# Patient Record
Sex: MALE | Race: WHITE | NOT HISPANIC OR LATINO | Employment: OTHER | URBAN - METROPOLITAN AREA
[De-identification: names, ages, dates, MRNs, and addresses within clinical notes are randomized per-mention and may not be internally consistent; named-entity substitution may affect disease eponyms.]

---

## 2017-02-21 ENCOUNTER — HOSPITAL ENCOUNTER (OUTPATIENT)
Dept: RADIOLOGY | Facility: MEDICAL CENTER | Age: 77
Discharge: HOME | End: 2017-02-21
Payer: MEDICARE

## 2017-02-21 ENCOUNTER — TRANSCRIBE ORDERS (OUTPATIENT)
Dept: ADMINISTRATIVE | Facility: HOSPITAL | Age: 77
End: 2017-02-21

## 2017-02-21 DIAGNOSIS — J45.20 INTRINSIC ASTHMA WITHOUT STATUS ASTHMATICUS, MILD INTERMITTENT, UNCOMPLICATED: Primary | ICD-10-CM

## 2017-02-21 DIAGNOSIS — J45.20 INTRINSIC ASTHMA WITHOUT STATUS ASTHMATICUS, MILD INTERMITTENT, UNCOMPLICATED: ICD-10-CM

## 2017-02-21 PROCEDURE — 71020 HB CHEST X-RAY 2VW FRONTAL&LATL: CPT

## 2017-03-02 ENCOUNTER — ALLSCRIPTS OFFICE VISIT (OUTPATIENT)
Dept: OTHER | Facility: OTHER | Age: 77
End: 2017-03-02

## 2017-03-02 ENCOUNTER — TRANSCRIBE ORDERS (OUTPATIENT)
Dept: ADMINISTRATIVE | Facility: HOSPITAL | Age: 77
End: 2017-03-02

## 2017-03-02 DIAGNOSIS — T57.8X1A TOXIC EFFECT OF ASBESTOS: ICD-10-CM

## 2017-03-02 DIAGNOSIS — R05.9 COUGH: Primary | ICD-10-CM

## 2017-03-29 ENCOUNTER — HOSPITAL ENCOUNTER (OUTPATIENT)
Dept: PULMONOLOGY | Facility: HOSPITAL | Age: 77
Discharge: HOME | End: 2017-03-29
Attending: INTERNAL MEDICINE
Payer: MEDICARE

## 2017-03-29 ENCOUNTER — GENERIC CONVERSION - ENCOUNTER (OUTPATIENT)
Dept: OTHER | Facility: OTHER | Age: 77
End: 2017-03-29

## 2017-03-29 DIAGNOSIS — R05.9 COUGH: ICD-10-CM

## 2017-03-29 DIAGNOSIS — T57.8X1A TOXIC EFFECT OF ASBESTOS: ICD-10-CM

## 2017-03-29 PROCEDURE — 94726 PLETHYSMOGRAPHY LUNG VOLUMES: CPT

## 2017-03-29 PROCEDURE — 94760 N-INVAS EAR/PLS OXIMETRY 1: CPT

## 2017-03-29 PROCEDURE — 94729 DIFFUSING CAPACITY: CPT

## 2017-03-29 PROCEDURE — 94060 EVALUATION OF WHEEZING: CPT

## 2017-03-29 RX ORDER — ALBUTEROL SULFATE 2.5 MG/3ML
2.5 SOLUTION RESPIRATORY (INHALATION) ONCE
Status: COMPLETED | OUTPATIENT
Start: 2017-03-29 | End: 2017-03-29

## 2017-03-29 RX ADMIN — ALBUTEROL SULFATE 2.5 MG: 2.5 SOLUTION RESPIRATORY (INHALATION) at 10:32

## 2017-04-14 ENCOUNTER — APPOINTMENT (OUTPATIENT)
Dept: LAB | Facility: MEDICAL CENTER | Age: 77
End: 2017-04-14
Payer: MEDICARE

## 2017-04-14 ENCOUNTER — TRANSCRIBE ORDERS (OUTPATIENT)
Dept: ADMINISTRATIVE | Facility: HOSPITAL | Age: 77
End: 2017-04-14

## 2017-04-14 DIAGNOSIS — I10 ESSENTIAL HYPERTENSION, BENIGN: Primary | ICD-10-CM

## 2017-04-14 DIAGNOSIS — Z87.09 HISTORY OF ASTHMA: ICD-10-CM

## 2017-04-14 DIAGNOSIS — J45.909 INTRINSIC ASTHMA WITHOUT STATUS ASTHMATICUS, UNSPECIFIED ASTHMA SEVERITY, UNCOMPLICATED: ICD-10-CM

## 2017-04-14 DIAGNOSIS — Z00.00 ROUTINE GENERAL MEDICAL EXAMINATION AT A HEALTH CARE FACILITY: ICD-10-CM

## 2017-04-14 LAB
ALBUMIN SERPL BCP-MCNC: 3.6 G/DL (ref 3.5–5)
ALP SERPL-CCNC: 84 U/L (ref 46–116)
ALT SERPL W P-5'-P-CCNC: 18 U/L (ref 12–78)
ANION GAP SERPL CALCULATED.3IONS-SCNC: 5 MMOL/L (ref 4–13)
AST SERPL W P-5'-P-CCNC: 12 U/L (ref 5–45)
BACTERIA UR QL AUTO: NORMAL /HPF
BASOPHILS # BLD AUTO: 0.02 THOUSANDS/ΜL (ref 0–0.1)
BASOPHILS NFR BLD AUTO: 0 % (ref 0–1)
BILIRUB SERPL-MCNC: 1.15 MG/DL (ref 0.2–1)
BILIRUB UR QL STRIP: NEGATIVE
BUN SERPL-MCNC: 21 MG/DL (ref 5–25)
CALCIUM SERPL-MCNC: 8.8 MG/DL (ref 8.3–10.1)
CHLORIDE SERPL-SCNC: 107 MMOL/L (ref 100–108)
CHOLEST SERPL-MCNC: 164 MG/DL (ref 50–200)
CLARITY UR: CLEAR
CO2 SERPL-SCNC: 30 MMOL/L (ref 21–32)
COLOR UR: YELLOW
CREAT SERPL-MCNC: 1.06 MG/DL (ref 0.6–1.3)
EOSINOPHIL # BLD AUTO: 0.19 THOUSAND/ΜL (ref 0–0.61)
EOSINOPHIL NFR BLD AUTO: 3 % (ref 0–6)
ERYTHROCYTE [DISTWIDTH] IN BLOOD BY AUTOMATED COUNT: 13.3 % (ref 11.6–15.1)
GFR SERPL CREATININE-BSD FRML MDRD: >60 ML/MIN/1.73SQ M
GLUCOSE P FAST SERPL-MCNC: 89 MG/DL (ref 65–99)
GLUCOSE UR STRIP-MCNC: NEGATIVE MG/DL
HCT VFR BLD AUTO: 40.2 % (ref 36.5–49.3)
HDLC SERPL-MCNC: 38 MG/DL (ref 40–60)
HGB BLD-MCNC: 13.7 G/DL (ref 12–17)
HGB UR QL STRIP.AUTO: NEGATIVE
HYALINE CASTS #/AREA URNS LPF: NORMAL /LPF
KETONES UR STRIP-MCNC: NEGATIVE MG/DL
LDLC SERPL CALC-MCNC: 105 MG/DL (ref 0–100)
LEUKOCYTE ESTERASE UR QL STRIP: NEGATIVE
LYMPHOCYTES # BLD AUTO: 1.62 THOUSANDS/ΜL (ref 0.6–4.47)
LYMPHOCYTES NFR BLD AUTO: 25 % (ref 14–44)
MCH RBC QN AUTO: 31.9 PG (ref 26.8–34.3)
MCHC RBC AUTO-ENTMCNC: 34.1 G/DL (ref 31.4–37.4)
MCV RBC AUTO: 94 FL (ref 82–98)
MONOCYTES # BLD AUTO: 0.57 THOUSAND/ΜL (ref 0.17–1.22)
MONOCYTES NFR BLD AUTO: 9 % (ref 4–12)
NEUTROPHILS # BLD AUTO: 4.1 THOUSANDS/ΜL (ref 1.85–7.62)
NEUTS SEG NFR BLD AUTO: 63 % (ref 43–75)
NITRITE UR QL STRIP: NEGATIVE
NON-SQ EPI CELLS URNS QL MICRO: NORMAL /HPF
NRBC BLD AUTO-RTO: 0 /100 WBCS
PH UR STRIP.AUTO: 6 [PH] (ref 4.5–8)
PLATELET # BLD AUTO: 166 THOUSANDS/UL (ref 149–390)
PMV BLD AUTO: 10.7 FL (ref 8.9–12.7)
POTASSIUM SERPL-SCNC: 4.2 MMOL/L (ref 3.5–5.3)
PROT SERPL-MCNC: 6.5 G/DL (ref 6.4–8.2)
PROT UR STRIP-MCNC: NEGATIVE MG/DL
RBC # BLD AUTO: 4.3 MILLION/UL (ref 3.88–5.62)
RBC #/AREA URNS AUTO: NORMAL /HPF
SODIUM SERPL-SCNC: 142 MMOL/L (ref 136–145)
SP GR UR STRIP.AUTO: 1.02 (ref 1–1.03)
TRIGL SERPL-MCNC: 106 MG/DL
TSH SERPL DL<=0.05 MIU/L-ACNC: 1.68 UIU/ML (ref 0.36–3.74)
UROBILINOGEN UR QL STRIP.AUTO: 0.2 E.U./DL
WBC # BLD AUTO: 6.52 THOUSAND/UL (ref 4.31–10.16)
WBC #/AREA URNS AUTO: NORMAL /HPF

## 2017-04-14 PROCEDURE — 84443 ASSAY THYROID STIM HORMONE: CPT | Performed by: FAMILY MEDICINE

## 2017-04-14 PROCEDURE — 85025 COMPLETE CBC W/AUTO DIFF WBC: CPT | Performed by: FAMILY MEDICINE

## 2017-04-14 PROCEDURE — 80053 COMPREHEN METABOLIC PANEL: CPT | Performed by: FAMILY MEDICINE

## 2017-04-14 PROCEDURE — 36415 COLL VENOUS BLD VENIPUNCTURE: CPT | Performed by: FAMILY MEDICINE

## 2017-04-14 PROCEDURE — 80061 LIPID PANEL: CPT | Performed by: FAMILY MEDICINE

## 2017-04-14 PROCEDURE — 81001 URINALYSIS AUTO W/SCOPE: CPT | Performed by: FAMILY MEDICINE

## 2018-01-14 VITALS
HEART RATE: 76 BPM | WEIGHT: 221 LBS | BODY MASS INDEX: 30.94 KG/M2 | RESPIRATION RATE: 18 BRPM | DIASTOLIC BLOOD PRESSURE: 64 MMHG | TEMPERATURE: 98.4 F | HEIGHT: 71 IN | OXYGEN SATURATION: 96 % | SYSTOLIC BLOOD PRESSURE: 132 MMHG

## 2018-04-12 ENCOUNTER — TRANSCRIBE ORDERS (OUTPATIENT)
Dept: ADMINISTRATIVE | Facility: HOSPITAL | Age: 78
End: 2018-04-12

## 2018-04-12 ENCOUNTER — LAB (OUTPATIENT)
Dept: LAB | Facility: MEDICAL CENTER | Age: 78
End: 2018-04-12
Payer: MEDICARE

## 2018-04-12 DIAGNOSIS — R53.83 FATIGUE, UNSPECIFIED TYPE: ICD-10-CM

## 2018-04-12 DIAGNOSIS — Z12.5 SPECIAL SCREENING FOR MALIGNANT NEOPLASM OF PROSTATE: ICD-10-CM

## 2018-04-12 DIAGNOSIS — E78.2 MIXED HYPERLIPIDEMIA: ICD-10-CM

## 2018-04-12 DIAGNOSIS — I10 ESSENTIAL HYPERTENSION, BENIGN: ICD-10-CM

## 2018-04-12 DIAGNOSIS — I10 ESSENTIAL HYPERTENSION, BENIGN: Primary | ICD-10-CM

## 2018-04-12 LAB
ALBUMIN SERPL BCP-MCNC: 3.7 G/DL (ref 3.5–5)
ALP SERPL-CCNC: 97 U/L (ref 46–116)
ALT SERPL W P-5'-P-CCNC: 18 U/L (ref 12–78)
ANION GAP SERPL CALCULATED.3IONS-SCNC: 6 MMOL/L (ref 4–13)
AST SERPL W P-5'-P-CCNC: 13 U/L (ref 5–45)
BASOPHILS # BLD AUTO: 0.02 THOUSANDS/ΜL (ref 0–0.1)
BASOPHILS NFR BLD AUTO: 0 % (ref 0–1)
BILIRUB SERPL-MCNC: 0.92 MG/DL (ref 0.2–1)
BILIRUB UR QL STRIP: NEGATIVE
BUN SERPL-MCNC: 29 MG/DL (ref 5–25)
CALCIUM SERPL-MCNC: 8.7 MG/DL
CHLORIDE SERPL-SCNC: 110 MMOL/L (ref 100–108)
CHOLEST SERPL-MCNC: 160 MG/DL (ref 50–200)
CLARITY UR: CLEAR
CO2 SERPL-SCNC: 28 MMOL/L (ref 21–32)
COLOR UR: YELLOW
CREAT SERPL-MCNC: 1.05 MG/DL (ref 0.6–1.3)
EOSINOPHIL # BLD AUTO: 0.11 THOUSAND/ΜL (ref 0–0.61)
EOSINOPHIL NFR BLD AUTO: 2 % (ref 0–6)
ERYTHROCYTE [DISTWIDTH] IN BLOOD BY AUTOMATED COUNT: 12.8 % (ref 11.6–15.1)
GFR SERPL CREATININE-BSD FRML MDRD: 69 ML/MIN/1.73SQ M
GLUCOSE P FAST SERPL-MCNC: 96 MG/DL (ref 65–99)
GLUCOSE UR STRIP-MCNC: NEGATIVE MG/DL
HCT VFR BLD AUTO: 42.4 % (ref 36.5–49.3)
HDLC SERPL-MCNC: 44 MG/DL (ref 40–60)
HGB BLD-MCNC: 13.9 G/DL (ref 12–17)
HGB UR QL STRIP.AUTO: NEGATIVE
KETONES UR STRIP-MCNC: NEGATIVE MG/DL
LDLC SERPL CALC-MCNC: 97 MG/DL (ref 0–100)
LEUKOCYTE ESTERASE UR QL STRIP: NEGATIVE
LYMPHOCYTES # BLD AUTO: 1.66 THOUSANDS/ΜL (ref 0.6–4.47)
LYMPHOCYTES NFR BLD AUTO: 25 % (ref 14–44)
MCH RBC QN AUTO: 31.4 PG (ref 26.8–34.3)
MCHC RBC AUTO-ENTMCNC: 32.8 G/DL (ref 31.4–37.4)
MCV RBC AUTO: 96 FL (ref 82–98)
MONOCYTES # BLD AUTO: 0.52 THOUSAND/ΜL (ref 0.17–1.22)
MONOCYTES NFR BLD AUTO: 8 % (ref 4–12)
NEUTROPHILS # BLD AUTO: 4.22 THOUSANDS/ΜL (ref 1.85–7.62)
NEUTS SEG NFR BLD AUTO: 65 % (ref 43–75)
NITRITE UR QL STRIP: NEGATIVE
NONHDLC SERPL-MCNC: 116 MG/DL
NRBC BLD AUTO-RTO: 0 /100 WBCS
PH UR STRIP.AUTO: 6 [PH] (ref 4.5–8)
PLATELET # BLD AUTO: 173 THOUSANDS/UL (ref 149–390)
PMV BLD AUTO: 10.4 FL (ref 8.9–12.7)
POTASSIUM SERPL-SCNC: 4.7 MMOL/L (ref 3.5–5.3)
PROT SERPL-MCNC: 6.9 G/DL (ref 6.4–8.2)
PROT UR STRIP-MCNC: NEGATIVE MG/DL
PSA SERPL-MCNC: 1.3 NG/ML (ref 0–4)
RBC # BLD AUTO: 4.42 MILLION/UL (ref 3.88–5.62)
SODIUM SERPL-SCNC: 144 MMOL/L (ref 136–145)
SP GR UR STRIP.AUTO: 1.02 (ref 1–1.03)
TRIGL SERPL-MCNC: 95 MG/DL
TSH SERPL DL<=0.05 MIU/L-ACNC: 2.23 UIU/ML (ref 0.36–3.74)
UROBILINOGEN UR QL STRIP.AUTO: 0.2 E.U./DL
WBC # BLD AUTO: 6.54 THOUSAND/UL (ref 4.31–10.16)

## 2018-04-12 PROCEDURE — 81003 URINALYSIS AUTO W/O SCOPE: CPT | Performed by: FAMILY MEDICINE

## 2018-04-12 PROCEDURE — 36415 COLL VENOUS BLD VENIPUNCTURE: CPT

## 2018-04-12 PROCEDURE — 85025 COMPLETE CBC W/AUTO DIFF WBC: CPT

## 2018-04-12 PROCEDURE — G0103 PSA SCREENING: HCPCS

## 2018-04-12 PROCEDURE — 80053 COMPREHEN METABOLIC PANEL: CPT

## 2018-04-12 PROCEDURE — 84443 ASSAY THYROID STIM HORMONE: CPT

## 2018-04-12 PROCEDURE — 80061 LIPID PANEL: CPT

## 2018-05-02 ENCOUNTER — TELEPHONE (OUTPATIENT)
Dept: NEUROLOGY | Facility: CLINIC | Age: 78
End: 2018-05-02

## 2018-06-13 ENCOUNTER — TRANSCRIBE ORDERS (OUTPATIENT)
Dept: ADMINISTRATIVE | Facility: HOSPITAL | Age: 78
End: 2018-06-13

## 2018-06-13 ENCOUNTER — APPOINTMENT (OUTPATIENT)
Dept: LAB | Facility: MEDICAL CENTER | Age: 78
End: 2018-06-13
Payer: MEDICARE

## 2018-06-13 DIAGNOSIS — Z11.9 SCREENING EXAMINATION FOR INFECTIOUS DISEASE: ICD-10-CM

## 2018-06-13 DIAGNOSIS — Z11.9 SCREENING EXAMINATION FOR INFECTIOUS DISEASE: Primary | ICD-10-CM

## 2018-06-13 PROCEDURE — 36415 COLL VENOUS BLD VENIPUNCTURE: CPT

## 2018-06-13 PROCEDURE — 86618 LYME DISEASE ANTIBODY: CPT

## 2018-06-14 LAB
B BURGDOR IGG SER IA-ACNC: 0.07
B BURGDOR IGM SER IA-ACNC: 0.34

## 2018-07-12 ENCOUNTER — OFFICE VISIT (OUTPATIENT)
Dept: NEUROLOGY | Facility: CLINIC | Age: 78
End: 2018-07-12
Payer: MEDICARE

## 2018-07-12 VITALS
DIASTOLIC BLOOD PRESSURE: 70 MMHG | WEIGHT: 209 LBS | SYSTOLIC BLOOD PRESSURE: 118 MMHG | HEIGHT: 71 IN | RESPIRATION RATE: 14 BRPM | HEART RATE: 81 BPM | BODY MASS INDEX: 29.26 KG/M2

## 2018-07-12 DIAGNOSIS — G20 PARKINSON'S DISEASE (HCC): Primary | ICD-10-CM

## 2018-07-12 DIAGNOSIS — R26.89 IMBALANCE: ICD-10-CM

## 2018-07-12 DIAGNOSIS — G31.84 MILD COGNITIVE IMPAIRMENT: ICD-10-CM

## 2018-07-12 PROCEDURE — 99204 OFFICE O/P NEW MOD 45 MIN: CPT | Performed by: PSYCHIATRY & NEUROLOGY

## 2018-07-12 RX ORDER — LOSARTAN POTASSIUM 100 MG/1
100 TABLET ORAL ONCE
COMMUNITY
Start: 2018-06-04 | End: 2019-03-12

## 2018-07-12 NOTE — PROGRESS NOTES
Patient ID: Marlene Fermin  is a 69 y o  male  Assessment/Plan:    No problem-specific Assessment & Plan notes found for this encounter  Diagnoses and all orders for this visit:    Parkinson's disease ([address] )- he has action tremor but otherwise with significant bradykinesia, decremental response and increased tone on the right concerning for PD  Sinemet trial, discussed potential med a/e  -     Ambulatory referral to Physical Therapy; Future- BIG and LOUD therapy  -     MRI brain without contrast; Future  -     carbidopa-levodopa (SINEMET)  mg per tablet; Take 1 tablet by mouth 3 (three) times a day  - Discussed staying hydrated to help with postural light headedness and constipation and daily exercise- stationary bike, aqua therapy at least 30 minutes a day  Mild cognitive impairment  -     Vitamin B12; Future  -     Folate; Future  -     Vitamin D 25 hydroxy; Future    Imbalance  -     Vitamin B12; Future  -     Folate; Future  -     Vitamin D 25 hydroxy; Future    Other orders  -     losartan (COZAAR) 100 MG tablet; Take 100 tablets by mouth once  Follow up in three months time         Subjective:    HPI     Mr Noris Cisneros is a 68 yo left handed male, hx b/l shoulder and knee surgery, hx asbestosis (was a ), seen in consultation for tremors  He tells me he was having therapy for shoulder and PT noted less dexterity in his right and left hands thus here  His wife tells me over time he has gotten weaker- worse for the last 2-3 years with no inciting etiology  States he is more stiff and slower to do things  He states he has had worsening tremors more over the last year; milder prior  Notes it while using a screw  or buttoning shirts but not noted at rest  Wife States speech a little more softer over time- he used to scream all the time on his cell per him but no longer    He denies one sided weakness   States chronic neck pain  Hx skull fracture and concussion s/p fall with ice skating decades ago but no residual symptoms per him  Hx reading issues since childhood, but wife states worse short term memory over the last few years but does ADLs work around the house with no issues per him and her other than physical trouble with tremor  States no trouble with driving, states no getting lost going in the familiar route or issues with reaction time per wife and patient  States no family history of tremors or PD  States no hx CAD, DM, irregular heart beat stroke or seizure  No tobacco use or significant alcohol use  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  States any type of pain medication- he hallucinates  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 thus prefers open MRI  Denies dysphagia, hallucinosis, sleeping trouble or depression or anxiety otherwise  Does report light headedness with standing and constipation- states drinks may 1-2 cups water daily  Denies syncope    The following portions of the patient's history were reviewed and updated as appropriate: allergies, current medications, past family history, past medical history, past social history, past surgical history and problem list          Objective:    Blood pressure 118/70, pulse 81, resp  rate 14, height 5' 11" (1 803 m), weight 94 8 kg (209 lb)  Physical Exam   Constitutional: He appears well-developed and well-nourished  HENT:   Head: Normocephalic and atraumatic  Eyes: Conjunctivae and EOM are normal  Pupils are equal, round, and reactive to light  Neck: Normal range of motion  Neck supple  Cardiovascular: Normal rate and regular rhythm  Pulmonary/Chest: Effort normal    Musculoskeletal: Normal range of motion  Neurological: He has normal strength and normal reflexes  Coordination normal    Nursing note and vitals reviewed        Neurological Exam    Mental Status  The patient is alert and oriented to person, place, time, and situation  His recent and remote memory are normal  He has dysarthria of mild severity  He is able to name object, read and repeat  He has normal attention span and concentration  He follows multi-step commands  He has a normal fund of knowledge  Bradykinetic worse with right  Hypomimia, hypophonia       Cranial Nerves    CN II: The patient's visual acuity and visual fields are normal   CN III, IV, VI: The patient's pupils are equally round and reactive to light and ocular movements are normal   CN V: The patient has normal facial sensation  CN VII:  The patient has symmetric facial movement  CN VIII:  The patient's hearing is normal   CN IX, X: The patient has symmetric palate movement and normal gag reflex  CN XI: The patient's shoulder shrug strength is normal   CN XII: The patient's tongue is midline without atrophy or fasciculations  Motor  The patient has normal muscle bulk throughout  His overall muscle tone is increased throughout  Specifically, the tone is increased in the right arm, increased in the right leg,  He has abnormal movement with bradykinesia  He has right-sided tremor and left-sided tremor  There is postural and intention tremor of the right arm;  There is postural and intention tremor of the left arm;  His strength is 5/5 throughout all four extremities  More bradykinetic on the right with more decremental response with HAM right UE and LE     Sensory  The patient's sensation is normal in all four extremities to light touch, temperature and vibration  He has no right-sided and no left-sided hemispatial neglect  Reflexes  Deep tendon reflexes are 2+ and symmetric in all four extremities with downgoing toes bilaterally  Gait and Coordination   He has a narrow stance  He has a shortened stride  Romberg's sign is negative  He has normal coordination bilaterally  Mildly stooped posture and reduced arm swing b/l          ROS:    Review of Systems   Constitutional: Negative      HENT: Positive for hearing loss and sinus pressure  Eyes: Negative  Respiratory: Positive for shortness of breath  Cardiovascular: Negative  Gastrointestinal: Negative  Endocrine: Negative  Genitourinary: Negative  Musculoskeletal: Positive for back pain and neck pain  Joint pain     Skin: Negative  Allergic/Immunologic: Negative  Neurological: Positive for tremors  Taste or smell changes   Hematological: Negative  Psychiatric/Behavioral: Negative

## 2018-07-12 NOTE — LETTER
July 12, 2018     Miky Robert, [address] [place] [address]  [facility] [address] [place] [address] Road [address]    Patient: Anya Kc  YOB: 1940   Date of Visit: 7/12/2018       Dear Dr Jerry Carrel:    Thank you for referring Susanne Silva to me for evaluation  Below are my notes for this consultation  If you have questions, please do not hesitate to call me  I look forward to following your patient along with you  Sincerely,        Beatrice Vieyra DO        CC: No Recipients  Beatrice Vieyra DO  7/12/2018  6:01 PM  Sign at close encounter  Patient ID: Anya Kc  is a 69 y o  male  Assessment/Plan:    No problem-specific Assessment & Plan notes found for this encounter  Diagnoses and all orders for this visit:    Parkinson's disease ([address] )- he has action tremor but otherwise with significant bradykinesia, decremental response and increased tone on the right concerning for PD  Sinemet trial, discussed potential med a/e  -     Ambulatory referral to Physical Therapy; Future- BIG and LOUD therapy  -     MRI brain without contrast; Future  -     carbidopa-levodopa (SINEMET)  mg per tablet; Take 1 tablet by mouth 3 (three) times a day  - Discussed staying hydrated to help with postural light headedness and constipation and daily exercise- stationary bike, aqua therapy at least 30 minutes a day  Mild cognitive impairment  -     Vitamin B12; Future  -     Folate; Future  -     Vitamin D 25 hydroxy; Future    Imbalance  -     Vitamin B12; Future  -     Folate; Future  -     Vitamin D 25 hydroxy; Future    Other orders  -     losartan (COZAAR) 100 MG tablet; Take 100 tablets by mouth once  Follow up in three months time         Subjective:    HPI     Mr Susanne Silva is a 68 yo left handed male, hx b/l shoulder and knee surgery, hx asbestosis (was a ), seen in consultation for tremors    He tells me he was having therapy for shoulder and PT noted less dexterity in his right and left hands thus here  His wife tells me over time he has gotten weaker- worse for the last 2-3 years with no inciting etiology  States he is more stiff and slower to do things  He states he has had worsening tremors more over the last year; milder prior  Notes it while using a screw  or buttoning shirts but not noted at rest  Wife States speech a little more softer over time- he used to scream all the time on his cell per him but no longer  He denies one sided weakness   States chronic neck pain  Hx skull fracture and concussion s/p fall with ice skating decades ago but no residual symptoms per him  Hx reading issues since childhood, but wife states worse short term memory over the last few years but does ADLs work around the house with no issues per him and her other than physical trouble with tremor  States no trouble with driving, states no getting lost going in the familiar route or issues with reaction time per wife and patient  States no family history of tremors or PD  States no hx CAD, DM, irregular heart beat stroke or seizure  No tobacco use or significant alcohol use  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  States any type of pain medication- he hallucinates  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 thus prefers open MRI  Denies dysphagia, hallucinosis, sleeping trouble or depression or anxiety otherwise  Does report light headedness with standing and constipation- states drinks may 1-2 cups water daily  Denies syncope    The following portions of the patient's history were reviewed and updated as appropriate: allergies, current medications, past family history, past medical history, past social history, past surgical history and problem list          Objective:    Blood pressure 118/70, pulse 81, resp  rate 14, height 5' 11" (1 803 m), weight 94 8 kg (209 lb)      Physical Exam   Constitutional: He appears well-developed and well-nourished  HENT:   Head: Normocephalic and atraumatic  Eyes: Conjunctivae and EOM are normal  Pupils are equal, round, and reactive to light  Neck: Normal range of motion  Neck supple  Cardiovascular: Normal rate and regular rhythm  Pulmonary/Chest: Effort normal    Musculoskeletal: Normal range of motion  Neurological: He has normal strength and normal reflexes  Coordination normal    Nursing note and vitals reviewed  Neurological Exam    Mental Status  The patient is alert and oriented to person, place, time, and situation  His recent and remote memory are normal  He has dysarthria of mild severity  He is able to name object, read and repeat  He has normal attention span and concentration  He follows multi-step commands  He has a normal fund of knowledge  Bradykinetic worse with right  Hypomimia, hypophonia       Cranial Nerves    CN II: The patient's visual acuity and visual fields are normal   CN III, IV, VI: The patient's pupils are equally round and reactive to light and ocular movements are normal   CN V: The patient has normal facial sensation  CN VII:  The patient has symmetric facial movement  CN VIII:  The patient's hearing is normal   CN IX, X: The patient has symmetric palate movement and normal gag reflex  CN XI: The patient's shoulder shrug strength is normal   CN XII: The patient's tongue is midline without atrophy or fasciculations  Motor  The patient has normal muscle bulk throughout  His overall muscle tone is increased throughout  Specifically, the tone is increased in the right arm, increased in the right leg,  He has abnormal movement with bradykinesia  He has right-sided tremor and left-sided tremor  There is postural and intention tremor of the right arm;  There is postural and intention tremor of the left arm;  His strength is 5/5 throughout all four extremities    More bradykinetic on the right with more decremental response with HAM right UE and LE     Sensory  The patient's sensation is normal in all four extremities to light touch, temperature and vibration  He has no right-sided and no left-sided hemispatial neglect  Reflexes  Deep tendon reflexes are 2+ and symmetric in all four extremities with downgoing toes bilaterally  Gait and Coordination   He has a narrow stance  He has a shortened stride  Romberg's sign is negative  He has normal coordination bilaterally  Mildly stooped posture and reduced arm swing b/l          ROS:    Review of Systems   Constitutional: Negative  HENT: Positive for hearing loss and sinus pressure  Eyes: Negative  Respiratory: Positive for shortness of breath  Cardiovascular: Negative  Gastrointestinal: Negative  Endocrine: Negative  Genitourinary: Negative  Musculoskeletal: Positive for back pain and neck pain  Joint pain     Skin: Negative  Allergic/Immunologic: Negative  Neurological: Positive for tremors  Taste or smell changes   Hematological: Negative  Psychiatric/Behavioral: Negative

## 2018-07-16 ENCOUNTER — APPOINTMENT (OUTPATIENT)
Dept: LAB | Facility: MEDICAL CENTER | Age: 78
End: 2018-07-16
Payer: MEDICARE

## 2018-07-16 DIAGNOSIS — G31.84 MILD COGNITIVE IMPAIRMENT: ICD-10-CM

## 2018-07-16 DIAGNOSIS — R26.89 IMBALANCE: ICD-10-CM

## 2018-07-16 LAB
25(OH)D3 SERPL-MCNC: 29.1 NG/ML (ref 30–100)
FOLATE SERPL-MCNC: >20 NG/ML (ref 3.1–17.5)
VIT B12 SERPL-MCNC: 435 PG/ML (ref 100–900)

## 2018-07-16 PROCEDURE — 82746 ASSAY OF FOLIC ACID SERUM: CPT

## 2018-07-16 PROCEDURE — 82607 VITAMIN B-12: CPT

## 2018-07-16 PROCEDURE — 82306 VITAMIN D 25 HYDROXY: CPT

## 2018-07-16 PROCEDURE — 36415 COLL VENOUS BLD VENIPUNCTURE: CPT

## 2018-07-24 NOTE — PROGRESS NOTES
Speech-Language Pathology Initial Evaluation    Today's date: 2018   Patients name: Gerardo Good  : 1940  MRN: [id number]  Safety measures: Parkinson's disease, fall risk   Referring provider: Rena Cintron DO    Medical history significant for:   Past Medical History:   Diagnosis Date    Arthritis     Asbestos exposure     [address] ([facility] [address]     GERD (gastroesophageal reflux disease)     Hypertension     Lung disease     Parkinson's disease ([facility] [address] )        Medication list:   Current Outpatient Prescriptions   Medication Sig Dispense Refill    carbidopa-levodopa (SINEMET)  mg per tablet Take 1 tablet by mouth 3 (three) times a day 90 tablet 3    losartan (COZAAR) 100 MG tablet Take 100 tablets by mouth once       No current facility-administered medications for this visit  Allergies: Allergies   Allergen Reactions    Morphine And Related Other (See Comments)     Patient gets delirious       Subjective comments: "I'm fine "    Patient/family's goal(s): "to talk louder"    Reason for referral: Change in vocal quality  Prior functional status: Communication effective and appropriate in all situations  Clinically complex situations: Mental/behavioral diagnosis affecting rate of recovery    History: Patient is a 69 y o  male who was referred to outpatient skilled Speech Therapy services for an LSVT LOUD evaluation  Per review of record, patient has an action tremor, but otherwise, without significant bradykinesia  Patient was recently dx with Parkinson's disease  Other significant PMH includes MCI, imbalance, concussion s/p fall while ice skating (decades ago), h/o asbestosis (was a )  Patient with a significant PSH of b/l shoulder and knee surgery  Patient's wife indicated that over the past 2-3 years without any inciting etiology, patient had become weaker ("more stiff   slower to do things")  Patient reported that tremors have worsened over the past year  Patient's wife also noted that patient's speech has become "a little more softer over time" over the past 6 months--per review of record, she reported that he used to "scream" while using his cell phone, but no longer does  Patient reported that he is 75% intelligible to both familiar and unfamiliar listeners  Patient demonstrates decreased awareness of his reduced vocal loudness  Hearing: Decreased hearing (Per patient's wife, was evaluated by audiologist; however, did not f/u )  Vision: Reading glasses (h/o Lasik surgery)    Home environment/lifestyle: Lives with wife  Highest level of education: High school  Vocational status:  (retired)    Mental status: Alert; forgetful  Behavior status: Cooperative  Communication modalities: Verbal  Rehabilitation prognosis: Fair rehab potential to reach the established goals    Assessments    [facility] (LSVT) LOUD assessment:    Sound Level Meter-to-Mouth Distance: 50 cm throughout testing    Maximum Duration of Sustained Vowel Phonation (/ah/):   Average MDP 11 9 sec (range 11 25-13 0 sec)   Average Intensity 68 4 dB SPL       Maximum Fundamental Frequency Range:   Highest Pitch 170 3 Hz   Lowest Pitch 138 0 Hz   Average Intensity 78 7 dB SPL     Reading of Words:   Average Intensity  67 2 dB SPL     Reading of Phrases:   Average Intensity  66 7 dB SPL     Conversational Monologue:   Average Intensity  67 2 dB SPL     *Data gathered from reading and conversation tasks revealed vocal SPL levels that may significantly reduce patients speech intelligibility and communicative effectiveness in his functional living environment  **STIMULABILITY TESTING TO [address] LOUD! **    Maximum Duration of Sustained Vowel Phonation (/ah/):   Average MDP 13 5 sec   Average Intensity 87 5 dB SPL       Maximum Fundamental Frequency Range:   Highest Pitch 233 0 Hz   Lowest Pitch 169 3 Hz   Average Intensity 84 5 dB SPL     Reading of Phrases:   Average Intensity  76 0 dB SPL     *Patient demonstrated vocal improvement with loudness and quality in all tasks when stimulated  Voice Handicap Index (VHI): The VHI is a list of 30 statements that many people have used to describe their voices and the effects of their voices on their lives  Patient indicated how frequently he has the same experience using a rating point scale (never = 0, almost never = 1, sometimes = 2, almost always = 3, and always = 4)  Results were as follows:    Subscale: Score: Self-Perceived Impairment Level:   Physical 10/40 Mild   Emotional 5/40 Mild   Functional 9/40 Mild        TOTAL 24/120 Mild       Goals  Short-term goals:  1  Patient and caregiver will be educated on vocal hygiene and demonstrate understanding of recommendations to facilitate improved vocal quality (to be achieved in 1-2 weeks)       2  Patient will increase his vocal loudness from 50% to 75% of the time in order to convey information over the telephone with min verbal cues (to be achieved in 4 weeks)       3  Patient will demonstrate understanding that his louder voice is WNL and will become comfortable increasing his phonatory effort (to be achieved in 4 weeks)      4  Patient will develop the ability to monitor adequate loudness levels in conversation to facilitate increased communication success with min verbal cues (to be achieved in 4 weeks)       Long-term goals:  1    Patient will increase his vocal loudness to an appropriate level to be understood by others with min verbal cues (to be achieved by discharge)       2  Patient will increase self-intelligibility rating by 25% (to be achieved by discharge)      Functional Limitations Reporting (G-codes):    Flowsheet Rows      Most Recent Value   SLP G-Codes   FOTO information reviewed  N/A   Assessment Type  Evaluation   Functional Limitations  Voice   Voice Current Status ()  CL   Voice Goal Status ()  CJ Impressions/Recommendations    Impressions: Patient presents with moderate-severe voice disorder c/b reduced vocal loudness and a breathy vocal quality, which contributes to an overall decrease in speech intelligibility  During conversation, speech intelligibility is reduced 60% of the time as judged by this clinician  Based on stimulability testing, patient is an good candidate for the LSVT LOUD program; however, due to suspected cognitive decline as evidenced through informal observation during testing, patient's rehabilitation prognosis decreases  Rehabilitation prognosis is dependant upon the involment of patient's caregiver (wife)  P[place]et would benefit from skilled speech/voice therapy (LSVT LOUD program) in the outpatient setting 4x/week for 4 weeks (consisting of 16 sessions)  Recommendations:  -Patient would benefit from outpatient skilled Speech Therapy services : LSVT LOUD program    -Frequency: 4x weekly  -Duration: 4 weeks    -Intervention certification from: 2/66/9944  -Intervention certification to: 97/73/7529    Visit Tracking:  -Referring provider: Epic  -Billing guidelines: CMS  -Visit #1/10   -Medicare  [employer]  -RE due 08/25/2018

## 2018-07-25 ENCOUNTER — EVALUATION (OUTPATIENT)
Dept: SPEECH THERAPY | Facility: CLINIC | Age: 78
End: 2018-07-25
Payer: MEDICARE

## 2018-07-25 ENCOUNTER — EVALUATION (OUTPATIENT)
Dept: PHYSICAL THERAPY | Facility: CLINIC | Age: 78
End: 2018-07-25
Payer: MEDICARE

## 2018-07-25 DIAGNOSIS — G20 PARKINSON'S DISEASE (HCC): ICD-10-CM

## 2018-07-25 DIAGNOSIS — R49.9 UNSPECIFIED VOICE AND RESONANCE DISORDER: Primary | ICD-10-CM

## 2018-07-25 PROCEDURE — 97162 PT EVAL MOD COMPLEX 30 MIN: CPT | Performed by: PHYSICAL THERAPIST

## 2018-07-25 PROCEDURE — G9171 VOICE CURRENT STATUS: HCPCS | Performed by: SPEECH-LANGUAGE PATHOLOGIST

## 2018-07-25 PROCEDURE — G8978 MOBILITY CURRENT STATUS: HCPCS | Performed by: PHYSICAL THERAPIST

## 2018-07-25 PROCEDURE — 92524 BEHAVRAL QUALIT ANALYS VOICE: CPT | Performed by: SPEECH-LANGUAGE PATHOLOGIST

## 2018-07-25 PROCEDURE — G9172 VOICE GOAL STATUS: HCPCS | Performed by: SPEECH-LANGUAGE PATHOLOGIST

## 2018-07-25 PROCEDURE — G8979 MOBILITY GOAL STATUS: HCPCS | Performed by: PHYSICAL THERAPIST

## 2018-07-25 NOTE — PROGRESS NOTES
PT Evaluation     Today's date: 2018  Patient name: Reji Slater  : 1940  MRN: [id number]  Referring provider: Carrie Galvez DO  Dx:   Encounter Diagnosis     ICD-10-CM    1  Parkinson's disease ([facility] [address] Ambulatory referral to Physical Therapy       Start Time: 1400  Stop Time: 1500  Total time in clinic (min): 60 minutes    Assessment  Impairments: abnormal gait, impaired balance and safety issue    Assessment details: Pt is a 69year old male referred with Parkinson's  Presented today with impairments in decreased standing balance, (+) fall risk per FGA and TUG scores, occasional bradykinesia all which limit him functionally with gait, elevations, ADLs, and yardwork  Pt will benefit from [facility] BIG program to address these impairments with possible transfer to OT services for this program   Understanding of Dx/Px/POC: good   Prognosis: good    Goals  ST  Pt will demonstrate independence with HEP within 2 weeks  2  Pt will improve TUG cog score by 3 seconds reducing fall risk within 2 weeks  LT  Pt will improve gait speed to at least 1 22 m/s with least restrictive device needed for safe community mobility within 4 weeks  2  Pt will improve FGA score >25/30 needed to reduce fall risk within 4 weeks  3  Pt will improve ABC score to at least 90% within 4 weeks  4  Pt will report improved ability to climb in and out of his mower to mow the lawn within 4 weeks         Plan  Patient would benefit from: skilled physical therapy  Planned therapy interventions: balance, neuromuscular re-education, patient education, strengthening, therapeutic exercise, therapeutic activities, gait training and home exercise program  Frequency: 2x week  Duration in weeks: 4  Treatment plan discussed with: family and patient  Plan details: Certification period from today 18 through 18        Subjective Evaluation    History of Present Illness  Mechanism of injury: Noticed during PT this past winter when having therapy for his shoulder that his hand wasn't moving right  Per wife she was noticing his walking getting slower  Diagnosis with Parkinson's, started meds  Since been on meds has improved  Having problems with buttoning shirt, walking, writing, stumbles on stairs, tremor when action of left hand  Denies falls, but has had near falls  Does not need assistive device  Goals: wants to get hands moving better, walking better, buttoning shirts, slow progression of disease without getting worse, wants to increase speed/time with normal daily things  Pain  No pain reported    Social Support  Steps to enter house: yes  Stairs in house: yes (tobasement)   Lives in: [facility]  Lives with: spouse    Hand dominance: left          Objective    Flowsheet Rows      Most Recent Value   PT/OT G-Codes   Current Score  97   Projected Score  92   FOTO information reviewed  Yes   Assessment Type  Evaluation   G code set  Mobility: Walking & Moving Around   Mobility: Walking and Moving Around Current Status ()  CI   Mobility: Walking and Moving Around Goal Status ()  CI       Patient Reported Functional Limitations: see subjective    Patient Goals: see subjective    MMT: 5/5 throughout BLE except hip ext 3+/5 B, and hip abd 4/5 B   5/5 throughout BUE   Grib L: 80#; R: 73#    AROM: BLE and BUE WFL    Posture: forward head, slightly rounded B shoulders    Coordination  Finger to Nose: normal right and left  Rapid forearm supination and pronation: normal left, slowed on right  Heel on Shin: normal right and left    Sit to Stand Assessment  5xSTS score (time): 9 5 seconds    Gait Assessments  1  Timed Up and Go (TUG)   TUG Score: 11 sec   TUG Manual Score: 12 5 sec   TUG Cognitive Score: 16 3 sec  2  6 minute walk test score: NT  3   Gait speed: 94 m/s without AD   Deviations: decreased heel strike B, decreased speed,   4: Functional Gait Assessment (FGA) <22/30 indicates increased risk for falls: 20/30    Hand Function Assessment  9 Hole Peg Test Score: left: 46 sec; right: 34 sec    Other  1  Modified Clinical Test of Sensory Interaction on Balance  eyes open firm surface: 30s  eyes closed firm surface: 30s  eyes open foam surface: 30s  eyes closed foam surface: 30s, increased sway  2  Activities Specific Balance Confidence Scale (ABC) score: 79%    Identified Functional Component Movements (5)  1  Sit to Stand  2  Buttons on shirt  3  Slouching with posture  4  Putting shoes on  5  Writing    Identified Hierarchies (1-3)  1  Using  (stepping on, steering, pushing pedal)  2  Using power tools  3  Precautions: Parkinson's, HTN  [facility] BIG Daily Treatment Diary      Carryover Assignment                                                                                      Max Daily Exercises             Floor to Ceiling             Side to Side             Forward Step and Reach             Sideways Step and Reach             Backwards Step and Reach             Forwards Rock and Reach             Sideways Rock and Reach             Functional Components             1  STS             2  Buttons on shirt             3  posture             4  Writing             5  Putting shirt on             Hierarchies             1   Getting on              2              BIG walking

## 2018-07-30 NOTE — PROGRESS NOTES
Daily Speech Treatment Note    Today's date: 2018  Patients name: Safia Chaney  : 1940  MRN: [id number]  Safety measures: Parkinson's disease, fall risk  Referring provider: Luis Alas DO    Primary Diagnosis/Billing code: R49 9  Secondary Diagnosis/ Billing code: [address]    Visit Tracking:  -Referring provider: Epic  -Billing guidelines: CMS  -Visit #2/10   -Medicare  [employer]  -RE due 2018  Subjective/Behavioral:  -"I'm okay  My reading and writing are not good " Patient noted to uncontrollably laugh frequently during initial tasks, which appeared to distract patient from task  Patient also noted to have difficulty understanding instruction for tasks during session and required multiple reminders of what to do  Objective/Assessment:    Short-term goals:  1  Patient and caregiver will be educated on vocal hygiene and demonstrate understanding of recommendations to facilitate improved vocal quality (to be achieved in 1-2 weeks)  2  Patient will increase his vocal loudness from 50% to 75% of the time in order to convey information over the telephone with min verbal cues (to be achieved in 4 weeks)  3  Patient will demonstrate understanding that his louder voice is WNL and will become comfortable increasing his phonatory effort (to be achieved in 4 weeks)  4  Patient will develop the ability to monitor adequate loudness levels in conversation to facilitate increased communication success with min verbal cues (to be achieved in 4 weeks)  The following data was collected using [address] [place] [address]  Sound level meter-to-mouth distance: 50 cm      Daily Task #1  Maximum Duration & Intensity of Sustained LOUD /ah/ Phonation:  Average duration: 13 7 sec  Average intensity: 86 7 dB SPL  Perceived level of effort: 10/10  Cues for loudness: Minimal-moderate    Daily Task #2  Maximum Fundamental Frequency Range:  Average Pitch (high): 236 5 Hz  Average Pitch (low): 157 5 Hz  Perceived level of effort: 10/10  Cues for loudness: Minimal  Cues for pitch: Minimal-moderate    Daily Task #3  Maximum Speech Loudness Drill of Functional Phrases:  Average intensity: 72 4 dB SPL  Perceived level of effort: 10/10  Cues for loudness: Minimal-moderate    Average intensity during spontaneous "off the cuff" questions: 65 5 dB SPL    *Hierarchal Speech Loudness Drill (Word Level) not completed due to patient refusal     Plan:  -Patient was provided with home exercises/activities to target goals in plan of care at the end of today's session   -Continue with current plan of care  Patient was treated by Nichol Arndt, graduate SLP student, and was under the direct supervision of AME Steiner , CCC-SLP

## 2018-07-31 ENCOUNTER — OFFICE VISIT (OUTPATIENT)
Dept: SPEECH THERAPY | Facility: CLINIC | Age: 78
End: 2018-07-31
Payer: MEDICARE

## 2018-07-31 ENCOUNTER — OFFICE VISIT (OUTPATIENT)
Dept: PHYSICAL THERAPY | Facility: CLINIC | Age: 78
End: 2018-07-31
Payer: MEDICARE

## 2018-07-31 DIAGNOSIS — R49.9 UNSPECIFIED VOICE AND RESONANCE DISORDER: Primary | ICD-10-CM

## 2018-07-31 DIAGNOSIS — G20 PARKINSON'S DISEASE (HCC): ICD-10-CM

## 2018-07-31 DIAGNOSIS — G20 PARKINSON'S DISEASE (HCC): Primary | ICD-10-CM

## 2018-07-31 PROCEDURE — 92507 TX SP LANG VOICE COMM INDIV: CPT

## 2018-07-31 PROCEDURE — 97112 NEUROMUSCULAR REEDUCATION: CPT

## 2018-07-31 NOTE — PROGRESS NOTES
Daily Note     Today's date: 2018  Patient name: Boom Garcia  : 1940  MRN: [id number]  Referring provider: Luiz Wilkins DO  Dx:   Encounter Diagnosis     ICD-10-CM    1  Parkinson's disease ([facility] [address] ) G20          Subjective: Reports that he had MRI of his head  Objective: See treatment diary below    Precautions: Parkinson's, HTN  [facility] BIG Daily Treatment Diary      Carryover Assignment                                                                                         Max Daily Exercises             Floor to Ceiling  10x           Side to Side  10x           Forward Step and Reach  10x           Sideways Step and Reach  10x           Backwards Step and Reach  10x           Forwards Rock and Reach  10x           Sideways Rock and Reach  10x           Functional Components             1  STS  10x           2  Buttons on shirt  2x           3  posture             4  Picking up coins 1x           5  Putting shirt on             Hierarchies             1  Getting on              2              BIG walking                                                             Assessment: Initiated treatment per POC  Patient requires cueing and shaping for bigger movements  Mild instability noted with standing maximal daily exercises  Educated in postural awareness and correction  Very challenged with coordination with UE/LE with rocking  Patient demonstrates difficulty with fine motor skills, but demonstrates improvement with cueing  Plan: Progress treament per protocol

## 2018-08-01 ENCOUNTER — OFFICE VISIT (OUTPATIENT)
Dept: SPEECH THERAPY | Facility: CLINIC | Age: 78
End: 2018-08-01
Payer: MEDICARE

## 2018-08-01 ENCOUNTER — OFFICE VISIT (OUTPATIENT)
Dept: PHYSICAL THERAPY | Facility: CLINIC | Age: 78
End: 2018-08-01
Payer: MEDICARE

## 2018-08-01 DIAGNOSIS — G20 PARKINSON'S DISEASE (HCC): ICD-10-CM

## 2018-08-01 DIAGNOSIS — G20 PARKINSON'S DISEASE (HCC): Primary | ICD-10-CM

## 2018-08-01 DIAGNOSIS — R49.9 UNSPECIFIED VOICE AND RESONANCE DISORDER: Primary | ICD-10-CM

## 2018-08-01 PROCEDURE — 97116 GAIT TRAINING THERAPY: CPT

## 2018-08-01 PROCEDURE — 92507 TX SP LANG VOICE COMM INDIV: CPT

## 2018-08-01 NOTE — PROGRESS NOTES
Daily Speech Treatment Note    Today's date: 2018  Patients name: Sri Durham  : 1940  MRN: [id number]  Safety measures: Parkinson's disease, fall risk  Referring provider: Roya Bray DO    Primary Diagnosis/Billing code: R49 9  Secondary Diagnosis/ Billing code: [address]    Visit Tracking:  -Referring provider: Epic  -Billing guidelines: CMS  -Visit #3/10   -Medicare  [employer]  -RE due 2018  Subjective/Behavioral:  -"I'm okay  It's rough to be here; I don't want to be " Patient reported he "howled a few times last night" to be loud  He also reported his wife tells him he's "talking awfully soft "    Objective/Assessment:  -Patient did not complete HEP  Patient reported he was busy last night  Short-term goals:  1  Patient and caregiver will be educated on vocal hygiene and demonstrate understanding of recommendations to facilitate improved vocal quality (to be achieved in 1-2 weeks)  2  Patient will increase his vocal loudness from 50% to 75% of the time in order to convey information over the telephone with min verbal cues (to be achieved in 4 weeks)  3  Patient will demonstrate understanding that his louder voice is WNL and will become comfortable increasing his phonatory effort (to be achieved in 4 weeks)  4  Patient will develop the ability to monitor adequate loudness levels in conversation to facilitate increased communication success with min verbal cues (to be achieved in 4 weeks)  The following data was collected using [address] [place] [address]  Sound level meter-to-mouth distance: 50 cm      Daily Task #1  Maximum Duration & Intensity of Sustained LOUD /ah/ Phonation:  Average duration: 12 5 sec  Average intensity: 85 0 dB SPL  Perceived level of effort: 10/10  Cues for loudness: None    Daily Task #2  Maximum Fundamental Frequency Range:  Average Pitch (high): 253 5 Hz  Average Pitch (low): 130 1 Hz  Perceived level of effort: 10/10  Cues for loudness: Minimal  Cues for pitch: Minimal    Daily Task #3  Maximum Speech Loudness Drill of Functional Phrases:  Average intensity: 74 3 dB SPL  Perceived level of effort: 10/10  Cues for loudness: Minimal    Daily Task #4  Hierarchal Speech Loudness Drill (Word Level): Average intensity: 73 7 dB SPL  Perceived level of effort: 10/10  Cues for loudness: Minimal-moderate    *Hierarchal Speech Loudness Drill (Word Level) completed with rote recitation tasks (i e , days of the week, months of the year, alphabet, counting to 10)  Average intensity during spontaneous "off the cuff" questions: 70 8 dB SPL    Plan:  -Patient was provided with home exercises/activities to target goals in plan of care at the end of today's session   -Continue with current plan of care  Patient was treated by Ivana Dhaliwal, graduate SLP student, and was under the direct supervision of AME Paredes , CCC-SLP

## 2018-08-01 NOTE — PROGRESS NOTES
Daily Note     Today's date: 2018  Patient name: Diego Rivera  : 1940  MRN: [id number]  Referring provider: Will Lawrence DO  Dx:   Encounter Diagnosis     ICD-10-CM    1  Parkinson's disease ([facility] [address] ) G20          Subjective: Reports that he had MRI of his head  Objective: See treatment diary below    Precautions: Parkinson's, HTN  [facility] BIG Daily Treatment Diary      Carryover Assignment                                                                                         Max Daily Exercises           Floor to Ceiling  10x  10x         Side to Side  10x  10x         Forward Step and Reach  10x  10x         Sideways Step and Reach  10x  10x         Backwards Step and Reach  10x  10x         Forwards Rock and Reach  10x  10x         Sideways Rock and Reach  10x  10x         Functional Components             1  STS  10x  10x         2  Buttons on shirt  2x  2x         3  posture    during functional components         4  Picking up coins 1x  1x         5  Putting shirt on    2x         Hierarchies             1  Getting on     lifting legs-5x         2 hurdles             BIG walking    10 min, outside-sidewalk, sidewalk hill                                                         Assessment:  Patient requires cueing and shaping for bigger movements  More difficulty and scuffing noted with stepping exercises on right  Very challenged with coordination with UE/LE with rocking  Demonstrates improvement with buttoning with cueing  Cueing during ambulation to  feet and to increased arm swing  More difficulty maintaining arm swing and open hand on left side  Plan: Progress treament per protocol

## 2018-08-02 ENCOUNTER — OFFICE VISIT (OUTPATIENT)
Dept: PHYSICAL THERAPY | Facility: CLINIC | Age: 78
End: 2018-08-02
Payer: MEDICARE

## 2018-08-02 ENCOUNTER — OFFICE VISIT (OUTPATIENT)
Dept: SPEECH THERAPY | Facility: CLINIC | Age: 78
End: 2018-08-02
Payer: MEDICARE

## 2018-08-02 DIAGNOSIS — G20 PARKINSON'S DISEASE (HCC): ICD-10-CM

## 2018-08-02 DIAGNOSIS — G20 PARKINSON'S DISEASE (HCC): Primary | ICD-10-CM

## 2018-08-02 DIAGNOSIS — R49.9 UNSPECIFIED VOICE AND RESONANCE DISORDER: Primary | ICD-10-CM

## 2018-08-02 PROCEDURE — 92507 TX SP LANG VOICE COMM INDIV: CPT

## 2018-08-02 PROCEDURE — 97116 GAIT TRAINING THERAPY: CPT

## 2018-08-02 PROCEDURE — 97112 NEUROMUSCULAR REEDUCATION: CPT

## 2018-08-02 NOTE — PROGRESS NOTES
Daily Speech Treatment Note    Today's date: 2018  Patients name: Joann Hoff  : 1940  MRN: [id number]  Safety measures: Parkinson's disease, fall risk  Referring provider: Dennie Dung, DO    Primary Diagnosis/Billing code: R49 9  Secondary Diagnosis/ Billing code: [address]    Visit Tracking:  -Referring provider: Epic  -Billing guidelines: CMS  -Visit #4/10   -Medicare  [employer]  -RE due 2018  Subjective/Behavioral:  -"I'm okay  I'm being loud at home "    Objective/Assessment:  -Reviewed patient's completed HEP  Short-term goals:  1  Patient and caregiver will be educated on vocal hygiene and demonstrate understanding of recommendations to facilitate improved vocal quality (to be achieved in 1-2 weeks)  2  Patient will increase his vocal loudness from 50% to 75% of the time in order to convey information over the telephone with min verbal cues (to be achieved in 4 weeks)  3  Patient will demonstrate understanding that his louder voice is WNL and will become comfortable increasing his phonatory effort (to be achieved in 4 weeks)  4  Patient will develop the ability to monitor adequate loudness levels in conversation to facilitate increased communication success with min verbal cues (to be achieved in 4 weeks)  The following data was collected using [address] [place] [address]  Sound level meter-to-mouth distance: 50 cm      Daily Task #1  Maximum Duration & Intensity of Sustained LOUD /ah/ Phonation:  Average duration: 9 1 sec  Average intensity: 86 9 dB SPL  Perceived level of effort: 10/10  Cues for loudness: None    Daily Task #2  Maximum Fundamental Frequency Range:  Average Pitch (high): 261 6 Hz  Average Pitch (low): 149 8 Hz  Perceived level of effort: 10/10  Cues for loudness: Minimal  Cues for pitch: Minimal-moderate    Daily Task #3  Maximum Speech Loudness Drill of Functional Phrases:  Average intensity: 78 5 dB SPL  Perceived level of effort: 10/10  Cues for loudness: Minimal-none    Daily Task #4  Hierarchal Speech Loudness Drill (Word Level): Average intensity: 76 5 dB SPL  Perceived level of effort: 10/10  Cues for loudness: Minimal    *Hierarchal Speech Loudness Drill (Word Level) completed with rote recitation tasks (i e , days of the week, months of the year, counting to 20)  Average intensity during spontaneous "off the cuff" questions: 71 2 dB SPL    Plan:  -Patient was provided with home exercises/activities to target goals in plan of care at the end of today's session   -Continue with current plan of care  Patient was treated by Michelle Caldwell, graduate SLP student, and was under the direct supervision of AME Shen , CCC-SLP

## 2018-08-03 ENCOUNTER — OFFICE VISIT (OUTPATIENT)
Dept: PHYSICAL THERAPY | Facility: CLINIC | Age: 78
End: 2018-08-03
Payer: MEDICARE

## 2018-08-03 ENCOUNTER — OFFICE VISIT (OUTPATIENT)
Dept: SPEECH THERAPY | Facility: CLINIC | Age: 78
End: 2018-08-03
Payer: MEDICARE

## 2018-08-03 DIAGNOSIS — G20 PARKINSON'S DISEASE (HCC): Primary | ICD-10-CM

## 2018-08-03 DIAGNOSIS — G20 PARKINSON'S DISEASE (HCC): ICD-10-CM

## 2018-08-03 DIAGNOSIS — R49.9 UNSPECIFIED VOICE AND RESONANCE DISORDER: Primary | ICD-10-CM

## 2018-08-03 PROCEDURE — 97116 GAIT TRAINING THERAPY: CPT

## 2018-08-03 PROCEDURE — 97112 NEUROMUSCULAR REEDUCATION: CPT

## 2018-08-03 PROCEDURE — 92507 TX SP LANG VOICE COMM INDIV: CPT

## 2018-08-03 NOTE — PROGRESS NOTES
Daily Note     Today's date: 8/3/2018  Patient name: Gerardo Good  : 1940  MRN: [id number]  Referring provider: Rena Cintron DO  Dx:   Encounter Diagnosis     ICD-10-CM    1  Parkinson's disease ([facility] [address] ) G20          Subjective: Reports some soreness in right shoulder  Objective: See treatment diary below    Precautions: Parkinson's, HTN  [facility] BIG Daily Treatment Diary      Carryover Assignment                                                                                         Max Daily Exercises  7/31 8/1  8/2  8/3     Floor to Ceiling  10x  10x  22N  w/ finger flicks-10x     Side to Side  10x  10x  74[address]  w/ finger flicks-10x      Forward Step and Reach  10x  10x  10x  10x     Sideways Step and Reach  10x  10x  10x  10x     Backwards Step and Reach  10x  10x  10x  10x    Forwards Rock and Reach  10x  10x  10x  10x     Sideways Rock and Reach  10x  10x  10x  10x     Functional Components             1  STS  10x  10x  10x  10x     2  Buttons on shirt  2x  2x  2x  2x     3  posture    during functional components  during functional components  during functional components     4  Picking up coins 1x  1x  1x  1x     5  Putting shirt on    2x  1x       Hierarchies             1  Getting on     lifting legs-5x  ifting legs-5x       2 hurdles             BIG walking    10 min, outside-sidewalk, sidewalk hill  10 min, outside-sidewalk, sidewalk hill  10 min, outside-sidewalk, sidewalk hill, sticks around clinic                                                   Assessment:  Patient requires cueing and shaping for bigger movements  Has some instability at times with stepping exercises  Very challenged with coordination with UE/LE with rocking  Impression that patients gets confused at times, requires cueing and re-direction  Demonstrated improvements with buttoning today  Still requires cueing to maintain arm swing during ambulation  Plan: Progress treament per protocol

## 2018-08-03 NOTE — PROGRESS NOTES
Daily Speech Treatment Note    Today's date: 8/3/2018  Patients name: Gabe Toscano  : 1940  MRN: [id number]  Safety measures: Parkinson's disease, fall risk  Referring provider: Gary Almanza DO    Primary Diagnosis/Billing code: R49 9  Secondary Diagnosis/ Billing code: Nell Carmona    Visit Tracking:  -Referring provider: Epic  -Billing guidelines: CMS  -Visit #5/10   -Medicare  [employer]  -RE due 2018  Subjective/Behavioral:  n/a    Objective/Assessment:  Patient reports he doesn't always do HEP- during review of HEP it was noted he was not completing it accurately  Education was provided on appropriate complete of HEP  Reciprocal comprehension was verbally expressed  Short-term goals:  1  Patient and caregiver will be educated on vocal hygiene and demonstrate understanding of recommendations to facilitate improved vocal quality (to be achieved in 1-2 weeks)  2  Patient will increase his vocal loudness from 50% to 75% of the time in order to convey information over the telephone with min verbal cues (to be achieved in 4 weeks)  3  Patient will demonstrate understanding that his louder voice is WNL and will become comfortable increasing his phonatory effort (to be achieved in 4 weeks)  4  Patient will develop the ability to monitor adequate loudness levels in conversation to facilitate increased communication success with min verbal cues (to be achieved in 4 weeks)  The following data was collected using [address] [place] [address]  Sound level meter-to-mouth distance: 50 cm      Daily Task #1  Maximum Duration & Intensity of Sustained LOUD /ah/ Phonation:  Average duration: 14 sec  Average intensity: 89 4 dB SPL  Perceived level of effort: 10/10  Cues for loudness: None    Daily Task #2  Maximum Fundamental Frequency Range:  Average Pitch (high): 298 Hz  Average Pitch (low): 165 5 Hz  Perceived level of effort: 10/10  Cues for loudness: Minimal  Cues for pitch: Moderate-maximum    Daily Task #3  Maximum Speech Loudness Drill of Functional Phrases:  Average intensity: 77 2 dB SPL  Perceived level of effort: 10/10  Cues for loudness: None    Daily Task #4  Hierarchal Speech Loudness Drill (Word Level): Average intensity: 63 6 dB SPL  Perceived level of effort: 10/10  Cues for loudness: Minimal-moderate    *Hierarchal Speech Loudness Drill (Word Level) completed with rote recitation tasks (i e , days of the week, months of the year, counting to 20)  Average intensity during spontaneous "off the cuff" questions: 71 4 dB SPL    Plan:  -Patient was provided with home exercises/activities to target goals in plan of care at the end of today's session   -Continue with current plan of care       AME Coreas , [address] [place] [address] Language Pathologist   [id number]

## 2018-08-06 ENCOUNTER — OFFICE VISIT (OUTPATIENT)
Dept: SPEECH THERAPY | Facility: CLINIC | Age: 78
End: 2018-08-06
Payer: MEDICARE

## 2018-08-06 ENCOUNTER — OFFICE VISIT (OUTPATIENT)
Dept: PHYSICAL THERAPY | Facility: CLINIC | Age: 78
End: 2018-08-06
Payer: MEDICARE

## 2018-08-06 DIAGNOSIS — G20 PARKINSON'S DISEASE (HCC): Primary | ICD-10-CM

## 2018-08-06 DIAGNOSIS — G20 PARKINSON'S DISEASE (HCC): ICD-10-CM

## 2018-08-06 DIAGNOSIS — R49.9 UNSPECIFIED VOICE AND RESONANCE DISORDER: Primary | ICD-10-CM

## 2018-08-06 PROCEDURE — 97112 NEUROMUSCULAR REEDUCATION: CPT

## 2018-08-06 PROCEDURE — 92507 TX SP LANG VOICE COMM INDIV: CPT

## 2018-08-06 PROCEDURE — 97116 GAIT TRAINING THERAPY: CPT

## 2018-08-06 NOTE — PROGRESS NOTES
Daily Speech Treatment Note    Today's date: 2018  Patients name: Joann Hoff  : 1940  MRN: [id number]  Safety measures: Parkinson's disease, fall risk  Referring provider: Dennie Dung, DO    Primary Diagnosis/Billing code: R49 9  Secondary Diagnosis/ Billing code: Larisa Erick    Visit Tracking:  -Referring provider: Epic  -Billing guidelines: CMS  -Visit #6/10   -Medicare  [employer]  -RE due 2018  Subjective/Behavioral:  "I'm good " Patient reported sore throat upon arrival to session  Patient reports sore throat began Saturday  Patient was instructed to drink water as needed throughout session  Objective/Assessment:  Patient reported he did not complete HEP over the weekend  Patient reports he "was doing yard work all weekend "    Short-term goals:  1  Patient and caregiver will be educated on vocal hygiene and demonstrate understanding of recommendations to facilitate improved vocal quality (to be achieved in 1-2 weeks)  2  Patient will increase his vocal loudness from 50% to 75% of the time in order to convey information over the telephone with min verbal cues (to be achieved in 4 weeks)  3  Patient will demonstrate understanding that his louder voice is WNL and will become comfortable increasing his phonatory effort (to be achieved in 4 weeks)  4  Patient will develop the ability to monitor adequate loudness levels in conversation to facilitate increased communication success with min verbal cues (to be achieved in 4 weeks)  The following data was collected using [address] [place] [address]  Sound level meter-to-mouth distance: 50 cm      Daily Task #1  Maximum Duration & Intensity of Sustained LOUD /ah/ Phonation:  Average duration: 11 2 sec  Average intensity: 85 5 dB SPL  Perceived level of effort: 10/10  Cues for loudness: None    Daily Task #2  Maximum Fundamental Frequency Range:  Average Pitch (high): 248 9 Hz  Average Pitch (low): 150 3 Hz  Perceived level of effort: 10/10  Cues for loudness: None  Cues for pitch: Minimal    Daily Task #3  Maximum Speech Loudness Drill of Functional Phrases:  Average intensity: 77 8 dB SPL  Perceived level of effort: 10/10  Cues for loudness: Minimal    Daily Task #4  Hierarchal Speech Loudness Drill (Phrase Level): Average intensity: 73 9 dB SPL  Perceived level of effort: 10/10  Cues for loudness: None    *Hierarchal Speech Loudness Drill (Word Level) completed with patient repetition of phrases read aloud by clinician  Average intensity during spontaneous "off the cuff" questions: 70 9 dB SPL    Plan:  -Patient was provided with home exercises/activities to target goals in plan of care at the end of today's session   -Continue with current plan of care  Patient was treated by Aury Wellington, graduate SLP student, and was under the direct supervision of AME Nair , CCC-SLP

## 2018-08-06 NOTE — PROGRESS NOTES
Daily Note     Today's date: 2018  Patient name: Wei Jones  : 1940  MRN: [id number]  Referring provider: Andrew Abbott DO  Dx:   Encounter Diagnosis     ICD-10-CM    1  Parkinson's disease ([facility] [address] ) G20          Subjective: Reports some soreness in right shoulder  States that he was too busy with yard work over the weekend, didn't have time for HEP  Objective: See treatment diary below    Precautions: Parkinson's, HTN  LSVT BIG Daily Treatment Diary      Carryover Assignment                                                                                         Max Daily Exercises  7/31 8/1  8/2  8/3  8/6   Floor to Ceiling  10x  10x  58G  w/ finger [id number]  w/ finger flicks-10x   Side to Side  10x  10x  46Y  w/ finger [id number]   w/ finger flicks-10x   Forward Step and Reach  10x  10x  10x  10x  10x   Sideways Step and Reach  10x  10x  10x  10x  10x   Backwards Step and Reach  10x  10x  10x  10x 10x   Forwards Rock and Reach  10x  10x  10x  10x  10x   Sideways Rock and Reach  10x  10x  10x  10x  10x   Functional Components             1  STS  10x  10x  10x  10x  10x   2  Buttons on shirt  2x  2x  2x  2x  2x   3  posture    during functional components  during functional components  during functional components  during functional components   4  Picking up coins 1x  1x  1x  1x  1x   5  Putting shirt on    2x  1x       Hierarchies             1  Getting on     lifting legs-5x  ifting legs-5x       2 hurdles          fwd/lateral-5 laps   BIG walking    10 min, outside-sidewalk, sidewalk hill  10 min, outside-sidewalk, sidewalk hill  10 min, outside-sidewalk, sidewalk hill, sticks around clinic  10 min, outside-sidewalk, sidewalk hill,    alt step taps w/ opposite arm reach         10x                                   Assessment:  Patient requires cueing and shaping for bigger movements  Very challenged with coordination with UE/LE with rocking   Impression that patients gets confused at times, requires cueing and re-direction  Patient has difficulty with coordiantion of opposite UE/LE with stepping over hurdles, but able to maintain with ambulation  Plan: Progress treament per protocol

## 2018-08-07 ENCOUNTER — OFFICE VISIT (OUTPATIENT)
Dept: SPEECH THERAPY | Facility: CLINIC | Age: 78
End: 2018-08-07
Payer: MEDICARE

## 2018-08-07 ENCOUNTER — APPOINTMENT (OUTPATIENT)
Dept: PHYSICAL THERAPY | Facility: CLINIC | Age: 78
End: 2018-08-07
Payer: MEDICARE

## 2018-08-07 ENCOUNTER — APPOINTMENT (OUTPATIENT)
Dept: OCCUPATIONAL THERAPY | Facility: CLINIC | Age: 78
End: 2018-08-07
Payer: MEDICARE

## 2018-08-07 DIAGNOSIS — R49.9 UNSPECIFIED VOICE AND RESONANCE DISORDER: Primary | ICD-10-CM

## 2018-08-07 DIAGNOSIS — G20 PARKINSON'S DISEASE (HCC): ICD-10-CM

## 2018-08-07 PROCEDURE — 92507 TX SP LANG VOICE COMM INDIV: CPT

## 2018-08-07 NOTE — PROGRESS NOTES
Daily Speech Treatment Note    Today's date: 2018  Patients name: Wei Jones  : 1940  MRN: [id number]  Safety measures: Parkinson's disease, fall risk  Referring provider: Andrew Abbott DO    Primary Diagnosis/Billing code: R49 9  Secondary Diagnosis/ Billing code: [address]    Visit Tracking:  -Referring provider: Epic  -Billing guidelines: CMS  -Visit #7/10   -Medicare  [employer]  -RE due 2018  Subjective/Behavioral:  Patient reports he has been somewhat noncompliant with ST and PT HEP  Discussed importance of carryover of exercises as well as LSVT program and how it works  He reports he often times forgets to do it or gets busy with yardwork  Discussed scheduling a time to get the exercises done so he does not forgot  Reciprocal comprehension was verbally expressed  Objective/Assessment:  Patient reported he did not complete HEP over the weekend  Patient reports he "was doing yard work all weekend "    Short-term goals:  1  Patient and caregiver will be educated on vocal hygiene and demonstrate understanding of recommendations to facilitate improved vocal quality (to be achieved in 1-2 weeks)  2  Patient will increase his vocal loudness from 50% to 75% of the time in order to convey information over the telephone with min verbal cues (to be achieved in 4 weeks)  3  Patient will demonstrate understanding that his louder voice is WNL and will become comfortable increasing his phonatory effort (to be achieved in 4 weeks)  4  Patient will develop the ability to monitor adequate loudness levels in conversation to facilitate increased communication success with min verbal cues (to be achieved in 4 weeks)  The following data was collected using [address] [place] [address]  Sound level meter-to-mouth distance: 50 cm      Daily Task #1  Maximum Duration & Intensity of Sustained LOUD /ah/ Phonation:  Average duration: 10 sec  Average intensity: 90 1 dB SPL  Perceived level of effort: 10/10  Cues for loudness: Minimal    Daily Task #2  Maximum Fundamental Frequency Range:  Average Pitch (high): 251 1 Hz  Average Pitch (low): 157 2 Hz  Perceived level of effort: 10/10  Cues for loudness: None  Cues for pitch: Moderate-maximum    Daily Task #3  Maximum Speech Loudness Drill of Functional Phrases:  Average intensity: 78  5dB SPL  Perceived level of effort: 10/10  Cues for loudness: Minimal-none    Daily Task #4  Hierarchal Speech Loudness Drill (Phrase Level): Average intensity: 77 3 dB SPL  Perceived level of effort: 10/10  Cues for loudness: Minimal-none  Patient was given a list of automatic speech tasks to complete in place of reading  This included the alphabet, counting, JENNIFER, SOPHIE, and reciting the itsy bitsy spider ( as patient reports he says this with his great granddaughter)  Average intensity during spontaneous "off the cuff" questions: 72 6 dB SPL    Spoke with patients wife at the end of today's session to reiterate importance of carryover of HEP and review the HEP worksheet previously provided- answered all related questions as well as reviewed POC  Reciprocal comprehension was verbally expressed  Plan:  -Patient was provided with home exercises/activities to target goals in plan of care at the end of today's session   -Continue with current plan of care       AME Prasad , [address] [place] [address] Language Pathologist   [id number]

## 2018-08-08 ENCOUNTER — OFFICE VISIT (OUTPATIENT)
Dept: SPEECH THERAPY | Facility: CLINIC | Age: 78
End: 2018-08-08
Payer: MEDICARE

## 2018-08-08 ENCOUNTER — OFFICE VISIT (OUTPATIENT)
Dept: PHYSICAL THERAPY | Facility: CLINIC | Age: 78
End: 2018-08-08
Payer: MEDICARE

## 2018-08-08 DIAGNOSIS — G20 PARKINSON'S DISEASE (HCC): ICD-10-CM

## 2018-08-08 DIAGNOSIS — R49.9 UNSPECIFIED VOICE AND RESONANCE DISORDER: Primary | ICD-10-CM

## 2018-08-08 DIAGNOSIS — G20 PARKINSON'S DISEASE (HCC): Primary | ICD-10-CM

## 2018-08-08 PROCEDURE — 97116 GAIT TRAINING THERAPY: CPT

## 2018-08-08 PROCEDURE — 92507 TX SP LANG VOICE COMM INDIV: CPT

## 2018-08-08 PROCEDURE — 97112 NEUROMUSCULAR REEDUCATION: CPT

## 2018-08-08 NOTE — PROGRESS NOTES
Daily Note     Today's date: 2018  Patient name: Boom Garcia  : 1940  MRN: [id number]  Referring provider: Luiz Wilkins DO  Dx:   Encounter Diagnosis     ICD-10-CM    1  Parkinson's disease ([facility] [address] ) G20          Subjective: Reports some soreness in right shoulder  Notes compliance with HEP  States he feels he is improving since starting  Objective: See treatment diary below    Precautions: Parkinson's, HTN  LSVT BIG Daily Treatment Diary      Carryover Assignment                                                                                         Max Daily Exercises         Floor to Ceiling  w/ finger flicks, 34O       Side to Side  w/ finger flicks, 38F       Forward Step and Reach  stepping to blue foam, 10x       Sideways Step and Reach  stepping to blue foam, 10x       Backwards Step and Reach  10x       Forwards Rock and Reach  10x       [employer] and Reach  10x       Functional Components         1  STS  10x       2  Buttons on shirt  2x       3  posture  during functional components       4  Picking up coins/threading beads Standing on foam-1x       5  Putting shirt on         Hierarchies         1  Getting on          2 hurdles  fwd/lateral- 5 laps       BIG walking  10 min-outside,sidewalk and sidewalk hill        alt step taps w/ opposite arm reach                                     Assessment:  Patient demonstrating improved big movements with less cueing required  Added stepping to blue foam for forward and sideways  Continues to be challenged with coordinating of forward rock and reach, but improving  Demonstrates some instability, stepping strategy utilized to correct self  Still requires cueing for arm swing during ambulation  Plan: Progress treament per protocol

## 2018-08-08 NOTE — PROGRESS NOTES
Daily Speech Treatment Note    Today's date: 2018  Patients name: Madina Maria  : 1940  MRN: [id number]  Safety measures: Parkinson's disease, fall risk  Referring provider: Indio Billings DO    Primary Diagnosis/Billing code: R49 9  Secondary Diagnosis/ Billing code: [address]    Visit Tracking:  -Referring provider: Epic  -Billing guidelines: CMS  -Visit #8/10   -Medicare  [employer]  -RE due 2018  Subjective/Behavioral:  Patient reports some improvement with carryover of HEP  Objective/Assessment:    Short-term goals:  1  Patient and caregiver will be educated on vocal hygiene and demonstrate understanding of recommendations to facilitate improved vocal quality (to be achieved in 1-2 weeks)  2  Patient will increase his vocal loudness from 50% to 75% of the time in order to convey information over the telephone with min verbal cues (to be achieved in 4 weeks)  3  Patient will demonstrate understanding that his louder voice is WNL and will become comfortable increasing his phonatory effort (to be achieved in 4 weeks)  4  Patient will develop the ability to monitor adequate loudness levels in conversation to facilitate increased communication success with min verbal cues (to be achieved in 4 weeks)  The following data was collected using [address] [place] [address]  Sound level meter-to-mouth distance: 50 cm  Daily Task #1  Maximum Duration & Intensity of Sustained LOUD /ah/ Phonation:  Average duration: 12 sec  Average intensity: 90 7 dB SPL  Perceived level of effort: 10/10  Cues for loudness: Minimal-moderate    Daily Task #2  Maximum Fundamental Frequency Range:  Average Pitch (high): 260 7 Hz  Average Pitch (low): 162 7 Hz  Perceived level of effort: 10/10  Cues for loudness: None  Cues for pitch:  Moderate-maximum    Daily Task #3  Maximum Speech Loudness Drill of Functional Phrases:  Average intensity: 80dB SPL  Perceived level of effort: 10/10  Cues for loudness: Minimal-none    Daily Task #4  Hierarchal Speech Loudness Drill (Phrase Level): Average intensity: 79 2 dB SPL  Perceived level of effort: 10/10  Cues for loudness: Minimal-none  Patient was given a list of automatic speech tasks to complete in place of reading  This included the alphabet, counting, JENNIFER, SOPHIE, and reciting the itsy bitsy spider ( as patient reports he says this with his great granddaughter)  Average intensity during spontaneous "off the cuff" questions: 71 3 dB SPL    Plan:  -Patient was provided with home exercises/activities to target goals in plan of care at the end of today's session   -Continue with current plan of care       AME Ramirez , [address] [place] [address] Language Pathologist   [id number]

## 2018-08-09 ENCOUNTER — OFFICE VISIT (OUTPATIENT)
Dept: PHYSICAL THERAPY | Facility: CLINIC | Age: 78
End: 2018-08-09
Payer: MEDICARE

## 2018-08-09 ENCOUNTER — OFFICE VISIT (OUTPATIENT)
Dept: SPEECH THERAPY | Facility: CLINIC | Age: 78
End: 2018-08-09
Payer: MEDICARE

## 2018-08-09 DIAGNOSIS — G20 PARKINSON'S DISEASE (HCC): Primary | ICD-10-CM

## 2018-08-09 DIAGNOSIS — R49.9 UNSPECIFIED VOICE AND RESONANCE DISORDER: Primary | ICD-10-CM

## 2018-08-09 DIAGNOSIS — G20 PARKINSON'S DISEASE (HCC): ICD-10-CM

## 2018-08-09 PROCEDURE — 92507 TX SP LANG VOICE COMM INDIV: CPT

## 2018-08-09 PROCEDURE — 97112 NEUROMUSCULAR REEDUCATION: CPT

## 2018-08-09 PROCEDURE — 97116 GAIT TRAINING THERAPY: CPT

## 2018-08-09 NOTE — PROGRESS NOTES
Daily Speech Treatment Note    Today's date: 2018  Patients name: Boom Garcia  : 1940  MRN: [id number]  Safety measures: Parkinson's disease, fall risk  Referring provider: Luiz Wilkins DO    Primary Diagnosis/Billing code: R49 9  Secondary Diagnosis/ Billing code: [address]    Visit Tracking:  -Referring provider: Epic  -Billing guidelines: CMS  -Visit #9/10   -Medicare  [employer]  -RE due 2018  Subjective/Behavioral:  Patient reports he has been doing well with carryover of HEP  Objective/Assessment:    Short-term goals:  1  Patient and caregiver will be educated on vocal hygiene and demonstrate understanding of recommendations to facilitate improved vocal quality (to be achieved in 1-2 weeks)  2  Patient will increase his vocal loudness from 50% to 75% of the time in order to convey information over the telephone with min verbal cues (to be achieved in 4 weeks)  3  Patient will demonstrate understanding that his louder voice is WNL and will become comfortable increasing his phonatory effort (to be achieved in 4 weeks)  4  Patient will develop the ability to monitor adequate loudness levels in conversation to facilitate increased communication success with min verbal cues (to be achieved in 4 weeks)  The following data was collected using [address] [place] [address]  Sound level meter-to-mouth distance: 50 cm  Daily Task #1  Maximum Duration & Intensity of Sustained LOUD /ah/ Phonation:  Average duration: 13 sec  Average intensity: 87 9 dB SPL  Perceived level of effort: 10/10  Cues for loudness: Minimal    Daily Task #2  Maximum Fundamental Frequency Range:  Average Pitch (high): 280 9 Hz  Average Pitch (low): 125 7 Hz  Perceived level of effort: 10/10  Cues for loudness: None  Cues for pitch: Moderate-maximum    Daily Task #3  Maximum Speech Loudness Drill of Functional Phrases:  Average intensity: 77  2dB SPL  Perceived level of effort: 10/10  Cues for loudness: Minimal-none    Daily Task #4  Hierarchal Speech Loudness Drill (Phrase Level): Average intensity: 74 1 dB SPL  Perceived level of effort: 10/10  Cues for loudness: Minimal-none    Patient was given a list of automatic speech tasks to complete in place of reading  This included the alphabet, counting, JENNIFER, SOPHIE, and reciting the itsy bitsy spider ( as patient reports he says this with his great granddaughter)  Average intensity during spontaneous "off the cuff" questions: 71 1 dB SPL    Plan:  -Patient was provided with home exercises/activities to target goals in plan of care at the end of today's session   -Continue with current plan of care

## 2018-08-09 NOTE — PROGRESS NOTES
Daily Note     Today's date: 2018  Patient name: Leonel Bustamante  : 1940  MRN: [id number]  Referring provider: Justin Wolfe DO  Dx:   Encounter Diagnosis     ICD-10-CM    1  Parkinson's disease ([facility] [address] ) G20          Subjective: Reports minimal soreness upon arrival      Objective: See treatment diary below    Precautions: Parkinson's, HTN  LSVT BIG Daily Treatment Diary      Carryover Assignment                                                                                         Max Daily Exercises        Floor to Ceiling  w/ finger flicks, 01W w/ finger flicks, 08T      Side to Side  w/ finger flicks, 20F w/ finger flicks, 80Z      Forward Step and Reach  stepping to blue foam, 10x From blue disks to blue foam, 10x      Sideways Step and Reach  stepping to blue foam, 10x From blue disks to blue foam, 10x      Backwards Step and Reach  10x 10x      Forwards Rock and Reach  10x 10x      Sideways Rock and Reach  10x From blue disks      Functional Components         1  STS  10x Blue foam, 10x      2  Buttons on shirt  2x       3  posture  during functional components  during functional components      4  Picking up coins/threading beads Standing on foam-1x Standing on foam, coins & clips      5  Putting shirt on         Hierarchies         1  Getting on          2 hurdles  fwd/lateral- 5 laps  fwd/lateral- 5 laps      BIG walking  10 min-outside,sidewalk and sidewalk hill 10 min-outside,sidewalk and sidewalk hill       alt step taps w/ opposite arm reach                                   **1# wrist weights and 1 5# ankle weights utilized entire session**    Assessment:  Added wrist and ankle weights today for additional challenge  Patient demonstrating improved big movements with less cueing required  Added standing on blue disks and stepping to blue foam for forward and sideways  Mild instability demonstrated at times   Continues to be challenged with coordinating of forward rock and reach, but improving  Challenged with coordination of opposite arm/leg with stepping over hurdles  Plan: Progress treament per protocol

## 2018-08-13 ENCOUNTER — OFFICE VISIT (OUTPATIENT)
Dept: PHYSICAL THERAPY | Facility: CLINIC | Age: 78
End: 2018-08-13
Payer: MEDICARE

## 2018-08-13 ENCOUNTER — EVALUATION (OUTPATIENT)
Dept: SPEECH THERAPY | Facility: CLINIC | Age: 78
End: 2018-08-13
Payer: MEDICARE

## 2018-08-13 DIAGNOSIS — G20 PARKINSON'S DISEASE (HCC): Primary | ICD-10-CM

## 2018-08-13 DIAGNOSIS — G20 PARKINSON'S DISEASE (HCC): ICD-10-CM

## 2018-08-13 DIAGNOSIS — R49.9 UNSPECIFIED VOICE AND RESONANCE DISORDER: Primary | ICD-10-CM

## 2018-08-13 PROCEDURE — G8979 MOBILITY GOAL STATUS: HCPCS | Performed by: PHYSICAL THERAPIST

## 2018-08-13 PROCEDURE — G9171 VOICE CURRENT STATUS: HCPCS

## 2018-08-13 PROCEDURE — 97110 THERAPEUTIC EXERCISES: CPT | Performed by: PHYSICAL THERAPIST

## 2018-08-13 PROCEDURE — 92507 TX SP LANG VOICE COMM INDIV: CPT

## 2018-08-13 PROCEDURE — G8980 MOBILITY D/C STATUS: HCPCS | Performed by: PHYSICAL THERAPIST

## 2018-08-13 PROCEDURE — 97530 THERAPEUTIC ACTIVITIES: CPT | Performed by: PHYSICAL THERAPIST

## 2018-08-13 PROCEDURE — G9172 VOICE GOAL STATUS: HCPCS

## 2018-08-13 PROCEDURE — 97116 GAIT TRAINING THERAPY: CPT | Performed by: PHYSICAL THERAPIST

## 2018-08-13 NOTE — PROGRESS NOTES
Daily Note     Today's date: 2018  Patient name: Laura Walters  : 1940  MRN: [id number]  Referring provider: José Miguel Peña DO  Dx:   Encounter Diagnosis     ICD-10-CM    1  Parkinson's disease ([facility] [address] ) G20          Subjective: No complaints  Reports compliance with HEP however is not consistent  Discussed transfer to OT BIG program [place]  Objective: See treatment diary below    Precautions: Parkinson's, HTN  LSVT BIG Daily Treatment Diary      Carryover Assignment                                                                                         Max Daily Exercises       Floor to Ceiling  w/ finger flicks, 81T w/ finger flicks, 66E w/ finger flicks, 56R     Side to Side  w/ finger flicks, 76W w/ finger flicks, 21C w/ finger flicks, 58W     Forward Step and Reach  stepping to blue foam, 10x From blue disks to blue foam, 10x From blue disks to blue foam, 10x     Sideways Step and Reach  stepping to blue foam, 10x From blue disks to blue foam, 10x From blue disks to blue foam, 10x     Backwards Step and Reach  10x 10x x10     Forwards Rock and Reach  10x 10x x10     Sideways Rock and Reach  10x From blue disks x10     Functional Components         1  STS  10x Blue foam, 10x Blue foam, 10x     2  Buttons on shirt  2x  1 round each with flicks first with each button     3  posture  during functional components  during functional components While on TM     4  Picking up coins/threading beads Standing on foam-1x Standing on foam, coins & clips Standing on foam, coins  With flicks  1 min ea     5  Putting shirt on    x5 with cues for Bign[place]     Hierarchies         1   Getting on          2 hurdles  fwd/lateral- 5 laps  fwd/lateral- 5 laps fwd/lateral- 3 laps ea     BIG walking  10 min-outside,sidewalk and sidewalk hill 10 min-outside,sidewalk and sidewalk hill x5 min on TM 1 5 mph      alt step taps w/ opposite arm reach                                   **1# wrist weights and 1 5# ankle weights utilized during MDEs and STS**    Assessment:  Added flicks with buttoning and coins which assisted in pt's fine motor and speed of hand movements  Initiated TM to address gait speed and posture during gait, pt required frequent cues to increase heel strike and correct BIG posture however able to complete  Plan: Will have one more PT session, transferring for OT BIG

## 2018-08-13 NOTE — PROGRESS NOTES
Speech Re-Evaluation/Progress Update  Today's date: 2018  Patients name: Aba Sotelo  : 1940  MRN: [id number]  Safety measures: Parkinson's disease, fall risk  Referring provider: Amol Jarrell DO    Primary Diagnosis/Billing code: R49 9  Secondary Diagnosis/ Billing code: [address]    Visit Tracking:  -Referring provider: Epic  -Billing guidelines: CMS  -Visit #1/10  10 total  -Medicare  [employer]  -RE due 2018    Subjective/Behavioral:  Patient reports he has been doing well with carryover of HEP  Patient/family's goal(s): "to talk louder"    Objective/Assessment:    [facility] Voice Treatment (LSVT) LOUD re-assessment:    Sound Level Meter-to-Mouth Distance: 50 cm throughout testing    Maximum Duration of Sustained Vowel Phonation (/ah/): IE: Status:   Average MDP 12 8 sec  11 9 sec IMPROVEMENT   Average Intensity 87 4 dB SPL 68 4 dB IMPROVEMENT         Maximum Fundamental Frequency Range: IE: Status:   Highest Pitch 253 2 Hz 170 3 Hz IMPROVEMENT   Lowest Pitch 130 Hz 138 Hz IMPROVEMENT   Average Intensity 84 dB SPL 78 7 dB IMPROVEMENT         Reading of Words: IE: Status:   Average Intensity  78 8 dB SPL 67 2 dB IMPROVEMENT         Reading of Phrases: IE: Status:   Average Intensity  76 6 dB SPL 66 7 dB IMPROVEMENT     Conversational Monologue: IE: Status:   Average Intensity  70 1 dB SPL 67 2 dB IMPROVEMENT     IE indicates the scores from the initial evaluation (18)  *Data gathered from reading and conversation tasks revealed vocal SPL levels that may significantly reduce patients speech intelligibility and communicative effectiveness in his functional living environment  Short-term goals:  1  Patient and caregiver will be educated on vocal hygiene and demonstrate understanding of recommendations to facilitate improved vocal quality (to be achieved in 1-2 weeks)       2  Patient will increase his vocal loudness from 50% to 75% of the time in order to convey information over the telephone with min verbal cues (to be achieved in 4 weeks)  3  Patient will demonstrate understanding that his louder voice is WNL and will become comfortable increasing his phonatory effort (to be achieved in 4 weeks)  4  Patient will develop the ability to monitor adequate loudness levels in conversation to facilitate increased communication success with min verbal cues (to be achieved in 4 weeks)  Long-term goals:  1   Patient will increase his vocal loudness to an appropriate level to be understood by others with min verbal cues (to be achieved by discharge)       2  Patient will increase self-intelligibility rating by 25% (to be achieved by discharge)  Functional Limitations Reporting (G-codes):    Flowsheet Rows      Most Recent Value   SLP G-Codes   FOTO information reviewed  N/A   Assessment Type  Re-evaluation   Functional Limitations  Voice   Voice Current Status ()  CK   Voice Goal Status ()  CJ              Impressions/Recommendations     Impressions: 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 c/b reduced vocal loudness and a breathy vocal quality, which contributes to an overall decrease in speech intelligibility  During conversation, speech intelligibility is reduced 40% of the time as judged by this clinician  Based on stimulability testing, patient is an good candidate for the LSVT LOUD program; however, due to suspected cognitive decline as evidenced through informal observation during testing, patient's rehabilitation prognosis decreases  Rehabilitation prognosis is dependant upon the involment of patient's caregiver (wife)   Patinet would benefit from skilled speech/voice therapy (LSVT LOUD program) in the outpatient setting 4x/week for 4 weeks (consisting of 16 sessions)       Recommendations:  -Patient would benefit from outpatient skilled Speech Therapy services : LSVT LOUD program     -Frequency: 4x weekly  -Duration: 2-3 weeks     -Intervention certification from: 9/65/97  -Intervention certification to: 8/02/63     Visit Tracking:  -Referring provider: Epic  -Billing guidelines: CMS  -Visit #1/10  (10 total)  -Medicare  [employer]  -RE due 9/13/18    Plan:  -Patient was provided with home exercises/activities to target goals in plan of care at the end of today's session   -Continue with current plan of care

## 2018-08-14 ENCOUNTER — EVALUATION (OUTPATIENT)
Dept: OCCUPATIONAL THERAPY | Facility: CLINIC | Age: 78
End: 2018-08-14
Payer: MEDICARE

## 2018-08-14 ENCOUNTER — OFFICE VISIT (OUTPATIENT)
Dept: SPEECH THERAPY | Facility: CLINIC | Age: 78
End: 2018-08-14
Payer: MEDICARE

## 2018-08-14 DIAGNOSIS — R49.9 UNSPECIFIED VOICE AND RESONANCE DISORDER: Primary | ICD-10-CM

## 2018-08-14 DIAGNOSIS — G20 PARKINSON'S DISEASE (HCC): Primary | ICD-10-CM

## 2018-08-14 DIAGNOSIS — G20 PARKINSON'S DISEASE (HCC): ICD-10-CM

## 2018-08-14 PROCEDURE — 92507 TX SP LANG VOICE COMM INDIV: CPT | Performed by: SPEECH-LANGUAGE PATHOLOGIST

## 2018-08-14 PROCEDURE — G8990 OTHER PT/OT CURRENT STATUS: HCPCS | Performed by: OCCUPATIONAL THERAPIST

## 2018-08-14 PROCEDURE — 97165 OT EVAL LOW COMPLEX 30 MIN: CPT | Performed by: OCCUPATIONAL THERAPIST

## 2018-08-14 PROCEDURE — G8991 OTHER PT/OT GOAL STATUS: HCPCS | Performed by: OCCUPATIONAL THERAPIST

## 2018-08-14 NOTE — PROGRESS NOTES
OCCUPATIONAL THERAPY INITIAL EVALUATION    2018  Zora Sanz    1940  [id number]  Beatrice Vieyra, DO  No diagnosis found  Subjective Evaluation    Quality of life: good      "It's hard for me to tell what my limitations are"  PATIENT GOAL: "I want to increase my speed"  HISTORY OF PRESENT ILLNESS:     Pt is a 69 y o  male who was referred to Occupational Therapy s/p PD diagnosed 2018  Pt with initial symptoms of isolating digits, decreased reciprocal arm swings, and dragging feet with functional ambulation  Pt attended PCP 2* suspicions who then referred Pt to neurologist who recommended Pt for LSVT BIG tx  Pt currently taking Sinemet 3x/day (morning, mid-afternoon, and evening hours)  Pt reports benefits noted since beginning medication  Pt reports difficulties with donning shirt occasionally, decreased reciprocal arm swings, decreased abilities buttoning, difficulties donning suspenders, tremors in B/L hands, and increased time required to complete functional tasks  Pt lives in a [address] with wife  Pt mod I with all ADLs at this time with occasional difficulties buttoning, donning suspenders, and donning some shirts  Pt mod I with increased time required for all IADLs  Pt worked as a ; Pt retired in   Pt continues the role of driving with no difficulties reported  PMH:   Past Medical History:   Diagnosis Date    Arthritis     Asbestos exposure     Asbestosis ([facility] )     GERD (gastroesophageal reflux disease)     Hypertension     Lung disease     Parkinson's disease ([facility] [address] )          Pain Levels:  Restin    With Activity:  6    Pain in R shoulder     Objective     Functional Assessment     Comments  See impairment section for further details  IMPAIRMENT SECTION:  1  9-hole peg test: R 35 58 seconds, L 33 67 seconds (Pt is L hand dominant)  2    TUG 10 38 secs, TUG carry 11 69 secs, TUG cog 23 25 secs   3  5 sit to stands: 11 17 seconds  4  Physical Performance Test 17/28     5  functional task recording form for 10 salient ADL tasks:    -buttoning, donning coat/jacket, improved reciprocal arm swings with functional ambulation, donning suspenders  6  Medication taken 3x/day with meals  Assessment    Assessment details: See skilled analysis for further details  SKILLED ANALYSIS:  Pt is a 69 y o  Male referred to Occupational Therapy s/p new diagnosis of PD  Pt presents with B/L tremors, marked decreased B/L arm swings with functional ambulation, increased time required for task completion of all functional tasks, decreased engagement in activities of daily living as Pt has difficulties in the following: dinning shirts/coats/jackets, donning suspenders, buttoning  Pt will benefit from skilled Occupational Therapy services for LSVT BIG 4x/week for 2-4 weeks (split between PT/OT services) focusing on neuro re-education, ADL training, and self-care management for improved rate of performance, larger amplitude of movement, and good calibration for improved functional performance in salient tasks,  QOL, and community involvement             GOALS:    Short Term Goals:    - Pt will increase BUE reciprocal arm swings to WNL for improved rate of community mobility, improved balance with item transport throughout home and community envt 2-4 weeks    -Pt will demo good carryover of larger amplitude movements with salient  Tasks and ADL engagement with increased pace/speed x 75% 2-4 weeks    -Pt will demo good carryover of LSVT HEP 2 x daily x 4 weeks- 2-4 weeks      -Pt will increase confidence in all rating scales >95% for improved  functional mobility with ADL performance- 2-4 weeks    -Pt will increase rate of performance of Physical Performance Test for  improved QOL with ADLS- 2-4 weeks    -Pt will demo good carryover of larger amplitude movements with salient ADL task  >75% of time for increased participation in meaningful occupations 2-4 weeks    -Pt will report increased rate of activities of daily living and instrumental activities of daily living for improved salient task performance 4 weeks        Long Term Goals:    STG = LTG      PLANNED THERAPY INTERVENTIONS:  Maximal daily exercises abiding by LSVT BIG protocol    INTERVENTION COMMENTS:  Diagnosis: PD  Precautions: Asbestosis, lung disease, HTN  FOTO: will perform next session  Insurance: Payor: [address] [place] [address] / Plan: MEDICARE A AND B / Product Type: Medicare A & B Fee for Service /   1 of 10 visits, PN due 09/14/2018

## 2018-08-14 NOTE — PROGRESS NOTES
Daily Speech Treatment Note    Today's date: 2018  Patients name: Audelia Cortes  : 1940  MRN: [id number]  Safety measures: Parkinson's disease, fall risk  Referring provider: Radha Ordoñez DO    Primary Diagnosis/Billing code: R49 9  Secondary Diagnosis/ Billing code: Enrrique Hewitt    Visit Tracking:  -Referring provider: Epic  -Billing guidelines: CMS  -Visit #2/10  (11 total visits)  -Medicare  [employer]  -RE due 2018    Subjective/Behavioral:  -"I practiced my homework with [place] "    Objective/Assessment:    Short-term goals:  1  Patient and caregiver will be educated on vocal hygiene and demonstrate understanding of recommendations to facilitate improved vocal quality (to be achieved in 1-2 weeks)  2  Patient will increase his vocal loudness from 50% to 75% of the time in order to convey information over the telephone with min verbal cues (to be achieved in 4 weeks)  3  Patient will demonstrate understanding that his louder voice is WNL and will become comfortable increasing his phonatory effort (to be achieved in 4 weeks)  4  Patient will develop the ability to monitor adequate loudness levels in conversation to facilitate increased communication success with min verbal cues (to be achieved in 4 weeks)  The following data was collected using [address] [place] [address]  Sound level meter-to-mouth distance: 50 cm  Daily Task #1  Maximum Duration & Intensity of Sustained LOUD /ah/ Phonation:  Average duration: 12 0 sec  Average intensity: 90 5 dB SPL  Perceived level of effort: 10/10  Cues for loudness: Minimal    Daily Task #2  Maximum Fundamental Frequency Range:  Average Pitch (high): 256 9 Hz  Average Pitch (low): 135 3 Hz  Perceived level of effort: 10/10  Cues for loudness: None  Cues for pitch:  Moderate-maximum    Daily Task #3  Maximum Speech Loudness Drill of Functional Phrases:  Average intensity: 72 1 dB SPL  Perceived level of effort: 9/10  Cues for loudness: Minimal-none    Daily Task #4  Hierarchal Speech Loudness Drill (Phrase Level): Average intensity: 67 7 dB SPL  Perceived level of effort: 10/10  Cues for loudness: Minimal-none    Patient was given a list of automatic speech tasks to complete in place of reading  This included the alphabet, counting, JENNIFER, SOPHIE, and reciting the itsy bitsy spider ( as patient reports he says this with his great granddaughter)  Average intensity during spontaneous "off the cuff" questions: 66 4 dB SPL    Plan:  -Patient was provided with home exercises/activities to target goals in plan of care at the end of today's session   -Continue with current plan of care

## 2018-08-14 NOTE — PROGRESS NOTES
Daily Speech Treatment Note    Today's date: 8/15/2018  Patients name: Gifty Bennett  : 1940  MRN: [id number]  Safety measures: Parkinson's disease, fall risk  Referring provider: Jose De Jesus Campuzano DO    Primary Diagnosis/Billing code: R49 9  Secondary Diagnosis/ Billing code: [address]    Visit Tracking:  -Referring provider: Epic  -Billing guidelines: CMS  -Visit #3/10  (12 total visits)  -Medicare  [employer]  -RE due 2018    Subjective/Behavioral:  -"I'm good "    Objective/Assessment:    Short-term goals:  1  Patient and caregiver will be educated on vocal hygiene and demonstrate understanding of recommendations to facilitate improved vocal quality (to be achieved in 1-2 weeks)  2  Patient will increase his vocal loudness from 50% to 75% of the time in order to convey information over the telephone with min verbal cues (to be achieved in 4 weeks)  3  Patient will demonstrate understanding that his louder voice is WNL and will become comfortable increasing his phonatory effort (to be achieved in 4 weeks)  4  Patient will develop the ability to monitor adequate loudness levels in conversation to facilitate increased communication success with min verbal cues (to be achieved in 4 weeks)  The following data was collected using [address] [place] [address]  Sound level meter-to-mouth distance: 50 cm  Daily Task #1  Maximum Duration & Intensity of Sustained LOUD /ah/ Phonation:  Average duration: 11 1 sec  Average intensity: 94 7 dB SPL  Perceived level of effort: 10/10  Cues for loudness: Minimal    Daily Task #2  Maximum Fundamental Frequency Range:  Average Pitch (high): 234 5 Hz  Average Pitch (low): 152 5 Hz  Perceived level of effort: 10/10  Cues for loudness: None  Cues for pitch:  Moderate    Daily Task #3  Maximum Speech Loudness Drill of Functional Phrases:  Average intensity: 79 2 dB SPL  Perceived level of effort: 9/10  Cues for loudness: Minimal    Daily Task #4  Hierarchal Speech Loudness Drill (Phrase Level): Average intensity: 75 7 dB SPL  Perceived level of effort: 10/10  Cues for loudness: Minimal-none    Patient was given a list of automatic speech tasks to complete in place of reading  This included the alphabet, counting, JENNIFER, SOPHIE, and reciting the itsy bitsy spider ( as patient reports he says this with his great granddaughter)  Average intensity during spontaneous "off the cuff" questions: 71 3 dB SPL    Plan:  -Patient was provided with home exercises/activities to target goals in plan of care at the end of today's session   -Continue with current plan of care

## 2018-08-15 ENCOUNTER — OFFICE VISIT (OUTPATIENT)
Dept: OCCUPATIONAL THERAPY | Facility: CLINIC | Age: 78
End: 2018-08-15
Payer: MEDICARE

## 2018-08-15 ENCOUNTER — OFFICE VISIT (OUTPATIENT)
Dept: SPEECH THERAPY | Facility: CLINIC | Age: 78
End: 2018-08-15
Payer: MEDICARE

## 2018-08-15 DIAGNOSIS — G20 PARKINSON'S DISEASE (HCC): ICD-10-CM

## 2018-08-15 DIAGNOSIS — R49.9 UNSPECIFIED VOICE AND RESONANCE DISORDER: Primary | ICD-10-CM

## 2018-08-15 DIAGNOSIS — G20 PARKINSON'S DISEASE (HCC): Primary | ICD-10-CM

## 2018-08-15 PROCEDURE — 97112 NEUROMUSCULAR REEDUCATION: CPT

## 2018-08-15 PROCEDURE — 92507 TX SP LANG VOICE COMM INDIV: CPT | Performed by: SPEECH-LANGUAGE PATHOLOGIST

## 2018-08-15 PROCEDURE — 97110 THERAPEUTIC EXERCISES: CPT

## 2018-08-15 NOTE — PROGRESS NOTES
Daily Note     Today's date: 8/15/2018  Patient name: Anya Kc  : 1940  MRN: [id number]  Referring provider: Mellisa Brito DO  Dx:   Encounter Diagnosis   Name Primary?  Parkinson's disease ([place] [address] ) Yes                  Subjective: "I keep mixing up which arm I'm supposed to swing "      Objective: See treatment diary below      Assessment: Tolerated treatment well  Pt completed 1x10 reps each side of seated exercises: floor to ceiling reach and side-to-side reach, and in stance: forward steps, sideways steps, backward steps, forward rock and reach, sideways rock and reach, and sit to stand, incorporating finger flicks B29 during each rep of exercises  Focus was on performing large amplitude movements, noted with poor carryover of technique and requiring constant therapist demonstration and frequent verbal and physical cues for positioning  Completed exercises with slow pace, noted with BUE tremoring during reach and occasional LOB with pt able to self-correct  BUE bean bag toss in quadruped on mat table requiring crossing midline and large amplitude movements to retrieve bean bag and toss towards power web targets on floor in front of pt  Focus was on improving BUE reciprocal arm swings for improved balance, noted with continued BUE tremoring and fair carryover of technique requiring frequent cues for sequencing and finger flicks  Plan: Continue skilled OT per POC with focus on carryover of large amplitude movements, BUE reciprocal arm swings, and pacing      INTERVENTION COMMENTS:  Diagnosis: Parkinson's disease ([facility] ) [G20]  Precautions: Asbestosis, lung disease, HTN  FOTO: will perform next session  Insurance: Payor: MEDICARE / Plan: MEDICARE A AND B / Product Type: Medicare A & B Fee for Service /   2 of 10 visits, PN due 2018

## 2018-08-16 ENCOUNTER — OFFICE VISIT (OUTPATIENT)
Dept: OCCUPATIONAL THERAPY | Facility: CLINIC | Age: 78
End: 2018-08-16
Payer: MEDICARE

## 2018-08-16 ENCOUNTER — OFFICE VISIT (OUTPATIENT)
Dept: SPEECH THERAPY | Facility: CLINIC | Age: 78
End: 2018-08-16
Payer: MEDICARE

## 2018-08-16 DIAGNOSIS — G20 PARKINSON'S DISEASE (HCC): Primary | ICD-10-CM

## 2018-08-16 DIAGNOSIS — R49.9 UNSPECIFIED VOICE AND RESONANCE DISORDER: Primary | ICD-10-CM

## 2018-08-16 DIAGNOSIS — G20 PARKINSON'S DISEASE (HCC): ICD-10-CM

## 2018-08-16 PROCEDURE — 92507 TX SP LANG VOICE COMM INDIV: CPT | Performed by: SPEECH-LANGUAGE PATHOLOGIST

## 2018-08-16 PROCEDURE — 97535 SELF CARE MNGMENT TRAINING: CPT

## 2018-08-16 PROCEDURE — 97112 NEUROMUSCULAR REEDUCATION: CPT

## 2018-08-16 NOTE — PROGRESS NOTES
Daily Speech Treatment Note    Today's date: 2018   Patients name: Anya Kc  : 1940  MRN: [id number]  Safety measures: Parkinson's disease, fall risk  Referring provider: Mellisa Brito DO    Primary Diagnosis/Billing code: R49 9  Secondary Diagnosis/ Billing code: [address]    Visit Tracking:  -Referring provider: Epic  -Billing guidelines: CMS  -Visit #4/10  (13 total visits)  -Medicare  [employer]  -RE due 2018    Subjective/Behavioral:  -"My phone ran out of battery '    Objective/Assessment:    Short-term goals:  1  Patient and caregiver will be educated on vocal hygiene and demonstrate understanding of recommendations to facilitate improved vocal quality (to be achieved in 1-2 weeks)  2  Patient will increase his vocal loudness from 50% to 75% of the time in order to convey information over the telephone with min verbal cues (to be achieved in 4 weeks)  3  Patient will demonstrate understanding that his louder voice is WNL and will become comfortable increasing his phonatory effort (to be achieved in 4 weeks)  4  Patient will develop the ability to monitor adequate loudness levels in conversation to facilitate increased communication success with min verbal cues (to be achieved in 4 weeks)  The following data was collected using [address] [place] [address]  Sound level meter-to-mouth distance: 50 cm      Daily Task #1  Maximum Duration & Intensity of Sustained LOUD /ah/ Phonation:  Average duration: 12 4 sec  Average intensity: 90 0 dB SPL  Perceived level of effort: 10/10  Cues for loudness: Minimal    Daily Task #2  Maximum Fundamental Frequency Range:  Average Pitch (high): 239 7 Hz  Average Pitch (low): 148 7 Hz  Perceived level of effort: 10/10  Cues for loudness: None  Cues for pitch: Minimal    Daily Task #3  Maximum Speech Loudness Drill of Functional Phrases:  Average intensity: 77 7 dB SPL  Perceived level of effort: 9/10  Cues for loudness: Minimal    Daily Task #4  Hierarchal Speech Loudness Drill (Phrase Level): Average intensity: 74 0 dB SPL  Perceived level of effort: 10/10  Cues for loudness: Minimal-none    Patient was given a list of automatic speech tasks to complete in place of reading  This included the alphabet, counting, JENNIFER, SOPHIE, and reciting the itsy bitsy spider ( as patient reports he says this with his great granddaughter)  Average intensity during spontaneous "off the cuff" questions: 71 4 dB SPL    Plan:  -Patient was provided with home exercises/activities to target goals in plan of care at the end of today's session   -Continue with current plan of care

## 2018-08-16 NOTE — PROGRESS NOTES
Today's date: 2018  Patient name: Audelia Cortes  : 1940  MRN: [id number]  Referring provider: Radha Ordoñez DO  Dx:   Encounter Diagnosis   Name Primary?  Parkinson's disease ([place] [address] ) Yes                  Subjective: "I have trouble cutting my toenails"  Objective: See treatment diary below  Assessment: Tolerated treatment well  Patient would benefit from continued OT    2 of 16  LSVT sessions performing at 5/10 exertion level  Pts first LSVT visit  Pt performed all exercises on foam with exception of backwards step, twist and reach w/o LOB noted  Improvement noted   Functional comp task:  Big reach to low surface to engage in ADL tasks ie   cutting toenails       BIG walking:        Hierarchy Task:     Calibration: Frequent cues required for posture during task           Meds:     Carryover Assign:     PLAN:    INTERVENTION COMMENTS:  Diagnosis: Parkinson's disease ([place] [address] ) [G20]  Precautions: Asbestosis, lung disease, HTN  FOTO: will perform next session  Insurance: Payor: [address] [place] [address],[address] / Plan: MEDICARE A AND B / Product Type: Medicare A & B Fee for Service /   3 of 10 visits, PN due 2018

## 2018-08-18 NOTE — PROGRESS NOTES
Daily Speech Treatment Note    Today's date: 2018  Patients name: Gabe Toscano  : 1940  MRN: [id number]  Safety measures: Parkinson's disease, fall risk  Referring provider: Gary Almanza DO    Primary Diagnosis/Billing code: R49 9  Secondary Diagnosis/ Billing code: [address]    Visit Tracking:  -Referring provider: Epic  -Billing guidelines: CMS  -Visit #5/10  (14 total visits)   -Medicare  [employer]  -RE due 2018    Subjective/Behavioral:  -"I tweaked my knee"    Objective/Assessment:    Short-term goals:  1  Patient and caregiver will be educated on vocal hygiene and demonstrate understanding of recommendations to facilitate improved vocal quality (to be achieved in 1-2 weeks)  2  Patient will increase his vocal loudness from 50% to 75% of the time in order to convey information over the telephone with min verbal cues (to be achieved in 4 weeks)  3  Patient will demonstrate understanding that his louder voice is WNL and will become comfortable increasing his phonatory effort (to be achieved in 4 weeks)  4  Patient will develop the ability to monitor adequate loudness levels in conversation to facilitate increased communication success with min verbal cues (to be achieved in 4 weeks)  The following data was collected using [address] [place] [address]  Sound level meter-to-mouth distance: 50 cm      Daily Task #1  Maximum Duration & Intensity of Sustained LOUD /ah/ Phonation:  Average duration: 13 8 sec  Average intensity: 92 7 dB SPL  Perceived level of effort: 10/10  Cues for loudness: Minimal    Daily Task #2  Maximum Fundamental Frequency Range:  Average Pitch (high): 253 5 Hz  Average Pitch (low): 129 4 Hz  Perceived level of effort: 10/10  Cues for loudness: None  Cues for pitch: Minimal    Daily Task #3  Maximum Speech Loudness Drill of Functional Phrases:  Average intensity: 76 1 dB SPL  Perceived level of effort: 9/10  Cues for loudness: Minimal    Average intensity during spontaneous "off the cuff" questions: 71 6 dB SPL    Plan:  -Patient was provided with home exercises/activities to target goals in plan of care at the end of today's session   -Continue with current plan of care

## 2018-08-20 ENCOUNTER — OFFICE VISIT (OUTPATIENT)
Dept: SPEECH THERAPY | Facility: CLINIC | Age: 78
End: 2018-08-20
Payer: MEDICARE

## 2018-08-20 ENCOUNTER — OFFICE VISIT (OUTPATIENT)
Dept: OCCUPATIONAL THERAPY | Facility: CLINIC | Age: 78
End: 2018-08-20
Payer: MEDICARE

## 2018-08-20 DIAGNOSIS — G20 PARKINSON'S DISEASE (HCC): Primary | ICD-10-CM

## 2018-08-20 DIAGNOSIS — R49.9 UNSPECIFIED VOICE AND RESONANCE DISORDER: Primary | ICD-10-CM

## 2018-08-20 DIAGNOSIS — G20 PARKINSON'S DISEASE (HCC): ICD-10-CM

## 2018-08-20 PROCEDURE — 97535 SELF CARE MNGMENT TRAINING: CPT

## 2018-08-20 PROCEDURE — 97112 NEUROMUSCULAR REEDUCATION: CPT

## 2018-08-20 PROCEDURE — 92507 TX SP LANG VOICE COMM INDIV: CPT

## 2018-08-20 NOTE — PROGRESS NOTES
Daily Note     Today's date: 2018  Patient name: Joann Hoff  : 1940  MRN: [id number]  Referring provider: Dennie Dung, DO  Dx:   Encounter Diagnosis   Name Primary?  Parkinson's disease ([place] [address] ) Yes                  Subjective: "I'm not good with coordination  Objective: See treatment diary below      Assessment: Tolerated treatment well  Patient would benefit from continued OT    Session 11 of 16  Medication:   Maximum Daily Exercises: utilized blue foam for all exercises except backward step, rock and reach, and twist  Required frequent cuing to recall exercises and complete with good technique  5 Component Tasks: BIG Sit-to-Stand (unable to complete due to time constraints)  Hierarchy Task: unable to complete due to time  BIG Walking: forward and backward in christiansen with verbal cuing for BIG arm swings  Calibration: poor/fair  Carryover Assignment:arm swings at home        Plan: Continue per plan of care       INTERVENTION COMMENTS:  Diagnosis: Parkinson's disease ([place] [address] ) [G20]  Precautions: Asbestosis, lung disease, HTN  FOTO:  Insurance: Payor: MEDICARE / Plan: MEDICARE A AND B / Product Type: Medicare A & B Fee for Service /   4 of 10 visits, PN due

## 2018-08-21 ENCOUNTER — OFFICE VISIT (OUTPATIENT)
Dept: OCCUPATIONAL THERAPY | Facility: CLINIC | Age: 78
End: 2018-08-21
Payer: MEDICARE

## 2018-08-21 ENCOUNTER — OFFICE VISIT (OUTPATIENT)
Dept: SPEECH THERAPY | Facility: CLINIC | Age: 78
End: 2018-08-21
Payer: MEDICARE

## 2018-08-21 DIAGNOSIS — G20 PARKINSON'S DISEASE (HCC): ICD-10-CM

## 2018-08-21 DIAGNOSIS — R49.9 UNSPECIFIED VOICE AND RESONANCE DISORDER: Primary | ICD-10-CM

## 2018-08-21 DIAGNOSIS — G20 PARKINSON'S DISEASE (HCC): Primary | ICD-10-CM

## 2018-08-21 PROCEDURE — 92507 TX SP LANG VOICE COMM INDIV: CPT

## 2018-08-21 PROCEDURE — 97112 NEUROMUSCULAR REEDUCATION: CPT

## 2018-08-21 PROCEDURE — 97535 SELF CARE MNGMENT TRAINING: CPT

## 2018-08-21 NOTE — PROGRESS NOTES
Daily Speech Treatment Note    Today's date: 2018  Patients name: Sharmin Smith  : 1940  MRN: [id number]  Safety measures: Parkinson's disease, fall risk  Referring provider: Tiburcio Lance DO    Primary Diagnosis/Billing code: R49 9  Secondary Diagnosis/ Billing code: [place]    Visit Tracking:  -Referring provider: Epic  -Billing guidelines: CMS  -Visit #6/10  (14 total visits)   -Medicare  [employer]  -RE due 2018    Subjective/Behavioral:  -"I have a lot of work to do when I get home "    Objective/Assessment:    Short-term goals:  1  Patient and caregiver will be educated on vocal hygiene and demonstrate understanding of recommendations to facilitate improved vocal quality (to be achieved in 1-2 weeks)  2  Patient will increase his vocal loudness from 50% to 75% of the time in order to convey information over the telephone with min verbal cues (to be achieved in 4 weeks)  3  Patient will demonstrate understanding that his louder voice is WNL and will become comfortable increasing his phonatory effort (to be achieved in 4 weeks)  4  Patient will develop the ability to monitor adequate loudness levels in conversation to facilitate increased communication success with min verbal cues (to be achieved in 4 weeks)  The following data was collected using [address] [place] [address]  Sound level meter-to-mouth distance: 50 cm       Daily Task #1  Maximum Duration & Intensity of Sustained LOUD /ah/ Phonation:  Average duration: 9 8 sec  Average intensity: 90 3 dB SPL  Perceived level of effort: 10/10  Cues for loudness: Minimal     Daily Task #2  Maximum Fundamental Frequency Range:  Average Pitch (high): 254 3 Hz  Average Pitch (low): 138 7 Hz  Perceived level of effort: 10/10  Cues for loudness: None  Cues for pitch: Minimal     Daily Task #3  Maximum Speech Loudness Drill of Functional Phrases:  Average intensity: 79 9 dB SPL  Perceived level of effort: 9/10  Cues for loudness: None Average intensity during spontaneous "off the cuff" questions: 71 3 dB SPL  Plan:  -Patient was provided with home exercises/activities to target goals in plan of care at the end of today's session   -Continue with current plan of care

## 2018-08-22 ENCOUNTER — OFFICE VISIT (OUTPATIENT)
Dept: SPEECH THERAPY | Facility: CLINIC | Age: 78
End: 2018-08-22
Payer: MEDICARE

## 2018-08-22 ENCOUNTER — OFFICE VISIT (OUTPATIENT)
Dept: OCCUPATIONAL THERAPY | Facility: CLINIC | Age: 78
End: 2018-08-22
Payer: MEDICARE

## 2018-08-22 DIAGNOSIS — G20 PARKINSON'S DISEASE (HCC): Primary | ICD-10-CM

## 2018-08-22 DIAGNOSIS — R49.9 UNSPECIFIED VOICE AND RESONANCE DISORDER: Primary | ICD-10-CM

## 2018-08-22 DIAGNOSIS — G20 PARKINSON'S DISEASE (HCC): ICD-10-CM

## 2018-08-22 PROCEDURE — 92507 TX SP LANG VOICE COMM INDIV: CPT

## 2018-08-22 PROCEDURE — 97112 NEUROMUSCULAR REEDUCATION: CPT

## 2018-08-22 PROCEDURE — 97535 SELF CARE MNGMENT TRAINING: CPT

## 2018-08-22 NOTE — PROGRESS NOTES
Daily Note     Today's date: 2018  Patient name: Boom Garcia  : 1940  MRN: [id number]  Referring provider: Luiz Wilkins DO  Dx:   Encounter Diagnosis   Name Primary?  Parkinson's disease ([place] [address] ) Yes                  Subjective: "I did a few exercises yesterday, but not all of them "      Objective: See treatment diary below      Assessment: Tolerated treatment well  Patient would benefit from continued OT    Session 14 of 16  Medication:   Maximum Daily Exercises: Utilized blue foam and sylvain for increasing balance challenge  5 Component Tasks: BIG Sit-to-Stand, BB toss in stance for reciprocal arm movements tossing over standing mirror, BIG catch of BB while standing on foam  Hierarchy Task: agility ladder for coordination forward and backward stepping  BIG Walking: completed 3 laps in hallways with BIG arm swings-decreased VC this session  Calibration: fair  Carryover Assignment:complete full set of LSVT exercises tonight        Plan: Progress treament per protocol       INTERVENTION COMMENTS:  Diagnosis: Parkinson's disease ([place] [address] ) [G20]  Precautions: Asbestosis, lung disease, HTN  FOTO:  Insurance: Payor: MEDICARE / Plan: MEDICARE A AND B / Product Type: Medicare A & B Fee for Service /   6 of 10 visits, PN due

## 2018-08-22 NOTE — PROGRESS NOTES
Daily Speech Treatment Note    Today's date: 2018  Patients name: Audelia Cortes  : 1940  MRN: [id number]  Safety measures: Parkinson's disease, fall risk  Referring provider: Radha Ordoñez DO    Primary Diagnosis/Billing code: R49 9  Secondary Diagnosis/ Billing code: [address]    Visit Tracking:  -Referring provider: Epic  -Billing guidelines: CMS  -Visit #7/10  (15 total visits)   -Medicare  [employer]  -RE due 2018    Subjective/Behavioral:  "My wife says I sound better"    Objective/Assessment:    Short-term goals:  1  Patient and caregiver will be educated on vocal hygiene and demonstrate understanding of recommendations to facilitate improved vocal quality (to be achieved in 1-2 weeks)  2  Patient will increase his vocal loudness from 50% to 75% of the time in order to convey information over the telephone with min verbal cues (to be achieved in 4 weeks)  3  Patient will demonstrate understanding that his louder voice is WNL and will become comfortable increasing his phonatory effort (to be achieved in 4 weeks)  4  Patient will develop the ability to monitor adequate loudness levels in conversation to facilitate increased communication success with min verbal cues (to be achieved in 4 weeks)  The following data was collected using [address] [place] [address]  Sound level meter-to-mouth distance: 50 cm       Daily Task #1  Maximum Duration & Intensity of Sustained LOUD /ah/ Phonation:  Average duration: 12 5 sec  Average intensity: 92 0 dB SPL  Perceived level of effort: 10/10  Cues for loudness: Minimal     Daily Task #2  Maximum Fundamental Frequency Range:  Average Pitch (high): 230 1 Hz  Average Pitch (low): 121,8 Hz  Perceived level of effort: 10/10  Cues for loudness: None  Cues for pitch: Minimal     Daily Task #3  Maximum Speech Loudness Drill of Functional Phrases:  Average intensity: 78 0 dB SPL  Perceived level of effort: 9/10  Cues for loudness: None     Average intensity during spontaneous "off the cuff" questions: 72 1 dB SPL  Plan:  -Patient was provided with home exercises/activities to target goals in plan of care at the end of today's session   -Continue with current plan of care

## 2018-08-23 ENCOUNTER — OFFICE VISIT (OUTPATIENT)
Dept: SPEECH THERAPY | Facility: CLINIC | Age: 78
End: 2018-08-23
Payer: MEDICARE

## 2018-08-23 ENCOUNTER — OFFICE VISIT (OUTPATIENT)
Dept: OCCUPATIONAL THERAPY | Facility: CLINIC | Age: 78
End: 2018-08-23
Payer: MEDICARE

## 2018-08-23 DIAGNOSIS — G20 PARKINSON'S DISEASE (HCC): Primary | ICD-10-CM

## 2018-08-23 DIAGNOSIS — R49.9 UNSPECIFIED VOICE AND RESONANCE DISORDER: Primary | ICD-10-CM

## 2018-08-23 DIAGNOSIS — G20 PARKINSON'S DISEASE (HCC): ICD-10-CM

## 2018-08-23 PROCEDURE — 97112 NEUROMUSCULAR REEDUCATION: CPT | Performed by: OCCUPATIONAL THERAPIST

## 2018-08-23 PROCEDURE — G8991 OTHER PT/OT GOAL STATUS: HCPCS | Performed by: OCCUPATIONAL THERAPIST

## 2018-08-23 PROCEDURE — G9172 VOICE GOAL STATUS: HCPCS

## 2018-08-23 PROCEDURE — G8992 OTHER PT/OT  D/C STATUS: HCPCS | Performed by: OCCUPATIONAL THERAPIST

## 2018-08-23 PROCEDURE — G9173 VOICE D/C STATUS: HCPCS

## 2018-08-23 PROCEDURE — 92507 TX SP LANG VOICE COMM INDIV: CPT

## 2018-08-23 NOTE — PROGRESS NOTES
Pt did not have one more PT treatment as planned prior to transferring to OT to finish the BIG program  This visit was pt's PT d/c from BIG  Pt finished BIG program with OT

## 2018-08-23 NOTE — PROGRESS NOTES
Speech-Language Pathology Discharge    Today's date: 2018  Patients name: Juan Andrade  : 1940  MRN: [id number]  Safety measures: Parkinson's, fall ris  Referring provider: John Goncalves DO    Visit Tracking:   -Referring provider: Epic  -Billing guidelines: CMS  -Visit #8/10  (16 total visits)   -Medicare  [employer]  -RE due 2018    Subjective comments: "I brought in candy for you guys"    Patient/family's goal(s): "to talk louder"    Assessments    [facility] (LSVT) LOUD re-assessment:     Sound Level Meter-to-Mouth Distance: 50 cm throughout testing           Maximum Duration of Sustained Vowel Phonation (/ah/): IE: Status:   Average MDP 12 6 sec 11 9 sec IMPROVEMENT   Average Intensity 95 2 dB 68 4 dB IMPROVEMENT             Maximum Fundamental Frequency Range: IE: Status:   Highest Pitch 240 1 Hz 170 3 Hz IMPROVEMENT   Lowest Pitch 138 Hz 138 Hz NO CHANGE   Average Intensity 85 dB 78 7 dB IMPROVEMENT             Reading of Phrases: IE: Status:   Average Intensity  79 0 dB 66 7 dB IMPROVEMENT            Conversational Monologue: IE: Status:   Average Intensity  71 4 67 2 dB IMPROVEMENT      IE indicates the scores from the initial evaluation (18)     Voice Handicap Index (VHI): The VHI is a list of 30 statements that many people have used to describe their voices and the effects of their voices on their lives  Patient indicated how frequently he has the same experience using a rating point scale (never = 0, almost never = 1, sometimes = 2, almost always = 3, and always = 4)  Results were as follows:    Subscale: Score: Self-Perceived Impairment Level:   Physical 6/40 Mild (improved from 10)   Emotional 4/40 Mild (improved from 5)   Functional 8/40 Mild (improved from 9)        TOTAL 18/120 Mild (improved from 24)       Goals  Short-term goals:  1   Patient and caregiver will be educated on vocal hygiene and demonstrate understanding of recommendations to facilitate improved vocal quality (to be achieved in 1-2 weeks)  MET     2  Patient will increase his vocal loudness from 50% to 75% of the time in order to convey information over the telephone with min verbal cues (to be achieved in 4 weeks)  MET     3  Patient will demonstrate understanding that his louder voice is WNL and will become comfortable increasing his phonatory effort (to be achieved in 4 weeks)  MET     4  Patient will develop the ability to monitor adequate loudness levels in conversation to facilitate increased communication success with min verbal cues (to be achieved in 4 weeks)  MET     Long-term goals:  1   Patient will increase his vocal loudness to an appropriate level to be understood by others with min verbal cues (to be achieved by discharge)  MET     2  Patient will increase self-intelligibility rating by 25% (to be achieved by discharge)  MET    Functional Limitations Reporting (G-codes):   Flowsheet Rows      Most Recent Value   SLP G-Codes   FOTO information reviewed  N/A   Assessment Type  Discharge   Functional Limitations  Voice   Voice Goal Status ()  [address] [employer]   Voice Discharge Status ()  CJ          Impressions/Recommendations    Impressions:Patient presents with an improved overall vocal loudness  During conversation, speech intelligibility is reduced 20% of the time as judged by this clinician (improved from 60% in initial evaluation)       Recommendations:  -Patient to be discharged from outpatient skilled Speech Therapy services: Patient to be discharged to an independent Home Exercise Program

## 2018-08-23 NOTE — PROGRESS NOTES
OCCUPATIONAL THERAPY DISCHARGE SUMMARY:    2018  [address]   1940  [id number]  Beatrice Vieyra, DO  No diagnosis found  Subjective Evaluation    Quality of life: good      "I feel pretty good"  PATIENT GOAL: "I want to increase my speed"  HISTORY OF PRESENT ILLNESS:     Pt is a 69 y o  male who was referred to Occupational Therapy s/p PD diagnosed 2018  Pt with initial symptoms of isolating digits, decreased reciprocal arm swings, and dragging feet with functional ambulation  Pt attended PCP 2* suspicions who then referred Pt to neurologist who recommended Pt for LSVT BIG tx  Pt currently taking Sinemet 3x/day (morning, mid-afternoon, and evening hours)  Pt reports benefits noted since beginning medication  Pt reports difficulties with donning shirt occasionally, decreased reciprocal arm swings, decreased abilities buttoning, difficulties donning suspenders, tremors in B/L hands, and increased time required to complete functional tasks  Pt lives in a [address] with wife  Pt mod I with all ADLs at this time with occasional difficulties buttoning, donning suspenders, and donning some shirts  Pt mod I with increased time required for all IADLs  Pt worked as a ; Pt retired in   Pt continues the role of driving with no difficulties reported  PMH:   Past Medical History:   Diagnosis Date    Arthritis     Asbestos exposure     Asbestosis ([facility] [address] )     GERD (gastroesophageal reflux disease)     Hypertension     Lung disease     Parkinson's disease ([facility] [address] )          Pain Levels:  Restin    With Activity:  0    Pain in R shoulder     Objective     Functional Assessment     Comments  See impairment section for further details  IMPAIRMENT SECTION:  1  9-hole peg test: R 35 58 seconds, L 33 67 seconds (Pt is L hand dominant)    R 28 65 seconds, L 31 78 seconds  2  TUG 10 38 secs, TUG carry 11 69 secs, TUG cog 23 25 secs     TUG 9 31 secs, TUG carry 11 44 secs, TUG cog 12 75 secs  3  5 sit to stands: 11 17 seconds    11 09 seconds  4  Physical Performance Test 17/28 18/28    5  functional task recording form for 10 salient ADL tasks:    -buttoning, donning coat/jacket, improved reciprocal arm swings with functional ambulation, donning suspenders    Pt reports significant improvement with buttoning independently with increased pace and accuracy, Pt continues to present with min difficulties with donning coat/jackets, slight increased reciprocal arm swings evident, Pt able to maria luisa suspenders independently with little difficulties reported  6  Medication taken 3x/day with meals  7  97% confidence of ABC survey      Pt demonstrates with good functional progression towards all goals in POC  Pt beginning to caliberate and recognize the need for increased "Bigness"-- improved ability to learn new internal cues for WNL movement effort and bigness  Pt reports noticing functional impact outside of therapy session when in home and community environments  OTR will be D/Cing Pt at this time with completion of LSVT BIG program 4x/4 weeks  OTR recommending Pt to continue LSVT BIG exercises in home environment 2x/day to continue to progress/maintain large amplitude movements "automatically" in everyday living  Assessment    Assessment details: See skilled analysis for further details  SKILLED ANALYSIS:  Pt is a 69 y o  Male referred to Occupational Therapy s/p new diagnosis of PD  Pt presents with B/L tremors, marked decreased B/L arm swings with functional ambulation, increased time required for task completion of all functional tasks, decreased engagement in activities of daily living as Pt has difficulties in the following: dinning shirts/coats/jackets, donning suspenders, buttoning    Pt will benefit from skilled Occupational Therapy services for LSVT BIG 4x/week for 2-4 weeks (split between PT/OT services) focusing on neuro re-education, ADL training, and self-care management for improved rate of performance, larger amplitude of movement, and good calibration for improved functional performance in salient tasks,  QOL, and community involvement             GOALS:    Short Term Goals:    - Pt will increase BUE reciprocal arm swings to WNL for improved rate of community mobility, improved balance with item transport throughout home and community envt 2-4 weeks-- PARTIALLY MET    -Pt will demo good carryover of larger amplitude movements with salient  Tasks and ADL engagement with increased pace/speed x 75% 2-4 weeks-- PARTIALLY MET    -Pt will demo good carryover of LSVT HEP 2 x daily x 4 weeks- 2-4 weeks-- PARTIALLY MET      -Pt will increase confidence in all rating scales >95% for improved  functional mobility with ADL performance- 2-4 weeks-- MET    -Pt will increase rate of performance of Physical Performance Test for  improved QOL with ADLS- 2-4 weeks-- MET     -Pt will demo good carryover of larger amplitude movements with salient ADL task  >75% of time for increased participation in meaningful occupations 2-4 weeks-- PARTIALLY MET    -Pt will report increased rate of activities of daily living and instrumental activities of daily living for improved salient task performance 4 weeks-- MET        Long Term Goals:    STG = LTG      PLANNED THERAPY INTERVENTIONS:  Maximal daily exercises abiding by LSVT BIG protocol    INTERVENTION COMMENTS:  Diagnosis: PD  Precautions: Asbestosis, lung disease, HTN  FOTO: 82, with 3% limitations   Insurance: Payor: Jose Nair / Plan: MEDICARE A AND B / Product Type: Medicare A & B Fee for Service /   7 of 10 visits

## 2018-10-25 ENCOUNTER — OFFICE VISIT (OUTPATIENT)
Dept: NEUROLOGY | Facility: CLINIC | Age: 78
End: 2018-10-25
Payer: MEDICARE

## 2018-10-25 VITALS
HEIGHT: 71 IN | WEIGHT: 209 LBS | RESPIRATION RATE: 14 BRPM | DIASTOLIC BLOOD PRESSURE: 72 MMHG | SYSTOLIC BLOOD PRESSURE: 110 MMHG | HEART RATE: 78 BPM | BODY MASS INDEX: 29.26 KG/M2

## 2018-10-25 DIAGNOSIS — G20 PARKINSON'S DISEASE (HCC): ICD-10-CM

## 2018-10-25 PROCEDURE — 99214 OFFICE O/P EST MOD 30 MIN: CPT | Performed by: PSYCHIATRY & NEUROLOGY

## 2018-10-25 NOTE — PROGRESS NOTES
Patient ID: Tameka Paniagua  is a 69 y o  male  Assessment/Plan:    No problem-specific Assessment & Plan notes found for this encounter  Diagnoses and all orders for this visit:    Parkinson's disease ([address] )  -     carbidopa-levodopa (SINEMET)  mg per tablet; Take 1 5 tab AM, 1 5 tab afternoon, 1 tab nightly- increased dose to help with his bradykinesia noted on exam today and hopefully helps with his tremor also  Discussed staying hydrated for constipation  Commended him on doing daily exercises as taught in BIG and LOUD therapy- they will call next year if he wishes to repeat this carter in winter time  Discussed importance of fall prevention    Follow up in three months time           Subjective:    HPI     Mr  Cierra Abreu is a 70 yo left handed male, hx b/l shoulder and knee surgery, hx asbestosis (was a ), seen in consultation for tremors  He tells me he was having therapy for shoulder and PT noted less dexterity in his right and left hands thus here  His wife tells me over time he has gotten weaker- worse for the last 2-3 years with no inciting etiology  States he is more stiff and slower to do things  He states he has had worsening tremors more over the last year; milder prior  Notes it while using a screw  or buttoning shirts but not noted at rest  Wife States speech a little more softer over time- he used to scream all the time on his cell per him but no longer  He denies one sided weakness   States chronic neck pain  Hx skull fracture and concussion s/p fall with ice skating decades ago but no residual symptoms per him  Hx reading issues since childhood, but wife states worse short term memory over the last few years but does ADLs work around the house with no issues per him and her other than physical trouble with tremor  States no trouble with driving, states no getting lost going in the familiar route or issues with reaction time per wife and patient    States no family history of tremors or PD  States no hx CAD, DM, irregular heart beat stroke or seizure  No tobacco use or significant alcohol use  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  States any type of pain medication- he hallucinates  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 thus prefers open MRI  Denies dysphagia, hallucinosis, sleeping trouble or depression or anxiety otherwise  Does report light headedness with standing and constipation- states drinks may 1-2 cups water daily  Denies syncope    Since last seen:  Per wife he is more "limber" since starting Sinemet  He states tremor still the same- however his tremor is with action  He reports lesser bradykinesia, wife states she didn't realize how "slow he is" until he started the medication and she noted the difference  States voice louder   He has done BIG and LOUD therapy and notes a benefit from this  States he has increased phlegm but also has asbestosis and has seen pulmonology in the past, doing well now  States occasional minimal drooling  No significant bladder control issues  States no depression or anxiety  Delayed recall: 1/3  Stays active  No falls  States no significant cognitive issues  Wife state occasional shouting, "twitching" in sleep improved with Sinemet also    The following portions of the patient's history were reviewed and updated as appropriate: allergies, current medications, past family history, past medical history, past social history, past surgical history and problem list          Objective:    Blood pressure 110/72, pulse 78, resp  rate 14, height 5' 11" (1 803 m), weight 94 8 kg (209 lb)  Physical Exam   Constitutional: He appears well-developed and well-nourished  HENT:   Head: Normocephalic and atraumatic  Eyes: Pupils are equal, round, and reactive to light  Conjunctivae are normal    Neck: Normal range of motion  Neck supple  Cardiovascular: Normal rate and regular rhythm  Pulmonary/Chest: Effort normal    Musculoskeletal: Normal range of motion  Neurological: He is alert  He has normal strength and normal reflexes  Nursing note and vitals reviewed  Neurological Exam  Mental Status  Awake and alert  Oriented to person, place, time and situation  Recalls 1 of 3 objects immediately  At 10 minutes no dysarthria present  Language is fluent with no aphasia  Attention and concentration are normal   Hypophonia- improved  Cranial Nerves  CN II: Visual fields full to confrontation  CN III, IV, VI: Extraocular movements intact bilaterally  Pupils equal round and reactive to light bilaterally  CN V: Facial sensation is normal   CN VII: Full and symmetric facial movement  CN VIII: Hearing is normal   CN IX, X: Palate elevates symmetrically  Normal gag reflex  CN XI: Shoulder shrug strength is normal   CN XII: Tongue midline without atrophy or fasciculations  Masked facies  Motor  Normal muscle bulk throughout  No fasciculations present  Increased muscle tone  The following abnormal movements were seen: Strength is 5/5 throughout all four extremities  Increased tone RUE >> LUE- improved mildly from last time  Bradykinetic with all action  Decremental response noted with HAM     Sensory  Sensation is intact to light touch, pinprick, vibration and proprioception in all four extremities  Light touch is normal in upper and lower extremities  Temperature is normal in upper and lower extremities  Vibration is normal in upper and lower extremities  No right-sided hemispatial neglect  No left-sided hemispatial neglect  Reflexes  Deep tendon reflexes are 2+ and symmetric in all four extremities with downgoing toes bilaterally      Coordination  Right: Finger-to-nose normal  Rapid alternating movement abnormality:  Left: Finger-to-nose normal  Rapid alternating movement abnormality:  Decremental response with HAM     Gait Romberg is absent  Shuffling gait improved from last time, minimal stooped posture, reduced swing b/l  ROS:    Review of Systems   Constitutional: Negative  Negative for appetite change and fever  HENT: Negative  Negative for hearing loss, tinnitus, trouble swallowing and voice change  Eyes: Negative  Negative for photophobia and pain  Respiratory: Negative  Negative for shortness of breath  Cardiovascular: Negative  Negative for palpitations  Gastrointestinal: Negative  Negative for nausea and vomiting  Endocrine: Negative  Negative for cold intolerance and heat intolerance  Genitourinary: Negative  Negative for dysuria, frequency and urgency  Musculoskeletal: Negative  Negative for myalgias and neck pain  Skin: Negative  Negative for rash  Neurological: Positive for tremors  Negative for dizziness, seizures, syncope, facial asymmetry, speech difficulty, weakness, light-headedness, numbness and headaches  Hematological: Negative  Does not bruise/bleed easily  Psychiatric/Behavioral: Negative  Negative for confusion, hallucinations and sleep disturbance

## 2018-10-25 NOTE — LETTER
October 25, 2018     Marisol Ring, [address] [place] [address] [place] [address] Road [address]    Patient: Brinda Beauchamp  YOB: 1940   Date of Visit: 10/25/2018       Dear Dr Marisa Omalley:    Thank you for referring Bar Trevino to me for evaluation  Below are my notes for this consultation  If you have questions, please do not hesitate to call me  I look forward to following your patient along with you  Sincerely,        Beatrice Vieyra,         CC: No Recipients  [address] [facility], DO  10/25/2018  4:27 PM  Sign at close encounter  Patient ID: Brinda Beauchamp  is a 69 y o  male  Assessment/Plan:    No problem-specific Assessment & Plan notes found for this encounter  Diagnoses and all orders for this visit:    Parkinson's disease ([address] )  -     carbidopa-levodopa (SINEMET)  mg per tablet; Take 1 5 tab AM, 1 5 tab afternoon, 1 tab nightly- increased dose to help with his bradykinesia noted on exam today and hopefully helps with his tremor also  Discussed staying hydrated for constipation  Commended him on doing daily exercises as taught in [facility] therapy- they will call next year if he wishes to repeat this carter in winter time  Discussed importance of fall prevention    Follow up in three months time           Subjective:    HPI     Mr  Bar Trevino is a 70 yo left handed male, hx b/l shoulder and knee surgery, hx asbestosis (was a ), seen in consultation for tremors  He tells me he was having therapy for shoulder and PT noted less dexterity in his right and left hands thus here  His wife tells me over time he has gotten weaker- worse for the last 2-3 years with no inciting etiology  States he is more stiff and slower to do things  He states he has had worsening tremors more over the last year; milder prior   Notes it while using a screw  or buttoning shirts but not noted at rest  Wife States speech a little more softer over time- he used to scream all the time on his cell per him but no longer  He denies one sided weakness   States chronic neck pain  Hx skull fracture and concussion s/p fall with ice skating decades ago but no residual symptoms per him  Hx reading issues since childhood, but wife states worse short term memory over the last few years but does ADLs work around the house with no issues per him and her other than physical trouble with tremor  States no trouble with driving, states no getting lost going in the familiar route or issues with reaction time per wife and patient  States no family history of tremors or PD  States no hx CAD, DM, irregular heart beat stroke or seizure  No tobacco use or significant alcohol use  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  States any type of pain medication- he hallucinates  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 thus prefers open MRI  Denies dysphagia, hallucinosis, sleeping trouble or depression or anxiety otherwise  Does report light headedness with standing and constipation- states drinks may 1-2 cups water daily  Denies syncope    Since last seen:  Per wife he is more "limber" since starting Sinemet    He states tremor still the same- however his tremor is with action  He reports lesser bradykinesia, wife states she didn't realize how "slow he is" until he started the medication and she noted the difference  States voice louder   He has done [facility] therapy and notes a benefit from this  States he has increased phlegm but also has asbestosis and has seen pulmonology in the past, doing well now  States occasional minimal drooling  No significant bladder control issues  States no depression or anxiety  Delayed recall: 1/3  Stays active  No falls  States no significant cognitive issues  Wife state occasional shouting, "twitching" in sleep improved with Sinemet also    The following portions of the patient's history were reviewed and updated as appropriate: allergies, current medications, past family history, past medical history, past social history, past surgical history and problem list          Objective:    Blood pressure 110/72, pulse 78, resp  rate 14, height 5' 11" (1 803 m), weight 94 8 kg (209 lb)  Physical Exam   Constitutional: He appears well-developed and well-nourished  HENT:   Head: Normocephalic and atraumatic  Eyes: Pupils are equal, round, and reactive to light  Conjunctivae are normal    Neck: Normal range of motion  Neck supple  Cardiovascular: Normal rate and regular rhythm  Pulmonary/Chest: Effort normal    Musculoskeletal: Normal range of motion  Neurological: He is alert  He has normal strength and normal reflexes  Nursing note and vitals reviewed  Neurological Exam  Mental Status  Awake and alert  Oriented to person, place, time and situation  Recalls 1 of 3 objects immediately  At 10 minutes no dysarthria present  Language is fluent with no aphasia  Attention and concentration are normal   Hypophonia- improved  Cranial Nerves  CN II: Visual fields full to confrontation  CN III, IV, VI: Extraocular movements intact bilaterally  Pupils equal round and reactive to light bilaterally  CN V: Facial sensation is normal   CN VII: Full and symmetric facial movement  CN VIII: Hearing is normal   CN IX, X: Palate elevates symmetrically  Normal gag reflex  CN XI: Shoulder shrug strength is normal   CN XII: Tongue midline without atrophy or fasciculations  Masked facies  Motor  Normal muscle bulk throughout  No fasciculations present  Increased muscle tone  The following abnormal movements were seen: Strength is 5/5 throughout all four extremities  Increased tone RUE >> LUE- improved mildly from last time  Bradykinetic with all action  Decremental response noted with HAM     Sensory  Sensation is intact to light touch, pinprick, vibration and proprioception in all four extremities  Light touch is normal in upper and lower extremities  Temperature is normal in upper and lower extremities  Vibration is normal in upper and lower extremities  No right-sided hemispatial neglect  No left-sided hemispatial neglect  Reflexes  Deep tendon reflexes are 2+ and symmetric in all four extremities with downgoing toes bilaterally  Coordination  Right: Finger-to-nose normal  Rapid alternating movement abnormality:  Left: Finger-to-nose normal  Rapid alternating movement abnormality:  Decremental response with HAM     Gait Romberg is absent  Shuffling gait improved from last time, minimal stooped posture, reduced swing b/l  ROS:    Review of Systems   Constitutional: Negative  Negative for appetite change and fever  HENT: Negative  Negative for hearing loss, tinnitus, trouble swallowing and voice change  Eyes: Negative  Negative for photophobia and pain  Respiratory: Negative  Negative for shortness of breath  Cardiovascular: Negative  Negative for palpitations  Gastrointestinal: Negative  Negative for nausea and vomiting  Endocrine: Negative  Negative for cold intolerance and heat intolerance  Genitourinary: Negative  Negative for dysuria, frequency and urgency  Musculoskeletal: Negative  Negative for myalgias and neck pain  Skin: Negative  Negative for rash  Neurological: Positive for tremors  Negative for dizziness, seizures, syncope, facial asymmetry, speech difficulty, weakness, light-headedness, numbness and headaches  Hematological: Negative  Does not bruise/bleed easily  Psychiatric/Behavioral: Negative  Negative for confusion, hallucinations and sleep disturbance

## 2018-11-03 DIAGNOSIS — G20 PARKINSON'S DISEASE (HCC): ICD-10-CM

## 2019-01-08 ENCOUNTER — TELEPHONE (OUTPATIENT)
Dept: NEUROLOGY | Facility: CLINIC | Age: 79
End: 2019-01-08

## 2019-01-08 NOTE — TELEPHONE ENCOUNTER
Wife calls re ongoing constipation  Asking for recommendation on what pt can take so that he doesn't have to strain, should he use miralax?    Pt has not tried anything previously    Please advise    Current meds:  Sinemet 25/100mg 1 5tab, 1 5, 1tab

## 2019-01-08 NOTE — TELEPHONE ENCOUNTER
Miralax is okay to use- can take 1-3 days to take effect    Also can try magnesium oxide 500-2000 mg available over the counter daily

## 2019-01-15 NOTE — TELEPHONE ENCOUNTER
Patient's wife made aware that he can take either Miralax or mag oxide OTC for his constipation  She verbalized understanding

## 2019-02-28 ENCOUNTER — OFFICE VISIT (OUTPATIENT)
Dept: NEUROLOGY | Facility: CLINIC | Age: 79
End: 2019-02-28
Payer: MEDICARE

## 2019-02-28 ENCOUNTER — APPOINTMENT (OUTPATIENT)
Dept: LAB | Facility: CLINIC | Age: 79
End: 2019-02-28
Payer: MEDICARE

## 2019-02-28 ENCOUNTER — TRANSCRIBE ORDERS (OUTPATIENT)
Dept: LAB | Facility: CLINIC | Age: 79
End: 2019-02-28

## 2019-02-28 VITALS
BODY MASS INDEX: 29.4 KG/M2 | HEART RATE: 79 BPM | DIASTOLIC BLOOD PRESSURE: 60 MMHG | WEIGHT: 210 LBS | SYSTOLIC BLOOD PRESSURE: 112 MMHG | HEIGHT: 71 IN

## 2019-02-28 DIAGNOSIS — I67.9 CEREBROVASCULAR DISEASE: ICD-10-CM

## 2019-02-28 DIAGNOSIS — G20 PARKINSON DISEASE (HCC): Primary | ICD-10-CM

## 2019-02-28 DIAGNOSIS — H57.9 EYE EXAM ABNORMAL: ICD-10-CM

## 2019-02-28 DIAGNOSIS — G20 PARKINSON'S DISEASE (HCC): ICD-10-CM

## 2019-02-28 LAB
BUN SERPL-MCNC: 28 MG/DL (ref 5–25)
CREAT SERPL-MCNC: 1.04 MG/DL (ref 0.6–1.3)
GFR SERPL CREATININE-BSD FRML MDRD: 69 ML/MIN/1.73SQ M

## 2019-02-28 PROCEDURE — 82565 ASSAY OF CREATININE: CPT

## 2019-02-28 PROCEDURE — 99215 OFFICE O/P EST HI 40 MIN: CPT | Performed by: PSYCHIATRY & NEUROLOGY

## 2019-02-28 PROCEDURE — 84520 ASSAY OF UREA NITROGEN: CPT

## 2019-02-28 PROCEDURE — 36415 COLL VENOUS BLD VENIPUNCTURE: CPT

## 2019-02-28 NOTE — PATIENT INSTRUCTIONS
Constipation    Try prune juice daily  Miralax daily  Stay hydrated  Take Magnesium oxide 500-1000 mg daily
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
## 2019-03-05 ENCOUNTER — HOSPITAL ENCOUNTER (OUTPATIENT)
Dept: RADIOLOGY | Facility: MEDICAL CENTER | Age: 79
Discharge: HOME | End: 2019-03-05
Payer: MEDICARE

## 2019-03-05 DIAGNOSIS — I67.9 CEREBROVASCULAR DISEASE: ICD-10-CM

## 2019-03-05 PROCEDURE — 70498 CT ANGIOGRAPHY NECK: CPT

## 2019-03-05 PROCEDURE — 70496 CT ANGIOGRAPHY HEAD: CPT

## 2019-03-05 RX ADMIN — IOHEXOL 85 ML: 350 INJECTION, SOLUTION INTRAVENOUS at 09:53

## 2019-03-12 ENCOUNTER — OFFICE VISIT (OUTPATIENT)
Dept: OBGYN CLINIC | Facility: OTHER | Age: 79
End: 2019-03-12
Payer: MEDICARE

## 2019-03-12 ENCOUNTER — HOSPITAL ENCOUNTER (OUTPATIENT)
Dept: RADIOLOGY | Facility: HOSPITAL | Age: 79
Discharge: HOME | End: 2019-03-12
Attending: ORTHOPAEDIC SURGERY
Payer: MEDICARE

## 2019-03-12 ENCOUNTER — APPOINTMENT (OUTPATIENT)
Dept: RADIOLOGY | Facility: OTHER | Age: 79
End: 2019-03-12
Payer: MEDICARE

## 2019-03-12 VITALS
DIASTOLIC BLOOD PRESSURE: 76 MMHG | HEIGHT: 71 IN | BODY MASS INDEX: 29.4 KG/M2 | HEART RATE: 78 BPM | SYSTOLIC BLOOD PRESSURE: 165 MMHG | WEIGHT: 210 LBS

## 2019-03-12 DIAGNOSIS — M25.511 RIGHT SHOULDER PAIN, UNSPECIFIED CHRONICITY: ICD-10-CM

## 2019-03-12 DIAGNOSIS — M25.511 CHRONIC RIGHT SHOULDER PAIN: ICD-10-CM

## 2019-03-12 DIAGNOSIS — G89.29 CHRONIC RIGHT SHOULDER PAIN: ICD-10-CM

## 2019-03-12 DIAGNOSIS — M24.811 INTERNAL DERANGEMENT OF RIGHT SHOULDER: Primary | ICD-10-CM

## 2019-03-12 PROCEDURE — 73030 X-RAY EXAM OF SHOULDER: CPT

## 2019-03-12 PROCEDURE — 99203 OFFICE O/P NEW LOW 30 MIN: CPT | Performed by: ORTHOPAEDIC SURGERY

## 2019-03-12 RX ORDER — LORAZEPAM 1 MG/1
1 TABLET ORAL
Qty: 1 TABLET | Refills: 0 | Status: SHIPPED | OUTPATIENT
Start: 2019-03-12 | End: 2019-07-08 | Stop reason: ALTCHOICE

## 2019-03-12 RX ORDER — LORAZEPAM 1 MG/1
1 TABLET ORAL
Qty: 90 EACH | Refills: 0 | Status: SHIPPED | OUTPATIENT
Start: 2019-03-12 | End: 2019-03-12 | Stop reason: SDUPTHER

## 2019-03-12 NOTE — PROGRESS NOTES
Moira Álvarez MD personally examined the patient and reviewed the history provided  I agree with the note and the assessment and plan by Milad Carl PA-C  Assessment  Diagnoses and all orders for this visit:    Internal derangement of right shoulder  -     MRI shoulder right wo contrast; Future  -     Discontinue: LORazepam (ATIVAN) 1 mg tablet; Take 1 tablet (1 mg total) by mouth once in imaging for anxiety for up to 1 dose  -     LORazepam (ATIVAN) 1 mg tablet; Take 1 tablet (1 mg total) by mouth once in imaging for anxiety for up to 1 dose    Chronic right shoulder pain  -     XR shoulder 2+ vw right; Future  -     MRI shoulder right wo contrast; Future        Discussion and Plan:    1  MRI right shoulder to evaluate rotator cuff  Had prior rotator cuff repair about 20 years ago and with weakness on exam we are concerned for another rotator cuff tear  He has not improved with over 4 months of conservative treatment, therapy and steroid injection  2  Ativan provided for MRI  3  If he cannot tolerate MRI, CT arthrogram would be needed NOT an open MRI  4  Follow up after study    Subjective:   Patient ID: Jeffery Marie  is a 69 y o  male      Rakel Worthy presents in consultation from his PCP Dr Fatimah Soria for right shoulder pain  Pain started last year after a fall into wall at home  He tried therapy but was having difficulty completing tasks and was later found to have Parkinson's  He is doing better with that medication, but right shoulder pain remains  PCP injected the right shoulder which helped for a short period of time  Therapy started making his right forearm hurt and his shoulder pain worse  He admits to a prior rotator cuff repair about 20 years ago  He has had pain with the shoulder on and off over the last several years  He has pain at night        Vitals:    03/12/19 1302   BP: 165/76   Pulse: 78       The following portions of the patient's history were reviewed and updated as appropriate: allergies, current medications, past family history, past medical history, past social history, past surgical history and problem list     Review of Systems   Constitutional: Negative for chills and fever  HENT: Negative for hearing loss and trouble swallowing  Eyes: Negative for visual disturbance  Respiratory: Negative for shortness of breath  Cardiovascular: Negative for chest pain  Gastrointestinal: Negative for abdominal pain, constipation and diarrhea  Musculoskeletal:        See HPI    Skin: Negative for rash and wound  Neurological: Negative for weakness, numbness and headaches  As reviewed in the HPI   Psychiatric/Behavioral: Negative for agitation and sleep disturbance  The patient is not nervous/anxious  Objective:  Right Shoulder Exam     Tenderness   The patient is experiencing tenderness in the acromioclavicular joint  Range of Motion   Passive abduction: 80   External rotation: 40   Forward flexion: 150     Muscle Strength   External rotation: 3/5   Supraspinatus: 3/5     Tests   Nicolas test: positive  Cross arm: negative  Impingement: positive  Drop arm: positive    Other   Erythema: absent  Scars: present (healed anterior-superior scar)  Sensation: normal  Pulse: present    Comments:  Neg speeds  Positive empty can            Physical Exam   Constitutional: He is oriented to person, place, and time  He appears well-developed and well-nourished  HENT:   Head: Normocephalic  Eyes: EOM are normal    Neck: Normal range of motion  Pulmonary/Chest: No respiratory distress  He has no wheezes  Neurological: He is alert and oriented to person, place, and time  Skin: Skin is warm and dry  Psychiatric: He has a normal mood and affect  His behavior is normal  Judgment and thought content normal          I have personally reviewed pertinent films in PACS and my interpretation is as follows      3 views right shoulder: no significant GH arthritis, no fx or dislocation

## 2019-03-19 ENCOUNTER — TELEPHONE (OUTPATIENT)
Dept: NEUROLOGY | Facility: CLINIC | Age: 79
End: 2019-03-19

## 2019-03-19 NOTE — TELEPHONE ENCOUNTER
Patient's secondary insurance has been finally denied and I have made patient's wife aware of it  She would also like to know the results of patient's CTA scan  I advised that I will have the clinical team call them with the results because I cannot give them any of that information  She would like a call as soon as possible  Thank you!

## 2019-03-31 ENCOUNTER — HOSPITAL ENCOUNTER (OUTPATIENT)
Dept: MRI IMAGING | Facility: HOSPITAL | Age: 79
Discharge: HOME | End: 2019-03-31
Payer: MEDICARE

## 2019-03-31 DIAGNOSIS — M25.511 CHRONIC RIGHT SHOULDER PAIN: ICD-10-CM

## 2019-03-31 DIAGNOSIS — G89.29 CHRONIC RIGHT SHOULDER PAIN: ICD-10-CM

## 2019-03-31 DIAGNOSIS — M24.811 INTERNAL DERANGEMENT OF RIGHT SHOULDER: ICD-10-CM

## 2019-03-31 PROCEDURE — 73221 MRI JOINT UPR EXTREM W/O DYE: CPT

## 2019-04-09 ENCOUNTER — OFFICE VISIT (OUTPATIENT)
Dept: OBGYN CLINIC | Facility: OTHER | Age: 79
End: 2019-04-09
Payer: COMMERCIAL

## 2019-04-09 VITALS
SYSTOLIC BLOOD PRESSURE: 120 MMHG | WEIGHT: 212 LBS | HEART RATE: 84 BPM | HEIGHT: 71 IN | BODY MASS INDEX: 29.68 KG/M2 | DIASTOLIC BLOOD PRESSURE: 64 MMHG

## 2019-04-09 DIAGNOSIS — M75.101 TEAR OF RIGHT SUPRASPINATUS TENDON: Primary | ICD-10-CM

## 2019-04-09 DIAGNOSIS — S46.101D INJURY OF TENDON OF LONG HEAD OF RIGHT BICEPS, SUBSEQUENT ENCOUNTER: ICD-10-CM

## 2019-04-09 PROCEDURE — 99213 OFFICE O/P EST LOW 20 MIN: CPT | Performed by: ORTHOPAEDIC SURGERY

## 2019-05-03 ENCOUNTER — TELEPHONE (OUTPATIENT)
Dept: NEUROLOGY | Facility: CLINIC | Age: 79
End: 2019-05-03

## 2019-05-16 ENCOUNTER — TRANSCRIBE ORDERS (OUTPATIENT)
Dept: ADMINISTRATIVE | Facility: HOSPITAL | Age: 79
End: 2019-05-16

## 2019-05-16 ENCOUNTER — APPOINTMENT (OUTPATIENT)
Dept: LAB | Facility: MEDICAL CENTER | Age: 79
End: 2019-05-16
Payer: COMMERCIAL

## 2019-05-16 DIAGNOSIS — I10 ESSENTIAL HYPERTENSION, BENIGN: Primary | ICD-10-CM

## 2019-05-16 DIAGNOSIS — R53.81 MALAISE: ICD-10-CM

## 2019-05-16 DIAGNOSIS — R53.83 OTHER FATIGUE: ICD-10-CM

## 2019-05-16 LAB
ALBUMIN SERPL BCP-MCNC: 3.8 G/DL (ref 3.5–5)
ALP SERPL-CCNC: 89 U/L (ref 46–116)
ALT SERPL W P-5'-P-CCNC: 9 U/L (ref 12–78)
ANION GAP SERPL CALCULATED.3IONS-SCNC: 3 MMOL/L (ref 4–13)
AST SERPL W P-5'-P-CCNC: 11 U/L (ref 5–45)
BACTERIA UR QL AUTO: NORMAL /HPF
BASOPHILS # BLD AUTO: 0.03 THOUSANDS/ΜL (ref 0–0.1)
BASOPHILS NFR BLD AUTO: 1 % (ref 0–1)
BILIRUB SERPL-MCNC: 1.09 MG/DL (ref 0.2–1)
BILIRUB UR QL STRIP: NEGATIVE
BUN SERPL-MCNC: 25 MG/DL (ref 5–25)
CALCIUM SERPL-MCNC: 8.6 MG/DL (ref 8.3–10.1)
CHLORIDE SERPL-SCNC: 110 MMOL/L (ref 100–108)
CHOLEST SERPL-MCNC: 165 MG/DL (ref 50–200)
CLARITY UR: CLEAR
CO2 SERPL-SCNC: 29 MMOL/L (ref 21–32)
COLOR UR: YELLOW
CREAT SERPL-MCNC: 1.06 MG/DL (ref 0.6–1.3)
EOSINOPHIL # BLD AUTO: 0.14 THOUSAND/ΜL (ref 0–0.61)
EOSINOPHIL NFR BLD AUTO: 3 % (ref 0–6)
ERYTHROCYTE [DISTWIDTH] IN BLOOD BY AUTOMATED COUNT: 13 % (ref 11.6–15.1)
GFR SERPL CREATININE-BSD FRML MDRD: 67 ML/MIN/1.73SQ M
GLUCOSE P FAST SERPL-MCNC: 87 MG/DL (ref 65–99)
GLUCOSE UR STRIP-MCNC: NEGATIVE MG/DL
HCT VFR BLD AUTO: 41.1 % (ref 36.5–49.3)
HDLC SERPL-MCNC: 42 MG/DL (ref 40–60)
HGB BLD-MCNC: 13.4 G/DL (ref 12–17)
HGB UR QL STRIP.AUTO: NEGATIVE
HYALINE CASTS #/AREA URNS LPF: NORMAL /LPF
IMM GRANULOCYTES # BLD AUTO: 0.01 THOUSAND/UL (ref 0–0.2)
IMM GRANULOCYTES NFR BLD AUTO: 0 % (ref 0–2)
KETONES UR STRIP-MCNC: NEGATIVE MG/DL
LDLC SERPL CALC-MCNC: 107 MG/DL (ref 0–100)
LEUKOCYTE ESTERASE UR QL STRIP: NEGATIVE
LYMPHOCYTES # BLD AUTO: 1.31 THOUSANDS/ΜL (ref 0.6–4.47)
LYMPHOCYTES NFR BLD AUTO: 25 % (ref 14–44)
MCH RBC QN AUTO: 31.8 PG (ref 26.8–34.3)
MCHC RBC AUTO-ENTMCNC: 32.6 G/DL (ref 31.4–37.4)
MCV RBC AUTO: 98 FL (ref 82–98)
MONOCYTES # BLD AUTO: 0.5 THOUSAND/ΜL (ref 0.17–1.22)
MONOCYTES NFR BLD AUTO: 10 % (ref 4–12)
NEUTROPHILS # BLD AUTO: 3.29 THOUSANDS/ΜL (ref 1.85–7.62)
NEUTS SEG NFR BLD AUTO: 61 % (ref 43–75)
NITRITE UR QL STRIP: NEGATIVE
NON-SQ EPI CELLS URNS QL MICRO: NORMAL /HPF
NONHDLC SERPL-MCNC: 123 MG/DL
NRBC BLD AUTO-RTO: 0 /100 WBCS
PH UR STRIP.AUTO: 6.5 [PH]
PLATELET # BLD AUTO: 173 THOUSANDS/UL (ref 149–390)
PMV BLD AUTO: 10.5 FL (ref 8.9–12.7)
POTASSIUM SERPL-SCNC: 4.5 MMOL/L (ref 3.5–5.3)
PROT SERPL-MCNC: 6.6 G/DL (ref 6.4–8.2)
PROT UR STRIP-MCNC: NEGATIVE MG/DL
RBC # BLD AUTO: 4.21 MILLION/UL (ref 3.88–5.62)
RBC #/AREA URNS AUTO: NORMAL /HPF
SODIUM SERPL-SCNC: 142 MMOL/L (ref 136–145)
SP GR UR STRIP.AUTO: 1.02 (ref 1–1.03)
TRIGL SERPL-MCNC: 82 MG/DL
TSH SERPL DL<=0.05 MIU/L-ACNC: 1.48 UIU/ML (ref 0.36–3.74)
UROBILINOGEN UR QL STRIP.AUTO: 0.2 E.U./DL
WBC # BLD AUTO: 5.28 THOUSAND/UL (ref 4.31–10.16)
WBC #/AREA URNS AUTO: NORMAL /HPF

## 2019-05-16 PROCEDURE — 80053 COMPREHEN METABOLIC PANEL: CPT | Performed by: FAMILY MEDICINE

## 2019-05-16 PROCEDURE — 81001 URINALYSIS AUTO W/SCOPE: CPT | Performed by: FAMILY MEDICINE

## 2019-05-16 PROCEDURE — 36415 COLL VENOUS BLD VENIPUNCTURE: CPT | Performed by: FAMILY MEDICINE

## 2019-05-16 PROCEDURE — 84443 ASSAY THYROID STIM HORMONE: CPT | Performed by: FAMILY MEDICINE

## 2019-05-16 PROCEDURE — 85025 COMPLETE CBC W/AUTO DIFF WBC: CPT | Performed by: FAMILY MEDICINE

## 2019-05-16 PROCEDURE — 80061 LIPID PANEL: CPT | Performed by: FAMILY MEDICINE

## 2019-06-11 ENCOUNTER — OFFICE VISIT (OUTPATIENT)
Dept: OBGYN CLINIC | Facility: OTHER | Age: 79
End: 2019-06-11
Payer: COMMERCIAL

## 2019-06-11 VITALS
HEIGHT: 68 IN | SYSTOLIC BLOOD PRESSURE: 132 MMHG | WEIGHT: 211 LBS | HEART RATE: 77 BPM | DIASTOLIC BLOOD PRESSURE: 64 MMHG | BODY MASS INDEX: 31.98 KG/M2

## 2019-06-11 DIAGNOSIS — M75.101 TEAR OF RIGHT SUPRASPINATUS TENDON: Primary | ICD-10-CM

## 2019-06-11 PROCEDURE — 99213 OFFICE O/P EST LOW 20 MIN: CPT | Performed by: ORTHOPAEDIC SURGERY

## 2019-06-11 PROCEDURE — 20610 DRAIN/INJ JOINT/BURSA W/O US: CPT | Performed by: ORTHOPAEDIC SURGERY

## 2019-06-11 RX ORDER — BUPIVACAINE HYDROCHLORIDE 2.5 MG/ML
2 INJECTION, SOLUTION INFILTRATION; PERINEURAL
Status: COMPLETED | OUTPATIENT
Start: 2019-06-11 | End: 2019-06-11

## 2019-06-11 RX ORDER — BETAMETHASONE SODIUM PHOSPHATE AND BETAMETHASONE ACETATE 3; 3 MG/ML; MG/ML
6 INJECTION, SUSPENSION INTRA-ARTICULAR; INTRALESIONAL; INTRAMUSCULAR; SOFT TISSUE
Status: COMPLETED | OUTPATIENT
Start: 2019-06-11 | End: 2019-06-11

## 2019-06-11 RX ADMIN — BUPIVACAINE HYDROCHLORIDE 2 ML: 2.5 INJECTION, SOLUTION INFILTRATION; PERINEURAL at 13:33

## 2019-06-11 RX ADMIN — BETAMETHASONE SODIUM PHOSPHATE AND BETAMETHASONE ACETATE 6 MG: 3; 3 INJECTION, SUSPENSION INTRA-ARTICULAR; INTRALESIONAL; INTRAMUSCULAR; SOFT TISSUE at 13:33

## 2019-07-08 ENCOUNTER — TELEPHONE (OUTPATIENT)
Dept: NEUROLOGY | Facility: CLINIC | Age: 79
End: 2019-07-08

## 2019-07-08 ENCOUNTER — OFFICE VISIT (OUTPATIENT)
Dept: NEUROLOGY | Facility: CLINIC | Age: 79
End: 2019-07-08
Payer: COMMERCIAL

## 2019-07-08 VITALS
BODY MASS INDEX: 31.52 KG/M2 | DIASTOLIC BLOOD PRESSURE: 60 MMHG | HEART RATE: 70 BPM | SYSTOLIC BLOOD PRESSURE: 110 MMHG | RESPIRATION RATE: 16 BRPM | WEIGHT: 208 LBS | HEIGHT: 68 IN

## 2019-07-08 DIAGNOSIS — G25.81 RLS (RESTLESS LEGS SYNDROME): ICD-10-CM

## 2019-07-08 DIAGNOSIS — G20 PARKINSON'S DISEASE (HCC): Primary | ICD-10-CM

## 2019-07-08 PROCEDURE — 99214 OFFICE O/P EST MOD 30 MIN: CPT | Performed by: PSYCHIATRY & NEUROLOGY

## 2019-07-08 NOTE — PROGRESS NOTES
Patient ID: Diego Rivera  is a 69 y o  male  Assessment/Plan:    No problem-specific Assessment & Plan notes found for this encounter  Diagnoses and all orders for this visit:    Parkinson's disease ([address] )  - C/w Sinemet  : 1 and half tabs morning, afternoon and 1 tab nightly  - Boxing for PD recommended- reached out to our  regarding location recommendations  - discussed importance of regular exercise, staying hydrated  RLS  - encouraged nightly magnesium  Overall doing well can follow up with me in 4-6 months time  Subjective:    HPI    Mr Anjelica Albarran is a pleasant 70 yo male seen in follow-up for Parkinson's disease  He continues to be on Sinemet 1 and half tabs morning, afternoon, 1 tab nightly  No significant adverse effects with this  He states he is overall doing well other than significant restless leg symptoms at nighttime  Denies any freezing, falls, significant sleep disturbance, hallucinosis, depression, cognitive changes, wearing off with medication  He has no trouble with ADLs  Denies falls or significant imbalance  States he is happy with how he is doing  When Last seen, he had noted me that the eye doctor told him there was a likely a blood clot in his thigh  For this reason I started him on aspirin daily and obtained a CTA of the head and neck which showed no significant occlusion or stenosis or other vascular anomaly  The following portions of the patient's history were reviewed and updated as appropriate: allergies, current medications, past family history, past medical history, past social history, past surgical history and problem list and ROS  Objective:    Blood pressure 110/60, pulse 70, resp  rate 16, height 5' 8" (1 727 m), weight 94 3 kg (208 lb)  Physical Exam   Constitutional: He appears well-developed and well-nourished  HENT:   Head: Normocephalic and atraumatic     Eyes: Pupils are equal, round, and reactive to light  Conjunctivae are normal    Neck: Normal range of motion  Neck supple  Cardiovascular: Normal rate and regular rhythm  Pulmonary/Chest: Effort normal    Musculoskeletal: Normal range of motion  Neurological: He is alert  He has normal strength and normal reflexes  Gait normal    Nursing note and vitals reviewed  Neurological Exam  Mental Status  Alert  Oriented to person, place, time and situation  Recent and remote memory are intact  no dysarthria present  Language is fluent with no aphasia  Attention and concentration are normal  Fund of knowledge is appropriate for level of education  Cranial Nerves  CN II: Visual fields full to confrontation  CN III, IV, VI: Extraocular movements intact bilaterally  Pupils equal round and reactive to light bilaterally  CN V: Facial sensation is normal   CN VII: Full and symmetric facial movement  CN VIII: Hearing is normal   CN IX, X: Palate elevates symmetrically  Normal gag reflex  CN XI: Shoulder shrug strength is normal   CN XII: Tongue midline without atrophy or fasciculations  Motor   Normal muscle tone  The following abnormal movements were seen: Strength is 5/5 throughout all four extremities  Bl tremor minimal resting  No significant cogwheeling noted  Mild bradykinesia with HAM     Sensory  Sensation is intact to light touch, pinprick, vibration and proprioception in all four extremities  Light touch is normal in upper and lower extremities  Temperature is normal in upper and lower extremities  Vibration is normal in upper and lower extremities  No right-sided hemispatial neglect  No left-sided hemispatial neglect  Reflexes  Deep tendon reflexes are 2+ and symmetric in all four extremities with downgoing toes bilaterally  Coordination  Right: Finger-to-nose normal  Rapid alternating movement abnormality:  Left: Finger-to-nose normal  Rapid alternating movement abnormality:  Minimal decremental response      Gait  Casual gait: Narrow stance  Reduced stride length  Normal gait  Normal right arm swing  Normal left arm swing  Tandem gait abnormality:        ROS:    Review of Systems   Constitutional: Negative  Negative for appetite change and fever  HENT: Negative  Negative for hearing loss, tinnitus, trouble swallowing and voice change  Eyes: Negative  Negative for photophobia and pain  Respiratory: Negative  Negative for shortness of breath  Cardiovascular: Negative  Negative for palpitations  Gastrointestinal: Negative  Negative for nausea and vomiting  Endocrine: Negative  Negative for cold intolerance and heat intolerance  Genitourinary: Negative  Negative for dysuria, frequency and urgency  Musculoskeletal: Negative  Negative for myalgias and neck pain  Skin: Negative  Negative for rash  Neurological: Positive for tremors (R/L)  Negative for dizziness, seizures, syncope, facial asymmetry, speech difficulty, weakness, light-headedness, numbness and headaches  Patient states that he has bilateral tremors in hand  Patient also stated that he gets a hit in his arms at times when he sleeps  Hematological: Negative  Does not bruise/bleed easily  Psychiatric/Behavioral: Negative  Negative for confusion, hallucinations and sleep disturbance

## 2019-07-08 NOTE — TELEPHONE ENCOUNTER
----- Message from [address], DO sent at 7/8/2019 11:50 AM EDT -----  Regarding: PD-boxing therapy  Good afternoon! Mr  Laura Fitzgerald is interested in boxing therapy for PD and he will certainly benefit from this  He lives between our [place] and [facility] offices  Please let him/wife and me know what location would work and if I need to place a referral    Thank you!   SD

## 2019-07-09 NOTE — TELEPHONE ENCOUNTER
MSW researched [employer] Classes that would be local for patient with the following results:    [place]:  [address]  Email: [email or web address]  Phone: [phone]  Class schedule:  *All levels will be combined but may be split at a later date based on enrollments  *An initial assessment is required  Email for an appointment  Mondays 230-330PM   Tuesdays 5-6PM   Wednesdays 12-1PM   Thursdays 5-6PM   Saturdays [address] [place] [address] Avenue:  [address] [place] [address]  Email: [email or web address]  Phone: [phone]  Class schedule:  Tuesdays & Thursdays 330-5PM    MSW also located that Manuel Powell has a "boxing for PD program" which is currently going through legal to be classified as a "[employer]" program  This is held at [employer]  Phone number is [phone]  MSW attempted to reach patient to review above options, but there was no answer at [phone]  MSW left message requesting callback  Awaiting same  MSW will be available to patient as needed

## 2019-07-10 NOTE — TELEPHONE ENCOUNTER
MSW attempted to reach patient at [phone] this date  No answer at either number  MSW left messages requesting callback  Awaiting same

## 2019-07-11 NOTE — TELEPHONE ENCOUNTER
MSW spoke with Clari Abreu at [address] [place] [address] who stated that they are able to bill patient's insurance for the first round of Boxing for PD, and then if patient is interested in continuing that they would need to join the "[employer] gym ($25 for first month, then $45 thereafter, and this class would be included with his membership  MSW received a callback from patient's wife who stated that they would prefer the [employer] location  MSW did advise that the patient would need to pay for classes there, as it is not billable to patient's insurance  Patient's wife stated that they have have a friend who has PD in [place], [place] who goes to [employer] at [employer] there and that it is a part of his membership  Patient's wife stated that patient has [employer] and wonders if any of the local [facility] would offer the [employer] class  MSW advised that this writer would need to look into same and let her know outcome  MSW did advise patient's wife of upcoming program at   [address], but patient's wife stated that [place] is not a desirable location for patient to get to

## 2019-07-12 NOTE — TELEPHONE ENCOUNTER
MSW received a call back from the person in charge of [employer] at the [place], [place]  She advised that is it unique that the [place] has [employer], as she is not aware of any other [facility] that would have this service as it is a separate [facility]  MSW was informed that they charge $10 per class and that it is not part of the [employer] program      MSW also phoned the [facility] with the following the results:  [place] - Nothing specific for PD and Boxing  Closest they have is "[employer]" (kickboxing) on Thursdays [phone]  [place] - No boxing for PD  [place] - No boxing for PD  [place] - only an education center, no group exercise classes  [place] - No boxing for PD  [address] - No boxing for PD  [place] - No boxing for PD    MSW will get pricing information for [employer] and will mail information to patient

## 2019-07-15 NOTE — TELEPHONE ENCOUNTER
MSW attempted to reach [facility] with [address] to get pricing information  No answer [phone]  MSW left message requesting callback  Awaiting same  MSW updated patient's wife regarding the program not being available at local [facility] and that this writer will place information in the mail to patient for [address] once pricing information obtained  Patient's wife in agreement with same

## 2019-07-16 NOTE — TELEPHONE ENCOUNTER
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  Garry Harrison started this group to have access to the sport she loved (formerly did kickboxing)  Because she shares the same diagnosis, she can relate more to group participants and the outcome is a supportive group who will also hold discussions about PD/its challenges before or after class   - Classes are held:   Mondays and Wednesdays from 2-3PM   Tuesdays and Thursdays from 5-6PM   Saturdays from 11AM-12PM  - Classes are held at [address] [place] [address] (at the [employer])  - Pricing is as follows: Two classes per week - $60 per month   Unlimited classes per week - $80 per month  - All [employer] participants are together in the class, but there are volunteers who help patients with different ability levels  - There is no charge for the assessment  Garry Harrison has patients come a few minutes before a class to review the program, then patients are welcome to observe/participate in that class before committing to a monthly membership  To schedule an assessment, please call [phone] or email [email or web address]   - Wear comfortable clothes, no equipment needed besides gloves  Patients can choose to purchase gloves on their own (~$30-40) or patients can choose to purchase gloves from Garry Harrison at [facility] for $15-17  - There are about 3535 [facility] participants in total  Average class size is 6-8 people, but the largest class can have 10-11 people  - A physician certification form is needed to provide clearance to participate in the class  MSW emailed Garry Harrison to request a blank Physician Release form  Once received, MSW will forward form to Dr Gloria Corcoran for signature and will then mail information along with form to patient's home address

## 2019-07-18 NOTE — TELEPHONE ENCOUNTER
MSW received the Physician Medical Release form and forwarded same to Dr Franca Fried via her MA for completion

## 2019-07-19 NOTE — TELEPHONE ENCOUNTER
Dr Rich Roldan completed the Physician Medical Release form  Release form and information about [address] placed in the mail to patient this date, along with this writer's business card  MSW will be available to patient as needed

## 2019-09-19 DIAGNOSIS — G20 PARKINSON'S DISEASE (HCC): ICD-10-CM

## 2019-10-10 ENCOUNTER — OFFICE VISIT (OUTPATIENT)
Dept: OBGYN CLINIC | Facility: OTHER | Age: 79
End: 2019-10-10
Payer: COMMERCIAL

## 2019-10-10 VITALS
DIASTOLIC BLOOD PRESSURE: 60 MMHG | WEIGHT: 211 LBS | HEIGHT: 68 IN | BODY MASS INDEX: 31.98 KG/M2 | SYSTOLIC BLOOD PRESSURE: 116 MMHG | HEART RATE: 77 BPM

## 2019-10-10 DIAGNOSIS — G89.29 CHRONIC RIGHT SHOULDER PAIN: ICD-10-CM

## 2019-10-10 DIAGNOSIS — M25.511 CHRONIC RIGHT SHOULDER PAIN: ICD-10-CM

## 2019-10-10 DIAGNOSIS — M75.101 TEAR OF RIGHT SUPRASPINATUS TENDON: Primary | ICD-10-CM

## 2019-10-10 PROCEDURE — 99213 OFFICE O/P EST LOW 20 MIN: CPT | Performed by: ORTHOPAEDIC SURGERY

## 2020-02-11 ENCOUNTER — APPOINTMENT (OUTPATIENT)
Dept: LAB | Facility: MEDICAL CENTER | Age: 80
End: 2020-02-11
Payer: COMMERCIAL

## 2020-02-11 ENCOUNTER — TRANSCRIBE ORDERS (OUTPATIENT)
Dept: ADMINISTRATIVE | Facility: HOSPITAL | Age: 80
End: 2020-02-11

## 2020-02-11 DIAGNOSIS — Z13.6 SCREENING FOR CARDIOVASCULAR CONDITION: ICD-10-CM

## 2020-02-11 DIAGNOSIS — R10.30 ABDOMINAL PAIN, LOWER: Primary | ICD-10-CM

## 2020-02-11 DIAGNOSIS — R53.83 OTHER FATIGUE: ICD-10-CM

## 2020-02-11 DIAGNOSIS — R60.0 BILATERAL EDEMA OF LOWER EXTREMITY: ICD-10-CM

## 2020-02-11 DIAGNOSIS — R10.30 ABDOMINAL PAIN, LOWER: ICD-10-CM

## 2020-02-11 LAB
ALBUMIN SERPL BCP-MCNC: 3.9 G/DL (ref 3.5–5)
ALP SERPL-CCNC: 87 U/L (ref 46–116)
ALT SERPL W P-5'-P-CCNC: 17 U/L (ref 12–78)
ANION GAP SERPL CALCULATED.3IONS-SCNC: 3 MMOL/L (ref 4–13)
AST SERPL W P-5'-P-CCNC: 14 U/L (ref 5–45)
BACTERIA UR QL AUTO: NORMAL /HPF
BASOPHILS # BLD AUTO: 0.03 THOUSANDS/ΜL (ref 0–0.1)
BASOPHILS NFR BLD AUTO: 1 % (ref 0–1)
BILIRUB SERPL-MCNC: 1.1 MG/DL (ref 0.2–1)
BILIRUB UR QL STRIP: NEGATIVE
BUN SERPL-MCNC: 28 MG/DL (ref 5–25)
CALCIUM SERPL-MCNC: 9.2 MG/DL (ref 8.3–10.1)
CHLORIDE SERPL-SCNC: 109 MMOL/L (ref 100–108)
CHOLEST SERPL-MCNC: 193 MG/DL (ref 50–200)
CLARITY UR: CLEAR
CO2 SERPL-SCNC: 29 MMOL/L (ref 21–32)
COLOR UR: YELLOW
CREAT SERPL-MCNC: 1.1 MG/DL (ref 0.6–1.3)
EOSINOPHIL # BLD AUTO: 0.16 THOUSAND/ΜL (ref 0–0.61)
EOSINOPHIL NFR BLD AUTO: 3 % (ref 0–6)
ERYTHROCYTE [DISTWIDTH] IN BLOOD BY AUTOMATED COUNT: 12.3 % (ref 11.6–15.1)
GFR SERPL CREATININE-BSD FRML MDRD: 64 ML/MIN/1.73SQ M
GLUCOSE P FAST SERPL-MCNC: 87 MG/DL (ref 65–99)
GLUCOSE UR STRIP-MCNC: NEGATIVE MG/DL
HCT VFR BLD AUTO: 43.6 % (ref 36.5–49.3)
HDLC SERPL-MCNC: 47 MG/DL
HGB BLD-MCNC: 14.4 G/DL (ref 12–17)
HGB UR QL STRIP.AUTO: NEGATIVE
HYALINE CASTS #/AREA URNS LPF: NORMAL /LPF
IMM GRANULOCYTES # BLD AUTO: 0.01 THOUSAND/UL (ref 0–0.2)
IMM GRANULOCYTES NFR BLD AUTO: 0 % (ref 0–2)
KETONES UR STRIP-MCNC: NEGATIVE MG/DL
LDLC SERPL CALC-MCNC: 122 MG/DL (ref 0–100)
LEUKOCYTE ESTERASE UR QL STRIP: NEGATIVE
LYMPHOCYTES # BLD AUTO: 1.54 THOUSANDS/ΜL (ref 0.6–4.47)
LYMPHOCYTES NFR BLD AUTO: 24 % (ref 14–44)
MCH RBC QN AUTO: 32.3 PG (ref 26.8–34.3)
MCHC RBC AUTO-ENTMCNC: 33 G/DL (ref 31.4–37.4)
MCV RBC AUTO: 98 FL (ref 82–98)
MONOCYTES # BLD AUTO: 0.54 THOUSAND/ΜL (ref 0.17–1.22)
MONOCYTES NFR BLD AUTO: 9 % (ref 4–12)
NEUTROPHILS # BLD AUTO: 4.07 THOUSANDS/ΜL (ref 1.85–7.62)
NEUTS SEG NFR BLD AUTO: 63 % (ref 43–75)
NITRITE UR QL STRIP: NEGATIVE
NON-SQ EPI CELLS URNS QL MICRO: NORMAL /HPF
NONHDLC SERPL-MCNC: 146 MG/DL
NRBC BLD AUTO-RTO: 0 /100 WBCS
NT-PROBNP SERPL-MCNC: 124 PG/ML
PH UR STRIP.AUTO: 7 [PH]
PLATELET # BLD AUTO: 190 THOUSANDS/UL (ref 149–390)
PMV BLD AUTO: 10.6 FL (ref 8.9–12.7)
POTASSIUM SERPL-SCNC: 4.4 MMOL/L (ref 3.5–5.3)
PROT SERPL-MCNC: 7.3 G/DL (ref 6.4–8.2)
PROT UR STRIP-MCNC: NEGATIVE MG/DL
RBC # BLD AUTO: 4.46 MILLION/UL (ref 3.88–5.62)
RBC #/AREA URNS AUTO: NORMAL /HPF
SODIUM SERPL-SCNC: 141 MMOL/L (ref 136–145)
SP GR UR STRIP.AUTO: 1.02 (ref 1–1.03)
TRIGL SERPL-MCNC: 122 MG/DL
TSH SERPL DL<=0.05 MIU/L-ACNC: 2.44 UIU/ML (ref 0.36–3.74)
UROBILINOGEN UR QL STRIP.AUTO: 0.2 E.U./DL
WBC # BLD AUTO: 6.35 THOUSAND/UL (ref 4.31–10.16)
WBC #/AREA URNS AUTO: NORMAL /HPF

## 2020-02-11 PROCEDURE — 80053 COMPREHEN METABOLIC PANEL: CPT | Performed by: FAMILY MEDICINE

## 2020-02-11 PROCEDURE — 84443 ASSAY THYROID STIM HORMONE: CPT | Performed by: FAMILY MEDICINE

## 2020-02-11 PROCEDURE — 83880 ASSAY OF NATRIURETIC PEPTIDE: CPT

## 2020-02-11 PROCEDURE — 80061 LIPID PANEL: CPT | Performed by: FAMILY MEDICINE

## 2020-02-11 PROCEDURE — 85025 COMPLETE CBC W/AUTO DIFF WBC: CPT | Performed by: FAMILY MEDICINE

## 2020-02-11 PROCEDURE — 81001 URINALYSIS AUTO W/SCOPE: CPT | Performed by: FAMILY MEDICINE

## 2020-02-11 PROCEDURE — 36415 COLL VENOUS BLD VENIPUNCTURE: CPT | Performed by: FAMILY MEDICINE

## 2020-02-17 ENCOUNTER — TELEPHONE (OUTPATIENT)
Dept: NEUROLOGY | Facility: CLINIC | Age: 80
End: 2020-02-17

## 2020-02-18 ENCOUNTER — OFFICE VISIT (OUTPATIENT)
Dept: NEUROLOGY | Facility: CLINIC | Age: 80
End: 2020-02-18
Payer: COMMERCIAL

## 2020-02-18 VITALS
DIASTOLIC BLOOD PRESSURE: 72 MMHG | HEART RATE: 76 BPM | BODY MASS INDEX: 33.36 KG/M2 | SYSTOLIC BLOOD PRESSURE: 148 MMHG | HEIGHT: 68 IN | RESPIRATION RATE: 12 BRPM | WEIGHT: 220.1 LBS

## 2020-02-18 DIAGNOSIS — I10 HYPERTENSION, UNSPECIFIED TYPE: ICD-10-CM

## 2020-02-18 DIAGNOSIS — G20 PARKINSON'S DISEASE (HCC): Primary | ICD-10-CM

## 2020-02-18 PROBLEM — G20.A1 PARKINSON'S DISEASE: Status: ACTIVE | Noted: 2020-02-18

## 2020-02-18 PROCEDURE — 99214 OFFICE O/P EST MOD 30 MIN: CPT | Performed by: PSYCHIATRY & NEUROLOGY

## 2020-02-18 NOTE — PATIENT INSTRUCTIONS
Take Sinemet 1 5 tabs at 8-9 AM, 2-3 PM, 9-10 PM  Exercise daily 20-30 minutes a day five days a week mild to moderate in severity  Reduce salty food/junk food  Please talk to your family doctor about high blood pressure it was 178 SBP today    If sudden onset of one sided weakness facial droop speech changes dizziness vision loss please call 911/go to the nearest ER as these can be signs of acute stroke

## 2020-02-18 NOTE — PROGRESS NOTES
Patient ID: Frankey Moos  is a 67 y o  male  Assessment/Plan:    No problem-specific Assessment & Plan notes found for this encounter  Diagnoses and all orders for this visit:    Parkinson's disease ([address] )- stable  - Continue with Sinemet 1 and half tabs morning, afternoon, 1 tab nightly  - Discussed importance of staying active hydrated      Hypertension, unspecified type  - follow up with PCP tomorrow  - Repeat BP check improved  - We discussed reducing salt intake, dietary modification        If sudden onset of one sided weakness facial droop speech changes dizziness vision loss please call 911/go to the nearest ER as these can be signs of acute stroke    Follow up in 4-5 months time    Subjective:    HPI    Mr Miguel Walters is a pleasant 68 yo male seen in follow-up for Parkinson's disease  He continues to be on Sinemet 1 and half tabs morning, afternoon, 1 tab nightly  States this dose works well for him  Denies wearing off  Patient's wife states that he continues to be much better with walking and movement overall on this dose  They are happy with the current dosing  No s/e  States he continues to do well from Parkinson's with minimal tremors, reduced dexterity noted with eating spaghetti, buttoning shirt however no significant trouble reported  Pt states he is being more active exercising with a pedal bike doing sitting standing chair exercises daily  Denies significant imbalance falls  States uses cane as needed for long walks  Denies bowel bladder control issues  Denies sleep difficulty, RBD,  Acting out dreams, visual hallucinosis  RLS reported last visit improved with Mag nightly  States staying hydrated  No positional lightheadedness reported  Denies memory issues  States reads the paper daily, goes hunting at times, socializes with family and grandchildren and no issues with this  He is hypertensive today, this was not noted in prior visits per him and on chart review by me   Per wife and patient he has always been one to indulge in potato chips  He also had LE edema and was on lasix recently per PCP and already told to reduce salt intake  We discussed this again  He is due to follow up with PCP tomorrow and discussed notifying him re: HTN  He is asymptomatic from BP standpoint, no headaches, vision changes confusion or other neurologic deficits reported    Repeat BP improved to 140s SBP toward the end of the visit  Patient's wife states her sister has had bladder cancer and between visiting her they have all been more stressed  No memory issues per him  No trouble with driving  The following portions of the patient's history were reviewed and updated as appropriate: allergies, current medications, past family history, past medical history, past social history, past surgical history and problem list and ROS         Objective:    Blood pressure 146/86, pulse 81, resp  rate 12, height 5' 8" (1 727 m), weight 99 8 kg (220 lb 1 6 oz)  Physical Exam   Constitutional: He appears well-developed and well-nourished  HENT:   Head: Normocephalic and atraumatic  Right Ear: No hearing normal    Left Ear: No hearing normal    Eyes: Pupils are equal, round, and reactive to light  Conjunctivae are normal    Neck: Normal range of motion  Neck supple  Cardiovascular: Normal rate and regular rhythm  Pulmonary/Chest: Effort normal    Musculoskeletal: Normal range of motion  He exhibits edema (bl feet mild)  Neurological: He is alert  He has normal strength  Reflex Scores:       Patellar reflexes are Tr on the right side and Tr on the left side  Achilles reflexes are Tr on the right side and Tr on the left side  Nursing note and vitals reviewed  Neurological Exam  Mental Status  Alert  Oriented only to person, place and situation  no dysarthria present  Language is fluent with no aphasia  Able to perform serial calculations    Knows month but not year- states "25, then 20"  States February but states then third month  Knows season president country location  Cranial Nerves  CN II: Visual fields full to confrontation  CN III, IV, VI: Extraocular movements intact bilaterally  Pupils equal round and reactive to light bilaterally  CN V: Facial sensation is normal   CN VII: Full and symmetric facial movement  CN VIII:  Right: Hearing is decreased  Left: Hearing is decreased  CN IX, X: Palate elevates symmetrically  Normal gag reflex  CN XI: Shoulder shrug strength is normal   CN XII: Tongue midline without atrophy or fasciculations  Hypomimia, reduced blink rate  Motor   Increased muscle tone  The following abnormal movements were seen: Strength is 5/5 throughout all four extremities  Resting tremor mild bl with mildly increased tone bl UE  + bradykinesia and decremental response with HAM     Sensory  Light touch is normal in upper and lower extremities  Temperature is normal in upper and lower extremities  Vibration abnormality:  No right-sided hemispatial neglect  No left-sided hemispatial neglect  Reduced vibration bl feet-- does have edema  Reflexes  Deep tendon reflexes are 2+ and symmetric except as noted  Right                      Left  Patellar                                Tr                         Tr  Achilles                                Tr                         Tr    Coordination  Right: Finger-to-nose normal  Rapid alternating movement abnormality:  Left: Finger-to-nose normal  Rapid alternating movement abnormality:  Decremental response with HAM bl  Gait  Casual gait: Wide stance  Reduced stride length  Reduced right arm swing  Reduced left arm swing  Romberg is absent  No ataxia noted  ROS:    Review of Systems   Constitutional: Negative  Negative for appetite change and fever  HENT: Negative  Negative for hearing loss, tinnitus, trouble swallowing and voice change  Eyes: Negative    Negative for photophobia and pain  Respiratory: Negative  Negative for shortness of breath  Cardiovascular: Negative  Negative for palpitations  Gastrointestinal: Negative  Negative for nausea and vomiting  Endocrine: Negative  Negative for cold intolerance and heat intolerance  Genitourinary: Negative  Negative for dysuria, frequency and urgency  Musculoskeletal: Negative for myalgias and neck pain  Feet and hands swollen    Skin: Negative  Negative for rash  Allergic/Immunologic: Negative  Neurological: Negative  Negative for dizziness, tremors, seizures, syncope, facial asymmetry, speech difficulty, weakness, light-headedness, numbness and headaches  Hematological: Negative  Does not bruise/bleed easily  Psychiatric/Behavioral: Negative  Negative for confusion, hallucinations and sleep disturbance  All other systems reviewed and are negative

## 2020-09-14 ENCOUNTER — OFFICE VISIT (OUTPATIENT)
Dept: NEUROLOGY | Facility: CLINIC | Age: 80
End: 2020-09-14
Payer: COMMERCIAL

## 2020-09-14 VITALS
TEMPERATURE: 98 F | HEART RATE: 81 BPM | HEIGHT: 68 IN | BODY MASS INDEX: 33.34 KG/M2 | SYSTOLIC BLOOD PRESSURE: 146 MMHG | DIASTOLIC BLOOD PRESSURE: 73 MMHG | WEIGHT: 220 LBS

## 2020-09-14 DIAGNOSIS — G20 PARKINSON'S DISEASE (HCC): ICD-10-CM

## 2020-09-14 PROCEDURE — 99214 OFFICE O/P EST MOD 30 MIN: CPT | Performed by: PSYCHIATRY & NEUROLOGY

## 2020-09-14 NOTE — PATIENT INSTRUCTIONS
1  Continue Simenet- TAKE ONE AND ONE-HALF TABLETS IN THE MORNING, ONE AND ONE-HALF TABLETS IN THE AFTERNOON AND 1 TABLET NIGHTLY   2  Call the office with any worsening of symptoms  3   Follow up with Dr Catherine Gomez in 6 months or sooner if needed

## 2020-09-14 NOTE — PROGRESS NOTES
Patient ID: Reza Mckeon  is a 67 y o  male with Parkinson's disease who is returning to neurology office for follow-up  Assessment/Plan:    Parkinson's disease [facility])  Patient has been doing well since prior visit  Denies any side effects or concerns with medication  Denies wearing off  No worsening of walking or movement  Notes noticeable tremor or one hand or the other, does not usually notice both hands at the same time  Has trouble with writing or fine movements such as buttoning his shirt but not difficulty with eating  Has stayed active with walking outside  No trouble swallowing or issues with incontinence  May have occasional constipation and urinary urgency  Denies positional lightheadedness or sleeping difficulties  Exam reveals bradykinesia, reduced stride length and arm swing, difficulty with rising from seated position, resting tremor, difficulty with HAM and increased tone of upper extremities  Sensation and reflexes intact  A&O x 3  recalls 2/3 objects  Naming intact  Offered to increase dose of sinemet as patient does seem to have worsening of symptoms throughout the day  Patient and wife have refused at this time as they note he is doing well  Instructed to call office with any worsening of symptoms  Follow up with Dr Deandre Root in 6 months or sooner if needed  Diagnoses and all orders for this visit:    Parkinson's disease ([address] )  -     carbidopa-levodopa (SINEMET)  mg per tablet; TAKE ONE AND ONE-HALF TABLETS IN THE MORNING, ONE AND ONE-HALF TABLETS IN THE AFTERNOON AND 1 TABLET NIGHTLY           Subjective: Uriel Kincaid is a 67year old male who is being seen today for Parkinson's follow up  He was last seen by Dr Deandre Root on 02/18/2020  At that time he was doing well on current dose of sinemet without any side effects  Tremors were minimal, dexterity was reduced but did not have any significant issues  Since his last visit, he had some issues with back pain    Had antiinflammatory and steroid shot  This has resolved  Following with podiatry for foot issues  He is doing well and denies any issues from sinemet  Currently feeling well  Wearing off - denies  Walking and movement - no changes  Tremor - may be one hand or the other but usually not both hands at the same time  Dexterity/fine movements - difficulty with writing and with fine motor such as buttoning his shirt  Denies difficulty with eating but does eat slowly  Activity - has been walking recently  Summer was too hot to walk  No trouble swallowing  Bowel/bladder issues - no incontinence but occasional constipation and urinary urgency  Positional light headedness - no  Sleep difficulties - denies  No memory issues   Occasionally wife notes he does not listen to what she is saying but he notes that it is because he is not paying attention to her  No headaches or other neurological deficits          The following portions of the patient's history were reviewed and updated as appropriate: allergies, current medications, past family history, past medical history, past social history, past surgical history and problem list          Objective:    Blood pressure 146/73, pulse 81, temperature 98 °F (36 7 °C), temperature source Temporal, height 5' 8" (1 727 m), weight 99 8 kg (220 lb)  Physical Exam   No apparent distress  Appears well nourished  Trace LE edema  Hard of hearing, one ear not worse than other, intact bilaterally to finger rubs     Neurologic Exam  Mental status- alert and oriented to person, place, and time  Speech appropriate for conversation  Recalls 2/3 words after 5 minutes  Unable to spell world (3rd grade education)  Good attention and knowledge  Object naming intact  Cranial Nerves- VFFTC, EOMS normal, facial sensation and muscles symmetric, hearing intact bilaterally to finger rubs, tongue midline, palate rise symmetrical, shoulder shrug symmetrical     Motor- No pronator drift  5/5 strength all extremities  Resting tremor noted of bilateral upper extremities  + bradykinesia  Increased tone of bilateral upper extremities  Sensory-  Intact distally in all extremities to light touch and vibration  DTRs- 2+ and symmetrical in all extremities  Gait- difficulty rising from chair  wide based casual gait  Reduced stride length and arm swing bilaterally  Difficulty with tandem gait  Negative rhomberg  Coordination- FNF slow but intact  Difficulty with HAM        ROS:    Review of Systems   Constitutional: Negative  Negative for appetite change and fever  HENT: Negative  Negative for hearing loss, tinnitus, trouble swallowing and voice change  Eyes: Negative  Negative for photophobia and pain  Respiratory: Negative  Negative for shortness of breath  Cardiovascular: Negative  Negative for palpitations  Gastrointestinal: Negative  Negative for nausea and vomiting  Endocrine: Negative  Negative for cold intolerance  Genitourinary: Negative  Negative for dysuria, frequency and urgency  Musculoskeletal: Negative  Negative for myalgias and neck pain  Skin: Negative  Negative for rash  Allergic/Immunologic: Negative  Neurological: Negative  Negative for dizziness, tremors, seizures, syncope, facial asymmetry, speech difficulty, weakness, light-headedness, numbness and headaches  Hematological: Negative  Does not bruise/bleed easily  Psychiatric/Behavioral: Negative  Negative for confusion, hallucinations and sleep disturbance  ROS obtained by MA and reviewed by myself

## 2020-09-14 NOTE — ASSESSMENT & PLAN NOTE
Patient has been doing well since prior visit  Denies any side effects or concerns with medication  Denies wearing off  No worsening of walking or movement  Notes noticeable tremor or one hand or the other, does not usually notice both hands at the same time  Has trouble with writing or fine movements such as buttoning his shirt but not difficulty with eating  Has stayed active with walking outside  No trouble swallowing or issues with incontinence  May have occasional constipation and urinary urgency  Denies positional lightheadedness or sleeping difficulties  Exam reveals bradykinesia, reduced stride length and arm swing, difficulty with rising from seated position, resting tremor, difficulty with HAM and increased tone of upper extremities  Sensation and reflexes intact  A&O x 3  recalls 2/3 objects  Naming intact  Offered to increase dose of sinemet as patient does seem to have worsening of symptoms throughout the day  Patient and wife have refused at this time as they note he is doing well  Instructed to call office with any worsening of symptoms  Follow up with Dr Surya Mcmahon in 6 months or sooner if needed

## 2021-02-14 ENCOUNTER — IMMUNIZATIONS (OUTPATIENT)
Dept: FAMILY MEDICINE CLINIC | Facility: HOSPITAL | Age: 81
End: 2021-02-14

## 2021-02-14 DIAGNOSIS — Z23 ENCOUNTER FOR IMMUNIZATION: Primary | ICD-10-CM

## 2021-02-14 PROCEDURE — 91300 SARS-COV-2 / COVID-19 MRNA VACCINE (PFIZER-BIONTECH) 30 MCG: CPT

## 2021-02-14 PROCEDURE — 0001A SARS-COV-2 / COVID-19 MRNA VACCINE (PFIZER-BIONTECH) 30 MCG: CPT

## 2021-02-26 ENCOUNTER — LAB (OUTPATIENT)
Dept: LAB | Facility: MEDICAL CENTER | Age: 81
End: 2021-02-26
Payer: COMMERCIAL

## 2021-02-26 ENCOUNTER — TRANSCRIBE ORDERS (OUTPATIENT)
Dept: ADMINISTRATIVE | Facility: HOSPITAL | Age: 81
End: 2021-02-26

## 2021-02-26 DIAGNOSIS — I10 ESSENTIAL HYPERTENSION, BENIGN: ICD-10-CM

## 2021-02-26 DIAGNOSIS — Z13.6 SCREENING FOR CARDIOVASCULAR CONDITION: ICD-10-CM

## 2021-02-26 DIAGNOSIS — Z13.6 SCREENING FOR CARDIOVASCULAR CONDITION: Primary | ICD-10-CM

## 2021-02-26 DIAGNOSIS — R53.83 FATIGUE, UNSPECIFIED TYPE: ICD-10-CM

## 2021-02-26 LAB
ALBUMIN SERPL BCP-MCNC: 4.3 G/DL (ref 3.5–5)
ALP SERPL-CCNC: 97 U/L (ref 46–116)
ALT SERPL W P-5'-P-CCNC: 11 U/L (ref 12–78)
ANION GAP SERPL CALCULATED.3IONS-SCNC: 3 MMOL/L (ref 4–13)
AST SERPL W P-5'-P-CCNC: 17 U/L (ref 5–45)
BASOPHILS # BLD AUTO: 0.03 THOUSANDS/ΜL (ref 0–0.1)
BASOPHILS NFR BLD AUTO: 0 % (ref 0–1)
BILIRUB SERPL-MCNC: 1.07 MG/DL (ref 0.2–1)
BILIRUB UR QL STRIP: NEGATIVE
BUN SERPL-MCNC: 31 MG/DL (ref 5–25)
CALCIUM SERPL-MCNC: 9.3 MG/DL (ref 8.3–10.1)
CHLORIDE SERPL-SCNC: 112 MMOL/L (ref 100–108)
CHOLEST SERPL-MCNC: 175 MG/DL (ref 50–200)
CLARITY UR: CLEAR
CO2 SERPL-SCNC: 30 MMOL/L (ref 21–32)
COLOR UR: YELLOW
CREAT SERPL-MCNC: 1.18 MG/DL (ref 0.6–1.3)
EOSINOPHIL # BLD AUTO: 0.11 THOUSAND/ΜL (ref 0–0.61)
EOSINOPHIL NFR BLD AUTO: 2 % (ref 0–6)
ERYTHROCYTE [DISTWIDTH] IN BLOOD BY AUTOMATED COUNT: 12.5 % (ref 11.6–15.1)
GFR SERPL CREATININE-BSD FRML MDRD: 58 ML/MIN/1.73SQ M
GLUCOSE P FAST SERPL-MCNC: 89 MG/DL (ref 65–99)
GLUCOSE UR STRIP-MCNC: NEGATIVE MG/DL
HCT VFR BLD AUTO: 42.8 % (ref 36.5–49.3)
HDLC SERPL-MCNC: 48 MG/DL
HGB BLD-MCNC: 14.1 G/DL (ref 12–17)
HGB UR QL STRIP.AUTO: NEGATIVE
IMM GRANULOCYTES # BLD AUTO: 0.02 THOUSAND/UL (ref 0–0.2)
IMM GRANULOCYTES NFR BLD AUTO: 0 % (ref 0–2)
KETONES UR STRIP-MCNC: NEGATIVE MG/DL
LDLC SERPL CALC-MCNC: 108 MG/DL (ref 0–100)
LEUKOCYTE ESTERASE UR QL STRIP: NEGATIVE
LYMPHOCYTES # BLD AUTO: 1.46 THOUSANDS/ΜL (ref 0.6–4.47)
LYMPHOCYTES NFR BLD AUTO: 21 % (ref 14–44)
MCH RBC QN AUTO: 32 PG (ref 26.8–34.3)
MCHC RBC AUTO-ENTMCNC: 32.9 G/DL (ref 31.4–37.4)
MCV RBC AUTO: 97 FL (ref 82–98)
MONOCYTES # BLD AUTO: 0.5 THOUSAND/ΜL (ref 0.17–1.22)
MONOCYTES NFR BLD AUTO: 7 % (ref 4–12)
NEUTROPHILS # BLD AUTO: 4.75 THOUSANDS/ΜL (ref 1.85–7.62)
NEUTS SEG NFR BLD AUTO: 70 % (ref 43–75)
NITRITE UR QL STRIP: NEGATIVE
NONHDLC SERPL-MCNC: 127 MG/DL
NRBC BLD AUTO-RTO: 0 /100 WBCS
PH UR STRIP.AUTO: 6.5 [PH]
PLATELET # BLD AUTO: 180 THOUSANDS/UL (ref 149–390)
PMV BLD AUTO: 10.6 FL (ref 8.9–12.7)
POTASSIUM SERPL-SCNC: 4.6 MMOL/L (ref 3.5–5.3)
PROT SERPL-MCNC: 7.4 G/DL (ref 6.4–8.2)
PROT UR STRIP-MCNC: NEGATIVE MG/DL
RBC # BLD AUTO: 4.41 MILLION/UL (ref 3.88–5.62)
SODIUM SERPL-SCNC: 145 MMOL/L (ref 136–145)
SP GR UR STRIP.AUTO: 1.02 (ref 1–1.03)
TRIGL SERPL-MCNC: 93 MG/DL
TSH SERPL DL<=0.05 MIU/L-ACNC: 1.75 UIU/ML (ref 0.36–3.74)
UROBILINOGEN UR QL STRIP.AUTO: 0.2 E.U./DL
WBC # BLD AUTO: 6.87 THOUSAND/UL (ref 4.31–10.16)

## 2021-02-26 PROCEDURE — 85025 COMPLETE CBC W/AUTO DIFF WBC: CPT

## 2021-02-26 PROCEDURE — 80061 LIPID PANEL: CPT

## 2021-02-26 PROCEDURE — 36415 COLL VENOUS BLD VENIPUNCTURE: CPT

## 2021-02-26 PROCEDURE — 84443 ASSAY THYROID STIM HORMONE: CPT

## 2021-02-26 PROCEDURE — 80053 COMPREHEN METABOLIC PANEL: CPT

## 2021-02-26 PROCEDURE — 81003 URINALYSIS AUTO W/O SCOPE: CPT | Performed by: FAMILY MEDICINE

## 2021-03-07 ENCOUNTER — IMMUNIZATIONS (OUTPATIENT)
Dept: FAMILY MEDICINE CLINIC | Facility: HOSPITAL | Age: 81
End: 2021-03-07

## 2021-03-07 DIAGNOSIS — Z23 ENCOUNTER FOR IMMUNIZATION: Primary | ICD-10-CM

## 2021-03-07 PROCEDURE — 0002A SARS-COV-2 / COVID-19 MRNA VACCINE (PFIZER-BIONTECH) 30 MCG: CPT

## 2021-03-07 PROCEDURE — 91300 SARS-COV-2 / COVID-19 MRNA VACCINE (PFIZER-BIONTECH) 30 MCG: CPT

## 2021-03-15 ENCOUNTER — OFFICE VISIT (OUTPATIENT)
Dept: NEUROLOGY | Facility: CLINIC | Age: 81
End: 2021-03-15
Payer: COMMERCIAL

## 2021-03-15 VITALS
DIASTOLIC BLOOD PRESSURE: 70 MMHG | HEIGHT: 68 IN | BODY MASS INDEX: 32.05 KG/M2 | WEIGHT: 211.5 LBS | SYSTOLIC BLOOD PRESSURE: 130 MMHG

## 2021-03-15 DIAGNOSIS — R60.0 LEG EDEMA: ICD-10-CM

## 2021-03-15 DIAGNOSIS — G20 PARKINSON'S DISEASE (HCC): Primary | ICD-10-CM

## 2021-03-15 PROCEDURE — 1036F TOBACCO NON-USER: CPT | Performed by: PSYCHIATRY & NEUROLOGY

## 2021-03-15 PROCEDURE — 1160F RVW MEDS BY RX/DR IN RCRD: CPT | Performed by: PSYCHIATRY & NEUROLOGY

## 2021-03-15 PROCEDURE — 99214 OFFICE O/P EST MOD 30 MIN: CPT | Performed by: PSYCHIATRY & NEUROLOGY

## 2021-03-15 NOTE — PROGRESS NOTES
Patient ID: Frankey Moos  is a 79 y o  male  Assessment/Plan:    No problem-specific Assessment & Plan notes found for this encounter  Diagnoses and all orders for this visit:    Parkinson's disease ([address] )  - C/w Sinemet : wife would like patient to reduce to 1 tab TID to see if LE edema swelling better  I d/w patient and wife if his tremors bradykinesia or balance are much worse to increase dose again   They will let me know  -     Ambulatory referral to Physical Therapy; Future  Pt does have mild cogwheeling and clear  Bradykinesia on exam today, referral to BIG therapy for benefit with this  No falls thankfully  Leg edema  bl pitting mild to moderate  Hx asbestosis they will speak to pulmonology    Follow up with me in four months time or sooner if clinically warranted         Subjective:    HPI    Mr Miguel Walters is a pleasant 80 yo male seen in follow up for Parkinson's disease, states doing okay other than intermittent tremors  He is unsure if before next dose of med  Per wife, he has trouble with buttoning shirt at times but only with certain kinds  States balance is okay  No falls  States the sinemet makes him a little fatigued  Patients' wife is concerned the sinemet is making him have LE edema  We discussed unlikely but they can reduce dose and see if this is better  Denies dysphagia    He has hx of asbestos exposure and has seen pulmonology before  He is noting more cough, phlegm and LE swelling and wife concerned it may be due to lung issues   I discussed notifying their pulmonologist  Pt denies chest pain or cardiac awareness    Parkinson's therapy was helpful in the past       The following portions of the patient's history were reviewed and updated as appropriate: allergies, current medications, past family history, past medical history, past social history, past surgical history and problem list and ROS         Objective:    Blood pressure 130/70, height 5' 8" (1 727 m), weight 95 9 kg (211 lb 8 oz)  Physical Exam    Gen: NAD  HEENT: NCAT, EOMI  Resp: Symmetric chest rise bl  CVS: RRR  Ext: bl LE edema, pitting    Neurological Exam  AO X 4, immediate recall 3/3, delayed recall 2/3 no significant hypophonia  CN 2-12 grossly intact other than hard of hearing  Motor: 5/5 ext x 4 with reduced ROM over shoulder bl, hx rotator cuff injury bl  + resting tremor and cogwheeling rigidity bl UE mild  Abnormal HAM- + bradykinesia with bl UE >>> LE  Sensation: Intact to LT x 4  Cerebellar: No dysmetria b/l  Gait: narrow based bl reduced but walks independently with no shuffling and no ataxia      ROS:    Review of Systems   Constitutional: Negative  Negative for appetite change and fever  HENT: Negative  Negative for hearing loss, tinnitus, trouble swallowing and voice change  Eyes: Negative  Negative for photophobia and pain  Respiratory: Negative  Negative for shortness of breath  Cardiovascular: Negative  Negative for palpitations  Gastrointestinal: Negative  Negative for nausea and vomiting  Endocrine: Negative  Negative for cold intolerance  Genitourinary: Negative  Negative for dysuria, frequency and urgency  Musculoskeletal: Negative  Negative for myalgias and neck pain  Swelling in the feet   Skin: Negative  Negative for rash  Neurological: Positive for tremors  Negative for dizziness, seizures, syncope, facial asymmetry, speech difficulty, weakness, light-headedness, numbness and headaches  Hematological: Negative  Does not bruise/bleed easily  Psychiatric/Behavioral: Negative  Negative for confusion, hallucinations and sleep disturbance  All other systems reviewed and are negative

## 2021-03-25 ENCOUNTER — EVALUATION (OUTPATIENT)
Dept: PHYSICAL THERAPY | Facility: MEDICAL CENTER | Age: 81
End: 2021-03-25
Payer: COMMERCIAL

## 2021-03-25 VITALS — SYSTOLIC BLOOD PRESSURE: 164 MMHG | DIASTOLIC BLOOD PRESSURE: 83 MMHG | HEART RATE: 75 BPM

## 2021-03-25 DIAGNOSIS — G20 PARKINSON'S DISEASE (HCC): Primary | ICD-10-CM

## 2021-03-25 DIAGNOSIS — R68.89 HYPOKINESIA: ICD-10-CM

## 2021-03-25 DIAGNOSIS — R26.9 ABNORMALITY OF GAIT AND MOBILITY: ICD-10-CM

## 2021-03-25 PROCEDURE — 97163 PT EVAL HIGH COMPLEX 45 MIN: CPT

## 2021-03-25 NOTE — PROGRESS NOTES
PT Evaluation     Today's date: 3/25/2021  Patient name: Elizabeth Joiner  : 1940  MRN: [id number]  Referring provider: Yolanda Dave DO  Dx:   Encounter Diagnosis     ICD-10-CM    1  Parkinson's disease ([address] )  [address] Ambulatory referral to Physical Therapy                  Assessment  Assessment details: 79year old male with PD reports difficulty with UE activity, due to slow UE movement, UE tremoring  Pt displays reduced shoulder ROM  Pt presents also with reduced LE strength, increased risk for falls per Lopez score  Further objective measures next visit  All Functional Task Recording provided by patient involves UE and dressing/eating  Pt observed to demonstrate small steps with full turns  Pt would benefit from PT for improvement of his UE AROM, calibration of his UE movment,   LSVT tune up, review of his LSVT BIG home  program, with focus on functional aciivities as listed  Pt and spouse are in agreement  Impairments: abnormal coordination, abnormal muscle tone, abnormal or restricted ROM, impaired balance, impaired physical strength, lacks appropriate home exercise program and pain with function  Other impairment: bradykinesia, hypokinesia  Understanding of Dx/Px/POC: excellentPrognosis details: STG 2-4 weeks  1  Pt will display independence with gait on level surfaces including 360 degree turns with lowest number of steps needed  2   Pt will be independent with early HEP/LSVT HEP  3  Pt will need cueing jennifer 50% of the time for high-march technique, or swaying technique to override poor motion initiation, and to improve safety with turns in place  4  Pt will need cueing only 50% of the time for large amplitude movement for donning shirt sleeve  LTG 4-12 weeks  1  Pt will display independent, or safe with close supervision, ambulation on unlevel surfaces, stairs, curbs, perturb surfaces with AD as needed  2  Pt will be independent with final HEP     3  Improve 5XSTS score to approach 10 sec age norm  4  Improved Lopez score to 48 or higher, to indicate mild impairments  5  Pt will independently use appropriate size movement for UE functional activities: buttoning, donning shirt, eating  Plan  Planned therapy interventions: manual therapy, neuromuscular re-education, patient education, postural training, therapeutic exercise, home exercise program, therapeutic activities, balance, fine motor coordination training and graded exercise  Frequency: 2x week  Duration in weeks: 12  Plan of Care beginning date: 3/25/2021  Plan of Care expiration date: 2021  Treatment plan discussed with: family and patient        Subjective Evaluation    History of Present Illness  Mechanism of injury: 79year old male accompanied by spouse Prosper Rhoades, states he was dxd with PD 3 years ago  LSVT was completed at [address] [place] [address] SLPT early in PD  Pt here for LSVT tune up  Primary complaint difficulty buttoning shirts  While  getting a shirt on,  he gets twisted  Shoulder pain R, with history of multiple surgeries, was told by Orthopedic MD that some of the pain may be due to PD  He needs assistance to get screwdriver in the slot due to UE tremors  Balance:  No real issues  Getting up from a low couch is difficult  Pt states he can get up from kneeling with something to lean on  But from floor to quadruped is challenging         Carbiodopa/levidopa: 1 tablet meds 9 am, 3 pm 6 pm       Pt lives in [address])  Pain  Current pain ratin  At best pain ratin  At worst pain rating: 10  Location: both shoulders  Quality: dull ache  Relieving factors: rest    Social Support  Steps to enter house: yes (railings present)  Stairs in house: yes (railing present basement stairs)   Lives in: multiple-level home  Lives with: spouse    Hand dominance: left    Treatments  Previous treatment: physical therapy  Patient Goals  Patient goals for therapy: independence with ADLs/IADLs  Patient goal: Needs help with donning shirt/buttons  goal to be independent    Wants to be able to use screwdriver  Wants to be able to finish a meal quicker  Objective     Functional Assessment        Comments    INITIAL EVAL:   Activity level:  "In winter he sits a lot "  "In warmer weather, he cuts the grass (riding mower,) rakes " They will be getting a zero turn b/c pushing the gas bothered his back  He will be starting to walk up the steep hill as weather warms  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  FUNCTIONAL TASK RECORDING FORM:  1  Button shirt 5 very difficult  2  Putting shirt and coat on 4, moderately difficult  3  Screw  4, moderately difficult  4  Eating 3 somewhat difficult    Coordination:   Finger to nose: mild tremor L, very slow, on target  Heel to shin:  Very low range of motion, leaning back in the chair     HAM:  UE:slow, incomplete range      LE:slow, off rhythm    UE ROM:  Flexion to 130 degrees bilat  ABD: 110 degrees bilat  ER 80 dgerees L,  Full R     UE strength: flex and ABD 5/5 with slight increase in ache  Gait: almost no trunk rotation or arm swing bilaterally  Feet clear the floor, gait symmetrical       LE ROM:   Does not achieve full knee ext by 10-15 degrees while seated, R   Lack of last 5-10 degrees knee ext while standing  Strength:  Hip flexion:  R=3+/5 L=3+/5  Quads:  R = 5 /5  L = 5/5  Ankles: R=5/5 DF and PF,  IV = 4+/5  L=5/5    Oculomotor:  SP:slow   Saccades: slow    Static Balance:  FA EO: 60 sec   FA EC: 30 sec   REO:30 sec   REC: NT  SREO:NT   SREC:NT   SLS: 2 sec     Dynamic Balance and Transfers:   Gait speed:11 37 sec  sec over 10M =0 87 m/sec or  2 88ft/sec no AD    PROCTOR:47/56, steps get smaller, increased number of steps during 360 degree turns  TUG:NT   5XSTS:NT         Positionals: denies dizziness             Flowsheet Rows      Most Recent Value   PT/OT G-Codes   Current Score  73   Projected Score  71 Precautions:   Past Medical History:   Diagnosis Date    Arthritis     Asbestos exposure     [facility]     GERD (gastroesophageal reflux disease)     Hypertension     Lung disease     [facility]'s [facility] )          Manuals                                                                 Neuro Re-Ed                                                                                                        Ther Ex                                                                                                                     Ther Activity                                       Gait Training                                       Modalities

## 2021-03-30 ENCOUNTER — OFFICE VISIT (OUTPATIENT)
Dept: PHYSICAL THERAPY | Facility: MEDICAL CENTER | Age: 81
End: 2021-03-30
Payer: COMMERCIAL

## 2021-03-30 DIAGNOSIS — R26.9 ABNORMALITY OF GAIT AND MOBILITY: ICD-10-CM

## 2021-03-30 DIAGNOSIS — R68.89 HYPOKINESIA: ICD-10-CM

## 2021-03-30 DIAGNOSIS — G20 PARKINSON'S DISEASE (HCC): Primary | ICD-10-CM

## 2021-03-30 PROCEDURE — 97110 THERAPEUTIC EXERCISES: CPT

## 2021-03-30 PROCEDURE — 97530 THERAPEUTIC ACTIVITIES: CPT

## 2021-03-30 PROCEDURE — 97112 NEUROMUSCULAR REEDUCATION: CPT

## 2021-03-30 NOTE — PROGRESS NOTES
Daily Note     Today's date: 3/30/2021  Patient name: Brigitte Velásquez  : 1940  MRN: [id number]  Referring provider: Priscila Webber,   Dx:   Encounter Diagnosis     ICD-10-CM    1  Parkinson's disease ([facility] )  G20    2  Hypokinesia  R68 89    3  Abnormality of gait and mobility  R26 9                   Subjective: No new complaints  No falls  Objective: See treatment diary below  LSVT tune-up with focus on UE movement/strength    Warm up:  TE:   NV: UBE   Seated shoulder AAROM/wand flexion 20 reps  Standing shoulder ABD/wand  AAROM 20 reps  Lumbar flexion stretch seated 20 sec x 3, 6" wooden box  NV add LTR    NMR:  Max Daily exercises:  Exercises 1-7: 4 reps each, gait belt utilized, step length occasionally decreased due to LOB  Big walkin sec -VC's no LOB  TA:   Functional- 5 reps each  1  STS no UE   2  UE 1# lift to mouth-bilat  3  Button a button NV  4  Flip coat over shoulders and Punch R UE into R sleeve  5  Screw : 5 turns (turn reflex hammer in door stop opening)         Hierarchal tasks:  1  Put on coat- 3 steps 5 reps block practice   2  Get food on fork to mouth NV   3  Screw in screws while standing; NV           HEP: 3/30:  Lumbar trunk flexion stretch seated, shoulder AAROM flexion and ABD  NV add LTR    Assessment: Tolerated treatment well  Patient would benefit from continued PT  Donning coat improved with several reps  Pt to bring in or wear shirt with buttons NV  Pt to bring in old BIG homework for PT to see  Plan: Continue per plan of care        Precautions:   Past Medical History:   Diagnosis Date    Arthritis     Asbestos exposure     [facility]bestosis ([facility] )     GERD (gastroesophageal reflux disease)     Hypertension     Lung disease     Parkinson's disease ([facility] )          Manuals                                                                 Neuro Re-Ed Ther Ex                                                                                                                     Ther Activity                                       Gait Training                                       Modalities

## 2021-04-02 ENCOUNTER — OFFICE VISIT (OUTPATIENT)
Dept: PHYSICAL THERAPY | Facility: MEDICAL CENTER | Age: 81
End: 2021-04-02
Payer: COMMERCIAL

## 2021-04-02 DIAGNOSIS — R26.9 ABNORMALITY OF GAIT AND MOBILITY: ICD-10-CM

## 2021-04-02 DIAGNOSIS — R68.89 HYPOKINESIA: ICD-10-CM

## 2021-04-02 DIAGNOSIS — G20 PARKINSON'S DISEASE (HCC): Primary | ICD-10-CM

## 2021-04-02 PROCEDURE — 97112 NEUROMUSCULAR REEDUCATION: CPT

## 2021-04-02 PROCEDURE — 97530 THERAPEUTIC ACTIVITIES: CPT

## 2021-04-02 NOTE — PROGRESS NOTES
Daily Note     Today's date: 2021  Patient name: Domingo Pimentel  : 1940  MRN: [id number]  Referring provider: Vicki Leslie DO  Dx:   Encounter Diagnosis     ICD-10-CM    1  Parkinson's disease ([facility] [address] )  G20    2  Hypokinesia  R68 89    3  Abnormality of gait and mobility  R26 9        Start Time: 1016  Stop Time: 1104  Total time in clinic (min): 48 minutes    Subjective: No new complaints  No falls  Objective: See treatment diary below  LSVT tune-up with focus on UE movement/strength    Warm up:  TE:   NV: UBE   Seated shoulder AAROM/wand flexion 20 reps  NP Standing shoulder ABD/wand  AAROM 20 reps  NP Lumbar flexion stretch seated 20 sec x 3, 6" wooden box  NV add LTR    NMR:  Max Daily exercises:  exercises 1-7: 4 reps each, gait belt utilized, step length occasionally decreased due to LOB  Big walking:  NP 90 sec -VC's no LOB  TA:   Functional- 5 reps each  1  STS no UE   2  Fork to mouth  3  Button a button and unbutton:  5 reps each  4  Flip coat over shoulders and Punch R UE into R sleeve  5  Screw : 5 turns       Hierarchal tasks:  1  Get food on fork to mouth NV   2  Screw in screws while standing; NV           HEP: 3/30:  Lumbar trunk flexion stretch seated, shoulder AAROM flexion and ABD  NV add LTR    Assessment: Tolerated treatment well  Patient would benefit from continued PT  Donning coat improved with several reps  Shows fatigue buttoning/unbuttongin 5 buttons  Pt to brought  old BIG homework for PT to see- no evidence of pt performing HEP from previous LSVT BIG  Plan: Continue per plan of care  Continue to progress to full BIG program and encourage HEP adherence        Precautions:   Past Medical History:   Diagnosis Date    Arthritis     Asbestos exposure     Asbestosis ([facility] [address] )     GERD (gastroesophageal reflux disease)     Hypertension     Lung disease     Parkinson's disease ([facility] [address] [facility] )          Manuals Neuro Re-Ed                                                                                                        Ther Ex                                                                                                                     Ther Activity                                       Gait Training                                       Modalities

## 2021-04-06 ENCOUNTER — OFFICE VISIT (OUTPATIENT)
Dept: PHYSICAL THERAPY | Facility: MEDICAL CENTER | Age: 81
End: 2021-04-06
Payer: COMMERCIAL

## 2021-04-06 DIAGNOSIS — G20 PARKINSON'S DISEASE (HCC): Primary | ICD-10-CM

## 2021-04-06 DIAGNOSIS — R68.89 HYPOKINESIA: ICD-10-CM

## 2021-04-06 DIAGNOSIS — R26.9 ABNORMALITY OF GAIT AND MOBILITY: ICD-10-CM

## 2021-04-06 PROCEDURE — 97530 THERAPEUTIC ACTIVITIES: CPT

## 2021-04-06 PROCEDURE — 97112 NEUROMUSCULAR REEDUCATION: CPT

## 2021-04-06 PROCEDURE — 97110 THERAPEUTIC EXERCISES: CPT

## 2021-04-06 NOTE — PROGRESS NOTES
Daily Note     Today's date: 2021  Patient name: Lyndsey Query  : 1940  MRN: [id number]  Referring provider: Catalina Frost DO  Dx:   Encounter Diagnosis     ICD-10-CM    1  Parkinson's disease ([facility] [address] )  G20    2  Hypokinesia  R68 89    3  Abnormality of gait and mobility  R26 9                   Subjective:  My back hurts in the morning  No falls  Objective: See treatment diary below  LSVT tune-up with focus on UE movement/strength    Warm up:  TE:   LTR rocking 2 min   LTR 20 sec x 3 bilat   supine shoulder AAROM wand/flexion 20 sec  x5   Lumbar flexion stretch seated 20 sec x 3, 6" wooden box  NP Standing shoulder ABD/wand  AAROM 20 reps  NV add /consider UBE    NMR:  Max Daily exercises:  exercises 1-5: 4 reps each, rock/reaches 10 reps, gait belt utilized, step length occasionally decreased due to LOB  Big walking:  NP 90 sec -VC's no LOB  TA:   Functional- 5 reps each  1  STS no UE   2  NP Fork to mouth  3  NPButton a button and unbutton:  5 reps each  4  Flip coat over shoulders and Punch R UE into R sleeve  5  NP Screw : 5 turns       Hierarchal tasks:  1  NP Get food on fork to mouth NV   2  NP Screw in screws while standing; NV           HEP: 3/30:  Lumbar trunk flexion stretch seated, shoulder AAROM flexion and ABD  21 added  LTR    Assessment: Tolerated treatment well  Patient would benefit from continued PT Pt needs moderate warm up activities to loosen shoulders, trunk  Hierarchal tasks not completed, partial functional completed (pt is here for LSVT tune-up ) Time spent educating pt that we will focus on building his HEP so he can continue that everyday in future  Pt encouraged to go online and look at  homework helper DVD and discuss with family (Pt may need adapted Max Daily exercise version of DVD which is included in volume I )    Plan: Continue per plan of care  Continue to progress to full BIG program and encourage HEP adherence   Plan to continue to educate pt about LSVT Homework helper video           Precautions:   Past Medical History:   Diagnosis Date    Arthritis     Asbestos exposure     [facility]     GERD (gastroesophageal reflux disease)     Hypertension     Lung disease     Parkinson's disease ([facility] )          Manuals                                                                 Neuro Re-Ed                                                                                                        Ther Ex                                                                                                                     Ther Activity                                       Gait Training                                       Modalities

## 2021-04-09 ENCOUNTER — APPOINTMENT (OUTPATIENT)
Dept: PHYSICAL THERAPY | Facility: MEDICAL CENTER | Age: 81
End: 2021-04-09
Payer: COMMERCIAL

## 2021-04-13 ENCOUNTER — OFFICE VISIT (OUTPATIENT)
Dept: PHYSICAL THERAPY | Facility: MEDICAL CENTER | Age: 81
End: 2021-04-13
Payer: COMMERCIAL

## 2021-04-13 DIAGNOSIS — R26.9 ABNORMALITY OF GAIT AND MOBILITY: ICD-10-CM

## 2021-04-13 DIAGNOSIS — G20 PARKINSON'S DISEASE (HCC): Primary | ICD-10-CM

## 2021-04-13 DIAGNOSIS — R68.89 HYPOKINESIA: ICD-10-CM

## 2021-04-13 PROCEDURE — 97112 NEUROMUSCULAR REEDUCATION: CPT

## 2021-04-13 PROCEDURE — 97110 THERAPEUTIC EXERCISES: CPT

## 2021-04-13 PROCEDURE — 97116 GAIT TRAINING THERAPY: CPT

## 2021-04-13 NOTE — PROGRESS NOTES
Daily Note     Today's date: 2021  Patient name: Diomedes Perez  : 1940  MRN: [id number]  Referring provider: Glory Munoz DO  Dx:   Encounter Diagnosis     ICD-10-CM    1  Parkinson's disease ([facility] [address] )  G20    2  Hypokinesia  R68 89    3  Abnormality of gait and mobility  R26 9        Start Time: 1147  Stop Time: 1242  Total time in clinic (min): 55 minutes    Subjective:  My back hurts in the morning  No falls  Objective: See treatment diary below  LSVT tune-up with focus on UE movement/strength    Warm up:  TE: none today   LTR rocking 2 min   LTR 20 sec x 3 bilat   supine shoulder AAROM wand/flexion 20 sec  x5   Lumbar flexion stretch seated 20 sec x 3, 6" wooden box  NP Standing shoulder ABD/wand  AAROM 20 reps  NV add /consider UBE    Spouse was in waiting room and was invited in for education regarding the BIG program and to learn supervision tips for home  Spouse observed the session closely, asked questions, gave verbal understanding  Homework sheet was reviewed with patient/spouse  NMR:  Max Daily exercises:  exercises 1-5: 6-8 reps each, rock/reaches 10 reps, gait belt utilized, step length occasionally decreased due to LOB  Big walking:  NP 90 sec -VC's no LOB  TA:   Functional- 5 reps each  1  STS no UE   2  Fork to mouth  3  Button a button and unbutton:  5 reps each  4  Flip coat over shoulders and Punch R UE into R sleeve  5  Screw : 5 turns       Hierarchal tasks:  1  NP Get food on fork to mouth NV   2  NP Screw in screws while standing; NV     Carryover:  tues-use screwdriver BIG  Wed-open a dooor for someone BIG  thurs- walk to you car BIG      HEP: 3/30:  Lumbar trunk flexion stretch seated, shoulder AAROM flexion and ABD  21 added  LTR  NV add wall climbing with towel    Assessment: Tolerated treatment well  Patient would benefit from continued PT Pt needs moderate warm up activities to loosen shoulders, trunk     Significant time spent this session making sure spouse understands how to position pt with wall/corner nearby at home for safety/imbalance, and HEP info:  how many reps of Max Daily pt is to do, 5 Functional tasks, Big Walking, and daily carryover activity  Plan: Continue per plan of care  Continue to progress to full BIG program and encourage HEP adherence  Plan to continue to educate pt about LSVT Homework helper video           Precautions:   Past Medical History:   Diagnosis Date    Arthritis     Asbestos exposure     [facility]bestosis ([facility] [address] )     GERD (gastroesophageal reflux disease)     Hypertension     Lung disease     [facility]          Manuals                                                                 Neuro Re-Ed                                                                                                        Ther Ex                                                                                                                     Ther Activity                                       Gait Training                                       Modalities

## 2021-04-16 ENCOUNTER — OFFICE VISIT (OUTPATIENT)
Dept: PHYSICAL THERAPY | Facility: MEDICAL CENTER | Age: 81
End: 2021-04-16
Payer: COMMERCIAL

## 2021-04-16 DIAGNOSIS — R68.89 HYPOKINESIA: ICD-10-CM

## 2021-04-16 DIAGNOSIS — R26.9 ABNORMALITY OF GAIT AND MOBILITY: ICD-10-CM

## 2021-04-16 DIAGNOSIS — G20 PARKINSON'S DISEASE (HCC): Primary | ICD-10-CM

## 2021-04-16 PROCEDURE — 97110 THERAPEUTIC EXERCISES: CPT

## 2021-04-16 PROCEDURE — 97112 NEUROMUSCULAR REEDUCATION: CPT

## 2021-04-16 NOTE — PROGRESS NOTES
Daily Note     Today's date: 2021  Patient name: Boo Cueva  : 1940  MRN: [id number]  Referring provider: Odette Crisostomo DO  Dx:   Encounter Diagnosis     ICD-10-CM    1  Parkinson's disease ([facility] [address] )  G20    2  Hypokinesia  R68 89    3  Abnormality of gait and mobility  R26 9        Start Time: 1105  Stop Time: 1145  Total time in clinic (min): 40 minutes    Subjective:  My back hurts in the morning  No falls  Objective: See treatment diary below  LSVT tune-up with focus on UE movement/strength    Warm up:  TE:   LTR rocking 2 min   LTR 20 sec x 3 bilat   HS stretch by PT   supine shoulder AAROM wand/flexion 20 sec  x5   NV Lumbar flexion stretch seated 20 sec x 3, 6" wooden box  NP Standing shoulder ABD/wand  AAROM 20 reps  NV add /consider UBE      NMR:  Max Daily exercises:  exercises 1-5: 6-8 reps each,   rock/reaches 10 reps, gait belt utilized, step length occasionally decreased due to LOB  Big walking: not today  NP 90 sec -VC's no LOB  TA:  5x5 not done today  Functional- 5 reps each  1  STS no UE   2  Fork to mouth  3  Button a button and unbutton:  5 reps each  4  Flip coat over shoulders and Punch R UE into R sleeve  5  Screw : 5 turns       Hierarchal tasks:  1  NP Get food on fork to mouth NV   2  Screw in screws while standing;  place screw at tip of screwdriver, place screw in hole, turn screw x 5    Carryover:  Not assigned      HEP: 3/30:  Lumbar trunk flexion stretch seated, shoulder AAROM flexion and ABD  21 added  LTR  NV add wall climbing with towel    Assessment: Tolerated treatment well  Patient would benefit from continued PT Pt needs a considerate amount of warm-up/stretching prior to LSVT (see TE above )  Pt has significantly tight HS  Pt reported HEP adherence  Plan: Continue per plan of care  Continue to progress to full BIG program and encourage HEP adherence  Pt borrowed SVT Homework helper video for the weekend  Precautions:   Past Medical History:   Diagnosis Date    Arthritis     Asbestos exposure     [facility]bestosis ([facility] [address] )     GERD (gastroesophageal reflux disease)     Hypertension     Lung disease     [facility]          Manuals                                                                 Neuro Re-Ed                                                                                                        Ther Ex                                                                                                                     Ther Activity                                       Gait Training                                       Modalities

## 2021-04-20 ENCOUNTER — OFFICE VISIT (OUTPATIENT)
Dept: PHYSICAL THERAPY | Facility: MEDICAL CENTER | Age: 81
End: 2021-04-20
Payer: COMMERCIAL

## 2021-04-20 DIAGNOSIS — R68.89 HYPOKINESIA: ICD-10-CM

## 2021-04-20 DIAGNOSIS — R26.9 ABNORMALITY OF GAIT AND MOBILITY: ICD-10-CM

## 2021-04-20 DIAGNOSIS — G20 PARKINSON'S DISEASE (HCC): Primary | ICD-10-CM

## 2021-04-20 PROCEDURE — 97112 NEUROMUSCULAR REEDUCATION: CPT

## 2021-04-20 PROCEDURE — 97530 THERAPEUTIC ACTIVITIES: CPT

## 2021-04-20 PROCEDURE — 97110 THERAPEUTIC EXERCISES: CPT

## 2021-04-20 NOTE — PROGRESS NOTES
Daily Note     Today's date: 2021  Patient name: Lefty Cristobal  : 1940  MRN: [id number]  Referring provider: Ant Cedillo DO  Dx:   Encounter Diagnosis     ICD-10-CM    1  Parkinson's disease ([facility] [address] )  G20    2  Hypokinesia  R68 89    3  Abnormality of gait and mobility  R26 9                   Subjective:  My back hurts in the morning  Pt has not been remembering to perform LTR in bed prior to getting up  No falls  Objective: See treatment diary below  LSVT tune-up with focus on UE movement/strength    Warm up:  TE: 25'  LTR rocking 2 min   LTR 20 sec x 3 bilat   HS stretch by PT   supine shoulder AAROM wand/flexion 20 sec  x5   Lumbar flexion stretch seated 20 sec x 3, 6" wooden box  NP Standing shoulder ABD/wand  AAROM 20 reps        NMR:  Max Daily exercises:  exercises 1-5: 8 reps each,   rock/reaches 10 reps, gait belt utilized, step length occasionally decreased due to LOB  COLOR DOTS USED       Big walkin' x 5 Big walking, to board with holes, apply screw to screwdriver, screw in hole x 5 BIG turns  TA:  5x5 not done today  Functional- 5 reps each  1  STS no UE   2   np Fork to mouth  3  Button a button and unbutton:  5 reps each  4  NP Flip coat over shoulders and Punch R UE into R sleeve  5  Screw : 5 turns -see BIG walking  Hierarchal tasks:  1  NP Get food on fork to mouth NV   2  Screw in screws while standing;  place screw at tip of screwdriver, place screw in hole, turn screw x 5- done during BIG walking     Carryover:  Not assigned      HEP: 3/30:  Lumbar trunk flexion stretch seated, shoulder AAROM flexion and ABD  21 added  LTR  NV add wall climbing with towel    Assessment: Tolerated treatment well  Patient would benefit from continued PT Pt needs a considerate amount of warm-up/stretching prior to LSVT (see TE above )  Pt has significantly tight HS  Pt reported HEP adherence    Pt purchased the Homework [place] DVD, should get it in a few days         Plan: Continue per plan of care  Continue to progress to full BIG program and encourage HEP adherence             Precautions:   Past Medical History:   Diagnosis Date    Arthritis     Asbestos exposure     [facility] [address] )     GERD (gastroesophageal reflux disease)     Hypertension     Lung disease     Parkinson's disease ([facility] )          Manuals                                                                 Neuro Re-Ed                                                                                                        Ther Ex                                                                                                                     Ther Activity                                       Gait Training                                       Modalities

## 2021-04-23 ENCOUNTER — OFFICE VISIT (OUTPATIENT)
Dept: PHYSICAL THERAPY | Facility: MEDICAL CENTER | Age: 81
End: 2021-04-23
Payer: COMMERCIAL

## 2021-04-23 DIAGNOSIS — R26.9 ABNORMALITY OF GAIT AND MOBILITY: ICD-10-CM

## 2021-04-23 DIAGNOSIS — R68.89 HYPOKINESIA: ICD-10-CM

## 2021-04-23 DIAGNOSIS — G20 PARKINSON'S DISEASE (HCC): Primary | ICD-10-CM

## 2021-04-23 PROCEDURE — 97530 THERAPEUTIC ACTIVITIES: CPT

## 2021-04-23 PROCEDURE — 97112 NEUROMUSCULAR REEDUCATION: CPT

## 2021-04-23 NOTE — PROGRESS NOTES
Daily Note     Today's date: 2021  Patient name: Sebastián Wilson  : 1940  MRN: [id number]  Referring provider: Aruna Collazo DO  Dx:   Encounter Diagnosis     ICD-10-CM    1  Parkinson's disease ([facility] [address] )  G20    2  Hypokinesia  R68 89    3  Abnormality of gait and mobility  R26 9        Start Time: 1105  Stop Time: 1155  Total time in clinic (min): 50 minutes    Subjective:  No falls  At the end of the session spouse stated in waiting room that she would like the next session to be the last          Objective: See treatment diary below  LSVT tune-up with focus on UE movement/strength  Pt/spouse were provided education regarding RSB in [place]  They were advised that trials of neuro-boxing could begin next week (to determine if pt is appropriate candidate,) so pt may need to complete 2 visits next week  Warm up:  TE: none this visit  LTR rocking 2 min   LTR 20 sec x 3 bilat   HS stretch by PT   supine shoulder AAROM wand/flexion 20 sec  x5   Lumbar flexion stretch seated 20 sec x 3, 6" wooden box  NP Standing shoulder ABD/wand  AAROM 20 reps        NMR:  Max Daily exercises:  exercises 1-5: 8 reps each,   rock/reaches 10 reps, gait belt utilized, step length occasionally decreased due to LOB  COLOR DOTS USED       Big walking:   Fwd x 140'  Backwards x 100'  Sidestepping x 100'      TA:   Functional- 5 reps each  1  STS foam on seat, under feet  2   np Fork to mouth  3  NP Button a button and unbutton:  5 reps each  4  NP Flip coat over shoulders and Punch R UE into R sleeve  5  Screw : 5 turns  For each of 5 screws       Hierarchal tasks:  1  NP Get food on fork to mouth NV   2  Hold 7# at mouth (dumbell, L UE) x 3 min    Carryover:  Not assigned      HEP: 3/30:  Lumbar trunk flexion stretch seated, shoulder AAROM flexion and ABD  21 added  LTR      21 finger ext with rubber bands  NV add wall climbing with towel  Assessment: Tolerated treatment well   Patient would benefit from continued PT Pt needs a considerate amount of warm-up/stretching prior to LSVT (see TE above )  Pt has significantly tight HS  Pt reported HEP adherence  Pt purchased the Homework [place] DVD, should get it in a few days  Spouse feels he is doing well enough to stop PT, but she is willing for trial of neuroboxing next week, for consideration of RSB in [place] vs maintenance in [place]  Plan: Continue per plan of care  Continue to progress to full BIG program and encourage HEP adherence  Trial neuroboxing             Precautions:   Past Medical History:   Diagnosis Date    Arthritis     Asbestos exposure     [facility]bestosis [facility] [address] )     GERD (gastroesophageal reflux disease)     Hypertension     Lung disease     Parkinson's disease ([facility] [address] )          Manuals                                                                 Neuro Re-Ed                                                                                                        Ther Ex                                                                                                                     Ther Activity                                       Gait Training                                       Modalities

## 2021-04-27 ENCOUNTER — OFFICE VISIT (OUTPATIENT)
Dept: PHYSICAL THERAPY | Facility: MEDICAL CENTER | Age: 81
End: 2021-04-27
Payer: COMMERCIAL

## 2021-04-27 DIAGNOSIS — R68.89 HYPOKINESIA: ICD-10-CM

## 2021-04-27 DIAGNOSIS — G20 PARKINSON'S DISEASE (HCC): Primary | ICD-10-CM

## 2021-04-27 DIAGNOSIS — R26.9 ABNORMALITY OF GAIT AND MOBILITY: ICD-10-CM

## 2021-04-27 PROCEDURE — 97530 THERAPEUTIC ACTIVITIES: CPT

## 2021-04-27 PROCEDURE — 97112 NEUROMUSCULAR REEDUCATION: CPT

## 2021-04-27 NOTE — PROGRESS NOTES
Progress Note/Daily Note     Today's date: 2021  Patient name: Prashant Painter  : 1940  MRN: [id number]  Referring provider: Gil West DO  Dx:   Encounter Diagnosis     ICD-10-CM    1  Parkinson's disease ([facility] [address] )  G20    2  Hypokinesia  R68 89    3  Abnormality of gait and mobility  R26 9        Start Time: 1100  Stop Time: 1200  Total time in clinic (min): 60 minutes    Subjective:  Patient reports he is doing well  Is interested in the boxing classes offered at [place]  Objective: See treatment diary below  LSVT tune-up with focus on UE     Gait: almost no trunk rotation or arm swing bilaterally  Feet clear the floor, gait symmetrical       LE ROM:   Does not achieve full knee ext by 10-15 degrees while seated, R   Lack of last 5-10 degrees knee ext while standing  Strength:  Hip flexion:  R=4/5 L=4/5  Quads:  R = 5 /5  L = 5/5  Ankles: R=5/5 DF and PF,  IV = 4+/5  L=5/5    Static Balance:  FA EO: 60 sec   FA EC: 60 sec   REO:30 sec   REC: NT  SREO:NT   SREC:NT   SLS:  sec     Dynamic Balance and Transfers:   Gait speed:8 5 sec over 10M =1 17m/sec   6 min WT 1350ft  PROCTOR:49 /56    TUG:   TUG 12 15 sec   TUG Cog 20 19 sec   TUG manual 14 22 sec  5XSTS: 12 5 sec  30 sec STS: 11x        [employer]  - Patient educated on rock steady boxing techniques, form, and concepts prior to start 10'  -Shadow boxing 2x10 ea  1  2  3  4  5  6  -Punches mits with therapist calling a number 2x10  1  2  3  4  5  6    STG 2-4 weeks- MET all Goals  1  Pt will display independence with gait on level surfaces including 360 degree turns with lowest number of steps needed  2   Pt will be independent with early HEP/LSVT HEP  3  Pt will need cueing jennifer 50% of the time for high-march technique, or swaying technique to override poor motion initiation, and to improve safety with turns in place      4  Pt will need cueing only 50% of the time for large amplitude movement for donning shirt sleeve  LTG 4-12 weeks- Progressing toward all goals  1  Pt will display independent, or safe with close supervision, ambulation on unlevel surfaces, stairs, curbs, perturb surfaces with AD as needed  2  Pt will be independent with final HEP  3  Improve 5XSTS score to approach 10 sec age norm  4  Improved Lopez score to 48 or higher, to indicate mild impairments  5  Pt will independently use appropriate size movement for UE functional activities: buttoning, donning shirt, eating  Assessment: Tolerated treatment well with a reexamination being performed in today's session  Patient Improved in his gait speed (1 17 m/sec) which suggests patient can tolerate community ambulation  Lopez scores improved slightly (49/56) but the patient still struggled with SLS and reaching activities  A TUG and 5xSTS was performed which showed the patient is not a fall risk and has good strength/power in his legs  However with a follow up TUG C and TUG M patient had increased times especially with the cognitive component  Endurance also appears to be WNL as the patient walked 1350ft in 6 minutes without an AD  Rock steady boxing was trialed today with a learning curve in the techniques however the patient caught on after a few sets  He is considering a Rock steady class in follow up therapy  He will continue to benefit from skiled Pt to improve balance, strength, endurance, and gait  Patient left in the waiting room following the session as he waited for his wife to arrive  Patient will call back to schedule more if interested, concerned that he has too much to do at home  Plan: Continue per plan of care  Continue to progress to full BIG program and encourage HEP adherence  Trial neuroboxing             Precautions:   Past Medical History:   Diagnosis Date    Arthritis     Asbestos exposure     [facility]osis ([facility] [address] )     GERD (gastroesophageal reflux disease)     Hypertension     Lung disease     [facility]          Manuals                                                                 Neuro Re-Ed                                                                                                        Ther Ex                                                                                                                     Ther Activity                                       Gait Training                                       Modalities

## 2021-05-11 ENCOUNTER — OFFICE VISIT (OUTPATIENT)
Dept: PHYSICAL THERAPY | Facility: MEDICAL CENTER | Age: 81
End: 2021-05-11
Payer: COMMERCIAL

## 2021-05-11 DIAGNOSIS — R68.89 HYPOKINESIA: ICD-10-CM

## 2021-05-11 DIAGNOSIS — R26.9 ABNORMALITY OF GAIT AND MOBILITY: ICD-10-CM

## 2021-05-11 DIAGNOSIS — G20 PARKINSON'S DISEASE (HCC): Primary | ICD-10-CM

## 2021-05-11 PROCEDURE — 97112 NEUROMUSCULAR REEDUCATION: CPT

## 2021-05-11 NOTE — PROGRESS NOTES
Daily Note     Today's date: 2021  Patient name: Onelia Najera  : 1940  MRN: [id number]  Referring provider: Jerardo Li DO  Dx:   Encounter Diagnosis     ICD-10-CM    1  Parkinson's disease ([facility] )  G20    2  Hypokinesia  R68 89    3  Abnormality of gait and mobility  R26 9        Start Time: 1400  Stop Time: 1445  Total time in clinic (min): 45 minutes    Subjective:  Patient reports he has been " having some trouble with his Parkinson's disease"  He notes being achy lately but has not had any problems with his falls or balance  Objective: See treatment diary below  LSVT tune-up with focus on [facility]  - Patient educated on rock steady boxing techniques, form, and concepts prior to start 10'  -Shadow boxing 2x10 ea  1  2  3  4  5  6  -Punches mits with therapist calling a number 2x10  1  2  3  4  5  6  1,2  3,4  5,6    THEREX  Sit to stand 5x  --> combo punches 10x  Step over karen LAT--> combos 10x  Sled push no additional weight 25 ft x4       Assessment: Tolerated treatment fairly  Frequent VCs needed for which punch is correlated to which number  Patient showed good strength and power with punches but form and technique need to be continued to be practiced in following sessions  Patient is still interested in Vignesh Mejia  He will continue to benefit from skilled PT in order to improve strength and endurance  Plan: Continue per plan of care           Precautions:   Past Medical History:   Diagnosis Date    Arthritis     Asbestos exposure     Asbestosis ([facility] )     GERD (gastroesophageal reflux disease)     Hypertension     Lung disease     Parkinson's disease ([facility] )          Manuals                                                                 Neuro Re-Ed                                                                                                        Ther Ex Ther Activity                                       Gait Training                                       Modalities

## 2021-05-13 ENCOUNTER — OFFICE VISIT (OUTPATIENT)
Dept: PHYSICAL THERAPY | Facility: MEDICAL CENTER | Age: 81
End: 2021-05-13
Payer: COMMERCIAL

## 2021-05-13 DIAGNOSIS — R26.9 ABNORMALITY OF GAIT AND MOBILITY: ICD-10-CM

## 2021-05-13 DIAGNOSIS — R68.89 HYPOKINESIA: ICD-10-CM

## 2021-05-13 DIAGNOSIS — G20 PARKINSON'S DISEASE (HCC): Primary | ICD-10-CM

## 2021-05-13 PROCEDURE — 97112 NEUROMUSCULAR REEDUCATION: CPT

## 2021-05-13 NOTE — PROGRESS NOTES
Daily Note     Today's date: 2021  Patient name: Lefty Cavanaugh  : 1940  MRN: [id number]  Referring provider: Vadim Solorio DO  Dx:   Encounter Diagnosis     ICD-10-CM    1  Parkinson's disease ([facility] [address] )  G20    2  Hypokinesia  R68 89    3  Abnormality of gait and mobility  R26 9        Start Time: 1615  Stop Time: 1700  Total time in clinic (min): 45 minutes    Subjective:  Patient reports he has been having some back pain that is especially worst in th morning  Patient reports it doesn't seem to get better with exercise  Objective: See treatment diary below  LSVT tune-up with focus on [facility]  - Patient educated on rock steady boxing techniques, form, and concepts prior to start 5'  -Punches mits with therapist calling a number 2x10  1  2  3  4  5  6  1,2  3,4  5,6    THEREX  Standing Marching  20x--> combo punches 10x repeated 3 cycles  Sit to stands 5x-->10 punches repeated 3 cycles   Sled push no additional weight 25 ft x3 cycles       Assessment: Tolerated treatment fairly  Continued use of VCs  And tactile cues for which punch is correlated to which number  Patient showed good strength and power with punches but form and technique need to be continued to be practiced in following sessions  Increased processing time needed after directions for exercises  He will continue to benefit from skilled PT in order to improve strength and endurance  Plan: Continue per plan of care           Precautions:   Past Medical History:   Diagnosis Date    Arthritis     Asbestos exposure     [facility]osis [facility] )     GERD (gastroesophageal reflux disease)     Hypertension     Lung disease     [facility]          Manuals                                                                 Neuro Re-Ed                                                                                                        Ther Ex Ther Activity                                       Gait Training                                       Modalities

## 2021-05-18 ENCOUNTER — OFFICE VISIT (OUTPATIENT)
Dept: PHYSICAL THERAPY | Facility: MEDICAL CENTER | Age: 81
End: 2021-05-18
Payer: COMMERCIAL

## 2021-05-18 DIAGNOSIS — G20 PARKINSON'S DISEASE (HCC): Primary | ICD-10-CM

## 2021-05-18 DIAGNOSIS — R26.9 ABNORMALITY OF GAIT AND MOBILITY: ICD-10-CM

## 2021-05-18 DIAGNOSIS — R68.89 HYPOKINESIA: ICD-10-CM

## 2021-05-18 PROCEDURE — 97112 NEUROMUSCULAR REEDUCATION: CPT

## 2021-05-18 PROCEDURE — 97530 THERAPEUTIC ACTIVITIES: CPT

## 2021-05-18 NOTE — PROGRESS NOTES
Daily Note     Today's date: 2021  Patient name: Luther Pritchard  : 1940  MRN: [id number]  Referring provider: Sunita Perez DO  Dx:   Encounter Diagnosis     ICD-10-CM    1  Parkinson's disease ([facility] )  G20    2  Hypokinesia  R68 89    3  Abnormality of gait and mobility  R26 9        Start Time: 1230  Stop Time: 1315  Total time in clinic (min): 45 minutes    Subjective:  Patient reports he has been okay recently, no changes  Objective: See treatment diary below  LSVT tune-up with focus on [facility]  - Patient educated on rock steady boxing techniques, form, and concepts prior to start 5'  -Punches mits with therapist calling a number 2x10  1  2  3  4  5  6  1,2  3,4  5,6  3,4,5,6  1,2,3,4  1,2,3,4,5,6    Standing Marching  20x--> combo punches 10x repeated 3 cycles  Sit to stands 5x-->10 punches repeated 3 cycles   Sled push no additional weight 25 ft x3 cycles       Assessment: Tolerated treatment well, patient continues to require mod to max assistance for memorization of the combos for the punching, patient continues to demonstrate decreased amplitude with his movements today  Increased processing time needed after directions for exercises  He will continue to benefit from skilled PT in order to improve strength and endurance  Plan: Continue per plan of care           Precautions:   Past Medical History:   Diagnosis Date    Arthritis     Asbestos exposure     [facility]     GERD (gastroesophageal reflux disease)     Hypertension     Lung disease     Parkinson's disease ([facility] )          Manuals                                                                 Neuro Re-Ed                                                                                                        Ther Ex                                                                                                                     Ther Activity                                       Gait Training Modalities

## 2021-05-20 ENCOUNTER — TELEPHONE (OUTPATIENT)
Dept: NEUROLOGY | Facility: CLINIC | Age: 81
End: 2021-05-20

## 2021-05-20 ENCOUNTER — OFFICE VISIT (OUTPATIENT)
Dept: PHYSICAL THERAPY | Facility: MEDICAL CENTER | Age: 81
End: 2021-05-20
Payer: COMMERCIAL

## 2021-05-20 DIAGNOSIS — G20 PARKINSON'S DISEASE (HCC): Primary | ICD-10-CM

## 2021-05-20 DIAGNOSIS — R68.89 HYPOKINESIA: ICD-10-CM

## 2021-05-20 DIAGNOSIS — R26.9 ABNORMALITY OF GAIT AND MOBILITY: ICD-10-CM

## 2021-05-20 PROCEDURE — 97112 NEUROMUSCULAR REEDUCATION: CPT

## 2021-05-20 NOTE — PROGRESS NOTES
Daily Note     Today's date: 2021  Patient name: Kimberley Drake  : 1940  MRN: [id number]  Referring provider: Belen Martins DO  Dx:   Encounter Diagnosis     ICD-10-CM    1  [facility] ([facility] )  G20    2  Hypokinesia  R68 89    3  Abnormality of gait and mobility  R26 9        Start Time: 1215  Stop Time: 1300  Total time in clinic (min): 45 minutes    Subjective:  Patient reports he has been okay recently, no changes  Patient wants to do maintenance program        Objective: See treatment diary below  [address] [place] [address]  - Patient educated on rock steady boxing techniques, form, and concepts prior to start 5'  -Punches mits with therapist calling a number 2x10  1  2  3  4  5  6  1,2  3,4  5,6  3,4,5,6  1,2,3,4  1,2,3,4,5,6    Standing Marching  20x--> combo punches 10x repeated 3 cycles  Sit to stands 5x-->10 punches repeated 3 cycles   Sled push no additional weight 25 ft x3 cycles       Ambulation with combos- 5'    Assessment: Tolerated treatment well, patient continues to require mod to max assistance for memorization of the combos for the punching, patient requires consistent cuing to improve his function today, patient challenged with his dual tasking today, patient challenged with his trunk rotation  He will continue to benefit from skilled PT in order to improve strength and endurance      Plan: Discharge, Begin maintenance PT next scheduled session        Precautions:   Past Medical History:   Diagnosis Date    Arthritis     Asbestos exposure     [facility]     GERD (gastroesophageal reflux disease)     Hypertension     Lung disease     [facility]'s [facility] )          Manuals                                                                 Neuro Re-Ed                                                                                                        Ther Ex Ther Activity                                       Gait Training                                       Modalities

## 2021-05-20 NOTE — TELEPHONE ENCOUNTER
Patient's wife calling regarding patient's medication  Patient was not able to tolerate the sinemet  decrease of 1 tab TID  Patient returned after 2 weeks to his previous dose of 1 5 tabs/1 5tabs/1tab daily    Natalie Resendiz states that despite increasing back to his previous dose, he currently has a worsening left hand tremor and some balance issues  Patient is currently doing BIG therapy  Patient was to f/u with you in 4 months, but d/t lack of availability was scheduled into November  Patient was provided with a  Early September appointment in [facility] to at least be seen somewhat sooner  Dr Sen Lay, they are looking for some recommendations and what can be done in the meantime  Please advise  There are no appointments available with Pasha Raphael, I thought maybe getting him into an AP in the meantime, but everyone is really booked up      # [phone]

## 2021-05-20 NOTE — TELEPHONE ENCOUNTER
If he just returned to his prior higher dose it might take at least a week to notice significant benefit   If not better within a week please let me know thanks

## 2021-05-26 NOTE — TELEPHONE ENCOUNTER
Called patient's wife for update on how patient is doing on the increased dose   She states patient has been doing well in his increased dose and would like to remain on that dose if that is okay with provider

## 2021-06-03 ENCOUNTER — EVALUATION (OUTPATIENT)
Dept: PHYSICAL THERAPY | Facility: MEDICAL CENTER | Age: 81
End: 2021-06-03
Payer: COMMERCIAL

## 2021-06-03 DIAGNOSIS — G20 PARKINSON'S DISEASE (HCC): Primary | ICD-10-CM

## 2021-06-03 DIAGNOSIS — R68.89 HYPOKINESIA: ICD-10-CM

## 2021-06-03 DIAGNOSIS — R26.9 ABNORMALITY OF GAIT AND MOBILITY: ICD-10-CM

## 2021-06-03 PROCEDURE — 97163 PT EVAL HIGH COMPLEX 45 MIN: CPT

## 2021-06-03 NOTE — PROGRESS NOTES
PT Evaluation /PT Maintenance EVAL                                                   POC-6/3 to 9/3                                                              1x/week  Today's date: 6/3/2021  Patient name: Holland Armstrong  : 1940  MRN: [id number]  Referring provider: Ivone Escobar DO  Dx:   Encounter Diagnosis     ICD-10-CM    1  Parkinson's disease ([facility] )  G20 PT plan of care cert/re-cert   2  Hypokinesia  R68 89 PT plan of care cert/re-cert   3  Abnormality of gait and mobility  R26 9 PT plan of care cert/re-cert         Assessment  Assessment details: Patient is a 79 y o  Male who has been making steady gains in skilled outpatient PT since end of May 2021 with reported 0 falls  However, high risk for falls remains as he has demonstrated results below age matched normative values for the following outcome measures: FGA, Gait speed, TUG, 5x sit to stand, and PROCTOR  At this time the patient is appropriate to transition to maintenance skilled PT to maintain current gains and prevent regression  Regression is likely, due to history or regression noted, sedentary lifestyle, presence of neurodegenerative/neurocognitive disorder and limited ability for family member/caregiver to assist with HEP  Both the patient and caregiver would be at risk for increased injury if patient had LOB or fall due to inadequate safety equipment, which could lead to hospitalization and increased healthcare costs  New goals have been created below to reflect new focus of maintenance therapy  Pt and wife in agreement with POC  Maria Eugenia Lo (2013): a [facility] decision that sought to clarify that Medicare will in fact cover skilled therapy services to maintain a patients current condition or prevent/slow further deterioration  Patient verbalized understanding of POC  Please contact me if you have any questions or recommendations   Thank you for the referral and the opportunity to share [facility]      Cut off score   All date taken from APTA Neuro Section or Rehab Measures    Proctor/64  MDC: 6 pts  Age Norms:  61-76: M - 54   F - 55  70-79: M - 47   F - 53  80-89: M - 48   F - 50 5xSTS: Valery et al 2010  MDC: 2 3 sec  Age Norms:  60-69: 11 1 sec  70-79: 12 6 sec  80-89: 14 8 sec   TUG  MDC: 4 14 sec  Cut off score:  >13 5 sec community dwelling adults  >32 2 frail elderly  <20 I for basic transfers  >30 dependent on transfers 10 Meter Walk Test: Nita Joseis al 2011  20-29: M - 1 35 m   F - 1 34 m  30-39: M - 1 43 m   F - 1 34 m  40-49: M - 1 43 m   F - 1 39 m  50-59: M - 1 43 m   F - 1 31 m  60-69: M - 1 34 m   F - 1 24 m  70-79: M - 1 26 m   F - 1 13 m  80-89: M - 0 97 m   F - 0 94 m   FGA  MCID: 4 pts  Geriatrics/community < 22/30 fall risk  Geriatrics/community < 20/30 unexplained falls DGI  MDC: vestibular - 4 pts  MDC: geriatric/community - 3 pts  Falls risk <19/24   6 Minute Walk Test  Age Norms  61-76: M - 1876 ft   F - 1765 ft  70-79: M - 1729 ft   F - 1545 ft  80-89: M - 1368 ft   F - 1286 ft mCTSIB  Norm: 20-60 yrs  Eyes open firm: norm sway 0 21-0 48  Eyes closed firm: norm sway 0 48-0 99  Eyes open foam: norm sway 0 38-0 71  Eyes closed foam: norm sway 0 70-2 22             Impairments: abnormal coordination, abnormal gait, abnormal muscle tone, abnormal or restricted ROM, activity intolerance, impaired balance, impaired physical strength, lacks appropriate HEP, poor posture, poor body mechanics and pain with function  Understanding of Dx/Px/POC: Excellent  Prognosis: Good      Goals    - Patient will ambulate outdoors on uneven surfaces with limited assistance to facilitate safe community ambulation  - Patient will maintain gait speed per 10 meter walk test at  92 m/s to facilitate continued safety with community ambulation  - Patient will continue to score at least 36/56 on PROCTOR to maintain fall risk status to reduce risk of injury  - Patient will be able to ambulate at least 1050 ft during 6 Minute Walk Test to maintain current cardiovascular capacity  - Patient will attend PT 1x/week with focus on balance and gait training to maintain functional capacity  - Patient will remain at 10 5 seconds on TUG and TUG variations without AD to maintain fall risk status  - Patient will maintain LE strength at current level of 4/5 strength to maintain level of independence with transfers        Plan  Plan details: Maintenance program  Patient would benefit from: PT Eval and Skilled PT  Planned modality interventions: Biofeedback, Cryotherapy, TENS, Thermotherapy: Hydrocollator Packs and Traction  Planned therapy interventions: abdominal trunk stabilization and breathing training  Frequency: 1x/wk  Duration in weeks: 16  Plan of Care beginning date: 6/3/2021  Plan of Care expiration date: 3 months - 9/3/2021  Treatment plan discussed with: Patient and Family        Subjective Evaluation    History of Present Illness  Mechanism of injury: Patient is a 79year old male reporting to skilled PT today for a maintenance evaluation today, patient previously completed LSVT BIG program for PD management, patient interested in neuro boxing to help maintain his gains       Pain  Current pain ratin/10  At best pain ratin/10  At worst pain ratin/10  Location: Low back  Aggravating factors: Bending and twisting    Social Support  Steps to enter house: 2  Stairs in house: 12  Lives in: [place]  Lives with: wife    Employment status: retired  Hand dominance: L    Treatments  Previous treatment: Physical Therapy  Current treatment:         Objective     Orientation  - A&O x 3,       Coordination  UE:  - Finger to nose: Normal  - Alternating supination/pronation: Abnormal    LE:  - Heel to shin: Abnormal, Bradykinetic  - Alternating toe taps: Abnormal,Bradykinetic      LE Strength (MMT)  - L hip flexion: 4/5  R hip flexion: 4/5  - L hip extension: 4/5  R hip extension: 4/5  - L hip abduction: 4/5  R hip abduction: 4/5  - L hip adduction: 4/5  R hip adduction: 4/5  - L knee extension: 4/5 R knee extension:4/5  - L knee flexion: 4/5  R knee flexion: 4/5  - L ankle DF: 4/5  R ankle DF: 4/5  - L ankle PF: 4/5  R ankle PF: 4/5    Anticipatory Reactions  - Anterior Balance Reaction: Would fall without PT assistance,  - Lateral Balance Reaction: Would fall without PT assistance,    - Posterior Balance Reaction: Would fall without PT assistance,       Transfers and Mobility  Assistance Level with Ambulation: Independent  - Primary AD: None    Assistance Level with Transfers:  - STS: independent  - Return to sit: independent  - Floor to stand: contact guard      Gait  - Observed gait abnormalities: Festinating gait pattern   - Gait speed: 10/10 92=  92 m/s  - Difficulty with environmental obstacles such as: Stairs, hurdles, uneven surfaces    Outcome Measures IE  6/3/2021     5x STS 11 76 sec     TUG  TUG manual  TUG cog 12 69 sec  13 93 sec  24 83 sec  100 by 2's     10 Meter Walk Test  92 m/s     6 Minute Walk Test 1050 ft     FGA 17/30     PROCTOR 36/56     30 second sit to stand  11 reps          Precautions:   Past Medical History:   Diagnosis Date    Arthritis     Asbestos exposure     [facility]     GERD (gastroesophageal reflux disease)     Hypertension     Lung disease     Parkinson's disease ([facility] )

## 2021-06-10 ENCOUNTER — OFFICE VISIT (OUTPATIENT)
Dept: PHYSICAL THERAPY | Facility: MEDICAL CENTER | Age: 81
End: 2021-06-10
Payer: COMMERCIAL

## 2021-06-10 DIAGNOSIS — R68.89 HYPOKINESIA: ICD-10-CM

## 2021-06-10 DIAGNOSIS — G20 PARKINSON'S DISEASE (HCC): Primary | ICD-10-CM

## 2021-06-10 DIAGNOSIS — R26.9 ABNORMALITY OF GAIT AND MOBILITY: ICD-10-CM

## 2021-06-10 PROCEDURE — 97112 NEUROMUSCULAR REEDUCATION: CPT

## 2021-06-10 NOTE — PROGRESS NOTES
Daily Note/Maintainance       Today's date: 6/10/2021  Patient name: Sebastián Wilson  : 1940  MRN: [id number]  Referring provider: Aruna Collazo DO  Dx:   Encounter Diagnosis     ICD-10-CM    1  Parkinson's disease ([facility] )  G20    2  Hypokinesia  R68 89    3  Abnormality of gait and mobility  R26 9      Start Time: 1445  Stop Time: 1530  Total time in clinic (min): 45 minutes    Subjective: Patient reports no updates upon arrival, states she has been practicing her combos          Objective: See treatment diary below     Neuro Boxing instruction  Shadow boxing  1 through 6     Hitting mitts  1 through 6  1-2  3-4  5-6     1-2-3-4  3-4-5-6     Rapid Fire  1, 2, 3, 4, 5, 6  1-2-3-4     Sit to stand- 15 reps, 1-2 combos        Assessment: Patient challenged with maintaining amplitude, improved carryover of boxing combinations, challenged with amplitude with changing combinations  Over 1000 punches thrown today   Patient requires skilled PT in order to maximize function to improve her endurance and maximize function with PD        Plan: Continue per plan of care   Precautions:   Past Medical History:   Diagnosis Date    Arthritis     Asbestos exposure     [facility]     GERD (gastroesophageal reflux disease)     Hypertension     Lung disease     [facility]          Manuals                                                                 Neuro Re-Ed                                                                                                        Ther Ex                                                                                                                     Ther Activity                                       Gait Training                                       Modalities

## 2021-06-17 ENCOUNTER — OFFICE VISIT (OUTPATIENT)
Dept: PHYSICAL THERAPY | Facility: MEDICAL CENTER | Age: 81
End: 2021-06-17
Payer: COMMERCIAL

## 2021-06-17 DIAGNOSIS — R68.89 HYPOKINESIA: ICD-10-CM

## 2021-06-17 DIAGNOSIS — R26.9 ABNORMALITY OF GAIT AND MOBILITY: ICD-10-CM

## 2021-06-17 DIAGNOSIS — G20 PARKINSON'S DISEASE (HCC): Primary | ICD-10-CM

## 2021-06-17 PROCEDURE — 97112 NEUROMUSCULAR REEDUCATION: CPT

## 2021-06-17 NOTE — PROGRESS NOTES
Daily Note/Maintainance       Today's date: 2021  Patient name: Domingo Piemntel  : 1940  MRN: [id number]  Referring provider: Vicki Leslie DO  Dx:   Encounter Diagnosis     ICD-10-CM    1  Parkinson's disease ([facility] )  G20    2  Hypokinesia  R68 89    3  Abnormality of gait and mobility  R26 9      Start Time: 1400  Stop Time: 1445  Total time in clinic (min): 45 minutes    Subjective: Patient reports no updates upon arrival, states he has been practicing his combos at home in mirror          Objective: See treatment diary below     Neuro Boxing instruction 25x each  Shadow boxing  1 through 6     Hitting mitts 25x each  1 through 6  1-2  3-4  5-6     1-2-3-4  3-4-5-6     Rapid Fire combo call out- 25x each  1, 2, 3, 4, 5, 6  1-2-3-4     Sit to stand with combo call out- 5 cycles  -6" karen  -6" step up        Assessment: Patient challenged with maintaining amplitude, improved carryover of boxing combinations, challenged with amplitude with changing combinations, specifically with trunk rotation, patient challenged with "on the spot" memory combinations   Patient requires skilled PT in order to maximize function to improve her endurance and maximize function with PD        Plan: Continue per plan of care   Precautions:   Past Medical History:   Diagnosis Date    Arthritis     Asbestos exposure     [facility]bestosis [facility]     GERD (gastroesophageal reflux disease)     Hypertension     Lung disease     Parkinson's disease ([facility] )          Manuals                                                                 Neuro Re-Ed                                                                                                        Ther Ex                                                                                                                     Ther Activity                                       Gait Training                                       Modalities

## 2021-06-24 ENCOUNTER — OFFICE VISIT (OUTPATIENT)
Dept: PHYSICAL THERAPY | Facility: MEDICAL CENTER | Age: 81
End: 2021-06-24
Payer: COMMERCIAL

## 2021-06-24 DIAGNOSIS — R68.89 HYPOKINESIA: ICD-10-CM

## 2021-06-24 DIAGNOSIS — G20 PARKINSON'S DISEASE (HCC): Primary | ICD-10-CM

## 2021-06-24 DIAGNOSIS — R26.9 ABNORMALITY OF GAIT AND MOBILITY: ICD-10-CM

## 2021-06-24 PROCEDURE — 97112 NEUROMUSCULAR REEDUCATION: CPT

## 2021-06-24 NOTE — PROGRESS NOTES
Daily Note/Maintainance       Today's date: 2021  Patient name: Boo Cueva  : 1940  MRN: [id number]  Referring provider: Odette Crisostomo DO  Dx:   Encounter Diagnosis     ICD-10-CM    1  [facility]  G20    2  Hypokinesia  R68 89    3  Abnormality of gait and mobility  R26 9      Start Time: 1230  Stop Time: 1315  Total time in clinic (min): 45 minutes    Subjective: Patient reports no updates upon arrival, states he is getting hammer toe surgery tomorrow         Objective: See treatment diary below     Neuro Boxing instruction 25x each  Shadow boxing  1 through 6     Hitting mitts 25x each  1 through 6  1-2  3-4  5-6     1-2-3-4  3-4-5-6     Rapid Fire combo call out- 25x each  1, 2, 3, 4, 5, 6  1-2-3-4     Sit to stand with combo call out- 5 cycles  -6" karen  -6" step up    Sit to stand with combo call out- random combo 5'        Assessment: Patient challenged with maintaining amplitude, continued to be challenged with combo recognition  Patient challenged with overall activities today, advised to follow up with treating therapist with his status after his hammer toe surgery  Patient verbalized understanding   Patient requires skilled PT in order to maximize function to improve her endurance and maximize function with PD        Plan: Continue per plan of care   Precautions:   Past Medical History:   Diagnosis Date    Arthritis     Asbestos exposure     [facility]     GERD (gastroesophageal reflux disease)     Hypertension     Lung disease     Parkinson's disease ([facility] )          Manuals                                                                 Neuro Re-Ed                                                                                                        Ther Ex                                                                                                                     Ther Activity                                       Gait Training 58 yo female h/o DM on oral hypoglycemics, kidney stones c/o cough ( worse at night) for a week associated with nasal congestion.  In addition, she reports decreased appetite ( cannot smell food) and nausea with NBNB emesis .  No documented fever, chills, sore throat, headache, neck pain, difficulties breathing or swallowing, no CP, SOB,  new abdominal pain or flank pain, no change in urination.  No recent travel or immobilization.  Will check labs, CXR, give IVF and reassess. Modalities

## 2021-07-01 ENCOUNTER — OFFICE VISIT (OUTPATIENT)
Dept: PHYSICAL THERAPY | Facility: MEDICAL CENTER | Age: 81
End: 2021-07-01
Payer: COMMERCIAL

## 2021-07-01 DIAGNOSIS — R26.9 ABNORMALITY OF GAIT AND MOBILITY: ICD-10-CM

## 2021-07-01 DIAGNOSIS — R68.89 HYPOKINESIA: ICD-10-CM

## 2021-07-01 DIAGNOSIS — G20 PARKINSON'S DISEASE (HCC): Primary | ICD-10-CM

## 2021-07-01 PROCEDURE — 97112 NEUROMUSCULAR REEDUCATION: CPT

## 2021-07-01 NOTE — PROGRESS NOTES
Daily Note/Maintainance       Today's date: 2021  Patient name: Surya Rivera  : 1940  MRN: [id number]  Referring provider: Sadie Collier DO  Dx:   Encounter Diagnosis     ICD-10-CM    1  Parkinson's disease ([facility] )  G20    2  Hypokinesia  R68 89    3  Abnormality of gait and mobility  R26 9      Start Time: 1230  Stop Time: 1315  Total time in clinic (min): 45 minutes    Subjective: Patient reports no updates upon arrival, states he got his surgery and was cleared to return          Objective: See treatment diary below     Neuro Boxing instruction 25x each  Shadow boxing  1 through 6     Hitting mitts 25x each  1 through 6  1-2  3-4  5-6     1-2-3-4  3-4-5-6     Rapid Fire combo call out- 25x each  1, 2, 3, 4, 5, 6  1-2-3-4     Sit to stand with combo call out- 5 cycles  -6" karen  -6" step up    Sit to stand with combo call out- random combo 5'    Speed ladder- forward and laterally, no combos- 10 cycles of 10 feet         Assessment: Patient challenged with maintaining amplitude, continued to be challenged with combo recognition  Patient challenged with overall recognition, challenged with overall internal cuing, patient responds to cuing well with external cuing   Patient requires skilled PT in order to maximize function to improve her endurance and maximize function with PD        Plan: Continue per plan of care   Precautions:   Past Medical History:   Diagnosis Date    Arthritis     Asbestos exposure     Asbestosis ([facility] )     GERD (gastroesophageal reflux disease)     Hypertension     Lung disease     Parkinson's disease ([facility] )          Manuals                                                                 Neuro Re-Ed                                                                                                        Ther Ex                                                                                                                     Ther Activity Gait Training                                       Modalities

## 2021-07-08 ENCOUNTER — OFFICE VISIT (OUTPATIENT)
Dept: PHYSICAL THERAPY | Facility: MEDICAL CENTER | Age: 81
End: 2021-07-08
Payer: COMMERCIAL

## 2021-07-08 DIAGNOSIS — G20 PARKINSON'S DISEASE (HCC): Primary | ICD-10-CM

## 2021-07-08 DIAGNOSIS — R26.9 ABNORMALITY OF GAIT AND MOBILITY: ICD-10-CM

## 2021-07-08 DIAGNOSIS — R68.89 HYPOKINESIA: ICD-10-CM

## 2021-07-08 PROCEDURE — 97530 THERAPEUTIC ACTIVITIES: CPT

## 2021-07-08 NOTE — PROGRESS NOTES
PT Progress Note/Daily Note                                                   POC-6/3 to 9/3                                                              1x/week  Today's date: 2021  Patient name: Gabe Toscano  : 1940  MRN: [id number]  Referring provider: Gary Almanza DO  Dx:   Encounter Diagnosis     ICD-10-CM    1  Parkinson's disease ([facility] [address] )  G20    2  Hypokinesia  R68 89    3  Abnormality of gait and mobility  R26 9          Assessment  Assessment details: Patient is a 79 y o  Male who has been making steady gains in skilled outpatient PT since end of May 2021 with reported 0 falls  However, high risk for falls remains as he has demonstrated results below age matched normative values for the following outcome measures: FGA, Gait speed, TUG, 5x sit to stand, and PROCTOR  At this time the patient is appropriate to remain on maintenance skilled PT to maintain current gains and prevent regression  Regression is likely, due to history or regression noted, sedentary lifestyle, presence of neurodegenerative/neurocognitive disorder and limited ability for family member/caregiver to assist with HEP  Both the patient and caregiver would be at risk for increased injury if patient had LOB or fall due to inadequate safety equipment, which could lead to hospitalization and increased healthcare costs  New goals have been created below to reflect new focus of maintenance therapy  Pt and wife in agreement with POC  Maria Eugenia Lo (2013): a [facility] decision that sought to clarify that Medicare will in fact cover skilled therapy services to maintain a patients current condition or prevent/slow further deterioration  Patient verbalized understanding of POC  Please contact me if you have any questions or recommendations  Thank you for the referral and the opportunity to share in [facility]  's care      Cut off score   All date taken from [facility] Neuro Section or Rehab Measures    Proctor/64  MDC: 6 pts  Age Norms:  61-76: M - 54   F - 55  70-79: M - 47   F - 53  80-89: M - 48   F - 50 5xSTS: Valery et al 2010  Mick [address]: 2 3 sec  Age Norms:  60-69: 11 1 sec  70-79: 12 6 sec  80-89: 14 8 sec   TUG  MDC: 4 14 sec  Cut off score:  >13 5 sec community dwelling adults  >32 2 frail elderly  <20 I for basic transfers  >30 dependent on transfers 10 Meter Walk Test: Susan Reyes and Ej wilcox   20-29: M - 1 35 m   F - 1 34 m  30-39: M - 1 43 m   F - 1 34 m  40-49: M - 1 43 m   F - 1 39 m  50-59: M - 1 43 m   F - 1 31 m  60-69: M - 1 34 m   F - 1 24 m  70-79: M - 1 26 m   F - 1 13 m  80-89: M - 0 97 m   F - 0 94 m   FGA  MCID: 4 pts  Geriatrics/community < 22/30 fall risk  Geriatrics/community < 20/30 unexplained falls DGI  MDC: vestibular - 4 pts  MDC: geriatric/community - 3 pts  Falls risk <19/24   6 Minute Walk Test  Age Norms  61-76: M - 1876 ft   F - 1765 ft  70-79: M - 1729 ft   F - 1545 ft  80-89: M - 1368 ft   F - 1286 ft mCTSIB  Norm: 20-60 yrs  Eyes open firm: norm sway 0 21-0 48  Eyes closed firm: norm sway 0 48-0 99  Eyes open foam: norm sway 0 38-0 71  Eyes closed foam: norm sway 0 70-2 22             Impairments: abnormal coordination, abnormal gait, abnormal muscle tone, abnormal or restricted ROM, activity intolerance, impaired balance, impaired physical strength, lacks appropriate HEP, poor posture, poor body mechanics and pain with function  Understanding of Dx/Px/POC: Excellent  Prognosis: Good      Goals    - Patient will ambulate outdoors on uneven surfaces with limited assistance to facilitate safe community ambulation- MET  - Patient will maintain gait speed per 10 meter walk test at  92 m/s to facilitate continued safety with community ambulation- MET  - Patient will continue to score at least 36/56 on PROCTOR to maintain fall risk status to reduce risk of injury- MET  - Patient will be able to ambulate at least 1050 ft during 6 Minute Walk Test to maintain current cardiovascular capacity- MET  - Patient will attend PT 1x/week with focus on balance and gait training to maintain functional capacity- MET  - Patient will remain at 10 5 seconds on TUG and TUG variations without AD to maintain fall risk status- NOT MET  - Patient will maintain LE strength at current level of 4/5 strength to maintain level of independence with transfers- MET        Plan  Plan details: Maintenance program  Patient would benefit from: PT Eval and Skilled PT  Planned modality interventions: Biofeedback, Cryotherapy, TENS, Thermotherapy: Hydrocollator Packs and Traction  Planned therapy interventions: abdominal trunk stabilization and breathing training  Frequency: 1x/wk  Duration in weeks: 12  Plan of Care beginning date: 6/3/2021  Plan of Care expiration date: 3 months - 9/3/2021  Treatment plan discussed with: Patient and Family        Subjective Evaluation    History of Present Illness  Mechanism of injury: Patient is a 79year old male reporting to skilled PT today for a maintenance evaluation today, patient previously completed LSV[employer] BIG program for PD management, patient interested in neuro boxing to help maintain his gains  Patient has been gradually improving with his overall function per subjective reports       Pain  Current pain ratin/10  At best pain ratin/10  At worst pain ratin/10  Location: Low back  Aggravating factors: Bending and twisting    Social Support  Steps to enter house: 2  Stairs in house: 12  Lives in: [place]  Lives with: wife    Employment status: retired  Hand dominance: L    Treatments  Previous treatment: Physical Therapy  Current treatment: PT Neuro boxing        Objective     Orientation  - A&O x 3,       Coordination  UE:  - Finger to nose: Normal  - Alternating supination/pronation: Abnormal    LE:  - Heel to shin: Abnormal, Bradykinetic  - Alternating toe taps: Abnormal,Bradykinetic      LE Strength (MMT)  - L hip flexion: 4/5  R hip flexion: 4/5  - L hip extension: 4/5  R hip extension: 4/5  - L hip abduction: 4/5  R hip abduction: 4/5  - L hip adduction: 4/5  R hip adduction: 4/5  - L knee extension: 4/5 R knee extension:4/5  - L knee flexion: 4/5  R knee flexion: 4/5  - L ankle DF: 4/5  R ankle DF: 4/5  - L ankle PF: 4/5  R ankle PF: 4/5    Anticipatory Reactions  - Anterior Balance Reaction: Would fall without PT assistance,  - Lateral Balance Reaction: Would fall without PT assistance,    - Posterior Balance Reaction: Would fall without PT assistance,       Transfers and Mobility  Assistance Level with Ambulation: Independent  - Primary AD: None    Assistance Level with Transfers:  - STS: independent  - Return to sit: independent  - Floor to stand: contact guard      Gait  - Observed gait abnormalities: Festinating gait pattern   - Gait speed: 10/8 63= 1 16 m/s  - Difficulty with environmental obstacles such as: Stairs, hurdles, uneven surfaces    Outcome Measures IE  6/9/2021 NC  7/8/2021    5x STS 11 76 sec 13 seconds    TUG  TUG manual  TUG cog 12 69 sec  13 93 sec  24 83 sec  100 by 2's 13 90 sec  15 90 sec  24 31 sec  100's by 2's    10 Meter Walk Test  92 m/s 1 16 m/s    6 Minute Walk Test 1050 ft 1275 feet    FGA 17/30 17/30    PROCTOR 36/56 41/56    30 second sit to stand  11 reps  10 reps        Precautions:   Past Medical History:   Diagnosis Date    Arthritis     Asbestos exposure     Asbestosis ([facility] [address] )     GERD (gastroesophageal reflux disease)     Hypertension     Lung disease     [facility]

## 2021-07-22 ENCOUNTER — OFFICE VISIT (OUTPATIENT)
Dept: PHYSICAL THERAPY | Facility: MEDICAL CENTER | Age: 81
End: 2021-07-22
Payer: COMMERCIAL

## 2021-07-22 DIAGNOSIS — R68.89 HYPOKINESIA: ICD-10-CM

## 2021-07-22 DIAGNOSIS — R26.9 ABNORMALITY OF GAIT AND MOBILITY: ICD-10-CM

## 2021-07-22 DIAGNOSIS — G20 PARKINSON'S DISEASE (HCC): Primary | ICD-10-CM

## 2021-07-22 PROCEDURE — 97112 NEUROMUSCULAR REEDUCATION: CPT | Performed by: PHYSICAL THERAPIST

## 2021-07-22 NOTE — PROGRESS NOTES
Daily Note     Today's date: 2021  Patient name: Leonel Bustamante  : 1940  MRN: [id number]  Referring provider: Justin Wolfe DO  Dx:   Encounter Diagnosis     ICD-10-CM    1  Parkinson's disease ([facility] [address] )  G20    2  Hypokinesia  R68 89    3  Abnormality of gait and mobility  R26 9        Start Time: 1230  Stop Time: 1312  Total time in clinic (min): 42 minutes    Subjective: pt denies new complaints upon arrival        Objective: See treatment diary below    Neuro Boxing instruction 25x each  Shadow boxing  1 through 6     Hitting mitts 25x each  1 through 6  1-2  3-4  5-6     1-2-3-4  3-4-5-6  5-6-1-2     Rapid Fire combo call out- 25x each  1, 2, 3, 4, 5, 6  1-2-3-4      Speed ladder- forward and laterally, no combos- 2 cycles of 10 feet       Assessment: Tolerated treatment well  Pt required increased cueing for large amplitude movements and trunk rotation  Rest breaks were utilized to improve performance of exercise  Pt had mild loss of balance during agility ladder exercise that the patient was able to independently recover  Patient would benefit from continued PT      Plan: Continue per plan of care        Precautions:   Past Medical History:   Diagnosis Date    Arthritis     Asbestos exposure     Asbestosis ([facility] [address] )     GERD (gastroesophageal reflux disease)     Hypertension     Lung disease     Parkinson's disease ([facility] [address] )          Manuals                                                                 Neuro Re-Ed                                                                                                        Ther Ex                                                                                                                     Ther Activity                                       Gait Training                                       Modalities

## 2021-07-29 ENCOUNTER — OFFICE VISIT (OUTPATIENT)
Dept: PHYSICAL THERAPY | Facility: MEDICAL CENTER | Age: 81
End: 2021-07-29
Payer: COMMERCIAL

## 2021-07-29 DIAGNOSIS — R26.9 ABNORMALITY OF GAIT AND MOBILITY: ICD-10-CM

## 2021-07-29 DIAGNOSIS — G20 PARKINSON'S DISEASE (HCC): Primary | ICD-10-CM

## 2021-07-29 DIAGNOSIS — R68.89 HYPOKINESIA: ICD-10-CM

## 2021-07-29 PROCEDURE — 97112 NEUROMUSCULAR REEDUCATION: CPT

## 2021-07-29 NOTE — PROGRESS NOTES
Daily Note     Today's date: 2021  Patient name: Joann Hoff  : 1940  MRN: [id number]  Referring provider: Dennie Dung, DO  Dx:   Encounter Diagnosis     ICD-10-CM    1  Parkinson's disease ([facility] [address] )  G20    2  Hypokinesia  R68 89    3  Abnormality of gait and mobility  R26 9        Start Time: 1230  Stop Time: 1315  Total time in clinic (min): 45 minutes    Subjective: Patient challenged with overall endurance with his activities inside and outside the clinic with endurance and amplitude  Objective: See treatment diary below    Neuro Boxing instruction 25x each  Shadow boxing  1 through 6     Hitting mitts 25x each  1 through 6  1-2  3-4  5-6     1-2-3-4  3-4-5-6  5-6-1-2     Rapid Fire combo call out- 25x each  1, 2, 3, 4, 5, 6  1-2-3-4      Speed ladder- forward and laterally, no combos- 2 cycles of 10 feet     Hurdles with BIG cue-2'        Assessment: Tolerated treatment well  Pt required increased cueing for large amplitude movements and trunk rotation  Patient continually needed extensive verbal and tactile cuing to improve his memorization with punches and motion  Patient would benefit from continued PT to maximize his function with Neurodegenerative dx of PD  Plan: Continue per plan of care        Precautions:   Past Medical History:   Diagnosis Date    Arthritis     Asbestos exposure     Asbestosis ([facility] [address] )     GERD (gastroesophageal reflux disease)     Hypertension     Lung disease     Parkinson's disease ([facility] [address] )          Manuals                                                                 Neuro Re-Ed                                                                                                        Ther Ex                                                                                                                     Ther Activity                                       Gait Training                                       Modalities

## 2021-08-04 ENCOUNTER — TELEPHONE (OUTPATIENT)
Dept: NEUROLOGY | Facility: CLINIC | Age: 81
End: 2021-08-04

## 2021-08-04 ENCOUNTER — OFFICE VISIT (OUTPATIENT)
Dept: NEUROLOGY | Facility: CLINIC | Age: 81
End: 2021-08-04
Payer: COMMERCIAL

## 2021-08-04 VITALS
WEIGHT: 205 LBS | HEART RATE: 76 BPM | HEIGHT: 68 IN | DIASTOLIC BLOOD PRESSURE: 63 MMHG | SYSTOLIC BLOOD PRESSURE: 135 MMHG | BODY MASS INDEX: 31.07 KG/M2

## 2021-08-04 DIAGNOSIS — G89.29 CHRONIC MIDLINE LOW BACK PAIN WITH RIGHT-SIDED SCIATICA: ICD-10-CM

## 2021-08-04 DIAGNOSIS — R20.0 NUMBNESS AND TINGLING: ICD-10-CM

## 2021-08-04 DIAGNOSIS — M54.41 CHRONIC MIDLINE LOW BACK PAIN WITH RIGHT-SIDED SCIATICA: ICD-10-CM

## 2021-08-04 DIAGNOSIS — R29.898 HAND WEAKNESS: ICD-10-CM

## 2021-08-04 DIAGNOSIS — R20.2 NUMBNESS AND TINGLING: ICD-10-CM

## 2021-08-04 DIAGNOSIS — G20 PARKINSON'S DISEASE (HCC): Primary | ICD-10-CM

## 2021-08-04 PROCEDURE — 1160F RVW MEDS BY RX/DR IN RCRD: CPT | Performed by: PSYCHIATRY & NEUROLOGY

## 2021-08-04 PROCEDURE — 1036F TOBACCO NON-USER: CPT | Performed by: PSYCHIATRY & NEUROLOGY

## 2021-08-04 PROCEDURE — 99215 OFFICE O/P EST HI 40 MIN: CPT | Performed by: PSYCHIATRY & NEUROLOGY

## 2021-08-04 RX ORDER — HYDROCORTISONE 25 MG/ML
LOTION TOPICAL AS NEEDED
COMMUNITY
Start: 2021-05-10 | End: 2022-05-10 | Stop reason: ALTCHOICE

## 2021-08-04 RX ORDER — AMANTADINE HYDROCHLORIDE 100 MG/1
CAPSULE, GELATIN COATED ORAL
Qty: 30 CAPSULE | Refills: 1 | Status: SHIPPED | OUTPATIENT
Start: 2021-08-04 | End: 2021-09-15

## 2021-08-04 NOTE — PATIENT INSTRUCTIONS
Take Sinemet 1 5 tabs at 8 AM, 1 5 tabs at noon/1 PM, 1 tab at 7/730 PM  I will send a message to PT about addressing your low back pain

## 2021-08-04 NOTE — PROGRESS NOTES
Patient ID: Reji Slater  is a 79 y o  male  Assessment/Plan:    No problem-specific Assessment & Plan notes found for this encounter  Diagnoses and all orders for this visit:    Parkinson's disease ([address] )  -   Continue with carbidopa levodopa, discuss timings taking it at 8:00 a m , noon, 7 7:30 p m  to see if this would further help  I am not increasing dosing at this time as he has lower extremity edema as adverse  -     amantadine (SYMMETREL) 100 mg capsule; Take one tab daily at 5 pm- to help with gait especially in evening time    Numbness and tingling-   Positive Tinel sign in bl hands  -     EMG 2 Limb Upper Extremity; Future  -     HEMOGLOBIN A1C W/ EAG ESTIMATION; Future  -     Elastic Bandages & Supports (Wrist Brace Deluxe/Left L-XL) MISC; Use nightly for carpal tunnel    Hand weakness-- mild hand  weakness worse right  -     EMG 2 Limb Upper Extremity; Future  -     HEMOGLOBIN A1C W/ EAG ESTIMATION; Future  -     Elastic Bandages & Supports (Wrist Brace Deluxe/Left L-XL) MISC; Use nightly for carpal tunnel       review blood work including thyroid function studies, CBC, CMP   If no significant abnormalities  Lipid panel with mildly elevated LDL but this is improved from prior  We discussed healthy diet  We discussed the importance of exercises and patient is currently on his physical therapy regimen  Will follow up in 3 months time with me or Pilar VILLEDA in [facility] and six months with me in [facility] per their preference     total time spent with direct patient care, chart review, imaging review to formulate treatment plan today at least 45 minutes  Subjective:    HPI    Mr  Ej Brown Is a pleasant 71-year-old male with lower extremity edema last visit attribute it to Parkinson's disease medications seen in follow-up    We had discussed last visit to reduce the dosing to 1 tablet t i d  to see if his edema with improved as long as tremors, bradykinesia, balance did not worsen significantly  Did benefit with Parkinson's disease big and loud therapy in the past       There history of asbestos exposure in the remote past and recent increased coughing which we had discussed notifying his pulmonologist     Follow up today:  Worse tremor at 12/1 pm  States stress can worsen it  He does feel fatigued  Around this time  He does nap around 2-3:00 p m  which is helpful  Patient is continuing boxing for PD with significant benefit  He states at the end of the day- his balance and getting out of the chair is harder  He states less dexterity with hands including using his tools for wood working  Denies hand pain  He does report chronic back pain and stiffness with radiation to posterior RLE  No new weakness    No falls    Takes B complex daily          The following portions of the patient's history were reviewed and updated as appropriate: allergies, current medications, past family history, past medical history, past social history, past surgical history and problem list and ROS         Objective:    Blood pressure 135/63, pulse 76, height 5' 8" (1 727 m), weight 93 kg (205 lb)  Physical Exam    Gen: NAD  HEENT: NCAT, EOMI  Resp: Symmetric chest rise bl  CVS: RRR  Ext: bl LE edema below mid calf- improved compared to prior    Neurological Exam    AO X person place and month,   No aphasia or dysarthria and mild hypophonia noted  CN 2-12 grossly intact   However he is hard of hearing  Motor: 5/5 ext x 4,   Resting tremor noted bilateral worse left, decremental response with rapid alternating movements worsened left lower extremity and noted in bilateral upper extremity  His cogwheeling rigidity mild bilateral upper extremity  Reduced hand  strength bl  Sensation: Intact to   Light touch and vibration proximal and distal at anklex 4  extremities  Cerebellar: No dysmetria b/l  Gait:  Narrow small steps, reduced arm swing bilateral mildly stooped posture      ROS:    Review of Systems   Constitutional: Negative  Negative for appetite change and fever  HENT: Negative  Negative for hearing loss, tinnitus, trouble swallowing and voice change  Eyes: Negative  Negative for photophobia and pain  Respiratory: Negative  Negative for shortness of breath  Cardiovascular: Negative  Negative for palpitations  Gastrointestinal: Negative  Negative for nausea and vomiting  Endocrine: Negative  Negative for cold intolerance  Genitourinary: Negative  Negative for dysuria, frequency and urgency  Musculoskeletal: Negative  Negative for myalgias and neck pain  Skin: Negative  Negative for rash  Neurological: Positive for tremors (left hand)  Negative for dizziness, seizures, syncope, facial asymmetry, speech difficulty, weakness, light-headedness, numbness and headaches  Hematological: Negative  Does not bruise/bleed easily  Psychiatric/Behavioral: Negative  Negative for confusion, hallucinations and sleep disturbance

## 2021-08-04 NOTE — TELEPHONE ENCOUNTER
Wife Ervin Menchaca called in stating pharmacy would not fill amantadine  She asked if [facility] and if not able to fill there, can send to [facility] in [address] [place] [address]       Best call back [phone], okay with detailed messages

## 2021-08-05 ENCOUNTER — OFFICE VISIT (OUTPATIENT)
Dept: PHYSICAL THERAPY | Facility: MEDICAL CENTER | Age: 81
End: 2021-08-05
Payer: COMMERCIAL

## 2021-08-05 DIAGNOSIS — R26.9 ABNORMALITY OF GAIT AND MOBILITY: ICD-10-CM

## 2021-08-05 DIAGNOSIS — G20 PARKINSON'S DISEASE (HCC): Primary | ICD-10-CM

## 2021-08-05 DIAGNOSIS — R68.89 HYPOKINESIA: ICD-10-CM

## 2021-08-05 PROCEDURE — 97530 THERAPEUTIC ACTIVITIES: CPT

## 2021-08-05 PROCEDURE — 97112 NEUROMUSCULAR REEDUCATION: CPT

## 2021-08-05 NOTE — PROGRESS NOTES
PT Progress Note/Daily Note                                                  POC-6/3 to 9/3                                                              1x/week  Today's date: 2021  Patient name: Gifty Bennett  : 1940  MRN: [id number]  Referring provider: Jose De Jesus Campuzano DO  Dx:   Encounter Diagnosis     ICD-10-CM    1  Parkinson's disease ([facility] [address] )  G20    2  Hypokinesia  R68 89    3  Abnormality of gait and mobility  R26 9          Assessment  Assessment details: Patient is a 79 y o  Male who has been making steady gains in skilled outpatient PT since end of May 2021 with reported 0 falls  However, high risk for falls remains as he has demonstrated results below age matched normative values for the following outcome measures: FGA, Gait speed, TUG, 5x sit to stand, and PROCTOR  At this time the patient is appropriate to remain on maintenance skilled PT to maintain current gains and prevent regression  Regression is likely, due to history or regression noted, sedentary lifestyle, presence of neurodegenerative/neurocognitive disorder and limited ability for family member/caregiver to assist with HEP  Both the patient and caregiver would be at risk for increased injury if patient had LOB or fall due to inadequate safety equipment, which could lead to hospitalization and increased healthcare costs  Patient recently had hammer toe surgery, patient notes improvements with overall activities and with their balance and pain  New goals have been created below to reflect new focus of maintenance therapy  Pt and wife in agreement with POC  Maria Eugenia Lo (2013): a [facility] decision that sought to clarify that Medicare will in fact cover skilled therapy services to maintain a patients current condition or prevent/slow further deterioration  Patient verbalized understanding of POC      Please contact me if you have any questions or recommendations  Thank you for the referral and the opportunity to share in [place] [address] 's care      Cut off score   All date taken from APTA Neuro Section or Rehab Measures    Lopez/64  MDC: 6 pts  Age Norms:  61-76: M - 54   F - 55  70-79: M - 47   F - 53  80-89: M - 48   F - 50 5xSTS: Valery et al 2010  MDC: 2 3 sec  Age Norms:  60-69: 11 1 sec  70-79: 12 6 sec  80-89: 14 8 sec   TUG  MDC: 4 14 sec  Cut off score:  >13 5 sec community dwelling adults  >32 2 frail elderly  <20 I for basic transfers  >30 dependent on transfers 10 Meter Walk Test: Melanie Dodd and Vale wilcox   20-29: M - 1 35 m   F - 1 34 m  30-39: M - 1 43 m   F - 1 34 m  40-49: M - 1 43 m   F - 1 39 m  50-59: M - 1 43 m   F - 1 31 m  60-69: M - 1 34 m   F - 1 24 m  70-79: M - 1 26 m   F - 1 13 m  80-89: M - 0 97 m   F - 0 94 m   FGA  MCID: 4 pts  Geriatrics/community < 22/30 fall risk  Geriatrics/community < 20/30 unexplained falls DGI  MDC: vestibular - 4 pts  MDC: geriatric/community - 3 pts  Falls risk <19/24   6 Minute Walk Test  Age Norms  61-76: M - 1876 ft   F - 1765 ft  70-79: M - 1729 ft   F - 1545 ft  80-89: M - 1368 ft   F - 1286 ft mCTSIB  Norm: 20-60 yrs  Eyes open firm: norm sway 0 21-0 48  Eyes closed firm: norm sway 0 48-0 99  Eyes open foam: norm sway 0 38-0 71  Eyes closed foam: norm sway 0 70-2 22         Impairments: abnormal coordination, abnormal gait, abnormal muscle tone, abnormal or restricted ROM, activity intolerance, impaired balance, impaired physical strength, lacks appropriate HEP, poor posture, poor body mechanics and pain with function  Understanding of Dx/Px/POC: Excellent  Prognosis: Good      Goals    - Patient will ambulate outdoors on uneven surfaces with limited assistance to facilitate safe community ambulation- MET  - Patient will maintain gait speed per 10 meter walk test at  92 m/s to facilitate continued safety with community ambulation- MET  - Patient will continue to score at least 36/56 on PROCTOR to maintain fall risk status to reduce risk of injury- MET  - Patient will be able to ambulate at least 1050 ft during 6 Minute Walk Test to maintain current cardiovascular capacity- MET  - Patient will attend PT 1x/week with focus on balance and gait training to maintain functional capacity- MET  - Patient will remain at 10 5 seconds on TUG and TUG variations without AD to maintain fall risk status- NOT MET  - Patient will maintain LE strength at current level of 4/5 strength to maintain level of independence with transfers- MET        Plan  Plan details: Maintenance program  Patient would benefit from: PT Eval and Skilled PT  Planned modality interventions: Biofeedback, Cryotherapy, TENS, Thermotherapy: Hydrocollator Packs and Traction  Planned therapy interventions: abdominal trunk stabilization and breathing training  Frequency: 1x/wk  Duration in weeks: 12  Plan of Care beginning date: 6/3/2021  Plan of Care expiration date: 3 months - 9/3/2021  Treatment plan discussed with: Patient and Family        Subjective Evaluation    History of Present Illness  Mechanism of injury: Patient is a 79year old male reporting to skilled PT today for a maintenance evaluation today, patient previously completed [employer] program for PD management, patient interested in neuro boxing to help maintain his gains  Patient has been gradually improving with his overall function per subjective reports  Patient is concerned about his hands and his tremoring with his overall activity tolerance      Pain  Current pain ratin/10  At best pain ratin/10  At worst pain ratin/10  Location: Low back  Aggravating factors: Bending and twisting    Social Support  Steps to enter house: 2  Stairs in house: 12  Lives in: [place]  Lives with: wife    Employment status: retired  Hand dominance: L    Treatments  Previous treatment: Physical Therapy  Current treatment: PT Neuro boxing        Objective     Orientation  - A&O x 3, Coordination  UE:  - Finger to nose: Normal  - Alternating supination/pronation: Abnormal    LE:  - Heel to shin: Abnormal, Bradykinetic  - Alternating toe taps: Abnormal,Bradykinetic      LE Strength (MMT)  - L hip flexion: 4/5  R hip flexion: 4/5  - L hip extension: 4/5  R hip extension: 4/5  - L hip abduction: 4/5  R hip abduction: 4/5  - L hip adduction: 4/5  R hip adduction: 4/5  - L knee extension: 4/5 R knee extension:4/5  - L knee flexion: 4/5  R knee flexion: 4/5  - L ankle DF: 4/5  R ankle DF: 4/5  - L ankle PF: 4/5  R ankle PF: 4/5    Anticipatory Reactions  - Anterior Balance Reaction: Would fall without PT assistance,  - Lateral Balance Reaction: Would fall without PT assistance,    - Posterior Balance Reaction: Would fall without PT assistance,     Neuro Boxing instruction 25x each  Shadow boxing  1 through 6     Hitting mitts 25x each  1 through 6  1-2  3-4  5-6     1-2-3-4  3-4-5-6     Rapid Fire combo call out- 25x each  1, 2, 3, 4, 5, 6  1-2-3-4      Transfers and Mobility  Assistance Level with Ambulation: Independent  - Primary AD: None    Assistance Level with Transfers:  - STS: independent  - Return to sit: independent  - Floor to stand: contact guard      Gait  - Observed gait abnormalities: Festinating gait pattern   - Gait speed: 10/9 81= 1 11 m/s  - Difficulty with environmental obstacles such as: Stairs, hurdles, uneven surfaces    Outcome Measures IE  6/9/2021 CA  7/8/2021 CA  8/5/2021   5x STS 11 76 sec 13 seconds 12 01 seconds   TUG  TUG manual  TUG cog 12 69 sec  13 93 sec  24 83 sec  100 by 2's 13 90 sec  15 90 sec  24 31 sec  100's by 2's 11 23 sec  13 78 sec  25 31 sec  100's by 2's   10 Meter Walk Test  92 m/s 1 16 m/s 1 11 m/s   6 Minute Walk Test 1050 ft 1275 feet 1250 feet   FGA 17/30 17/30 17/30   PROCTOR 36/56 41/56 42/56   30 second sit to stand  11 reps  10 reps 11 reps        Precautions:   Past Medical History:   Diagnosis Date    Arthritis     Asbestos exposure     Asbestosis ([facility] )     GERD (gastroesophageal reflux disease)     Hypertension     Lung disease     Parkinson's disease ([facility] )

## 2021-08-12 ENCOUNTER — OFFICE VISIT (OUTPATIENT)
Dept: PHYSICAL THERAPY | Facility: MEDICAL CENTER | Age: 81
End: 2021-08-12
Payer: COMMERCIAL

## 2021-08-12 DIAGNOSIS — R68.89 HYPOKINESIA: ICD-10-CM

## 2021-08-12 DIAGNOSIS — G20 PARKINSON'S DISEASE (HCC): Primary | ICD-10-CM

## 2021-08-12 DIAGNOSIS — R26.9 ABNORMALITY OF GAIT AND MOBILITY: ICD-10-CM

## 2021-08-12 PROCEDURE — 97112 NEUROMUSCULAR REEDUCATION: CPT

## 2021-08-12 NOTE — PROGRESS NOTES
Daily Note     Today's date: 2021  Patient name: Surya Rivera  : 1940  MRN: [id number]  Referring provider: Sadie Collier DO  Dx:   Encounter Diagnosis     ICD-10-CM    1  [facility] ([facility] )  G20    2  Hypokinesia  R68 89    3  Abnormality of gait and mobility  R26 9        Start Time: 1145  Stop Time: 1230  Total time in clinic (min): 45 minutes    Subjective: Patient reports that he saw his neurologist recently, patient notes that he has some low back pain  Objective: See treatment diary below    Neuro Boxing instruction 25x each  Shadow boxing  1 through 6     Hitting mitts 25x each  1 through 6  1-2  3-4  5-6     1-2-3-4  3-4-5-6  5-6-1-2     Rapid Fire combo call out- 25x each  1, 2, 3, 4, 5, 6  1-2-3-4    Hurdles of 6 in and 9 in with BIG cue - 5 laps    BIG review- Forward reach, lateral reach bilaterally- 10 reps x 10 seconds      Assessment: Tolerated treatment well  Pt showed slight improvements with memory of punching combos  Patient demonstrated good balance with hurdles, however requires CG due to loss of balance a few times  Next session STS exercise will be practiced to work on minimizing UE use when standing up from a chair and to maximize overall QOL as well as lumbar stabilization for low back pain  Patient would benefit from continued PT to improve his function with Neurodegenerative dx of PD  Plan: Continue per plan of care        Precautions:   Past Medical History:   Diagnosis Date    Arthritis     Asbestos exposure     [facility]     GERD (gastroesophageal reflux disease)     Hypertension     Lung disease     Parkinson's disease ([facility] )          Manuals                                                                 Neuro Re-Ed                                                                                                        Ther Ex Ther Activity                                       Gait Training                                       Modalities

## 2021-08-19 ENCOUNTER — OFFICE VISIT (OUTPATIENT)
Dept: PHYSICAL THERAPY | Facility: MEDICAL CENTER | Age: 81
End: 2021-08-19
Payer: COMMERCIAL

## 2021-08-19 DIAGNOSIS — R68.89 HYPOKINESIA: ICD-10-CM

## 2021-08-19 DIAGNOSIS — G20 PARKINSON'S DISEASE (HCC): Primary | ICD-10-CM

## 2021-08-19 DIAGNOSIS — R26.9 ABNORMALITY OF GAIT AND MOBILITY: ICD-10-CM

## 2021-08-19 PROCEDURE — 97112 NEUROMUSCULAR REEDUCATION: CPT

## 2021-08-19 NOTE — PROGRESS NOTES
Daily Note     Today's date: 2021  Patient name: Sri Durham  : 1940  MRN: [id number]  Referring provider: Roya Bray DO  Dx:   Encounter Diagnosis     ICD-10-CM    1  Parkinson's disease ([facility] [address] )  G20    2  Hypokinesia  R68 89    3  Abnormality of gait and mobility  R26 9        Start Time: 1145  Stop Time: 1230  Total time in clinic (min): 45 minutes    Subjective: Patient reported he is feeling okay today and that he has no new updates  Objective: See treatment diary below    Neuro Boxing instruction 25x each  Shadow boxing  1 through 6     Hitting mitts 25x each  1 through 6  1-2  3-4  5-6     1-2-3-4  3-4-5-6  5-6-1-2     Rapid Fire combo call out- 25x each  1, 2, 3, 4, 5, 6  1-2-3-4    Hurdles of 6 in and 9 in with BIG cue - 5 laps    BIG STS on low chair without foam - 20x      Assessment: Tolerated treatment well  Pt demonstrated good stamina with punching combos, however requires a break after each round  Patient was educated on BIG STS to improve function with lower chairs at home, which he completed with good form, utilizing increased amplitude to assist with his motion and large amplitude  Patient would benefit from continued PT to improve his function with Neurodegenerative dx of PD  Plan: Continue per plan of care        Precautions:   Past Medical History:   Diagnosis Date    Arthritis     Asbestos exposure     Asbestosis ([facility] )     GERD (gastroesophageal reflux disease)     Hypertension     Lung disease     [facility]          Manuals                                                                 Neuro Re-Ed                                                                                                        Ther Ex                                                                                                                     Ther Activity                                       Gait Training                                       Modalities

## 2021-08-26 ENCOUNTER — OFFICE VISIT (OUTPATIENT)
Dept: PHYSICAL THERAPY | Facility: MEDICAL CENTER | Age: 81
End: 2021-08-26
Payer: COMMERCIAL

## 2021-08-26 DIAGNOSIS — R26.9 ABNORMALITY OF GAIT AND MOBILITY: ICD-10-CM

## 2021-08-26 DIAGNOSIS — R68.89 HYPOKINESIA: ICD-10-CM

## 2021-08-26 DIAGNOSIS — G20 PARKINSON'S DISEASE (HCC): Primary | ICD-10-CM

## 2021-08-26 PROCEDURE — 97112 NEUROMUSCULAR REEDUCATION: CPT

## 2021-08-26 NOTE — PROGRESS NOTES
Daily Note/Discharge    Today's date: 2021  Patient name: Diego Rivera  : 1940  MRN: [id number]  Referring provider: Will Lawrence DO  Dx:   Encounter Diagnosis     ICD-10-CM    1  Parkinson's disease ([facility] )  G20    2  Hypokinesia  R68 89    3  Abnormality of gait and mobility  R26 9        Start Time: 1230  Stop Time: 1315  Total time in clinic (min): 45 minutes    Subjective: Patient reported he is feeling okay today and that he has no new updates  Patient ready for D/C today  Objective: See treatment diary below    Neuro Boxing instruction 25x each  Shadow boxing  1 through 6     Hitting mitts 25x each  1 through 6  1-2  3-4  5-6     1-2-3-4  3-4-5-6  5-6-1-2     Rapid Fire combo call out- 25x each  1, 2, 3, 4, 5, 6  1-2-3-4    Walking- 20 feet, 6 cycles each direction  1-2  3-4  5-6      Assessment: Tolerated treatment well  Pt demonstrated good tolerance, patient advised to contact PT with any concerns after neurologist appointment in December if any regression is noted  Patient to be discharged due to reaching maximal level of function  Plan: Continue per plan of care        Precautions:   Past Medical History:   Diagnosis Date    Arthritis     Asbestos exposure     Asbestosis ([facility] )     GERD (gastroesophageal reflux disease)     Hypertension     Lung disease     Parkinson's disease ([place] [address] )          Manuals                                                                 Neuro Re-Ed                                                                                                        Ther Ex                                                                                                                     Ther Activity                                       Gait Training                                       Modalities

## 2021-09-07 ENCOUNTER — APPOINTMENT (OUTPATIENT)
Dept: LAB | Facility: MEDICAL CENTER | Age: 81
End: 2021-09-07
Payer: COMMERCIAL

## 2021-09-07 DIAGNOSIS — R20.2 NUMBNESS AND TINGLING: ICD-10-CM

## 2021-09-07 DIAGNOSIS — R29.898 HAND WEAKNESS: ICD-10-CM

## 2021-09-07 DIAGNOSIS — R20.0 NUMBNESS AND TINGLING: ICD-10-CM

## 2021-09-07 LAB
EST. AVERAGE GLUCOSE BLD GHB EST-MCNC: 103 MG/DL
HBA1C MFR BLD: 5.2 %

## 2021-09-07 PROCEDURE — 83036 HEMOGLOBIN GLYCOSYLATED A1C: CPT

## 2021-09-07 PROCEDURE — 36415 COLL VENOUS BLD VENIPUNCTURE: CPT

## 2021-09-09 ENCOUNTER — PROCEDURE VISIT (OUTPATIENT)
Dept: NEUROLOGY | Facility: CLINIC | Age: 81
End: 2021-09-09
Payer: COMMERCIAL

## 2021-09-09 DIAGNOSIS — R20.0 NUMBNESS AND TINGLING: ICD-10-CM

## 2021-09-09 DIAGNOSIS — R20.2 NUMBNESS AND TINGLING: ICD-10-CM

## 2021-09-09 DIAGNOSIS — R29.898 HAND WEAKNESS: ICD-10-CM

## 2021-09-09 PROCEDURE — 95886 MUSC TEST DONE W/N TEST COMP: CPT | Performed by: PSYCHIATRY & NEUROLOGY

## 2021-09-09 PROCEDURE — 95910 NRV CNDJ TEST 7-8 STUDIES: CPT | Performed by: PSYCHIATRY & NEUROLOGY

## 2021-09-09 NOTE — PROGRESS NOTES
Procedures    EMG BILATERAL UPPER EXTREMITIES    Motor and sensory conduction studies were performed on the median and ulnar nerves and radial sensory nerves bilaterally  The left median distal motor latency is prolonged at 5 milliseconds while the other motor latencies are normal  distal motor latencies were normal  The motor action potential amplitudes were normal  Motor conduction velocities   Of the median nerves were prolonged at 43 m/sec on the right and 48 m/sec on the left  Conduction of the ulnar nerves below the elbow were prolonged at 44 m/sec bilaterally with normal conduction across the elbow  The  median and ulnar F waves were  Prolonged bilaterally  Median distal sensory latencies were prolonged at 4 3 milliseconds on the right and 3 9 milliseconds on the left with a delay and palmar stimulation at 1 9 milliseconds on the right and 2 2 milliseconds on the left with normal sensory action potential amplitudes  The radial distal sensory latencies were normal with normal action potential amplitudes  The ulnar distal sensory latencies were mildly delayed at 2 9 milliseconds on the right and 3 milliseconds on the left  Concentric needle EMG was performed in various distal and proximal muscles of the upper extremities bilaterally including APB, FDI, EDC, brachioradialis, biceps, triceps in the low cervical paraspinal region  There was no evidence of spontaneous activity seen  The compound motor unit potentials were of normal configuration and interference patterns were full or full for effort    IMPRESSION: This is an abnormal EMG of the bilateral upper extremities due to changes consistent with a localized neuropathic process involving the median nerve at the wrist bilaterally consistent with moderate carpal tunnel syndrome with demyelinative change    In addition 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

## 2021-09-14 DIAGNOSIS — G20 PARKINSON'S DISEASE (HCC): ICD-10-CM

## 2021-09-15 RX ORDER — AMANTADINE HYDROCHLORIDE 100 MG/1
CAPSULE, GELATIN COATED ORAL
Qty: 30 CAPSULE | Refills: 11 | Status: SHIPPED | OUTPATIENT
Start: 2021-09-15 | End: 2021-11-05 | Stop reason: SDUPTHER

## 2021-09-29 DIAGNOSIS — G20 PARKINSON'S DISEASE (HCC): Primary | ICD-10-CM

## 2021-10-28 ENCOUNTER — IMMUNIZATIONS (OUTPATIENT)
Dept: FAMILY MEDICINE CLINIC | Facility: HOSPITAL | Age: 81
End: 2021-10-28

## 2021-10-28 DIAGNOSIS — Z23 ENCOUNTER FOR IMMUNIZATION: Primary | ICD-10-CM

## 2021-10-28 PROCEDURE — 91300 COVID-19 PFIZER VACC 0.3 ML: CPT

## 2021-10-28 PROCEDURE — 0001A COVID-19 PFIZER VACC 0.3 ML: CPT

## 2021-11-05 DIAGNOSIS — G20 PARKINSON'S DISEASE (HCC): ICD-10-CM

## 2021-11-05 RX ORDER — AMANTADINE HYDROCHLORIDE 100 MG/1
CAPSULE, GELATIN COATED ORAL
Qty: 90 CAPSULE | Refills: 3 | Status: SHIPPED | OUTPATIENT
Start: 2021-11-05 | End: 2022-06-27 | Stop reason: SDUPTHER

## 2021-12-13 DIAGNOSIS — G20 PARKINSON'S DISEASE (HCC): ICD-10-CM

## 2021-12-27 ENCOUNTER — TELEPHONE (OUTPATIENT)
Dept: NEUROLOGY | Facility: CLINIC | Age: 81
End: 2021-12-27

## 2022-03-14 ENCOUNTER — OFFICE VISIT (OUTPATIENT)
Dept: NEUROLOGY | Facility: CLINIC | Age: 82
End: 2022-03-14
Payer: COMMERCIAL

## 2022-03-14 VITALS
HEART RATE: 75 BPM | WEIGHT: 206.4 LBS | SYSTOLIC BLOOD PRESSURE: 147 MMHG | HEIGHT: 68 IN | BODY MASS INDEX: 31.28 KG/M2 | TEMPERATURE: 98.1 F | DIASTOLIC BLOOD PRESSURE: 66 MMHG

## 2022-03-14 DIAGNOSIS — G20 PARKINSON'S DISEASE (HCC): Primary | ICD-10-CM

## 2022-03-14 DIAGNOSIS — G62.9 NEUROPATHY: ICD-10-CM

## 2022-03-14 DIAGNOSIS — G56.00 CARPAL TUNNEL SYNDROME: ICD-10-CM

## 2022-03-14 PROCEDURE — 99215 OFFICE O/P EST HI 40 MIN: CPT | Performed by: PSYCHIATRY & NEUROLOGY

## 2022-03-14 RX ORDER — PREDNISONE 10 MG/1
10 TABLET ORAL 2 TIMES DAILY
COMMUNITY
Start: 2021-12-09 | End: 2022-05-10 | Stop reason: ALTCHOICE

## 2022-03-14 NOTE — PROGRESS NOTES
Patient ID: Kenisha Pham  is a [de-identified] y o  male  Assessment/Plan:    No problem-specific Assessment & Plan notes found for this encounter  Diagnoses and all orders for this visit:    Parkinson's disease ([address] )  - Doing well per report he is bradykinetic on exam however did not take his afternoon dose yet  Pt and wife report improvement with addition of amantadine thus will continue this daily at 5 PM  Also continue carbidopa levodopa see dosing in HP below  Higher dosing caused significant LE edema thus on lower dosing  -     TSH, 3rd generation with Free T4 reflex; Future    Carpal tunnel syndrome- emg reviewed with moderate axonal and demyelinating CTS bl  Pt with no pain, unclear duration of symptoms defer to hold off surgery at this time after further discussion  Will try OTC wrist splints  -     TSH, 3rd generation with Free T4 reflex; Future  -     HEMOGLOBIN A1C W/ EAG ESTIMATION; Future  -     Vitamin B12; Future  -     Folate; Future  -     Heavy metals screen; Future  -     Protein electrophoresis, serum; Future    Neuropathy- hgbA1C wnl last year will recheck  -     TSH, 3rd generation with Free T4 reflex; Future  -     HEMOGLOBIN A1C W/ EAG ESTIMATION; Future  -     Vitamin B12; Future  -     Folate; Future  -     Heavy metals screen; Future  -     Protein electrophoresis, serum; Future  Message sent to social work regarding boxing with PD information to be sent to patient     Prefers to be seen in [facility], if possible going forward  Follow up in three to four months time with Niharika VILLEDA going forward then general neurology in [facility] if openings available afterward    Total time spent with direct patient care chart review imaging review to formulate treatment plan discuss with patient and wife today at least 45 minutes    Subjective:    HPI      Mr Rimma Bennett is a pleasant [de-identified] yo male seen in follow up for PD    Patient reports he is doing better since last visit since adding amantadine at 5 PM with significant improvement in walking  He continues to take 1 5 tabs 8 AM, 12-1 PM and 7 PM  No side effects, LE edema stable with this dose  No falls  Lumbar radicular symptoms improved with prednisone short taper by PCP- Dec 2020  Denies any significant issues with memory or driving  We discussed EMG results including bl carpal tunnel cannot exclude neuropathy  Pt reports that he drives locally when good weather and not in dark  Wife corroborates this  Patients wife reports possible exposure to pesticides other farmland chemicals around them that is being looked into in case I am contacted given the potential link to these chemicals and PD  The following portions of the patient's history were reviewed and updated as appropriate: allergies, current medications, past family history, past medical history, past social history, past surgical history and problem list and ROS  Objective:    Blood pressure 147/66, pulse 75, temperature 98 1 °F (36 7 °C), height 5' 8" (1 727 m), weight 93 6 kg (206 lb 6 4 oz)  Physical Exam    Gen: NAD  HEENT: NCAT, EOMI  Resp: Symmetric chest rise bl  CVS: RRR  Ext:+ mild LE edema    Neurological Exam    AO X  Person place and month but not year, but picks correct year with choices, states inaccurate season as spring however this is only a few days away, president picks the correct president when given choices-- otherwise unable to name, slowed cognitive processing noted  Does have insight into situation for the most part  CN 2-12 grossly intact other than hypomimia and hard of hearing  Motor: 5/5 ext x 4 with resting tremor bl + bradykinesia no significant cogwheeling , HAM abnormal with decremental response  3-/5 hand  strength  Sensation: Intact to LT PP vibration above ankle, diminished below   Reflexes: hyporeflexic  Cerebellar: No dysmetria b/l  Gait: cautious bradykinetic    ROS:    Review of Systems   Constitutional: Negative    Negative for appetite change and fever  HENT: Negative  Negative for hearing loss, tinnitus, trouble swallowing and voice change  Eyes: Negative  Negative for photophobia and pain  Respiratory: Negative  Negative for shortness of breath  Cardiovascular: Negative  Negative for palpitations  Gastrointestinal: Negative  Negative for nausea and vomiting  Endocrine: Negative  Negative for cold intolerance  Genitourinary: Negative  Negative for dysuria, frequency and urgency  Musculoskeletal: Negative  Negative for myalgias and neck pain  Skin: Negative  Negative for rash  Neurological: Positive for tremors, weakness (Hands) and numbness (Hands)  Negative for dizziness, seizures, syncope, facial asymmetry, speech difficulty, light-headedness and headaches  Hematological: Bruises/bleeds easily  Psychiatric/Behavioral: Negative  Negative for confusion, hallucinations and sleep disturbance

## 2022-03-14 NOTE — PATIENT INSTRUCTIONS
Continue to stay active   30 minutes a day five days a week  Recommend carpal tunnel wrist splint over the counter (ask your local pharmacist) wear it nightly for a month to see if this helps your hand strength, alternate hands

## 2022-03-15 ENCOUNTER — TELEPHONE (OUTPATIENT)
Dept: NEUROLOGY | Facility: CLINIC | Age: 82
End: 2022-03-15

## 2022-03-15 NOTE — TELEPHONE ENCOUNTER
[facility], DO  P Neurology   Hello this patient is interested in starting community program for boxing with Parkinsons disease   He did this in past and was helpful   Please assist them   Thank you

## 2022-03-16 NOTE — TELEPHONE ENCOUNTER
MSW phoned [facility] at  [phone], and spoke with Sheng Cuba who informed that [employer] is running  She transferred [facility] requesting to learn days and times of the week that program is offered   MSW is awaiting for call

## 2022-03-17 NOTE — TELEPHONE ENCOUNTER
MSW phoned [facility] at  [phone], and spoke with    Jimena Kumari who informed that he will be emailing this writer with more info about the program and what is needed in detail  Patient will need pt and st eval just to be able to add patient to the right level  M and W- class high level  T-Th- lower level (pt have dme)  $10 per session  Will need gloves and can buy at [facility]  Class is from 4 to 530pm     MSW will await for email from Jimena Kumari

## 2022-03-18 NOTE — TELEPHONE ENCOUNTER
MSW awaiting on a response from Anne De La Paz from [address] [place] [address] to learn if they have any program in the [place] area

## 2022-03-18 NOTE — TELEPHONE ENCOUNTER
MSW phoned home number at [phone] and requested to speak with patient  Juana Castnao asked to take the message  MSW informed that [employer] is available at [place] location  Juana Pea reported that they will not travel to [facility] and is interested to know if there is any other Boxing program for parkinson patients in [place]  MSW informed Juana Omaira that she is not aware of any boxing program in that area but will do a search for her and will let her know if anything is available

## 2022-03-21 NOTE — TELEPHONE ENCOUNTER
[facility] in [facility] at [phone] and lvm to Oneil Piper to learn if their boxing program is still active and if dates and pricing are still the same as 2019  Awaiting on a call back

## 2022-03-23 ENCOUNTER — APPOINTMENT (OUTPATIENT)
Dept: LAB | Facility: MEDICAL CENTER | Age: 82
End: 2022-03-23
Payer: COMMERCIAL

## 2022-03-23 DIAGNOSIS — G56.00 CARPAL TUNNEL SYNDROME: ICD-10-CM

## 2022-03-23 DIAGNOSIS — G62.9 NEUROPATHY: ICD-10-CM

## 2022-03-23 DIAGNOSIS — G20 PARKINSON'S DISEASE (HCC): ICD-10-CM

## 2022-03-23 LAB
EST. AVERAGE GLUCOSE BLD GHB EST-MCNC: 100 MG/DL
FOLATE SERPL-MCNC: 11.7 NG/ML (ref 3.1–17.5)
HBA1C MFR BLD: 5.1 %
TSH SERPL DL<=0.05 MIU/L-ACNC: 2.45 UIU/ML (ref 0.36–3.74)
VIT B12 SERPL-MCNC: 370 PG/ML (ref 100–900)

## 2022-03-23 PROCEDURE — 82746 ASSAY OF FOLIC ACID SERUM: CPT

## 2022-03-23 PROCEDURE — 82607 VITAMIN B-12: CPT

## 2022-03-23 PROCEDURE — 84165 PROTEIN E-PHORESIS SERUM: CPT | Performed by: SPECIALIST

## 2022-03-23 PROCEDURE — 84165 PROTEIN E-PHORESIS SERUM: CPT

## 2022-03-23 PROCEDURE — 36415 COLL VENOUS BLD VENIPUNCTURE: CPT

## 2022-03-23 PROCEDURE — 83036 HEMOGLOBIN GLYCOSYLATED A1C: CPT

## 2022-03-23 PROCEDURE — 84443 ASSAY THYROID STIM HORMONE: CPT

## 2022-03-23 PROCEDURE — 86334 IMMUNOFIX E-PHORESIS SERUM: CPT

## 2022-03-23 PROCEDURE — 86334 IMMUNOFIX E-PHORESIS SERUM: CPT | Performed by: SPECIALIST

## 2022-03-23 NOTE — TELEPHONE ENCOUNTER
MSW phoned [facility] program in [facility] and spoke with Emelina Espana at [phone]     [address] [place] [address] - fitness center   3 classes a week  3pm -4pm  T,W, Th  $80 a month unlimited classes and use of fitness center  $60- class 2x a week   $10 per class must buy punch card   1st class is free  Must do assessment first before class  Physician release form can be completed by PCP or Neurologist      MSW phoned Genetta Later and informed that there is a boxing program available  She will evaluate the information and will contact rosana if interested  MSW remains available for any questions or future social needs

## 2022-03-24 LAB
ALBUMIN SERPL ELPH-MCNC: 4.22 G/DL (ref 3.5–5)
ALBUMIN SERPL ELPH-MCNC: 62 % (ref 52–65)
ALPHA1 GLOB SERPL ELPH-MCNC: 0.3 G/DL (ref 0.1–0.4)
ALPHA1 GLOB SERPL ELPH-MCNC: 4.4 % (ref 2.5–5)
ALPHA2 GLOB SERPL ELPH-MCNC: 0.83 G/DL (ref 0.4–1.2)
ALPHA2 GLOB SERPL ELPH-MCNC: 12.2 % (ref 7–13)
BETA GLOB ABNORMAL SERPL ELPH-MCNC: 0.36 G/DL (ref 0.4–0.8)
BETA1 GLOB SERPL ELPH-MCNC: 5.3 % (ref 5–13)
BETA2 GLOB SERPL ELPH-MCNC: 4.7 % (ref 2–8)
BETA2+GAMMA GLOB SERPL ELPH-MCNC: 0.32 G/DL (ref 0.2–0.5)
GAMMA GLOB ABNORMAL SERPL ELPH-MCNC: 0.78 G/DL (ref 0.5–1.6)
GAMMA GLOB SERPL ELPH-MCNC: 11.4 % (ref 12–22)
IGG/ALB SER: 1.63 {RATIO} (ref 1.1–1.8)
INTERPRETATION UR IFE-IMP: NORMAL
M PROTEIN 1 MFR SERPL ELPH: 4.2 %
M PROTEIN 1 SERPL ELPH-MCNC: 0.29 G/DL
PROT PATTERN SERPL ELPH-IMP: ABNORMAL
PROT SERPL-MCNC: 6.8 G/DL (ref 6.4–8.2)

## 2022-03-26 ENCOUNTER — APPOINTMENT (OUTPATIENT)
Dept: LAB | Facility: MEDICAL CENTER | Age: 82
End: 2022-03-26
Payer: COMMERCIAL

## 2022-03-26 DIAGNOSIS — G56.00 CARPAL TUNNEL SYNDROME, UNSPECIFIED LATERALITY: ICD-10-CM

## 2022-03-26 PROCEDURE — 36415 COLL VENOUS BLD VENIPUNCTURE: CPT

## 2022-04-01 ENCOUNTER — LAB (OUTPATIENT)
Dept: LAB | Facility: CLINIC | Age: 82
End: 2022-04-01
Payer: COMMERCIAL

## 2022-04-01 DIAGNOSIS — G56.00 CARPAL TUNNEL SYNDROME, UNSPECIFIED LATERALITY: ICD-10-CM

## 2022-04-01 PROCEDURE — 82300 ASSAY OF CADMIUM: CPT

## 2022-04-01 PROCEDURE — 36415 COLL VENOUS BLD VENIPUNCTURE: CPT

## 2022-04-01 PROCEDURE — 83825 ASSAY OF MERCURY: CPT

## 2022-04-01 PROCEDURE — 82175 ASSAY OF ARSENIC: CPT

## 2022-04-01 PROCEDURE — 83655 ASSAY OF LEAD: CPT

## 2022-04-06 LAB
ARSENIC BLD-MCNC: 15 UG/L (ref 0–9)
CADMIUM BLD-MCNC: <0.5 UG/L (ref 0–1.2)
LEAD BLD-MCNC: 1 UG/DL (ref 0–4)
MERCURY BLD-MCNC: 1.2 UG/L (ref 0–14.9)

## 2022-04-11 ENCOUNTER — TELEPHONE (OUTPATIENT)
Dept: NEUROLOGY | Facility: CLINIC | Age: 82
End: 2022-04-11

## 2022-04-11 DIAGNOSIS — G62.9 NEUROPATHY: ICD-10-CM

## 2022-04-11 DIAGNOSIS — R77.8 ABNORMAL SPEP: Primary | ICD-10-CM

## 2022-04-11 NOTE — RESULT ENCOUNTER NOTE
Please let patient,wife know that his arsenic level is mildly elevated 15, this is potentially from environmental exposure recommend reducing intake of seafood  We can recheck this again his next office visit  Also he has a mildly abnormal blood protein igG kappa elevated levels- likely benign however I will refer him to hematology to make sure no other work up needed      thanks

## 2022-04-11 NOTE — TELEPHONE ENCOUNTER
----- Message from Juana Bonner DO sent at 4/11/2022  3:49 PM EDT -----  Please let patient,wife know that his arsenic level is mildly elevated 15, this is potentially from environmental exposure recommend reducing intake of seafood  We can recheck this again his next office visit  Also he has a mildly abnormal blood protein igG kappa elevated levels- likely benign however I will refer him to hematology to make sure no other work up needed      thanks

## 2022-04-14 ENCOUNTER — TELEPHONE (OUTPATIENT)
Dept: HEMATOLOGY ONCOLOGY | Facility: CLINIC | Age: 82
End: 2022-04-14

## 2022-04-14 NOTE — TELEPHONE ENCOUNTER
Made attempt to schedule new pt appt, left message with wife , who stated that they would like to speak with referring doctor before scheduling an appointment  Provided hopeline number to callback to schedule an appointment

## 2022-04-25 ENCOUNTER — TELEPHONE (OUTPATIENT)
Dept: HEMATOLOGY ONCOLOGY | Facility: CLINIC | Age: 82
End: 2022-04-25

## 2022-04-25 NOTE — TELEPHONE ENCOUNTER
New Patient Intake Form   Patient Details: Julian Yarbrough   1940    Appointment Information   Who is calling to schedule? Spouse   If not self, what is the caller's name? Please put name of [facility] nurse as well  DID YOU CONFIRM INSURANCE WITH PATIENT? Yes    Referring provider Bishnu Paige, DO   What is the diagnosis? Abnormal SPEP     Is there a confirmed tissue diagnosis? No   Is there a biopsy ordered or pending? Please specify dates   n/a     Is patient aware of diagnosis? Yes   Have you had any imaging or labs done? If yes, where? (If imaging done outside of [facility], please remind patient to bring a disk ) Yes     03/23     If imaging done at outside facility, did you instruct patient to obtain discs and bring to visit? n/a   Have you been seen by another Oncologist/Hematologist?  If so, who and where? no   Are the records in [facility] or Care Everywhere? yes   Does the patient have records at another facility/hospital? no   If yes, Name of facility, city and state where facility is located  Did you instruct patient to have records faxed to [facility] and provide rightfax number? n/a   Preferred [facility]   Is the patient willing to be seen by another provider? (This is for breast patients only) no     Did you send new patient paperwork?   Email or mail? no   Miscellaneous Information: appt made for 04/29

## 2022-04-28 ENCOUNTER — TELEPHONE (OUTPATIENT)
Dept: HEMATOLOGY ONCOLOGY | Facility: CLINIC | Age: 82
End: 2022-04-28

## 2022-04-28 NOTE — TELEPHONE ENCOUNTER
Left message for patient to confirm consult with Dr Lashell Escobar on 4/29/22 and to go over department policies listed below  "We ask that you come at least 15 minutes early for your appointment to complete all paperwork  However, If you are up to 20 minutes late for your appointment, we may need to reschedule you  Any lateness to an appointment may result in an shorted visit, for our providers to offer you the most out of your consult, please arrive early  We require at least 24-hour notice for cancelations and if you miss your appointment 3 times, we may unfortunately not be able to reschedule any future visits  We ask that you please call our office in the event you are feeling ill as we may need to reschedule your appointment  You are allowed to bring only one visitor with you to your appointment, if your visitor is not feeling well we ask that you don't bring them   Our current masking policy is: if you are vaccinated masking is optional, if you are unvaccinated masking is required "

## 2022-04-29 ENCOUNTER — CONSULT (OUTPATIENT)
Dept: HEMATOLOGY ONCOLOGY | Facility: CLINIC | Age: 82
End: 2022-04-29
Payer: COMMERCIAL

## 2022-04-29 VITALS
TEMPERATURE: 97.8 F | BODY MASS INDEX: 31.07 KG/M2 | RESPIRATION RATE: 17 BRPM | HEIGHT: 68 IN | HEART RATE: 79 BPM | SYSTOLIC BLOOD PRESSURE: 140 MMHG | DIASTOLIC BLOOD PRESSURE: 80 MMHG | WEIGHT: 205 LBS | OXYGEN SATURATION: 99 %

## 2022-04-29 DIAGNOSIS — R77.8 ABNORMAL SPEP: ICD-10-CM

## 2022-04-29 DIAGNOSIS — D47.2 MGUS (MONOCLONAL GAMMOPATHY OF UNKNOWN SIGNIFICANCE): Primary | ICD-10-CM

## 2022-04-29 DIAGNOSIS — G62.9 NEUROPATHY: ICD-10-CM

## 2022-04-29 DIAGNOSIS — G20 PARKINSON'S DISEASE (HCC): ICD-10-CM

## 2022-04-29 PROCEDURE — 99204 OFFICE O/P NEW MOD 45 MIN: CPT | Performed by: INTERNAL MEDICINE

## 2022-04-29 NOTE — PROGRESS NOTES
Consultation - Medical Oncology   Marifer Ricketts  [de-identified] y o  male MRN: [id number]  Unit/Bed#:  Encounter: [id number]    Date of consultation:  04/29/2022  Referring physician:  Dr Conchis Lindsay  Reason for Consult:  Abnormal SPEP  HPI: Marifer Ricketts  is a [de-identified]y o  year old male   Patient is here with his wife  He has symptoms of Parkinson's disease and is on medication and he states that is helping   He does not have tremors  He has rigidity  History of arthritis  History of carpal tunnel  Hesitancy and nocturia  Occasionally constipation  Occasionally tingling in his hands and feet and they get cold  History of exposure to asbestos and he gets checks x-rays for that  He follows with his primary physician and neurologist on regular basis  Occasionally tiredness   He walks slow    ROS:  04/29/22 Reviewed 12 systems: See symptoms in HPI  Presently no other neurological, cardiac, pulmonary, GI and  symptoms other than listed in HPI  Other symptoms are in HPI  No  fever, chills, bleeding, bone pains, skin rash, weight loss, night sweats,  weakness,  Claudication  No frequent infections  Not unusually sensitive to heat or cold  No swelling of the ankles  No swollen glands  Patient is anxious         Historical Information   Past Medical History:   Diagnosis Date    Arthritis     Asbestos exposure     [facility]     GERD (gastroesophageal reflux disease)     Hypertension     Lung disease     Parkinson's disease [facility] )      Past Surgical History:   Procedure Laterality Date    HERNIA REPAIR      KNEE SURGERY      many years ago    SHOULDER SURGERY       Social History   Social History     Substance and Sexual Activity   Alcohol Use No     Social History     Substance and Sexual Activity   Drug Use No     Social History     Tobacco Use   Smoking Status Never Smoker   Smokeless Tobacco Never Used     Family History:   Family History   Problem Relation Age of Onset    Stroke Mother    Ishmael Lima Colon cancer Mother     Depression Sister     Hypertension Sister          Current Outpatient Medications:     amantadine (SYMMETREL) 100 mg capsule, TAKE 1 CAPSULE DAILY AT 5 P M , Disp: 90 capsule, Rfl: 3    carbidopa-levodopa (SINEMET)  mg per tablet, TAKE ONE AND ONE-HALF TABLETS IN THE MORNING, ONE AND ONE-HALF TABLETS IN THE AFTERNOON AND 1 TABLET NIGHTLY, Disp: 360 tablet, Rfl: 3    Elastic Bandages & Supports (Wrist Brace Deluxe/Left L-XL) MISC, Use nightly for carpal tunnel (Patient not taking: Reported on 3/14/2022 ), Disp: 1 each, Rfl: 0    hydrocortisone 2 5 % lotion, as needed (Patient not taking: Reported on 3/14/2022 ), Disp: , Rfl:     predniSONE 10 mg tablet, Take 10 mg by mouth 2 (two) times a day (Patient not taking: Reported on 3/14/2022 ), Disp: , Rfl:     Allergies   Allergen Reactions    Morphine And Related Other (See Comments)     Patient gets delirious     @ ROS@  Physical Exam:  Vitals:    04/29/22 0935   BP: 140/80   BP Location: Left arm   Patient Position: Sitting   Cuff Size: Adult   Pulse: 79   Resp: 17   Temp: 97 8 °F (36 6 °C)   SpO2: 99%   Weight: 93 kg (205 lb)   Height: 5' 8" (1 727 m)     Alert, oriented, not in distress, no icterus, no oral thrush, no palpable neck mass, clear lung fields, regular heart rate, systolic murmur, abdomen  soft and non tender, no palpable abdominal mass, no ascites, no edema of ankles, no calf tenderness, no focal neurological deficit, has some rigidity, he ambulates slowly, no skin rash, no palpable lymphadenopathy in the neck and axillary areas,  no clubbing  Patient is anxious  Performance status 2  Lab Results: I have reviewed all pertinent labs  LABS:  Results for orders placed or performed in visit on 04/01/22   Heavy metals screen   Result Value Ref Range    Arsenic 15 (H) 0 - 9 ug/L    Cadmium <0 5 0 0 - 1 2 ug/L    Mercury 1 2 0 0 - 14 9 ug/L    Lead Blood 1 0 - 4 ug/dL     Hemoglobin 14 1  WBC 6870   Platelets [id number]  Normal differential count  Near normal CMP  Creatinine 1  18  Calcium 9 3  Normal LFTs  SPEP showed small IgG kappa monoclonal spike of 290 mg  Folic acid 06 6  B12 370  Imaging Studies: I have personally reviewed pertinent reports  IMPRESSION:   1  No lung consolidation or a pleural effusion  Workstation:[id number]    Narrative    Clinical indication-cough  Procedure-chest, 2 view:   Comparison study is dated February 2020  Findings-   No lung consolidation, lung mass, pleural effusion, hilar enlargement or   cardiomegaly  There are mild degenerative change of dorsal spine    Other Result Text    Santosh Arias MD - 09/29/2021   Formatting of this note might be different from the original    Clinical indication-cough  Procedure-chest, 2 view:   Comparison study is dated February 2020  Findings-   No lung consolidation, lung mass, pleural effusion, hilar enlargement or   cardiomegaly  There are mild degenerative change of dorsal spine     IMPRESSION:   IMPRESSION:   1  No lung consolidation or a pleural effusion  Pathology, and Other Studies: I have personally reviewed pertinent reports  Reviewed above test results    Assessment and Plan:  See diagnoses, orders instructions below      Patient is here with his wife  He has symptoms of Parkinson's disease and is on medication and he states that is helping   He does not have tremors  He has rigidity  History of arthritis  History of carpal tunnel  Hesitancy and nocturia  Occasionally constipation  Occasionally tingling in his hands and feet and they get cold  History of exposure to asbestos and he gets checks x-rays for that  He follows with his primary physician and neurologist on regular basis  Occasionally tiredness   He walks slow    Physical examination and test results are as recorded and discussed  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 at his age and will be monitored    I explained MGUS to patient and his wife and risk of evolution into multiple myeloma is 1% per year  I do not thing peripheral neuropathy is related to the MGUS in his case  He is being treated for Parkinson's disease  For urological symptoms I suggested seeing a urologist and they will discuss this with patient's primary physician  Wife asked about the arsenic and level is higher than normal and I told her that I am not the doctor for that  She will discuss this with her primary physician  Diet and activities per primary physician and neurologist     Patient needs assistance in his care  Plan for MGUS is surveillance  All discussed in detail  Questions answered  Discussed precautions against COVID virus  1  MGUS (monoclonal gammopathy of unknown significance)    - Beta 2 microglobulin, serum; Future  - CBC and differential; Future  - Comprehensive metabolic panel; Future  - IgG, IgA, IgM; Future  - Immunoglobulin free LT chains blood; Future  - LD,Blood; Future  - Protein electrophoresis, serum; Future  - Protein electrophoresis, urine; Future    2  Parkinson's disease ([facility] )      3  Abnormal SPEP    - Ambulatory Referral to Hematology / Oncology    4  Neuropathy    - Ambulatory Referral to Hematology / Oncology        Blood and urine test prior to next visit in 3 months     Patient will continue to follow with her primary physician and other consultants  Patient and wife voiced understanding and agrees     This note has been generated by voice recognition softener  Therefore there maybe spelling, grammar, and/or syntax errors  Please contact if questions arise  Counseling / Coordination of Care    Dany Valladares   Provided counseling and support

## 2022-05-10 ENCOUNTER — HOSPITAL ENCOUNTER (EMERGENCY)
Facility: HOSPITAL | Age: 82
Discharge: HOME | DRG: 683 | End: 2022-05-10
Attending: EMERGENCY MEDICINE
Payer: COMMERCIAL

## 2022-05-10 ENCOUNTER — APPOINTMENT (EMERGENCY)
Dept: RADIOLOGY | Facility: HOSPITAL | Age: 82
DRG: 683 | End: 2022-05-10
Payer: COMMERCIAL

## 2022-05-10 VITALS
OXYGEN SATURATION: 98 % | DIASTOLIC BLOOD PRESSURE: 78 MMHG | SYSTOLIC BLOOD PRESSURE: 184 MMHG | RESPIRATION RATE: 18 BRPM | HEART RATE: 75 BPM | TEMPERATURE: 97.7 F

## 2022-05-10 DIAGNOSIS — R04.2 HEMOPTYSIS: Primary | ICD-10-CM

## 2022-05-10 LAB
ALBUMIN SERPL BCP-MCNC: 3.8 G/DL (ref 3.5–5)
ALP SERPL-CCNC: 130 U/L (ref 46–116)
ALT SERPL W P-5'-P-CCNC: 7 U/L (ref 12–78)
ANION GAP SERPL CALCULATED.3IONS-SCNC: 9 MMOL/L (ref 4–13)
APTT PPP: 34 SECONDS (ref 23–37)
AST SERPL W P-5'-P-CCNC: 14 U/L (ref 5–45)
ATRIAL RATE: 77 BPM
BASOPHILS # BLD AUTO: 0.03 THOUSANDS/ΜL (ref 0–0.1)
BASOPHILS NFR BLD AUTO: 1 % (ref 0–1)
BILIRUB DIRECT SERPL-MCNC: 0.18 MG/DL (ref 0–0.2)
BILIRUB SERPL-MCNC: 0.92 MG/DL (ref 0.2–1)
BUN SERPL-MCNC: 26 MG/DL (ref 5–25)
CALCIUM SERPL-MCNC: 8.8 MG/DL (ref 8.3–10.1)
CARDIAC TROPONIN I PNL SERPL HS: 3 NG/L
CHLORIDE SERPL-SCNC: 105 MMOL/L (ref 100–108)
CO2 SERPL-SCNC: 28 MMOL/L (ref 21–32)
CREAT SERPL-MCNC: 1.25 MG/DL (ref 0.6–1.3)
EOSINOPHIL # BLD AUTO: 0.18 THOUSAND/ΜL (ref 0–0.61)
EOSINOPHIL NFR BLD AUTO: 4 % (ref 0–6)
ERYTHROCYTE [DISTWIDTH] IN BLOOD BY AUTOMATED COUNT: 12.6 % (ref 11.6–15.1)
FLUAV RNA RESP QL NAA+PROBE: NEGATIVE
FLUBV RNA RESP QL NAA+PROBE: NEGATIVE
GFR SERPL CREATININE-BSD FRML MDRD: 54 ML/MIN/1.73SQ M
GLUCOSE SERPL-MCNC: 88 MG/DL (ref 65–140)
HCT VFR BLD AUTO: 39.8 % (ref 36.5–49.3)
HGB BLD-MCNC: 13.3 G/DL (ref 12–17)
IMM GRANULOCYTES # BLD AUTO: 0.01 THOUSAND/UL (ref 0–0.2)
IMM GRANULOCYTES NFR BLD AUTO: 0 % (ref 0–2)
INR PPP: 1.11 (ref 0.84–1.19)
LACTATE SERPL-SCNC: 0.8 MMOL/L (ref 0.5–2)
LYMPHOCYTES # BLD AUTO: 1 THOUSANDS/ΜL (ref 0.6–4.47)
LYMPHOCYTES NFR BLD AUTO: 22 % (ref 14–44)
MCH RBC QN AUTO: 32.3 PG (ref 26.8–34.3)
MCHC RBC AUTO-ENTMCNC: 33.4 G/DL (ref 31.4–37.4)
MCV RBC AUTO: 97 FL (ref 82–98)
MONOCYTES # BLD AUTO: 0.84 THOUSAND/ΜL (ref 0.17–1.22)
MONOCYTES NFR BLD AUTO: 19 % (ref 4–12)
NEUTROPHILS # BLD AUTO: 2.46 THOUSANDS/ΜL (ref 1.85–7.62)
NEUTS SEG NFR BLD AUTO: 54 % (ref 43–75)
NRBC BLD AUTO-RTO: 0 /100 WBCS
NT-PROBNP SERPL-MCNC: 348 PG/ML
P AXIS: 60 DEGREES
PLATELET # BLD AUTO: 165 THOUSANDS/UL (ref 149–390)
PMV BLD AUTO: 9.7 FL (ref 8.9–12.7)
POTASSIUM SERPL-SCNC: 4.9 MMOL/L (ref 3.5–5.3)
PR INTERVAL: 222 MS
PROT SERPL-MCNC: 6.9 G/DL (ref 6.4–8.2)
PROTHROMBIN TIME: 13.9 SECONDS (ref 11.6–14.5)
QRS AXIS: 33 DEGREES
QRSD INTERVAL: 94 MS
QT INTERVAL: 384 MS
QTC INTERVAL: 434 MS
RBC # BLD AUTO: 4.12 MILLION/UL (ref 3.88–5.62)
RSV RNA RESP QL NAA+PROBE: NEGATIVE
SARS-COV-2 RNA RESP QL NAA+PROBE: NEGATIVE
SODIUM SERPL-SCNC: 142 MMOL/L (ref 136–145)
T WAVE AXIS: 14 DEGREES
VENTRICULAR RATE: 77 BPM
WBC # BLD AUTO: 4.52 THOUSAND/UL (ref 4.31–10.16)

## 2022-05-10 PROCEDURE — 0241U HB NFCT DS VIR RESP RNA 4 TRGT: CPT | Performed by: EMERGENCY MEDICINE

## 2022-05-10 PROCEDURE — 99284 EMERGENCY DEPT VISIT MOD MDM: CPT | Performed by: EMERGENCY MEDICINE

## 2022-05-10 PROCEDURE — 80048 BASIC METABOLIC PNL TOTAL CA: CPT | Performed by: EMERGENCY MEDICINE

## 2022-05-10 PROCEDURE — 93005 ELECTROCARDIOGRAM TRACING: CPT

## 2022-05-10 PROCEDURE — 93010 ELECTROCARDIOGRAM REPORT: CPT | Performed by: INTERNAL MEDICINE

## 2022-05-10 PROCEDURE — 99284 EMERGENCY DEPT VISIT MOD MDM: CPT

## 2022-05-10 PROCEDURE — 83605 ASSAY OF LACTIC ACID: CPT | Performed by: EMERGENCY MEDICINE

## 2022-05-10 PROCEDURE — 87154 CUL TYP ID BLD PTHGN 6+ TRGT: CPT | Performed by: EMERGENCY MEDICINE

## 2022-05-10 PROCEDURE — 87186 SC STD MICRODIL/AGAR DIL: CPT | Performed by: EMERGENCY MEDICINE

## 2022-05-10 PROCEDURE — 83880 ASSAY OF NATRIURETIC PEPTIDE: CPT | Performed by: EMERGENCY MEDICINE

## 2022-05-10 PROCEDURE — 71046 X-RAY EXAM CHEST 2 VIEWS: CPT

## 2022-05-10 PROCEDURE — 85025 COMPLETE CBC W/AUTO DIFF WBC: CPT | Performed by: EMERGENCY MEDICINE

## 2022-05-10 PROCEDURE — 85730 THROMBOPLASTIN TIME PARTIAL: CPT | Performed by: EMERGENCY MEDICINE

## 2022-05-10 PROCEDURE — 80076 HEPATIC FUNCTION PANEL: CPT | Performed by: EMERGENCY MEDICINE

## 2022-05-10 PROCEDURE — 84484 ASSAY OF TROPONIN QUANT: CPT | Performed by: EMERGENCY MEDICINE

## 2022-05-10 PROCEDURE — 87040 BLOOD CULTURE FOR BACTERIA: CPT | Performed by: EMERGENCY MEDICINE

## 2022-05-10 PROCEDURE — 85610 PROTHROMBIN TIME: CPT | Performed by: EMERGENCY MEDICINE

## 2022-05-10 PROCEDURE — 36415 COLL VENOUS BLD VENIPUNCTURE: CPT | Performed by: EMERGENCY MEDICINE

## 2022-05-10 NOTE — DISCHARGE INSTRUCTIONS
As we discussed, call your lung doctor or family doctor today to schedule a follow-up appointment for the coughing up blood  Come back to the ER if your symptoms worsen or if you have any other concerns

## 2022-05-10 NOTE — ED PROVIDER NOTES
History  Chief Complaint   Patient presents with    Cough     Pt and wife report spitting up blood last few days, hx asbestosis and frequent coughing  Denies difficulty breathing or chest pain  [address] yo male with h/o possible asbestosis as well as parkinson's who presents to ED c/o 2-3 days of scant hemoptysis  Pt is a very poor historian on account of dementia and therefore a complete history cannot be obtained  Wife states duration 2-3 days, associated mild dyspnea and subjective fever  Negative covid test yesterday  Prior to Admission Medications   Prescriptions Last Dose Informant Patient Reported? Taking? Elastic Bandages & Supports (Wrist Brace Deluxe/Left L-XL) MISC   No No   Sig: Use nightly for carpal tunnel   Patient not taking: Reported on 3/14/2022    amantadine (SYMMETREL) 100 mg capsule   No No   Sig: TAKE 1 CAPSULE DAILY AT 5 P M    carbidopa-levodopa (SINEMET)  mg per tablet   No No   Sig: TAKE ONE AND ONE-HALF TABLETS IN THE MORNING, ONE AND ONE-HALF TABLETS IN THE AFTERNOON AND 1 TABLET NIGHTLY      Facility-Administered Medications: None       Past Medical History:   Diagnosis Date    Arthritis     Asbestos exposure     [facility]osis [facility]     GERD (gastroesophageal reflux disease)     Hypertension     Lung disease     Parkinson's disease ([facility] )        Past Surgical History:   Procedure Laterality Date    HERNIA REPAIR      KNEE SURGERY      many years ago    SHOULDER SURGERY         Family History   Problem Relation Age of Onset   Chepe Loya Stroke Mother     Colon cancer Mother     Depression Sister     Hypertension Sister      I have reviewed and agree with the history as documented      E-Cigarette/Vaping    E-Cigarette Use Never User      E-Cigarette/Vaping Substances    Nicotine No     THC No     CBD No     Flavoring No     Other No     Unknown No      Social History     Tobacco Use    Smoking status: Never Smoker    Smokeless tobacco: Never Used   Vaping Use  Vaping Use: Never used   Substance Use Topics    Alcohol use: No    Drug use: No       Review of Systems   Respiratory: Positive for cough  All other systems reviewed and are negative  Physical Exam  Physical Exam  Vitals and nursing note reviewed  Constitutional:       General: He is not in acute distress  Appearance: Normal appearance  He is well-developed  He is not ill-appearing, toxic-appearing or diaphoretic  HENT:      Head: Normocephalic and atraumatic  Eyes:      Conjunctiva/sclera: Conjunctivae normal       Pupils: Pupils are equal, round, and reactive to light  Neck:      Vascular: No JVD  Cardiovascular:      Rate and Rhythm: Normal rate and regular rhythm  Pulses: Normal pulses  Heart sounds: Normal heart sounds  No murmur heard  No friction rub  No gallop  Pulmonary:      Effort: Pulmonary effort is normal  No respiratory distress  Breath sounds: Normal breath sounds  No stridor  No wheezing or rales  Abdominal:      General: There is no distension  Palpations: Abdomen is soft  Tenderness: There is no abdominal tenderness  Musculoskeletal:         General: No tenderness or deformity  Normal range of motion  Cervical back: Normal range of motion and neck supple  Right lower leg: Edema present  Left lower leg: Edema present  Skin:     General: Skin is warm and dry  Capillary Refill: Capillary refill takes less than 2 seconds  Neurological:      Mental Status: He is alert  Cranial Nerves: No cranial nerve deficit  Sensory: No sensory deficit  Motor: No abnormal muscle tone        Coordination: Coordination normal          Vital Signs  ED Triage Vitals [05/10/22 1240]   Temperature Pulse Respirations Blood Pressure SpO2   97 7 °F (36 5 °C) 77 15 139/61 99 %      Temp Source Heart Rate Source Patient Position - Orthostatic VS BP Location FiO2 (%)   Oral Monitor Sitting Left arm --      Pain Score       No Pain Vitals:    05/10/22 1240 05/10/22 1315   BP: 139/61 (!) 184/78   Pulse: 77 75   Patient Position - Orthostatic VS: Sitting          Visual Acuity      ED Medications  Medications - No data to display    Diagnostic Studies  Results Reviewed     Procedure Component Value Units Date/Time    COVID/FLU/RSV - 2 hour TAT [[id number]]  (Normal) Collected: 05/10/22 1334    Lab Status: Final result Specimen: Nares from Nose Updated: 05/10/22 1423     SARS-CoV-2 Negative     INFLUENZA A PCR Negative     INFLUENZA B PCR Negative     RSV PCR Negative    Narrative:      FOR PEDIATRIC PATIENTS - copy/paste COVID Guidelines URL to browser: [email or web address]  [employer]    SARS-CoV-2 assay is a Nucleic Acid Amplification assay intended for the  qualitative detection of nucleic acid from SARS-CoV-2 in nasopharyngeal  swabs  Results are for the presumptive identification of SARS-CoV-2 RNA  Positive results are indicative of infection with SARS-CoV-2, the virus  causing COVID-19, but do not rule out bacterial infection or co-infection  with other viruses  Laboratories within the [place] and its  territories are required to report all positive results to the appropriate  public health authorities  Negative results do not preclude SARS-CoV-2  infection and should not be used as the sole basis for treatment or other  patient management decisions  Negative results must be combined with  clinical observations, patient history, and epidemiological information  This test has not been FDA cleared or approved  This test has been authorized by FDA under an Emergency Use Authorization  (EUA)   This test is only authorized for the duration of time the  declaration that circumstances exist justifying the authorization of the  emergency use of an in vitro diagnostic tests for detection of SARS-CoV-2  virus and/or diagnosis of COVID-19 infection under section 564(b)(1) of  the Act, [place]  360bbb-3(b)(1), unless the authorization is terminated  or revoked sooner  The test has been validated but independent review by FDA  and CLIA is pending  Test performed using [employer]: This RT-PCR assay targets N2,  a region unique to SARS-CoV-2  A conserved region in the E-gene was chosen  for pan-Sarbecovirus detection which includes SARS-CoV-2  Hepatic function panel [[id number]]  (Abnormal) Collected: 05/10/22 1334    Lab Status: Final result Specimen: Blood from Arm, Left Updated: 05/10/22 1412     Total Bilirubin 0 92 mg/dL      Bilirubin, Direct 0 18 mg/dL      Alkaline Phosphatase 130 U/L      AST 14 U/L      ALT 7 U/L      Total Protein 6 9 g/dL      Albumin 3 8 g/dL     NT-BNP PRO [[id number]]  (Normal) Collected: 05/10/22 1334    Lab Status: Final result Specimen: Blood from Arm, Left Updated: 05/10/22 1412     NT-proBNP 348 pg/mL     HS Troponin I 2hr [[id number]]     Lab Status: No result Specimen: Blood     HS Troponin 0hr (reflex protocol) [[id number]]  (Normal) Collected: 05/10/22 1334    Lab Status: Final result Specimen: Blood from Arm, Left Updated: 05/10/22 1411     hs TnI 0hr 3 ng/L     Lactic acid [[id number]]  (Normal) Collected: 05/10/22 1334    Lab Status: Final result Specimen: Blood from Arm, Left Updated: 05/10/22 1407     LACTIC ACID 0 8 mmol/L     Narrative:      Result may be elevated if tourniquet was used during collection      Basic metabolic panel [[id number]]  (Abnormal) Collected: 05/10/22 1334    Lab Status: Final result Specimen: Blood from Arm, Left Updated: 05/10/22 1402     Sodium 142 mmol/L      Potassium 4 9 mmol/L      Chloride 105 mmol/L      CO2 28 mmol/L      ANION GAP 9 mmol/L      BUN 26 mg/dL      Creatinine 1 25 mg/dL      Glucose 88 mg/dL      Calcium 8 8 mg/dL      eGFR 54 ml/min/1 73sq m     Narrative:      [place] guidelines for Chronic Kidney Disease (CKD):     Stage 1 with normal or high GFR (GFR > 90 mL/min/1 73 square meters)    Stage 2 Mild CKD (GFR = 60-89 mL/min/1 73 square meters)    Stage 3A Moderate CKD (GFR = 45-59 mL/min/1 73 square meters)    Stage 3B Moderate CKD (GFR = 30-44 mL/min/1 73 square meters)    Stage 4 Severe CKD (GFR = 15-29 mL/min/1 73 square meters)    Stage 5 End Stage CKD (GFR <15 mL/min/1 73 square meters)  Note: GFR calculation is accurate only with a steady state creatinine    Protime-INR [[id number]]  (Normal) Collected: 05/10/22 1334    Lab Status: Final result Specimen: Blood from Arm, Left Updated: 05/10/22 1358     Protime 13 9 seconds      INR 1 11    APTT [[id number]]  (Normal) Collected: 05/10/22 1334    Lab Status: Final result Specimen: Blood from Arm, Left Updated: 05/10/22 1358     PTT 34 seconds     CBC and differential [[id number]]  (Abnormal) Collected: 05/10/22 1334    Lab Status: Final result Specimen: Blood from Arm, Left Updated: 05/10/22 1342     WBC 4 52 Thousand/uL      RBC 4 12 Million/uL      Hemoglobin 13 3 g/dL      Hematocrit 39 8 %      MCV 97 fL      MCH 32 3 pg      MCHC 33 4 g/dL      RDW 12 6 %      MPV 9 7 fL      Platelets 602 Thousands/uL      nRBC 0 /100 WBCs      Neutrophils Relative 54 %      Immat GRANS % 0 %      Lymphocytes Relative 22 %      Monocytes Relative 19 %      Eosinophils Relative 4 %      Basophils Relative 1 %      Neutrophils Absolute 2 46 Thousands/µL      Immature Grans Absolute 0 01 Thousand/uL      Lymphocytes Absolute 1 00 Thousands/µL      Monocytes Absolute 0 84 Thousand/µL      Eosinophils Absolute 0 18 Thousand/µL      Basophils Absolute 0 03 Thousands/µL     Blood culture #2 [[id number]] Collected: 05/10/22 1334    Lab Status: In process Specimen: Blood from Arm, Left Updated: 05/10/22 1338    Blood culture #1 [[id number]] Collected: 05/10/22 1334    Lab Status:  In process Specimen: Blood from Arm, Left Updated: 05/10/22 1338                 XR chest 2 views    (Results Pending)              Procedures  Procedures         ED Course               Identification of Seniors at Risk      Most Recent Value   (ISAR) Identification of Seniors at Risk    Before the illness or injury that brought you to the Emergency, did you need someone to help you on a regular basis? 1 Filed at: 05/10/2022 1241   In the last 24 hours, have you needed more help than usual? 0 Filed at: 05/10/2022 1241   Have you been hospitalized for one or more nights during the past 6 months? 0 Filed at: 05/10/2022 1241   In general, do you see well? 0 Filed at: 05/10/2022 1241   In general, do you have serious problems with your memory? 1 Filed at: 05/10/2022 1241   Do you take more than three different medications every day? 1 Filed at: 05/10/2022 1241   ISAR Score 3 Filed at: 05/10/2022 1241                      SBIRT 22yo+      Most Recent Value   SBIRT (23 yo +)    In order to provide better care to our patients, we are screening all of our patients for alcohol and drug use  Would it be okay to ask you these screening questions? No Filed at: 05/10/2022 1311   WANDA: How many times in the past year have you    Used an illegal drug or used a prescription medication for non-medical reasons? Never Filed at: 05/10/2022 1311                    MDM    Disposition  Final diagnoses:   Hemoptysis     Time reflects when diagnosis was documented in both MDM as applicable and the Disposition within this note     Time User Action Codes Description Comment    5/10/2022  2:50 PM Clearance Redfield Add [R04 2] Hemoptysis       ED Disposition     ED Disposition Condition Date/Time Comment    Discharge Stable Tue May 10, 2022  2:50 PM Jenaro Martinez  discharge to home/self care  Follow-up Information    None         Patient's Medications   Discharge Prescriptions    No medications on file       No discharge procedures on file      PDMP Review     None          ED Provider  Electronically Signed by           Carmen Weiss MD  05/10/22 5645

## 2022-05-11 ENCOUNTER — HOSPITAL ENCOUNTER (INPATIENT)
Facility: HOSPITAL | Age: 82
LOS: 2 days | Discharge: HOME | DRG: 683 | End: 2022-05-13
Attending: INTERNAL MEDICINE | Admitting: HOSPITALIST
Payer: COMMERCIAL

## 2022-05-11 ENCOUNTER — APPOINTMENT (INPATIENT)
Dept: CT IMAGING | Facility: HOSPITAL | Age: 82
DRG: 683 | End: 2022-05-11
Payer: COMMERCIAL

## 2022-05-11 DIAGNOSIS — R04.2 HEMOPTYSIS: ICD-10-CM

## 2022-05-11 DIAGNOSIS — G20 PARKINSON'S DISEASE (HCC): ICD-10-CM

## 2022-05-11 DIAGNOSIS — N17.9 AKI (ACUTE KIDNEY INJURY) (HCC): ICD-10-CM

## 2022-05-11 DIAGNOSIS — R78.81 BACTEREMIA: Primary | ICD-10-CM

## 2022-05-11 LAB
ALBUMIN SERPL BCP-MCNC: 3.6 G/DL (ref 3.5–5)
ALP SERPL-CCNC: 117 U/L (ref 46–116)
ALT SERPL W P-5'-P-CCNC: <6 U/L (ref 12–78)
ANION GAP SERPL CALCULATED.3IONS-SCNC: 7 MMOL/L (ref 4–13)
AST SERPL W P-5'-P-CCNC: 13 U/L (ref 5–45)
BASOPHILS # BLD AUTO: 0.02 THOUSANDS/ΜL (ref 0–0.1)
BASOPHILS NFR BLD AUTO: 0 % (ref 0–1)
BILIRUB SERPL-MCNC: 0.64 MG/DL (ref 0.2–1)
BUN SERPL-MCNC: 32 MG/DL (ref 5–25)
CALCIUM SERPL-MCNC: 9 MG/DL (ref 8.3–10.1)
CHLORIDE SERPL-SCNC: 107 MMOL/L (ref 100–108)
CO2 SERPL-SCNC: 27 MMOL/L (ref 21–32)
CREAT SERPL-MCNC: 1.38 MG/DL (ref 0.6–1.3)
EOSINOPHIL # BLD AUTO: 0.17 THOUSAND/ΜL (ref 0–0.61)
EOSINOPHIL NFR BLD AUTO: 4 % (ref 0–6)
ERYTHROCYTE [DISTWIDTH] IN BLOOD BY AUTOMATED COUNT: 12.6 % (ref 11.6–15.1)
GFR SERPL CREATININE-BSD FRML MDRD: 47 ML/MIN/1.73SQ M
GLUCOSE SERPL-MCNC: 104 MG/DL (ref 65–140)
HCT VFR BLD AUTO: 38 % (ref 36.5–49.3)
HGB BLD-MCNC: 12.4 G/DL (ref 12–17)
IMM GRANULOCYTES # BLD AUTO: 0.01 THOUSAND/UL (ref 0–0.2)
IMM GRANULOCYTES NFR BLD AUTO: 0 % (ref 0–2)
LYMPHOCYTES # BLD AUTO: 1.31 THOUSANDS/ΜL (ref 0.6–4.47)
LYMPHOCYTES NFR BLD AUTO: 27 % (ref 14–44)
MCH RBC QN AUTO: 32.2 PG (ref 26.8–34.3)
MCHC RBC AUTO-ENTMCNC: 32.6 G/DL (ref 31.4–37.4)
MCV RBC AUTO: 99 FL (ref 82–98)
MONOCYTES # BLD AUTO: 0.67 THOUSAND/ΜL (ref 0.17–1.22)
MONOCYTES NFR BLD AUTO: 14 % (ref 4–12)
NEUTROPHILS # BLD AUTO: 2.61 THOUSANDS/ΜL (ref 1.85–7.62)
NEUTS SEG NFR BLD AUTO: 55 % (ref 43–75)
NRBC BLD AUTO-RTO: 0 /100 WBCS
PLATELET # BLD AUTO: 163 THOUSANDS/UL (ref 149–390)
PLATELET # BLD AUTO: 167 THOUSANDS/UL (ref 149–390)
PMV BLD AUTO: 9.9 FL (ref 8.9–12.7)
PMV BLD AUTO: 9.9 FL (ref 8.9–12.7)
POTASSIUM SERPL-SCNC: 4.1 MMOL/L (ref 3.5–5.3)
PROCALCITONIN SERPL-MCNC: 0.06 NG/ML
PROT SERPL-MCNC: 6.6 G/DL (ref 6.4–8.2)
RBC # BLD AUTO: 3.85 MILLION/UL (ref 3.88–5.62)
SODIUM SERPL-SCNC: 141 MMOL/L (ref 136–145)
WBC # BLD AUTO: 4.79 THOUSAND/UL (ref 4.31–10.16)

## 2022-05-11 PROCEDURE — 96374 THER/PROPH/DIAG INJ IV PUSH: CPT

## 2022-05-11 PROCEDURE — 99284 EMERGENCY DEPT VISIT MOD MDM: CPT

## 2022-05-11 PROCEDURE — 71250 CT THORAX DX C-: CPT

## 2022-05-11 PROCEDURE — 80053 COMPREHEN METABOLIC PANEL: CPT | Performed by: PHYSICIAN ASSISTANT

## 2022-05-11 PROCEDURE — 85049 AUTOMATED PLATELET COUNT: CPT | Performed by: HOSPITALIST

## 2022-05-11 PROCEDURE — 99285 EMERGENCY DEPT VISIT HI MDM: CPT | Performed by: PHYSICIAN ASSISTANT

## 2022-05-11 PROCEDURE — 84145 PROCALCITONIN (PCT): CPT | Performed by: HOSPITALIST

## 2022-05-11 PROCEDURE — 99220 PR INITIAL OBSERVATION CARE/DAY 70 MINUTES: CPT | Performed by: HOSPITALIST

## 2022-05-11 PROCEDURE — 85025 COMPLETE CBC W/AUTO DIFF WBC: CPT | Performed by: PHYSICIAN ASSISTANT

## 2022-05-11 PROCEDURE — G1004 CDSM NDSC: HCPCS

## 2022-05-11 PROCEDURE — 87040 BLOOD CULTURE FOR BACTERIA: CPT | Performed by: PHYSICIAN ASSISTANT

## 2022-05-11 PROCEDURE — 36415 COLL VENOUS BLD VENIPUNCTURE: CPT | Performed by: PHYSICIAN ASSISTANT

## 2022-05-11 RX ORDER — SODIUM CHLORIDE 9 MG/ML
50 INJECTION, SOLUTION INTRAVENOUS CONTINUOUS
Status: DISCONTINUED | OUTPATIENT
Start: 2022-05-11 | End: 2022-05-12

## 2022-05-11 RX ORDER — AMANTADINE HYDROCHLORIDE 100 MG/1
100 CAPSULE, GELATIN COATED ORAL DAILY
Status: DISCONTINUED | OUTPATIENT
Start: 2022-05-12 | End: 2022-05-12

## 2022-05-11 RX ORDER — AMANTADINE HYDROCHLORIDE 100 MG/1
100 CAPSULE, GELATIN COATED ORAL DAILY
Status: DISCONTINUED | OUTPATIENT
Start: 2022-05-12 | End: 2022-05-13 | Stop reason: HOSPADM

## 2022-05-11 RX ORDER — AMANTADINE HYDROCHLORIDE 100 MG/1
100 CAPSULE, GELATIN COATED ORAL ONCE
Status: COMPLETED | OUTPATIENT
Start: 2022-05-11 | End: 2022-05-11

## 2022-05-11 RX ORDER — HEPARIN SODIUM 5000 [USP'U]/ML
5000 INJECTION, SOLUTION INTRAVENOUS; SUBCUTANEOUS EVERY 12 HOURS SCHEDULED
Status: DISCONTINUED | OUTPATIENT
Start: 2022-05-11 | End: 2022-05-13 | Stop reason: HOSPADM

## 2022-05-11 RX ADMIN — CARBIDOPA AND LEVODOPA 1 TABLET: 25; 100 TABLET ORAL at 18:57

## 2022-05-11 RX ADMIN — SODIUM CHLORIDE 50 ML/HR: 0.9 INJECTION, SOLUTION INTRAVENOUS at 18:57

## 2022-05-11 RX ADMIN — CEFTRIAXONE SODIUM 1000 MG: 10 INJECTION, POWDER, FOR SOLUTION INTRAVENOUS at 21:09

## 2022-05-11 RX ADMIN — HEPARIN SODIUM 5000 UNITS: 5000 INJECTION INTRAVENOUS; SUBCUTANEOUS at 21:09

## 2022-05-11 RX ADMIN — VANCOMYCIN HYDROCHLORIDE 1250 MG: 5 INJECTION, POWDER, LYOPHILIZED, FOR SOLUTION INTRAVENOUS at 17:21

## 2022-05-11 RX ADMIN — AMANTADINE HYDROCHLORIDE 100 MG: 100 CAPSULE, LIQUID FILLED ORAL at 20:21

## 2022-05-11 NOTE — ASSESSMENT & PLAN NOTE
Creatinine of 1 38 with a baseline of 10 4 to 1 25  Will gently hydrate with a total of 1 LNS in consideration of his age  AM BMP ordered  Avoid all possible nephrotoxins

## 2022-05-11 NOTE — H&P
[address] [facility]  H/P- Keira Yu  1940, [de-identified] y o  male MRN: [id number]  Unit/Bed#: -[facility] Encounter: [id number]  Primary Care Provider: Kevin Fogn,    Date and time admitted to hospital: 5/11/2022  4:48 PM    * Bacteremia  Assessment & Plan  · Blood culture positive with staph epidermidis yesterday in the ED after initial visit for wich he was called back today  · Repeat blood cx are pending as drawn today  · S/P a dose of vanco and Rocephin in the ED, will continue both while awaiting final blood culture result for abx tailoring  · No known source appreciated  · May consider ICD consult      Hemoptysis  Assessment & Plan  CXR with no focal consolidation , pneumothorax or any acute cardiopulmonary process  Viral panel is negative for FLU and covid 19  Saturating well >95% on room air  Will obtain CT chest without iv contrast since his GFR is 47  AM labs ordered  Will consider Pulm consult for possible bronchoscopy if CT chest is negative    [facility]) ([facility]  Assessment & Plan  Creatinine of 1 38 with a baseline of 10 4 to 1 25  Will gently hydrate with a total of 1 LNS in consideration of his age  AM BMP ordered  Avoid all possible nephrotoxins    Parkinson's disease ([facility] )  Assessment & Plan  · Resume home dose of Amantadine and Sinemet  · Exercise fall precaution       VTE Pharmacologic Prophylaxis:   Moderate Risk (Score 3-4) - Pharmacological DVT Prophylaxis Ordered: heparin  Code Status: Level 1 - Full Code yes  Discussion with family: Updated  (wife) at bedside  Anticipated Length of Stay: > 2 days secondary to positive blood culture results    Total Time for Visit, including Counseling / Coordination of Care: 45 minutes Greater than 50% of this total time spent on direct patient counseling and coordination of care      Chief Complaint: Hemoptysis    History of Present Illness:    Patient is an [de-identified] yo male with a hx of  Parkinson's disease, possible asbestosis, right shoulder pain chronically seen with complaint of 1 week of cough which improved mildly but then he developed streaks of blood about 3 days ago for which he was seen in the ED yesterday for further evaluation  ED evaluation involved blood work including cbc, bmp, blood cultures  He was called back to the hospital for admission 2/2 a positive blood culture results with G+cocci in clusters (staph epidermidis)  Evaluation today shows normal vitals with saturation of 98^ on RA  Labs with normal cbc and bmp except foe creatinine of 1 38 and GFR of 47  He was given a dose each of Rocephin and vancomycin  Hospitalist team was called to admit and to manage further      Review of Systems:  Review of Systems   Constitutional: Negative  Respiratory: Positive for cough  Negative for wheezing and stridor  Hemoptysis   All other systems reviewed and are negative  Past Medical and Surgical History:   Past Medical History:   Diagnosis Date    Arthritis     Asbestos exposure     Asbestosis ([facility] )     GERD (gastroesophageal reflux disease)     Hypertension     Lung disease     Parkinson's disease ([facility] )        Past Surgical History:   Procedure Laterality Date    HERNIA REPAIR      KNEE SURGERY      many years ago    SHOULDER SURGERY         Meds/Allergies:  Prior to Admission medications    Medication Sig Start Date End Date Taking? Authorizing Provider   amantadine (SYMMETREL) 100 mg capsule TAKE 1 CAPSULE DAILY AT 5 P M  11/5/21   Beatrice Vieyra DO   carbidopa-levodopa (SINEMET)  mg per tablet TAKE ONE AND ONE-HALF TABLETS IN THE MORNING, ONE AND ONE-HALF TABLETS IN THE AFTERNOON AND 1 TABLET NIGHTLY 12/16/21   Beatrice Vieyra DO   Elastic Bandages & Supports (Wrist Brace Deluxe/Left L-XL) MISC Use nightly for carpal tunnel  Patient not taking: Reported on 3/14/2022  8/4/21   Beatrice Vieyra DO     I have reviewed home medications with patient personally      Allergies: Allergies   Allergen Reactions    Morphine And Related Other (See Comments)     Patient gets delirious       Social History:  Marital Status: /Civil Union   Occupation: Retired   Patient Pre-hospital Living Situation: With spouse  Patient Pre-hospital Level of Mobility: walks  Patient Pre-hospital Diet Restrictions: None  Substance Use History:   Social History     Substance and Sexual Activity   Alcohol Use No     Social History     Tobacco Use   Smoking Status Never Smoker   Smokeless Tobacco Never Used     Social History     Substance and Sexual Activity   Drug Use No       Family History:  Family History   Problem Relation Age of Onset    Stroke Mother     Colon cancer Mother     Depression Sister     Hypertension Sister        Physical Exam:     Vitals:   Blood Pressure: (!) 180/89 (05/11/22 1837)  Pulse: 86 (05/11/22 1837)  Temperature: 97 9 °F (36 6 °C) (05/11/22 1837)  Temp Source: Oral (05/11/22 1837)  Respirations: 20 (05/11/22 1646)  SpO2: 98 % (05/11/22 1837)    Physical Exam  Vitals and nursing note reviewed  HENT:      Head: Normocephalic and atraumatic  Eyes:      Conjunctiva/sclera: Conjunctivae normal       Pupils: Pupils are equal, round, and reactive to light  Cardiovascular:      Rate and Rhythm: Normal rate and regular rhythm  Pulses: Normal pulses  Heart sounds: Normal heart sounds  Pulmonary:      Effort: Pulmonary effort is normal       Breath sounds: Normal breath sounds  Abdominal:      General: Bowel sounds are normal       Palpations: Abdomen is soft  Musculoskeletal:         General: Normal range of motion  Comments: Left arm with resting tremor   Skin:     General: Skin is warm and dry  Capillary Refill: Capillary refill takes less than 2 seconds  Neurological:      General: No focal deficit present  Mental Status: He is alert and oriented to person, place, and time     Psychiatric:         Mood and Affect: Mood normal  Additional Data:     Lab Results:  Results from last 7 days   Lab Units 05/11/22  1856 05/11/22  1715   WBC Thousand/uL  --  4 79   HEMOGLOBIN g/dL  --  12 4   HEMATOCRIT %  --  38 0   PLATELETS Thousands/uL 163 167   NEUTROS PCT %  --  55   LYMPHS PCT %  --  27   MONOS PCT %  --  14*   EOS PCT %  --  4     Results from last 7 days   Lab Units 05/11/22  1715   SODIUM mmol/L 141   POTASSIUM mmol/L 4 1   CHLORIDE mmol/L 107   CO2 mmol/L 27   BUN mg/dL 32*   CREATININE mg/dL 1 38*   ANION GAP mmol/L 7   CALCIUM mg/dL 9 0   ALBUMIN g/dL 3 6   TOTAL BILIRUBIN mg/dL 0 64   ALK PHOS U/L 117*   ALT U/L <6*   AST U/L 13   GLUCOSE RANDOM mg/dL 104     Results from last 7 days   Lab Units 05/10/22  1334   INR  1 11             Results from last 7 days   Lab Units 05/10/22  1334   LACTIC ACID mmol/L 0 8       Imaging: Reviewed radiology reports from this admission including: chest xray  CT chest w contrast    (Results Pending)       EKG and Other Studies Reviewed on Admission:   · EKG: NSR  HR 77 bpm and Ist degree AVB  ** Please Note: This note has been constructed using a voice recognition system   **

## 2022-05-11 NOTE — ASSESSMENT & PLAN NOTE
· Blood culture positive with staph epidermidis yesterday in the ED after initial visit for wich he was called back today  · Repeat blood cx are pending as drawn today  · S/P a dose of vanco and Rocephin in the ED, will continue both while awaiting final blood culture result for abx tailoring  · No known source appreciated  · May consider ICD consult

## 2022-05-11 NOTE — ED PROVIDER NOTES
History  Chief Complaint   Patient presents with    Abnormal Lab     Positive blood cultures from yesterdays visit     Siena Mccoy  Is an 81-year-old male with past medical history including Parkinson's disease, possible asbestosis, returning to the emergency department for evaluation following abnormal test results from recent hospital visit  The patient was evaluated in this emergency department yesterday with chief complaint of hemoptysis x 2-3 days  The patient had blood cultures drawn as part of his workup, with results of 2 sets of positive blood cultures for Staph epidermidis  The patient denies any skin lesions or open wounds  On my assessment, the patient denies any fevers, chills, sweats, rigors  He denies any pain  Patient and spouse offer no acute concerns at this time  Prior to Admission Medications   Prescriptions Last Dose Informant Patient Reported? Taking? Elastic Bandages & Supports (Wrist Brace Deluxe/Left L-XL) MISC   No No   Sig: Use nightly for carpal tunnel   Patient not taking: Reported on 3/14/2022    amantadine (SYMMETREL) 100 mg capsule   No No   Sig: TAKE 1 CAPSULE DAILY AT 5 P M    carbidopa-levodopa (SINEMET)  mg per tablet   No No   Sig: TAKE ONE AND ONE-HALF TABLETS IN THE MORNING, ONE AND ONE-HALF TABLETS IN THE AFTERNOON AND 1 TABLET NIGHTLY      Facility-Administered Medications: None       Past Medical History:   Diagnosis Date    Arthritis     Asbestos exposure     Asbestosis ([address]     GERD (gastroesophageal reflux disease)     Hypertension     Lung disease     Parkinson's disease ([facility] )        Past Surgical History:   Procedure Laterality Date    HERNIA REPAIR      KNEE SURGERY      many years ago    SHOULDER SURGERY         Family History   Problem Relation Age of Onset   [facility] Stroke Mother     Colon cancer Mother     Depression Sister     Hypertension Sister      I have reviewed and agree with the history as documented      E-Cigarette/Vaping  E-Cigarette Use Never User      E-Cigarette/Vaping Substances    Nicotine No     THC No     CBD No     Flavoring No     Other No     Unknown No      Social History     Tobacco Use    Smoking status: Never Smoker    Smokeless tobacco: Never Used   Vaping Use    Vaping Use: Never used   Substance Use Topics    Alcohol use: No    Drug use: No       Review of Systems   Constitutional: Negative for chills, diaphoresis, fatigue and fever  Respiratory: Positive for cough (Hemoptysis)  Cardiovascular: Negative for chest pain and leg swelling  Gastrointestinal: Negative for nausea and vomiting  Neurological: Negative for dizziness, weakness, numbness and headaches  All other systems reviewed and are negative  Physical Exam  Physical Exam  Vitals and nursing note reviewed  Constitutional:       General: He is not in acute distress  Appearance: Normal appearance  He is well-developed  He is not ill-appearing, toxic-appearing or diaphoretic  HENT:      Head: Normocephalic and atraumatic  Right Ear: External ear normal       Left Ear: External ear normal    Eyes:      Conjunctiva/sclera: Conjunctivae normal    Cardiovascular:      Rate and Rhythm: Normal rate and regular rhythm  Pulses: Normal pulses  Pulmonary:      Effort: Pulmonary effort is normal  No respiratory distress  Breath sounds: Normal breath sounds  No wheezing or rhonchi  Chest:      Chest wall: No tenderness  Abdominal:      General: There is no distension  Palpations: Abdomen is soft  Tenderness: There is no abdominal tenderness  There is no guarding or rebound  Musculoskeletal:         General: Normal range of motion  Cervical back: Normal range of motion and neck supple  Skin:     General: Skin is warm and dry  Capillary Refill: Capillary refill takes less than 2 seconds  Neurological:      Mental Status: He is alert  Mental status is at baseline        Motor: Motor function is intact           Vital Signs  ED Triage Vitals   Temperature Pulse Respirations Blood Pressure SpO2   05/11/22 1646 05/11/22 1646 05/11/22 1646 05/11/22 1646 05/11/22 1648   97 7 °F (36 5 °C) 89 20 129/58 98 %      Temp Source Heart Rate Source Patient Position - Orthostatic VS BP Location FiO2 (%)   05/11/22 1646 05/11/22 1646 05/11/22 1646 05/11/22 1646 --   Oral Monitor Sitting Left arm       Pain Score       --                  Vitals:    05/11/22 1646   BP: 129/58   Pulse: 89   Patient Position - Orthostatic VS: Sitting         Visual Acuity      ED Medications  Medications   vancomycin (VANCOCIN) 1,250 mg in sodium chloride 0 9 % 250 mL IVPB (1,250 mg Intravenous New Bag 5/11/22 1721)   amantadine (SYMMETREL) capsule 100 mg (has no administration in time range)   ceftriaxone (ROCEPHIN) 1 g/50 mL in dextrose IVPB (has no administration in time range)   carbidopa-levodopa (SINEMET)  mg per tablet 1 5 tablet (has no administration in time range)   carbidopa-levodopa (SINEMET)  mg per tablet 1 5 tablet (has no administration in time range)   carbidopa-levodopa (SINEMET)  mg per tablet 1 tablet (has no administration in time range)   amantadine (SYMMETREL) capsule 100 mg (has no administration in time range)   sodium chloride 0 9 % infusion (has no administration in time range)   heparin (porcine) subcutaneous injection 5,000 Units (has no administration in time range)   ceftriaxone (ROCEPHIN) 1 g/50 mL in dextrose IVPB (has no administration in time range)       Diagnostic Studies  Results Reviewed     Procedure Component Value Units Date/Time    CBC and differential [[id number]]  (Abnormal) Collected: 05/11/22 1715    Lab Status: Final result Specimen: Blood from Arm, Left Updated: 05/11/22 1722     WBC 4 79 Thousand/uL      RBC 3 85 Million/uL      Hemoglobin 12 4 g/dL      Hematocrit 38 0 %      MCV 99 fL      MCH 32 2 pg      MCHC 32 6 g/dL      RDW 12 6 %      MPV 9 9 fL      Platelets 615 Thousands/uL      nRBC 0 /100 WBCs      Neutrophils Relative 55 %      Immat GRANS % 0 %      Lymphocytes Relative 27 %      Monocytes Relative 14 %      Eosinophils Relative 4 %      Basophils Relative 0 %      Neutrophils Absolute 2 61 Thousands/µL      Immature Grans Absolute 0 01 Thousand/uL      Lymphocytes Absolute 1 31 Thousands/µL      Monocytes Absolute 0 67 Thousand/µL      Eosinophils Absolute 0 17 Thousand/µL      Basophils Absolute 0 02 Thousands/µL     Comprehensive metabolic panel [[id number]] Collected: 05/11/22 1715    Lab Status: In process Specimen: Blood from Arm, Left Updated: 05/11/22 1720    Blood culture #2 [[id number]] Collected: 05/11/22 1715    Lab Status: In process Specimen: Blood from Arm, Right Updated: 05/11/22 1720    Blood culture #1 [[id number]] Collected: 05/11/22 1715    Lab Status: In process Specimen: Blood from Arm, Left Updated: 05/11/22 1720                 No orders to display              Procedures  Procedures         ED Course                                             MDM  Number of Diagnoses or Management Options  Bacteremia: new and requires workup  Diagnosis management comments: This is an 25-year-old male returning to the emergency department for further evaluation and management of abnormal lab results  The patient was previously evaluated yesterday in this emergency department with complaint of hemoptysis x 2-3 days  Blood cultures were drawn with two positive sets for Staph epidermidis  The patient denies any systemic complaints denying fevers, chills, sweats, rigors, fatigue, weakness, chest pain  He denies any pain, n/v/d, or recent illness      Differential diagnosis includes but is not limited to: bacteremia, source unclear at this time; consider pneumonia, endocarditis, cellulitis, uti    Initial ED plan: repeat lab work, initiate broad spectrum antibiotics, admit    Final ED Assessment: Vital signs on ED presentation demonstrate an elevated blood pressure reading  Patient afebrile and non-toxic in appearance  Examination as above which is without acute findings  Labs reviewed, no leukocytosis  Serum creatinine is mildly elevated from the patient's baseline  Repeat blood cultures obtained and are in process  Patient's spouse made aware of plan for hospital admission to continue IV antibiotics with further management per SLIM  Patient started on broad-spectrum IV antibiotics in the emergency department  I had discussed the case with Dr Win Hope, who accepts patient for hospital admission  Bridging orders placed  Amount and/or Complexity of Data Reviewed  Clinical lab tests: ordered and reviewed  Review and summarize past medical records: yes  Independent visualization of images, tracings, or specimens: yes    Risk of Complications, Morbidity, and/or Mortality  Presenting problems: high  Diagnostic procedures: high  Management options: high    Patient Progress  Patient progress: stable      Disposition  Final diagnoses:   Bacteremia     Time reflects when diagnosis was documented in both MDM as applicable and the Disposition within this note     Time User Action Codes Description Comment    5/11/2022  5:28 PM Jelani Stauffer Add [V79 [id number]] Bacteremia       ED Disposition     ED Disposition   Admit    Condition   Stable    Date/Time   Wed May 11, 2022  5:27 PM    Comment   Case was discussed with Dr Priscila Montes and the patient's admission status was agreed to be Admission Status: observation status to the service of Dr Norbert Dodd   Follow-up Information    None         Patient's Medications   Discharge Prescriptions    No medications on file       No discharge procedures on file      PDMP Review     None          ED Provider  Electronically Signed by           Beth Arenas PA-C  05/11/22 9329

## 2022-05-11 NOTE — ASSESSMENT & PLAN NOTE
CXR with no focal consolidation , pneumothorax or any acute cardiopulmonary process  Viral panel is negative for FLU and covid 19  Saturating well >95% on room air  Will obtain CT chest without iv contrast since his GFR is 47  AM labs ordered  Will consider Pulm consult for possible bronchoscopy if CT chest is negative

## 2022-05-11 NOTE — RESULT ENCOUNTER NOTE
5/11/22 1455 -  1st attempt - attempted to contact patient via phone - multiple rings - no answer, no voicemail  1 blood culture positive for staph epidermidis  2nd culture not performed  Needs f/u call to evaluate for symptoms or second blood culture set  May be contaminant unless symptomatic

## 2022-05-11 NOTE — RESULT ENCOUNTER NOTE
5/11/22  able to contact patient  - spoke with wife - 2/2 blood cultures positive - wife bringing him back to ED

## 2022-05-12 LAB
ANION GAP SERPL CALCULATED.3IONS-SCNC: 10 MMOL/L (ref 4–13)
BUN SERPL-MCNC: 27 MG/DL (ref 5–25)
CALCIUM SERPL-MCNC: 8.7 MG/DL (ref 8.3–10.1)
CHLORIDE SERPL-SCNC: 105 MMOL/L (ref 100–108)
CO2 SERPL-SCNC: 26 MMOL/L (ref 21–32)
CREAT SERPL-MCNC: 1.12 MG/DL (ref 0.6–1.3)
ERYTHROCYTE [DISTWIDTH] IN BLOOD BY AUTOMATED COUNT: 12.4 % (ref 11.6–15.1)
GFR SERPL CREATININE-BSD FRML MDRD: 61 ML/MIN/1.73SQ M
GLUCOSE SERPL-MCNC: 93 MG/DL (ref 65–140)
HCT VFR BLD AUTO: 37.9 % (ref 36.5–49.3)
HGB BLD-MCNC: 12.9 G/DL (ref 12–17)
MCH RBC QN AUTO: 32.4 PG (ref 26.8–34.3)
MCHC RBC AUTO-ENTMCNC: 34 G/DL (ref 31.4–37.4)
MCV RBC AUTO: 95 FL (ref 82–98)
PLATELET # BLD AUTO: 180 THOUSANDS/UL (ref 149–390)
PMV BLD AUTO: 9.8 FL (ref 8.9–12.7)
POTASSIUM SERPL-SCNC: 4.6 MMOL/L (ref 3.5–5.3)
RBC # BLD AUTO: 3.98 MILLION/UL (ref 3.88–5.62)
SODIUM SERPL-SCNC: 141 MMOL/L (ref 136–145)
WBC # BLD AUTO: 6.17 THOUSAND/UL (ref 4.31–10.16)

## 2022-05-12 PROCEDURE — 85027 COMPLETE CBC AUTOMATED: CPT | Performed by: NURSE PRACTITIONER

## 2022-05-12 PROCEDURE — 99232 SBSQ HOSP IP/OBS MODERATE 35: CPT | Performed by: NURSE PRACTITIONER

## 2022-05-12 PROCEDURE — 80048 BASIC METABOLIC PNL TOTAL CA: CPT | Performed by: NURSE PRACTITIONER

## 2022-05-12 RX ORDER — SODIUM CHLORIDE 9 MG/ML
50 INJECTION, SOLUTION INTRAVENOUS ONCE
Status: DISCONTINUED | OUTPATIENT
Start: 2022-05-12 | End: 2022-05-13 | Stop reason: HOSPADM

## 2022-05-12 RX ADMIN — AMANTADINE HYDROCHLORIDE 100 MG: 100 CAPSULE, LIQUID FILLED ORAL at 09:13

## 2022-05-12 RX ADMIN — HEPARIN SODIUM 5000 UNITS: 5000 INJECTION INTRAVENOUS; SUBCUTANEOUS at 09:10

## 2022-05-12 RX ADMIN — HEPARIN SODIUM 5000 UNITS: 5000 INJECTION INTRAVENOUS; SUBCUTANEOUS at 21:27

## 2022-05-12 RX ADMIN — VANCOMYCIN HYDROCHLORIDE 750 MG: 1 INJECTION, POWDER, LYOPHILIZED, FOR SOLUTION INTRAVENOUS at 04:57

## 2022-05-12 RX ADMIN — CARBIDOPA AND LEVODOPA 1 TABLET: 25; 100 TABLET ORAL at 17:56

## 2022-05-12 RX ADMIN — CEFTRIAXONE SODIUM 1000 MG: 10 INJECTION, POWDER, FOR SOLUTION INTRAVENOUS at 19:57

## 2022-05-12 RX ADMIN — VANCOMYCIN HYDROCHLORIDE 750 MG: 1 INJECTION, POWDER, LYOPHILIZED, FOR SOLUTION INTRAVENOUS at 16:30

## 2022-05-12 RX ADMIN — CARBIDOPA AND LEVODOPA 1.5 TABLET: 25; 100 TABLET ORAL at 12:51

## 2022-05-12 RX ADMIN — CARBIDOPA AND LEVODOPA 1.5 TABLET: 25; 100 TABLET ORAL at 09:12

## 2022-05-12 NOTE — ASSESSMENT & PLAN NOTE
· Blood culture positive with staph epidermidis yesterday in the ED after initial visit for wich he was called back today  · Repeat blood cx are pending as drawn today  · S/P a dose of vanco and Rocephin in the ED, will continue both while awaiting final blood culture result for abx tailoring  · No known source appreciated

## 2022-05-12 NOTE — UTILIZATION REVIEW
Initial Clinical Review    Admission: Date/Time/Statement:   Admission Orders (From admission, onward)     Ordered        05/11/22 1911  Inpatient Admission  Once                       Inpatient Admission     Standing Status:   Standing     Number of Occurrences:   1     Order Specific Question:   Level of Care     Answer:   Med Surg [16]     Order Specific Question:   Estimated length of stay     Answer:   More than 2 Midnights     Order Specific Question:   Certification     Answer:   I certify that inpatient services are medically necessary for this patient for a duration of greater than two midnights  See H&P and MD Progress Notes for additional information about the patient's course of treatment  ED Arrival Information     Expected   -    Arrival   5/11/2022 16:42    Acuity   Emergent            Means of arrival   Walk-In    Escorted by   Family Member    Service   Hospitalist    Admission type   Emergency            Arrival complaint   Abnormal Labs            Chief Complaint   Patient presents with    Abnormal Lab     Positive blood cultures from yesterdays visit       Initial Presentation: [de-identified] y o  male to ED from home w/ 1 week of cough improved mildly but then developed streaks of bld 3 days ago   Seen in ED yest for labs   Called back for admission sec to + BC gran + cocci in clusters   In ED O2 sat wnl , CR 1 38  given rocephin and vanco   Admitted IP status for bacteremia , no known source appreciated , consider ID consult , repeat BC pending   Hemoptysis obtain CT chest , consider pulm consult for possible bronchoscopy if CT is neg   BONIFACIO gentle IVF and recheck   Hx parkinsons , resume home meds  Date: 5/12   Day 2: cont stay for BONIFACIO , bld cultures   CR improved w/ IVF   Repeat BC drawn today    Cont IV ceftriaxone and IV vanco        ED Triage Vitals   Temperature Pulse Respirations Blood Pressure SpO2   05/11/22 1646 05/11/22 1646 05/11/22 1646 05/11/22 1646 05/11/22 1648   97 7 °F (36 5 °C) 89 20 129/58 98 %      Temp Source Heart Rate Source Patient Position - Orthostatic VS BP Location FiO2 (%)   05/11/22 1646 05/11/22 1646 05/11/22 1646 05/11/22 1646 --   Oral Monitor Sitting Left arm       Pain Score       05/11/22 1920       No Pain          Wt Readings from Last 1 Encounters:   05/11/22 93 kg (205 lb 0 4 oz)     Additional Vital Signs:   05/12/22 09:02:12 97 6 °F (36 4 °C) 79 -- -- -- 94 % -- --   05/12/22 07:22:26 -- 73 -- 174/86 Abnormal  115 96 % -- --   05/12/22 0356 -- 79 -- -- -- -- -- --   05/11/22 23:15:53 97 7 °F (36 5 °C) 79 18 173/87 Abnormal  116 97 % -- --   05/11/22 1920 -- -- -- -- -- -- None (Room air) --   05/11/22 18:37:20 97 9 °F (36 6 °C) 86 -- 180/89 Abnormal  119 98 % None (Room air) --   05/11/22 1648 -- -- -- -- -- 98 % --        Pertinent Labs/Diagnostic Test Results:   5/11 EKG Sinus rhythm with 1st degree A-V block  Otherwise normal ECG  CT chest high resolution   Final Result by Froy Mcmahan MD (05/12 0946)         1  No CT evidence of interstitial lung disease  No active pulmonary disease  2   Stable bilateral pleural plaques, in keeping with asbestosis related pleural disease  No evidence of parenchymal asbestosis                    Workstation performed: [id number]           Results from last 7 days   Lab Units 05/10/22  1334   SARS-COV-2  Negative     Results from last 7 days   Lab Units 05/12/22  1234 05/11/22  1856 05/11/22  1715 05/10/22  1334   WBC Thousand/uL 6 17  --  4 79 4 52   HEMOGLOBIN g/dL 12 9  --  12 4 13 3   HEMATOCRIT % 37 9  --  38 0 39 8   PLATELETS Thousands/uL 180 163 167 165   NEUTROS ABS Thousands/µL  --   --  2 61 2 46     Results from last 7 days   Lab Units 05/12/22  1234 05/11/22  1715 05/10/22  1334   SODIUM mmol/L 141 141 142   POTASSIUM mmol/L 4 6 4 1 4 9   CHLORIDE mmol/L 105 107 105   CO2 mmol/L 26 27 28   ANION GAP mmol/L 10 7 9   BUN mg/dL 27* 32* 26*   CREATININE mg/dL 1 12 1 38* 1 25   EGFR ml/min/1 73sq m 61 47 54 CALCIUM mg/dL 8 7 9 0 8 8     Results from last 7 days   Lab Units 05/11/22  1715 05/10/22  1334   AST U/L 13 14   ALT U/L <6* 7*   ALK PHOS U/L 117* 130*   TOTAL PROTEIN g/dL 6 6 6 9   ALBUMIN g/dL 3 6 3 8   TOTAL BILIRUBIN mg/dL 0 64 0 92   BILIRUBIN DIRECT mg/dL  --  0 18     Results from last 7 days   Lab Units 05/12/22  1234 05/11/22  1715 05/10/22  1334   GLUCOSE RANDOM mg/dL 93 104 88     Results from last 7 days   Lab Units 05/10/22  1334   HS TNI 0HR ng/L 3     Results from last 7 days   Lab Units 05/10/22  1334   PROTIME seconds 13 9   INR  1 11   PTT seconds 34     Results from last 7 days   Lab Units 05/11/22  1856   PROCALCITONIN ng/ml 0 06     Results from last 7 days   Lab Units 05/10/22  1334   LACTIC ACID mmol/L 0 8       Results from last 7 days   Lab Units 05/10/22  1334   NT-PRO BNP pg/mL 348       Results from last 7 days   Lab Units 05/10/22  1334   INFLUENZA A PCR  Negative   INFLUENZA B PCR  Negative   RSV PCR  Negative       Results from last 7 days   Lab Units 05/11/22  1715 05/10/22  1334   BLOOD CULTURE  Received in Microbiology Lab  Culture in Progress  Received in Microbiology Lab  Culture in Progress   Staphylococcus coagulase negative*  Staphylococcus epidermidis*   GRAM STAIN RESULT   --  Gram positive cocci in clusters*  Gram positive cocci in clusters*     ED Treatment:   Medication Administration from 05/11/2022 1642 to 05/11/2022 1819       Date/Time Order Dose Route Action Action by Comments     05/11/2022 1721 vancomycin (VANCOCIN) 1,250 mg in sodium chloride 0 9 % 250 mL IVPB 1,250 mg Intravenous New Bag Ehsan Luo RN         Past Medical History:   Diagnosis Date    Arthritis     Asbestos exposure     [facility]bestosis ([facility] )     GERD (gastroesophageal reflux disease)     Hypertension     Lung disease     Parkinson's disease ([facility] )      Present on Admission:   Bacteremia   Hemoptysis   BONIFACIO (acute kidney injury) ([facility] )   Parkinson's disease ([facility] )      Admitting Diagnosis: Bacteremia [R78 81]  Parkinson's disease ([facility] ) [G20]  Abnormal laboratory test [R89 9]  [facility]ONIFACIO (acute kidney injury) ([facility] ) [N17 9]  Hemoptysis [R04 2]  Age/Sex: [de-identified] y o  male  Admission Orders:  Scheduled Medications:  amantadine, 100 mg, Oral, Daily  amantadine, 100 mg, Oral, Daily  carbidopa-levodopa, 1 tablet, Oral, TID  carbidopa-levodopa, 1 tablet, Oral, QPM  carbidopa-levodopa, 1 5 tablet, Oral, Daily With Lunch  carbidopa-levodopa, 1 5 tablet, Oral, QAM  cefTRIAXone, 1,000 mg, Intravenous, Once  cefTRIAXone, 1,000 mg, Intravenous, Q24H  heparin (porcine), 5,000 Units, Subcutaneous, Q12H [address]  vancomycin, 10 mg/kg (Adjusted), Intravenous, Q12H      Continuous IV Infusions:  sodium chloride, 50 mL/hr, Intravenous, Continuous      PRN Meds:     amb pt   Reg diet     IP CONSULT TO PHARMACY    Network Utilization Review Department  ATTENTION: Please call with any questions or concerns to [phone] and carefully listen to the prompts so that you are directed to the right person  All voicemails are confidential   Malu Abbott all requests for admission clinical reviews, approved or denied determinations and any other requests to dedicated fax number below belonging to the campus where the patient is receiving treatment   List of dedicated fax numbers for the Facilities:  [address] DENIALS (Administrative/Medical Necessity) [phone]   [address] (Maternity/NICU/Pediatrics) [phone]   [address] [place] [address]  [address] [facility] [place] [address] [address] [place] [address] [phone]   Nir Cisneros [address] [place] [address] [address] [place] [address] [phone]

## 2022-05-12 NOTE — CASE MANAGEMENT
Case Management Assessment & Discharge Planning Note    Patient name Abhinav Mary Beth  Location /-01 MRN [id number]  : 1940 Date 2022       Current Admission Date: 2022  Current Admission Diagnosis:Bacteremia   Patient Active Problem List    Diagnosis Date Noted    Bacteremia 2022    Hemoptysis 2022    BONIFACIO (acute kidney injury) ([facility] ) 2022    MGUS (monoclonal gammopathy of unknown significance) 2022    Neuropathy 2022    [facility]'s disease ([facility] ) 2020    Tear of right supraspinatus tendon 2019    Chronic right shoulder pain 2019    Internal derangement of right shoulder 2019      LOS (days): 1  Geometric Mean LOS (GMLOS) (days): 3 50  Days to GMLOS:2 7     OBJECTIVE:    Risk of Unplanned Readmission Score: 10 08         Current admission status: Inpatient       Preferred Pharmacy:   [address] [place] [address] [address] [facility]  Phone: [phone] Fax: [phone]    [address] [place] [address]  Phone: [phone] Fax: [phone]    Primary Care Provider: Re Walters DO    Primary Insurance: [facility]  Secondary Insurance:     ASSESSMENT:  Analia Mcdaniel Proxies     [facility] Representative - Spouse   Primary Phone: [phone] (Home)               Advance Directives  Does patient have a [address] [facility] [address]?: No  Was patient offered paperwork?: No (patient has dementia; not appropriate   Wife identifies herself as HCR )  Does patient currently have a Health Care decision maker?: Yes, please see Health Care Proxy section  Does patient have Advance Directives?: Yes  Advance Directives: Living will  Primary Contact: spouse Ju Arroyo         Readmission Root Cause  30 Day Readmission: No    Patient Information  Admitted from[de-identified] Home  Mental Status: Alert  During Assessment patient was accompanied by: Spouse  Assessment information provided by[de-identified] Patient, Spouse  Primary Caregiver: Self  Support Systems: Spouse/significant other  [place] of Residence: [place] [address] do you live in?: [address] entry access options   Select all that apply : Stairs  Number of steps to enter home : 1  Type of Current Residence: [place]  In the last 12 months, was there a time when you were not able to pay the mortgage or rent on time?: No  In the last 12 months, how many places have you lived?: 1  In the last 12 months, was there a time when you did not have a steady place to sleep or slept in a shelter (including now)?: No  Living Arrangements: Lives w/ Spouse/significant other    Activities of Daily Living Prior to Admission  Functional Status: Assistance  Completes ADLs independently?: No  Level of ADL dependence: Assistance (assist from spouse with certain movements while getting dressed)  Ambulates independently?: Yes  Does patient use assisted devices?: No  Does patient currently own DME?: Yes  What DME does the patient currently own?: Other (Comment) (raised toilet, electric recliner chair)  Does patient have a history of Outpatient Therapy (PT/OT)?: No  Does the patient have a history of Short-Term Rehab?: No  Does patient have a history of HHC?: No         Patient Information Continued  Does patient have prescription coverage?: Yes  Within the past 12 months, you worried that your food would run out before you got the money to buy more : Never true  Within the past 12 months, the food you bought just didn't last and you didn't have money to get more : Never true  Does patient receive dialysis treatments?: No  Does patient have a history of substance abuse?: No    PHQ 2/9 Screening   Reviewed PHQ 2/9 Depression Screening Score?: No    Means of Transportation  Means of Transport to Appts[de-identified] Drives Self  In the past 12 months, has lack of transportation kept you from medical appointments or from getting medications?: No  In the past 12 months, has lack of transportation kept you from meetings, work, or from getting things needed for daily living?: No        DISCHARGE DETAILS:    Discharge planning discussed with[de-identified] patient and spouse at bedside  Freedom of Choice: Yes  Comments - Freedom of Choice: CM met with patient and spouse at bedside to introduce self/role  Patient is alert and seated in recliner; he did participate in assessment but spouse provided most information  Patient requires some assistance from his spouse when getting dressed, like when buttoning his shirt or putting on his suspenders  Otherwise, he still drives and does not use any DME to ambulate  Patient is hoping to dc home later today vs tomorrow once his sensitivities come back  No CM needs anticipated at this time though CM will continue to follow and will remain available through discharge  CM contacted family/caregiver?: Yes  Were Treatment Team discharge recommendations reviewed with patient/caregiver?: Yes  Did patient/caregiver verbalize understanding of patient care needs?: Yes  Were patient/caregiver advised of the risks associated with not following Treatment Team discharge recommendations?: Yes    Contacts  Patient Contacts: spouse Rc Lassiter  Relationship to Patient[de-identified] Family  Contact Method:  In Person  Reason/Outcome: Continuity of Care, Discharge [address]         Is the patient interested in [facility] at discharge?: No    DME Referral Provided  Referral made for DME?: No        Treatment Team Recommendation: Home  Discharge Destination Plan[de-identified] Home  Transport at Discharge : Family

## 2022-05-12 NOTE — ASSESSMENT & PLAN NOTE
Patient was discharged from the ED on 05/10 for hemoptysis; has a history of asbestos  CXR negative  Viral panel is negative for FLU and covid 19  CT chest  No CT evidence of interstitial lung disease   No active pulmonary disease   Stable bilateral pleural plaques, in keeping with asbestosis related pleural disease   No evidence of parenchymal asbestosis  Recommend outpatient pulmonary follow-up  Hemoglobin stable

## 2022-05-12 NOTE — PROGRESS NOTES
[address] [facility]  Progress Note - Elizabeth Joiner  1940, [de-identified] y o  male MRN: [id number]  Unit/Bed#: MS Corazon Encounter: [id number]  Primary Care Provider: Niranjan Oliveira DO   Date and time admitted to hospital: 5/11/2022  4:48 PM    * Bacteremia  Assessment & Plan  · Blood culture positive with staph epidermidis yesterday in the ED after initial visit for wich he was called back today  · Repeat blood cx are pending as drawn today  · S/P a dose of vanco and Rocephin in the ED, will continue both while awaiting final blood culture result for abx tailoring  · No known source appreciated    BONIFACIO (acute kidney injury) ([facility] )  Assessment & Plan  Creatinine of 1 38 with a baseline of 1 04 to 1 25  Will gently hydrate with a total of 1 LNS in consideration of his age  Resolved with IV fluids  1 12    Hemoptysis  Assessment & Plan  Patient was discharged from the ED on 05/10 for hemoptysis; has a history of asbestos  CXR negative  Viral panel is negative for FLU and covid 19  CT chest  No CT evidence of interstitial lung disease   No active pulmonary disease  Stable bilateral pleural plaques, in keeping with asbestosis related pleural disease   No evidence of parenchymal asbestosis  Recommend outpatient pulmonary follow-up  Hemoglobin stable    Parkinson's disease ([facility] )  Assessment & Plan  · Resume home dose of Amantadine and Sinemet  · Exercise fall precaution     VTE Pharmacologic Prophylaxis: VTE Score: 4 Moderate Risk (Score 3-4) - Pharmacological DVT Prophylaxis Ordered: heparin  Patient Centered Rounds: I performed bedside rounds with nursing staff today  Discussions with Specialists or Other Care Team Provider:  Reviewed notes    Education and Discussions with Family / Patient:  Discussed with patient and family    Time Spent for Care: 20 minutes  More than 50% of total time spent on counseling and coordination of care as described above      Current Length of Stay: 1 day(s)  Current Patient Status: Inpatient   Certification Statement: The patient will continue to require additional inpatient hospital stay due to BONIFACIO, blood cultures  Discharge Plan: Anticipate discharge tomorrow to home  Code Status: Level 1 - Full Code    Subjective:   Resting in chair wife at bedside offers no complaints or concerns at this time    Objective:     Vitals:   Temp (24hrs), Av 7 °F (36 5 °C), Min:97 6 °F (36 4 °C), Max:97 9 °F (36 6 °C)    Temp:  [97 6 °F (36 4 °C)-97 9 °F (36 6 °C)] 97 6 °F (36 4 °C)  HR:  [73-89] 79  Resp:  [18-20] 18  BP: (129-180)/(58-89) 174/86  SpO2:  [94 %-98 %] 94 %  Body mass index is 31 17 kg/m²  Input and Output Summary (last 24 hours): Intake/Output Summary (Last 24 hours) at 2022 1434  Last data filed at 2022 1301  Gross per 24 hour   Intake 2113 33 ml   Output 1525 ml   Net 588 33 ml       Physical Exam:   Physical Exam  Constitutional:       Appearance: He is ill-appearing  HENT:      Head: Normocephalic  Cardiovascular:      Rate and Rhythm: Normal rate  Pulmonary:      Breath sounds: Normal breath sounds  Abdominal:      Palpations: Abdomen is soft  Skin:     General: Skin is warm  Neurological:      Mental Status: He is alert  Mental status is at baseline  Psychiatric:         Mood and Affect: Mood normal          Additional Data:     Labs:  Results from last 7 days   Lab Units 22  1234 22  1856 22  1715   WBC Thousand/uL 6 17  --  4 79   HEMOGLOBIN g/dL 12 9  --  12 4   HEMATOCRIT % 37 9  --  38 0   PLATELETS Thousands/uL 180   < > 167   NEUTROS PCT %  --   --  55   LYMPHS PCT %  --   --  27   MONOS PCT %  --   --  14*   EOS PCT %  --   --  4    < > = values in this interval not displayed       Results from last 7 days   Lab Units 22  1234 22  1715   SODIUM mmol/L 141 141   POTASSIUM mmol/L 4 6 4 1   CHLORIDE mmol/L 105 107   CO2 mmol/L 26 27   BUN mg/dL 27* 32*   CREATININE mg/dL 1 12 1 38*   ANION GAP mmol/L 10 7   CALCIUM mg/dL 8 7 9 0   ALBUMIN g/dL  --  3 6   TOTAL BILIRUBIN mg/dL  --  0 64   ALK PHOS U/L  --  117*   ALT U/L  --  <6*   AST U/L  --  13   GLUCOSE RANDOM mg/dL 93 104     Results from last 7 days   Lab Units 05/10/22  1334   INR  1 11             Results from last 7 days   Lab Units 05/11/22  1856 05/10/22  1334   LACTIC ACID mmol/L  --  0 8   PROCALCITONIN ng/ml 0 06  --        Lines/Drains:  Invasive Devices  Report    Peripheral Intravenous Line  Duration           Peripheral IV 05/11/22 Left Antecubital <1 day                      Recent Cultures (last 7 days):   Results from last 7 days   Lab Units 05/11/22  1715 05/10/22  1334   BLOOD CULTURE  Received in Microbiology Lab  Culture in Progress  Received in Microbiology Lab  Culture in Progress  Staphylococcus epidermidis*  Staphylococcus coagulase negative*   GRAM STAIN RESULT   --  Gram positive cocci in clusters*  Gram positive cocci in clusters*       Last 24 Hours Medication List:   Current Facility-Administered Medications   Medication Dose Route Frequency Provider Last Rate    amantadine  100 mg Oral Daily Jenna Graff MD      carbidopa-levodopa  1 tablet Oral QPM Jenna Graff MD      carbidopa-levodopa  1 5 tablet Oral Daily With Lunch Jenna Graff MD      carbidopa-levodopa  1 5 tablet Oral QAM Jenna Graff MD      cefTRIAXone  1,000 mg Intravenous Q24H Jenna Graff MD 1,000 mg (05/11/22 2109)    heparin (porcine)  5,000 Units Subcutaneous Q12H [facility] Jenna Graff MD      sodium chloride  50 mL/hr Intravenous Once MITESH Leon 50 mL/hr (05/12/22 1116)    vancomycin  10 mg/kg (Adjusted) Intravenous Q12H Terry Anthony MD          Today, Patient Was Seen By: MITESH Leon    **Please Note: This note may have been constructed using a voice recognition system  **

## 2022-05-12 NOTE — PROGRESS NOTES
Vancomycin Assessment    Frankey Moos  is a [de-identified] y o  male who is currently receiving vancomycin 125omf IV q12h for bacteremia     Relevant clinical data and objective history reviewed:  Creatinine   Date Value Ref Range Status   05/11/2022 1 38 (H) 0 60 - 1 30 mg/dL Final     Comment:     Standardized to IDMS reference method   05/10/2022 1 25 0 60 - 1 30 mg/dL Final     Comment:     Standardized to IDMS reference method   02/26/2021 1 18 0 60 - 1 30 mg/dL Final     Comment:     Standardized to IDMS reference method   04/29/2015 0 82 0 60 - 1 30 mg/dL Final     Comment:     Standardized to IDMS reference method   06/04/2014 0 79 0 60 - 1 30 mg/dL Final     Comment:     Standardized to IDMS reference method     BP (!) 173/87   Pulse 79   Temp 97 7 °F (36 5 °C)   Resp 18   SpO2 97%   I/O last 3 completed shifts: In: 730 [P O :730]  Out: 300 [Urine:300]  Lab Results   Component Value Date/Time    BUN 32 (H) 05/11/2022 05:15 PM    BUN 22 04/29/2015 09:10 AM    WBC 4 79 05/11/2022 05:15 PM    WBC 7 23 04/29/2015 09:10 AM    HGB 12 4 05/11/2022 05:15 PM    HGB 14 6 04/29/2015 09:10 AM    HCT 38 0 05/11/2022 05:15 PM    HCT 42 3 04/29/2015 09:10 AM    MCV 99 (H) 05/11/2022 05:15 PM    MCV 93 04/29/2015 09:10 AM     05/11/2022 06:56 PM     04/29/2015 09:10 AM     Temp Readings from Last 3 Encounters:   05/11/22 97 7 °F (36 5 °C)   05/10/22 97 7 °F (36 5 °C) (Oral)   04/29/22 97 8 °F (36 6 °C)     Vancomycin Days of Therapy: 2    Assessment/Plan  The patient is currently on vancomycin utilizing scheduled dosing based on adjusted body weight (due to obesity)  Baseline risks associated with therapy include: pre-existing renal impairment, concomitant nephrotoxic medications, advanced age, and dehydration  The patient is currently receiving 125omf IV q12h and after clinical evaluation will be changed to 750mg IV q12h    Pharmacy will also follow closely for s/sx of nephrotoxicity, infusion reactions, and appropriateness of therapy  BMP and CBC will be ordered per protocol  Plan for trough as patient approaches steady state, prior to the 5th  dose at approximately 1630 on 5/13/22  Due to infection severity, will target a trough of 15-20 (appropriate for most indications)   Pharmacy will continue to follow the patients culture results and clinical progress daily      Riddhi Westfall

## 2022-05-12 NOTE — PLAN OF CARE
Problem: MOBILITY - ADULT  Goal: Maintain or return to baseline ADL function  Description: INTERVENTIONS:  -  Assess patient's ability to carry out ADLs; assess patient's baseline for ADL function and identify physical deficits which impact ability to perform ADLs (bathing, care of mouth/teeth, toileting, grooming, dressing, etc )  - Assess/evaluate cause of self-care deficits   - Assess range of motion  - Assess patient's mobility; develop plan if impaired  - Assess patient's need for assistive devices and provide as appropriate  - Encourage maximum independence but intervene and supervise when necessary  - Involve family in performance of ADLs  - Assess for home care needs following discharge   - Consider OT consult to assist with ADL evaluation and planning for discharge  - Provide patient education as appropriate  5/12/2022 0353 by Lori Granados RN  Outcome: Progressing  5/12/2022 0352 by Lori Granados RN  Outcome: Progressing     Problem: PAIN - ADULT  Goal: Verbalizes/displays adequate comfort level or baseline comfort level  Description: Interventions:  - Encourage patient to monitor pain and request assistance  - Assess pain using appropriate pain scale  - Administer analgesics based on type and severity of pain and evaluate response  - Implement non-pharmacological measures as appropriate and evaluate response  - Consider cultural and social influences on pain and pain management  - Notify physician/advanced practitioner if interventions unsuccessful or patient reports new pain  Outcome: Progressing     Problem: DISCHARGE PLANNING  Goal: Discharge to home or other facility with appropriate resources  Description: INTERVENTIONS:  - Identify barriers to discharge w/patient and caregiver  - Arrange for needed discharge resources and transportation as appropriate  - Identify discharge learning needs (meds, wound care, etc )  - Arrange for interpretive services to assist at discharge as needed  - Refer to Case Management Department for coordinating discharge planning if the patient needs post-hospital services based on physician/advanced practitioner order or complex needs related to functional status, cognitive ability, or social support system  Outcome: Progressing     Problem: Knowledge Deficit  Goal: Patient/family/caregiver demonstrates understanding of disease process, treatment plan, medications, and discharge instructions  Description: Complete learning assessment and assess knowledge base    Interventions:  - Provide teaching at level of understanding  - Provide teaching via preferred learning methods  Outcome: Progressing

## 2022-05-12 NOTE — ASSESSMENT & PLAN NOTE
Creatinine of 1 38 with a baseline of 1 04 to 1 25  Will gently hydrate with a total of 1 LNS in consideration of his age  Resolved with IV fluids  1 12

## 2022-05-13 VITALS
SYSTOLIC BLOOD PRESSURE: 158 MMHG | DIASTOLIC BLOOD PRESSURE: 89 MMHG | TEMPERATURE: 96.6 F | HEIGHT: 68 IN | BODY MASS INDEX: 31.07 KG/M2 | WEIGHT: 205.03 LBS | RESPIRATION RATE: 20 BRPM | OXYGEN SATURATION: 95 % | HEART RATE: 85 BPM

## 2022-05-13 LAB
ANION GAP SERPL CALCULATED.3IONS-SCNC: 9 MMOL/L (ref 4–13)
BACTERIA BLD CULT: ABNORMAL
BUN SERPL-MCNC: 24 MG/DL (ref 5–25)
CALCIUM SERPL-MCNC: 8.5 MG/DL (ref 8.3–10.1)
CHLORIDE SERPL-SCNC: 107 MMOL/L (ref 100–108)
CO2 SERPL-SCNC: 27 MMOL/L (ref 21–32)
CREAT SERPL-MCNC: 0.95 MG/DL (ref 0.6–1.3)
ERYTHROCYTE [DISTWIDTH] IN BLOOD BY AUTOMATED COUNT: 12.5 % (ref 11.6–15.1)
GFR SERPL CREATININE-BSD FRML MDRD: 75 ML/MIN/1.73SQ M
GLUCOSE SERPL-MCNC: 95 MG/DL (ref 65–140)
GRAM STN SPEC: ABNORMAL
HCT VFR BLD AUTO: 37.9 % (ref 36.5–49.3)
HGB BLD-MCNC: 13 G/DL (ref 12–17)
MCH RBC QN AUTO: 32.7 PG (ref 26.8–34.3)
MCHC RBC AUTO-ENTMCNC: 34.3 G/DL (ref 31.4–37.4)
MCV RBC AUTO: 95 FL (ref 82–98)
PLATELET # BLD AUTO: 162 THOUSANDS/UL (ref 149–390)
PMV BLD AUTO: 9.9 FL (ref 8.9–12.7)
POTASSIUM SERPL-SCNC: 4.3 MMOL/L (ref 3.5–5.3)
RBC # BLD AUTO: 3.98 MILLION/UL (ref 3.88–5.62)
S EPIDERMIDIS DNA BLD POS QL NAA+NON-PRB: DETECTED
SODIUM SERPL-SCNC: 143 MMOL/L (ref 136–145)
WBC # BLD AUTO: 7 THOUSAND/UL (ref 4.31–10.16)

## 2022-05-13 PROCEDURE — 80048 BASIC METABOLIC PNL TOTAL CA: CPT | Performed by: NURSE PRACTITIONER

## 2022-05-13 PROCEDURE — 85027 COMPLETE CBC AUTOMATED: CPT | Performed by: NURSE PRACTITIONER

## 2022-05-13 PROCEDURE — 99239 HOSP IP/OBS DSCHRG MGMT >30: CPT | Performed by: NURSE PRACTITIONER

## 2022-05-13 RX ADMIN — CARBIDOPA AND LEVODOPA 1.5 TABLET: 25; 100 TABLET ORAL at 10:00

## 2022-05-13 RX ADMIN — VANCOMYCIN HYDROCHLORIDE 750 MG: 1 INJECTION, POWDER, LYOPHILIZED, FOR SOLUTION INTRAVENOUS at 05:21

## 2022-05-13 RX ADMIN — AMANTADINE HYDROCHLORIDE 100 MG: 100 CAPSULE, LIQUID FILLED ORAL at 10:00

## 2022-05-13 RX ADMIN — HEPARIN SODIUM 5000 UNITS: 5000 INJECTION INTRAVENOUS; SUBCUTANEOUS at 10:00

## 2022-05-13 NOTE — ASSESSMENT & PLAN NOTE
· Blood culture positive with staph epidermidis yesterday in the ED after initial visit for wich he was called back   · Repeat blood cx negative for 24 hours  · S/P a dose of vanco and Rocephin in the ED, will continue both while awaiting final blood culture result for abx tailoring  · Bacteremia was likely a false positive in the setting of contamination of the initial blood culture set

## 2022-05-13 NOTE — UTILIZATION REVIEW
Notification of Discharge   This is a Notification of Discharge from our facility [address]  Please be advised that this patient has been discharge from our facility  Below you will find the admission and discharge date and time including the patients disposition  UTILIZATION REVIEW CONTACT:  Majo Giordano  Utilization   Network Utilization Review Department  Phone: [phone] x carefully listen to the prompts  All voicemails are confidential   Email: [email or web address]     PHYSICIAN ADVISORY SERVICES:  FOR EOUB-LD-AGJO REVIEW - MEDICAL NECESSITY DENIAL  Phone: [phone]  Fax: [phone]  Email: [email or web address]     PRESENTATION DATE: 5/11/2022  4:48 PM  OBERVATION ADMISSION DATE: 05/11/22  INPATIENT ADMISSION DATE: 5/11/22  7:11 PM   DISCHARGE DATE: 5/13/2022 11:56 AM  DISPOSITION: Home/Self Care Home/Self Care      IMPORTANT INFORMATION:  Send all requests for admission clinical reviews, approved or denied determinations and any other requests to dedicated fax number below belonging to the campus where the patient is receiving treatment   List of dedicated fax numbers:  [address] [place] [address] DENIALS (Administrative/Medical Necessity) [phone]   [address] [place] [address] (Maternity/NICU/Pediatrics) [phone]   [facility] [phone]   [address] [facility] [phone]   [facility] [place]  [phone]   [address] [place] [address] [address] [place] [address],[address] [phone]   [facility]  [phone]   [address] [facility]  [address] [facility] [address] [facility] [phone]

## 2022-05-13 NOTE — DISCHARGE SUMMARY
[facility]  Discharge- Katie Lamas  1940, [de-identified] y o  male MRN: [id number]  Unit/Bed#: [facility] Encounter: [id number]  Primary Care Provider: Olya Quinn DO   Date and time admitted to hospital: 5/11/2022  4:48 PM    * Bacteremia  Assessment & Plan  · Blood culture positive with staph epidermidis yesterday in the ED after initial visit for wich he was called back   · Repeat blood cx negative for 24 hours  · S/P a dose of vanco and Rocephin in the ED, will continue both while awaiting final blood culture result for abx tailoring  · Bacteremia was likely a false positive in the setting of contamination of the initial blood culture set    BONIFACIO (acute kidney injury) ([facility] [address] )  Assessment & Plan  Creatinine of 1 38 with a baseline of 1 04 to 1 25  Will gently hydrate with a total of 1 LNS in consideration of his age  Resolved with IV fluids      Hemoptysis  Assessment & Plan  Patient was discharged from the ED on 05/10 for hemoptysis; has a history of asbestos  CXR negative  Viral panel is negative for FLU and covid 19  CT chest  No CT evidence of interstitial lung disease   No active pulmonary disease   Stable bilateral pleural plaques, in keeping with asbestosis related pleural disease   No evidence of parenchymal asbestosis  Has an outpatient pulmonary visit on Tuesday  Hemoglobin stable      Parkinson's disease ([facility] [address] )  Assessment & Plan  · Resume home dose of Amantadine and Sinemet  · Exercise fall precaution          Discharging Physician / Practitioner: MITESH Guevara  PCP: Olya Quinn DO  Admission Date:   Admission Orders (From admission, onward)     Ordered        05/11/22 1911  Inpatient Admission  Once            05/11/22 1728  Place in Observation  Once                      Discharge Date: 05/13/22    Medical Problems             Resolved Problems  Date Reviewed: 5/11/2022   None                 Consultations During Hospital Stay:  · IP CONSULT TO PHARMACY    Procedures Performed:   XR chest 2 views    Result Date: 5/10/2022  Narrative: CHEST INDICATION:   hemoptysis  COMPARISON:  Chest radiograph February 21, 2017 EXAM PERFORMED/VIEWS:  XR CHEST PA & LATERAL FINDINGS: Cardiomediastinal silhouette appears unremarkable  Bibasilar linear atelectasis  Francella Crooked No pneumothorax or pleural effusion  Osseous structures appear within normal limits for patient age  Impression: Bibasilar linear atelectasis  No focal consolidation, pleural effusion or pneumothorax  Workstation performed: [id number]     CT chest high resolution    Result Date: 5/12/2022  Narrative: CT CHEST INCLUDING HIGH RESOLUTION SLICES - WITHOUT CONTRAST INDICATION:   Hemoptysis Hemoptysis and a hx of asbestosis  COMPARISON:  X-ray chest dated 5/10/2022, CT chest dated April 3, 2013  TECHNIQUE: CT examination of the chest was performed without intravenous contrast   Contiguous 1 25 mm transverse images were obtained along with 1 x 10 mm transverse images with the patient prone  Additional thin section images were performed at 10 mm intervals in supine and prone positioning in inspiration as well as supine in expiration  Reformatted images were created in sagittal, and coronal planes  MIP reconstructions were created in the transverse and coronal planes along with minIP reconstructions in the coronal plane  Sagittal reformatted images were also created  Radiation dose length product (DLP) for this visit:  366 mGy-cm   This examination, like all CT scans performed in the [facility], was performed utilizing techniques to minimize radiation dose exposure, including the use of iterative reconstruction and automated exposure control  FINDINGS: LUNGS:  -Patent central airways  Linear atelectasis in the left lower lobe, lingula and right middle lobe    No focal consolidation  -No septal lobular thickening, bronchial wall thickening, bronchiectasis, diffuse nodularity, ground glass infiltrates or honeycombing to suggest interstitial lung disease  -Stable 3 mm right upper lobe nodule (3/111), stable 4 mm right lower lobe nodule (3/152), and stable bilateral perifissural nodules, largest measuring 6 mm along right major fissure (3/under 30)  No suspicious pulmonary nodules  PLEURA:  Bilateral pleural plaques, some of which are calcified, similar to prior CT  Findings are in keeping with asbestosis related pleural disease  HEART/GREAT VESSELS: Mild atherosclerotic calcifications  Otherwise, unremarkable  MEDIASTINUM AND MELODY:  No mediastinal lymphadenopathy  Small hiatal hernia  CHEST WALL AND LOWER NECK:  Unremarkable  VISUALIZED STRUCTURES IN THE UPPER ABDOMEN:  Unremarkable  OSSEOUS STRUCTURES:  No acute fracture or destructive osseous lesion  Degenerative changes of thoracic spine  Impression: 1  No CT evidence of interstitial lung disease  No active pulmonary disease  2   Stable bilateral pleural plaques, in keeping with asbestosis related pleural disease  No evidence of parenchymal asbestosis  Workstation performed: [id number]   ·   ·     Significant Findings / Test Results:   · none    Incidental Findings:   · none     Test Results Pending at Discharge (will require follow up):   · none     Outpatient Tests Requested:  · none    Complications:  none    Past Medical History:   Diagnosis Date    Arthritis     Asbestos exposure     Asbestosis ([facility] [address] [facility]     GERD (gastroesophageal reflux disease)     Hypertension     Lung disease     Parkinson's disease ([facility] [address] )        Reason for Admission: Abnormal Lab (Positive blood cultures from yesterdays visit)       Hospital Course: Jenaro Martinez  is a [de-identified] y o  male patient with past medical history of see above of who originally presented to the hospital on 5/11/2022 due to Abnormal Lab (Positive blood cultures from yesterdays visit) contaminated blood cultures were likely the culprit    Patient has no signs or symptoms of an infection repeat blood cultures are negative patient had a very mild BONIFACIO I which resolved with IV fluids he has an outpatient pulmonary visit on Tuesday    Please see above list of diagnoses and related plan for additional information  Condition at Discharge: stable     Discharge Day Visit / Exam:     Subjective:  Patient is dressed sitting in the chair offers no complaints at this time house eager for discharge  Vitals: Blood Pressure: 158/89 (05/13/22 0744)  Pulse: 85 (05/13/22 0744)  Temperature: (!) 96 6 °F (35 9 °C) (05/13/22 0744)  Temp Source: Oral (05/12/22 1538)  Respirations: 20 (05/12/22 2342)  Height: 5' 8" (172 7 cm) (05/11/22 2315)  Weight - Scale: 93 kg (205 lb 0 4 oz) (05/11/22 2315)  SpO2: 95 % (05/13/22 0744)  Exam:   Physical Exam  Vitals and nursing note reviewed  Cardiovascular:      Rate and Rhythm: Normal rate  Pulmonary:      Breath sounds: Normal breath sounds  Abdominal:      Palpations: Abdomen is soft  Skin:     General: Skin is warm  Neurological:      Mental Status: He is alert  Mental status is at baseline  Psychiatric:         Mood and Affect: Mood normal      Discussion with Family:  Wife    Discharge instructions/Information to patient and family:   See after visit summary for information provided to patient and family  Provisions for Follow-Up Care:  See after visit summary for information related to follow-up care and any pertinent home health orders  Disposition:     Home  ·     Planned Readmission: no     Discharge Statement:  I spent 40 minutes discharging the patient  This time was spent on the day of discharge  I had direct contact with the patient on the day of discharge  Greater than 50% of the total time was spent examining patient, answering all patient questions, arranging and discussing plan of care with patient as well as directly providing post-discharge instructions  Additional time then spent on discharge activities      Discharge Medications:  See after visit summary for reconciled discharge medications provided to patient and family        ** Please Note: This note has been constructed using a voice recognition system **

## 2022-05-13 NOTE — ASSESSMENT & PLAN NOTE
Creatinine of 1 38 with a baseline of 1 04 to 1 25  Will gently hydrate with a total of 1 LNS in consideration of his age  Resolved with IV fluids

## 2022-05-13 NOTE — ASSESSMENT & PLAN NOTE
Patient was discharged from the ED on 05/10 for hemoptysis; has a history of asbestos  CXR negative  Viral panel is negative for FLU and covid 19  CT chest  No CT evidence of interstitial lung disease   No active pulmonary disease   Stable bilateral pleural plaques, in keeping with asbestosis related pleural disease   No evidence of parenchymal asbestosis  Has an outpatient pulmonary visit on Tuesday  Hemoglobin stable

## 2022-05-14 ENCOUNTER — APPOINTMENT (EMERGENCY)
Dept: VASCULAR ULTRASOUND | Facility: HOSPITAL | Age: 82
End: 2022-05-14
Payer: COMMERCIAL

## 2022-05-14 ENCOUNTER — HOSPITAL ENCOUNTER (EMERGENCY)
Facility: HOSPITAL | Age: 82
Discharge: HOME | End: 2022-05-14
Attending: EMERGENCY MEDICINE
Payer: COMMERCIAL

## 2022-05-14 VITALS
TEMPERATURE: 98.1 F | HEART RATE: 90 BPM | SYSTOLIC BLOOD PRESSURE: 148 MMHG | OXYGEN SATURATION: 98 % | RESPIRATION RATE: 18 BRPM | DIASTOLIC BLOOD PRESSURE: 70 MMHG

## 2022-05-14 DIAGNOSIS — M71.22 SYNOVIAL CYST OF LEFT POPLITEAL SPACE: ICD-10-CM

## 2022-05-14 DIAGNOSIS — M79.605 LEFT LEG PAIN: Primary | ICD-10-CM

## 2022-05-14 PROCEDURE — 99284 EMERGENCY DEPT VISIT MOD MDM: CPT | Performed by: EMERGENCY MEDICINE

## 2022-05-14 PROCEDURE — 99283 EMERGENCY DEPT VISIT LOW MDM: CPT

## 2022-05-14 PROCEDURE — 93971 EXTREMITY STUDY: CPT

## 2022-05-14 RX ORDER — ACETAMINOPHEN 325 MG/1
650 TABLET ORAL ONCE
Status: COMPLETED | OUTPATIENT
Start: 2022-05-14 | End: 2022-05-14

## 2022-05-14 RX ADMIN — ACETAMINOPHEN 650 MG: 325 TABLET, FILM COATED ORAL at 15:30

## 2022-05-14 NOTE — ED PROVIDER NOTES
History  Chief Complaint   Patient presents with    Leg Pain     Left calf pain and swelling for a few days, recently admitted for BONIFACIO  Hx of blood clot years ago  Was walking with cane with EMS     [de-identified] yo male with h/o dementia and distant h/o DVT who presents to ED for evaluation of L leg pain x 1 day  Worse in calf  Worsened w ambulation  No associated swelling  Occurs in context of admission last week for + blood cultures (staph epidermidis)  Complete history limited due to dementia  additional history from transporting EMS and wife at bedside  Prior to Admission Medications   Prescriptions Last Dose Informant Patient Reported? Taking? Elastic Bandages & Supports (Wrist Brace Deluxe/Left L-XL) MISC   No No   Sig: Use nightly for carpal tunnel   Patient not taking: No sig reported   amantadine (SYMMETREL) 100 mg capsule   No No   Sig: TAKE 1 CAPSULE DAILY AT 5 P M    carbidopa-levodopa (SINEMET)  mg per tablet   No No   Sig: TAKE ONE AND ONE-HALF TABLETS IN THE MORNING, ONE AND ONE-HALF TABLETS IN THE AFTERNOON AND 1 TABLET NIGHTLY      Facility-Administered Medications: None       Past Medical History:   Diagnosis Date    Arthritis     Asbestos exposure     [facility]bestosis ([facility] [address]     GERD (gastroesophageal reflux disease)     Hypertension     Lung disease     Parkinson's disease ([facility] [address] )        Past Surgical History:   Procedure Laterality Date    HERNIA REPAIR      KNEE SURGERY      many years ago    SHOULDER SURGERY         Family History   Problem Relation Age of Onset    Stroke Mother     Colon cancer Mother     Depression Sister     Hypertension Sister      I have reviewed and agree with the history as documented      E-Cigarette/Vaping    E-Cigarette Use Never User      E-Cigarette/Vaping Substances    Nicotine No     THC No     CBD No     Flavoring No     Other No     Unknown No      Social History     Tobacco Use    Smoking status: Never Smoker    Smokeless tobacco: Never Used   Vaping Use    Vaping Use: Never used   Substance Use Topics    Alcohol use: Not Currently    Drug use: No       Review of Systems   Unable to perform ROS: Dementia       Physical Exam  Physical Exam  Vitals and nursing note reviewed  Constitutional:       General: He is not in acute distress  Appearance: He is well-developed  He is not diaphoretic  HENT:      Head: Normocephalic and atraumatic  Eyes:      General:         Right eye: No discharge  Left eye: No discharge  Conjunctiva/sclera: Conjunctivae normal       Pupils: Pupils are equal, round, and reactive to light  Neck:      Vascular: No JVD  Pulmonary:      Breath sounds: No stridor  Musculoskeletal:         General: Tenderness present  No deformity  Normal range of motion  Cervical back: Normal range of motion and neck supple  Right lower leg: No edema  Left lower leg: No edema  Comments: L calf mildly ttp  Normal perfusion  Calf soft  No pain with passive stretch  Skin:     General: Skin is warm and dry  Capillary Refill: Capillary refill takes less than 2 seconds  Coloration: Skin is not jaundiced or pale  Findings: No bruising, erythema, lesion or rash  Neurological:      General: No focal deficit present  Mental Status: He is alert  He is disoriented  Cranial Nerves: No cranial nerve deficit  Sensory: No sensory deficit  Motor: No abnormal muscle tone        Coordination: Coordination normal          Vital Signs  ED Triage Vitals [05/14/22 1416]   Temperature Pulse Respirations Blood Pressure SpO2   98 1 °F (36 7 °C) 90 18 148/70 98 %      Temp Source Heart Rate Source Patient Position - Orthostatic VS BP Location FiO2 (%)   Oral Monitor -- Right arm --      Pain Score       5           Vitals:    05/14/22 1416   BP: 148/70   Pulse: 90         Visual Acuity      ED Medications  Medications - No data to display    Diagnostic Studies  Results Reviewed     None VAS lower limb venous duplex study, unilateral/limited    (Results Pending)       Prelim result from u/s tech; negative for dvt  + baker's cyst         Procedures  Procedures         ED Course                                             MDM    Disposition  Final diagnoses:   Left leg pain   Synovial cyst of left popliteal space     Time reflects when diagnosis was documented in both MDM as applicable and the Disposition within this note     Time User Action Codes Description Comment    5/14/2022  2:53 PM Will Nevarez [M79 605] Left leg pain     5/14/2022  2:53 PM Will Nevarez [M71 22] Synovial cyst of left popliteal space       ED Disposition     ED Disposition   Discharge    Condition   Stable    Date/Time   Sat May 14, 2022  2:53 PM    [address]  discharge to home/self care  Follow-up Information     Follow up With Specialties Details Why [address], MD Orthopedic Surgery In 1 week  [address] [address]  [phone]            Patient's Medications   Discharge Prescriptions    No medications on file       No discharge procedures on file      PDMP Review     None          ED Provider  Electronically Signed by           Ricardo Vasquez MD  05/14/22 5239

## 2022-05-15 LAB
BACTERIA BLD CULT: ABNORMAL
GRAM STN SPEC: ABNORMAL

## 2022-05-15 PROCEDURE — 93971 EXTREMITY STUDY: CPT | Performed by: SURGERY

## 2022-05-17 ENCOUNTER — CONSULT (OUTPATIENT)
Dept: PULMONOLOGY | Facility: CLINIC | Age: 82
End: 2022-05-17
Payer: COMMERCIAL

## 2022-05-17 VITALS
HEIGHT: 70 IN | TEMPERATURE: 98.8 F | WEIGHT: 207 LBS | DIASTOLIC BLOOD PRESSURE: 64 MMHG | BODY MASS INDEX: 29.63 KG/M2 | HEART RATE: 94 BPM | SYSTOLIC BLOOD PRESSURE: 146 MMHG | OXYGEN SATURATION: 98 %

## 2022-05-17 DIAGNOSIS — J92.0 CALCIFIED PLEURAL PLAQUE DUE TO ASBESTOS EXPOSURE: ICD-10-CM

## 2022-05-17 DIAGNOSIS — R05.8 POST-VIRAL COUGH SYNDROME: Primary | ICD-10-CM

## 2022-05-17 DIAGNOSIS — R06.2 WHEEZING: ICD-10-CM

## 2022-05-17 LAB
BACTERIA BLD CULT: NORMAL
BACTERIA BLD CULT: NORMAL

## 2022-05-17 PROCEDURE — 99204 OFFICE O/P NEW MOD 45 MIN: CPT | Performed by: INTERNAL MEDICINE

## 2022-05-17 RX ORDER — ALBUTEROL SULFATE 90 UG/1
2 AEROSOL, METERED RESPIRATORY (INHALATION) EVERY 6 HOURS PRN
Qty: 6.7 G | Refills: 1 | Status: SHIPPED | OUTPATIENT
Start: 2022-05-17 | End: 2022-08-09 | Stop reason: ALTCHOICE

## 2022-05-17 RX ORDER — IPRATROPIUM BROMIDE AND ALBUTEROL SULFATE 2.5; .5 MG/3ML; MG/3ML
3 SOLUTION RESPIRATORY (INHALATION) 4 TIMES DAILY
Qty: 360 ML | Refills: 1 | Status: SHIPPED | OUTPATIENT
Start: 2022-05-17 | End: 2022-05-17 | Stop reason: ALTCHOICE

## 2022-05-17 RX ORDER — PREDNISONE 10 MG/1
TABLET ORAL
Qty: 18 TABLET | Refills: 0 | Status: SHIPPED | OUTPATIENT
Start: 2022-05-17 | End: 2022-08-09 | Stop reason: ALTCHOICE

## 2022-05-17 RX ORDER — GUAIFENESIN/DEXTROMETHORPHAN 100-10MG/5
5 SYRUP ORAL 3 TIMES DAILY PRN
Qty: 237 ML | Refills: 0 | Status: SHIPPED | OUTPATIENT
Start: 2022-05-17 | End: 2022-08-09

## 2022-05-17 NOTE — PROGRESS NOTES
Assessment/Plan:     Diagnoses and all orders for this visit:    Post-viral cough syndrome  -     dextromethorphan-guaiFENesin (ROBITUSSIN DM)  mg/5 mL syrup; Take 5 mL by mouth as needed in the morning and 5 mL as needed at noon and 5 mL as needed in the evening for cough  Wheezing  -     Discontinue: ipratropium-albuterol (DUO-NEB) 0 5-2 5 mg/3 mL nebulizer solution; Take 3 mL by nebulization in the morning and 3 mL at noon and 3 mL in the evening and 3 mL before bedtime  -     albuterol (Ventolin HFA) 90 mcg/act inhaler; Inhale 2 puffs every 6 (six) hours as needed for wheezing  -     predniSONE 10 mg tablet; Use 3 tab for 3 days and 2 tab for 3 days and one tab for 3 days    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          Plan for follow up:  Post viral cough syndrome with significant cough and wheezing currently the hemoptysis has subsided   Reviewed CT of the chest report and images with the patient with no evidence of any pneumonia bilateral calcified pleural plaques with prior history of asbestos exposure   Will send in the cough syrup  Albuterol MDI 2 puffs 4 times daily as needed also spacer given to the patient and use of the MDI with a spacer was shown to the patient  Prednisone tapering down dose   Call if any worsening symptoms or go into the ER otherwise will see him back in 6 weeks or p r n  earlier as needed  Return in about 6 weeks (around 6/28/2022)  All questions are answered to the patient's satisfaction and understanding  He verbalizes understanding  He is encouraged to call with any further questions or concerns  Portions of the record may have been created with voice recognition software  Occasional wrong word or "sound a like" substitutions may have occurred due to the inherent limitations of voice recognition software  Read the chart carefully and recognize, using context, where substitutions have occurred  a    Electronically Signed by Patsy Granados MD    ______________________________________________________________________    Chief Complaint: No chief complaint on file  Patient ID: Ruiz Cristobal is a [de-identified] y o  y o  male has a past medical history of Arthritis, Asbestos exposure, Asbestosis ([facility] [address] ), GERD (gastroesophageal reflux disease), Hypertension, Lung disease, and Parkinson's disease ([facility] [address] )  5/17/2022  Patient presents today for initial visit  Patient is a very pleasant 81-year-old gentleman, who has never smoked, who used to work as a  exposed to a lot of asbestos he states, has history of repeated episodes of bronchitis in the past also was given an inhaler in the past for wheezing several years ago after an acute URI cough and wheezing last it for several weeks he states  Recently he had an episode of head cold about 3 weeks  ago, no history of any fevers, but after which he started to have significant cough, and had an episode of hemoptysis for which he had to go into the ER CT chest was done no evidence of any pneumonia and the patient was discharged from the ER after 2 2 days had to go back again because of swelling in the leg was found to be bakers  cyst , patient wife States  She also states he her the cough has slightly decreased, he has not having any hemoptysis currently but he has wheezing especially at nighttime           Occupational/Exposure history:  Used to work as pipe  in the past   Travel history:  None  Review of Systems   Constitutional: Positive for fatigue  HENT: Negative  Eyes: Negative  Respiratory: Positive for cough and wheezing  Cardiovascular: Negative  Gastrointestinal: Negative  Endocrine: Negative  Genitourinary: Negative  Musculoskeletal: Negative  Allergic/Immunologic: Negative  Neurological: Negative  Hematological: Negative  Psychiatric/Behavioral: Negative  Social history: He reports that he has never smoked   He has never used smokeless tobacco  He reports previous alcohol use  He reports that he does not use drugs  Past surgical history:   Past Surgical History:   Procedure Laterality Date    HERNIA REPAIR      KNEE SURGERY      many years ago    SHOULDER SURGERY       Family history:   Family History   Problem Relation Age of Onset    Stroke Mother     Colon cancer Mother     Depression Sister     Hypertension Sister        Immunization History   Administered Date(s) Administered    COVID-19 PFIZER VACCINE 0 3 ML IM 02/14/2021, 03/07/2021, 10/28/2021    Influenza Split High Dose Preservative Free IM 10/02/2016     Current Outpatient Medications   Medication Sig Dispense Refill    albuterol (Ventolin HFA) 90 mcg/act inhaler Inhale 2 puffs every 6 (six) hours as needed for wheezing 6 7 g 1    amantadine (SYMMETREL) 100 mg capsule TAKE 1 CAPSULE DAILY AT 5 P M  90 capsule 3    carbidopa-levodopa (SINEMET)  mg per tablet TAKE ONE AND ONE-HALF TABLETS IN THE MORNING, ONE AND ONE-HALF TABLETS IN THE AFTERNOON AND 1 TABLET NIGHTLY 360 tablet 3    dextromethorphan-guaiFENesin (ROBITUSSIN DM)  mg/5 mL syrup Take 5 mL by mouth as needed in the morning and 5 mL as needed at noon and 5 mL as needed in the evening for cough  237 mL 0    predniSONE 10 mg tablet Use 3 tab for 3 days and 2 tab for 3 days and one tab for 3 days 18 tablet 0    Elastic Bandages & Supports (Wrist Brace Deluxe/Left L-XL) MISC Use nightly for carpal tunnel (Patient not taking: No sig reported) 1 each 0     No current facility-administered medications for this visit  Allergies: Morphine and related    Objective:  Vitals:    05/17/22 1408 05/17/22 1411   BP: 146/64    Pulse: 94    Temp: 98 8 °F (37 1 °C)    SpO2:  98%   Weight: 93 9 kg (207 lb)    Height: 5' 10" (1 778 m)    Oxygen Therapy  SpO2: 98 %  Oxygen Therapy: None (Room air)      Wt Readings from Last 3 Encounters:   05/17/22 93 9 kg (207 lb)   05/11/22 93 kg (205 lb 0 4 oz)   04/29/22 93 kg (205 lb)     Body mass index is 29 7 kg/m²  Physical Exam  Vitals and nursing note reviewed  Constitutional:       Appearance: He is well-developed  HENT:      Head: Normocephalic and atraumatic  Eyes:      Conjunctiva/sclera: Conjunctivae normal       Pupils: Pupils are equal, round, and reactive to light  Neck:      Thyroid: No thyromegaly  Vascular: No JVD  Cardiovascular:      Rate and Rhythm: Normal rate and regular rhythm  Heart sounds: Normal heart sounds  No murmur heard  No friction rub  No gallop  Pulmonary:      Effort: Pulmonary effort is normal  No respiratory distress  Breath sounds: Wheezing (occasional expiratory wheeze) present  No rales  Chest:      Chest wall: No tenderness  Musculoskeletal:         General: No tenderness or deformity  Normal range of motion  Cervical back: Normal range of motion and neck supple  Lymphadenopathy:      Cervical: No cervical adenopathy  Skin:     General: Skin is warm and dry  Neurological:      Mental Status: He is alert and oriented to person, place, and time  Diagnostics:  I have personally reviewed pertinent films in PACS  CXR chest 2 views    Result Date: 5/10/2022  Narrative: CHEST INDICATION:   hemoptysis  COMPARISON:  Chest radiograph February 21, 2017 EXAM PERFORMED/VIEWS:  XR CHEST PA & LATERAL FINDINGS: Cardiomediastinal silhouette appears unremarkable  Bibasilar linear atelectasis  Carlyon Burows No pneumothorax or pleural effusion  Osseous structures appear within normal limits for patient age  Impression: Bibasilar linear atelectasis  No focal consolidation, pleural effusion or pneumothorax  Workstation performed: [id number]     CT chest high resolution    Result Date: 5/12/2022  Narrative: CT CHEST INCLUDING HIGH RESOLUTION SLICES - WITHOUT CONTRAST INDICATION:   Hemoptysis Hemoptysis and a hx of asbestosis  COMPARISON:  X-ray chest dated 5/10/2022, CT chest dated April 3, 2013   TECHNIQUE: CT examination of the chest was performed without intravenous contrast   Contiguous 1 25 mm transverse images were obtained along with 1 x 10 mm transverse images with the patient prone  Additional thin section images were performed at 10 mm intervals in supine and prone positioning in inspiration as well as supine in expiration  Reformatted images were created in sagittal, and coronal planes  MIP reconstructions were created in the transverse and coronal planes along with minIP reconstructions in the coronal plane  Sagittal reformatted images were also created  Radiation dose length product (DLP) for this visit:  366 mGy-cm   This examination, like all CT scans performed in the [facility], was performed utilizing techniques to minimize radiation dose exposure, including the use of iterative reconstruction and automated exposure control  FINDINGS: LUNGS:  -Patent central airways  Linear atelectasis in the left lower lobe, lingula and right middle lobe  No focal consolidation  -No septal lobular thickening, bronchial wall thickening, bronchiectasis, diffuse nodularity, ground glass infiltrates or honeycombing to suggest interstitial lung disease  -Stable 3 mm right upper lobe nodule (3/111), stable 4 mm right lower lobe nodule (3/152), and stable bilateral perifissural nodules, largest measuring 6 mm along right major fissure (3/under 30)  No suspicious pulmonary nodules  PLEURA:  Bilateral pleural plaques, some of which are calcified, similar to prior CT  Findings are in keeping with asbestosis related pleural disease  HEART/GREAT VESSELS: Mild atherosclerotic calcifications  Otherwise, unremarkable  MEDIASTINUM AND MELODY:  No mediastinal lymphadenopathy  Small hiatal hernia  CHEST WALL AND LOWER NECK:  Unremarkable  VISUALIZED STRUCTURES IN THE UPPER ABDOMEN:  Unremarkable  OSSEOUS STRUCTURES:  No acute fracture or destructive osseous lesion  Degenerative changes of thoracic spine  Impression: 1  No CT evidence of interstitial lung disease  No active pulmonary disease  2   Stable bilateral pleural plaques, in keeping with asbestosis related pleural disease  No evidence of parenchymal asbestosis  Workstation performed: [id number]     VAS lower limb venous duplex study, unilateral/limited    Result Date: 5/15/2022  Narrative:  [facility] REPORT CLINICAL: Indications: Patient complains of pain behind his knee x 3 days  Doctor wants to rule out deep and superficial venous thrombosis  Operative History: No cardiovascular surgeries Risk Factors The patient has history of HTN and CKD  FINDINGS:  Left     Valve   Reflux Time  FV Dist  Reflux         1 18     CONCLUSION:  Impression: RIGHT LOWER LIMB LIMITED: Evaluation shows no evidence of thrombus in the common femoral vein  Doppler evaluation shows a normal response to augmentation maneuvers  LEFT LOWER LIMB: No evidence of acute or chronic deep vein thrombosis No evidence of superficial thrombophlebitis noted  Deep venous incompetence is noted  Popliteal, posterior tibial and anterior tibial arterial Doppler waveforms are biphasic  There is a well defined hypoechoic non-vascularized cystic-type structure in the popliteal fossa  Technical findings were given to Dr Noam Cohen    SIGNATURE: Electronically Signed by: Michaela Duverney on 8900-08-26 48:71:70 AM

## 2022-05-17 NOTE — PATIENT INSTRUCTIONS
USE THE COUGH SYRUP 3-4 TIMES A DAY   USE THE VENTOLIN INHALER 2 PUFFS OFUR TIMES DAILY AS NEEDED, USE AT NIGHT WHEN U HAVE THE WHEEZE, USE THE INHALER WITH THE SPACER GIVEN AND SHOWN HOW TO USE  IF THERE IS ANY BRIGHT RED BLOOD COUGHING UP PLEASE GO BACK TO THE ER

## 2022-06-16 ENCOUNTER — NEW PATIENT (OUTPATIENT)
Dept: URBAN - METROPOLITAN AREA CLINIC 6 | Facility: CLINIC | Age: 82
End: 2022-06-16

## 2022-06-16 DIAGNOSIS — H04.123: ICD-10-CM

## 2022-06-16 DIAGNOSIS — H25.813: ICD-10-CM

## 2022-06-16 PROCEDURE — 92004 COMPRE OPH EXAM NEW PT 1/>: CPT

## 2022-06-16 ASSESSMENT — VISUAL ACUITY
OD_GLARE: 20/100
OS_GLARE: 20/100
OS_CC: 20/50+1
OD_CC: 20/50
OD_PH: 20/40-2

## 2022-06-16 ASSESSMENT — TONOMETRY
OS_IOP_MMHG: 18
OD_IOP_MMHG: 17

## 2022-06-27 ENCOUNTER — OFFICE VISIT (OUTPATIENT)
Dept: NEUROLOGY | Facility: CLINIC | Age: 82
End: 2022-06-27
Payer: COMMERCIAL

## 2022-06-27 VITALS
WEIGHT: 198.2 LBS | TEMPERATURE: 97.2 F | DIASTOLIC BLOOD PRESSURE: 57 MMHG | BODY MASS INDEX: 28.37 KG/M2 | HEART RATE: 87 BPM | SYSTOLIC BLOOD PRESSURE: 113 MMHG | HEIGHT: 70 IN

## 2022-06-27 DIAGNOSIS — G20 PARKINSON'S DISEASE (HCC): ICD-10-CM

## 2022-06-27 DIAGNOSIS — D47.2 MGUS (MONOCLONAL GAMMOPATHY OF UNKNOWN SIGNIFICANCE): ICD-10-CM

## 2022-06-27 DIAGNOSIS — G62.9 NEUROPATHY: Primary | ICD-10-CM

## 2022-06-27 PROCEDURE — 99214 OFFICE O/P EST MOD 30 MIN: CPT | Performed by: NURSE PRACTITIONER

## 2022-06-27 RX ORDER — AMANTADINE HYDROCHLORIDE 100 MG/1
CAPSULE, GELATIN COATED ORAL
Qty: 90 CAPSULE | Refills: 3 | Status: SHIPPED | OUTPATIENT
Start: 2022-06-27

## 2022-06-27 RX ORDER — LIFITEGRAST 50 MG/ML
1 SOLUTION/ DROPS OPHTHALMIC 2 TIMES DAILY
COMMUNITY

## 2022-06-27 NOTE — PROGRESS NOTES
Patient ID: Gavin Mejia  is a [de-identified] y o  male  Assessment/Plan:    Neuropathy  Noted on EMG, patient without any neuropathic pain  He does have diminished sensation to pinprick, temperature and vibration distal  No muscle weakness,  strength is improved form previous Work up thus far with abnormal spep-following with hematology for MGUS  Will order additional labs to rule out other causes of neuropathy  Unclear duration of symptoms as patient is minimally symptomatic  He is not interested for surgery for his carpal tunnel at this time  Would like to work with OT to work on hand strength and dexterity  MGUS (monoclonal gammopathy of unknown significance)  Currently following with hematology who is monitoring labs  Parkinson's disease [facility])  Patient fairly stable since last seen  He continues with mild tremor as well as bradykinesia  We did discuss increasing sinemet or amantadine slightly, however he felt he is currently functioning well and did not wish to make any adjustments  Could consider this in the future if he feels worsening of symptoms-would watch for any worsening leg swelling with increase in sinemet as he did have this issue prior, however per the patient and wife has resolved  He was interested in PT referral as well as OT to work on hand dexterity  Plan for follow up in 4-6 months, he wishes to see general attending at [facility] due to proximity to his home in which I will try to arrange  To contact the office sooner with any concerns or worsening symptoms  Diagnoses and all orders for this visit:    Neuropathy  -     Ambulatory Referral to Physical Therapy; Future  -     Sedimentation rate, automated; Future  -     Vitamin B6; Future  -     Sjogren's Antibodies; Future    Parkinson's disease [facility])  -     Ambulatory Referral to Occupational Therapy; Future  -     Ambulatory Referral to Physical Therapy;  Future  -     amantadine (SYMMETREL) 100 mg capsule; TAKE 1 CAPSULE DAILY AT 5 P M     MGUS (monoclonal gammopathy of unknown significance)    Other orders  -     lifitegrast (Xiidra) 5 % op solution; Administer 1 drop to both eyes 2 (two) times a day           Subjective:    HPI    Mr Noreen Worthy is a pleasant [de-identified] yo male seen in follow up for PD  Per chart review, first seen for consultation in 2018 in which he had noted 2-3 year history of weakness, stiffness, and moving slower  Noted worsening tremors over this period of time  He was started on sinemet with improvement of symptoms  Amantadine later added  He did have issues with leg swelling with higher doses of sinemet  Last office visit 3/2022 in which PD was stable  Labs for neuropathy were ordered given EMG findings  he was referred to hematology for abnormal spep  Current Medications:  sinemet 25/100 mg 1 5 tabs  8 am, 1 pm and 1 tab 8 PM  amantadine 100 mg QD     Interval History:   He feels his walking and balance are good, he tripped off his  but no other falls  He will need a chair with arms to get up  Feels tremor is about the same, benefit from amantadine  No trouble swallowing  He sleeps well, He has occasional  talking in his sleep, he used to have more acting out of his dreams but has improved recently  No hallucinations  No dizziness  He has some occasional drooling  His wife can sometimes have a hard time hearing him sometimes  No memory concerns  He denies any numbness or tingling in his feet, he has occasional numbness/tingling in his hands b/l entire hand, has known carpal tunnel  He is looking into stay strong program at [facility]  Performs ADls independent, needs help putting on pants  Was seen by hematology for MGUS-monitoring labs  He was admitted to [facility] for abnormal blood cultures, however repeat studies were normal, likely contamination  He did have BONIFACIO that was treated with fluids   He did have f/u with pulmonary due to hemoptysis that has since resolved  HX of asbestos exposure  prior work up:  labs 3/2022: tsh 2 45, a1c 5 1, b12 370, folate 11 7, spep monoclonal peak in gamma region, immunofixation-monoclonal gammopathy identified as IgG kappa, heavy metals screen normal except arsenic elevated at 15     EMG 9/2021 b/l UE: This is an abnormal EMG of the bilateral upper extremities due to changes consistent with a localized neuropathic process involving the median nerve at the wrist bilaterally consistent with moderate carpal tunnel syndrome with demyelinative change   In addition 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 with predominantly demyelinative change         The following portions of the patient's history were reviewed and updated as appropriate: allergies, current medications, past family history, past medical history, past social history, past surgical history and problem list        Objective:    Blood pressure 113/57, pulse 87, temperature (!) 97 2 °F (36 2 °C), temperature source Temporal, height 5' 10" (1 778 m), weight 89 9 kg (198 lb 3 2 oz)  Physical Exam  Constitutional:       General: He is awake  HENT:      Right Ear: Hearing normal       Left Ear: Hearing normal    Eyes:      General: Lids are normal       Pupils: Pupils are equal, round, and reactive to light  Neurological:      Mental Status: He is alert  Deep Tendon Reflexes: Strength normal       Reflex Scores:       Bicep reflexes are 1+ on the right side and 1+ on the left side  Brachioradialis reflexes are 1+ on the right side and 1+ on the left side  Patellar reflexes are 1+ on the right side and 1+ on the left side  Achilles reflexes are 0 on the right side and 0 on the left side  Neurological Exam  Mental Status  Awake and alert  Speech: hypophonia  Language is fluent with no aphasia  Cranial Nerves  CN III, IV, VI: Normal lids and orbits bilaterally   Pupils equal round and reactive to light bilaterally  Slow saccades, dysconjugate gaze  CN V:  Right: Facial sensation is normal   Left: Facial sensation is normal on the left  CN VII:  Right: There is no facial weakness  Left: There is no facial weakness  CN VIII:  Right: Hearing is normal   Left: Hearing is normal   CN IX, X: Palate elevates symmetrically  CN XI:  Right: Trapezius strength is normal   Left: Trapezius strength is normal   CN XII: Tongue midline without atrophy or fasciculations  Motor   Strength is 5/5 throughout all four extremities  Normal  strength b/l  Sensory  Light touch is normal in upper and lower extremities  Pinprick abnormality: Diminished to the forearm in b/l UE, diminished to the ankle in b/l LE    Temperature abnormality: Diminished to the forearm in b/l UE, diminished to the knee in b/l LE    Vibration abnormality: Decreased to 7 secs at the ankles  Reflexes                                            Right                      Left  Brachioradialis                    1+                         1+  Biceps                                 1+                         1+  Patellar                                1+                         1+  Achilles                                0                         0    Coordination  Right: Finger-to-nose abnormality: Rapid alternating movement abnormality:Left: Finger-to-nose normal  Rapid alternating movement abnormality:  Intermittently resting tremor R>L, no postural tremor, mild  intention tremor noted with FTN testing on the right  Mild to moderate bradykinesia R>L       Gait  Casual gait: Unable to rise from chair without using arms  Decreased stride R>L, decreased arm swing  I have personally reviewed the ROS performed by the MA     ROS:    Review of Systems   Constitutional: Negative  Negative for appetite change and fever  HENT: Negative  Negative for hearing loss, tinnitus, trouble swallowing and voice change  Eyes: Negative    Negative for photophobia and pain  Respiratory: Negative  Negative for shortness of breath  Cardiovascular: Negative  Negative for palpitations  Gastrointestinal: Negative  Negative for nausea and vomiting  Endocrine: Negative  Negative for cold intolerance  Genitourinary: Negative  Negative for dysuria, frequency and urgency  Musculoskeletal: Negative  Negative for myalgias and neck pain  Skin: Negative  Negative for rash  Neurological: Positive for tremors  Negative for dizziness, seizures, syncope, facial asymmetry, speech difficulty, weakness, light-headedness, numbness and headaches  Hematological: Negative  Does not bruise/bleed easily  Psychiatric/Behavioral: Negative  Negative for confusion, hallucinations and sleep disturbance  All other systems reviewed and are negative

## 2022-06-27 NOTE — PATIENT INSTRUCTIONS
Obtain labs for neuropathy    PT and OT for balance and to work on hand dexterity    Continue current dose of sinemet and amantadine-call if you would like to increase either prior to next visit

## 2022-06-27 NOTE — ASSESSMENT & PLAN NOTE
Noted on EMG, patient without any neuropathic pain  He does have diminished sensation to pinprick, temperature and vibration distal  No muscle weakness,  strength is improved form previous Work up thus far with abnormal spep-following with hematology for MGUS  Will order additional labs to rule out other causes of neuropathy  Unclear duration of symptoms as patient is minimally symptomatic  He is not interested for surgery for his carpal tunnel at this time  Would like to work with OT to work on hand strength and dexterity

## 2022-06-28 NOTE — ASSESSMENT & PLAN NOTE
Patient fairly stable since last seen  He continues with mild tremor as well as bradykinesia  We did discuss increasing sinemet or amantadine slightly, however he felt he is currently functioning well and did not wish to make any adjustments  Could consider this in the future if he feels worsening of symptoms-would watch for any worsening leg swelling with increase in sinemet as he did have this issue prior, however per the patient and wife has resolved  He was interested in PT referral as well as OT to work on hand dexterity  Plan for follow up in 4-6 months, he wishes to see general attending at [facility] [place] due to proximity to his home in which I will try to arrange  To contact the office sooner with any concerns or worsening symptoms

## 2022-07-25 ENCOUNTER — FOLLOW UP (OUTPATIENT)
Dept: URBAN - METROPOLITAN AREA CLINIC 6 | Facility: CLINIC | Age: 82
End: 2022-07-25

## 2022-07-25 DIAGNOSIS — H25.813: ICD-10-CM

## 2022-07-25 DIAGNOSIS — H04.123: ICD-10-CM

## 2022-07-25 PROCEDURE — 92012 INTRM OPH EXAM EST PATIENT: CPT

## 2022-07-25 ASSESSMENT — VISUAL ACUITY
OU_CC: 20/40
OD_CC: 20/40-2
OS_CC: 20/40

## 2022-07-25 ASSESSMENT — TONOMETRY
OD_IOP_MMHG: 15
OS_IOP_MMHG: 16

## 2022-07-28 ENCOUNTER — APPOINTMENT (OUTPATIENT)
Dept: LAB | Facility: MEDICAL CENTER | Age: 82
End: 2022-07-28
Payer: COMMERCIAL

## 2022-07-28 DIAGNOSIS — E55.9 VITAMIN D DEFICIENCY: ICD-10-CM

## 2022-07-28 DIAGNOSIS — G62.9 NEUROPATHY: ICD-10-CM

## 2022-07-28 DIAGNOSIS — D47.2 MGUS (MONOCLONAL GAMMOPATHY OF UNKNOWN SIGNIFICANCE): ICD-10-CM

## 2022-07-28 LAB
25(OH)D3 SERPL-MCNC: 36.6 NG/ML (ref 30–100)
ALBUMIN SERPL BCP-MCNC: 3.8 G/DL (ref 3.5–5)
ALP SERPL-CCNC: 108 U/L (ref 46–116)
ALT SERPL W P-5'-P-CCNC: 16 U/L (ref 12–78)
ANION GAP SERPL CALCULATED.3IONS-SCNC: 5 MMOL/L (ref 4–13)
AST SERPL W P-5'-P-CCNC: 14 U/L (ref 5–45)
BASOPHILS # BLD AUTO: 0.03 THOUSANDS/ΜL (ref 0–0.1)
BASOPHILS NFR BLD AUTO: 1 % (ref 0–1)
BILIRUB SERPL-MCNC: 1.31 MG/DL (ref 0.2–1)
BUN SERPL-MCNC: 32 MG/DL (ref 5–25)
CALCIUM SERPL-MCNC: 9.1 MG/DL (ref 8.3–10.1)
CHLORIDE SERPL-SCNC: 108 MMOL/L (ref 96–108)
CO2 SERPL-SCNC: 26 MMOL/L (ref 21–32)
CREAT SERPL-MCNC: 1.41 MG/DL (ref 0.6–1.3)
EOSINOPHIL # BLD AUTO: 0.13 THOUSAND/ΜL (ref 0–0.61)
EOSINOPHIL NFR BLD AUTO: 2 % (ref 0–6)
ERYTHROCYTE [DISTWIDTH] IN BLOOD BY AUTOMATED COUNT: 12.6 % (ref 11.6–15.1)
ERYTHROCYTE [SEDIMENTATION RATE] IN BLOOD: 16 MM/HOUR (ref 0–19)
GFR SERPL CREATININE-BSD FRML MDRD: 46 ML/MIN/1.73SQ M
GLUCOSE P FAST SERPL-MCNC: 84 MG/DL (ref 65–99)
HCT VFR BLD AUTO: 39.6 % (ref 36.5–49.3)
HGB BLD-MCNC: 13 G/DL (ref 12–17)
IGA SERPL-MCNC: 165 MG/DL (ref 70–400)
IGG SERPL-MCNC: 773 MG/DL (ref 700–1600)
IGM SERPL-MCNC: 107 MG/DL (ref 40–230)
IMM GRANULOCYTES # BLD AUTO: 0.02 THOUSAND/UL (ref 0–0.2)
IMM GRANULOCYTES NFR BLD AUTO: 0 % (ref 0–2)
LDH SERPL-CCNC: 236 U/L (ref 81–234)
LYMPHOCYTES # BLD AUTO: 1.41 THOUSANDS/ΜL (ref 0.6–4.47)
LYMPHOCYTES NFR BLD AUTO: 22 % (ref 14–44)
MCH RBC QN AUTO: 32.1 PG (ref 26.8–34.3)
MCHC RBC AUTO-ENTMCNC: 32.8 G/DL (ref 31.4–37.4)
MCV RBC AUTO: 98 FL (ref 82–98)
MONOCYTES # BLD AUTO: 0.53 THOUSAND/ΜL (ref 0.17–1.22)
MONOCYTES NFR BLD AUTO: 8 % (ref 4–12)
NEUTROPHILS # BLD AUTO: 4.39 THOUSANDS/ΜL (ref 1.85–7.62)
NEUTS SEG NFR BLD AUTO: 67 % (ref 43–75)
NRBC BLD AUTO-RTO: 0 /100 WBCS
PLATELET # BLD AUTO: 179 THOUSANDS/UL (ref 149–390)
PMV BLD AUTO: 10.6 FL (ref 8.9–12.7)
POTASSIUM SERPL-SCNC: 4.6 MMOL/L (ref 3.5–5.3)
PROT SERPL-MCNC: 7 G/DL (ref 6.4–8.4)
RBC # BLD AUTO: 4.05 MILLION/UL (ref 3.88–5.62)
SODIUM SERPL-SCNC: 139 MMOL/L (ref 135–147)
WBC # BLD AUTO: 6.51 THOUSAND/UL (ref 4.31–10.16)

## 2022-07-28 PROCEDURE — 80053 COMPREHEN METABOLIC PANEL: CPT

## 2022-07-28 PROCEDURE — 86235 NUCLEAR ANTIGEN ANTIBODY: CPT

## 2022-07-28 PROCEDURE — 83615 LACTATE (LD) (LDH) ENZYME: CPT

## 2022-07-28 PROCEDURE — 84207 ASSAY OF VITAMIN B-6: CPT

## 2022-07-28 PROCEDURE — 36415 COLL VENOUS BLD VENIPUNCTURE: CPT

## 2022-07-28 PROCEDURE — 83521 IG LIGHT CHAINS FREE EACH: CPT

## 2022-07-28 PROCEDURE — 84166 PROTEIN E-PHORESIS/URINE/CSF: CPT | Performed by: PATHOLOGY

## 2022-07-28 PROCEDURE — 85025 COMPLETE CBC W/AUTO DIFF WBC: CPT

## 2022-07-28 PROCEDURE — 84165 PROTEIN E-PHORESIS SERUM: CPT

## 2022-07-28 PROCEDURE — 84165 PROTEIN E-PHORESIS SERUM: CPT | Performed by: PATHOLOGY

## 2022-07-28 PROCEDURE — 82232 ASSAY OF BETA-2 PROTEIN: CPT

## 2022-07-28 PROCEDURE — 82784 ASSAY IGA/IGD/IGG/IGM EACH: CPT

## 2022-07-28 PROCEDURE — 82306 VITAMIN D 25 HYDROXY: CPT

## 2022-07-28 PROCEDURE — 85652 RBC SED RATE AUTOMATED: CPT

## 2022-07-28 PROCEDURE — 84166 PROTEIN E-PHORESIS/URINE/CSF: CPT

## 2022-07-29 LAB
ALBUMIN SERPL ELPH-MCNC: 4.47 G/DL (ref 3.5–5)
ALBUMIN SERPL ELPH-MCNC: 65.7 % (ref 52–65)
ALBUMIN UR ELPH-MCNC: 17.4 %
ALPHA1 GLOB MFR UR ELPH: 9.2 %
ALPHA1 GLOB SERPL ELPH-MCNC: 0.29 G/DL (ref 0.1–0.4)
ALPHA1 GLOB SERPL ELPH-MCNC: 4.2 % (ref 2.5–5)
ALPHA2 GLOB MFR UR ELPH: 47.1 %
ALPHA2 GLOB SERPL ELPH-MCNC: 0.75 G/DL (ref 0.4–1.2)
ALPHA2 GLOB SERPL ELPH-MCNC: 11.1 % (ref 7–13)
B-GLOBULIN MFR UR ELPH: 6.8 %
BETA GLOB ABNORMAL SERPL ELPH-MCNC: 0.31 G/DL (ref 0.4–0.8)
BETA1 GLOB SERPL ELPH-MCNC: 4.5 % (ref 5–13)
BETA2 GLOB SERPL ELPH-MCNC: 4.1 % (ref 2–8)
BETA2+GAMMA GLOB SERPL ELPH-MCNC: 0.28 G/DL (ref 0.2–0.5)
ENA SS-A AB SER-ACNC: <0.2 AI (ref 0–0.9)
ENA SS-B AB SER-ACNC: <0.2 AI (ref 0–0.9)
GAMMA GLOB ABNORMAL SERPL ELPH-MCNC: 0.71 G/DL (ref 0.5–1.6)
GAMMA GLOB MFR UR ELPH: 19.5 %
GAMMA GLOB SERPL ELPH-MCNC: 10.4 % (ref 12–22)
IGG/ALB SER: 1.92 {RATIO} (ref 1.1–1.8)
KAPPA LC FREE SER-MCNC: 206 MG/L (ref 3.3–19.4)
KAPPA LC FREE/LAMBDA FREE SER: 12.19 {RATIO} (ref 0.26–1.65)
LAMBDA LC FREE SERPL-MCNC: 16.9 MG/L (ref 5.7–26.3)
M PROTEIN 1 MFR SERPL ELPH: 2.7 %
M PROTEIN 1 SERPL ELPH-MCNC: 0.18 G/DL
PROT PATTERN SERPL ELPH-IMP: ABNORMAL
PROT PATTERN UR ELPH-IMP: ABNORMAL
PROT SERPL-MCNC: 6.8 G/DL (ref 6.4–8.2)
PROT UR-MCNC: 26 MG/DL

## 2022-07-30 LAB — B2 MICROGLOB SERPL-MCNC: 3.1 MG/L (ref 0.6–2.4)

## 2022-08-01 LAB — VIT B6 SERPL-MCNC: 15.7 UG/L (ref 3.4–65.2)

## 2022-08-05 ENCOUNTER — OFFICE VISIT (OUTPATIENT)
Dept: HEMATOLOGY ONCOLOGY | Facility: CLINIC | Age: 82
End: 2022-08-05
Payer: COMMERCIAL

## 2022-08-05 VITALS
WEIGHT: 199.5 LBS | DIASTOLIC BLOOD PRESSURE: 70 MMHG | TEMPERATURE: 98 F | SYSTOLIC BLOOD PRESSURE: 120 MMHG | HEART RATE: 81 BPM | OXYGEN SATURATION: 97 % | HEIGHT: 70 IN | RESPIRATION RATE: 16 BRPM | BODY MASS INDEX: 28.56 KG/M2

## 2022-08-05 DIAGNOSIS — J61 PULMONARY ASBESTOSIS (HCC): ICD-10-CM

## 2022-08-05 DIAGNOSIS — G20 PARKINSON'S DISEASE (HCC): ICD-10-CM

## 2022-08-05 DIAGNOSIS — D47.2 MGUS (MONOCLONAL GAMMOPATHY OF UNKNOWN SIGNIFICANCE): Primary | ICD-10-CM

## 2022-08-05 PROCEDURE — 99214 OFFICE O/P EST MOD 30 MIN: CPT | Performed by: INTERNAL MEDICINE

## 2022-08-05 NOTE — PROGRESS NOTES
HPI:   Patient is here with his wife  Follow-up visit for MGUS and patient's hematological status is being monitored  Patient was in the hospital in May 2022 because of staph epidermidis bacteremia that was thought to be contaminant and during that admission in he also had cough and hemoptysis and was seen by Pulmonary service and was thought to have viral infection but not COVID  Patient also has asbestosis and he follows with Pulmonary service for that  Patient was a     He has Parkinson's disease with stiffness but no tremors  He walks slowly    There was some increase in creatinine during the admission in May 2022 but that improved with intravenous fluids  He was thought to have dehydration  He has minimal residual cough  No hemoptysis  Exertional dyspnea  Has generalized weakness and tiredness and stiffness  History of arthritis  History of carpal tunnel  Occasionally tingling in his hands and feet and they get cold  He follows with his primary physician and other consultants including neurologist on regular basis  Has nocturia  ROS:  08/05/22 Reviewed 12 systems: See symptoms in HPI  Presently no other neurological, cardiac, pulmonary, GI and  symptoms other than listed in HPI  Other symptoms are in HPI  No  fever, chills, bleeding, bone pains, skin rash, weight loss, night sweats,   Claudication  No swelling of the ankles  No swollen glands  Patient is anxious         Historical Information   Past Medical History:   Diagnosis Date    Arthritis     Asbestos exposure     Asbestosis ([address] )     GERD (gastroesophageal reflux disease)     Hypertension     Lung disease     Parkinson's disease ([facility] )      Past Surgical History:   Procedure Laterality Date    HERNIA REPAIR      KNEE SURGERY      many years ago    SHOULDER SURGERY       Social History   Social History     Substance and Sexual Activity   Alcohol Use Not Currently     Social History     Substance and Sexual Activity   Drug Use No     Social History     Tobacco Use   Smoking Status Never Smoker   Smokeless Tobacco Never Used     Family History:   Family History   Problem Relation Age of Onset    Stroke Mother     Colon cancer Mother     Depression Sister     Hypertension Sister          Current Outpatient Medications:     amantadine (SYMMETREL) 100 mg capsule, TAKE 1 CAPSULE DAILY AT 5 P M , Disp: 90 capsule, Rfl: 3    carbidopa-levodopa (SINEMET)  mg per tablet, TAKE ONE AND ONE-HALF TABLETS IN THE MORNING, ONE AND ONE-HALF TABLETS IN THE AFTERNOON AND 1 TABLET NIGHTLY, Disp: 360 tablet, Rfl: 3    lifitegrast (Xiidra) 5 % op solution, Administer 1 drop to both eyes 2 (two) times a day, Disp: , Rfl:     albuterol (Ventolin HFA) 90 mcg/act inhaler, Inhale 2 puffs every 6 (six) hours as needed for wheezing (Patient not taking: No sig reported), Disp: 6 7 g, Rfl: 1    dextromethorphan-guaiFENesin (ROBITUSSIN DM)  mg/5 mL syrup, Take 5 mL by mouth as needed in the morning and 5 mL as needed at noon and 5 mL as needed in the evening for cough  (Patient not taking: No sig reported), Disp: 237 mL, Rfl: 0    Elastic Bandages & Supports (Wrist Brace Deluxe/Left L-XL) MISC, Use nightly for carpal tunnel (Patient not taking: No sig reported), Disp: 1 each, Rfl: 0    predniSONE 10 mg tablet, Use 3 tab for 3 days and 2 tab for 3 days and one tab for 3 days (Patient not taking: No sig reported), Disp: 18 tablet, Rfl: 0    Allergies   Allergen Reactions    Morphine And Related Other (See Comments)     Patient gets delirious     @ ROS@  Physical Exam:  Vitals:    08/05/22 1102   BP: 120/70   BP Location: Left arm   Patient Position: Sitting   Cuff Size: Adult   Pulse: 81   Resp: 16   Temp: 98 °F (36 7 °C)   SpO2: 97%   Weight: 90 5 kg (199 lb 8 oz)   Height: 5' 10" (1 778 m)       Patient is alert and oriented  Patient is not in distress  Vital signs are above    There is no icterus, no oral thrush , no palpable neck mass,  lung fields clear to percussion and auscultation, regular heart rate, systolic murmur,  soft and non tender abdomen, no palpable abdominal mass, no ascites, no edema of ankles, no calf tenderness,  has some rigidity, he ambulates slowly, no skin rash, no palpable lymphadenopathy in the neck and axillary areas,  no clubbing  Patient is anxious  Performance status 2  Lab Results: I have reviewed all pertinent labs    LABS:    Results for orders placed or performed in visit on 07/28/22   Beta 2 microglobulin, serum   Result Value Ref Range    Beta-2 Microglobulin 3 1 (H) 0 6 - 2 4 mg/L   CBC and differential   Result Value Ref Range    WBC 6 51 4 31 - 10 16 Thousand/uL    RBC 4 05 3 88 - 5 62 Million/uL    Hemoglobin 13 0 12 0 - 17 0 g/dL    Hematocrit 39 6 36 5 - 49 3 %    MCV 98 82 - 98 fL    MCH 32 1 26 8 - 34 3 pg    MCHC 32 8 31 4 - 37 4 g/dL    RDW 12 6 11 6 - 15 1 %    MPV 10 6 8 9 - 12 7 fL    Platelets 384 262 - 005 Thousands/uL    nRBC 0 /100 WBCs    Neutrophils Relative 67 43 - 75 %    Immat GRANS % 0 0 - 2 %    Lymphocytes Relative 22 14 - 44 %    Monocytes Relative 8 4 - 12 %    Eosinophils Relative 2 0 - 6 %    Basophils Relative 1 0 - 1 %    Neutrophils Absolute 4 39 1 85 - 7 62 Thousands/µL    Immature Grans Absolute 0 02 0 00 - 0 20 Thousand/uL    Lymphocytes Absolute 1 41 0 60 - 4 47 Thousands/µL    Monocytes Absolute 0 53 0 17 - 1 22 Thousand/µL    Eosinophils Absolute 0 13 0 00 - 0 61 Thousand/µL    Basophils Absolute 0 03 0 00 - 0 10 Thousands/µL   Comprehensive metabolic panel   Result Value Ref Range    Sodium 139 135 - 147 mmol/L    Potassium 4 6 3 5 - 5 3 mmol/L    Chloride 108 96 - 108 mmol/L    CO2 26 21 - 32 mmol/L    ANION GAP 5 4 - 13 mmol/L    BUN 32 (H) 5 - 25 mg/dL    Creatinine 1 41 (H) 0 60 - 1 30 mg/dL    Glucose, Fasting 84 65 - 99 mg/dL    Calcium 9 1 8 3 - 10 1 mg/dL    AST 14 5 - 45 U/L    ALT 16 12 - 78 U/L    Alkaline Phosphatase 108 46 - 116 U/L Total Protein 7 0 6 4 - 8 4 g/dL    Albumin 3 8 3 5 - 5 0 g/dL    Total Bilirubin 1 31 (H) 0 20 - 1 00 mg/dL    eGFR 46 ml/min/1 73sq m   IgG, IgA, IgM   Result Value Ref Range     0 70 0 - 400 0 mg/dL     0 700 0 - 1,600 0 mg/dL     0 40 0 - 230 0 mg/dL   Immunoglobulin free LT chains blood   Result Value Ref Range    Ig [place] Free Light Chain 206 0 (H) 3 3 - 19 4 mg/L    Ig Lambda Free Light Chain 16 9 5 7 - 26 3 mg/L    Kappa/Lambda FluidC Ratio 12 19 (H) 0 26 - 1 65   LD,Blood   Result Value Ref Range     (H) 81 - 234 U/L   Protein electrophoresis, serum   Result Value Ref Range    A/G Ratio 1 92 (H) 1 10 - 1 80    Albumin Electrophoresis 65 7 (H) 52 0 - 65 0 %    Albumin CONC 4 47 3 50 - 5 00 g/dl    Alpha 1 4 2 2 5 - 5 0 %    ALPHA 1 CONC 0 29 0 10 - 0 40 g/dL    Alpha 2 11 1 7 0 - 13 0 %    ALPHA 2 CONC 0 75 0 40 - 1 20 g/dL    Beta-1 4 5 (L) 5 0 - 13 0 %    BETA 1 CONC 0 31 (L) 0 40 - 0 80 g/dL    Beta-2 4 1 2 0 - 8 0 %    BETA 2 CONC 0 28 0 20 - 0 50 g/dL    Gamma Globulin 10 4 (L) 12 0 - 22 0 %    GAMMA CONC 0 71 0 50 - 1 60 g/dL    M Peak ID 1 2 70 %    M PEAK 1 CONC 0 18 g/dL    Total Protein 6 8 6 4 - 8 2 g/dL    SPEP Interpretation See Comment    Protein electrophoresis, urine   Result Value Ref Range    Urine Protein 26 0 (H) 2 0 - 17 5 mg/dL    Albumin ELP, Urine 17 4 %    Alpha 1, Urine 9 2 %    Alpha 2, Urine 47 1 %    Beta, Urine 6 8 %    Gamma Globulin, Urine 19 5 %    UPEP Interp See Comment    Sedimentation rate, automated   Result Value Ref Range    Sed Rate 16 0 - 19 mm/hour   Vitamin B6   Result Value Ref Range    Vitamin B6 15 7 3 4 - 65 2 ug/L   Sjogren's Antibodies   Result Value Ref Range    SS-A (RO) Ab <0 2 0 0 - 0 9 AI    SS-B (LA) Ab <0 2 0 0 - 0 9 AI   Vitamin D 25 hydroxy   Result Value Ref Range    Vit D, 25-Hydroxy 36 6 30 0 - 100 0 ng/mL         Imaging Studies: I have personally reviewed pertinent reports        OSSEOUS STRUCTURES:  No acute fracture or destructive osseous lesion  Degenerative changes of thoracic spine      IMPRESSION:        1  No CT evidence of interstitial lung disease  No active pulmonary disease      2   Stable bilateral pleural plaques, in keeping with asbestosis related pleural disease  No evidence of parenchymal asbestosis                 Workstation performed: [id number]          Imaging    CT chest high resolution (Order: [id number]) - 5/11/2022                Pathology, and Other Studies: I have personally reviewed pertinent reports  Reviewed above test results pertaining to hematological condition and discussed with patient and his wife    Assessment and Plan:  See diagnoses, orders instructions below  Patient is here with his wife  Follow-up visit for MGUS and patient's hematological status is being monitored  Patient was in the hospital in May 2022 because of staph epidermidis bacteremia that was thought to be contaminant and during that admission in he also had cough and hemoptysis and was seen by Pulmonary service and was thought to have viral infection but not COVID  Patient also has asbestosis and he follows with Pulmonary service for that  Patient was a     He has Parkinson's disease with stiffness but no tremors  He walks slowly    There was some increase in creatinine during the admission in May 2022 but that improved with intravenous fluids  He was thought to have dehydration  He has minimal residual cough  No hemoptysis  Exertional dyspnea  Has generalized weakness and tiredness and stiffness  History of arthritis  History of carpal tunnel  Occasionally tingling in his hands and feet and they get cold  He follows with his primary physician and other consultants including neurologist on regular basis  Has nocturia      Physical examination and test results are as recorded and discussed  I think small IgG kappa spike MGUS is an incidental finding at his age and will be monitored    It measures 180 mg   Diet and activities per primary physician and neurologist     Patient needs assistance in his care  Plan for MGUS is surveillance  All discussed in detail  Questions answered  Discussed precautions against COVID virus  1  MGUS (monoclonal gammopathy of unknown significance)    - CBC and differential; Future  - Comprehensive metabolic panel; Future  - IgG, IgA, IgM; Future  - Protein electrophoresis, serum; Future    2  Parkinson's disease ([facility] [address] )      3  Pulmonary [facility]bestosis ([facility] )      Blood work prior to next visit in 7 months     Patient will continue to follow with  primary physician and other consultants  Patient and wife voiced understanding and agrees     This note has been generated by voice recognition softener  Therefore there maybe spelling, grammar, and/or syntax errors  Please contact if questions arise  Counseling / Coordination of Care    Karrie Nissen   Provided counseling and support

## 2022-08-09 ENCOUNTER — OFFICE VISIT (OUTPATIENT)
Dept: PULMONOLOGY | Facility: CLINIC | Age: 82
End: 2022-08-09
Payer: COMMERCIAL

## 2022-08-09 VITALS
SYSTOLIC BLOOD PRESSURE: 126 MMHG | DIASTOLIC BLOOD PRESSURE: 70 MMHG | OXYGEN SATURATION: 97 % | BODY MASS INDEX: 28.63 KG/M2 | HEIGHT: 70 IN | WEIGHT: 200 LBS | HEART RATE: 84 BPM | RESPIRATION RATE: 16 BRPM | TEMPERATURE: 98.5 F

## 2022-08-09 DIAGNOSIS — R06.02 SHORTNESS OF BREATH: ICD-10-CM

## 2022-08-09 DIAGNOSIS — J92.0 PLEURAL PLAQUE WITH PRESENCE OF ASBESTOS: Primary | ICD-10-CM

## 2022-08-09 DIAGNOSIS — R04.2 HEMOPTYSIS: ICD-10-CM

## 2022-08-09 DIAGNOSIS — R05.8 RECURRENT PRODUCTIVE COUGH: ICD-10-CM

## 2022-08-09 DIAGNOSIS — G20 PARKINSON'S DISEASE (HCC): ICD-10-CM

## 2022-08-09 PROBLEM — R09.82 ALLERGIC RHINITIS WITH POSTNASAL DRIP: Status: ACTIVE | Noted: 2017-03-02

## 2022-08-09 PROBLEM — J30.9 ALLERGIC RHINITIS WITH POSTNASAL DRIP: Status: ACTIVE | Noted: 2017-03-02

## 2022-08-09 PROBLEM — R78.81 BACTEREMIA: Status: RESOLVED | Noted: 2022-05-11 | Resolved: 2022-08-09

## 2022-08-09 PROBLEM — N17.9 AKI (ACUTE KIDNEY INJURY) (HCC): Status: RESOLVED | Noted: 2022-05-11 | Resolved: 2022-08-09

## 2022-08-09 PROBLEM — J61 PULMONARY ASBESTOSIS (HCC): Status: RESOLVED | Noted: 2022-08-05 | Resolved: 2022-08-09

## 2022-08-09 PROCEDURE — 99214 OFFICE O/P EST MOD 30 MIN: CPT | Performed by: INTERNAL MEDICINE

## 2022-08-09 NOTE — ASSESSMENT & PLAN NOTE
· Normally does not have daily symptomatology or persistent cough   · When he does get sick however he does have severe cough with protracted symptoms   · Over-the-counter cough medicine has not been beneficial   · Has not found inhalers to be beneficial   · Denies aspiration or difficulty swallowing  No GERD  Occasional postnasal drip  · I have advised cautious observation without addition of new medications    If he does get sick acutely, they should contact us and would favor early intervention with prednisone, Tessalon, and judicious use of antibiotics if appropriate

## 2022-08-09 NOTE — ASSESSMENT & PLAN NOTE
· Findings on CT imaging are all benign    No evidence of asbestosis  · Has pleural plaque indicative of work as a  and exposure to asbestos but this is a benign finding that does not require routine follow-up

## 2022-08-09 NOTE — PROGRESS NOTES
Progress note - Pulmonary Medicine   Chavez Ross  [de-identified] y o  male MRN: [id number]       Impression & Plan:     Hemoptysis  · Resolved and has had no further hemoptysis    Recurrent productive cough  · Normally does not have daily symptomatology or persistent cough   · When he does get sick however he does have severe cough with protracted symptoms   · Over-the-counter cough medicine has not been beneficial   · Has not found inhalers to be beneficial   · Denies aspiration or difficulty swallowing  No GERD  Occasional postnasal drip  · I have advised cautious observation without addition of new medications  If he does get sick acutely, they should contact us and would favor early intervention with prednisone, Tessalon, and judicious use of antibiotics if appropriate    Pleural plaque with presence of asbestos  · Findings on CT imaging are all benign  No evidence of asbestosis  · Has pleural plaque indicative of work as a  and exposure to asbestos but this is a benign finding that does not require routine follow-up    Parkinson's disease ([address] )  · Develop some shortness of breath as his Parkinson's has progressed   · He has previously had essentially normal pulmonary function testing in 2017   · Advised complete pulmonary function testing to re-evaluate pulmonary function and assess shortness of breath    Have recommended six-month follow-up  But they should call if there are any changes to his symptoms  ______________________________________________________________________    HPI:    Chavez Ross  presents today for follow-up of hemoptysis, pleural plaque, and recurrent productive coughing  He was previously known to me from remote evaluation in 2017  He did not follow routinely but more recently was seen in the hospital for hemoptysis  He did follow-up with Dr Radu Rodriguez for hospital follow-up but has decided to return to me for ongoing pulmonary care        He has not had any hemoptysis recently  This has resolved fully  He has had several episodes of severe coughing with bronchitis like symptoms  He denies symptoms of aspiration despite relatively advanced Parkinson's  He has not had any wheezing  He does occasionally get postnasal drip  That cough episodes tend to be acute but do result in severe coughing and protracted symptoms  He was prescribed albuterol but did not really use this  He has tried over-the-counter cough medicine without much benefit  He has previously been on some prednisone as well  His Parkinson's has progressed  He has significant mobility issues  He does note shortness of breath with certain activities  Current Medications:    Current Outpatient Medications:     amantadine (SYMMETREL) 100 mg capsule, TAKE 1 CAPSULE DAILY AT 5 P M , Disp: 90 capsule, Rfl: 3    carbidopa-levodopa (SINEMET)  mg per tablet, TAKE ONE AND ONE-HALF TABLETS IN THE MORNING, ONE AND ONE-HALF TABLETS IN THE AFTERNOON AND 1 TABLET NIGHTLY, Disp: 360 tablet, Rfl: 3    lifitegrast (Xiidra) 5 % op solution, Administer 1 drop to both eyes 2 (two) times a day, Disp: , Rfl:     Review of Systems:  Aside from what is mentioned in the HPI, the review of systems is otherwise negative    Past medical history, surgical history, and family history were reviewed and updated as appropriate    Social history updates:  Social History     Tobacco Use   Smoking Status Never Smoker   Smokeless Tobacco Never Used       PhysicalExamination:  Vitals:   /70 (BP Location: Left arm, Patient Position: Sitting, Cuff Size: Adult)   Pulse 84   Temp 98 5 °F (36 9 °C) (Tympanic)   Resp 16   Ht 5' 10" (1 778 m)   Wt 90 7 kg (200 lb)   SpO2 97%   BMI 28 70 kg/m²   Gen:  Comfortable on room air  No conversational dyspnea  He is quite parkinsonian with tremor, festinating gait, and did have some drooling  HEENT:  Conjugate gaze  sclerae anicteric  Oropharynx moist  Neck: Trachea is midline   No JVD  No adenopathy  Chest:  Shallow inspiratory effort  Breath sounds are somewhat distant  Cardiac:  Regular  no murmur  Abdomen:  benign  Extremities:  Mild peripheral edema  Neuro:  Normal speech and mentation    Diagnostic Data:  Labs: I personally reviewed the most recent laboratory data pertinent to today's visit    Lab Results   Component Value Date    WBC 6 51 07/28/2022    HGB 13 0 07/28/2022    HCT 39 6 07/28/2022    MCV 98 07/28/2022     07/28/2022     Lab Results   Component Value Date    SODIUM 139 07/28/2022    K 4 6 07/28/2022    CO2 26 07/28/2022     07/28/2022    BUN 32 (H) 07/28/2022    CREATININE 1 41 (H) 07/28/2022    CALCIUM 9 1 07/28/2022       PFT results: The most recent pulmonary function tests were reviewed  Remote pulmonary function testing from 2017 showed normal spirometry, normal lung volumes, and very minimal reduction in diffusion capacity    Imaging:  I personally reviewed the images on the [facility] system pertinent to today's visit  High-resolution CT scan of the chest from May did not show any evidence of interstitial lung disease  No significant pulmonary nodules  No bronchiectasis    Small pulmonary nodules and bilateral asbestos related pleural plaques were noted      Caron Mckinley MD

## 2022-08-09 NOTE — ASSESSMENT & PLAN NOTE
· Develop some shortness of breath as his Parkinson's has progressed   · He has previously had essentially normal pulmonary function testing in 2017   · Advised complete pulmonary function testing to re-evaluate pulmonary function and assess shortness of breath

## 2022-08-24 ENCOUNTER — HOSPITAL ENCOUNTER (OUTPATIENT)
Dept: PULMONOLOGY | Facility: HOSPITAL | Age: 82
Discharge: HOME | End: 2022-08-24
Attending: INTERNAL MEDICINE
Payer: COMMERCIAL

## 2022-08-24 DIAGNOSIS — J92.0 PLEURAL PLAQUE WITH PRESENCE OF ASBESTOS: ICD-10-CM

## 2022-08-24 DIAGNOSIS — R06.02 SHORTNESS OF BREATH: ICD-10-CM

## 2022-08-24 PROCEDURE — 94729 DIFFUSING CAPACITY: CPT

## 2022-08-24 PROCEDURE — 94729 DIFFUSING CAPACITY: CPT | Performed by: INTERNAL MEDICINE

## 2022-08-24 PROCEDURE — 94727 GAS DIL/WSHOT DETER LNG VOL: CPT | Performed by: INTERNAL MEDICINE

## 2022-08-24 PROCEDURE — 94760 N-INVAS EAR/PLS OXIMETRY 1: CPT

## 2022-08-24 PROCEDURE — 94727 GAS DIL/WSHOT DETER LNG VOL: CPT

## 2022-08-24 PROCEDURE — 94060 EVALUATION OF WHEEZING: CPT

## 2022-08-24 PROCEDURE — 94060 EVALUATION OF WHEEZING: CPT | Performed by: INTERNAL MEDICINE

## 2022-08-24 RX ORDER — ALBUTEROL SULFATE 2.5 MG/3ML
2.5 SOLUTION RESPIRATORY (INHALATION) ONCE
Status: COMPLETED | OUTPATIENT
Start: 2022-08-24 | End: 2022-08-24

## 2022-08-24 RX ADMIN — ALBUTEROL SULFATE 2.5 MG: 2.5 SOLUTION RESPIRATORY (INHALATION) at 11:38

## 2022-09-16 NOTE — RESULT ENCOUNTER NOTE
I did leave a voicemail on the patient's answering machine with the results    Encouraged to call with questions or concerns

## 2022-10-05 ENCOUNTER — TELEPHONE (OUTPATIENT)
Dept: NEUROLOGY | Facility: CLINIC | Age: 82
End: 2022-10-05

## 2022-10-11 PROBLEM — R05.8 RECURRENT PRODUCTIVE COUGH: Status: RESOLVED | Noted: 2017-03-02 | Resolved: 2022-10-11

## 2022-10-14 NOTE — PROGRESS NOTES
Assessment  Diagnoses and all orders for this visit:    Tear of right supraspinatus tendon    Chronic right shoulder pain        Discussion and Plan:  The patient has an examination consistent with right supraspinatus tear  I have discussed with the patient the pathophysiology of this diagnosis and reviewed how the examination correlates with this diagnosis  Discussed that we will hold off on any injection until he is symptomatic  He is not a good surgical candidate given his Parkinson's  If surgical intervention is required we may have to consider more aggressive surgery in the form of reverse total shoulder given his Parkinson's and the low rate of success of rotator cuff repair in those patients  Patient will follow up as needed  Subjective:   Patient ID: Ana Cristina Dill  is a 69 y o  male      HPI  Patient presents today for follow up of right supraspinatus tear  Surgical intervention was not recommended due to the patient's parkinson's  Patient has been treating conservatively  He received an injection at the last visit 6/11/19 and has been doing his HEP  Patient states overall he is doing well today  He denies any pain and states his ROM has improved  The following portions of the patient's history were reviewed and updated as appropriate: allergies, current medications, past family history, past medical history, past social history, past surgical history and problem list     Review of Systems   Constitutional: Negative for chills and fever  HENT: Negative for drooling and hearing loss  Eyes: Negative for visual disturbance  Respiratory: Negative for cough and shortness of breath  Cardiovascular: Negative for chest pain  Gastrointestinal: Negative for abdominal pain  Skin: Negative for rash  Psychiatric/Behavioral: Negative for agitation         Objective:  Right Shoulder Exam     Range of Motion   Active abduction: 80   External rotation: 20   Forward flexion: 140 Internal rotation 0 degrees: Lumbar     Muscle Strength   Abduction: 4/5   Internal rotation: 4/5   External rotation: 4/5     Other   Erythema: absent  Sensation: normal  Pulse: present            Physical Exam   Constitutional: He appears well-developed and well-nourished  HENT:   Head: Normocephalic  Eyes: Pupils are equal, round, and reactive to light  Pulmonary/Chest: Effort normal    Skin: Skin is warm and dry  Psychiatric: He has a normal mood and affect  Vitals reviewed  I have personally reviewed pertinent films in PACS and my interpretation is as follows  Right shoulder MRI demonstrates recurrent supraspinatus tear with split tear of the long head of the biceps        Scribe Attestation    I,:   Royce Hillman am acting as a scribe while in the presence of the attending physician :        I,:   Jazmine Vázquez MD personally performed the services described in this documentation    as scribed in my presence : In order to check the location, date, or time of your aftercare appointment, please refer to your Discharge Instructions Document given to you upon leaving the hospital.  If you have lost the instructions please call [phone]

## 2022-11-09 ENCOUNTER — TELEPHONE (OUTPATIENT)
Dept: NEUROLOGY | Facility: CLINIC | Age: 82
End: 2022-11-09

## 2022-11-09 NOTE — TELEPHONE ENCOUNTER
[facility] PT OUT PATIENT CERTIFICATION/ SUMMARY LETTER  FORM WAS PRINTED FROM [address] SIGNED AND FAXED BACK

## 2022-11-18 ENCOUNTER — TELEPHONE (OUTPATIENT)
Dept: NEUROLOGY | Facility: CLINIC | Age: 82
End: 2022-11-18

## 2022-11-18 NOTE — TELEPHONE ENCOUNTER
Spoke to patient's spouse to confirm his 11/21/2022 appointment with Dr Agustín Pryor at the [facility]

## 2022-11-21 ENCOUNTER — OFFICE VISIT (OUTPATIENT)
Dept: NEUROLOGY | Facility: CLINIC | Age: 82
End: 2022-11-21

## 2022-11-21 VITALS — WEIGHT: 199.4 LBS | DIASTOLIC BLOOD PRESSURE: 80 MMHG | SYSTOLIC BLOOD PRESSURE: 140 MMHG | BODY MASS INDEX: 28.61 KG/M2

## 2022-11-21 DIAGNOSIS — G20 PARKINSON DISEASE (HCC): Primary | ICD-10-CM

## 2022-11-21 NOTE — PATIENT INSTRUCTIONS
Patient Instructions:  -increase sinemet to 25/100 mg 2 tablets three times a day  -continue amantadine 100 mg daily  -please let me know if you would like to start gabapentin in the future for your neuropathic pain  -continue physical therapy  -follow up in about 3 months

## 2022-11-21 NOTE — PROGRESS NOTES
[facility]'s Neurology Consult  PATIENT:  Chela Cedillo  MRN:  [id number]  :  1940  DATE OF SERVICE:  2022  REFERRED BY: No ref  provider found  PMD: Kenisha Keenan,     Assessment/Plan:     Chela Cedillo  is a very pleasant [de-identified] y o  male with a past medical history that includes MGUS who presents for f/u of PD and neuropathy  Neuropathy  Noted on EMG  Workup to date notable for MGUS, following with hematology who are monitoring for now  He does have diminished sensation to pinprick, temperature and vibration distally and is currently complaining of neuropathic pain in his fingers and toes  Discussed starting gabapentin, but pt does not want to take another medication at this time     MGUS (monoclonal gammopathy of unknown significance)  Currently following with hematology who is monitoring labs     Parkinson's disease ([address] )  Moderate bradykinesia bilaterally R>L  Increased tone with cogwheeling throughout L>R, rest tremor in LUE, parkinsonian gait  Discussed increasing sinemet today for which he was agreeable  Will increase up to 2 tabs sinemet 25/100 three times a day  Will monitor for edema, as this was a documented adverse effect previously  Continue PT, which has been helpful for him    F/u in 3 months    CC:   PD and neuropathy    History of Present Illness: David Ramos is a pleasant [de-identified] yo male seen in follow up for PD  He is accompanied by his wife today  Wife states that she has noticed motor improvement since he started physical therapy  He is currently taking sinemet 25/100 1 5 tab at 8 am, 1 5 tab 1 pm, 1 tab at 8 pm and amantadine 100 mg daily at 5 pm  States that PD symptoms have essentially been stable since his last visit in   Wife states that RBD has improved since he was started on sinemet  Describes achy, burning pain in his fingers and toes but is not interested in starting gabapentin at this time   Hx CTS for which he is not interested in surgery for at this time  Previous Hx:  Per chart review, first seen for consultation in 2018 in which he had noted 2-3 year history of weakness, stiffness, and moving slower  Noted worsening tremors over this period of time  He was started on sinemet with improvement of symptoms  Amantadine later added  He did have issues with leg swelling with higher doses of sinemet       Last office visit 3/2022 in which PD was stable  Labs for neuropathy were ordered given EMG findings  he was referred to hematology for abnormal spep       Current Medications:  sinemet 25/100 mg 1 5 tabs  8 am, 1 pm and 1 tab 8 PM  amantadine 100 mg QD     Interval History:   He feels his walking and balance are good, he tripped off his  but no other falls  He will need a chair with arms to get up  Feels tremor is about the same, benefit from amantadine  No trouble swallowing  He sleeps well, He has occasional  talking in his sleep, he used to have more acting out of his dreams but has improved recently  No hallucinations  No dizziness  He has some occasional drooling  His wife can sometimes have a hard time hearing him sometimes  No memory concerns  He denies any numbness or tingling in his feet, he has occasional numbness/tingling in his hands b/l entire hand, has known carpal tunnel  He is looking into stay strong program at [facility]     Performs ADls independent, needs help putting on pants       Was seen by hematology for MGUS-monitoring labs  He was admitted to [facility] for abnormal blood cultures, however repeat studies were normal, likely contamination  He did have BONIFACIO that was treated with fluids  He did have f/u with pulmonary due to hemoptysis that has since resolved   HX of asbestos exposure       prior work up:  labs 3/2022: tsh 2 45, a1c 5 1, b12 370, folate 11 7, spep monoclonal peak in gamma region, immunofixation-monoclonal gammopathy identified as IgG kappa, heavy metals screen normal except arsenic elevated at 13     EMG 9/2021 b/l UE: This is an abnormal EMG of the bilateral upper extremities due to changes consistent with a localized neuropathic process involving the median nerve at the wrist bilaterally consistent with moderate carpal tunnel syndrome with demyelinative change   In addition 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 with predominantly demyelinative change      The following portions of the patient's history were reviewed and updated as appropriate: allergies, current medications, past family history, past medical history, past social history, past surgical history and problem list     Past Medical History:     Past Medical History:   Diagnosis Date   • Arthritis    • Asbestos exposure    • Asbestosis ([facility] )    • GERD (gastroesophageal reflux disease)    • Hemoptysis 5/11/2022   • Hypertension    • Lung disease    • Parkinson's disease [facility])        Patient Active Problem List   Diagnosis   • Chronic right shoulder pain   • Internal derangement of right shoulder   • Tear of right supraspinatus tendon   • Parkinson's disease ([facility] )   • MGUS (monoclonal gammopathy of unknown significance)   • Neuropathy   • Pleural plaque with presence of asbestos   • Allergic rhinitis with postnasal drip   • Shortness of breath       Medications:      Current Outpatient Medications   Medication Sig Dispense Refill   • amantadine (SYMMETREL) 100 mg capsule TAKE 1 CAPSULE DAILY AT 5 P M  90 capsule 3   • carbidopa-levodopa (SINEMET)  mg per tablet TAKE ONE AND ONE-HALF TABLETS IN THE MORNING, ONE AND ONE-HALF TABLETS IN THE AFTERNOON AND 1 TABLET NIGHTLY 360 tablet 3   • lifitegrast (Xiidra) 5 % op solution Administer 1 drop to both eyes 2 (two) times a day       No current facility-administered medications for this visit  Allergies:       Allergies   Allergen Reactions   • Morphine And Related Other (See Comments)     Patient gets delirious       Family History:     Family History Problem Relation Age of Onset   • Stroke Mother    • Colon cancer Mother    • Depression Sister    • Hypertension Sister        Social History:       Social History     Socioeconomic History   • Marital status: /Civil Union     Spouse name: Not on file   • Number of children: Not on file   • Years of education: Not on file   • Highest education level: Not on file   Occupational History   • Not on file   Tobacco Use   • Smoking status: Never   • Smokeless tobacco: Never   Vaping Use   • Vaping Use: Never used   Substance and Sexual Activity   • Alcohol use: Not Currently   • Drug use: No   • Sexual activity: Not on file   Other Topics Concern   • Not on file   Social History Narrative   • Not on file     Social Determinants of Health     Financial Resource Strain: Not on file   Food Insecurity: No Food Insecurity   • Worried About Running Out of Food in the Last Year: Never true   • Ran Out of Food in the Last Year: Never true   Transportation Needs: No Transportation Needs   • Lack of Transportation (Medical): No   • Lack of Transportation (Non-Medical): No   Physical Activity: Not on file   Stress: Not on file   Social Connections: Not on file   Intimate Partner Violence: Not on file   Housing Stability: Low Risk    • Unable to Pay for Housing in the Last Year: No   • Number of Places Lived in the Last Year: 1   • Unstable Housing in the Last Year: No         Objective:   /80   Wt 90 4 kg (199 lb 6 4 oz)   BMI 28 61 kg/m²     General: Patient is not in any acute/apparent distress, well nourished, well developed and cooperative  HEENT: normocephalic, atraumatic, moist membranes  Neck: supple  Extremities: no edema noted   Skin: no lesions or rash  Musculosketal: no bony abnormalities    Neurologic Examination:   Mental status: alert, awake, oriented X 3 and following commands       Speech/Language: Speech is fluent without any dysarthria, no aphasia noted, can name, comprehension intact    Cranial Nerves:   CN I: smell not tested  CN II: Visual fields full to confrontation  CN III, IV, VI: Extraocular movements intact bilaterally  Pupils equal round and reactive to light bilaterally  CN V: Facial sensation is normal   CN VII: Full and symmetric facial movement  CN VIII: Hearing is normal   CN IX, X: Palate elevates symmetrically  CN XI: Shoulder shrug strength is normal   CN XII: Tongue midline without atrophy or fasciculations  Motor:   Strength 5/5 in all 4 extremities  Moderate bradykinesia bilaterally R>L  RUE cogwheeling, LUE mild cogwheeling  Increased tone noted in LLE as well  Rest tremor seen briefly in LUE only after walking  Sensory:   Sensation intact to soft touch, vibration and pinprick in all 4 extremities  Cerebellar:   Finger-to-nose intact, normal heel to shin  Reflexes: 2+ in all 4 extremities  Pathologic reflexes - babinski reflex negative, Hoffmans reflex - negative    Gait:   Stooped posture  Short stride with shuffling gait, 5 steps to make turns  Decreased arm swing       Review of Systems:     Review of Systems   Constitutional: Negative  Negative for appetite change and fever  HENT: Negative  Negative for hearing loss, tinnitus, trouble swallowing and voice change  Eyes: Negative  Negative for photophobia, pain and visual disturbance  Respiratory: Negative  Negative for shortness of breath  Cardiovascular: Negative  Negative for palpitations  Gastrointestinal: Negative  Negative for nausea and vomiting  Endocrine: Negative  Negative for cold intolerance  Genitourinary: Negative  Negative for dysuria, frequency and urgency  Musculoskeletal: Negative  Negative for gait problem, myalgias and neck pain  Skin: Negative  Negative for rash  Allergic/Immunologic: Negative  Neurological: Positive for tremors (left hand)   Negative for dizziness, seizures, syncope, facial asymmetry, speech difficulty, weakness, light-headedness, numbness and headaches  Hematological: Negative  Does not bruise/bleed easily  Psychiatric/Behavioral: Negative  Negative for confusion, hallucinations and sleep disturbance

## 2023-01-24 ENCOUNTER — FOLLOW UP (OUTPATIENT)
Dept: URBAN - METROPOLITAN AREA CLINIC 6 | Facility: CLINIC | Age: 83
End: 2023-01-24

## 2023-01-24 ENCOUNTER — TELEPHONE (OUTPATIENT)
Dept: NEUROLOGY | Facility: CLINIC | Age: 83
End: 2023-01-24

## 2023-01-24 DIAGNOSIS — H04.123: ICD-10-CM

## 2023-01-24 PROCEDURE — 92012 INTRM OPH EXAM EST PATIENT: CPT

## 2023-01-24 ASSESSMENT — TONOMETRY
OS_IOP_MMHG: 20
OD_IOP_MMHG: 19

## 2023-01-24 ASSESSMENT — VISUAL ACUITY
OD_CC: 20/60
OS_CC: 20/60+1

## 2023-01-24 NOTE — TELEPHONE ENCOUNTER
Fax sent to [employer] [facility] for Ibrahima Smith to sign and complete   Printed out paperwork and placed on Lianet's desk for completion

## 2023-02-23 ENCOUNTER — APPOINTMENT (OUTPATIENT)
Dept: LAB | Facility: MEDICAL CENTER | Age: 83
End: 2023-02-23

## 2023-02-23 ENCOUNTER — TELEPHONE (OUTPATIENT)
Dept: NEUROLOGY | Facility: CLINIC | Age: 83
End: 2023-02-23

## 2023-02-23 DIAGNOSIS — D47.2 MGUS (MONOCLONAL GAMMOPATHY OF UNKNOWN SIGNIFICANCE): ICD-10-CM

## 2023-02-23 LAB
ALBUMIN SERPL BCP-MCNC: 4 G/DL (ref 3.5–5)
ALP SERPL-CCNC: 105 U/L (ref 46–116)
ALT SERPL W P-5'-P-CCNC: 13 U/L (ref 12–78)
ANION GAP SERPL CALCULATED.3IONS-SCNC: 5 MMOL/L (ref 4–13)
AST SERPL W P-5'-P-CCNC: 15 U/L (ref 5–45)
BASOPHILS # BLD AUTO: 0.03 THOUSANDS/ÂΜL (ref 0–0.1)
BASOPHILS NFR BLD AUTO: 1 % (ref 0–1)
BILIRUB SERPL-MCNC: 1.03 MG/DL (ref 0.2–1)
BUN SERPL-MCNC: 30 MG/DL (ref 5–25)
CALCIUM SERPL-MCNC: 9.1 MG/DL (ref 8.3–10.1)
CHLORIDE SERPL-SCNC: 106 MMOL/L (ref 96–108)
CO2 SERPL-SCNC: 27 MMOL/L (ref 21–32)
CREAT SERPL-MCNC: 1.09 MG/DL (ref 0.6–1.3)
EOSINOPHIL # BLD AUTO: 0.11 THOUSAND/ÂΜL (ref 0–0.61)
EOSINOPHIL NFR BLD AUTO: 2 % (ref 0–6)
ERYTHROCYTE [DISTWIDTH] IN BLOOD BY AUTOMATED COUNT: 12.5 % (ref 11.6–15.1)
GFR SERPL CREATININE-BSD FRML MDRD: 63 ML/MIN/1.73SQ M
GLUCOSE P FAST SERPL-MCNC: 84 MG/DL (ref 65–99)
HCT VFR BLD AUTO: 39.3 % (ref 36.5–49.3)
HGB BLD-MCNC: 13.1 G/DL (ref 12–17)
IGA SERPL-MCNC: 176 MG/DL (ref 70–400)
IGG SERPL-MCNC: 824 MG/DL (ref 700–1600)
IGM SERPL-MCNC: 122 MG/DL (ref 40–230)
IMM GRANULOCYTES # BLD AUTO: 0.02 THOUSAND/UL (ref 0–0.2)
IMM GRANULOCYTES NFR BLD AUTO: 0 % (ref 0–2)
LYMPHOCYTES # BLD AUTO: 1.11 THOUSANDS/ÂΜL (ref 0.6–4.47)
LYMPHOCYTES NFR BLD AUTO: 19 % (ref 14–44)
MCH RBC QN AUTO: 32.3 PG (ref 26.8–34.3)
MCHC RBC AUTO-ENTMCNC: 33.3 G/DL (ref 31.4–37.4)
MCV RBC AUTO: 97 FL (ref 82–98)
MONOCYTES # BLD AUTO: 0.49 THOUSAND/ÂΜL (ref 0.17–1.22)
MONOCYTES NFR BLD AUTO: 9 % (ref 4–12)
NEUTROPHILS # BLD AUTO: 4 THOUSANDS/ÂΜL (ref 1.85–7.62)
NEUTS SEG NFR BLD AUTO: 69 % (ref 43–75)
NRBC BLD AUTO-RTO: 0 /100 WBCS
PLATELET # BLD AUTO: 192 THOUSANDS/UL (ref 149–390)
PMV BLD AUTO: 10.7 FL (ref 8.9–12.7)
POTASSIUM SERPL-SCNC: 4.4 MMOL/L (ref 3.5–5.3)
PROT SERPL-MCNC: 7.1 G/DL (ref 6.4–8.4)
RBC # BLD AUTO: 4.05 MILLION/UL (ref 3.88–5.62)
SODIUM SERPL-SCNC: 138 MMOL/L (ref 135–147)
WBC # BLD AUTO: 5.76 THOUSAND/UL (ref 4.31–10.16)

## 2023-02-25 LAB
ALBUMIN SERPL ELPH-MCNC: 4.55 G/DL (ref 3.5–5)
ALBUMIN SERPL ELPH-MCNC: 66.9 % (ref 52–65)
ALPHA1 GLOB SERPL ELPH-MCNC: 0.31 G/DL (ref 0.1–0.4)
ALPHA1 GLOB SERPL ELPH-MCNC: 4.5 % (ref 2.5–5)
ALPHA2 GLOB SERPL ELPH-MCNC: 0.78 G/DL (ref 0.4–1.2)
ALPHA2 GLOB SERPL ELPH-MCNC: 11.4 % (ref 7–13)
BETA GLOB ABNORMAL SERPL ELPH-MCNC: 0.3 G/DL (ref 0.4–0.8)
BETA1 GLOB SERPL ELPH-MCNC: 4.4 % (ref 5–13)
BETA2 GLOB SERPL ELPH-MCNC: 3.8 % (ref 2–8)
BETA2+GAMMA GLOB SERPL ELPH-MCNC: 0.26 G/DL (ref 0.2–0.5)
GAMMA GLOB ABNORMAL SERPL ELPH-MCNC: 0.61 G/DL (ref 0.5–1.6)
GAMMA GLOB SERPL ELPH-MCNC: 9 % (ref 12–22)
IGG/ALB SER: 2.02 {RATIO} (ref 1.1–1.8)
M PROTEIN 1 MFR SERPL ELPH: 3.2 %
M PROTEIN 1 SERPL ELPH-MCNC: 0.22 G/DL
PROT PATTERN SERPL ELPH-IMP: ABNORMAL
PROT SERPL-MCNC: 6.8 G/DL (ref 6.4–8.2)

## 2023-03-10 ENCOUNTER — OFFICE VISIT (OUTPATIENT)
Dept: HEMATOLOGY ONCOLOGY | Facility: CLINIC | Age: 83
End: 2023-03-10

## 2023-03-10 VITALS
DIASTOLIC BLOOD PRESSURE: 82 MMHG | OXYGEN SATURATION: 98 % | WEIGHT: 208 LBS | BODY MASS INDEX: 29.78 KG/M2 | RESPIRATION RATE: 17 BRPM | HEART RATE: 80 BPM | HEIGHT: 70 IN | SYSTOLIC BLOOD PRESSURE: 144 MMHG

## 2023-03-10 DIAGNOSIS — R77.8 ABNORMAL SPEP: ICD-10-CM

## 2023-03-10 DIAGNOSIS — G20 PARKINSON'S DISEASE (HCC): ICD-10-CM

## 2023-03-10 DIAGNOSIS — J61 PULMONARY ASBESTOSIS (HCC): ICD-10-CM

## 2023-03-10 DIAGNOSIS — D47.2 MGUS (MONOCLONAL GAMMOPATHY OF UNKNOWN SIGNIFICANCE): Primary | ICD-10-CM

## 2023-03-10 NOTE — PROGRESS NOTES
HPI:   Patient is here with his wife  Patient has a small IgG kappa monoclonal spike of 220 mg and that is being monitored  He is not symptomatic from MGUS  Patient  has history of asbestosis and he follows with Pulmonary service for that  Patient was a     He has Parkinson's disease with stiffness but no tremors  He walks slowly  He states medication is helping and he is less stiff  He has minimal residual cough from COVID  No hemoptysis  Has exertional dyspnea  He follows with pulmonary specialist     Has generalized weakness and tiredness and stiffness  History of arthritis  History of carpal tunnel  Occasionally tingling in his hands and feet and they get cold  Has nocturia      ROS:  03/10/23 Reviewed 12 systems: See symptoms in HPI    Presently no other neurological, cardiac, pulmonary, GI and  symptoms other than listed in HPI  Other symptoms are in HPI  No symptoms like fever, chills, bleeding, bone pains, skin rash, weight loss, night sweats,   Claudication  No swelling of the ankles  No swollen glands  Patient is anxious         Historical Information   Past Medical History:   Diagnosis Date   • Arthritis    • Asbestos exposure    • Asbestosis ([facility] )    • GERD (gastroesophageal reflux disease)    • Hemoptysis 5/11/2022   • Hypertension    • Lung disease    • Parkinson's disease ([facility] )      Past Surgical History:   Procedure Laterality Date   • HERNIA REPAIR     • KNEE SURGERY      many years ago   • SHOULDER SURGERY       Social History   Social History     Substance and Sexual Activity   Alcohol Use Not Currently     Social History     Substance and Sexual Activity   Drug Use No     Social History     Tobacco Use   Smoking Status Never   Smokeless Tobacco Never     Family History:   Family History   Problem Relation Age of Onset   • Stroke Mother    • Colon cancer Mother    • Depression Sister    • Hypertension Sister          Current Outpatient Medications:   • amantadine (SYMMETREL) 100 mg capsule, TAKE 1 CAPSULE DAILY AT 5 P M , Disp: 90 capsule, Rfl: 3  •  carbidopa-levodopa (Sinemet)  mg per tablet, Take 2 tablets by mouth 3 (three) times a day, Disp: 540 tablet, Rfl: 3  •  lifitegrast (Xiidra) 5 % op solution, Administer 1 drop to both eyes 2 (two) times a day, Disp: , Rfl:     Allergies   Allergen Reactions   • Morphine And Related Other (See Comments)     Patient gets delirious       Physical Exam:  Vitals:    03/10/23 1104   BP: 144/82   BP Location: Left arm   Patient Position: Sitting   Cuff Size: Adult   Pulse: 80   Resp: 17   SpO2: 98%   Weight: 94 3 kg (208 lb)   Height: 5' 10" (1 778 m)       Alert and oriented and not in distress  Vital signs are above  No icterus , no oral thrush , no palpable neck mass,  lung fields are clear to percussion and auscultation, regular heart rate, systolic murmur, abdomen is soft and non tender , no palpable abdominal mass, no ascites, no edema of ankles, no calf tenderness,  has some rigidity, he ambulates slowly, no skin rash, no palpable lymphadenopathy in the neck and axillary areas,  no clubbing  Patient is anxious  Performance status 2  Lab Results: I have reviewed all pertinent labs    LABS:    Results for orders placed or performed in visit on 02/23/23   CBC and differential   Result Value Ref Range    WBC 5 76 4 31 - 10 16 Thousand/uL    RBC 4 05 3 88 - 5 62 Million/uL    Hemoglobin 13 1 12 0 - 17 0 g/dL    Hematocrit 39 3 36 5 - 49 3 %    MCV 97 82 - 98 fL    MCH 32 3 26 8 - 34 3 pg    MCHC 33 3 31 4 - 37 4 g/dL    RDW 12 5 11 6 - 15 1 %    MPV 10 7 8 9 - 12 7 fL    Platelets 003 360 - 371 Thousands/uL    nRBC 0 /100 WBCs    Neutrophils Relative 69 43 - 75 %    Immat GRANS % 0 0 - 2 %    Lymphocytes Relative 19 14 - 44 %    Monocytes Relative 9 4 - 12 %    Eosinophils Relative 2 0 - 6 %    Basophils Relative 1 0 - 1 %    Neutrophils Absolute 4 00 1 85 - 7 62 Thousands/µL    Immature Grans Absolute 0  02 0 00 - 0 20 Thousand/uL    Lymphocytes Absolute 1 11 0 60 - 4 47 Thousands/µL    Monocytes Absolute 0 49 0 17 - 1 22 Thousand/µL    Eosinophils Absolute 0 11 0 00 - 0 61 Thousand/µL    Basophils Absolute 0 03 0 00 - 0 10 Thousands/µL   Comprehensive metabolic panel   Result Value Ref Range    Sodium 138 135 - 147 mmol/L    Potassium 4 4 3 5 - 5 3 mmol/L    Chloride 106 96 - 108 mmol/L    CO2 27 21 - 32 mmol/L    ANION GAP 5 4 - 13 mmol/L    BUN 30 (H) 5 - 25 mg/dL    Creatinine 1 09 0 60 - 1 30 mg/dL    Glucose, Fasting 84 65 - 99 mg/dL    Calcium 9 1 8 3 - 10 1 mg/dL    AST 15 5 - 45 U/L    ALT 13 12 - 78 U/L    Alkaline Phosphatase 105 46 - 116 U/L    Total Protein 7 1 6 4 - 8 4 g/dL    Albumin 4 0 3 5 - 5 0 g/dL    Total Bilirubin 1 03 (H) 0 20 - 1 00 mg/dL    eGFR 63 ml/min/1 73sq m   IgG, IgA, IgM   Result Value Ref Range     0 70 0 - 400 0 mg/dL     0 700 0 - 1,600 0 mg/dL     0 40 0 - 230 0 mg/dL   Protein electrophoresis, serum   Result Value Ref Range    A/G Ratio 2 02 (H) 1 10 - 1 80    Albumin Electrophoresis 66 9 (H) 52 0 - 65 0 %    Albumin CONC 4 55 3 50 - 5 00 g/dl    Alpha 1 4 5 2 5 - 5 0 %    ALPHA 1 CONC 0 31 0 10 - 0 40 g/dL    Alpha 2 11 4 7 0 - 13 0 %    ALPHA 2 CONC 0 78 0 40 - 1 20 g/dL    Beta-1 4 4 (L) 5 0 - 13 0 %    BETA 1 CONC 0 30 (L) 0 40 - 0 80 g/dL    Beta-2 3 8 2 0 - 8 0 %    BETA 2 CONC 0 26 0 20 - 0 50 g/dL    Gamma Globulin 9 0 (L) 12 0 - 22 0 %    GAMMA CONC 0 61 0 50 - 1 60 g/dL    M Peak ID 1 3 20 %    M PEAK 1 CONC 0 22 g/dL    Total Protein 6 8 6 4 - 8 2 g/dL    SPEP Interpretation See Comment          Imaging Studies: I have personally reviewed pertinent reports  OSSEOUS STRUCTURES:  No acute fracture or destructive osseous lesion  Degenerative changes of thoracic spine      IMPRESSION:        1  No CT evidence of interstitial lung disease    No active pulmonary disease      2   Stable bilateral pleural plaques, in keeping with asbestosis related pleural disease  No evidence of parenchymal asbestosis                 Workstation performed: [id number]          Imaging    CT chest high resolution (Order: [id number]) - 5/11/2022                Pathology, and Other Studies: I have personally reviewed pertinent reports  Reviewed above test results pertaining to hematological condition and discussed with patient and his wife    Assessment and Plan:  See diagnoses, orders instructions below    Patient is here with his wife  Patient has a small IgG kappa monoclonal spike of 220 mg and that is being monitored  He is not symptomatic from MGUS  Patient  has history of asbestosis and he follows with Pulmonary service for that  Patient was a     He has Parkinson's disease with stiffness but no tremors  He walks slowly  He states medication is helping and he is less stiff  He has minimal residual cough from COVID  No hemoptysis  Has exertional dyspnea  He follows with pulmonary specialist     Has generalized weakness and tiredness and stiffness  History of arthritis  History of carpal tunnel  Occasionally tingling in his hands and feet and they get cold  Has nocturia          Physical examination and test results are as recorded and discussed  I think small IgG kappa spike MGUS is an incidental finding at his age and will be monitored  It measures 220 mg   Diet and activities per primary physician and neurologist   Patient to avoid falls and trauma and hazardous places  Patient needs assistance in his care  Plan for MGUS is surveillance  All discussed in detail  Questions answered  Discussed precautions against COVID virus  1  MGUS (monoclonal gammopathy of unknown significance)    - CBC and differential; Future  - Comprehensive metabolic panel; Future  - IgG, IgA, IgM; Future  - Immunoglobulin free LT chains blood; Future  - Protein electrophoresis, serum; Future    2   Parkinson's disease ([facility] )      3  Pulmonary asbestosis ([facility] )      4  Abnormal SPEP      Blood work prior to next visit in 4 months       Patient will continue to follow with  primary physician and other consultants  Patient and wife voiced understanding and agrees     This note has been generated by voice recognition software  Therefore there maybe spelling, grammar, and other errors  Please contact if questions arise  Counseling / Coordination of Care    Alessia Yang   Provided counseling and support

## 2023-03-22 ENCOUNTER — OFFICE VISIT (OUTPATIENT)
Dept: PULMONOLOGY | Facility: CLINIC | Age: 83
End: 2023-03-22

## 2023-03-22 VITALS
DIASTOLIC BLOOD PRESSURE: 74 MMHG | BODY MASS INDEX: 29.81 KG/M2 | SYSTOLIC BLOOD PRESSURE: 120 MMHG | HEART RATE: 76 BPM | TEMPERATURE: 96.8 F | OXYGEN SATURATION: 99 % | WEIGHT: 208.2 LBS | HEIGHT: 70 IN | RESPIRATION RATE: 18 BRPM

## 2023-03-22 DIAGNOSIS — R06.02 SHORTNESS OF BREATH: ICD-10-CM

## 2023-03-22 DIAGNOSIS — J92.0 PLEURAL PLAQUE WITH PRESENCE OF ASBESTOS: Primary | ICD-10-CM

## 2023-03-22 NOTE — ASSESSMENT & PLAN NOTE
· Does not describe significant shortness of breath  · Tries to maintain activity level and is undergoing physical therapy  He is somewhat limited by his parkinsonism  · Has not had further hemoptysis that he describes    Discussed at length with the patient and his wife and if further hemoptysis, should have repeat CAT scan but otherwise would defer at this time

## 2023-03-22 NOTE — PROGRESS NOTES
Progress note - Pulmonary Medicine   Lefty Cristobal  81 y o  male MRN: [id number]       Impression & Plan:     Shortness of breath  · Does not describe significant shortness of breath  · Tries to maintain activity level and is undergoing physical therapy  He is somewhat limited by his parkinsonism  · Has not had further hemoptysis that he describes  Discussed at length with the patient and his wife and if further hemoptysis, should have repeat CAT scan but otherwise would defer at this time    Pleural plaque with presence of asbestos  · Has pleural plaque but no evidence of belkis asbestosis  · No identified cause for previous hemoptysis  Does not describe recurrent hemoptysis  · Continue to observe  Would not repeat pulmonary function testing or CAT scan unless significant change in symptoms  Follow-up in 6 months  ______________________________________________________________________    HPI:    Lefty Cristobal  presents today for follow-up of hemoptysis, shortness of breath, and CAT scan suggestive of pleural plaque from possible asbestos exposure in the past   He is doing well  He reports no new pulmonary symptoms  No chest pain  No cough  No worsening shortness of breath  He tries to remain active and is undergoing physical therapy  He also tells me that he does some yard work  He is limited in his exercise capacity due to his parkinsonism however  More importantly, he has not had any further hemoptysis  His wife described a situation where she found blood on a door jam in the home but she was not sure where that came from and he did not describe any bleeding to her  No headache or visual changes  No change in weight or appetite    He otherwise remained stable    Current Medications:    Current Outpatient Medications:   •  amantadine (SYMMETREL) 100 mg capsule, TAKE 1 CAPSULE DAILY AT 5 P M , Disp: 90 capsule, Rfl: 3  •  carbidopa-levodopa (Sinemet)  mg per tablet, Take 2 tablets by mouth 3 (three) times a day, Disp: 540 tablet, Rfl: 3  •  lifitegrast (Xiidra) 5 % op solution, Administer 1 drop to both eyes 2 (two) times a day, Disp: , Rfl:     Review of Systems:  Aside from what is mentioned in the HPI, the review of systems is otherwise negative    Past medical history, surgical history, and family history were reviewed and updated as appropriate    Social history updates:  Social History     Tobacco Use   Smoking Status Never   • Passive exposure: Past   Smokeless Tobacco Former   • Types: Chew   • Quit date: 1998       PhysicalExamination:  Vitals:   /74 (BP Location: Right arm, Patient Position: Sitting, Cuff Size: Standard)   Pulse 76   Temp (!) 96 8 °F (36 °C) (Tympanic)   Resp 18   Ht 5' 10" (1 778 m)   Wt 94 4 kg (208 lb 3 2 oz)   SpO2 99%   BMI 29 87 kg/m²   Gen:  Comfortable on room air  No conversational dyspnea  He does have a parkinsonian affect  HEENT:  Conjugate gaze  sclerae anicteric  Oropharynx moist  Neck: Trachea is midline  No JVD  No adenopathy  Chest: Symmetric with clear breath sounds  No rales or crackles  No wheezes  Cardiac: Regular  no murmur  Abdomen:  benign  Extremities: No edema  Neuro:  Normal speech and mentation  Has tremor  Gait is festinating    Diagnostic Data:  Labs: I personally reviewed the most recent laboratory data pertinent to today's visit    Lab Results   Component Value Date    WBC 5 76 02/23/2023    HGB 13 1 02/23/2023    HCT 39 3 02/23/2023    MCV 97 02/23/2023     02/23/2023     Lab Results   Component Value Date    SODIUM 138 02/23/2023    K 4 4 02/23/2023    CO2 27 02/23/2023     02/23/2023    BUN 30 (H) 02/23/2023    CREATININE 1 09 02/23/2023    CALCIUM 9 1 02/23/2023       PFT results:  Pulmonary function from August 2022 only demonstrated very mild impairment and diffusion but otherwise was a normal study      Imaging:  I personally reviewed the images on the [facility] system pertinent to today's visit  Last imaging of the lungs was that the high-resolution CAT scan from May which did not demonstrate any interstitial lung disease    There are bilateral pleural plaques that are calcified, consistent with asbestos related plaque    Sunny Long MD

## 2023-03-22 NOTE — ASSESSMENT & PLAN NOTE
· Has pleural plaque but no evidence of belkis asbestosis  · No identified cause for previous hemoptysis  Does not describe recurrent hemoptysis  · Continue to observe  Would not repeat pulmonary function testing or CAT scan unless significant change in symptoms

## 2023-04-17 ENCOUNTER — APPOINTMENT (OUTPATIENT)
Dept: LAB | Facility: MEDICAL CENTER | Age: 83
End: 2023-04-17

## 2023-05-10 NOTE — PROGRESS NOTES
Amoxicillin Rx ordered by AMOS Rothman 05/09/2023 was sent to the wrong (mail order)pharmacy. Pt needs [facility]. Mail order can't transfer Rx.     [facility].   Triage called mail order pharmacy to cancel Amoxicillin rx.    Daily Note     Today's date: 2018  Patient name: Gerardo Good  : 1940  MRN: [id number]  Referring provider: Rena Cintron DO  Dx:   Encounter Diagnosis     ICD-10-CM    1  Parkinson's disease ([facility] [address] ) G20          Subjective: Notes that he tried HEP this morning  Objective: See treatment diary below    Precautions: Parkinson's, HTN  LSVT BIG Daily Treatment Diary      Carryover Assignment                                                                                         Max Daily Exercises         Floor to Ceiling  10x  10x  10x       Side to Side  10x  10x  10x       Forward Step and Reach  10x  10x  10x       Sideways Step and Reach  10x  10x  10x       Backwards Step and Reach  10x  10x  10x       Forwards Rock and Reach  10x  10x  10x       Sideways Rock and Reach  10x  10x  10x       Functional Components             1  STS  10x  10x  10x       2  Buttons on shirt  2x  2x  2x       3  posture    during functional components  during functional components       4  Picking up coins 1x  1x  1x       5  Putting shirt on    2x  1x       Hierarchies             1  Getting on     lifting legs-5x  ifting legs-5x       2 hurdles             BIG walking    10 min, outside-sidewalk, sidewalk hill  10 min, outside-sidewalk, sidewalk hill                                                     Assessment:  Patient requires cueing and shaping for bigger movements  Added finger flicks today  More difficulty and scuffing noted with stepping exercises on right  Very challenged with coordination with UE/LE with rocking  Impression that patients gets confused at times, requires cueing and re-direction  Still demonstrates difficulty and challenge with fine motor skills  Challenged with lifting legs for getting on/off  simulation  Cueing for arm swing during ambulation  Plan: Progress treament per protocol

## 2023-06-07 NOTE — TELEPHONE ENCOUNTER
[facility] are able to [facility] and is being processed now for delivery  Nothing further needed from our office  Said they also received script for DME supply which they were unable to fill  Patient's wife aware, they will have script for brace filled at local pharmacy  yes

## 2023-06-19 ENCOUNTER — TELEPHONE (OUTPATIENT)
Dept: NEUROLOGY | Facility: CLINIC | Age: 83
End: 2023-06-19

## 2023-06-19 NOTE — TELEPHONE ENCOUNTER
Received via [employer] request for medical records from [address] was scanned into [employer] and faxed to [phone]

## 2023-06-26 ENCOUNTER — APPOINTMENT (OUTPATIENT)
Dept: LAB | Facility: MEDICAL CENTER | Age: 83
End: 2023-06-26
Payer: COMMERCIAL

## 2023-06-26 DIAGNOSIS — D47.2 MGUS (MONOCLONAL GAMMOPATHY OF UNKNOWN SIGNIFICANCE): ICD-10-CM

## 2023-06-26 DIAGNOSIS — Z00.00 ROUTINE GENERAL MEDICAL EXAMINATION AT A HEALTH CARE FACILITY: ICD-10-CM

## 2023-06-26 LAB
ALBUMIN SERPL BCP-MCNC: 3.7 G/DL (ref 3.5–5)
ALP SERPL-CCNC: 108 U/L (ref 46–116)
ALT SERPL W P-5'-P-CCNC: 15 U/L (ref 12–78)
ANION GAP SERPL CALCULATED.3IONS-SCNC: 0 MMOL/L
AST SERPL W P-5'-P-CCNC: 10 U/L (ref 5–45)
BASOPHILS # BLD AUTO: 0.04 THOUSANDS/ÂΜL (ref 0–0.1)
BASOPHILS NFR BLD AUTO: 1 % (ref 0–1)
BILIRUB SERPL-MCNC: 1 MG/DL (ref 0.2–1)
BUN SERPL-MCNC: 32 MG/DL (ref 5–25)
CALCIUM SERPL-MCNC: 9 MG/DL (ref 8.3–10.1)
CHLORIDE SERPL-SCNC: 114 MMOL/L (ref 96–108)
CHOLEST SERPL-MCNC: 168 MG/DL
CO2 SERPL-SCNC: 27 MMOL/L (ref 21–32)
CREAT SERPL-MCNC: 1.51 MG/DL (ref 0.6–1.3)
EOSINOPHIL # BLD AUTO: 0.17 THOUSAND/ÂΜL (ref 0–0.61)
EOSINOPHIL NFR BLD AUTO: 3 % (ref 0–6)
ERYTHROCYTE [DISTWIDTH] IN BLOOD BY AUTOMATED COUNT: 12.8 % (ref 11.6–15.1)
GFR SERPL CREATININE-BSD FRML MDRD: 42 ML/MIN/1.73SQ M
GLUCOSE P FAST SERPL-MCNC: 94 MG/DL (ref 65–99)
HCT VFR BLD AUTO: 39.8 % (ref 36.5–49.3)
HDLC SERPL-MCNC: 40 MG/DL
HGB BLD-MCNC: 13.6 G/DL (ref 12–17)
IGA SERPL-MCNC: 169 MG/DL (ref 70–400)
IGG SERPL-MCNC: 814 MG/DL (ref 700–1600)
IGM SERPL-MCNC: 127 MG/DL (ref 40–230)
IMM GRANULOCYTES # BLD AUTO: 0.02 THOUSAND/UL (ref 0–0.2)
IMM GRANULOCYTES NFR BLD AUTO: 0 % (ref 0–2)
LDLC SERPL CALC-MCNC: 113 MG/DL (ref 0–100)
LYMPHOCYTES # BLD AUTO: 1.36 THOUSANDS/ÂΜL (ref 0.6–4.47)
LYMPHOCYTES NFR BLD AUTO: 20 % (ref 14–44)
MCH RBC QN AUTO: 32.9 PG (ref 26.8–34.3)
MCHC RBC AUTO-ENTMCNC: 34.2 G/DL (ref 31.4–37.4)
MCV RBC AUTO: 96 FL (ref 82–98)
MONOCYTES # BLD AUTO: 0.5 THOUSAND/ÂΜL (ref 0.17–1.22)
MONOCYTES NFR BLD AUTO: 7 % (ref 4–12)
NEUTROPHILS # BLD AUTO: 4.74 THOUSANDS/ÂΜL (ref 1.85–7.62)
NEUTS SEG NFR BLD AUTO: 69 % (ref 43–75)
NONHDLC SERPL-MCNC: 128 MG/DL
NRBC BLD AUTO-RTO: 0 /100 WBCS
PLATELET # BLD AUTO: 200 THOUSANDS/UL (ref 149–390)
PMV BLD AUTO: 10.3 FL (ref 8.9–12.7)
POTASSIUM SERPL-SCNC: 4.8 MMOL/L (ref 3.5–5.3)
PROT SERPL-MCNC: 7.1 G/DL (ref 6.4–8.4)
RBC # BLD AUTO: 4.13 MILLION/UL (ref 3.88–5.62)
SODIUM SERPL-SCNC: 141 MMOL/L (ref 135–147)
TRIGL SERPL-MCNC: 75 MG/DL
TSH SERPL DL<=0.05 MIU/L-ACNC: 1.91 UIU/ML (ref 0.45–4.5)
WBC # BLD AUTO: 6.83 THOUSAND/UL (ref 4.31–10.16)

## 2023-06-26 PROCEDURE — 84443 ASSAY THYROID STIM HORMONE: CPT

## 2023-06-26 PROCEDURE — 85025 COMPLETE CBC W/AUTO DIFF WBC: CPT

## 2023-06-26 PROCEDURE — 80053 COMPREHEN METABOLIC PANEL: CPT

## 2023-06-26 PROCEDURE — 83521 IG LIGHT CHAINS FREE EACH: CPT

## 2023-06-26 PROCEDURE — 82784 ASSAY IGA/IGD/IGG/IGM EACH: CPT

## 2023-06-26 PROCEDURE — 86334 IMMUNOFIX E-PHORESIS SERUM: CPT

## 2023-06-26 PROCEDURE — 80061 LIPID PANEL: CPT

## 2023-06-26 PROCEDURE — 36415 COLL VENOUS BLD VENIPUNCTURE: CPT

## 2023-06-26 PROCEDURE — 84165 PROTEIN E-PHORESIS SERUM: CPT

## 2023-06-27 LAB
ALBUMIN SERPL ELPH-MCNC: 3.99 G/DL (ref 3.2–5.1)
ALBUMIN SERPL ELPH-MCNC: 61.4 % (ref 48–70)
ALPHA1 GLOB SERPL ELPH-MCNC: 0.3 G/DL (ref 0.15–0.47)
ALPHA1 GLOB SERPL ELPH-MCNC: 4.6 % (ref 1.8–7)
ALPHA2 GLOB SERPL ELPH-MCNC: 0.79 G/DL (ref 0.42–1.04)
ALPHA2 GLOB SERPL ELPH-MCNC: 12.1 % (ref 5.9–14.9)
BETA GLOB ABNORMAL SERPL ELPH-MCNC: 0.34 G/DL (ref 0.31–0.57)
BETA1 GLOB SERPL ELPH-MCNC: 5.2 % (ref 4.7–7.7)
BETA2 GLOB SERPL ELPH-MCNC: 4.9 % (ref 3.1–7.9)
BETA2+GAMMA GLOB SERPL ELPH-MCNC: 0.32 G/DL (ref 0.2–0.58)
GAMMA GLOB ABNORMAL SERPL ELPH-MCNC: 0.77 G/DL (ref 0.4–1.66)
GAMMA GLOB SERPL ELPH-MCNC: 11.8 % (ref 6.9–22.3)
IGG/ALB SER: 1.59 {RATIO} (ref 1.1–1.8)
INTERPRETATION UR IFE-IMP: NORMAL
KAPPA LC FREE SER-MCNC: 280 MG/L (ref 3.3–19.4)
KAPPA LC FREE/LAMBDA FREE SER: 15.47 {RATIO} (ref 0.26–1.65)
LAMBDA LC FREE SERPL-MCNC: 18.1 MG/L (ref 5.7–26.3)
M PROTEIN 1 MFR SERPL ELPH: 3.6 %
M PROTEIN 1 SERPL ELPH-MCNC: 0.23 G/DL
PROT PATTERN SERPL ELPH-IMP: NORMAL
PROT SERPL-MCNC: 6.5 G/DL (ref 6.4–8.2)

## 2023-06-27 PROCEDURE — 84165 PROTEIN E-PHORESIS SERUM: CPT | Performed by: STUDENT IN AN ORGANIZED HEALTH CARE EDUCATION/TRAINING PROGRAM

## 2023-06-27 PROCEDURE — 86334 IMMUNOFIX E-PHORESIS SERUM: CPT | Performed by: STUDENT IN AN ORGANIZED HEALTH CARE EDUCATION/TRAINING PROGRAM

## 2023-07-05 DIAGNOSIS — G20 PARKINSON'S DISEASE (HCC): ICD-10-CM

## 2023-07-05 RX ORDER — AMANTADINE HYDROCHLORIDE 100 MG/1
CAPSULE, GELATIN COATED ORAL
Qty: 90 CAPSULE | Refills: 3 | Status: SHIPPED | OUTPATIENT
Start: 2023-07-05 | End: 2023-07-07 | Stop reason: SDUPTHER

## 2023-07-07 NOTE — TELEPHONE ENCOUNTER
Received VM transcription: This is Della moreland [facility]. I called the other day to have his prescription refill for amantadine. You refilled it at [facility]. It's not supposed to go there. It has to go to [facility] for 90 days. The prescription is amantadine 100 mg. Thank you.  -------------------------------------------------------    [facility] acknowledging message received. Dr. Ndiaye Signs - Rx entered to be sent to mail order pharmacy. Please review and sign if in agreement.

## 2023-07-10 RX ORDER — AMANTADINE HYDROCHLORIDE 100 MG/1
CAPSULE, GELATIN COATED ORAL
Qty: 90 CAPSULE | Refills: 3 | Status: SHIPPED | OUTPATIENT
Start: 2023-07-10

## 2023-07-14 ENCOUNTER — OFFICE VISIT (OUTPATIENT)
Dept: HEMATOLOGY ONCOLOGY | Facility: CLINIC | Age: 83
End: 2023-07-14
Payer: COMMERCIAL

## 2023-07-14 VITALS
DIASTOLIC BLOOD PRESSURE: 78 MMHG | HEIGHT: 70 IN | BODY MASS INDEX: 28.06 KG/M2 | RESPIRATION RATE: 16 BRPM | SYSTOLIC BLOOD PRESSURE: 122 MMHG | WEIGHT: 196 LBS | TEMPERATURE: 98.8 F

## 2023-07-14 DIAGNOSIS — D47.2 MGUS (MONOCLONAL GAMMOPATHY OF UNKNOWN SIGNIFICANCE): Primary | ICD-10-CM

## 2023-07-14 DIAGNOSIS — R77.8 ABNORMAL SPEP: ICD-10-CM

## 2023-07-14 DIAGNOSIS — N28.9 RENAL INSUFFICIENCY: ICD-10-CM

## 2023-07-14 DIAGNOSIS — G20 PARKINSON'S DISEASE (HCC): ICD-10-CM

## 2023-07-14 DIAGNOSIS — J61 PULMONARY ASBESTOSIS (HCC): ICD-10-CM

## 2023-07-14 PROCEDURE — 99214 OFFICE O/P EST MOD 30 MIN: CPT | Performed by: INTERNAL MEDICINE

## 2023-07-14 NOTE — PROGRESS NOTES
HPI:   Patient is here with his wife. Follow-up visit for IgG kappa MGUS and M spike is small and stable, 230 mg and that is being monitored. He is not symptomatic from MGUS. He is symptomatic from Parkinson's disease and he follows with his neurologist   Patient  has history of asbestosis and he follows with Pulmonary service for that. Patient was a   . He has Parkinson's disease with stiffness but no tremors. He walks slowly. He states medication is helping and he is less stiff. No more cough . No hemoptysis. Has exertional dyspnea. He follows with pulmonary specialist.    Has generalized weakness and tiredness and stiffness. History of arthritis. History of carpal tunnel. Occasionally tingling in his hands and feet and they get cold. Has nocturia      ROS:  07/14/23 Reviewed 12 systems: See symptoms in HPI    Presently no other neurological, cardiac, pulmonary, GI and  symptoms other than listed in HPI. Other symptoms are in HPI. No  fever, chills, bleeding, bone pains, skin rash, weight loss, night sweats,   Claudication. No swelling of the ankles. No swollen glands. Patient is anxious. Physical Exam:  Vitals:    07/14/23 1335   BP: 122/78   BP Location: Right arm   Patient Position: Sitting   Cuff Size: Standard   Resp: 16   Temp: 98.8 °F (37.1 °C)   TempSrc: Temporal   Weight: 88.9 kg (196 lb)   Height: 5' 10" (1.778 m)     . Alert and oriented and not in distress. Vital signs are above. No icterus , no oral thrush , no palpable neck mass,  lung fields are clear to percussion and auscultation, regular heart rate, systolic murmur, abdomen is soft and non tender , no palpable abdominal mass, no ascites, no edema of ankles, no calf tenderness,  has some rigidity, he ambulates slowly, no skin rash, no palpable lymphadenopathy in the neck and axillary areas,  no clubbing. Patient is anxious. Performance status 2.     Historical Information   Past Medical History: Diagnosis Date   • Arthritis    • Asbestos exposure    • Asbestosis ([address])    • GERD (gastroesophageal reflux disease)    • Hemoptysis 5/11/2022   • Hypertension    • Lung disease    • Parkinson's disease ([address])      Past Surgical History:   Procedure Laterality Date   • HERNIA REPAIR     • KNEE SURGERY      many years ago   • SHOULDER SURGERY       Social History   Social History     Substance and Sexual Activity   Alcohol Use Not Currently     Social History     Substance and Sexual Activity   Drug Use Never     Social History     Tobacco Use   Smoking Status Never   • Passive exposure: Past   Smokeless Tobacco Former   • Types: Chew   • Quit date: 0     Family History:   Family History   Problem Relation Age of Onset   • Stroke Mother    • Colon cancer Mother    • Depression Sister    • Hypertension Sister          Current Outpatient Medications:   •  amantadine (SYMMETREL) 100 mg capsule, TAKE 1 CAPSULE DAILY AT 5 P.M., Disp: 90 capsule, Rfl: 3  •  carbidopa-levodopa (Sinemet)  mg per tablet, Take 2 tablets by mouth 3 (three) times a day, Disp: 540 tablet, Rfl: 3  •  lifitegrast (Xiidra) 5 % op solution, Administer 1 drop to both eyes 2 (two) times a day, Disp: , Rfl:     Allergies   Allergen Reactions   • Morphine And Related Other (See Comments)     Patient gets delirious             Lab Results: I have reviewed all pertinent labs.   LABS:    Results for orders placed or performed in visit on 06/26/23   CBC and differential   Result Value Ref Range    WBC 6.83 4.31 - 10.16 Thousand/uL    RBC 4.13 3.88 - 5.62 Million/uL    Hemoglobin 13.6 12.0 - 17.0 g/dL    Hematocrit 39.8 36.5 - 49.3 %    MCV 96 82 - 98 fL    MCH 32.9 26.8 - 34.3 pg    MCHC 34.2 31.4 - 37.4 g/dL    RDW 12.8 11.6 - 15.1 %    MPV 10.3 8.9 - 12.7 fL    Platelets 305 535 - 645 Thousands/uL    nRBC 0 /100 WBCs    Neutrophils Relative 69 43 - 75 %    Immat GRANS % 0 0 - 2 %    Lymphocytes Relative 20 14 - 44 %    Monocytes Relative 7 4 - 12 %    Eosinophils Relative 3 0 - 6 %    Basophils Relative 1 0 - 1 %    Neutrophils Absolute 4.74 1.85 - 7.62 Thousands/µL    Immature Grans Absolute 0.02 0.00 - 0.20 Thousand/uL    Lymphocytes Absolute 1.36 0.60 - 4.47 Thousands/µL    Monocytes Absolute 0.50 0.17 - 1.22 Thousand/µL    Eosinophils Absolute 0.17 0.00 - 0.61 Thousand/µL    Basophils Absolute 0.04 0.00 - 0.10 Thousands/µL   Comprehensive metabolic panel   Result Value Ref Range    Sodium 141 135 - 147 mmol/L    Potassium 4.8 3.5 - 5.3 mmol/L    Chloride 114 (H) 96 - 108 mmol/L    CO2 27 21 - 32 mmol/L    ANION GAP 0 mmol/L    BUN 32 (H) 5 - 25 mg/dL    Creatinine 1.51 (H) 0.60 - 1.30 mg/dL    Glucose, Fasting 94 65 - 99 mg/dL    Calcium 9.0 8.3 - 10.1 mg/dL    AST 10 5 - 45 U/L    ALT 15 12 - 78 U/L    Alkaline Phosphatase 108 46 - 116 U/L    Total Protein 7.1 6.4 - 8.4 g/dL    Albumin 3.7 3.5 - 5.0 g/dL    Total Bilirubin 1.00 0.20 - 1.00 mg/dL    eGFR 42 ml/min/1.73sq m   IgG, IgA, IgM   Result Value Ref Range    .0 70.0 - 400.0 mg/dL    .0 700.0 - 1,600.0 mg/dL    .0 40.0 - 230.0 mg/dL   Immunoglobulin free LT chains blood   Result Value Ref Range    Ig Kappa Free Light Chain 280.0 (H) 3.3 - 19.4 mg/L    Ig Lambda Free Light Chain 18.1 5.7 - 26.3 mg/L    Kappa/Lambda FluidC Ratio 15.47 (H) 0.26 - 1.65   Protein electrophoresis, serum   Result Value Ref Range    A/G Ratio 1.59 1.10 - 1.80    Albumin Electrophoresis 61.4 48.0 - 70.0 %    Albumin CONC 3.99 3.20 - 5.10 g/dl    Alpha 1 4.6 1.8 - 7.0 %    ALPHA 1 CONC 0.30 0.15 - 0.47 g/dL    Alpha 2 12.1 5.9 - 14.9 %    ALPHA 2 CONC 0.79 0.42 - 1.04 g/dL    Beta-1 5.2 4.7 - 7.7 %    BETA 1 CONC 0.34 0.31 - 0.57 g/dL    Beta-2 4.9 3.1 - 7.9 %    BETA 2 CONC 0.32 0.20 - 0.58 g/dL    Gamma Globulin 11.8 6.9 - 22.3 %    GAMMA CONC 0.77 0.40 - 1.66 g/dL    M Peak ID 1 3.60 %    M PEAK 1 CONC 0.23 g/dL    Total Protein 6.5 6.4 - 8.2 g/dL    SPEP Interpretation See Comment    Lipid panel Result Value Ref Range    Cholesterol 168 See Comment mg/dL    Triglycerides 75 See Comment mg/dL    HDL, Direct 40 >=40 mg/dL    LDL Calculated 113 (H) 0 - 100 mg/dL    Non-HDL-Chol (CHOL-HDL) 128 mg/dl   TSH, 3rd generation   Result Value Ref Range    TSH 3RD GENERATON 1.914 0.450 - 4.500 uIU/mL   Immunofixation, Serum(Reflex Only-Do Not Order)   Result Value Ref Range    Immunofixation Interpretation See Comment          Imaging Studies: I have personally reviewed pertinent reports. OSSEOUS STRUCTURES:  No acute fracture or destructive osseous lesion. Degenerative changes of thoracic spine.     IMPRESSION:        1. No CT evidence of interstitial lung disease. No active pulmonary disease.     2.  Stable bilateral pleural plaques, in keeping with asbestosis related pleural disease. No evidence of parenchymal asbestosis.                Workstation performed: [id number]          Imaging    CT chest high resolution (Order: [id number]) - 5/11/2022                Pathology, and Other Studies: I have personally reviewed pertinent reports. Reviewed above test results pertaining to hematological condition and discussed with patient and his wife    Assessment and Plan:  See diagnoses, orders instructions below    Patient is here with his wife. Follow-up visit for IgG kappa MGUS and M spike is small and stable, 230 mg and that is being monitored. He is not symptomatic from MGUS. He is symptomatic from Parkinson's disease and he follows with his neurologist   Patient  has history of asbestosis and he follows with Pulmonary service for that. Patient was a   . He has Parkinson's disease with stiffness but no tremors. He walks slowly. He states medication is helping and he is less stiff. No more cough . No hemoptysis. Has exertional dyspnea. He follows with pulmonary specialist.    Has generalized weakness and tiredness and stiffness. History of arthritis. History of carpal tunnel. Occasionally tingling in his hands and feet and they get cold. Has nocturia      Physical examination and test results are as recorded and discussed. IgG kappa spike is mild and that is being monitored. There is some increase in free light chain ratio and creatinine but he does not want to see a nephrologist or urologist.  Also he does not want to bone marrow test. Diet and activities per primary physician and neurologist.  Patient to avoid falls and trauma and hazardous places  Patient needs assistance in his care. Plan for MGUS is surveillance. All discussed in detail. Questions answered. Discussed precautions against COVID virus and other infections. 1. MGUS (monoclonal gammopathy of unknown significance)    - CBC and differential; Future  - Comprehensive metabolic panel; Future  - IgG, IgA, IgM; Future  - Protein electrophoresis, serum; Future  - Protein electrophoresis, urine; Future  - Uric acid; Future    2. Parkinson's disease ([address])      3. Pulmonary asbestosis ([address])      4. Abnormal SPEP      5. Renal insufficiency      Blood work and urine test prior to next visit in 3 months. Discussed hydration. Patient will continue to follow with  primary physician and other consultants. Patient and wife voiced understanding and agrees     This note has been generated by voice recognition software. Therefore there maybe spelling, grammar, and other errors. Please contact if questions arise. Counseling / Coordination of Care  . Tom Souza   Provided counseling and support

## 2023-08-02 ENCOUNTER — ESTABLISHED COMPREHENSIVE EXAM (OUTPATIENT)
Dept: URBAN - METROPOLITAN AREA CLINIC 6 | Facility: CLINIC | Age: 83
End: 2023-08-02

## 2023-08-02 DIAGNOSIS — H25.813: ICD-10-CM

## 2023-08-02 DIAGNOSIS — H04.123: ICD-10-CM

## 2023-08-02 PROCEDURE — 92014 COMPRE OPH EXAM EST PT 1/>: CPT

## 2023-08-02 ASSESSMENT — VISUAL ACUITY
OS_CC: 20/100
OD_CC: 20/70-1

## 2023-08-02 ASSESSMENT — TONOMETRY
OD_IOP_MMHG: 12
OS_IOP_MMHG: 12

## 2023-08-22 ENCOUNTER — APPOINTMENT (OUTPATIENT)
Dept: LAB | Facility: MEDICAL CENTER | Age: 83
End: 2023-08-22
Payer: COMMERCIAL

## 2023-08-22 DIAGNOSIS — I10 ESSENTIAL HYPERTENSION, BENIGN: ICD-10-CM

## 2023-08-22 DIAGNOSIS — N28.9 RENAL INSUFFICIENCY: ICD-10-CM

## 2023-08-22 DIAGNOSIS — G20 PARKINSON DISEASE (HCC): ICD-10-CM

## 2023-08-22 LAB
ALBUMIN SERPL BCP-MCNC: 3.9 G/DL (ref 3.5–5)
ALP SERPL-CCNC: 90 U/L (ref 34–104)
ALT SERPL W P-5'-P-CCNC: 3 U/L (ref 7–52)
ANION GAP SERPL CALCULATED.3IONS-SCNC: 10 MMOL/L
AST SERPL W P-5'-P-CCNC: 14 U/L (ref 13–39)
BILIRUB SERPL-MCNC: 1.06 MG/DL (ref 0.2–1)
BUN SERPL-MCNC: 34 MG/DL (ref 5–25)
CALCIUM SERPL-MCNC: 8.9 MG/DL (ref 8.4–10.2)
CHLORIDE SERPL-SCNC: 104 MMOL/L (ref 96–108)
CO2 SERPL-SCNC: 28 MMOL/L (ref 21–32)
CREAT SERPL-MCNC: 1.55 MG/DL (ref 0.6–1.3)
GFR SERPL CREATININE-BSD FRML MDRD: 41 ML/MIN/1.73SQ M
GLUCOSE SERPL-MCNC: 81 MG/DL (ref 65–140)
POTASSIUM SERPL-SCNC: 5 MMOL/L (ref 3.5–5.3)
PROT SERPL-MCNC: 6.4 G/DL (ref 6.4–8.4)
SODIUM SERPL-SCNC: 142 MMOL/L (ref 135–147)

## 2023-08-22 PROCEDURE — 80053 COMPREHEN METABOLIC PANEL: CPT

## 2023-08-22 PROCEDURE — 36415 COLL VENOUS BLD VENIPUNCTURE: CPT

## 2023-09-01 ENCOUNTER — TELEPHONE (OUTPATIENT)
Dept: NEUROLOGY | Facility: CLINIC | Age: 83
End: 2023-09-01

## 2023-09-01 NOTE — TELEPHONE ENCOUNTER
Patient was schedule for 10/30. Dr. Willye Lesch had a sooner appointment available.  Patient reschedule for 9/5 at 10 am.

## 2023-09-05 ENCOUNTER — TELEPHONE (OUTPATIENT)
Dept: NEUROLOGY | Facility: CLINIC | Age: 83
End: 2023-09-05

## 2023-09-05 NOTE — TELEPHONE ENCOUNTER
Spoke to the patient's wife to let her know that Dr. Renate Etienne will not be in the office today due to her being sick, and I let her know that I would call her back to reschedule his appointment

## 2023-09-13 ENCOUNTER — OFFICE VISIT (OUTPATIENT)
Dept: NEUROLOGY | Facility: CLINIC | Age: 83
End: 2023-09-13
Payer: COMMERCIAL

## 2023-09-13 VITALS — SYSTOLIC BLOOD PRESSURE: 122 MMHG | HEART RATE: 76 BPM | DIASTOLIC BLOOD PRESSURE: 80 MMHG

## 2023-09-13 DIAGNOSIS — G20.A1 PARKINSON'S DISEASE: Primary | ICD-10-CM

## 2023-09-13 PROCEDURE — 99214 OFFICE O/P EST MOD 30 MIN: CPT | Performed by: STUDENT IN AN ORGANIZED HEALTH CARE EDUCATION/TRAINING PROGRAM

## 2023-09-13 NOTE — PROGRESS NOTES
[facility]'s Neurology Consult  PATIENT:  Lacie Buitrago. MRN:  [id number]  :  1940  DATE OF SERVICE:  2023  REFERRED BY: No ref. provider found  PMD: Oswaldo Ward DO    Assessment/Plan:     Lacie Anderson is a very pleasant 81 y.o. male with a past medical history that includes MGUS who presents for f/u of PD and neuropathy. Neuropathy  Noted on EMG. Workup to date notable for MGUS, following with hematology who are monitoring for now. He does have diminished sensation to pinprick, temperature and vibration distally and is currently complaining of neuropathic pain in his fingers and toes. Discussed starting gabapentin, but pt does not want to take another medication at this time     MGUS (monoclonal gammopathy of unknown significance)  Currently following with hematology who is monitoring labs     Parkinson's disease ([address])  Moderate bradykinesia bilaterally R>L. Increased tone with cogwheeling throughout L>R, rest tremor in LUE, parkinsonian gait. Discussed increasing sinemet today for which he was agreeable. Will increase up to 2 tabs sinemet 25/100 three times a day. Will monitor for edema, as this was a documented adverse effect previously. Continue PT, which has been helpful for him    CC:   PD and neuropathy    History of Present Illness: Isabella Stephens is a pleasant 82 yo male seen in follow up for PD. He is accompanied by his wife today. Interval hx  Wife states that she has noticed motor improvement since he started physical therapy. He is currently taking sinemet 25/100 1.5 tab at 8 am, 1.5 tab 1 pm, 1 tab at 8 pm and amantadine 100 mg daily at 5 pm. States that PD symptoms have essentially been stable since his last visit in . Wife states that RBD has improved since he was started on sinemet. Describes achy, burning pain in his fingers and toes but is not interested in starting gabapentin at this time.  Hx CTS for which he is not interested in surgery for at this time. Previous Hx:  Per chart review, first seen for consultation in 2018 in which he had noted 2-3 year history of weakness, stiffness, and moving slower. Noted worsening tremors over this period of time. He was started on sinemet with improvement of symptoms. Amantadine later added. He did have issues with leg swelling with higher doses of sinemet. Last office visit 3/2022 in which PD was stable. Labs for neuropathy were ordered given EMG findings. he was referred to hematology for abnormal spep. Current Medications:  sinemet 25/100 mg 1.5 tabs  8 am, 1 pm and 1 tab 8 PM  amantadine 100 mg QD     Interval History:   He feels his walking and balance are good, he tripped off his  but no other falls. He will need a chair with arms to get up. Feels tremor is about the same, benefit from amantadine. No trouble swallowing. He sleeps well, He has occasional  talking in his sleep, he used to have more acting out of his dreams but has improved recently. No hallucinations. No dizziness. He has some occasional drooling. His wife can sometimes have a hard time hearing him sometimes. No memory concerns. He denies any numbness or tingling in his feet, he has occasional numbness/tingling in his hands b/l entire hand, has known carpal tunnel. He is looking into stay strong program at [facility]. Performs ADls independent, needs help putting on pants. 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 He was admitted to [facility] for abnormal blood cultures, however repeat studies were normal, likely contamination. He did have BONIFACIO that was treated with fluids. He did have f/u with pulmonary due to hemoptysis that has since resolved. HX of asbestos exposure.       prior work up:  labs 3/2022: tsh 2.45, a1c 5.1, b12 370, folate 11.7, spep monoclonal peak in gamma region, immunofixation-monoclonal gammopathy identified as IgG kappa, heavy metals screen normal except arsenic elevated at 15     EMG 9/2021 b/l UE: This is an abnormal EMG of the bilateral upper extremities due to changes consistent with a localized neuropathic process involving the median nerve at the wrist bilaterally consistent with moderate carpal tunnel syndrome with demyelinative change. 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    Last visit  Wife states that she has noticed motor improvement since he started physical therapy. He is currently taking sinemet 25/100 1.5 tab at 8 am, 1.5 tab 1 pm, 1 tab at 8 pm and amantadine 100 mg daily at 5 pm. States that PD symptoms have essentially been stable since his last visit in June. Wife states that RBD has improved since he was started on sinemet. Describes achy, burning pain in his fingers and toes but is not interested in starting gabapentin at this time. Hx CTS for which he is not interested in surgery for at this time.      Past Medical History:     Past Medical History:   Diagnosis Date    Arthritis     Asbestos exposure     Asbestosis ([address])     GERD (gastroesophageal reflux disease)     Hemoptysis 5/11/2022    Hypertension     Lung disease     Parkinson's disease [facility])        Patient Active Problem List   Diagnosis    Chronic right shoulder pain    Internal derangement of right shoulder    Tear of right supraspinatus tendon    Parkinson's disease (HCC)    MGUS (monoclonal gammopathy of unknown significance)    Neuropathy    Pleural plaque with presence of asbestos    Allergic rhinitis with postnasal drip    Shortness of breath       Medications:      Current Outpatient Medications   Medication Sig Dispense Refill    amantadine (SYMMETREL) 100 mg capsule TAKE 1 CAPSULE DAILY AT 5 P.M. 90 capsule 3    carbidopa-levodopa (Sinemet)  mg per tablet Take 2 tablets by mouth 3 (three) times a day 540 tablet 3    lifitegrast (Xiidra) 5 % op solution Administer 1 drop to both eyes 2 (two) times a day (Patient not taking: Reported on 9/13/2023)       No current facility-administered medications for this visit. Allergies: Allergies   Allergen Reactions    Morphine And Related Other (See Comments)     Patient gets delirious       Family History:     Family History   Problem Relation Age of Onset    Stroke Mother     Colon cancer Mother     Depression Sister     Hypertension Sister        Social History:       Social History     Socioeconomic History    Marital status: /Civil Union     Spouse name: Not on file    Number of children: Not on file    Years of education: Not on file    Highest education level: Not on file   Occupational History    Not on file   Tobacco Use    Smoking status: Never     Passive exposure: Past    Smokeless tobacco: Former     Types: Chew     Quit date: 1998   Vaping Use    Vaping Use: Never used   Substance and Sexual Activity    Alcohol use: Not Currently    Drug use: Never    Sexual activity: Not on file   Other Topics Concern    Not on file   Social History Narrative    Not on file     Social Determinants of Health     Financial Resource Strain: Not on file   Food Insecurity: No Food Insecurity (5/12/2022)    Hunger Vital Sign     Worried About Running Out of Food in the Last Year: Never true     [address] in the Last Year: Never true   Transportation Needs: No Transportation Needs (5/12/2022)    PRAPARE - Transportation     Lack of Transportation (Medical): No     Lack of Transportation (Non-Medical):  No   Physical Activity: Not on file   Stress: Not on file   Social Connections: Not on file   Intimate Partner Violence: Not on file   Housing Stability: Low Risk  (5/12/2022)    Housing Stability Vital Sign     Unable to Pay for Housing in the Last Year: No     Number of Places Lived in the Last Year: 1     Unstable Housing in the Last Year: No         Objective:   /80 (BP Location: Left arm, Patient Position: Sitting, Cuff Size: Standard)   Pulse 76     General: Patient is not in any acute/apparent distress, well nourished, well developed and cooperative. HEENT: normocephalic, atraumatic, moist membranes  Neck: supple  Extremities: no edema noted   Skin: no lesions or rash  Musculosketal: no bony abnormalities    Neurologic Examination:   Mental status: alert, awake, oriented X 3 and following commands. Speech/Language: Speech is fluent without any dysarthria, no aphasia noted, can name, comprehension intact    Cranial Nerves:   CN I: smell not tested  CN II: Visual fields full to confrontation  CN III, IV, VI: Extraocular movements intact bilaterally. Pupils equal round and reactive to light bilaterally. CN V: Facial sensation is normal.  CN VII: Full and symmetric facial movement. CN VIII: Hearing is normal.  CN IX, X: Palate elevates symmetrically. CN XI: Shoulder shrug strength is normal.  CN XII: Tongue midline without atrophy or fasciculations. Motor:   Strength 5/5 in all 4 extremities. Moderate bradykinesia bilaterally R>L. RUE cogwheeling, LUE mild cogwheeling. Increased tone noted in LLE as well. Rest tremor seen briefly in LUE only after walking. Sensory:   Sensation intact to soft touch, vibration and pinprick in all 4 extremities. Cerebellar:   Finger-to-nose intact, normal heel to shin. Reflexes: 2+ in all 4 extremities  Pathologic reflexes - babinski reflex negative, Hoffmans reflex - negative    Gait:   Stooped posture. Short stride with shuffling gait, 5 steps to make turns. Decreased arm swing       Review of Systems:     ROS:  Review of Systems   Constitutional: Positive for appetite change (less) and fatigue. Negative for fever. HENT: Positive for hearing loss.  Negative for tinnitus, trouble swallowing and voice change. Eyes: Negative. Negative for photophobia, pain and visual disturbance. Respiratory: Negative. Negative for shortness of breath. Cardiovascular: Negative. Negative for palpitations. Gastrointestinal: Negative. Negative for nausea and vomiting. Endocrine: Negative. Negative for cold intolerance. Genitourinary: Negative. Negative for dysuria, frequency and urgency. Musculoskeletal: Positive for back pain, gait problem, myalgias and neck pain. Skin: Negative. Negative for rash. Allergic/Immunologic: Negative. Neurological: Positive for dizziness, tremors, weakness, light-headedness, numbness (Fingers) and headaches (Every once in awhile). Negative for seizures, syncope, facial asymmetry and speech difficulty. Dehydration   Hematological: Bruises/bleeds easily. Psychiatric/Behavioral: Positive for confusion (Little bit). Negative for hallucinations and sleep disturbance. I have spent a total time of 34 minutes on 9/13/23 in caring for this patient including Risks and benefits of tx options, Instructions for management, Patient and family education, Risk factor reductions, Impressions, Documenting in the medical record, and Obtaining or reviewing history  .

## 2023-09-13 NOTE — PATIENT INSTRUCTIONS
Patient Instructions:  -recommend increasing fluid intake up to 64-80 oz water daily  -continue sinemet 2 tablets three times a day and amantadine daily  -PT referral specifically for Parkinson's disease  -follow up in about 3-4 months

## 2023-09-14 ENCOUNTER — PRE-OP CATARACT MEASUREMENTS (OUTPATIENT)
Dept: URBAN - METROPOLITAN AREA CLINIC 6 | Facility: CLINIC | Age: 83
End: 2023-09-14

## 2023-09-14 DIAGNOSIS — H02.882: ICD-10-CM

## 2023-09-14 DIAGNOSIS — H02.834: ICD-10-CM

## 2023-09-14 DIAGNOSIS — H02.831: ICD-10-CM

## 2023-09-14 DIAGNOSIS — H04.123: ICD-10-CM

## 2023-09-14 DIAGNOSIS — H25.813: ICD-10-CM

## 2023-09-14 DIAGNOSIS — H02.885: ICD-10-CM

## 2023-09-14 PROCEDURE — 92014 COMPRE OPH EXAM EST PT 1/>: CPT

## 2023-09-14 PROCEDURE — 92136 OPHTHALMIC BIOMETRY: CPT

## 2023-09-14 RX ORDER — PREDNISONE 10 MG/1
10 TABLET ORAL 2 TIMES DAILY
COMMUNITY
Start: 2023-08-26

## 2023-09-14 ASSESSMENT — KERATOMETRY
OS_AXISANGLE_DEGREES: 057
OD_AXISANGLE_DEGREES: 133
OS_K2POWER_DIOPTERS: 43.00
OS_K1POWER_DIOPTERS: 41.25
OS_AXISANGLE2_DEGREES: 147
OD_K2POWER_DIOPTERS: 42.75
OD_K1POWER_DIOPTERS: 42.25
OD_AXISANGLE2_DEGREES: 43

## 2023-09-14 ASSESSMENT — TONOMETRY
OS_IOP_MMHG: 19
OD_IOP_MMHG: 14

## 2023-09-14 ASSESSMENT — VISUAL ACUITY
OS_CC: 20/70+2
OD_CC: 20/60

## 2023-09-19 ENCOUNTER — OFFICE VISIT (OUTPATIENT)
Dept: PULMONOLOGY | Facility: CLINIC | Age: 83
End: 2023-09-19
Payer: COMMERCIAL

## 2023-09-19 VITALS
HEIGHT: 70 IN | SYSTOLIC BLOOD PRESSURE: 132 MMHG | BODY MASS INDEX: 28.2 KG/M2 | WEIGHT: 197 LBS | DIASTOLIC BLOOD PRESSURE: 70 MMHG | OXYGEN SATURATION: 100 % | HEART RATE: 76 BPM | TEMPERATURE: 97.1 F

## 2023-09-19 DIAGNOSIS — J92.0 PLEURAL PLAQUE WITH PRESENCE OF ASBESTOS: ICD-10-CM

## 2023-09-19 DIAGNOSIS — R06.02 SHORTNESS OF BREATH: ICD-10-CM

## 2023-09-19 DIAGNOSIS — J61 ASBESTOSIS (HCC): Primary | ICD-10-CM

## 2023-09-19 PROCEDURE — 99214 OFFICE O/P EST MOD 30 MIN: CPT | Performed by: INTERNAL MEDICINE

## 2023-09-19 RX ORDER — KETOROLAC TROMETHAMINE 5 MG/ML
SOLUTION OPHTHALMIC
COMMUNITY
Start: 2023-09-15

## 2023-09-19 RX ORDER — PREDNISOLONE ACETATE 10 MG/ML
SUSPENSION/ DROPS OPHTHALMIC
COMMUNITY
Start: 2023-09-15

## 2023-09-19 NOTE — ASSESSMENT & PLAN NOTE
· Symptoms have not changed  · No further hemoptysis  · Had one episode of transient back pain which was likely musculoskeletal and unrelated to pulmonary process  · I have recommended follow-up in May with CT imaging. This will be a 2-year follow-up study.   If he has changes in symptoms, this certainly could be done sooner but given stability 2-year follow-up is reasonable

## 2023-09-19 NOTE — ASSESSMENT & PLAN NOTE
· Somewhat transient. Patient does not get a lot of activity due to his Parkinson's and has an element of cardiovascular deconditioning  · Not on any inhalers or respiratory medications. · Intermittent cough with phlegm may be bulbar manifestations of Parkinson's versus transient microaspiration. Currently using Robitussin as needed with good response.   Recommended to continue  · If cough worsens, trial of Tessalon Perles

## 2023-09-19 NOTE — PROGRESS NOTES
Progress note - Pulmonary Medicine   Godwin Frias. 81 y.o. male MRN: [id number]       Impression & Plan:     Pleural plaque with presence of asbestos  · Symptoms have not changed  · No further hemoptysis  · Had one episode of transient back pain which was likely musculoskeletal and unrelated to pulmonary process  · I have recommended follow-up in May with CT imaging. This will be a 2-year follow-up study. If he has changes in symptoms, this certainly could be done sooner but given stability 2-year follow-up is reasonable    Shortness of breath  · Somewhat transient. Patient does not get a lot of activity due to his Parkinson's and has an element of cardiovascular deconditioning  · Not on any inhalers or respiratory medications. · Intermittent cough with phlegm may be bulbar manifestations of Parkinson's versus transient microaspiration. Currently using Robitussin as needed with good response. Recommended to continue  · If cough worsens, trial of Tessalon Perles      Return in about 8 months (around 5/19/2024). Patient was accompanied by his wife today who provided some additional history. Additionally she is in agreement with the plan  ______________________________________________________________________    HPI:    Godwin Frias. presents today for follow-up of pleural plaque and pulmonary nodules which are associated with prior asbestos exposure. The pulmonary nodules have been stable for a long period of time and the largest is 6 mm. These can be considered benign. He has benign-appearing pleural plaque on imaging most recently performed in May 2022. He does have intermittent cough. He has no longer had any hemoptysis or other significant respiratory symptoms. The cough occasionally is productive of phlegm. It is responsive to over-the-counter Robitussin. He has not had any discoloration or fevers or chills to suggest infectious cough. He does have Parkinson's.   He has had declining exercise tolerance. No chest pain or cardiac complaints. He does have some allergic rhinitis with postnasal drip intermittently. His wife also reported a single episode of back pain that occurred for a day but was self-limited and did not recur    Current Medications:    Current Outpatient Medications:   •  amantadine (SYMMETREL) 100 mg capsule, TAKE 1 CAPSULE DAILY AT 5 P.M., Disp: 90 capsule, Rfl: 3  •  carbidopa-levodopa (Sinemet)  mg per tablet, Take 2 tablets by mouth 3 (three) times a day, Disp: 540 tablet, Rfl: 3  •  ketorolac (ACULAR) 0.5 % ophthalmic solution, , Disp: , Rfl:   •  prednisoLONE acetate (PRED FORTE) 1 % ophthalmic suspension, , Disp: , Rfl:   •  predniSONE 10 mg tablet, Take 10 mg by mouth 2 (two) times a day, Disp: , Rfl:     Review of Systems:  Aside from what is mentioned in the HPI, the review of systems is otherwise negative    Past medical history, surgical history, and family history were reviewed and updated as appropriate    Social history updates:  Social History     Tobacco Use   Smoking Status Never   • Passive exposure: Past   Smokeless Tobacco Former   • Types: Chew   • Quit date: 1998       PhysicalExamination:  Vitals:   /70 (BP Location: Left arm, Patient Position: Sitting, Cuff Size: Standard)   Pulse 76   Temp (!) 97.1 °F (36.2 °C) (Tympanic)   Ht 5' 10" (1.778 m)   Wt 89.4 kg (197 lb)   SpO2 100%   BMI 28.27 kg/m²   Gen:  Comfortable on room air. No conversational dyspnea  HEENT:  Conjugate gaze. sclerae anicteric. Oropharynx moist  Neck: Trachea is midline. No JVD. No adenopathy  Chest: Limited chest wall excursion but breath sounds are clear bilaterally  Cardiac: Regular. no murmur  Abdomen:  benign  Extremities: No edema  Neuro: Speech is slow. Parkinsonian gait    Diagnostic Data:  Labs:   I personally reviewed the most recent laboratory data pertinent to today's visit    Lab Results   Component Value Date    WBC 6.83 06/26/2023    HGB 13.6 06/26/2023    HCT 39.8 06/26/2023    MCV 96 06/26/2023     06/26/2023     Lab Results   Component Value Date    SODIUM 142 08/22/2023    K 5.0 08/22/2023    CO2 28 08/22/2023     08/22/2023    BUN 34 (H) 08/22/2023    CREATININE 1.55 (H) 08/22/2023    GLUCOSE 94 04/29/2015    CALCIUM 8.9 08/22/2023       Imaging:  I personally reviewed the images on the [facility] system pertinent to today's visit  Last CT was from May 2022 with tiny pulmonary nodules and asbestos related pleural plaque.   Nodules can be considered benign given long-term stability    Karly De Jesus MD

## 2023-10-03 ENCOUNTER — SURGERY/PROCEDURE (OUTPATIENT)
Dept: URBAN - METROPOLITAN AREA SURGICAL CENTER 6 | Facility: SURGICAL CENTER | Age: 83
End: 2023-10-03

## 2023-10-03 DIAGNOSIS — H25.812: ICD-10-CM

## 2023-10-03 PROCEDURE — 66984 XCAPSL CTRC RMVL W/O ECP: CPT

## 2023-10-04 ENCOUNTER — 1 DAY POST-OP (OUTPATIENT)
Dept: URBAN - METROPOLITAN AREA CLINIC 6 | Facility: CLINIC | Age: 83
End: 2023-10-04

## 2023-10-04 DIAGNOSIS — Z96.1: ICD-10-CM

## 2023-10-04 PROCEDURE — 99024 POSTOP FOLLOW-UP VISIT: CPT

## 2023-10-04 ASSESSMENT — KERATOMETRY
OS_K2POWER_DIOPTERS: 43.00
OD_K1POWER_DIOPTERS: 42.25
OS_AXISANGLE2_DEGREES: 147
OD_K2POWER_DIOPTERS: 42.75
OS_K2POWER_DIOPTERS: 43.00
OS_K1POWER_DIOPTERS: 41.25
OD_K2POWER_DIOPTERS: 42.75
OD_K1POWER_DIOPTERS: 42.25
OS_AXISANGLE_DEGREES: 057
OD_AXISANGLE_DEGREES: 133
OD_AXISANGLE2_DEGREES: 43
OS_AXISANGLE2_DEGREES: 147
OS_K1POWER_DIOPTERS: 41.25
OD_AXISANGLE2_DEGREES: 43
OD_AXISANGLE_DEGREES: 133
OS_AXISANGLE_DEGREES: 057

## 2023-10-04 ASSESSMENT — VISUAL ACUITY: OS_SC: 20/200

## 2023-10-04 ASSESSMENT — TONOMETRY
OD_IOP_MMHG: 17
OS_IOP_MMHG: 17

## 2023-10-06 ENCOUNTER — APPOINTMENT (OUTPATIENT)
Dept: LAB | Facility: MEDICAL CENTER | Age: 83
End: 2023-10-06
Payer: COMMERCIAL

## 2023-10-06 DIAGNOSIS — D47.2 MGUS (MONOCLONAL GAMMOPATHY OF UNKNOWN SIGNIFICANCE): ICD-10-CM

## 2023-10-06 LAB
ALBUMIN SERPL BCP-MCNC: 3.9 G/DL (ref 3.5–5)
ALP SERPL-CCNC: 93 U/L (ref 34–104)
ALT SERPL W P-5'-P-CCNC: 5 U/L (ref 7–52)
ANION GAP SERPL CALCULATED.3IONS-SCNC: 10 MMOL/L
AST SERPL W P-5'-P-CCNC: 19 U/L (ref 13–39)
BASOPHILS # BLD AUTO: 0.04 THOUSANDS/ÂΜL (ref 0–0.1)
BASOPHILS NFR BLD AUTO: 1 % (ref 0–1)
BILIRUB SERPL-MCNC: 1.17 MG/DL (ref 0.2–1)
BUN SERPL-MCNC: 27 MG/DL (ref 5–25)
CALCIUM SERPL-MCNC: 8.9 MG/DL (ref 8.4–10.2)
CHLORIDE SERPL-SCNC: 103 MMOL/L (ref 96–108)
CO2 SERPL-SCNC: 27 MMOL/L (ref 21–32)
CREAT SERPL-MCNC: 1.29 MG/DL (ref 0.6–1.3)
EOSINOPHIL # BLD AUTO: 0.14 THOUSAND/ÂΜL (ref 0–0.61)
EOSINOPHIL NFR BLD AUTO: 2 % (ref 0–6)
ERYTHROCYTE [DISTWIDTH] IN BLOOD BY AUTOMATED COUNT: 12.7 % (ref 11.6–15.1)
GFR SERPL CREATININE-BSD FRML MDRD: 51 ML/MIN/1.73SQ M
GLUCOSE P FAST SERPL-MCNC: 82 MG/DL (ref 65–99)
HCT VFR BLD AUTO: 39.1 % (ref 36.5–49.3)
HGB BLD-MCNC: 12.9 G/DL (ref 12–17)
IGA SERPL-MCNC: 165 MG/DL (ref 66–433)
IGG SERPL-MCNC: 732 MG/DL (ref 635–1741)
IGM SERPL-MCNC: 127 MG/DL (ref 45–281)
IMM GRANULOCYTES # BLD AUTO: 0.03 THOUSAND/UL (ref 0–0.2)
IMM GRANULOCYTES NFR BLD AUTO: 1 % (ref 0–2)
LYMPHOCYTES # BLD AUTO: 1.17 THOUSANDS/ÂΜL (ref 0.6–4.47)
LYMPHOCYTES NFR BLD AUTO: 19 % (ref 14–44)
MCH RBC QN AUTO: 32.6 PG (ref 26.8–34.3)
MCHC RBC AUTO-ENTMCNC: 33 G/DL (ref 31.4–37.4)
MCV RBC AUTO: 99 FL (ref 82–98)
MONOCYTES # BLD AUTO: 0.5 THOUSAND/ÂΜL (ref 0.17–1.22)
MONOCYTES NFR BLD AUTO: 8 % (ref 4–12)
NEUTROPHILS # BLD AUTO: 4.41 THOUSANDS/ÂΜL (ref 1.85–7.62)
NEUTS SEG NFR BLD AUTO: 69 % (ref 43–75)
NRBC BLD AUTO-RTO: 0 /100 WBCS
PLATELET # BLD AUTO: 203 THOUSANDS/UL (ref 149–390)
PMV BLD AUTO: 10.5 FL (ref 8.9–12.7)
POTASSIUM SERPL-SCNC: 4.9 MMOL/L (ref 3.5–5.3)
PROT SERPL-MCNC: 6.6 G/DL (ref 6.4–8.4)
RBC # BLD AUTO: 3.96 MILLION/UL (ref 3.88–5.62)
SODIUM SERPL-SCNC: 140 MMOL/L (ref 135–147)
URATE SERPL-MCNC: 6.3 MG/DL (ref 3.5–8.5)
WBC # BLD AUTO: 6.29 THOUSAND/UL (ref 4.31–10.16)

## 2023-10-06 PROCEDURE — 82784 ASSAY IGA/IGD/IGG/IGM EACH: CPT

## 2023-10-06 PROCEDURE — 84166 PROTEIN E-PHORESIS/URINE/CSF: CPT

## 2023-10-06 PROCEDURE — 80053 COMPREHEN METABOLIC PANEL: CPT

## 2023-10-06 PROCEDURE — 84165 PROTEIN E-PHORESIS SERUM: CPT

## 2023-10-06 PROCEDURE — 36415 COLL VENOUS BLD VENIPUNCTURE: CPT

## 2023-10-06 PROCEDURE — 86335 IMMUNFIX E-PHORSIS/URINE/CSF: CPT

## 2023-10-06 PROCEDURE — 84550 ASSAY OF BLOOD/URIC ACID: CPT

## 2023-10-06 PROCEDURE — 85025 COMPLETE CBC W/AUTO DIFF WBC: CPT

## 2023-10-10 ENCOUNTER — 1 WEEK POST-OP (OUTPATIENT)
Dept: URBAN - METROPOLITAN AREA CLINIC 6 | Facility: CLINIC | Age: 83
End: 2023-10-10

## 2023-10-10 DIAGNOSIS — H25.811: ICD-10-CM

## 2023-10-10 DIAGNOSIS — Z96.1: ICD-10-CM

## 2023-10-10 DIAGNOSIS — H04.123: ICD-10-CM

## 2023-10-10 LAB
ALBUMIN SERPL ELPH-MCNC: 3.67 G/DL (ref 3.2–5.1)
ALBUMIN SERPL ELPH-MCNC: 60.2 % (ref 48–70)
ALBUMIN UR ELPH-MCNC: 52.6 %
ALPHA1 GLOB MFR UR ELPH: 12.9 %
ALPHA1 GLOB SERPL ELPH-MCNC: 0.29 G/DL (ref 0.15–0.47)
ALPHA1 GLOB SERPL ELPH-MCNC: 4.7 % (ref 1.8–7)
ALPHA2 GLOB MFR UR ELPH: 34 %
ALPHA2 GLOB SERPL ELPH-MCNC: 0.76 G/DL (ref 0.42–1.04)
ALPHA2 GLOB SERPL ELPH-MCNC: 12.4 % (ref 5.9–14.9)
B-GLOBULIN MFR UR ELPH: 0 %
BETA GLOB ABNORMAL SERPL ELPH-MCNC: 0.34 G/DL (ref 0.31–0.57)
BETA1 GLOB SERPL ELPH-MCNC: 5.6 % (ref 4.7–7.7)
BETA2 GLOB SERPL ELPH-MCNC: 5.1 % (ref 3.1–7.9)
BETA2+GAMMA GLOB SERPL ELPH-MCNC: 0.31 G/DL (ref 0.2–0.58)
GAMMA GLOB ABNORMAL SERPL ELPH-MCNC: 0.73 G/DL (ref 0.4–1.66)
GAMMA GLOB MFR UR ELPH: 0.5 %
GAMMA GLOB SERPL ELPH-MCNC: 12 % (ref 6.9–22.3)
IGG/ALB SER: 1.51 {RATIO} (ref 1.1–1.8)
INTERPRETATION UR IFE-IMP: NORMAL
M PROTEIN 1 MFR SERPL ELPH: 3.7 %
M PROTEIN 1 SERPL ELPH-MCNC: 0.23 G/DL
M PROTEIN MFR UR ELPH: 2.6 MG/DL
M PROTEIN UR-MCNC: 23.4 %
PROT PATTERN SERPL ELPH-IMP: ABNORMAL
PROT PATTERN UR ELPH-IMP: NORMAL
PROT SERPL-MCNC: 6.1 G/DL (ref 6.4–8.4)
PROT UR-MCNC: 11.2 MG/DL

## 2023-10-10 PROCEDURE — 86335 IMMUNFIX E-PHORSIS/URINE/CSF: CPT | Performed by: STUDENT IN AN ORGANIZED HEALTH CARE EDUCATION/TRAINING PROGRAM

## 2023-10-10 PROCEDURE — 99024 POSTOP FOLLOW-UP VISIT: CPT

## 2023-10-10 PROCEDURE — 84165 PROTEIN E-PHORESIS SERUM: CPT | Performed by: STUDENT IN AN ORGANIZED HEALTH CARE EDUCATION/TRAINING PROGRAM

## 2023-10-10 PROCEDURE — 84166 PROTEIN E-PHORESIS/URINE/CSF: CPT | Performed by: STUDENT IN AN ORGANIZED HEALTH CARE EDUCATION/TRAINING PROGRAM

## 2023-10-10 ASSESSMENT — KERATOMETRY
OS_AXISANGLE_DEGREES: 057
OD_AXISANGLE2_DEGREES: 43
OD_K1POWER_DIOPTERS: 42.25
OS_K2POWER_DIOPTERS: 43.00
OD_AXISANGLE_DEGREES: 133
OD_K2POWER_DIOPTERS: 42.75
OS_K1POWER_DIOPTERS: 41.25
OS_AXISANGLE2_DEGREES: 147

## 2023-10-10 ASSESSMENT — VISUAL ACUITY
OD_SC: 20/80
OS_SC: 20/40-1

## 2023-10-10 ASSESSMENT — TONOMETRY
OD_IOP_MMHG: 12
OS_IOP_MMHG: 13

## 2023-10-20 ENCOUNTER — OFFICE VISIT (OUTPATIENT)
Dept: HEMATOLOGY ONCOLOGY | Facility: CLINIC | Age: 83
End: 2023-10-20
Payer: COMMERCIAL

## 2023-10-20 VITALS
WEIGHT: 207 LBS | OXYGEN SATURATION: 100 % | RESPIRATION RATE: 17 BRPM | DIASTOLIC BLOOD PRESSURE: 80 MMHG | BODY MASS INDEX: 29.63 KG/M2 | HEART RATE: 80 BPM | SYSTOLIC BLOOD PRESSURE: 150 MMHG | HEIGHT: 70 IN | TEMPERATURE: 97.9 F

## 2023-10-20 DIAGNOSIS — D47.2 MGUS (MONOCLONAL GAMMOPATHY OF UNKNOWN SIGNIFICANCE): Primary | ICD-10-CM

## 2023-10-20 DIAGNOSIS — R76.8 ELEVATED SERUM IMMUNOGLOBULIN FREE LIGHT CHAINS: ICD-10-CM

## 2023-10-20 DIAGNOSIS — G20.A1 PARKINSON'S DISEASE, UNSPECIFIED WHETHER DYSKINESIA PRESENT, UNSPECIFIED WHETHER MANIFESTATIONS FLUCTUATE: ICD-10-CM

## 2023-10-20 PROCEDURE — 99214 OFFICE O/P EST MOD 30 MIN: CPT | Performed by: INTERNAL MEDICINE

## 2023-10-20 NOTE — PATIENT INSTRUCTIONS
Please follow-up with Dr. Gladys Flowers in March 2024. You will need to get the CT myeloma scan 1 week prior to the scheduled office date. Please also get the bloodwork 1 week prior to the office visit.

## 2023-10-20 NOTE — PROGRESS NOTES
Medical Oncology Office Follow-up Note    HPI:       Mr. Grzegorz Mcqueen is an 81year old male who has been following up with hematology team for history of IgG kappa MGUS. Due to small and stable M-spike levels, patient was being monitored with bloodwork. He is not symptomatic from MGUS. He is symptomatic from Parkinson's disease and he follows with his neurologist. He has a magnetic gait and walks slowly. Does not need a cane or walker for ambulatory assistance. Patient  has history of asbestosis and he follows with Pulmonary service for that. Patient was a . Has generalized weakness and tiredness and stiffness. History of arthritis. History of carpal tunnel. Has occasional tingling sensation involving his feet and nocturia. Interval History:     Patient presented to the office for a routine f/u visit with his wife. Clinically, he has been doing ok, although pt's Parkinson's has significantly affected his gait and ability to perform his ADLs. Patient's bloodwork was reviewed. Although serum M-spike has remained stable and small ~ 0.2 g/dL, patient has had significantly elevated FLC ratio with elevated kappa levels in the past, which is concerning. Most recent FLC ratio was 15.47 on 6/26/23 with kappa level 280 mg/L. FLC ratio was 12.19 in July 2022. Immunoglobulin levels have remained within normal range. CBC shows Hgb within normal range and although patient does have CKD, renal function has remained stable with Cr in the 1.2-1.5 range. Given how high the free light chain ratio has been, bone marrow biopsy was discussed in the past but patient and wife were not in favor of pursuing any bone marrow biopsy. Unfortunately, FLC levels were not ordered during last office visit so we don't know what the most recent [facility] levels/ratio are. Review of Systems   Constitutional:  Negative for appetite change, chills, fatigue, fever and unexpected weight change.    HENT:   Negative for mouth sores, sore throat and tinnitus. Eyes:  Negative for eye problems. Respiratory:  Negative for chest tightness, cough and hemoptysis. Cardiovascular:  Negative for chest pain and leg swelling. Gastrointestinal:  Negative for abdominal pain, blood in stool, constipation, diarrhea, nausea and vomiting. Genitourinary:  Negative for difficulty urinating, dysuria, frequency and hematuria. Musculoskeletal:  Positive for gait problem (2/2 parkinson's disease). Negative for arthralgias, back pain, myalgias and neck pain. Skin:  Negative for itching, rash and wound. Neurological:  Positive for gait problem (2/2 parkinson's disease) and numbness (occasional neuropathy involving feet). Negative for dizziness, extremity weakness, headaches, light-headedness and speech difficulty. Hematological:  Negative for adenopathy. Does not bruise/bleed easily. Psychiatric/Behavioral:  Negative for confusion. The patient is not nervous/anxious. Vitals:    10/20/23 1035   BP: 150/80   BP Location: Left arm   Patient Position: Sitting   Cuff Size: Adult   Pulse: 80   Resp: 17   Temp: 97.9 °F (36.6 °C)   SpO2: 100%   Weight: 93.9 kg (207 lb)   Height: 5' 10" (1.778 m)       Physical Exam  Vitals reviewed. Constitutional:       General: He is not in acute distress. Appearance: He is not ill-appearing, toxic-appearing or diaphoretic. HENT:      Head: Normocephalic and atraumatic. Mouth/Throat:      Mouth: Mucous membranes are moist.      Pharynx: No oropharyngeal exudate or posterior oropharyngeal erythema. Eyes:      General: No scleral icterus. Conjunctiva/sclera: Conjunctivae normal.      Pupils: Pupils are equal, round, and reactive to light. Cardiovascular:      Rate and Rhythm: Normal rate and regular rhythm. Heart sounds: Normal heart sounds. No murmur heard. No gallop. Pulmonary:      Effort: No respiratory distress. Breath sounds: Normal breath sounds. No stridor.  No wheezing or rales. Abdominal:      General: Bowel sounds are normal. There is no distension. Palpations: Abdomen is soft. Tenderness: There is no abdominal tenderness. There is no guarding. Musculoskeletal:         General: No swelling or deformity. Right lower leg: No edema. Skin:     Capillary Refill: Capillary refill takes less than 2 seconds. Coloration: Skin is not jaundiced or pale. Findings: No bruising, erythema, lesion or rash. Neurological:      General: No focal deficit present. Mental Status: He is alert and oriented to person, place, and time. Motor: No weakness. Gait: Gait abnormal (magnetic gait consistent with known dx of Parkinson's). Psychiatric:         Mood and Affect: Mood normal.         Behavior: Behavior normal.         Thought Content:  Thought content normal.           Historical Information   Past Medical History:   Diagnosis Date    Arthritis     Asbestos exposure     Asbestosis ([facility])     GERD (gastroesophageal reflux disease)     Hemoptysis 5/11/2022    Hypertension     Lung disease     Parkinson's disease ([facility])      Past Surgical History:   Procedure Laterality Date    HERNIA REPAIR      KNEE SURGERY      many years ago    SHOULDER SURGERY       Social History   Social History     Substance and Sexual Activity   Alcohol Use Not Currently     Social History     Substance and Sexual Activity   Drug Use Never     Social History     Tobacco Use   Smoking Status Never    Passive exposure: Past   Smokeless Tobacco Former    Types: Chew    Quit date: 0     Family History:   Family History   Problem Relation Age of Onset    Stroke Mother     Colon cancer Mother     Depression Sister     Hypertension Sister          Current Outpatient Medications:     amantadine (SYMMETREL) 100 mg capsule, TAKE 1 CAPSULE DAILY AT 5 P.M., Disp: 90 capsule, Rfl: 3    carbidopa-levodopa (Sinemet)  mg per tablet, Take 2 tablets by mouth 3 (three) times a day, Disp: 540 tablet, Rfl: 3    ketorolac (ACULAR) 0.5 % ophthalmic solution, , Disp: , Rfl:     prednisoLONE acetate (PRED FORTE) 1 % ophthalmic suspension, , Disp: , Rfl:     predniSONE 10 mg tablet, Take 10 mg by mouth 2 (two) times a day, Disp: , Rfl:     Allergies   Allergen Reactions    Morphine And Related Other (See Comments)     Patient gets delirious             Lab Results: I have reviewed all pertinent labs.   LABS:      Results for orders placed or performed in visit on 10/06/23   CBC and differential   Result Value Ref Range    WBC 6.29 4.31 - 10.16 Thousand/uL    RBC 3.96 3.88 - 5.62 Million/uL    Hemoglobin 12.9 12.0 - 17.0 g/dL    Hematocrit 39.1 36.5 - 49.3 %    MCV 99 (H) 82 - 98 fL    MCH 32.6 26.8 - 34.3 pg    MCHC 33.0 31.4 - 37.4 g/dL    RDW 12.7 11.6 - 15.1 %    MPV 10.5 8.9 - 12.7 fL    Platelets 578 748 - 262 Thousands/uL    nRBC 0 /100 WBCs    Neutrophils Relative 69 43 - 75 %    Immat GRANS % 1 0 - 2 %    Lymphocytes Relative 19 14 - 44 %    Monocytes Relative 8 4 - 12 %    Eosinophils Relative 2 0 - 6 %    Basophils Relative 1 0 - 1 %    Neutrophils Absolute 4.41 1.85 - 7.62 Thousands/µL    Immature Grans Absolute 0.03 0.00 - 0.20 Thousand/uL    Lymphocytes Absolute 1.17 0.60 - 4.47 Thousands/µL    Monocytes Absolute 0.50 0.17 - 1.22 Thousand/µL    Eosinophils Absolute 0.14 0.00 - 0.61 Thousand/µL    Basophils Absolute 0.04 0.00 - 0.10 Thousands/µL   Comprehensive metabolic panel   Result Value Ref Range    Sodium 140 135 - 147 mmol/L    Potassium 4.9 3.5 - 5.3 mmol/L    Chloride 103 96 - 108 mmol/L    CO2 27 21 - 32 mmol/L    ANION GAP 10 mmol/L    BUN 27 (H) 5 - 25 mg/dL    Creatinine 1.29 0.60 - 1.30 mg/dL    Glucose, Fasting 82 65 - 99 mg/dL    Calcium 8.9 8.4 - 10.2 mg/dL    AST 19 13 - 39 U/L    ALT 5 (L) 7 - 52 U/L    Alkaline Phosphatase 93 34 - 104 U/L    Total Protein 6.6 6.4 - 8.4 g/dL    Albumin 3.9 3.5 - 5.0 g/dL    Total Bilirubin 1.17 (H) 0.20 - 1.00 mg/dL    eGFR 51 ml/min/1.73sq m   IgG, IgA, IgM   Result Value Ref Range     66 - 433 mg/dL     635 - 1,741 mg/dL     45 - 281 mg/dL   Protein electrophoresis, serum   Result Value Ref Range    A/G Ratio 1.51 1.10 - 1.80    Albumin Electrophoresis 60.2 48.0 - 70.0 %    Albumin CONC 3.67 3.20 - 5.10 g/dl    Alpha 1 4.7 1.8 - 7.0 %    ALPHA 1 CONC 0.29 0.15 - 0.47 g/dL    Alpha 2 12.4 5.9 - 14.9 %    ALPHA 2 CONC 0.76 0.42 - 1.04 g/dL    Beta-1 5.6 4.7 - 7.7 %    BETA 1 CONC 0.34 0.31 - 0.57 g/dL    Beta-2 5.1 3.1 - 7.9 %    BETA 2 CONC 0.31 0.20 - 0.58 g/dL    Gamma Globulin 12.0 6.9 - 22.3 %    GAMMA CONC 0.73 0.40 - 1.66 g/dL    M Peak ID 1 3.70 %    M PEAK 1 CONC 0.23 g/dL    Total Protein 6.1 (L) 6.4 - 8.4 g/dL    SPEP Interpretation See Comment    Protein electrophoresis, urine   Result Value Ref Range    Urine Protein 11.2 mg/dL    Albumin ELP, Urine 52.6 %    Alpha 1, Urine 12.9 %    Alpha 2, Urine 34.0 %    Beta, Urine 0.0 %    Gamma Globulin, Urine 0.5 %    M Peak ID 1, Ur 23.4 %    M-PEAK 1 PC, URINE 2.60 mg/dL    UPEP Interp See Comment    Uric acid   Result Value Ref Range    Uric Acid 6.3 3.5 - 8.5 mg/dL   Immunofixation,Urine(Reflex Only-Do Not Order)   Result Value Ref Range    Immunofixation Interpretation See Comment            All relevant labs and imaging results were reviewed and discussed with the patient in detail. Assessment and Plan:  S    1. MGUS (monoclonal gammopathy of unknown significance)    2. Elevated serum immunoglobulin free light chains    Mr. Tamanna Beauchamp is an 81year old male who has been following up with hematology team for history of IgG kappa MGUS. Due to small and stable M-spike levels, patient was being monitored with bloodwork. Although serum M-spike has remained stable and small at ~ 0.2 g/dL, patient has had significantly elevated FLC ratio with elevated kappa levels in the past, which is concerning.  Most recent FLC ratio was 15.47 on 6/26/23 with kappa level 280 mg/L. FLC ratio was 12.19 in July 2022. Immunoglobulin levels have remained within normal range. CBC shows Hgb within normal range and although patient does have CKD, renal function has remained stable with Cr in the 1.2-1.5 range. Given how high the free light chain ratio has been, bone marrow biopsy was discussed in the past but patient and wife were not in favor of pursuing any bone marrow biopsy in the past. Unfortunately, FLC levels were not ordered during last office visit so we don't know what the most recent [facility] levels/ratio are. Dr. Scarlett Saint and I discussed with the patient our recommendations regarding pursuing a bone marrow biopsy and myeloma scan to objectively evaluate for any bone marrow involvement by plasma cell population and osteolytic bone lesions. Patient and wife relayed that they would prefer to wait till early March to pursue any further workup. Patient was recommended to get repeat MM specific parameters and a myeloma CT scan done around beginning of March. If the FLC ratio continues to be significantly elevated and/or if the CT myeloma scan shows any osseous lesions concerning for myeloma, a bone marrow biopsy will be recommended at that time. Patient and his wife were in agreement with the plan as detailed above. They know to call the office with any questions/concerns. - CBC and differential; Future  - Comprehensive metabolic panel; Future  - Beta 2 microglobulin, serum; Future  - IgG, IgA, IgM; Future  - Immunoglobulin free LT chains blood; Future  - LD,Blood; Future  - Protein electrophoresis, serum; Future  - Protein electrophoresis, urine; Future  - Uric acid; Future  - CT low dose whole body myeloma scan; Future    3. Parkinson's disease     Patient with history of Parkinson's disease, for which he closely f/u with neurology team (Dr. Nilda Dubose). He is symptomatic with bradykinesia, cogwheel rigidity, and shuffling gait.  Patient is on sinemet, dose was recently increased in September by neurologist.

## 2023-11-01 ENCOUNTER — TELEPHONE (OUTPATIENT)
Dept: NEUROLOGY | Facility: CLINIC | Age: 83
End: 2023-11-01

## 2023-11-01 NOTE — TELEPHONE ENCOUNTER
Received VM transcription from 10/31/23, 11:45 PM:    Junior, this is Sheng Sanz calling about Daniel Sanz. [birth year]. He sees Dr. Cavanaugh for parkinson's. He's had a problem with urinating. Like all over the place. I thought maybe there may be a medication that he could take for that problem. Thank you.

## 2023-11-07 DIAGNOSIS — G20.A1 PARKINSON DISEASE: ICD-10-CM

## 2023-11-07 NOTE — TELEPHONE ENCOUNTER
Received VM transcription from 11/6/23, 9:13 AM:    Izabella Freitas, [birth year]. He need a refill on his Sinemet. Thank you.  ---------------------------    Next OV 3/13/23 with Dr. Amy Tinoco. Dr. Amy Tinoco - Rx entered. Please review and sign if in agreement.

## 2023-11-10 ENCOUNTER — TELEPHONE (OUTPATIENT)
Dept: NEUROLOGY | Facility: CLINIC | Age: 83
End: 2023-11-10

## 2023-11-10 NOTE — TELEPHONE ENCOUNTER
Patient's spouse,Sheng  called to report that the medication below was sent to the wrong pharmacy     carbidopa-levodopa (Sinemet)  mg per tablet       Patient spouse requesting that the medication be sent to :[id number], [address] [place] [address],[address]           Thank you!

## 2023-11-10 NOTE — TELEPHONE ENCOUNTER
Patient's spouse  called to report that the patient is worse since the LOV on 9/13/23    According to the spouse,Sheng the patient is going in and out of reality. Diana Snow stated,  that the patient will wake in the morning and seem Ok, shower and shave then as the day progresses the patient will state things that are not true. Patient will insist that there are people and thing inside and outside  the house that are not there. They will get up in the middle of the night with a flash light looking under the bed because they believe that there is something or someone there. Patient has fallen a few times since LOV due to imbalances issues. Patient spouse asked that we send a message to [facility] to make them aware of the situation and possible get an earlier appointment     Gave the spouse information on [facility] to [facility] into also   Spouse took the info ,but also stated that [place] was too far to travel. Thank you!

## 2023-11-13 ENCOUNTER — TELEPHONE (OUTPATIENT)
Dept: NEUROLOGY | Facility: CLINIC | Age: 83
End: 2023-11-13

## 2023-11-13 NOTE — TELEPHONE ENCOUNTER
Spoke with Aravind Dowling patient's  and gave her a sooner appointment for Abby Nix for 11/24/23 at 2:00 pm in our [facility] location

## 2023-11-13 NOTE — TELEPHONE ENCOUNTER
Called and spoke to patient's wife Danilo Hollingsworth and offered her a sooner appointment with Ronni Persaud for 11/24/23 at 2:00 pm in our [facility] location and she accepted

## 2023-11-13 NOTE — TELEPHONE ENCOUNTER
received  from 11/10 at 10:46am-  Hello, this is mio garnett calling about velasquez garnett. December 5th. 1940. I have. This is the 4th call. I have made there,  All 3 were to renew his prescription, and I don't get an answer. So could you please check into it?  Thank you.  [phone]  ---------------------------------------  Already addressed

## 2023-11-24 ENCOUNTER — OFFICE VISIT (OUTPATIENT)
Dept: NEUROLOGY | Facility: CLINIC | Age: 83
End: 2023-11-24
Payer: COMMERCIAL

## 2023-11-24 VITALS
WEIGHT: 198 LBS | DIASTOLIC BLOOD PRESSURE: 70 MMHG | HEART RATE: 80 BPM | HEIGHT: 70 IN | BODY MASS INDEX: 28.35 KG/M2 | SYSTOLIC BLOOD PRESSURE: 140 MMHG

## 2023-11-24 DIAGNOSIS — G20.A1 PARKINSON DISEASE: Primary | ICD-10-CM

## 2023-11-24 DIAGNOSIS — D47.2 MGUS (MONOCLONAL GAMMOPATHY OF UNKNOWN SIGNIFICANCE): ICD-10-CM

## 2023-11-24 DIAGNOSIS — G62.9 NEUROPATHY: ICD-10-CM

## 2023-11-24 PROCEDURE — 99215 OFFICE O/P EST HI 40 MIN: CPT | Performed by: STUDENT IN AN ORGANIZED HEALTH CARE EDUCATION/TRAINING PROGRAM

## 2023-11-24 NOTE — PROGRESS NOTES
[facility]'s Neurology Consult  PATIENT:  Asia Hankins MRN:  [id number]  :  1940  DATE OF SERVICE:  2023  REFERRED BY: No ref. provider found  PMD: Valentino Salisbury, DO    Assessment/Plan:     Asia Hankins is a very pleasant 81 y.o. male with a past medical history that includes MGUS who presents for f/u of PD and neuropathy. Neuropathy  Noted on EMG. Workup to date notable for MGUS, following with hematology who are monitoring for now. He does have diminished sensation to pinprick, temperature and vibration distally and is currently complaining of neuropathic pain in his fingers and toes. Discussed starting gabapentin, but pt does not want to take another medication at this time     MGUS (monoclonal gammopathy of unknown significance)  Currently following with hematology who is monitoring labs     Parkinson's disease ([address])  Moderate bradykinesia bilaterally R>L. Increased tone with cogwheeling throughout L>R, no rest tremors noted today, shuffling gait present. Will continue 2 tabs sinemet 25/100 three times a day and amantadine 100 mg daily, as he is not interested in a higher dose at this time. Will plan to continue PT. Advised wife to monitor frequency of visual hallucinations. If symptoms worsening, can consider discontinuing amantadine followed by addition of seroquel if necessary    CC:   PD and neuropathy    History of Present Illness: Kathlyne Lombard is a pleasant 82 yo male seen in follow up for PD. He is accompanied by his wife today. Interval hx  Wife endorses significant improvement in his motor symptoms with increase in his sinemet up to  mg 2 tabs twice daily, particularly his gait and tremors. He had cataract surgery in October and had a cold at the beginning of November. During this time, his wife states that he was experiencing hallucinations and some paranoid delusions, particularly at night.  These symptoms have since improved, but he continues to intermittently have visual hallucinations throughout the day. Previous Hx:  Per chart review, first seen for consultation in 2018 in which he had noted 2-3 year history of weakness, stiffness, and moving slower. Noted worsening tremors over this period of time. He was started on sinemet with improvement of symptoms. Amantadine later added. He did have issues with leg swelling with higher doses of sinemet. Last office visit 3/2022 in which PD was stable. Labs for neuropathy were ordered given EMG findings. he was referred to hematology for abnormal spep. Current Medications:  sinemet 25/100 mg 1.5 tabs  8 am, 1 pm and 1 tab 8 PM  amantadine 100 mg QD     Interval History:   He feels his walking and balance are good, he tripped off his  but no other falls. He will need a chair with arms to get up. Feels tremor is about the same, benefit from amantadine. No trouble swallowing. He sleeps well, He has occasional  talking in his sleep, he used to have more acting out of his dreams but has improved recently. No hallucinations. No dizziness. He has some occasional drooling. His wife can sometimes have a hard time hearing him sometimes. No memory concerns. He denies any numbness or tingling in his feet, he has occasional numbness/tingling in his hands b/l entire hand, has known carpal tunnel. He is looking into stay strong program at [facility]. Performs ADls independent, needs help putting on pants. 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 He was admitted to [facility] for abnormal blood cultures, however repeat studies were normal, likely contamination. He did have BONIFACIO that was treated with fluids. He did have f/u with pulmonary due to hemoptysis that has since resolved. HX of asbestos exposure.       prior work up:  labs 3/2022: tsh 2.45, a1c 5.1, b12 370, folate 11.7, spep monoclonal peak in gamma region, immunofixation-monoclonal gammopathy identified as IgG kappa, heavy metals screen normal except arsenic elevated at 15     EMG 9/2021 b/l UE: This is an abnormal EMG of the bilateral upper extremities due to changes consistent with a localized neuropathic process involving the median nerve at the wrist bilaterally consistent with moderate carpal tunnel syndrome with demyelinative change. 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    Last visit  Wife states that she has noticed motor improvement since he started physical therapy. He is currently taking sinemet 25/100 1.5 tab at 8 am, 1.5 tab 1 pm, 1 tab at 8 pm and amantadine 100 mg daily at 5 pm. States that PD symptoms have essentially been stable since his last visit in June. Wife states that RBD has improved since he was started on sinemet. Describes achy, burning pain in his fingers and toes but is not interested in starting gabapentin at this time. Hx CTS for which he is not interested in surgery for at this time.      Past Medical History:     Past Medical History:   Diagnosis Date    Arthritis     Asbestos exposure     Asbestosis ([address])     GERD (gastroesophageal reflux disease)     Hemoptysis 5/11/2022    Hypertension     Lung disease     Parkinson's disease        Patient Active Problem List   Diagnosis    Chronic right shoulder pain    Internal derangement of right shoulder    Tear of right supraspinatus tendon    Parkinson's disease    MGUS (monoclonal gammopathy of unknown significance)    Neuropathy    Pleural plaque with presence of asbestos    Allergic rhinitis with postnasal drip    Shortness of breath    Elevated serum immunoglobulin free light chains       Medications:      Current Outpatient Medications   Medication Sig Dispense Refill    amantadine (SYMMETREL) 100 mg capsule TAKE 1 CAPSULE DAILY AT 5 P.M. 90 capsule 3    carbidopa-levodopa (Sinemet)  mg per tablet Take 2 tablets by mouth 3 (three) times a day 540 tablet 3    ketorolac (ACULAR) 0.5 % ophthalmic solution       prednisoLONE acetate (PRED FORTE) 1 % ophthalmic suspension       predniSONE 10 mg tablet Take 10 mg by mouth 2 (two) times a day       No current facility-administered medications for this visit. Allergies: Allergies   Allergen Reactions    Morphine And Related Other (See Comments)     Patient gets delirious       Family History:     Family History   Problem Relation Age of Onset    Stroke Mother     Colon cancer Mother     Depression Sister     Hypertension Sister        Social History:       Social History     Socioeconomic History    Marital status: /Civil Union     Spouse name: Not on file    Number of children: Not on file    Years of education: Not on file    Highest education level: Not on file   Occupational History    Not on file   Tobacco Use    Smoking status: Never     Passive exposure: Past    Smokeless tobacco: Former     Types: Chew     Quit date: 1998   Vaping Use    Vaping Use: Never used   Substance and Sexual Activity    Alcohol use: Not Currently    Drug use: Never    Sexual activity: Not on file   Other Topics Concern    Not on file   Social History Narrative    Not on file     Social Determinants of Health     Financial Resource Strain: Not on file   Food Insecurity: No Food Insecurity (5/12/2022)    Hunger Vital Sign     Worried About Running Out of Food in the Last Year: Never true     [address] in the Last Year: Never true   Transportation Needs: No Transportation Needs (5/12/2022)    PRAPARE - Transportation     Lack of Transportation (Medical): No     Lack of Transportation (Non-Medical):  No   Physical Activity: Not on file   Stress: Not on file   Social Connections: Not on file   Intimate Partner Violence: Not on file   Housing Stability: Low Risk  (5/12/2022)    Housing Stability Vital Sign     Unable to Pay for Housing in the Last Year: No     Number of Places Lived in the Last Year: 1     Unstable Housing in the Last Year: No         Objective:   /70 (BP Location: Left arm, Patient Position: Sitting, Cuff Size: Adult)   Pulse 80   Ht 5' 10" (1.778 m)   Wt 89.8 kg (198 lb)   BMI 28.41 kg/m²     General: Patient is not in any acute/apparent distress, well nourished, well developed and cooperative. HEENT: normocephalic, atraumatic, moist membranes  Neck: supple  Extremities: no edema noted   Skin: no lesions or rash  Musculosketal: no bony abnormalities    Neurologic Examination:   Mental status: alert, awake, oriented X 3 and following commands. Speech/Language: Speech is fluent without any dysarthria, no aphasia noted, can name, comprehension intact    Cranial Nerves:   CN I: smell not tested  CN II: Visual fields full to confrontation  CN III, IV, VI: Extraocular movements intact bilaterally. Pupils equal round and reactive to light bilaterally. CN V: Facial sensation is normal.  CN VII: Full and symmetric facial movement. CN VIII: Hearing is normal.  CN IX, X: Palate elevates symmetrically. CN XI: Shoulder shrug strength is normal.  CN XII: Tongue midline without atrophy or fasciculations. Motor:   Strength 5/5 in all 4 extremities. Moderate bradykinesia bilaterally R>L. RUE cogwheeling, LUE mild cogwheeling. Increased tone noted in LLE as well. Rest tremor seen briefly in LUE only after walking. Sensory:   Sensation intact to soft touch, vibration and pinprick in all 4 extremities. Cerebellar:   Finger-to-nose intact, normal heel to shin. Reflexes: 2+ in all 4 extremities  Pathologic reflexes - babinski reflex negative, Hoffmans reflex - negative    Gait:   Stooped posture. Short stride with shuffling gait, 5 steps to make turns.  Decreased arm swing       Review of Systems:     ROS:    Review of Systems   Constitutional:  Negative for appetite change, fatigue and fever. HENT: Negative. Negative for hearing loss, tinnitus, trouble swallowing and voice change. Eyes: Negative. Negative for photophobia, pain and visual disturbance. Respiratory: Negative. Negative for shortness of breath. Cardiovascular: Negative. Negative for palpitations. Gastrointestinal: Negative. Negative for nausea and vomiting. Endocrine: Negative. Negative for cold intolerance. Genitourinary: Negative. Negative for dysuria, frequency and urgency. Musculoskeletal:  Positive for gait problem. Negative for back pain, myalgias and neck pain. Skin: Negative. Negative for rash. Allergic/Immunologic: Negative. Neurological:  Positive for tremors. Negative for dizziness, seizures, syncope, facial asymmetry, speech difficulty, weakness, light-headedness, numbness and headaches. Hematological: Negative. Does not bruise/bleed easily. Psychiatric/Behavioral:  Positive for hallucinations. Negative for confusion and sleep disturbance. I have spent a total time of 43 minutes on 11/24/23 in caring for this patient including Risks and benefits of tx options, Instructions for management, Patient and family education, Risk factor reductions, Impressions, Documenting in the medical record, and Obtaining or reviewing history  .

## 2023-11-24 NOTE — PATIENT INSTRUCTIONS
Patient Instructions:  -continue sinemet  mg 2 tablets three times daily  -continue amantadine 100 mg daily  -follow up in about 3-4 months

## 2023-12-05 ENCOUNTER — TELEPHONE (OUTPATIENT)
Dept: NEUROLOGY | Facility: CLINIC | Age: 83
End: 2023-12-05

## 2023-12-05 NOTE — TELEPHONE ENCOUNTER
received vm from 12/5 at 9:13am-Hello, this is Sagrario Rey, calling about [facility]. [birth year]. He is a patient of Dr. Albright. He's been experi , he has parkinson's, and he's been experience experiencing seeing things and it's been getting worse. Especially later in the day. I notice or early evening. I was wondering if there is something she may be able to give him for the hallucinations. he's seeping Okay, right now. Thank you.  [phone]  --------------------------------------  Per recent office note-Advised wife to monitor frequency of visual hallucinations.  If symptoms worsening, can consider discontinuing amantadine followed by addition of seroquel if necessary     Please advise

## 2024-01-03 ENCOUNTER — EVALUATION (OUTPATIENT)
Dept: PHYSICAL THERAPY | Facility: MEDICAL CENTER | Age: 84
End: 2024-01-03
Payer: COMMERCIAL

## 2024-01-03 VITALS — DIASTOLIC BLOOD PRESSURE: 84 MMHG | SYSTOLIC BLOOD PRESSURE: 156 MMHG

## 2024-01-03 DIAGNOSIS — G20.A1 PARKINSON'S DISEASE, UNSPECIFIED WHETHER DYSKINESIA PRESENT, UNSPECIFIED WHETHER MANIFESTATIONS FLUCTUATE: Primary | ICD-10-CM

## 2024-01-03 DIAGNOSIS — G20.A1 PARKINSON DISEASE: ICD-10-CM

## 2024-01-03 PROCEDURE — 97162 PT EVAL MOD COMPLEX 30 MIN: CPT | Performed by: PHYSICAL THERAPIST

## 2024-01-03 NOTE — PROGRESS NOTES
PT Evaluation          POC expires Auth Status Total     Start date  Expiration date PT/OT + Visit Limit? Co-Insurance   3/27/24                                                 Visit/Unit Tracking  AUTH Status:  Date 1/3                Authed:  Used 1               Remaining                  Today's date: 1/3/2024  Patient name: Daniel Sanz Jr.  : 1940  MRN: [id number]  Referring provider: Jovanna Cavanaugh MD  Dx:   Encounter Diagnosis     ICD-10-CM    1. Parkinson disease  G20.A1 Ambulatory Referral to Physical Therapy                Assessment  Assessment details: Patient is a 83 y.o. Male who presents to skilled outpatient PT with complaints of gait and balance deficits. Patient arrives to evaluation with wife who assists with patient medical history, as she states he has been having some memory difficulties. Patient may benefit from Occupational therapy referral due to memory and fine motor difficulties reported. Upon arrival, patient displays LUE tremor and B/L LE swelling which patient's wife reports is being followed by PCP. Patient displays increased B/L LE rigidity and is unable to fully extend knees. Patient requires 2 UE support to stand from a chair and requires 20.5 sec to complete 5xSTS suggesting reduced functional LE strength. Patient displays increased difficulty with TUG/COG/Carry, suggesting that he is at HIGH risk of falls. Based on his 10 MWT of 0.48 m/s he is also at increased risk of falls. During ambulation, patient displays frequent shuffling, slow gisela, reduced UE swing, freezing with turns, and requires CGA to maximize patient safety. Per research provided by [employer], patient will benefit from skilled outpatient PT services to improve and maximize his function, to reduce risk for falls and potential injuries associated with falls, reduce healthcare costs via hospitalization, and reduce patient and national healthcare costs. In early stages of Parkinson's Disease, research  indicates that intensive physical exercise can improve patient's motor control and assist in slowing the disease progression as a neuroprotective agent. Patient will benefit from skilled outpatient PT in order to maximize function in the short-term and long-term with overall improved postural strength with associated stability and mobility.      Impairments: Abnormal gait, Abnormal muscle tone, Abnormal or restricted ROM, Activity intolerance, Impaired balance, Impaired physical strength, Lacks appropriate HEP, Poor posture, Poor body mechanics, Pain with function, Safety issue, Weight-bearing intolerance, Abnormal movement  Understanding of Dx/Px/POC: Good  Prognosis: Good      Patient verbalized understanding of POC.    Please contact me if you have any questions or recommendations. Thank you for the referral and the opportunity to share in Daniel Sanz Jr.'s care.           Plan  Plan details: 6 MWT, miniBEST  Planned modality interventions: Cryotherapy, Thermotherapy  Planned therapy interventions: ADL training, Balance, Balance/WB training, Breathing training, Body mechanics training, Coordination, Functional ROM exercises, Gait training, HEP, Manual Therapy, Motor coordination training, Neuromuscular re-education, Patient education, Postural training, Strengthening, Stretching, Therapeutic activities, Therapeutic exercises, Therapeutic training, Transfer training  Frequency: 2x/wk  Duration in weeks: 12  Plan of Care beginning date: 1/3/2024  Plan of Care expiration date: 12 weeks - 3/27/2024  Treatment plan discussed with: Patient and Family         Goals  Short Term Goals (4 weeks):  - Patient will improve TUG score by MDC of 4.8 sec in order to reduce risk of falls and to increase safety with functional mobility  - Patient will improve 5 STS by the MDC of 2.3 sec indicating an improvement in strength and decreased challenge with transfers  - Patient will improve 6 MWT by the MDC of 269 ft indicating an  improvement in cardiovascular endurance in order to maximize function with functional mobility throughout the community  - Patient will improve MiniBESTest score by MDC of 5.52 points indicating an improvement with dynamic balance in order to further decrease risk of falls with functional tasks  - Patient will be independent in basic HEP in order to manage condition at home    Long Term Goals (12 weeks):  - Patient will score a low risk for falls 2/4 fall risks measures  - Patient will score age norm values for 1/2 endurance measures  - Patient will be able to ambulate on uneven surfaces with 50% reduction in near falls to increase safety with community mobility  - Patient will be able to perform a floor transfer without physical assistance to assist with fall recovery at home and in the community  - Patient will be independent in comprehensive HEP post discharge from program  - Patient will be able to walk up incline with decreased difficulty and no loss of balance in order to improve functional mobility throughout the community  - Patient will be able to perform sit to stands from softer surfaces such as a couch or bed in order to improvement function with transfers  - Patient will be consistent with program for at least 1 day per week to assist with management of condition and functional independence of their condition        Cut off score   All date taken from [facility] Neuro Section or Rehab Measures      PROCTOR Cutoff Scores:  MDC: 5 pts  Falls Risk Cutoff: 40.22/56 DGI Cutoff Scores:  Shady et al 2011, MDC: 2.9 pts  Darnell et al 2008, Ronny: Score <19/24   MiniBEST Cutoff Scores:  Mynor et al 2011, MDC: 5.52 pts  Lizzie 2013, Ronny: <19/28 5xSTS Cutoff Scores:  MDC: 2.3 sec  Benito et al, 2011, Ronny: > 16 sec   TUG Cutoff Scores:  Lars Walden et al, 2011, MDC: 4.8 sec  Sophie et al, 2011, Fallers: Meds ON: < 12.21 sec, OFF: 15.5 sec 10 Meter Walk Test Cutoff Scores:  Yovany and Adalberto, 2008, MDC:  0.18 m/s  Age Norm Values, Ronny: < 1.0 m/s   ABC Cutoff Scores:  Pat, 2008, MDC: 13 pts  Lars Retana, MDC: 11.12 pts  Increased risk for falls: < 69% 6 Minute Walk Test Cutoff Scores:  Galindo, 2008, MDC: 269 ft  Yovany et al, 2002, Norm Data Healthy Adults:  60 - 69 years:   M: 572 m      F: 538 m  70 - 79 years:   M: 527 m      F: 471 m  80 - 89 years:   M: 417 m      F: 392 m   PDQ-39 Cutoff Scores:  Demi et al, 2004:  MDC: Mobility (12.24), ADL (16.72), Emotional (14.22), Stigma (21.21), Social Support (21.25), Cognition (21.12), Communication (21.04), Bodily Discomfort (24.48)  Sheron et al, 2007:  Normative Data: Mobility (49.25), ADL (38.94), Emotional (37.92), Stigma (27.54), Social Support (14.78), Cognition (33.03), Communication (27.99), Bodily Discomfort (40.91) Ariela and Yahr Stages  Stage 0: No S/S  Stage 1: Mild unilateral symptoms  Stage 1.5: Unilateral and axial symptoms  Stage 2: Bilateral symptoms, no balance impairment  Stage 2.5: Mild bilateral symptoms and recovery with pull test  Stage 3: Mild to moderate postural instability, independent  Stage 4: Severe disability, walking or standing unassisted  Stage 5: Bed bound, w/c bound           Subjective Evaluation    History of Present Illness  Mechanism of injury: Patient and wife arrive to evaluation after previous treatment for Parkinson's Disease. They would like to work on his walking, balance, and leg strength. Patient's wife reports that patient has had 1 fall over the last few months and frequently shuffles his feet. He has a SPC that he uses occasionally but does not bring today. Patient and wife report that he has some confusion and frequent urination, he is not currently driving  Primary AD: occasional SPC      Pain  Current pain ratin/10  Location: Shoulder    Social Support  Steps to enter house: 1 step  Stairs in house: 1 flight  Lives in: house  Lives with: wife    Employment status:  Retired      Treatments  Previous treatment: PT      Objective   Gait abnormalities: slow gisela, shuffling gait, B/L knee flexion, reduced UE swing, freezing with turning          Outcome Measures Initial Eval  1/3/2024        5xSTS 20.5 sec w/ 1 UE        TUG  - Regular  - Cognitive  - Carry   23.9 sec  58.5 sec (unable to count)  30.8 sec (+ spillage)        MiniBEST /28        10 meter 0.48 m/s        6MWT  ft        ABC %        PDQ-39 /100        MDS-UPDRS  Part III   /128        H&Y Stage         Floor > Stand  sec                      Precautions:   Past Medical History:   Diagnosis Date    Arthritis     Asbestos exposure     Asbestosis (HCC)     GERD (gastroesophageal reflux disease)     Hemoptysis 5/11/2022    Hypertension     Lung disease     Parkinson's disease

## 2024-01-08 ENCOUNTER — OFFICE VISIT (OUTPATIENT)
Dept: PHYSICAL THERAPY | Facility: MEDICAL CENTER | Age: 84
End: 2024-01-08
Payer: COMMERCIAL

## 2024-01-08 DIAGNOSIS — G20.A1 PARKINSON'S DISEASE, UNSPECIFIED WHETHER DYSKINESIA PRESENT, UNSPECIFIED WHETHER MANIFESTATIONS FLUCTUATE: Primary | ICD-10-CM

## 2024-01-08 PROCEDURE — 97530 THERAPEUTIC ACTIVITIES: CPT | Performed by: PHYSICAL THERAPIST

## 2024-01-08 PROCEDURE — 97112 NEUROMUSCULAR REEDUCATION: CPT | Performed by: PHYSICAL THERAPIST

## 2024-01-08 NOTE — PROGRESS NOTES
"Daily Note   POC expires Auth Status Total     Start date  Expiration date PT/OT + Visit Limit? Co-Insurance   3/27/24                                                 Visit/Unit Tracking  AUTH Status:  Date 1/3 1/8               Authed:  Used 1 2              Remaining                  Today's date: 2024  Patient name: Daniel Sanz Jr. \"Alfonzo\"  : 1940  MRN: [id number]  Referring provider: Jovanna Cavanaugh MD  Dx:   Encounter Diagnosis     ICD-10-CM    1. Parkinson's disease, unspecified whether dyskinesia present, unspecified whether manifestations fluctuate  G20.A1             Subjective: Patient arrives to treatment with wife, reporting no changes since his evaluation       Objective: See treatment diary below    NMR:  - Floor to ceiling: 10x 10 sec holds  - Side to side: 10x 10 sec holds, 2 sets  - FWD step and reach: 10x 2 sets  - LAT step and reach: 10x 2 sets  - BWD step and reach: 10x 2 sets  - FWD rock and reach: 10x 2 sets  - LAT rock and reach: 10x 2 sets  - STS: 10x    TA:  - HEP: Floor to ceiling, side to side, STS      Assessment: Patient tolerated treatment fairly. Began patient with seated and standing postural exercises, emphasizing upright posture and challenging patients static and dynamic balance. During exercise, patient required constant verbal, visual, and tactile cueing to maintain exercise form. With standing exercises, 1 UE support and gait belt used to prevent LOB. Provided patient and wife with printed HEP, reviewed all exercises, and discussed safety of exercising at home, both verbalized agreement. Patient will benefit from skilled PT services to reduce his risk of falls and maximize his level of safety and independence.      Plan: Continue per plan of care.        Outcome Measures Initial Eval  1/3/2024        5xSTS 20.5 sec w/ 1 UE        TUG  - Regular  - Cognitive  - Carry   23.9 sec  58.5 sec (unable to count)  30.8 sec (+ spillage)        MiniBEST /28        10 meter " 0.48 m/s        6MWT  ft        ABC %        PDQ-39 /100        MDS-UPDRS  Part III   /128        H&Y Stage         Floor > Stand  sec                      Precautions:   Past Medical History:   Diagnosis Date    Arthritis     Asbestos exposure     Asbestosis (HCC)     GERD (gastroesophageal reflux disease)     Hemoptysis 5/11/2022    Hypertension     Lung disease     Parkinson's disease

## 2024-01-10 ENCOUNTER — OFFICE VISIT (OUTPATIENT)
Dept: PHYSICAL THERAPY | Facility: MEDICAL CENTER | Age: 84
End: 2024-01-10
Payer: COMMERCIAL

## 2024-01-10 DIAGNOSIS — G20.A1 PARKINSON'S DISEASE, UNSPECIFIED WHETHER DYSKINESIA PRESENT, UNSPECIFIED WHETHER MANIFESTATIONS FLUCTUATE: Primary | ICD-10-CM

## 2024-01-10 PROCEDURE — 97112 NEUROMUSCULAR REEDUCATION: CPT | Performed by: PHYSICAL THERAPIST

## 2024-01-10 NOTE — PROGRESS NOTES
"Daily Note   POC expires Auth Status Total     Start date  Expiration date PT/OT + Visit Limit? Co-Insurance   3/27/24                                                 Visit/Unit Tracking  AUTH Status:  Date 1/3 1/8 1/10              Authed:  Used 1 2 3             Remaining                  Today's date: 1/10/2024  Patient name: Daniel Sanz Jr. \"Alfonzo\"  : 1940  MRN: [id number]  Referring provider: Jovanna Cavanaugh MD  Dx:   Encounter Diagnosis     ICD-10-CM    1. Parkinson's disease, unspecified whether dyskinesia present, unspecified whether manifestations fluctuate  G20.A1             Subjective: Patient arrives to treatment reporting no new changes or falls      Objective: See treatment diary below    NMR:  - Floor to ceiling: 10x 10 sec holds  - Side to side: 10x 10 sec holds, 2 sets  - FWD step and reach: 10x 2 sets  - LAT step and reach: 10x 2 sets  - BWD step and reach: 10x 2 sets  - FWD rock and reach: 10x 2 sets  - LAT rock and reach: 10x 2 sets  - STS: 10x 2 sets    TA:  - HEP: Floor to ceiling, side to side, STS      Assessment: Patient tolerated treatment fairly. Continued with seated and standing exercises, requiring 1 UE support to maintain balance when standing. To maintain appropriate exercise posture and for recall, verbal, visual, and tactile cueing provided. Patient most challenged with twisting exercises, displaying increased rigidity and requiring additional assistance. During STS, CGA maintained due to patient foot placement leading to anterior/posterior instability. Patient will benefit from skilled PT services to reduce his risk of falls and maximize his level of safety and independence.      Plan: Continue per plan of care.        Outcome Measures Initial Eval  1/3/2024        5xSTS 20.5 sec w/ 1 UE        TUG  - Regular  - Cognitive  - Carry   23.9 sec  58.5 sec (unable to count)  30.8 sec (+ spillage)        MiniBEST /28        10 meter 0.48 m/s        6MWT  ft        ABC %      "   PDQ-39 /100        MDS-UPDRS  Part III   /128        H&Y Stage         Floor > Stand  sec                      Precautions:   Past Medical History:   Diagnosis Date    Arthritis     Asbestos exposure     Asbestosis (HCC)     GERD (gastroesophageal reflux disease)     Hemoptysis 5/11/2022    Hypertension     Lung disease     Parkinson's disease

## 2024-01-15 ENCOUNTER — APPOINTMENT (OUTPATIENT)
Dept: PHYSICAL THERAPY | Facility: MEDICAL CENTER | Age: 84
End: 2024-01-15
Payer: COMMERCIAL

## 2024-01-17 ENCOUNTER — APPOINTMENT (OUTPATIENT)
Dept: PHYSICAL THERAPY | Facility: MEDICAL CENTER | Age: 84
End: 2024-01-17
Payer: COMMERCIAL

## 2024-01-22 ENCOUNTER — OFFICE VISIT (OUTPATIENT)
Dept: PHYSICAL THERAPY | Facility: MEDICAL CENTER | Age: 84
End: 2024-01-22
Payer: COMMERCIAL

## 2024-01-22 DIAGNOSIS — G20.A1 PARKINSON'S DISEASE, UNSPECIFIED WHETHER DYSKINESIA PRESENT, UNSPECIFIED WHETHER MANIFESTATIONS FLUCTUATE: Primary | ICD-10-CM

## 2024-01-22 PROCEDURE — 97112 NEUROMUSCULAR REEDUCATION: CPT | Performed by: PHYSICAL THERAPIST

## 2024-01-22 NOTE — PROGRESS NOTES
"Daily Note   POC expires Auth Status Total     Start date  Expiration date PT/OT + Visit Limit? Co-Insurance   3/27/24                                                 Visit/Unit Tracking  AUTH Status:  Date 1/3 1/8 1/10 1/22             Authed:  Used 1 2 3 4            Remaining                  Today's date: 2024  Patient name: Daniel Sanz Jr. \"Alfonzo\"  : 1940  MRN: [id number]  Referring provider: Jovanna Cavanaugh MD  Dx:   Encounter Diagnosis     ICD-10-CM    1. Parkinson's disease, unspecified whether dyskinesia present, unspecified whether manifestations fluctuate  G20.A1             Subjective: Patient arrives to treatment reporting no new changes since his last treatment      Objective: See treatment diary below    NMR:  - Floor to ceiling: 10x 10 sec holds  - Side to side: 10x 10 sec holds, 2 sets  - FWD step and reach: 10x 2 sets  - LAT step and reach: 10x 2 sets  - BWD step and reach: 10x 2 sets  - FWD rock and reach: 10x 2 sets  - LAT rock and reach: 10x 2 sets  - STS: 10x 2 sets    TA:  - HEP: Floor to ceiling, side to side, STS      Assessment: Patient tolerated treatment fairly. Continued with seated and standing exercises, requiring 1 UE support to maintain balance when standing. Patient challenged with UE/LE coordination during reaching exercises, requiring frequent correction or assistance via visual target to hit. Improved performance with cue of hitting hand for high five. Patient will benefit from skilled PT services to reduce his risk of falls and maximize his level of safety and independence.      Plan: Continue per plan of care.        Outcome Measures Initial Eval  1/3/2024        5xSTS 20.5 sec w/ 1 UE        TUG  - Regular  - Cognitive  - Carry   23.9 sec  58.5 sec (unable to count)  30.8 sec (+ spillage)        MiniBEST /28        10 meter 0.48 m/s        6MWT  ft        ABC %        PDQ-39 /100        MDS-UPDRS  Part III   /128        H&Y Stage         Floor > Stand  sec      "                 Precautions:   Past Medical History:   Diagnosis Date    Arthritis     Asbestos exposure     Asbestosis (HCC)     GERD (gastroesophageal reflux disease)     Hemoptysis 5/11/2022    Hypertension     Lung disease     Parkinson's disease

## 2024-01-24 ENCOUNTER — APPOINTMENT (OUTPATIENT)
Dept: PHYSICAL THERAPY | Facility: MEDICAL CENTER | Age: 84
End: 2024-01-24
Payer: COMMERCIAL

## 2024-01-25 DIAGNOSIS — G20.A1 PARKINSON DISEASE: Primary | ICD-10-CM

## 2024-01-29 ENCOUNTER — OFFICE VISIT (OUTPATIENT)
Dept: PHYSICAL THERAPY | Facility: MEDICAL CENTER | Age: 84
End: 2024-01-29
Payer: COMMERCIAL

## 2024-01-29 DIAGNOSIS — G20.A1 PARKINSON'S DISEASE, UNSPECIFIED WHETHER DYSKINESIA PRESENT, UNSPECIFIED WHETHER MANIFESTATIONS FLUCTUATE: Primary | ICD-10-CM

## 2024-01-29 PROCEDURE — 97112 NEUROMUSCULAR REEDUCATION: CPT | Performed by: PHYSICAL THERAPIST

## 2024-01-29 NOTE — PROGRESS NOTES
"Daily Note   POC expires Auth Status Total     Start date  Expiration date PT/OT + Visit Limit? Co-Insurance   3/27/24                                                 Visit/Unit Tracking  AUTH Status:  Date 1/3 1/8 1/10 1/22 1/29            Authed:  Used 1 2 3 4 5           Remaining                  Today's date: 2024  Patient name: Daniel Sanz Jr. \"Alfonzo\"  : 1940  MRN: [id number]  Referring provider: Jovanna Cavanaugh MD  Dx:   Encounter Diagnosis     ICD-10-CM    1. Parkinson's disease, unspecified whether dyskinesia present, unspecified whether manifestations fluctuate  G20.A1               Subjective: Patient arrives to treatment reporting 1 fall without injury since his last treatment      Objective: See treatment diary below    NMR:  - Floor to ceiling: 10x 10 sec holds  - Side to side: 10x 10 sec holds, 2 sets  - FWD step and reach: 10x 2 sets  - LAT step and reach: 10x 2 sets  - BWD step and reach: 10x 2 sets  - FWD rock and reach: 10x 2 sets  - LAT rock and reach: 10x 2 sets  - STS: 10x 2 sets    TA:  - HEP: Floor to ceiling, side to side, STS      Assessment: Patient tolerated treatment fairly. Continued with seated and standing exercises, requiring 1 UE support to maintain balance when standing. Improved UE motion observed today when moving in forward and lateral directions. Cueing provided to prevent forward head/cervical flexion posture. May add karen to provide patient additional visual cue to increase foot clearance with forward and lateral steps. Patient will benefit from skilled PT services to reduce his risk of falls and maximize his level of safety and independence.      Plan: Continue per plan of care.        Outcome Measures Initial Eval  1/3/2024        5xSTS 20.5 sec w/ 1 UE        TUG  - Regular  - Cognitive  - Carry   23.9 sec  58.5 sec (unable to count)  30.8 sec (+ spillage)        MiniBEST /28        10 meter 0.48 m/s        6MWT  ft        ABC %        PDQ-39 /100      "   MDS-UPDRS  Part III   /128        H&Y Stage         Floor > Stand  sec                      Precautions:   Past Medical History:   Diagnosis Date    Arthritis     Asbestos exposure     Asbestosis (HCC)     GERD (gastroesophageal reflux disease)     Hemoptysis 5/11/2022    Hypertension     Lung disease     Parkinson's disease

## 2024-01-31 ENCOUNTER — OFFICE VISIT (OUTPATIENT)
Dept: PHYSICAL THERAPY | Facility: MEDICAL CENTER | Age: 84
End: 2024-01-31
Payer: COMMERCIAL

## 2024-01-31 DIAGNOSIS — G20.A1 PARKINSON'S DISEASE, UNSPECIFIED WHETHER DYSKINESIA PRESENT, UNSPECIFIED WHETHER MANIFESTATIONS FLUCTUATE: Primary | ICD-10-CM

## 2024-01-31 PROCEDURE — 97112 NEUROMUSCULAR REEDUCATION: CPT | Performed by: PHYSICAL THERAPIST

## 2024-01-31 NOTE — PROGRESS NOTES
"Daily Note   POC expires Auth Status Total     Start date  Expiration date PT/OT + Visit Limit? Co-Insurance   3/27/24                                                 Visit/Unit Tracking  AUTH Status:  Date 1/3 1/8 1/10 1/22 1/29 1/31           Authed:  Used 1 2 3 4 5 6          Remaining                  Today's date: 2024  Patient name: Daniel Sanz Jr. \"Alfonzo\"  : 1940  MRN: [id number]  Referring provider: Jovanna Cavanaugh MD  Dx:   Encounter Diagnosis     ICD-10-CM    1. Parkinson's disease, unspecified whether dyskinesia present, unspecified whether manifestations fluctuate  G20.A1                 Subjective: Patient arrives to treatment with SPC but has difficulty using it      Objective: See treatment diary below    NMR:  - Floor to ceiling: 10x 10 sec holds  - Side to side: 10x 10 sec holds, 2 sets  - FWD step and reach: 10x 2 sets  - LAT step and reach: 10x 2 sets  - STS: 10x 2 sets    TE:  - HS stretch: 60 sec, 3x ea    TA:  - HEP: Floor to ceiling, side to side, STS      Assessment: Patient tolerated treatment fairly. Patient requires cueing to maintain appropriate exercise form and upright posture. Based on patient reports of LE tightness, B/L HS stretch added today with slight overpressure as patient is not able to reach full extension. PT assist provided during STS to assist with standing and maintaining anterior weight shifting. Patient will benefit from skilled PT services to reduce his risk of falls and maximize his level of safety and independence.      Plan: Continue per plan of care.        Outcome Measures Initial Eval  1/3/2024        5xSTS 20.5 sec w/ 1 UE        TUG  - Regular  - Cognitive  - Carry   23.9 sec  58.5 sec (unable to count)  30.8 sec (+ spillage)        MiniBEST /28        10 meter 0.48 m/s        6MWT  ft        ABC %        PDQ-39 /100        MDS-UPDRS  Part III   /128        H&Y Stage         Floor > Stand  sec                      Precautions:   Past Medical " History:   Diagnosis Date    Arthritis     Asbestos exposure     Asbestosis (HCC)     GERD (gastroesophageal reflux disease)     Hemoptysis 5/11/2022    Hypertension     Lung disease     Parkinson's disease

## 2024-02-05 ENCOUNTER — OFFICE VISIT (OUTPATIENT)
Dept: PHYSICAL THERAPY | Facility: MEDICAL CENTER | Age: 84
End: 2024-02-05
Payer: COMMERCIAL

## 2024-02-05 DIAGNOSIS — G20.A1 PARKINSON'S DISEASE, UNSPECIFIED WHETHER DYSKINESIA PRESENT, UNSPECIFIED WHETHER MANIFESTATIONS FLUCTUATE: Primary | ICD-10-CM

## 2024-02-05 PROCEDURE — 97530 THERAPEUTIC ACTIVITIES: CPT | Performed by: PHYSICAL THERAPIST

## 2024-02-05 PROCEDURE — 97110 THERAPEUTIC EXERCISES: CPT | Performed by: PHYSICAL THERAPIST

## 2024-02-05 NOTE — PROGRESS NOTES
PT Evaluation          POC expires Auth Status Total     Start date  Expiration date PT/OT + Visit Limit? Co-Insurance   3/27/24                                                 Visit/Unit Tracking  AUTH Status:  Date 1/3 1/8 1/10 1/22 1/29 1/31 2/5          Authed:  Used 1 2 3 4 5 6 7         Remaining                    Today's date: 2024  Patient name: Daniel Sanz Jr.  : 1940  MRN: [id number]  Referring provider: Jovanna Cavanaugh MD  Dx:   Encounter Diagnosis     ICD-10-CM    1. Parkinson's disease, unspecified whether dyskinesia present, unspecified whether manifestations fluctuate  G20.A1                 Assessment  Assessment details: Patient is a 83 y.o. Male who has been attending skilled outpatient PT with complaints of gait and balance deficits secondary to Parkinson's Disease. He arrives to treatment without AD and required CG/Yue to navigate throughout clinic safely. At rest patient display UE tremor and B/L LE rigidity which does not allow him to fully extend his knees. Since his initial evaluation patient displays regressions with his 5xSTS and TUG/COG/Carry. Regressions with these objective measures suggest reduced functional LE strength and suggests he is at HIGH risk of falls. He maintains similar score for his 10 MWT, with difference from initial evaluation not enough to reflect MDC and maintaining HIGH risk of falls. Patient was able to attempt 6 MWT completing 300 ft in 4:36 and requiring early termination of test due to LE fatigue and instability. During ambulation, patient displays frequent shuffling/fenestrating gait, slow gisela, reduced UE swing, freezing with turns, and requires CGA to maximize patient safety. Provided patient with RW to trial around clinic to provide additional support due to LOB and PT assist needed with mobility. Plan to continue with RW training to improve patient confidence and gait mechanics to provide additional stability and maximize his mobility. Per  research provided by [employer], patient will benefit from skilled outpatient PT services to improve and maximize his function, to reduce risk for falls and potential injuries associated with falls, reduce healthcare costs via hospitalization, and reduce patient and national healthcare costs. In early stages of Parkinson's Disease, research indicates that intensive physical exercise can improve patient's motor control and assist in slowing the disease progression as a neuroprotective agent. Patient will benefit from skilled outpatient PT in order to maximize function in the short-term and long-term with overall improved postural strength with associated stability and mobility.      Impairments: Abnormal gait, Abnormal muscle tone, Abnormal or restricted ROM, Activity intolerance, Impaired balance, Impaired physical strength, Lacks appropriate HEP, Poor posture, Poor body mechanics, Pain with function, Safety issue, Weight-bearing intolerance, Abnormal movement  Understanding of Dx/Px/POC: Good  Prognosis: Good      Patient verbalized understanding of POC.    Please contact me if you have any questions or recommendations. Thank you for the referral and the opportunity to share in Daniel Sanz Jr.'s care.           Plan  Plan details: 6 MWT, miniBEST  Planned modality interventions: Cryotherapy, Thermotherapy  Planned therapy interventions: ADL training, Balance, Balance/WB training, Breathing training, Body mechanics training, Coordination, Functional ROM exercises, Gait training, HEP, Manual Therapy, Motor coordination training, Neuromuscular re-education, Patient education, Postural training, Strengthening, Stretching, Therapeutic activities, Therapeutic exercises, Therapeutic training, Transfer training  Frequency: 2x/wk  Duration in weeks: 12  Plan of Care beginning date: 1/3/2024  Plan of Care expiration date: 12 weeks - 3/27/2024  Treatment plan discussed with: Patient and Family         Goals  Short Term Goals (4  weeks):  - Patient will improve TUG score by MDC of 4.8 sec in order to reduce risk of falls and to increase safety with functional mobility  - Patient will improve 5 STS by the MDC of 2.3 sec indicating an improvement in strength and decreased challenge with transfers  - Patient will improve 6 MWT by the MDC of 269 ft indicating an improvement in cardiovascular endurance in order to maximize function with functional mobility throughout the community  - Patient will improve MiniBESTest score by MDC of 5.52 points indicating an improvement with dynamic balance in order to further decrease risk of falls with functional tasks  - Patient will be independent in basic HEP in order to manage condition at home    Long Term Goals (12 weeks):  - Patient will score a low risk for falls 2/4 fall risks measures  - Patient will score age norm values for 1/2 endurance measures  - Patient will be able to ambulate on uneven surfaces with 50% reduction in near falls to increase safety with community mobility  - Patient will be able to perform a floor transfer without physical assistance to assist with fall recovery at home and in the community  - Patient will be independent in comprehensive HEP post discharge from program  - Patient will be able to walk up incline with decreased difficulty and no loss of balance in order to improve functional mobility throughout the community  - Patient will be able to perform sit to stands from softer surfaces such as a couch or bed in order to improvement function with transfers  - Patient will be consistent with program for at least 1 day per week to assist with management of condition and functional independence of their condition        Cut off score   All date taken from [facility] Neuro Section or Rehab Measures      PROCTOR Cutoff Scores:  MDC: 5 pts  Falls Risk Cutoff: 40.22/56 DGI Cutoff Scores:  Carsuo et al 2011, MDC: 2.9 pts  Darnell et al 2008, Ronny: Score <19/24   MiniBEST Cutoff Scores:  Mynor  et al 2011, MDC: 5.52 pts  Lizzie 2013, Ronny: <19/28 5xSTS Cutoff Scores:  MDC: 2.3 sec  Milana, 2011, Ronny: > 16 sec   TUG Cutoff Scores:  Lars Obando, 2011, MDC: 4.8 sec  Sophie limon al, 2011, Fallers: Meds ON: < 12.21 sec, OFF: 15.5 sec 10 Meter Walk Test Cutoff Scores:  Galindo, 2008, MDC: 0.18 m/s  Age Norm Values, Ronny: < 1.0 m/s   ABC Cutoff Scores:  Pat, 2008, MDC: 13 pts  Lars Retana, MDC: 11.12 pts  Increased risk for falls: < 69% 6 Minute Walk Test Cutoff Scores:  Galindo, 2008, MDC: 269 ft  Yovany et al, 2002, Norm Data Healthy Adults:  60 - 69 years:   M: 572 m      F: 538 m  70 - 79 years:   M: 527 m      F: 471 m  80 - 89 years:   M: 417 m      F: 392 m   PDQ-39 Cutoff Scores:  Demi et al, 2004:  MDC: Mobility (12.24), ADL (16.72), Emotional (14.22), Stigma (21.21), Social Support (21.25), Cognition (21.12), Communication (21.04), Bodily Discomfort (24.48)  Sheron et al, 2007:  Normative Data: Mobility (49.25), ADL (38.94), Emotional (37.92), Stigma (27.54), Social Support (14.78), Cognition (33.03), Communication (27.99), Bodily Discomfort (40.91) Ariela and Yahr Stages  Stage 0: No S/S  Stage 1: Mild unilateral symptoms  Stage 1.5: Unilateral and axial symptoms  Stage 2: Bilateral symptoms, no balance impairment  Stage 2.5: Mild bilateral symptoms and recovery with pull test  Stage 3: Mild to moderate postural instability, independent  Stage 4: Severe disability, walking or standing unassisted  Stage 5: Bed bound, w/c bound           Subjective Evaluation    History of Present Illness  Mechanism of injury: Patient and wife arrive to evaluation after previous treatment for Parkinson's Disease. They would like to work on his walking, balance, and leg strength. Patient's wife reports that patient has had 1 fall over the last few months and frequently shuffles his feet. He has a SPC that he uses occasionally but does not bring  today. Patient and wife report that he has some confusion and frequent urination, he is not currently driving    Update 24: Patient arrives to treatment without AD, reporting no changes since his last treatment. He has had 1 fall over the last month and has difficulty getting up from the ground. Patient reports back stiffness today.   Primary AD: occasional SPC      Pain  Current pain ratin/10  Location: Shoulder    Social Support  Steps to enter house: 1 step  Stairs in house: 1 flight  Lives in: house  Lives with: wife    Employment status: Retired      Treatments  Previous treatment: PT      Objective   Gait abnormalities: slow gisela, shuffling and fenestrating gait, B/L knee flexion, reduced UE swing, freezing with turning          Outcome Measures Initial Eval  1/3/2024 PN  24       5xSTS 20.5 sec w/ 1 UE 23.4 sec w/ Yue       TUG  - Regular  - Cognitive  - Carry   23.9 sec  58.5 sec (unable to count)  30.8 sec (+ spillage)   29.4 sec  65.4 sec  43.3 sec (- spillage)       MiniBEST /28        10 meter 0.48 m/s 0.53 m/s       6MWT  ft 300 ft in 4:36       ABC %        PDQ-39 /100        MDS-UPDRS  Part III   /128        H&Y Stage         Floor > Stand  sec                    Gait:  - Ambulation w/ RW: 10 minutes    TE:  - Prolonged HS stretch: 5 min x2 sets    Precautions:   Past Medical History:   Diagnosis Date    Arthritis     Asbestos exposure     Asbestosis (HCC)     GERD (gastroesophageal reflux disease)     Hemoptysis 2022    Hypertension     Lung disease     Parkinson's disease

## 2024-02-07 ENCOUNTER — OFFICE VISIT (OUTPATIENT)
Dept: PHYSICAL THERAPY | Facility: MEDICAL CENTER | Age: 84
End: 2024-02-07
Payer: COMMERCIAL

## 2024-02-07 DIAGNOSIS — G20.A1 PARKINSON'S DISEASE, UNSPECIFIED WHETHER DYSKINESIA PRESENT, UNSPECIFIED WHETHER MANIFESTATIONS FLUCTUATE: Primary | ICD-10-CM

## 2024-02-07 PROCEDURE — 97112 NEUROMUSCULAR REEDUCATION: CPT | Performed by: PHYSICAL THERAPIST

## 2024-02-07 NOTE — PROGRESS NOTES
"Daily Note   POC expires Auth Status Total     Start date  Expiration date PT/OT + Visit Limit? Co-Insurance   3/27/24                                                 Visit/Unit Tracking  AUTH Status:  Date 1/3 1/8 1/10 1/22 1/29 1/31           Authed:  Used 1 2 3 4 5 6          Remaining                  Today's date: 2024  Patient name: Daniel Sanz Jr. \"Alfonzo\"  : 1940  MRN: [id number]  Referring provider: Jovanna Cavanaugh MD  Dx:   Encounter Diagnosis     ICD-10-CM    1. Parkinson's disease, unspecified whether dyskinesia present, unspecified whether manifestations fluctuate  G20.A1                   Subjective: Patient arrives to treatment without AD, wife states he is walking better today      Objective: See treatment diary below    NMR:  - Floor to ceiling: 10x 10 sec holds  - Side to side: 10x 10 sec holds, 2 sets  - FWD step and reach: 10x 2 sets  - LAT step and reach: 10x 2 sets  - BWD step and reach: 10x 2 sets  - FWD rock and reach: 10x 2 sets  - LAT rock and reach: 10x 2 sets  - STS: 10x    TA:  - HEP: Floor to ceiling, side to side, STS      Assessment: Patient tolerated treatment fairly. Patient requires 1 step commands and PT assist to maintain balance and prevent LOB while maintaining appropriate posture. Patient able to successfully coordinate UE/LE approximately 50% of attempts. CGA and gait belt maintained to maximize patient safety with all mobility. Patient will benefit from skilled PT services to reduce his risk of falls and maximize his level of safety and independence.      Plan: Continue per plan of care.        Outcome Measures Initial Eval  1/3/2024        5xSTS 20.5 sec w/ 1 UE        TUG  - Regular  - Cognitive  - Carry   23.9 sec  58.5 sec (unable to count)  30.8 sec (+ spillage)        MiniBEST /28        10 meter 0.48 m/s        6MWT  ft        ABC %        PDQ-39 /100        MDS-UPDRS  Part III   /128        H&Y Stage         Floor > Stand  sec                  "     Precautions:   Past Medical History:   Diagnosis Date    Arthritis     Asbestos exposure     Asbestosis (HCC)     GERD (gastroesophageal reflux disease)     Hemoptysis 5/11/2022    Hypertension     Lung disease     Parkinson's disease

## 2024-02-12 ENCOUNTER — OFFICE VISIT (OUTPATIENT)
Dept: PHYSICAL THERAPY | Facility: MEDICAL CENTER | Age: 84
End: 2024-02-12
Payer: COMMERCIAL

## 2024-02-12 DIAGNOSIS — G20.A1 PARKINSON'S DISEASE, UNSPECIFIED WHETHER DYSKINESIA PRESENT, UNSPECIFIED WHETHER MANIFESTATIONS FLUCTUATE: Primary | ICD-10-CM

## 2024-02-12 PROCEDURE — 97112 NEUROMUSCULAR REEDUCATION: CPT | Performed by: PHYSICAL THERAPIST

## 2024-02-12 NOTE — PROGRESS NOTES
"Daily Note   POC expires Auth Status Total     Start date  Expiration date PT/OT + Visit Limit? Co-Insurance   3/27/24                                                 Visit/Unit Tracking  AUTH Status:  Date 1/3 1/8 1/10 1/22 1/29 1/31 2/5 2/7 2/12        Authed:  Used 1 2 3 4 5 6 7 8 9       Remaining                  Today's date: 2024  Patient name: Daniel Sanz Jr. \"Alfonzo\"  : 1940  MRN: [id number]  Referring provider: Jovanna Cavanaugh MD  Dx:   Encounter Diagnosis     ICD-10-CM    1. Parkinson's disease, unspecified whether dyskinesia present, unspecified whether manifestations fluctuate  G20.A1                   Subjective: Patient arrives to treatment without AD, wife states he was recently given new BP medication      Objective: See treatment diary below    NMR:  - Floor to ceiling: 10x 10 sec holds  - Side to side: 10x 10 sec holds, 2 sets  - FWD step and reach: 10x 2 sets  - LAT step and reach: 10x 2 sets  - BWD step and reach: 10x 2 sets  - FWD rock and reach: 10x 2 sets  - LAT rock and reach: 10x 2 sets  - STS: 10x 2 sets    TA:  - BP: 150/78  - HEP: Floor to ceiling, side to side, STS      Assessment: Patient tolerated treatment fairly. Patient requires 1 step commands and PT assist to maintain balance and prevent LOB while maintaining appropriate posture. BP measured 150/78 today, plan to continue to follow as patients medication recently changed. Patient able to complete 2 sets of STS today having 1 posterior LOB due to poor anterior weight shifting when standing from a seated position. Patient will benefit from skilled PT services to reduce his risk of falls and maximize his level of safety and independence.      Plan: Continue per plan of care.        Outcome Measures Initial Eval  1/3/2024        5xSTS 20.5 sec w/ 1 UE        TUG  - Regular  - Cognitive  - Carry   23.9 sec  58.5 sec (unable to count)  30.8 sec (+ spillage)        MiniBEST /28        10 meter 0.48 m/s        6MWT  ft      "   ABC %        PDQ-39 /100        MDS-UPDRS  Part III   /128        H&Y Stage         Floor > Stand  sec                      Precautions:   Past Medical History:   Diagnosis Date    Arthritis     Asbestos exposure     Asbestosis (HCC)     GERD (gastroesophageal reflux disease)     Hemoptysis 5/11/2022    Hypertension     Lung disease     Parkinson's disease

## 2024-02-14 ENCOUNTER — APPOINTMENT (OUTPATIENT)
Dept: PHYSICAL THERAPY | Facility: MEDICAL CENTER | Age: 84
End: 2024-02-14
Payer: COMMERCIAL

## 2024-02-19 ENCOUNTER — EVALUATION (OUTPATIENT)
Dept: OCCUPATIONAL THERAPY | Facility: MEDICAL CENTER | Age: 84
End: 2024-02-19
Payer: COMMERCIAL

## 2024-02-19 ENCOUNTER — OFFICE VISIT (OUTPATIENT)
Dept: PHYSICAL THERAPY | Facility: MEDICAL CENTER | Age: 84
End: 2024-02-19
Payer: COMMERCIAL

## 2024-02-19 DIAGNOSIS — G20.A1 PARKINSON'S DISEASE, UNSPECIFIED WHETHER DYSKINESIA PRESENT, UNSPECIFIED WHETHER MANIFESTATIONS FLUCTUATE: Primary | ICD-10-CM

## 2024-02-19 DIAGNOSIS — G20.A1 PARKINSON'S DISEASE WITHOUT DYSKINESIA, UNSPECIFIED WHETHER MANIFESTATIONS FLUCTUATE: ICD-10-CM

## 2024-02-19 DIAGNOSIS — G20.A1 PARKINSON DISEASE: Primary | ICD-10-CM

## 2024-02-19 PROCEDURE — 97166 OT EVAL MOD COMPLEX 45 MIN: CPT

## 2024-02-19 PROCEDURE — 97112 NEUROMUSCULAR REEDUCATION: CPT | Performed by: PHYSICAL THERAPIST

## 2024-02-19 NOTE — PROGRESS NOTES
"Daily Note   POC expires Auth Status Total     Start date  Expiration date PT/OT + Visit Limit? Co-Insurance   3/27/24                                                 Visit/Unit Tracking  AUTH Status:  Date 1/3 1/8 1/10 1/22 1/29 1/31 2/5 2/7 2/12 2/19       Authed:  Used 1 2 3 4 5 6 7 8 9 10      Remaining                  Today's date: 2024  Patient name: Daniel Sanz Jr. \"Alfonzo\"  : 1940  MRN: [id number]  Referring provider: Jovanna Cavanaugh MD  Dx:   Encounter Diagnosis     ICD-10-CM    1. Parkinson's disease, unspecified whether dyskinesia present, unspecified whether manifestations fluctuate  G20.A1                   Subjective: Patient arrives to treatment without AD from OT evaluation      Objective: See treatment diary below    NMR:  - Floor to ceiling: 10x 10 sec holds  - Side to side: 10x 10 sec holds, 2 sets  - FWD step and reach: 10x 2 sets  - LAT step and reach: 10x 2 sets  - BWD step and reach: 10x 2 sets  - FWD rock and reach: 10x 2 sets  - LAT rock and reach: 10x 2 sets  - STS: 5x    TA:  - BP: 150/82  - HEP: Floor to ceiling, side to side, STS      Assessment: Patient tolerated treatment fairly. Patient requires 1 step commands and PT assist to maintain balance and prevent LOB while maintaining appropriate posture. BP measured 150/82 today, plan to continue to follow as patients medication recently changed. Patient only able to complete 5 STS due to increased LE tightness and fatigue. Increased difficulty maintaining balance and foot clearance with backward step and reach today requiring additional assistance to complete. Patient will benefit from skilled PT services to reduce his risk of falls and maximize his level of safety and independence.      Plan: Continue per plan of care.        Outcome Measures Initial Eval  1/3/2024        5xSTS 20.5 sec w/ 1 UE        TUG  - Regular  - Cognitive  - Carry   23.9 sec  58.5 sec (unable to count)  30.8 sec (+ spillage)        MiniBEST /28      "   10 meter 0.48 m/s        6MWT  ft        ABC %        PDQ-39 /100        MDS-UPDRS  Part III   /128        H&Y Stage         Floor > Stand  sec                      Precautions:   Past Medical History:   Diagnosis Date    Arthritis     Asbestos exposure     Asbestosis (HCC)     GERD (gastroesophageal reflux disease)     Hemoptysis 5/11/2022    Hypertension     Lung disease     Parkinson's disease

## 2024-02-19 NOTE — PROGRESS NOTES
OCCUPATIONAL THERAPY INITIAL EVALUATION    Today's Date: 2024  Patient Name: Daniel Sanz Jr.  : 1940  MRN: [id number]  Referring Provider: Jovanna Cavanaugh MD  Dx: Parkinson disease [G20.A1]    Eval/Re-Eval POC Expires Auth #/ Referral # Total Visits Start Date Expiration Date Extension Info Visits Limitation                                                                 1 2 3 4 5 6           7 8 9 10 11 12           13 14 15 16 17 18           19 20 21 22 23 24           25 26 27 28 29 30             POC START DATE: 2024  POC END DATE: 2024  NEXT PN DUE: 3/19/2024  FREQUENCY: 2x/wk for 12 weeks    SKILLED ANALYSIS:    Pt presents to OT evaluation on 2024 secondary to hallucination due to Parkinson's Disease. Date of Onset: 2018. Patient's wife was present while taking the subjective information as pt is a poor historian. Pt reports that he does not know why he is presently at occupational therapy and that he is LHD. Pt's wife reports that he has been having difficulty with getting in and out of chairs the most and has difficulty transferring into and out of the car independently. Wife further indicates that he is currently taking medication for his parkinson's for the past 4-5 years to reduce tremors of L hand, melatonin due to disrupted sleep (waking up 3-6 times a night), and medication for bladder control. Pt also has a hx of dry eyes and cataracts which is effecting his ability to transfer to the car/chairs independently; pt recently fell a few weeks ago due to this. Patient and wife report that he is able to still perform ADL/IADLs independently, but indicate that he is no longer able to complete lawn maintenance and that he has trouble gripping items. Pt is no longer driving and is retired. Pt's next follow-up appointment with his neurologist Dr. Cavanaugh is 3/18/2024. During the evaluation, pt engaged in a MOCA screen, Nine Hole Peg, Functional Dexterity, MMT, AROM, and  "Vernon  Strength. The overall results of these tests indicate severe cognitive impairment and decreased functional dexterity. The pt shows normal /muscle strength and WFL ROM. The results of the MOCA was 8/30 (Norm: 26/30), Nine Hole Peg R: 1:03 L: 1:24 (Norm: 26 seconds), Vernon  Strength  R:55 L: 66 (Norm: R: 65.7, L: 55). Due to observations seen throughout the evaluation it was discussed to the pt's wife on the potential of SLP, but wife does not want to pursue due to location. Pt would benefit from OT skilled therapy to address severe cognitive impairment and decreased functional dexterity. Pt will be seen for OT services 2x/wk for 12 weeks. POC was reviewed with the pt, pt understands and agrees with POC.    Subjective    Pt presents to OT evaluation on 2/19/2024 secondary to hallucination due to Parkinson's. Date of Onset: 7/12/2018. Patient's wife was present while taking the subjective information as pt is a poor historian. Pt reports that he does not know why he is presently at occupational therapy and that he is LHD. Pt's wife reports that he has been having difficulty with getting in and out of chairs the most and has difficulty transferring into and out of the car independently. Wife further indicates that he is currently taking medication for his parkinson's for the past 4-5 years to reduce tremors of L hand, melatonin due to disrupted sleep (waking up 3-6 times a night), and medication for bladder control. Pt also has a hx of dry eyes and cataracts which is effecting his ability to transfer to the car/chairs independently; pt recently fell a few weeks ago due to this. Patient and wife report that he is able to still perform ADL/IADLs independently, but indicate that he is no longer able to complete lawn maintenance and that he has trouble gripping items. Pt is no longer driving and is retired. Pt's next follow-up appointment with his neurologist Dr. Cavanaugh is 3/18/2024.    PATIENT GOAL: \"Get " "back to lawn mowing\"    HISTORY OF PRESENT ILLNESS:     PMH:   Past Medical History:   Diagnosis Date    Arthritis     Asbestos exposure     Asbestosis (HCC)     GERD (gastroesophageal reflux disease)     Hemoptysis 2022    Hypertension     Lung disease     Parkinson's disease        Past Surgical Hx:   Past Surgical History:   Procedure Laterality Date    HERNIA REPAIR      INTRACAPSULAR CATARACT EXTRACTION Left     KNEE SURGERY      many years ago    SHOULDER SURGERY          Pain Levels:      Restin/10    With Activity:  8/10    OBJECTIVE   9 HOLE PEG TEST: performed 9 hole peg test to assess dexterity/fine motor coordination with pt scoring 1:03 seconds on R hand  and 1: 24 seconds on L hand  side. Pt demonstrating decreased FMC related to age-related norms (age 27 seconds)     FUNCTIONAL DEXTERITY TESTS  RUE 1:47 seconds  LUE 1:13 seconds     Roland Cognitive Assessment Version 8.1 (MoCA V8.1)  Visuospatial/executive functionin/5  Namin/3  Memory: 1st trial:  0/5, 2nd trial:  0/5  Attention/concentration: 1/2  List of letters: 0/1  Seial Seven Subtraction:  1/3   Language/sentence repetition:  1/2  Language Fluency:  0/1  Abstract/Correlational Thinkin/2  Delayed Recall:  0/5  Orientation:  1/6              Memory Index Score: 0/15  MoCA V1 8.1 Raw Score:  8/30, MIS:  0/15, indicative of severe neurocognitive impairments.      UE Strength:              NAOMI: RUE 57/200 LUE: 65/200  The age norm is approximately R 66 lbs and L 55 lbs indicating relatively normal  strength    Pt is L hand dominant        RUE LUE Comments   AROM (seated)      Shoulder Flex/Ext WFL   WFL     Elbow Flex WFL  WFL     Elbow Ext WFL  WFL     Wrist Flex WFL  WFL     Wrist Ext WFL  WFL                          MMT         Shoulder Flex/Ext 5/5 5/5    Elbow Flex 4+/5 4+/5    Elbow Ext 4+/5 4+/5    Wrist Flex 5/5 5/5    Wrist Ext 5/5 5/5      Pt is left hand dominant     Short Term Goals, 4 Weeks:    Pt will " demo good carryover of clinic and home tremor reduction strategies for improved functional use of BUE L>R improved hand to target precision, utensil management, and item retrieval     Pt will increase R prehension patterns for improved tripod with utensil management and item retrieval with <15% droppage       Pt will increase automaticity of BUE L>R to 70% for improved grasp release of tabletop items for improved functional performance with salient tasks    Pt will increase BUE to Mod I status with 0% cuing for tabletop tasks for improved functional performance of life roles and salient tasks       Pt will demo G understanding of tremor management strategies such as proximal stabilization, resting hands on table, maintaining elbows close to body as demonstrated by incorporation in functional ADL/IADL tasks    Pt will demo competency with use of weighted utensils, weighted writing tools, use of wrist weights, built up handles, scoop dishes, and all AE as appropriate/needed for ADL/IADL fxn    Pt will obtain 12/30 on the MOCA screen to improve immediate and delayed memory recall    Long Term Goals:    Pt will demo good carryover of clinic and home tremor reduction strategies for improved functional use of BUE L>R improved hand to target precision, utensil management, and item retrieval     Pt will increase R prehension patterns for improved tripod with utensil management and item retrieval with <15% droppage     Pt will demo with G carryover of Home Exercise Program (hand exercises) to improve functional progression towards goals in Plan of care and for improved functional use of BUE L>R x 50%     Pt will increase automaticity of BUE L>R to 70% for improved grasp release of tabletop items for improved functional performance with salient tasks    Pt will increase BUE to Mod I status with 0% cuing for tabletop tasks for improved functional performance of life roles and salient tasks     Pt will demo G understanding of  tremor management strategies such as proximal stabilization, resting hands on table, maintaining elbows close to body as demonstrated by incorporation in functional ADL/IADL tasks    Pt will demo competency with use of weighted utensils, weighted writing tools, use of wrist weights, built up handles, scoop dishes, and all AE as appropriate/needed for ADL/IADL fxn    Pt will obtain 16/30 on the MOCA screen to improve immediate and delayed memory recall    Precautions: FALL SAFETY     Treatment conducted by student supervised and directed by Charlene Feliz MS, OTR/L, CSRS, ZACHARY, DCAT. I agree with all treatment methods performed during this session by ETHAN Thomas. Student was directly supervised by me during the entirety of session. Patient consent given to be treated by student.

## 2024-02-21 ENCOUNTER — TELEPHONE (OUTPATIENT)
Dept: OTHER | Facility: OTHER | Age: 84
End: 2024-02-21

## 2024-02-21 ENCOUNTER — OFFICE VISIT (OUTPATIENT)
Dept: PHYSICAL THERAPY | Facility: MEDICAL CENTER | Age: 84
End: 2024-02-21
Payer: COMMERCIAL

## 2024-02-21 DIAGNOSIS — G20.A1 PARKINSON'S DISEASE, UNSPECIFIED WHETHER DYSKINESIA PRESENT, UNSPECIFIED WHETHER MANIFESTATIONS FLUCTUATE: Primary | ICD-10-CM

## 2024-02-21 PROCEDURE — 97112 NEUROMUSCULAR REEDUCATION: CPT | Performed by: PHYSICAL THERAPIST

## 2024-02-21 NOTE — TELEPHONE ENCOUNTER
Pt's wife called and states that she believes the pt's issus es are coming from the change in his RX. Please call for further info.

## 2024-02-21 NOTE — PROGRESS NOTES
"Daily Note   POC expires Auth Status Total     Start date  Expiration date PT/OT + Visit Limit? Co-Insurance   3/27/24                                                 Visit/Unit Tracking  AUTH Status:  Date 1/3 1/8 1/10 1/22 1/29 1/31 2/5 2/7 2/12 2/19       Authed:  Used 1 2 3 4 5 6 7 8 9 10      Remaining                  Today's date: 2024  Patient name: Daniel Sanz Jr. \"Alfonzo\"  : 1940  MRN: [id number]  Referring provider: Jovanna Cavanaugh MD  Dx:   Encounter Diagnosis     ICD-10-CM    1. Parkinson's disease, unspecified whether dyskinesia present, unspecified whether manifestations fluctuate  G20.A1                   Subjective: Patient arrives to treatment without AD, reports no changes since his last visit      Objective: See treatment diary below    NMR:  - Floor to ceiling: 10x 10 sec holds  - Side to side: 10x 10 sec holds, 2 sets  - FWD step and reach: 10x 2 sets  - LAT step and reach: 10x 2 sets  - BWD step and reach: 10x 2 sets  - FWD rock and reach: 10x 2 sets  - LAT rock and reach: 10x 2 sets  - STS: 10x 2 sets    - FWD hurdles: 5 laps x 6\" hurdles    TA:  - HEP: Floor to ceiling, side to side, STS      Assessment: Patient tolerated treatment fairly. Patient requires 1 step commands and PT assist to maintain balance and prevent LOB while maintaining appropriate posture. Improved performance on STS today with addition of foam pad to increase seat height. FWD hurdles added today displaying improved foot clearance and step length when given visual cue of karen. Patient will benefit from skilled PT services to reduce his risk of falls and maximize his level of safety and independence.      Plan: Continue per plan of care.        Outcome Measures Initial Eval  1/3/2024        5xSTS 20.5 sec w/ 1 UE        TUG  - Regular  - Cognitive  - Carry   23.9 sec  58.5 sec (unable to count)  30.8 sec (+ spillage)        MiniBEST /28        10 meter 0.48 m/s        6MWT  ft        ABC %        PDQ-39 " /100        MDS-UPDRS  Part III   /128        H&Y Stage         Floor > Stand  sec                      Precautions:   Past Medical History:   Diagnosis Date    Arthritis     Asbestos exposure     Asbestosis (HCC)     GERD (gastroesophageal reflux disease)     Hemoptysis 5/11/2022    Hypertension     Lung disease     Parkinson's disease

## 2024-02-26 ENCOUNTER — OFFICE VISIT (OUTPATIENT)
Dept: OCCUPATIONAL THERAPY | Facility: MEDICAL CENTER | Age: 84
End: 2024-02-26
Payer: COMMERCIAL

## 2024-02-26 ENCOUNTER — OFFICE VISIT (OUTPATIENT)
Dept: PHYSICAL THERAPY | Facility: MEDICAL CENTER | Age: 84
End: 2024-02-26
Payer: COMMERCIAL

## 2024-02-26 ENCOUNTER — TELEPHONE (OUTPATIENT)
Dept: NEUROLOGY | Facility: CLINIC | Age: 84
End: 2024-02-26

## 2024-02-26 DIAGNOSIS — G20.A1 PARKINSON'S DISEASE WITHOUT DYSKINESIA, UNSPECIFIED WHETHER MANIFESTATIONS FLUCTUATE: Primary | ICD-10-CM

## 2024-02-26 DIAGNOSIS — G20.A1 PARKINSON'S DISEASE, UNSPECIFIED WHETHER DYSKINESIA PRESENT, UNSPECIFIED WHETHER MANIFESTATIONS FLUCTUATE: Primary | ICD-10-CM

## 2024-02-26 PROCEDURE — 97530 THERAPEUTIC ACTIVITIES: CPT

## 2024-02-26 PROCEDURE — 97112 NEUROMUSCULAR REEDUCATION: CPT

## 2024-02-26 PROCEDURE — 97112 NEUROMUSCULAR REEDUCATION: CPT | Performed by: PHYSICAL THERAPIST

## 2024-02-26 NOTE — TELEPHONE ENCOUNTER
Misael  2/22 4:14 PM     Sheng garnett calling for velasquez, [birth year]. He sees dr. Cavanaugh. I I called the other day because I was concerned that his new parkinson's medicine came from a different  in November. And that's when I started to see all these different changes. And we were to our family doctor today, and he agrees on his old medicine, his blood pressure went down, he went off his blood pressure medicine. He's now had to go back on his high blood pressure medicine so I, I'm hoping that we can do something about this. Thank you.  _____________   [phone]  Last visit Dr. Cavanaugh 11/24  Consent on file    Returned call to clarify medication was carbidopa/levodopa. Reached  , left detailed message to please  to confirm med name,  names and changes observed other than bp change if any in order to better assist.

## 2024-02-26 NOTE — PROGRESS NOTES
"Daily Note   POC expires Auth Status Total     Start date  Expiration date PT/OT + Visit Limit? Co-Insurance   3/27/24                                                 Visit/Unit Tracking  AUTH Status:  Date 1/3 1/8 1/10 1/22 1/29 1/31 2/5 2/7 2/12 2/19 2/21 2/26     Authed:  Used 1 2 3 4 5 6 7 8 9 10 11 12    Remaining                  Today's date: 2024  Patient name: Daniel Sanz Jr. \"Alfonzo\"  : 1940  MRN: [id number]  Referring provider: Jovanna Cavanaugh MD  Dx:   Encounter Diagnosis     ICD-10-CM    1. Parkinson's disease, unspecified whether dyskinesia present, unspecified whether manifestations fluctuate  G20.A1                     Subjective: Patient arrives to treatment without AD, reports no changes since his last visit      Objective: See treatment diary below    NMR:  - Floor to ceiling: 10x 10 sec holds  - Side to side: 10x 10 sec holds, 2 sets  - FWD step and reach: 10x 2 sets  - LAT step and reach: 10x 2 sets  - BWD step and reach: 10x 2 sets  - FWD rock and reach: 10x 2 sets--- NOT TODAY  - LAT rock and reach: 10x 2 sets  - STS: 10x 2 sets w/ airex pad      TA:  - HEP: Floor to ceiling, side to side, STS      Assessment: Patient tolerated treatment fairly. Patient requires 1 step commands and PT assist to maintain balance and prevent LOB while maintaining appropriate posture. Patient displays increased difficulty during backward step and reach due to narrow ESTELITA and poor postural stability. Airex pad added to STS today to raise seat height and reduce patient UE support. Patient will benefit from skilled PT services to reduce his risk of falls and maximize his level of safety and independence.      Plan: Continue per plan of care.        Outcome Measures Initial Eval  1/3/2024        5xSTS 20.5 sec w/ 1 UE        TUG  - Regular  - Cognitive  - Carry   23.9 sec  58.5 sec (unable to count)  30.8 sec (+ spillage)        MiniBEST /28        10 meter 0.48 m/s        6MWT  ft        ABC %      "   PDQ-39 /100        MDS-UPDRS  Part III   /128        H&Y Stage         Floor > Stand  sec                      Precautions:   Past Medical History:   Diagnosis Date    Arthritis     Asbestos exposure     Asbestosis (HCC)     GERD (gastroesophageal reflux disease)     Hemoptysis 5/11/2022    Hypertension     Lung disease     Parkinson's disease

## 2024-02-26 NOTE — PROGRESS NOTES
"Occupational Therapy Daily Note:    Today's date: 2024  Patient name: Daniel Sanz Jr.  : 1940  MRN: [id number]  Referring provider: Jovanna Cavanaugh MD  Dx:   Encounter Diagnosis   Name Primary?    Parkinson's disease without dyskinesia, unspecified whether manifestations fluctuate Yes       Eval/Re-Eval POC Expires Auth #/ Referral # Total Visits Start Date Expiration Date Extension Info Visits Limitation                                                                 1 2 3 4 5 6          7 8 9 10 11 12           13 14 15 16 17 18           19 20 21 22 23 24           25 26 27 28 29 30             POC START DATE: 2024  POC END DATE: 2024  NEXT PN DUE: 3/19/2024  FREQUENCY: 2x/wk for 12 weeks    Subjective: \"My back hurts a bit.\"    Objective:     Neuromuscular Re-Education -     Tremor Reduction  UBE retrograde & prograde  2 minutes prograde & 2 minutes retrograde    Fine Motor Control, Pincer Grasp, Hand Strength  L/R [place] Transfer w/ Tennis Ball  Completed: 2 x 40  Accuracy: 70%  Required min vc  Time: 15 minutes    Therapeutic Activity -     Fine Motor Control, Pincer Grasp  Get a  on Patterns  Dycem  Completed: x 3  Accuracy: 65%  Required mod vc  Time: 25 minutes    Assessment: Tolerated treatment well. Pt continuously required a verbal prompt to sustain attention on activity. Pt required reminders to aid in task recall as he kept forgetting how to complete task. Pt had slight tremors in both hand completing activities, but tremors were more prominent in R hand (non-dominant). Patient would benefit from continued skilled OT.    Plan: Continued skilled OT per POC.    Treatment conducted by student supervised and directed by Charlene Feliz MS, OTR/L, CSRS, ZACHARY, DCAT. I agree with all treatment methods performed during this session by ETHAN Thomas. Student was directly supervised by me during the entirety of session. Patient consent given to be treated by student. "

## 2024-02-27 NOTE — TELEPHONE ENCOUNTER
VM 2/26 at 12:03 pm:    Junior, this is Sheng Sanz calling back for Daniel Sanz. [birth year]. You wanted the name of his Sinemet medicine.  The new medicine, the  is Thinglink. Its a much smaller pill than the old one, which was [employer]. Not only did his blood pressure go up, that's when he started to have the memory problems. Thank you.

## 2024-02-28 ENCOUNTER — OFFICE VISIT (OUTPATIENT)
Dept: OCCUPATIONAL THERAPY | Facility: MEDICAL CENTER | Age: 84
End: 2024-02-28
Payer: COMMERCIAL

## 2024-02-28 ENCOUNTER — OFFICE VISIT (OUTPATIENT)
Dept: PHYSICAL THERAPY | Facility: MEDICAL CENTER | Age: 84
End: 2024-02-28
Payer: COMMERCIAL

## 2024-02-28 DIAGNOSIS — G20.A1 PARKINSON'S DISEASE WITHOUT DYSKINESIA, UNSPECIFIED WHETHER MANIFESTATIONS FLUCTUATE: Primary | ICD-10-CM

## 2024-02-28 DIAGNOSIS — G20.A1 PARKINSON'S DISEASE, UNSPECIFIED WHETHER DYSKINESIA PRESENT, UNSPECIFIED WHETHER MANIFESTATIONS FLUCTUATE: Primary | ICD-10-CM

## 2024-02-28 PROCEDURE — 97112 NEUROMUSCULAR REEDUCATION: CPT

## 2024-02-28 PROCEDURE — 97112 NEUROMUSCULAR REEDUCATION: CPT | Performed by: PHYSICAL THERAPIST

## 2024-02-28 NOTE — PROGRESS NOTES
"Daily Note   POC expires Auth Status Total     Start date  Expiration date PT/OT + Visit Limit? Co-Insurance   3/27/24                                                 Visit/Unit Tracking  AUTH Status:  Date                 Authed:  Used 13               Remaining                  Today's date: 2024  Patient name: Daniel Sanz Jr. \"Alfonzo\"  : 1940  MRN: [id number]  Referring provider: Jovanna Cavanaugh MD  Dx:   Encounter Diagnosis     ICD-10-CM    1. Parkinson's disease, unspecified whether dyskinesia present, unspecified whether manifestations fluctuate  G20.A1                     Subjective: Patient arrives to treatment without AD, reports no changes since his last visit      Objective: See treatment diary below    NMR:  - Floor to ceiling: 10x 10 sec holds  - Side to side: 10x 10 sec holds, 2 sets  - FWD hurdles: 8 laps x 6\" hurdles  - LAT hurdles: 4 laps x 6\" hurdles  - FWD step ups: 20x 6\" step  - Sidesteppin laps x 8 ft  - STS: 10x 2 sets w/ airex pad      TA:  - HEP: Floor to ceiling, side to side, STS      Assessment: Patient tolerated treatment fairly. Patient requires 1 step commands and PT assist to maintain balance and prevent LOB while maintaining appropriate posture. Increased focus on dynamic balance today, challenging patient to navigate hurdles and steps with limited UE support. PT assist and gait belt maintained when standing to maximize patient safety due to LOB. Occasional shuffling observed with PT cueing to reduce speed and regain balance before continuing with stepping. Plan to continue to focus on static and dynamic balance exercises. Patient will benefit from skilled PT services to reduce his risk of falls and maximize his level of safety and independence.      Plan: Continue per plan of care.        Outcome Measures Initial Eval  1/3/2024        5xSTS 20.5 sec w/ 1 UE        TUG  - Regular  - Cognitive  - Carry   23.9 sec  58.5 sec (unable to count)  30.8 sec (+ spillage) "        MiniBEST /28        10 meter 0.48 m/s        6MWT  ft        ABC %        PDQ-39 /100        MDS-UPDRS  Part III   /128        H&Y Stage         Floor > Stand  sec                      Precautions:   Past Medical History:   Diagnosis Date    Arthritis     Asbestos exposure     Asbestosis (HCC)     GERD (gastroesophageal reflux disease)     Hemoptysis 5/11/2022    Hypertension     Lung disease     Parkinson's disease

## 2024-02-28 NOTE — PROGRESS NOTES
"Occupational Therapy Daily Note:    Today's date: 2024  Patient name: Daniel Sanz Jr.  : 1940  MRN: [id number]  Referring provider: Jovanna Cavanaugh MD  Dx:   No diagnosis found.      Eval/Re-Eval POC Expires Auth #/ Referral # Total Visits Start Date Expiration Date Extension Info Visits Limitation                                                                 1 2 3 4 5 6         7 8 9 10 11 12           13 14 15 16 17 18           19 20 21 22 23 24           25 26 27 28 29 30             POC START DATE: 2024  POC END DATE: 2024  NEXT PN DUE: 3/19/2024  FREQUENCY: 2x/wk for 12 weeks    Subjective: \" I had a hard time sleeping last night.\"    Objective:     Neuromuscular Re-Education -     Tremor Reduction  UBE retrograde & prograde  2 minutes prograde & 2 minutes retrograde    Fine Motor Coordination, Pincer Grasp, Hand Strength  Connect 4 match  Red Resistance Clip  L Handiweight: 1.5 lbs  Completed: x 2  Accuracy:  40%  Required moderate visual and verbal cues  Time: 25 minutes    Fine Motor Coordination, Pincer Grasp   Fit the  L/R  L Handiweight: 1.5 lbs  Completed: x 2  Accuracy:  80%  Time: 10 minutes    Assessment: Tolerated treatment well. During today's session, pt's speech was slurred today which made communication a bit more difficult today. Pt required multiple verbal and visual cues throughout the session as he kept foretting the task presented. Pt required the use of 1.5 lbs of handiweight input due to increased tremors when red resistance clip was included. Patient would benefit from continued skilled OT.    Plan: Continued skilled OT per POC.    Treatment conducted by student supervised and directed by Charlene Feliz MS, OTR/L, CSRS, ZACHARY, DCAT. I agree with all treatment methods performed during this session by ETHAN Thomas. Student was directly supervised by me during the entirety of session. Patient consent given to be treated by student. "

## 2024-02-29 ENCOUNTER — HOSPITAL ENCOUNTER (OUTPATIENT)
Dept: RADIOLOGY | Facility: MEDICAL CENTER | Age: 84
Discharge: HOME | End: 2024-02-29
Payer: COMMERCIAL

## 2024-02-29 DIAGNOSIS — R76.8 ELEVATED SERUM IMMUNOGLOBULIN FREE LIGHT CHAINS: ICD-10-CM

## 2024-02-29 DIAGNOSIS — D47.2 MGUS (MONOCLONAL GAMMOPATHY OF UNKNOWN SIGNIFICANCE): ICD-10-CM

## 2024-02-29 PROCEDURE — 76497 UNLISTED CT PROCEDURE: CPT

## 2024-03-04 ENCOUNTER — OFFICE VISIT (OUTPATIENT)
Dept: OCCUPATIONAL THERAPY | Facility: MEDICAL CENTER | Age: 84
End: 2024-03-04
Payer: COMMERCIAL

## 2024-03-04 ENCOUNTER — OFFICE VISIT (OUTPATIENT)
Dept: PHYSICAL THERAPY | Facility: MEDICAL CENTER | Age: 84
End: 2024-03-04
Payer: COMMERCIAL

## 2024-03-04 DIAGNOSIS — G20.A1 PARKINSON'S DISEASE WITHOUT DYSKINESIA, UNSPECIFIED WHETHER MANIFESTATIONS FLUCTUATE: Primary | ICD-10-CM

## 2024-03-04 DIAGNOSIS — G20.A1 PARKINSON'S DISEASE, UNSPECIFIED WHETHER DYSKINESIA PRESENT, UNSPECIFIED WHETHER MANIFESTATIONS FLUCTUATE: Primary | ICD-10-CM

## 2024-03-04 PROCEDURE — 97112 NEUROMUSCULAR REEDUCATION: CPT

## 2024-03-04 PROCEDURE — 97530 THERAPEUTIC ACTIVITIES: CPT | Performed by: PHYSICAL THERAPIST

## 2024-03-04 NOTE — PROGRESS NOTES
PT Progress Note         POC expires Auth Status Total     Start date  Expiration date PT/OT + Visit Limit? Co-Insurance   3/27/24                                                 Visit/Unit Tracking  AUTH Status:  Date 2/28 3/4               Authed:  Used 13 14              Remaining                      Today's date: 3/4/2024  Patient name: Daniel Sanz Jr.  : 1940  MRN: [id number]  Referring provider: Jovanna Cavanaugh MD  Dx:   Encounter Diagnosis     ICD-10-CM    1. Parkinson's disease, unspecified whether dyskinesia present, unspecified whether manifestations fluctuate  G20.A1                 Assessment  Assessment details: Patient is a 83 y.o. Male who has been attending skilled outpatient PT with complaints of gait and balance deficits secondary to Parkinson's Disease. He arrives to treatment without AD and required CG/Yue to navigate throughout clinic safely. At rest patient display UE tremor and B/L LE rigidity which does not allow him to fully extend his knees. Since his initial evaluation patient displays improvements with his 5xSTS and 6 MWT, suggesting improvements with his functional strength and endurance. However despite these improvements he remains below normative values and at HIGH risk of falls. During TUG testing patient required additional time to complete TUG but was able to improve his TUG COG and TUG Carry and reducing gap between all test values. He maintains a similar speed for his 10 MWT with score change not enough to reflect MDC. Per research provided by [employer], patient will benefit from skilled outpatient PT services to improve and maximize his function, to reduce risk for falls and potential injuries associated with falls, reduce healthcare costs via hospitalization, and reduce patient and national healthcare costs. In early stages of Parkinson's Disease, research indicates that intensive physical exercise can improve patient's motor control and assist in slowing the disease  progression as a neuroprotective agent. Patient will benefit from skilled outpatient PT in order to maximize function in the short-term and long-term with overall improved postural strength with associated stability and mobility.      Impairments: Abnormal gait, Abnormal muscle tone, Abnormal or restricted ROM, Activity intolerance, Impaired balance, Impaired physical strength, Lacks appropriate HEP, Poor posture, Poor body mechanics, Pain with function, Safety issue, Weight-bearing intolerance, Abnormal movement  Understanding of Dx/Px/POC: Good  Prognosis: Good      Patient verbalized understanding of POC.    Please contact me if you have any questions or recommendations. Thank you for the referral and the opportunity to share in Daniel Sanz Jr.'s care.           Plan  Plan details: 6 MWT, miniBEST  Planned modality interventions: Cryotherapy, Thermotherapy  Planned therapy interventions: ADL training, Balance, Balance/WB training, Breathing training, Body mechanics training, Coordination, Functional ROM exercises, Gait training, HEP, Manual Therapy, Motor coordination training, Neuromuscular re-education, Patient education, Postural training, Strengthening, Stretching, Therapeutic activities, Therapeutic exercises, Therapeutic training, Transfer training  Frequency: 2x/wk  Duration in weeks: 12  Plan of Care beginning date: 3/4/2024  Plan of Care expiration date: 12 weeks - 5/27/2024  Treatment plan discussed with: Patient and Family         Goals  Short Term Goals (4 weeks):  - Patient will improve TUG score by MDC of 4.8 sec in order to reduce risk of falls and to increase safety with functional mobility- NOT MET  - Patient will improve 5 STS by the MDC of 2.3 sec indicating an improvement in strength and decreased challenge with transfers- MET  - Patient will improve 6 MWT by the MDC of 269 ft indicating an improvement in cardiovascular endurance in order to maximize function with functional mobility  throughout the community- Progressing  - Patient will improve MiniBESTest score by MDC of 5.52 points indicating an improvement with dynamic balance in order to further decrease risk of falls with functional tasks- NT  - Patient will be independent in basic HEP in order to manage condition at home- NOT MET     Long Term Goals (12 weeks):  - Patient will score a low risk for falls 2/4 fall risks measures  - Patient will score age norm values for 1/2 endurance measures  - Patient will be able to ambulate on uneven surfaces with 50% reduction in near falls to increase safety with community mobility  - Patient will be able to perform a floor transfer without physical assistance to assist with fall recovery at home and in the community  - Patient will be independent in comprehensive HEP post discharge from program  - Patient will be able to walk up incline with decreased difficulty and no loss of balance in order to improve functional mobility throughout the community  - Patient will be able to perform sit to stands from softer surfaces such as a couch or bed in order to improvement function with transfers  - Patient will be consistent with program for at least 1 day per week to assist with management of condition and functional independence of their condition        Cut off score   All date taken from [facility] Neuro Section or Rehab Measures      PROCTOR Cutoff Scores:  MDC: 5 pts  Falls Risk Cutoff: 40.22/56 DGI Cutoff Scores:  Caruso et al 2011, MDC: 2.9 pts  Darnell et al 2008, Ronny: Score <19/24   MiniBEST Cutoff Scores:  Mynor et al 2011, MDC: 5.52 pts  Ed and Na 2013, Ronny: <19/28 5xSTS Cutoff Scores:  MDC: 2.3 sec  Benito et al, 2011, Ronny: > 16 sec   TUG Cutoff Scores:  Lars Walden et al, 2011, MDC: 4.8 sec  Sophie et al, 2011, Fallers: Meds ON: < 12.21 sec, OFF: 15.5 sec 10 Meter Walk Test Cutoff Scores:  Yovany and Adalberto, 2008, MDC: 0.18 m/s  Age Norm Values, Ronny: < 1.0 m/s   ABC Cutoff  Scores:  Pat, 2008, MDC: 13 pts  Lars Retana, MDC: 11.12 pts  Increased risk for falls: < 69% 6 Minute Walk Test Cutoff Scores:  Galindo, 2008, MDC: 269 ft  Yovany et al, 2002, Norm Data Healthy Adults:  60 - 69 years:   M: 572 m      F: 538 m  70 - 79 years:   M: 527 m      F: 471 m  80 - 89 years:   M: 417 m      F: 392 m   PDQ-39 Cutoff Scores:  Demi et al, 2004:  MDC: Mobility (12.24), ADL (16.72), Emotional (14.22), Stigma (21.21), Social Support (21.25), Cognition (21.12), Communication (21.04), Bodily Discomfort (24.48)  Sheron et al, 2007:  Normative Data: Mobility (49.25), ADL (38.94), Emotional (37.92), Stigma (27.54), Social Support (14.78), Cognition (33.03), Communication (27.99), Bodily Discomfort (40.91) Ariela and Yahr Stages  Stage 0: No S/S  Stage 1: Mild unilateral symptoms  Stage 1.5: Unilateral and axial symptoms  Stage 2: Bilateral symptoms, no balance impairment  Stage 2.5: Mild bilateral symptoms and recovery with pull test  Stage 3: Mild to moderate postural instability, independent  Stage 4: Severe disability, walking or standing unassisted  Stage 5: Bed bound, w/c bound           Subjective Evaluation    History of Present Illness  Mechanism of injury: Patient and wife arrive to evaluation after previous treatment for Parkinson's Disease. They would like to work on his walking, balance, and leg strength. Patient's wife reports that patient has had 1 fall over the last few months and frequently shuffles his feet. He has a SPC that he uses occasionally but does not bring today. Patient and wife report that he has some confusion and frequent urination, he is not currently driving    Update 2/5/24: Patient arrives to treatment without AD, reporting no changes since his last treatment. He has had 1 fall over the last month and has difficulty getting up from the ground. Patient reports back stiffness today.     Update 3/4/24: Patient arrives to treatment from OT  with increased redness observed in right eye. Patient reports no visual deficits. Overall he reports no changes and is not able to recall any recent falls.     Primary AD: occasional SPC      Pain  Current pain ratin/10  Location: Shoulder    Social Support  Steps to enter house: 1 step  Stairs in house: 1 flight  Lives in: house  Lives with: wife    Employment status: Retired      Treatments  Previous treatment: PT      Objective   Gait abnormalities: slow gisela, shuffling and fenestrating gait, B/L knee flexion, reduced UE swing, freezing with turning          Outcome Measures Initial Eval  1/3/2024 PN  24 PN  3/4/24      5xSTS 20.5 sec w/ 1 UE 23.4 sec w/ Yue 17.2 sec w/ Yue  14.4 w/ airex      TUG  - Regular  - Cognitive  - Carry   23.9 sec  58.5 sec (unable to count)  30.8 sec (+ spillage)   29.4 sec  65.4 sec  43.3 sec (- spillage)   31.7 sec  32 sec  34.2 sec (- spillage)      MiniBEST /28        10 meter 0.48 m/s 0.53 m/s 0.52 m/s      6MWT  ft 300 ft in 4:36 460 ft       ABC %        PDQ-39 /100        MDS-UPDRS  Part III   /128        H&Y Stage         Floor > Stand  sec                        Precautions:   Past Medical History:   Diagnosis Date    Arthritis     Asbestos exposure     Asbestosis (HCC)     GERD (gastroesophageal reflux disease)     Hemoptysis 2022    Hypertension     Lung disease     Parkinson's disease

## 2024-03-04 NOTE — PROGRESS NOTES
"Occupational Therapy Daily Note:    Today's date: 3/4/2024  Patient name: Daniel Sanz Jr.  : 1940  MRN: [id number]  Referring provider: Jovanna Cavanaugh MD  Dx:   Encounter Diagnosis   Name Primary?    Parkinson's disease without dyskinesia, unspecified whether manifestations fluctuate Yes         Eval/Re-Eval POC Expires Auth #/ Referral # Total Visits Start Date Expiration Date Extension Info Visits Limitation                                                                 1 2 3 4 5 6   2/19 2/26 2/28 3/4     7 8 9 10 11 12           13 14 15 16 17 18           19 20 21 22 23 24           25 26 27 28 29 30             POC START DATE: 2024  POC END DATE: 2024  NEXT PN DUE: 3/19/2024  FREQUENCY: 2x/wk for 12 weeks    Subjective: \"I have a bit of a headache, but its not bad.\"    Objective:     Neuromuscular Re-Education -     Tremor Reduction  UBE retrograde & prograde  2 minutes prograde & 2 minutes retrograde    Fine Motor Coordination, Pincer Grasp  Tanagrams  Dycem  Completed: x 3  Accuracy:  60%  Required min vc  Time: 20 minutes    Fine Motor Coordination, Pincer Grasp, Hand Strength  Un/Screw Tool Board  L/R alternating hands  Completed: x 14  Time: 20 minutes    Assessment: Tolerated treatment well. Pt walked into the session with a bloodshot R eye and reported having a 5/10 headache in which his blood pressure was then taken. Pt's blood pressure when taken during thr session was 152/69 in which the pt indicated that being pretty normal. Due to that report, session was then continued after we tried to contact pt's wife t o discuss eyes but there was no answer. Pt was able to tolerate session with min vc, but pt's L eye then started to water in which rest break was provided. Once the pt's L eye stopped watering, the session progressed as tolerated. Patient would benefit from continued skilled OT.    Plan: Continued skilled OT per POC.    Treatment conducted by student supervised and " directed by Charlene Feliz MS, OTR/L, CSRS, ZACHARY, DCAT. I agree with all treatment methods performed during this session by ETHAN Thomas. Student was directly supervised by me during the entirety of session. Patient consent given to be treated by student.

## 2024-03-05 ENCOUNTER — TELEPHONE (OUTPATIENT)
Dept: HEMATOLOGY ONCOLOGY | Facility: CLINIC | Age: 84
End: 2024-03-05

## 2024-03-05 ENCOUNTER — APPOINTMENT (OUTPATIENT)
Dept: LAB | Facility: MEDICAL CENTER | Age: 84
End: 2024-03-05
Payer: COMMERCIAL

## 2024-03-05 DIAGNOSIS — R76.8 ELEVATED SERUM IMMUNOGLOBULIN FREE LIGHT CHAINS: ICD-10-CM

## 2024-03-05 DIAGNOSIS — D47.2 MGUS (MONOCLONAL GAMMOPATHY OF UNKNOWN SIGNIFICANCE): ICD-10-CM

## 2024-03-05 LAB
ALBUMIN SERPL BCP-MCNC: 3.9 G/DL (ref 3.5–5)
ALP SERPL-CCNC: 88 U/L (ref 34–104)
ALT SERPL W P-5'-P-CCNC: 4 U/L (ref 7–52)
ANION GAP SERPL CALCULATED.3IONS-SCNC: 10 MMOL/L
AST SERPL W P-5'-P-CCNC: 14 U/L (ref 13–39)
BASOPHILS # BLD AUTO: 0.03 THOUSANDS/ÂΜL (ref 0–0.1)
BASOPHILS NFR BLD AUTO: 0 % (ref 0–1)
BILIRUB SERPL-MCNC: 0.98 MG/DL (ref 0.2–1)
BUN SERPL-MCNC: 35 MG/DL (ref 5–25)
CALCIUM SERPL-MCNC: 8.9 MG/DL (ref 8.4–10.2)
CHLORIDE SERPL-SCNC: 103 MMOL/L (ref 96–108)
CO2 SERPL-SCNC: 27 MMOL/L (ref 21–32)
CREAT SERPL-MCNC: 1.22 MG/DL (ref 0.6–1.3)
EOSINOPHIL # BLD AUTO: 0.16 THOUSAND/ÂΜL (ref 0–0.61)
EOSINOPHIL NFR BLD AUTO: 2 % (ref 0–6)
ERYTHROCYTE [DISTWIDTH] IN BLOOD BY AUTOMATED COUNT: 12.8 % (ref 11.6–15.1)
GFR SERPL CREATININE-BSD FRML MDRD: 54 ML/MIN/1.73SQ M
GLUCOSE P FAST SERPL-MCNC: 81 MG/DL (ref 65–99)
HCT VFR BLD AUTO: 37.4 % (ref 36.5–49.3)
HGB BLD-MCNC: 11.9 G/DL (ref 12–17)
IGA SERPL-MCNC: 160 MG/DL (ref 66–433)
IGG SERPL-MCNC: 727 MG/DL (ref 635–1741)
IGM SERPL-MCNC: 119 MG/DL (ref 45–281)
IMM GRANULOCYTES # BLD AUTO: 0.04 THOUSAND/UL (ref 0–0.2)
IMM GRANULOCYTES NFR BLD AUTO: 1 % (ref 0–2)
LDH SERPL-CCNC: 184 U/L (ref 140–271)
LYMPHOCYTES # BLD AUTO: 1.33 THOUSANDS/ÂΜL (ref 0.6–4.47)
LYMPHOCYTES NFR BLD AUTO: 19 % (ref 14–44)
MCH RBC QN AUTO: 32.1 PG (ref 26.8–34.3)
MCHC RBC AUTO-ENTMCNC: 31.8 G/DL (ref 31.4–37.4)
MCV RBC AUTO: 101 FL (ref 82–98)
MONOCYTES # BLD AUTO: 0.72 THOUSAND/ÂΜL (ref 0.17–1.22)
MONOCYTES NFR BLD AUTO: 10 % (ref 4–12)
NEUTROPHILS # BLD AUTO: 4.89 THOUSANDS/ÂΜL (ref 1.85–7.62)
NEUTS SEG NFR BLD AUTO: 68 % (ref 43–75)
NRBC BLD AUTO-RTO: 0 /100 WBCS
PLATELET # BLD AUTO: 213 THOUSANDS/UL (ref 149–390)
PMV BLD AUTO: 10.3 FL (ref 8.9–12.7)
POTASSIUM SERPL-SCNC: 4.5 MMOL/L (ref 3.5–5.3)
PROT SERPL-MCNC: 6.3 G/DL (ref 6.4–8.4)
RBC # BLD AUTO: 3.71 MILLION/UL (ref 3.88–5.62)
SODIUM SERPL-SCNC: 140 MMOL/L (ref 135–147)
URATE SERPL-MCNC: 5.8 MG/DL (ref 3.5–8.5)
WBC # BLD AUTO: 7.17 THOUSAND/UL (ref 4.31–10.16)

## 2024-03-05 PROCEDURE — 85025 COMPLETE CBC W/AUTO DIFF WBC: CPT

## 2024-03-05 PROCEDURE — 84550 ASSAY OF BLOOD/URIC ACID: CPT

## 2024-03-05 PROCEDURE — 82232 ASSAY OF BETA-2 PROTEIN: CPT

## 2024-03-05 PROCEDURE — 83521 IG LIGHT CHAINS FREE EACH: CPT

## 2024-03-05 PROCEDURE — 84166 PROTEIN E-PHORESIS/URINE/CSF: CPT

## 2024-03-05 PROCEDURE — 36415 COLL VENOUS BLD VENIPUNCTURE: CPT

## 2024-03-05 PROCEDURE — 84165 PROTEIN E-PHORESIS SERUM: CPT

## 2024-03-05 PROCEDURE — 80053 COMPREHEN METABOLIC PANEL: CPT

## 2024-03-05 PROCEDURE — 82784 ASSAY IGA/IGD/IGG/IGM EACH: CPT

## 2024-03-05 PROCEDURE — 83615 LACTATE (LD) (LDH) ENZYME: CPT

## 2024-03-05 NOTE — TELEPHONE ENCOUNTER
Appointment Change  Cancel, Reschedule, Change to Virtual      Who are you speaking with? Spouse   If it is not the patient, is the caller listed on the communication consent form? Yes   Which provider is the appointment scheduled with? Dr. Crain   When was the original appointment scheduled?    Please list date and time 3/11/24 @ 11:40am   At which location is the appointment scheduled to take place? [place]   Was the appointment rescheduled?     Was the appointment changed from an in person visit to a virtual visit?    If so, please list the details of the change. Yes 3/15/24 @ 11:20am   What is the reason for the appointment change? Dr. Crain will be OOO       Was STAR transport scheduled? no   Does STAR transport need to be scheduled for the new visit (if applicable) no   Does the patient need an infusion appointment rescheduled? no   Does the patient have an upcoming infusion appointment scheduled? If so, when? no   Is the patient undergoing chemotherapy? no   For appointments cancelled with less than 24 hours:  Was the no-show policy reviewed? yes

## 2024-03-06 ENCOUNTER — OFFICE VISIT (OUTPATIENT)
Dept: OCCUPATIONAL THERAPY | Facility: MEDICAL CENTER | Age: 84
End: 2024-03-06
Payer: COMMERCIAL

## 2024-03-06 ENCOUNTER — OFFICE VISIT (OUTPATIENT)
Dept: PHYSICAL THERAPY | Facility: MEDICAL CENTER | Age: 84
End: 2024-03-06
Payer: COMMERCIAL

## 2024-03-06 DIAGNOSIS — G20.A1 PARKINSON'S DISEASE, UNSPECIFIED WHETHER DYSKINESIA PRESENT, UNSPECIFIED WHETHER MANIFESTATIONS FLUCTUATE: Primary | ICD-10-CM

## 2024-03-06 DIAGNOSIS — G20.A1 PARKINSON DISEASE: ICD-10-CM

## 2024-03-06 DIAGNOSIS — G20.A1 PARKINSON'S DISEASE WITHOUT DYSKINESIA, UNSPECIFIED WHETHER MANIFESTATIONS FLUCTUATE: Primary | ICD-10-CM

## 2024-03-06 LAB
ALBUMIN SERPL ELPH-MCNC: 3.68 G/DL (ref 3.2–5.1)
ALBUMIN SERPL ELPH-MCNC: 61.4 % (ref 48–70)
ALBUMIN UR ELPH-MCNC: 21.5 %
ALPHA1 GLOB MFR UR ELPH: 6.1 %
ALPHA1 GLOB SERPL ELPH-MCNC: 0.28 G/DL (ref 0.15–0.47)
ALPHA1 GLOB SERPL ELPH-MCNC: 4.6 % (ref 1.8–7)
ALPHA2 GLOB MFR UR ELPH: 29.3 %
ALPHA2 GLOB SERPL ELPH-MCNC: 0.73 G/DL (ref 0.42–1.04)
ALPHA2 GLOB SERPL ELPH-MCNC: 12.1 % (ref 5.9–14.9)
B-GLOBULIN MFR UR ELPH: 6.8 %
BETA GLOB ABNORMAL SERPL ELPH-MCNC: 0.35 G/DL (ref 0.31–0.57)
BETA1 GLOB SERPL ELPH-MCNC: 5.8 % (ref 4.7–7.7)
BETA2 GLOB SERPL ELPH-MCNC: 4.6 % (ref 3.1–7.9)
BETA2+GAMMA GLOB SERPL ELPH-MCNC: 0.28 G/DL (ref 0.2–0.58)
GAMMA GLOB ABNORMAL SERPL ELPH-MCNC: 0.69 G/DL (ref 0.4–1.66)
GAMMA GLOB MFR UR ELPH: 36.3 %
GAMMA GLOB SERPL ELPH-MCNC: 11.5 % (ref 6.9–22.3)
IGG/ALB SER: 1.59 {RATIO} (ref 1.1–1.8)
KAPPA LC FREE SER-MCNC: 330.6 MG/L (ref 3.3–19.4)
KAPPA LC FREE/LAMBDA FREE SER: 18.16 {RATIO} (ref 0.26–1.65)
LAMBDA LC FREE SERPL-MCNC: 18.2 MG/L (ref 5.7–26.3)
M PROTEIN 1 MFR SERPL ELPH: 4.3 %
M PROTEIN 1 SERPL ELPH-MCNC: 0.26 G/DL
M PROTEIN MFR UR ELPH: 1.6 MG/DL
M PROTEIN UR-MCNC: 9.3 %
PROT PATTERN SERPL ELPH-IMP: ABNORMAL
PROT PATTERN UR ELPH-IMP: NORMAL
PROT SERPL-MCNC: 6 G/DL (ref 6.4–8.2)
PROT UR-MCNC: 17.3 MG/DL

## 2024-03-06 PROCEDURE — 97112 NEUROMUSCULAR REEDUCATION: CPT

## 2024-03-06 PROCEDURE — 84165 PROTEIN E-PHORESIS SERUM: CPT | Performed by: STUDENT IN AN ORGANIZED HEALTH CARE EDUCATION/TRAINING PROGRAM

## 2024-03-06 PROCEDURE — 84166 PROTEIN E-PHORESIS/URINE/CSF: CPT | Performed by: STUDENT IN AN ORGANIZED HEALTH CARE EDUCATION/TRAINING PROGRAM

## 2024-03-06 PROCEDURE — 97112 NEUROMUSCULAR REEDUCATION: CPT | Performed by: PHYSICAL THERAPIST

## 2024-03-06 NOTE — PROGRESS NOTES
"Daily Note   POC expires Auth Status Total     Start date  Expiration date PT/OT + Visit Limit? Co-Insurance   3/27/24                                                 Visit/Unit Tracking  AUTH Status:  Date 2/28 3/4 3/6              Authed:  Used 13  15             Remaining                  Today's date: 3/6/2024  Patient name: Daniel Sanz Jr. \"Alfonzo\"  : 1940  MRN: [id number]  Referring provider: Jovanna Cavanaugh MD  Dx:   Encounter Diagnosis     ICD-10-CM    1. Parkinson's disease, unspecified whether dyskinesia present, unspecified whether manifestations fluctuate  G20.A1                     Subjective: Patient arrives to treatment without AD, reports no changes since his last visit      Objective: See treatment diary below    NMR:  - Floor to ceiling: 10x 10 sec holds  - Side to side: 10x 10 sec holds, 2 sets  - FWD hurdles: 8 laps x 6\" hurdles  - FWD step up: 20x 6\" step  - Cone serpentine: 5 laps x 4 cones  - Sidesteppin laps x 8 ft  - STS: 14x 2 sets w/ airex pad      TA:  - HEP: Floor to ceiling, side to side, STS      Assessment: Patient tolerated treatment fairly. Patient requires 1 step commands and PT assist to maintain balance and prevent LOB while maintaining appropriate posture. When navigating hurdles patient able to maintain balance with 0-1 UE support, displaying reduced foot clearance as fatigue increases. Cone serpentine added today with increased shuffling observed when navigating around cones. Patient will benefit from skilled PT services to reduce his risk of falls and maximize his level of safety and independence.      Plan: Continue per plan of care.      Outcome Measures Initial Eval  1/3/2024 PN  24 PN  3/4/24      5xSTS 20.5 sec w/ 1 UE 23.4 sec w/ Yue 17.2 sec w/ Yue  14.4 w/ airex      TUG  - Regular  - Cognitive  - Carry   23.9 sec  58.5 sec (unable to count)  30.8 sec (+ spillage)   29.4 sec  65.4 sec  43.3 sec (- spillage)   31.7 sec  32 sec  34.2 sec (- " spillage)      MiniBEST /28        10 meter 0.48 m/s 0.53 m/s 0.52 m/s      6MWT  ft 300 ft in 4:36 460 ft       ABC %        PDQ-39 /100        MDS-UPDRS  Part III   /128        H&Y Stage         Floor > Stand  sec                         Precautions:   Past Medical History:   Diagnosis Date    Arthritis     Asbestos exposure     Asbestosis (HCC)     GERD (gastroesophageal reflux disease)     Hemoptysis 5/11/2022    Hypertension     Lung disease     Parkinson's disease

## 2024-03-06 NOTE — PROGRESS NOTES
"Occupational Therapy Daily Note:    Today's date: 3/6/2024  Patient name: Daniel Sanz Jr.  : 1940  MRN: [id number]  Referring provider: Jovanna Cavanaugh MD  Dx:   Encounter Diagnosis   Name Primary?    Parkinson's disease without dyskinesia, unspecified whether manifestations fluctuate Yes           Eval/Re-Eval POC Expires Auth #/ Referral # Total Visits Start Date Expiration Date Extension Info Visits Limitation                                                                 1 2 3 4 5 6   2/19 2/26 2/28 3/4 3/6    7 8 9 10 11 12           13 14 15 16 17 18           19 20 21 22 23 24           25 26 27 28 29 30             POC START DATE: 2024  POC END DATE: 2024  NEXT PN DUE: 3/19/2024  FREQUENCY: 2x/wk for 12 weeks    Subjective: \"I am feeling pretty good today.\"    Objective:     Neuromuscular Re-Education -     Tremor Reduction  UBE retrograde & prograde  2 minutes prograde & 2 minutes retrograde    Fine Motor Coordination, Pincer Grasp  Wooden GeoPeg Board  L Handiweight = 1 lb  Completed: x 3  Accuracy:  75%  Required mod vc & visual  Time:  minutes    Assessment: Tolerated treatment well. Pt demonstrated increased tremors when trying to use pincer grasp with L/R. Pt was able to complete task when provided time to complete, but with the inclusion of min with the first two completed. Once the challenge increased for the GeoPeg activity, pt required max vc to complete.  Patient would benefit from continued skilled OT.    Plan: Continued skilled OT per POC.    Treatment conducted by student supervised and directed by Charlene Feliz MS, OTR/L, CSRS, ZACHARY, DCAT. I agree with all treatment methods performed during this session by ETHAN Thomas. Student was directly supervised by me during the entirety of session. Patient consent given to be treated by student.   "

## 2024-03-07 LAB — B2 MICROGLOB SERPL-MCNC: 3 MG/L (ref 0.6–2.4)

## 2024-03-11 ENCOUNTER — APPOINTMENT (OUTPATIENT)
Dept: PHYSICAL THERAPY | Facility: MEDICAL CENTER | Age: 84
End: 2024-03-11
Payer: COMMERCIAL

## 2024-03-13 ENCOUNTER — APPOINTMENT (OUTPATIENT)
Dept: OCCUPATIONAL THERAPY | Facility: MEDICAL CENTER | Age: 84
End: 2024-03-13
Payer: COMMERCIAL

## 2024-03-13 ENCOUNTER — APPOINTMENT (OUTPATIENT)
Dept: PHYSICAL THERAPY | Facility: MEDICAL CENTER | Age: 84
End: 2024-03-13
Payer: COMMERCIAL

## 2024-03-14 ENCOUNTER — FOLLOW UP (OUTPATIENT)
Dept: URBAN - METROPOLITAN AREA CLINIC 6 | Facility: CLINIC | Age: 84
End: 2024-03-14

## 2024-03-14 ENCOUNTER — TELEPHONE (OUTPATIENT)
Dept: NEUROLOGY | Facility: CLINIC | Age: 84
End: 2024-03-14

## 2024-03-14 DIAGNOSIS — H25.811: ICD-10-CM

## 2024-03-14 DIAGNOSIS — Z96.1: ICD-10-CM

## 2024-03-14 DIAGNOSIS — H04.123: ICD-10-CM

## 2024-03-14 PROCEDURE — 92014 COMPRE OPH EXAM EST PT 1/>: CPT

## 2024-03-14 ASSESSMENT — KERATOMETRY
OD_AXISANGLE2_DEGREES: 43
OS_K1POWER_DIOPTERS: 41.25
OS_AXISANGLE2_DEGREES: 147
OS_AXISANGLE_DEGREES: 057
OD_AXISANGLE_DEGREES: 133
OD_K1POWER_DIOPTERS: 42.25
OS_K2POWER_DIOPTERS: 43.00
OD_K2POWER_DIOPTERS: 42.75

## 2024-03-14 ASSESSMENT — TONOMETRY
OS_IOP_MMHG: 7
OD_IOP_MMHG: 14

## 2024-03-14 ASSESSMENT — VISUAL ACUITY
OD_SC: 20/100
OS_SC: 20/80

## 2024-03-15 ENCOUNTER — OFFICE VISIT (OUTPATIENT)
Dept: HEMATOLOGY ONCOLOGY | Facility: CLINIC | Age: 84
End: 2024-03-15
Payer: COMMERCIAL

## 2024-03-15 VITALS
HEIGHT: 70 IN | TEMPERATURE: 98 F | BODY MASS INDEX: 29.78 KG/M2 | OXYGEN SATURATION: 98 % | DIASTOLIC BLOOD PRESSURE: 64 MMHG | WEIGHT: 208 LBS | HEART RATE: 73 BPM | RESPIRATION RATE: 17 BRPM | SYSTOLIC BLOOD PRESSURE: 122 MMHG

## 2024-03-15 DIAGNOSIS — J61 PULMONARY ASBESTOSIS (HCC): ICD-10-CM

## 2024-03-15 DIAGNOSIS — G20.A1 PARKINSON'S DISEASE, UNSPECIFIED WHETHER DYSKINESIA PRESENT, UNSPECIFIED WHETHER MANIFESTATIONS FLUCTUATE: ICD-10-CM

## 2024-03-15 DIAGNOSIS — N28.9 RENAL INSUFFICIENCY: ICD-10-CM

## 2024-03-15 DIAGNOSIS — D47.2 MGUS (MONOCLONAL GAMMOPATHY OF UNKNOWN SIGNIFICANCE): Primary | ICD-10-CM

## 2024-03-15 DIAGNOSIS — R76.8 ELEVATED SERUM IMMUNOGLOBULIN FREE LIGHT CHAINS: ICD-10-CM

## 2024-03-15 DIAGNOSIS — N18.31 STAGE 3A CHRONIC KIDNEY DISEASE (HCC): ICD-10-CM

## 2024-03-15 PROCEDURE — 99214 OFFICE O/P EST MOD 30 MIN: CPT | Performed by: INTERNAL MEDICINE

## 2024-03-15 PROCEDURE — G2211 COMPLEX E/M VISIT ADD ON: HCPCS | Performed by: INTERNAL MEDICINE

## 2024-03-15 RX ORDER — AMLODIPINE BESYLATE 5 MG/1
5 TABLET ORAL DAILY
COMMUNITY
Start: 2024-03-07 | End: 2025-03-07

## 2024-03-15 NOTE — PROGRESS NOTES
"  HPI: Continuation of care for IgG kappa monoclonal gammopathy with a small and stable spike up to 260 mg.  There is also a small spike in the urine on UPEP-unusual for  MGUS.  These gammopathy parameters are being monitored and there has not been any significant change.  He is not symptomatic from this problem.  He is symptomatic from Parkinson's disease and has stiffness and gait problem and walks very slowly that too with assistance he follows with his neurologist.   Patient  has history of asbestosis and he follows with Pulmonary service for that.  Patient was a .  He has exertional dyspnea.    Has generalized weakness and tiredness and stiffness.  History of arthritis.  History of carpal tunnel.   Occasionally tingling in his hands and feet and they get cold.  Has nocturia      ROS:  03/17/24 Reviewed 12 systems: See symptoms in HPI    Presently no other neurological, cardiac, pulmonary, GI and  symptoms other than listed in HPI.  Other symptoms are in HPI.  No symptoms like fever, chills, bleeding, bone pains, skin rash, weight loss, night sweats,   Claudication.  No swelling of the ankles. No swollen glands.  Patient is anxious.     Physical Exam:     3/15/24 1/3/24 11/24/23   Blood Pressure 122/64 156/84 140/70   Pulse 73 -- 80   Respirations 17 -- --   Temperature 98 °F (36.7 °C) -- --   SpO2 98 % -- --   Weight - Scale 94.3 kg (208 lb) -- 89.8 kg (198 lb)   Height 5' 10\" (1.778 m) -- 5' 10\" (1.778 m)     Patient is alert and oriented.  He answers questions slowly and takes his time.  Patient is not in distress.  Vitals are above.  There is no icterus.  There is no oral thrush.  There is no palpable neck mass.  Clear lung fields.  Regular heart rate.  Systolic murmur.  No palpable abdominal mass.  Soft and nontender abdomen.  There is no ascites.  There is no edema of the ankles.  There is no calf tenderness.  Patient has some rigidity, he ambulates slowly, no tremors, no skin rash, no " palpable lymphadenopathy in the neck and axillary areas,  no clubbing.   Patient is anxious.  Performance status 2.    Historical Information   Past Medical History:   Diagnosis Date   • Arthritis    • Asbestos exposure    • Asbestosis (HCC)    • GERD (gastroesophageal reflux disease)    • Hemoptysis 5/11/2022   • Hypertension    • Lung disease    • Parkinson's disease      Past Surgical History:   Procedure Laterality Date   • HERNIA REPAIR     • INTRACAPSULAR CATARACT EXTRACTION Left    • KNEE SURGERY      many years ago   • SHOULDER SURGERY       Social History   Social History     Substance and Sexual Activity   Alcohol Use Not Currently     Social History     Substance and Sexual Activity   Drug Use Never     Social History     Tobacco Use   Smoking Status Never   • Passive exposure: Past   Smokeless Tobacco Former   • Types: Chew   • Quit date: 1998     Family History:   Family History   Problem Relation Age of Onset   • Stroke Mother    • Colon cancer Mother    • Depression Sister    • Hypertension Sister          Current Outpatient Medications:   •  amLODIPine (NORVASC) 5 mg tablet, Take 5 mg by mouth daily, Disp: , Rfl:   •  carbidopa-levodopa (Sinemet)  mg per tablet, Take 2 tablets by mouth 3 (three) times a day, Disp: 540 tablet, Rfl: 3  •  ketorolac (ACULAR) 0.5 % ophthalmic solution, , Disp: , Rfl:   •  prednisoLONE acetate (PRED FORTE) 1 % ophthalmic suspension, , Disp: , Rfl:   •  predniSONE 10 mg tablet, Take 10 mg by mouth 2 (two) times a day, Disp: , Rfl:     Allergies   Allergen Reactions   • Morphine And Related Other (See Comments)     Patient gets delirious             Lab Results: I have reviewed all pertinent labs.  LABS:      Results for orders placed or performed in visit on 03/05/24   CBC and differential   Result Value Ref Range    WBC 7.17 4.31 - 10.16 Thousand/uL    RBC 3.71 (L) 3.88 - 5.62 Million/uL    Hemoglobin 11.9 (L) 12.0 - 17.0 g/dL    Hematocrit 37.4 36.5 - 49.3 %    MCV  101 (H) 82 - 98 fL    MCH 32.1 26.8 - 34.3 pg    MCHC 31.8 31.4 - 37.4 g/dL    RDW 12.8 11.6 - 15.1 %    MPV 10.3 8.9 - 12.7 fL    Platelets 213 149 - 390 Thousands/uL    nRBC 0 /100 WBCs    Neutrophils Relative 68 43 - 75 %    Immature Grans % 1 0 - 2 %    Lymphocytes Relative 19 14 - 44 %    Monocytes Relative 10 4 - 12 %    Eosinophils Relative 2 0 - 6 %    Basophils Relative 0 0 - 1 %    Neutrophils Absolute 4.89 1.85 - 7.62 Thousands/µL    Absolute Immature Grans 0.04 0.00 - 0.20 Thousand/uL    Absolute Lymphocytes 1.33 0.60 - 4.47 Thousands/µL    Absolute Monocytes 0.72 0.17 - 1.22 Thousand/µL    Eosinophils Absolute 0.16 0.00 - 0.61 Thousand/µL    Basophils Absolute 0.03 0.00 - 0.10 Thousands/µL   Comprehensive metabolic panel   Result Value Ref Range    Sodium 140 135 - 147 mmol/L    Potassium 4.5 3.5 - 5.3 mmol/L    Chloride 103 96 - 108 mmol/L    CO2 27 21 - 32 mmol/L    ANION GAP 10 mmol/L    BUN 35 (H) 5 - 25 mg/dL    Creatinine 1.22 0.60 - 1.30 mg/dL    Glucose, Fasting 81 65 - 99 mg/dL    Calcium 8.9 8.4 - 10.2 mg/dL    AST 14 13 - 39 U/L    ALT 4 (L) 7 - 52 U/L    Alkaline Phosphatase 88 34 - 104 U/L    Total Protein 6.3 (L) 6.4 - 8.4 g/dL    Albumin 3.9 3.5 - 5.0 g/dL    Total Bilirubin 0.98 0.20 - 1.00 mg/dL    eGFR 54 ml/min/1.73sq m   Beta 2 microglobulin, serum   Result Value Ref Range    Beta-2 Microglobulin 3.0 (H) 0.6 - 2.4 mg/L   IgG, IgA, IgM   Result Value Ref Range     66 - 433 mg/dL     635 - 1,741 mg/dL     45 - 281 mg/dL   Immunoglobulin free LT chains blood   Result Value Ref Range    Ig Kappa Free Light Chain 330.6 (H) 3.3 - 19.4 mg/L    Ig Lambda Free Light Chain 18.2 5.7 - 26.3 mg/L    Kappa/Lambda FluidC Ratio 18.16 (H) 0.26 - 1.65   LD,Blood   Result Value Ref Range     140 - 271 U/L   Protein electrophoresis, serum   Result Value Ref Range    A/G Ratio 1.59 1.10 - 1.80    Albumin Electrophoresis 61.4 48.0 - 70.0 %    Albumin CONC 3.68 3.20 - 5.10 g/dl     Alpha 1 4.6 1.8 - 7.0 %    ALPHA 1 CONC 0.28 0.15 - 0.47 g/dL    Alpha 2 12.1 5.9 - 14.9 %    ALPHA 2 CONC 0.73 0.42 - 1.04 g/dL    Beta-1 5.8 4.7 - 7.7 %    BETA 1 CONC 0.35 0.31 - 0.57 g/dL    Beta-2 4.6 3.1 - 7.9 %    BETA 2 CONC 0.28 0.20 - 0.58 g/dL    Gamma Globulin 11.5 6.9 - 22.3 %    GAMMA CONC 0.69 0.40 - 1.66 g/dL    M Peak ID 1 4.30 %    M PEAK 1 CONC 0.26 g/dL    Total Protein 6.0 (L) 6.4 - 8.2 g/dL    SPEP Interpretation See Comment    Protein electrophoresis, urine   Result Value Ref Range    Urine Protein 17.3 mg/dL    Albumin ELP, Urine 21.5 %    Alpha 1, Urine 6.1 %    Alpha 2, Urine 29.3 %    Beta, Urine 6.8 %    Gamma Globulin, Urine 36.3 %    M Peak ID 1, Ur 9.3 %    M-PEAK 1 PC, URINE 1.60 mg/dL    UPEP Interp See Comment    Uric acid   Result Value Ref Range    Uric Acid 5.8 3.5 - 8.5 mg/dL         Imaging Studies: I have personally reviewed pertinent reports.      OSSEOUS STRUCTURES:  No acute fracture or destructive osseous lesion.  Degenerative changes of thoracic spine.     IMPRESSION:        1.  No CT evidence of interstitial lung disease.  No active pulmonary disease.     2.  Stable bilateral pleural plaques, in keeping with asbestosis related pleural disease.  No evidence of parenchymal asbestosis.                 Workstation performed: [id number]          Imaging    CT chest high resolution (Order: [id number]) - 5/11/2022      MPRESSION:     WHOLE BODY LOW DOSE CT FOR EVALUATION OF MULTIPLE MYELOMA:     OSTEOLYTIC LESIONS: None.  PROBABLY MALIGNANT VERTEBRAL FRACTURE: None.  SUSPICIOUS PERIPHERAL MEDULLARY PATTERN: No.                          Workstation performed: [id number]        Imaging    CT low dose whole body myeloma scan (Order: [id number]) - 2/29/2024            Pathology, and Other Studies: I have personally reviewed pertinent reports.      Reviewed above test results pertaining to hematological condition and discussed with patient and his wife    Assessment and Plan:  See  diagnoses, orders instructions below    Continuation of care for IgG kappa monoclonal gammopathy with a small and stable spike up to 260 mg.  There is also a small spike in the urine on UPEP-unusual for  MGUS.  These gammopathy parameters are being monitored and there has not been any significant change.  He is not symptomatic from this problem.  He is symptomatic from Parkinson's disease and has stiffness and gait problem and walks very slowly that too with assistance he follows with his neurologist.   Patient  has history of asbestosis and he follows with Pulmonary service for that.  Patient was a .  He has exertional dyspnea.    Has generalized weakness and tiredness and stiffness.  History of arthritis.  History of carpal tunnel.   Occasionally tingling in his hands and feet and they get cold.  Has nocturia      Physical examination and test results are as recorded and discussed.  IgG kappa spike is mild and that is being monitored.  There is some increase in free light chain ratio and creatinine but he does not want to see a nephrologist or urologist.  Also he does not want to bone marrow test. Diet and activities per primary physician and neurologist.  Patient to avoid falls and trauma and hazardous places  Patient needs assistance in his care.  Plan for MGUS is surveillance.  All discussed in detail.  Questions answered.  Discussed precautions against COVID virus and other infections.    1. MGUS (monoclonal gammopathy of unknown significance)    - CBC and differential; Future  - Comprehensive metabolic panel; Future  - IgG, IgA, IgM; Future  - Immunoglobulin free LT chains blood; Future  - Protein electrophoresis, serum; Future  - Uric acid; Future  - Beta 2 microglobulin, serum; Future    2. Elevated serum immunoglobulin free light chains    - CBC and differential; Future  - Comprehensive metabolic panel; Future  - IgG, IgA, IgM; Future  - Immunoglobulin free LT chains blood; Future  - Protein  electrophoresis, serum; Future  - Uric acid; Future  - Beta 2 microglobulin, serum; Future    3. Parkinson's disease, unspecified whether dyskinesia present, unspecified whether manifestations fluctuate      4. Pulmonary asbestosis (HCC)      5. Renal insufficiency      6. Stage 3a chronic kidney disease (HCC)    Blood work prior to next visit in 6 months.     Patient will continue to follow with  primary physician and other consultants.    Patient and wife voiced understanding and agrees     This note has been generated by voice recognition software.  Therefore there maybe spelling, grammar, and other errors.  Please contact if questions arise.    Counseling / Coordination of Care  ..  Provided counseling and support   .

## 2024-03-18 ENCOUNTER — OFFICE VISIT (OUTPATIENT)
Dept: NEUROLOGY | Facility: CLINIC | Age: 84
End: 2024-03-18
Payer: COMMERCIAL

## 2024-03-18 VITALS
BODY MASS INDEX: 29.84 KG/M2 | HEART RATE: 88 BPM | SYSTOLIC BLOOD PRESSURE: 160 MMHG | WEIGHT: 208 LBS | DIASTOLIC BLOOD PRESSURE: 78 MMHG

## 2024-03-18 DIAGNOSIS — G20.A1 PARKINSON DISEASE: ICD-10-CM

## 2024-03-18 PROCEDURE — 99214 OFFICE O/P EST MOD 30 MIN: CPT | Performed by: STUDENT IN AN ORGANIZED HEALTH CARE EDUCATION/TRAINING PROGRAM

## 2024-03-18 NOTE — PROGRESS NOTES
[facility]'s Neurology Consult  PATIENT:  Daniel Sanz Jr.  MRN:  [id number]  :  1940  DATE OF SERVICE:  3/18/2024  REFERRED BY: No ref. provider found  PMD: Charlie Martinez DO    Assessment/Plan:     Daniel Sanz Jr. is a very pleasant 83 y.o. male with a past medical history that includes MGUS who presents for f/u of PD and neuropathy.     Neuropathy  Noted on EMG. Workup to date notable for MGUS, following with hematology who are monitoring for now.  He does have diminished sensation to pinprick, temperature and vibration distally and is currently complaining of neuropathic pain in his fingers and toes. Discussed starting gabapentin, but pt does not want to take another medication at this time     MGUS (monoclonal gammopathy of unknown significance)  Currently following with hematology who is monitoring labs     Parkinson's disease   Visual hallucinations  delirium  Moderate bradykinesia bilaterally R>L. Increased tone with cogwheeling throughout L>R, intermittent LUE rest tremor noted today, shuffling gait present. Will increase up to 2.5 tabs sinemet 25/100 three times a day. Will plan to continue PT. Advised continuation of melatonin 5 mg before bed, limiting daytime naps, and mentally/physically stimulating activities during the day    CC:   PD and neuropathy    History of Present Illness:     Daniel Sanz is a pleasant 82 yo male seen in follow up for PD. He is accompanied by his wife today.     Interval hx  Since last visit, amantadine d/c due to concern for worsening hallucinations while taking the medication. Symptoms have appeared to have progressed somewhat since his last visit, although he does report doing exceptionally well lately with PT. Pt/wife unsure if he has been demonstrating any wear-off effect of sinemet. His wife is concerned that his BP has been slightly increased since his sinemet was changed to a different .     Previous Hx:  Per chart review, first seen for  consultation in 2018 in which he had noted 2-3 year history of weakness, stiffness, and moving slower. Noted worsening tremors over this period of time. He was started on sinemet with improvement of symptoms. Amantadine later added. He did have issues with leg swelling with higher doses of sinemet.      Last office visit 3/2022 in which PD was stable. Labs for neuropathy were ordered given EMG findings. he was referred to hematology for abnormal spep.      Current Medications:  sinemet 25/100 mg 1.5 tabs  8 am, 1 pm and 1 tab 8 PM  amantadine 100 mg QD     Interval History:   He feels his walking and balance are good, he tripped off his  but no other falls. He will need a chair with arms to get up.  Feels tremor is about the same, benefit from amantadine.  No trouble swallowing.  He sleeps well, He has occasional  talking in his sleep, he used to have more acting out of his dreams but has improved recently.  No hallucinations.   No dizziness.   He has some occasional drooling.  His wife can sometimes have a hard time hearing him sometimes.    No memory concerns.   He denies any numbness or tingling in his feet, he has occasional numbness/tingling in his hands b/l entire hand, has known carpal tunnel.   He is looking into stay strong program at [facility].    Performs ADls independent, needs help putting on pants.      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   He was admitted to [facility] for abnormal blood cultures, however repeat studies were normal, likely contamination. He did have BONIFACIO that was treated with fluids. He did have f/u with pulmonary due to hemoptysis that has since resolved. HX of asbestos exposure.      prior work up:  labs 3/2022: tsh 2.45, a1c 5.1, b12 370, folate 11.7, spep monoclonal peak in gamma region, immunofixation-monoclonal gammopathy identified as IgG kappa, heavy metals screen normal except arsenic elevated at 15     EMG 9/2021 b/l UE: This is an abnormal EMG of the  bilateral upper extremities due to changes consistent with a localized neuropathic process involving the median nerve at the wrist bilaterally consistent with moderate carpal tunnel syndrome with demyelinative change.  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    Last visit  Wife states that she has noticed motor improvement since he started physical therapy. He is currently taking sinemet 25/100 1.5 tab at 8 am, 1.5 tab 1 pm, 1 tab at 8 pm and amantadine 100 mg daily at 5 pm. States that PD symptoms have essentially been stable since his last visit in June.  Wife states that RBD has improved since he was started on sinemet.     Describes achy, burning pain in his fingers and toes but is not interested in starting gabapentin at this time. Hx CTS for which he is not interested in surgery for at this time.     Past Medical History:     Past Medical History:   Diagnosis Date    Arthritis     Asbestos exposure     Asbestosis ([facility])     GERD (gastroesophageal reflux disease)     Hemoptysis 5/11/2022    Hypertension     Lung disease     Parkinson's disease        Patient Active Problem List   Diagnosis    Chronic right shoulder pain    Internal derangement of right shoulder    Tear of right supraspinatus tendon    Parkinson's disease    MGUS (monoclonal gammopathy of unknown significance)    Neuropathy    Pleural plaque with presence of asbestos    Allergic rhinitis with postnasal drip    Shortness of breath    Elevated serum immunoglobulin free light chains    Stage 3a chronic kidney disease (HCC)       Medications:      Current Outpatient Medications   Medication Sig Dispense Refill    amLODIPine (NORVASC) 5 mg  tablet Take 5 mg by mouth daily      carbidopa-levodopa (Sinemet)  mg per tablet Take 2 tablets by mouth 3 (three) times a day 540 tablet 3    ketorolac (ACULAR) 0.5 % ophthalmic solution       prednisoLONE acetate (PRED FORTE) 1 % ophthalmic suspension       predniSONE 10 mg tablet Take 10 mg by mouth 2 (two) times a day       No current facility-administered medications for this visit.        Allergies:      Allergies   Allergen Reactions    Morphine And Related Other (See Comments)     Patient gets delirious       Family History:     Family History   Problem Relation Age of Onset    Stroke Mother     Colon cancer Mother     Depression Sister     Hypertension Sister        Social History:       Social History     Socioeconomic History    Marital status: /Civil Union     Spouse name: Not on file    Number of children: Not on file    Years of education: Not on file    Highest education level: Not on file   Occupational History    Not on file   Tobacco Use    Smoking status: Never     Passive exposure: Past    Smokeless tobacco: Former     Types: Chew     Quit date: 1998   Vaping Use    Vaping status: Never Used   Substance and Sexual Activity    Alcohol use: Not Currently    Drug use: Never    Sexual activity: Not on file   Other Topics Concern    Not on file   Social History Narrative    Not on file     Social Determinants of Health     Financial Resource Strain: Not on file   Food Insecurity: No Food Insecurity (5/12/2022)    Hunger Vital Sign     Worried About Running Out of Food in the Last Year: Never true     Ran Out of Food in the Last Year: Never true   Transportation Needs: No Transportation Needs (5/12/2022)    PRAPARE - Transportation     Lack of Transportation (Medical): No     Lack of Transportation (Non-Medical): No   Physical Activity: Not on file   Stress: Not on file   Social Connections: Not on file   Intimate Partner Violence: Not on file   Housing Stability: Low Risk  (5/12/2022)     Housing Stability Vital Sign     Unable to Pay for Housing in the Last Year: No     Number of Places Lived in the Last Year: 1     Unstable Housing in the Last Year: No         Objective:   /78 (BP Location: Right arm, Patient Position: Sitting, Cuff Size: Large)   Pulse 88   Wt 94.3 kg (208 lb)   BMI 29.84 kg/m²     General: Patient is not in any acute/apparent distress, well nourished, well developed and cooperative.   HEENT: normocephalic, atraumatic, moist membranes  Neck: supple  Extremities: no edema noted   Skin: no lesions or rash  Musculosketal: no bony abnormalities    Neurologic Examination:   Mental status: alert, awake, oriented X 3 and following commands.     Speech/Language: Speech is fluent without any dysarthria, no aphasia noted, can name, comprehension intact    Cranial Nerves:   CN I: smell not tested  CN II: Visual fields full to confrontation  CN III, IV, VI: Extraocular movements intact bilaterally. Pupils equal round and reactive to light bilaterally.  CN V: Facial sensation is normal.  CN VII: Full and symmetric facial movement.  CN VIII: Hearing is normal.  CN IX, X: Palate elevates symmetrically.   CN XI: Shoulder shrug strength is normal.  CN XII: Tongue midline without atrophy or fasciculations.    Motor:   Strength 5/5 in all 4 extremities. Moderate bradykinesia bilaterally R>L. RUE cogwheeling, LUE mild cogwheeling. Increased tone noted in LLE as well. Rest tremor seen briefly in LUE only after walking.     Sensory:   Sensation intact to soft touch, vibration and pinprick in all 4 extremities.    Cerebellar:   Finger-to-nose intact, normal heel to shin.    Reflexes: 2+ in all 4 extremities  Pathologic reflexes - babinski reflex negative, Hoffmans reflex - negative    Gait:   Stooped posture. Short stride with shuffling gait, 5 steps to make turns. Decreased arm swing       Review of Systems:     ROS:    Review of Systems   Constitutional:  Negative for appetite change, fatigue  and fever.   HENT: Negative.  Negative for hearing loss, tinnitus, trouble swallowing and voice change.    Eyes: Negative.  Negative for photophobia, pain and visual disturbance.   Respiratory: Negative.  Negative for shortness of breath.    Cardiovascular: Negative.  Negative for palpitations.   Gastrointestinal: Negative.  Negative for nausea and vomiting.   Endocrine: Negative.  Negative for cold intolerance.   Genitourinary: Negative.  Negative for dysuria, frequency and urgency.   Musculoskeletal:  Negative for back pain, gait problem, myalgias, neck pain and neck stiffness.   Skin: Negative.  Negative for rash.   Allergic/Immunologic: Negative.    Neurological: Negative.  Negative for dizziness, tremors, seizures, syncope, facial asymmetry, speech difficulty, weakness, light-headedness, numbness and headaches.   Hematological: Negative.  Does not bruise/bleed easily.   Psychiatric/Behavioral: Negative.  Negative for confusion, hallucinations and sleep disturbance.      I have spent a total time of 37 minutes on 03/18/24 in caring for this patient including Risks and benefits of tx options, Instructions for management, Patient and family education, Risk factor reductions, Impressions, Documenting in the medical record, Reviewing / ordering tests, medicine, procedures  , and Obtaining or reviewing history  .

## 2024-03-20 ENCOUNTER — OFFICE VISIT (OUTPATIENT)
Dept: PHYSICAL THERAPY | Facility: MEDICAL CENTER | Age: 84
End: 2024-03-20
Payer: COMMERCIAL

## 2024-03-20 ENCOUNTER — EVALUATION (OUTPATIENT)
Dept: OCCUPATIONAL THERAPY | Facility: MEDICAL CENTER | Age: 84
End: 2024-03-20
Payer: COMMERCIAL

## 2024-03-20 DIAGNOSIS — G20.A1 PARKINSON'S DISEASE WITHOUT DYSKINESIA, UNSPECIFIED WHETHER MANIFESTATIONS FLUCTUATE: Primary | ICD-10-CM

## 2024-03-20 DIAGNOSIS — G20.A1 PARKINSON'S DISEASE, UNSPECIFIED WHETHER DYSKINESIA PRESENT, UNSPECIFIED WHETHER MANIFESTATIONS FLUCTUATE: Primary | ICD-10-CM

## 2024-03-20 PROCEDURE — 97112 NEUROMUSCULAR REEDUCATION: CPT

## 2024-03-20 PROCEDURE — 97112 NEUROMUSCULAR REEDUCATION: CPT | Performed by: PHYSICAL THERAPIST

## 2024-03-20 NOTE — PROGRESS NOTES
"Daily Note   POC expires Auth Status Total     Start date  Expiration date PT/OT + Visit Limit? Co-Insurance   3/27/24                                                 Visit/Unit Tracking  AUTH Status:  Date 2/28 3/4 3/6 3/20             Authed:  Used 13 14 15 16            Remaining                  Today's date: 3/20/2024  Patient name: Daniel Sanz Jr. \"Alfonzo\"  : 1940  MRN: [id number]  Referring provider: Jovanna Cavanaugh MD  Dx:   Encounter Diagnosis     ICD-10-CM    1. Parkinson's disease, unspecified whether dyskinesia present, unspecified whether manifestations fluctuate  G20.A1                     Subjective: Patient arrives to treatment without AD, reports no changes since his last visit      Objective: See treatment diary below    NMR:  - Floor to ceiling: 10x 10 sec holds  - Side to side: 10x 10 sec holds, 2 sets  - FWD hurdles: 8 laps x 6\" hurdles  - Pickup/place squigz: 10x 2 sets  - LAT karen: 8 laps x 6\" hurdles  - STS: 12x 2 sets w/ airex pad      TA:  - HEP: Floor to ceiling, side to side, STS      Assessment: Patient tolerated treatment fairly. Patient requires 1 step commands and PT assist to maintain balance and prevent LOB while maintaining appropriate posture. Squigz added today to challenge patient balance when standing and with turning/stepping, completing 10 reps with minimal LOB and no instances of freezing. Patient will benefit from skilled PT services to reduce his risk of falls and maximize his level of safety and independence.      Plan: Continue per plan of care.      Outcome Measures Initial Eval  1/3/2024 PN  24 PN  3/4/24      5xSTS 20.5 sec w/ 1 UE 23.4 sec w/ Yue 17.2 sec w/ Yue  14.4 w/ airex      TUG  - Regular  - Cognitive  - Carry   23.9 sec  58.5 sec (unable to count)  30.8 sec (+ spillage)   29.4 sec  65.4 sec  43.3 sec (- spillage)   31.7 sec  32 sec  34.2 sec (- spillage)      MiniBEST /28        10 meter 0.48 m/s 0.53 m/s 0.52 m/s      6MWT  ft 300 ft in " 4:36 460 ft       ABC %        PDQ-39 /100        MDS-UPDRS  Part III   /128        H&Y Stage         Floor > Stand  sec                         Precautions:   Past Medical History:   Diagnosis Date    Arthritis     Asbestos exposure     Asbestosis (HCC)     GERD (gastroesophageal reflux disease)     Hemoptysis 5/11/2022    Hypertension     Lung disease     Parkinson's disease

## 2024-03-20 NOTE — PROGRESS NOTES
OCCUPATIONAL THERAPY RE-EVALUATION    Today's Date: 3/20/2024  Patient Name: Daniel Sanz Jr.  : 1940  MRN: [id number]  Referring Provider: Jovanna Cavanaugh MD  Dx: Parkinson's disease without dyskinesia, unspecified whether manifestations fluctuate [G20.A1]    Eval/Re-Eval POC Expires Auth #/ Referral # Total Visits Start Date Expiration Date Extension Info Visits Limitation                                                                         1 2 3 4 5 6   2/19 2/26 2/28 3/4 3/6  3/20   7 8 9 10 11 12                 13 14 15 16 17 18                 19 20 21 22 23 24                 25 26 27 28 29 30                    POC START DATE: 2024  POC END DATE: 2024  NEXT PN DUE: 2024  FREQUENCY: 2x/wk for 12 weeks    SKILLED ANALYSIS:    Pt presents to OT evaluation on 2024 secondary to hallucination due to Parkinson's Disease. Date of Onset: 2018. Patient's wife was present while taking the subjective information as pt is a poor historian. Pt reports that he does not know why he is presently at occupational therapy and that he is LHD. Pt's wife reports that he has been having difficulty with getting in and out of chairs the most and has difficulty transferring into and out of the car independently. Wife further indicates that he is currently taking medication for his parkinson's for the past 4-5 years to reduce tremors of L hand, melatonin due to disrupted sleep (waking up 3-6 times a night), and medication for bladder control. Pt also has a hx of dry eyes and cataracts which is effecting his ability to transfer to the car/chairs independently; pt recently fell a few weeks ago due to this. Patient and wife report that he is able to still perform ADL/IADLs independently, but indicate that he is no longer able to complete lawn maintenance and that he has trouble gripping items. Pt is no longer driving and is retired. Pt's next follow-up appointment with his neurologist Dr. Cavanaugh is  3/18/2024. During the evaluation, pt engaged in a MOCA screen, Nine Hole Peg, Functional Dexterity, MMT, AROM, and Vernon  Strength. The overall results of these tests indicate severe cognitive impairment and decreased functional dexterity. The pt shows normal /muscle strength and WFL ROM. The results of the MOCA was 8/30 (Norm: 26/30), Nine Hole Peg R: 1:03 L: 1:24 (Norm: 26 seconds), Vernon  Strength  R:55 L: 66 (Norm: R: 65.7, L: 55). Due to observations seen throughout the evaluation it was discussed to the pt's wife on the potential of SLP, but wife does not want to pursue due to location. Pt would benefit from OT skilled therapy to address severe cognitive impairment and decreased functional dexterity. Pt will be seen for OT services 2x/wk for 12 weeks. POC was reviewed with the pt, pt understands and agrees with POC.    Pt presents to OT re-evaluation on 3/20/2024 secondary to hallucination due to Parkinson's Disease. Date of Onset: 7/12/2018. Pt indicates that he feels like therapy is going well, but is a poor historian and wife is not present. Pt does report that he missed his previous session due to not having a ride and indicates that he no longer drives due to license being taken away. Before the evaluation began, pt reports that he feel achy with a slight headache currently. Pt also forgot to bring his glasses, which may impact the results of the assessments provided below. During today's re-evaluation, pt performed the MOCA screen, Nine Hole Peg, Functional Dexterity, MMT, AROM, Vernon  Strength, and blood pressure was tested. At the beginning of the re-evaluation, pt's blood pressure was taken and was 160/78 which pt indicated was his normal. The overall results of these tests indicate severe cognitive impairment and decreased functional dexterity. The pt shows improved  strength, normal muscle strength, and WFL ROM. The results of the MOCA was 7/30 (Norm: 26/30), Nine Hole Peg R:  "2:46 L: 1:11 (Norm: 26 seconds), Vernon  Strength  R:73 L: 76 (Norm: R: 65.7, L: 55). Pt would benefit from OT skilled therapy to address severe cognitive impairment and decreased functional dexterity. Pt will be seen for OT services 2x/wk for 12 weeks. POC was reviewed with the pt, pt understands and agrees with POC.     Subjective    Pt presents to OT evaluation on 2/19/2024 secondary Parkinson's. Date of Onset: 7/12/2018. Patient's wife was present while taking the subjective information as pt is a poor historian. Pt reports that he does not know why he is presently at occupational therapy and that he is LHD. Pt's wife reports that he has been having difficulty with getting in and out of chairs the most and has difficulty transferring into and out of the car independently. Wife further indicates that he is currently taking medication for his parkinson's for the past 4-5 years to reduce tremors of L hand, melatonin due to disrupted sleep (waking up 3-6 times a night), and medication for bladder control. Pt also has a hx of dry eyes and cataracts which is effecting his ability to transfer to the car/chairs independently; pt recently fell a few weeks ago due to this. Patient and wife report that he is able to still perform ADL/IADLs independently, but indicate that he is no longer able to complete lawn maintenance and that he has trouble gripping items. Pt is no longer driving and is retired. Pt's next follow-up appointment with his neurologist Dr. Cavanaugh is 3/18/2024.    Pt presents to OT re-evaluation on 3/20/2024 secondary to hallucination due to Parkinson's Disease. Date of Onset: 7/12/2018. Pt indicates that he feels like therapy is going well, but is a poor historian and wife is not present. Pt does report that he missed his previous session due to not having a ride and indicates that he no longer drives due to license being taken away. Before the evaluation began, pt reports saying \"I feel achy with a " "slight headache\".    PATIENT GOAL: \"Get back to lawn mowing\" - NOT MET    HISTORY OF PRESENT ILLNESS:     PMH:   Past Medical History:   Diagnosis Date    Arthritis     Asbestos exposure     Asbestosis (HCC)     GERD (gastroesophageal reflux disease)     Hemoptysis 2022    Hypertension     Lung disease     Parkinson's disease        Past Surgical Hx:   Past Surgical History:   Procedure Laterality Date    HERNIA REPAIR      INTRACAPSULAR CATARACT EXTRACTION Left     KNEE SURGERY      many years ago    SHOULDER SURGERY          Pain Levels:      Restin/10    With Activity:  8/10    OBJECTIVE   9 HOLE PEG TEST: performed 9 hole peg test to assess dexterity/fine motor coordination with pt scoring 1:11 seconds on R hand  and 2:46 seconds on L hand  side. Pt demonstrating decreased FMC related to age-related norms (age 27 seconds)     FUNCTIONAL DEXTERITY TESTS  RUE 1:25 seconds   LUE 1:32 seconds      [place] Cognitive Assessment Version 8.2 (MoCA V8.2)  Visuospatial/executive functionin/5  Naming:  3/3  Memory: 1st trial:  0/5, 2nd trial:  2/5  Attention/concentration: 0/2  List of letters: 0/1  Seial Seven Subtraction:  0/3   Language/sentence repetition:  0/2  Language Fluency:  0/1  Abstract/Correlational Thinkin/2  Delayed Recall:  0/5  Orientation:  2/6              Memory Index Score: 0/15  MoCA V1 8.1 Raw Score:  7/30, MIS:  0/15, indicative of severe neurocognitive impairments.      UE Strength:              NAOMI: RUE 73/200 LUE: 76/200  The age norm is approximately R 66 lbs and L 55 lbs indicating relatively normal  strength    Pt is L hand dominant        RUE LUSYLVESTER Comments   AROM (seated)      Shoulder Flex/Ext WFL   WFL     Elbow Flex WFL  WFL     Elbow Ext WFL  WFL     Wrist Flex WFL  WFL     Wrist Ext WFL  WFL                          MMT         Shoulder Flex/Ext 5/5 5/5    Elbow Flex 4+/5 4+/5    Elbow Ext 4+/5 4+/5    Wrist Flex 5/5 5/5    Wrist Ext 5/5 5/5      Pt is left hand " dominant     Short Term Goals, 4 Weeks:    Pt will demo good carryover of clinic and home tremor reduction strategies for improved functional use of BUE L>R improved hand to target precision, utensil management, and item retrieval - NOT MET    Pt will increase R prehension patterns for improved tripod with utensil management and item retrieval with <15% droppage - NOT MET      Pt will increase automaticity of BUE L>R to 70% for improved grasp release of tabletop items for improved functional performance with salient tasks - NOT MET    Pt will increase BUE to Mod I status with 0% cuing for tabletop tasks for improved functional performance of life roles and salient tasks - NOT MET      Pt will demo G understanding of tremor management strategies such as proximal stabilization, resting hands on table, maintaining elbows close to body as demonstrated by incorporation in functional ADL/IADL tasks - MET    Pt will demo competency with use of weighted utensils, weighted writing tools, use of wrist weights, built up handles, scoop dishes, and all AE as appropriate/needed for ADL/IADL fxn - NOT MET    Pt will obtain 12/30 on the MOCA screen to improve immediate and delayed memory recall - NOT MET    Long Term Goals:    Pt will demo good carryover of clinic and home tremor reduction strategies for improved functional use of BUE L>R improved hand to target precision, utensil management, and item retrieval - NOT MET    Pt will increase R prehension patterns for improved tripod with utensil management and item retrieval with <15% droppage - NOT MET    Pt will demo with G carryover of Home Exercise Program (hand exercises) to improve functional progression towards goals in Plan of care and for improved functional use of BUE L>R x 50% - NOT MET    Pt will increase automaticity of BUE L>R to 70% for improved grasp release of tabletop items for improved functional performance with salient tasks - NOT MET    Pt will increase BUE to  Mod I status with 0% cuing for tabletop tasks for improved functional performance of life roles and salient tasks - NOT MET    Pt will demo G understanding of tremor management strategies such as proximal stabilization, resting hands on table, maintaining elbows close to body as demonstrated by incorporation in functional ADL/IADL tasks -  MET    Pt will demo competency with use of weighted utensils, weighted writing tools, use of wrist weights, built up handles, scoop dishes, and all AE as appropriate/needed for ADL/IADL fxn - NOT MET    Pt will obtain 16/30 on the MOCA screen to improve immediate and delayed memory recall - NOT MET    Precautions: FALL SAFETY     Treatment conducted by student supervised and directed by Charlene Feliz MS, OTR/L, CSRS, ZACHARY, DCAT. I agree with all treatment methods performed during this session by ETHAN Thomas. Student was directly supervised by me during the entirety of session. Patient consent given to be treated by student.

## 2024-03-25 ENCOUNTER — OFFICE VISIT (OUTPATIENT)
Dept: PHYSICAL THERAPY | Facility: MEDICAL CENTER | Age: 84
End: 2024-03-25
Payer: COMMERCIAL

## 2024-03-25 ENCOUNTER — OFFICE VISIT (OUTPATIENT)
Dept: OCCUPATIONAL THERAPY | Facility: MEDICAL CENTER | Age: 84
End: 2024-03-25
Payer: COMMERCIAL

## 2024-03-25 DIAGNOSIS — G20.A1 PARKINSON'S DISEASE WITHOUT DYSKINESIA, UNSPECIFIED WHETHER MANIFESTATIONS FLUCTUATE: Primary | ICD-10-CM

## 2024-03-25 DIAGNOSIS — G20.A1 PARKINSON'S DISEASE, UNSPECIFIED WHETHER DYSKINESIA PRESENT, UNSPECIFIED WHETHER MANIFESTATIONS FLUCTUATE: Primary | ICD-10-CM

## 2024-03-25 PROCEDURE — 97112 NEUROMUSCULAR REEDUCATION: CPT | Performed by: PHYSICAL THERAPIST

## 2024-03-25 PROCEDURE — 97530 THERAPEUTIC ACTIVITIES: CPT

## 2024-03-25 PROCEDURE — 97112 NEUROMUSCULAR REEDUCATION: CPT

## 2024-03-25 NOTE — PROGRESS NOTES
"Occupational Therapy Daily Note:    Today's date: 3/25/2024  Patient name: Daniel Sanz Jr.  : 1940  MRN: [id number]  Referring provider: Jovanna Cavanaugh MD  Dx:   Encounter Diagnosis   Name Primary?    Parkinson's disease without dyskinesia, unspecified whether manifestations fluctuate Yes             Eval/Re-Eval POC Expires Auth #/ Referral # Total Visits Start Date Expiration Date Extension Info Visits Limitation                                                                 1 2 3 4 5 6   2/19 2/26 2/28 3/4 3/6 3/20   7 8 9 10 11 12   3/25        13 14 15 16 17 18           19 20 21 22 23 24           25 26 27 28 29 30             POC START DATE: 2024  POC END DATE: 2024  NEXT PN DUE: 2024  FREQUENCY: 2x/wk for 12 weeks    Subjective: \"I can't remember what I did this weekend.\"    Objective:     Neuromuscular Re-Education -     Tremor Reduction  UBE retrograde & prograde  2 minutes prograde & 2 minutes retrograde    Pincer Grasp, Fine Motor Coordination, Bilateral Coordination  Find the Letters  Completed: x 3  Required mod vc  Pt forgot his glasses  Time: 15 minutes    Hand Strength, Pincer Grasp  Find the Beads in Theraputty  Completed: x 6  Required mod vc  Time: 15 minutes    Therapeutic Activity -     Immediate Memory, Working Memory  Fruit Recall  Completed: x 5  Required Min vc  Time: 10 minutes    Assessment: Tolerated treatment well. Pt demonstrated decreased sustained attention as he continuously would divert from tasks and had to be reminded of task presented. Pt is able to complete tasks presented, but has difficulty with immediate memory and recalling the instructions provided.  Patient would benefit from continued skilled OT.    Plan: Continued skilled OT per POC.    Treatment conducted by student supervised and directed by Charlene Feliz MS, OTR/L, CSRS, ZACHARY, DCAT. I agree with all treatment methods performed during this session by ETHAN Thomas. Student was " directly supervised by me during the entirety of session. Patient consent given to be treated by student.

## 2024-03-25 NOTE — PROGRESS NOTES
"Daily Note   POC expires Auth Status Total     Start date  Expiration date PT/OT + Visit Limit? Co-Insurance   3/27/24                                                 Visit/Unit Tracking  AUTH Status:  Date 2/28 3/4 3/6 3/20 3/25            Authed:  Used 13 14 15 16 17           Remaining                  Today's date: 3/25/2024  Patient name: Daniel Sanz Jr. \"Alfonzo\"  : 1940  MRN: [id number]  Referring provider: Jovanna Cavanaugh MD  Dx:   Encounter Diagnosis     ICD-10-CM    1. Parkinson's disease, unspecified whether dyskinesia present, unspecified whether manifestations fluctuate  G20.A1                     Subjective: Patient arrives to treatment without AD, reports no changes since his last visit      Objective: See treatment diary below    NMR:  - Floor to ceiling: 10x 10 sec holds  - Side to side: 10x 10 sec holds, 2 sets  - Physioball rollouts: 20x 5 sec holds  - Ambulation w/ walking poles: 5 laps x 20 ft  - Pickup/place squigz: 10x 2 sets  - STS: 16x, 15x  - Step ups: 20x 6\" step, 2 UE    TA:  - HEP: Floor to ceiling, side to side, STS      Assessment: Patient tolerated treatment well. Patient requires 1 step commands and PT assist to maintain balance and prevent LOB while maintaining appropriate posture. Walking poles added today to promote UE swing along with reciprocal UE/LE movements during ambulation, completing with minimal shuffling/freezing and CGA to maximize patient safety. Physioball rollouts completed today due to patient reports of low back tightness, completing with PT assist and reporting reduced pain post-treatment. Patient will benefit from skilled PT services to reduce his risk of falls and maximize his level of safety and independence.      Plan: Continue per plan of care.      Outcome Measures Initial Eval  1/3/2024 PN  24 PN  3/4/24      5xSTS 20.5 sec w/ 1 UE 23.4 sec w/ Yue 17.2 sec w/ Yue  14.4 w/ airex      TUG  - Regular  - Cognitive  - Carry   23.9 sec  58.5 sec " (unable to count)  30.8 sec (+ spillage)   29.4 sec  65.4 sec  43.3 sec (- spillage)   31.7 sec  32 sec  34.2 sec (- spillage)      MiniBEST /28        10 meter 0.48 m/s 0.53 m/s 0.52 m/s      6MWT  ft 300 ft in 4:36 460 ft       ABC %        PDQ-39 /100        MDS-UPDRS  Part III   /128        H&Y Stage         Floor > Stand  sec                         Precautions:   Past Medical History:   Diagnosis Date    Arthritis     Asbestos exposure     Asbestosis (HCC)     GERD (gastroesophageal reflux disease)     Hemoptysis 5/11/2022    Hypertension     Lung disease     Parkinson's disease

## 2024-03-27 ENCOUNTER — OFFICE VISIT (OUTPATIENT)
Dept: PHYSICAL THERAPY | Facility: MEDICAL CENTER | Age: 84
End: 2024-03-27
Payer: COMMERCIAL

## 2024-03-27 ENCOUNTER — OFFICE VISIT (OUTPATIENT)
Dept: OCCUPATIONAL THERAPY | Facility: MEDICAL CENTER | Age: 84
End: 2024-03-27
Payer: COMMERCIAL

## 2024-03-27 DIAGNOSIS — G20.A1 PARKINSON'S DISEASE, UNSPECIFIED WHETHER DYSKINESIA PRESENT, UNSPECIFIED WHETHER MANIFESTATIONS FLUCTUATE: Primary | ICD-10-CM

## 2024-03-27 DIAGNOSIS — G20.A1 PARKINSON'S DISEASE WITHOUT DYSKINESIA, UNSPECIFIED WHETHER MANIFESTATIONS FLUCTUATE: Primary | ICD-10-CM

## 2024-03-27 PROCEDURE — 97530 THERAPEUTIC ACTIVITIES: CPT | Performed by: PHYSICAL THERAPIST

## 2024-03-27 PROCEDURE — 97112 NEUROMUSCULAR REEDUCATION: CPT | Performed by: PHYSICAL THERAPIST

## 2024-03-27 PROCEDURE — 97112 NEUROMUSCULAR REEDUCATION: CPT

## 2024-03-27 NOTE — PROGRESS NOTES
"Occupational Therapy Daily Note:    Today's date: 3/27/2024  Patient name: Daniel Sanz Jr.  : 1940  MRN: [id number]  Referring provider: Jovanna Cavanaugh MD  Dx:   Encounter Diagnosis   Name Primary?    Parkinson's disease without dyskinesia, unspecified whether manifestations fluctuate Yes               Eval/Re-Eval POC Expires Auth #/ Referral # Total Visits Start Date Expiration Date Extension Info Visits Limitation                                                                 1 2 3 4 5 6   2/19 2/26 2/28 3/4 3/6 3/20   7 8 9 10 11 12   3/25 3/27       13 14 15 16 17 18           19 20 21 22 23 24           25 26 27 28 29 30             POC START DATE: 2024  POC END DATE: 2024  NEXT PN DUE: 2024  FREQUENCY: 2x/wk for 12 weeks    Subjective: \"I am tired today.\"    Objective:     Neuromuscular Re-Education -     Tremor Reduction  UBE retrograde & prograde  2 minutes prograde & 2 minutes retrograde    Tremor Control, Fine Motor Coordination, Memory Recall  Unscramble the Words  Weighted Pen  Completed: x 0  Accuracy: 0  Pt did write name  Tremor Present  Time: 5 minutes    Fine Motor Coordination, Bilateral Coordination, Hand Strength, Pincer Grasp, Sustained Attention  Tricky Tree  L Handiweight = 1 lb  Completed: x 25  Accuracy: 45%  Required mod vc  Mild Tremor  Time: 40 minutes    Assessment: Tolerated treatment well. During the session, pt demonstrated improved sustained attention as he did not require a prompt to keep completing Tricky Tree activity. While pt did require mod vc to recall instructions of activity and how to complete, pt independence improved when instructions were simple step. Pt continuously took glasses on and off as he was unable to see clearly; it was inconsistent. When trying out a writing activity, pt was not confident in abilities and did not want to complete, pt did write his name but tremors were present.  Patient would benefit from continued skilled " OT.    Plan: Continued skilled OT per POC.    Treatment conducted by student supervised and directed by Charlene Feliz MS, OTR/L, CSRS, ZACHARY, DCAT. I agree with all treatment methods performed during this session by ETHAN Thomas. Student was directly supervised by me during the entirety of session. Patient consent given to be treated by student.

## 2024-03-27 NOTE — PROGRESS NOTES
"Daily Note   POC expires Auth Status Total     Start date  Expiration date PT/OT + Visit Limit? Co-Insurance   3/27/24                                                 Visit/Unit Tracking  AUTH Status:  Date 2/28 3/4 3/6 3/20 3/25            Authed:  Used 13 14 15 16 17           Remaining                  Today's date: 3/27/2024  Patient name: Daniel Sanz Jr. \"Alfonzo\"  : 1940  MRN: [id number]  Referring provider: Jovanna Cavanaugh MD  Dx:   Encounter Diagnosis     ICD-10-CM    1. Parkinson's disease, unspecified whether dyskinesia present, unspecified whether manifestations fluctuate  G20.A1                     Subjective: Patient arrives to treatment without AD, reports no changes since his last visit      Objective: See treatment diary below    NMR:  - Floor to ceiling: 10x 10 sec holds  - Side to side: 10x 10 sec holds, 2 sets  - STS: 20x  - FWD hurdles: 10 laps x 6\" hurdles  - Ambulation w/ walking poles: 5 laps x 20 ft  - Pickup/place squigz: 10x 2 sets  - Sled push/pull: 5 laps x 20 ft  - Step ups: 20x 6\" step, 2 UE    TA:  - HEP: Floor to ceiling, side to side, STS      Assessment: Patient tolerated treatment well. Patient requires 1 step commands and PT assist to maintain balance and prevent LOB while maintaining appropriate posture. When navigating hurdles patient displays improved balance and speed when using step-to gait pattern instead of step-through. During sled pull patient demonstrates increased freezing resulting in posterior LOB requiring PT assist. Reduced shuffling observed during 180 degree turns when completed squigz pickup/place. Patient will benefit from skilled PT services to reduce his risk of falls and maximize his level of safety and independence.      Plan: Continue per plan of care.      Outcome Measures Initial Eval  1/3/2024 PN  24 PN  3/4/24      5xSTS 20.5 sec w/ 1 UE 23.4 sec w/ Yue 17.2 sec w/ Yue  14.4 w/ airex      TUG  - Regular  - Cognitive  - Carry   23.9 " sec  58.5 sec (unable to count)  30.8 sec (+ spillage)   29.4 sec  65.4 sec  43.3 sec (- spillage)   31.7 sec  32 sec  34.2 sec (- spillage)      MiniBEST /28        10 meter 0.48 m/s 0.53 m/s 0.52 m/s      6MWT  ft 300 ft in 4:36 460 ft       ABC %        PDQ-39 /100        MDS-UPDRS  Part III   /128        H&Y Stage         Floor > Stand  sec                         Precautions:   Past Medical History:   Diagnosis Date    Arthritis     Asbestos exposure     Asbestosis (HCC)     GERD (gastroesophageal reflux disease)     Hemoptysis 5/11/2022    Hypertension     Lung disease     Parkinson's disease

## 2024-04-01 ENCOUNTER — OFFICE VISIT (OUTPATIENT)
Dept: PHYSICAL THERAPY | Facility: MEDICAL CENTER | Age: 84
End: 2024-04-01
Payer: COMMERCIAL

## 2024-04-01 ENCOUNTER — OFFICE VISIT (OUTPATIENT)
Dept: OCCUPATIONAL THERAPY | Facility: MEDICAL CENTER | Age: 84
End: 2024-04-01
Payer: COMMERCIAL

## 2024-04-01 DIAGNOSIS — G20.A1 PARKINSON'S DISEASE WITHOUT DYSKINESIA, UNSPECIFIED WHETHER MANIFESTATIONS FLUCTUATE: Primary | ICD-10-CM

## 2024-04-01 DIAGNOSIS — G20.A1 PARKINSON'S DISEASE, UNSPECIFIED WHETHER DYSKINESIA PRESENT, UNSPECIFIED WHETHER MANIFESTATIONS FLUCTUATE: Primary | ICD-10-CM

## 2024-04-01 PROCEDURE — 97110 THERAPEUTIC EXERCISES: CPT | Performed by: PHYSICAL THERAPIST

## 2024-04-01 PROCEDURE — 97112 NEUROMUSCULAR REEDUCATION: CPT

## 2024-04-01 PROCEDURE — 97112 NEUROMUSCULAR REEDUCATION: CPT | Performed by: PHYSICAL THERAPIST

## 2024-04-01 PROCEDURE — 97530 THERAPEUTIC ACTIVITIES: CPT

## 2024-04-01 NOTE — PROGRESS NOTES
"Occupational Therapy Daily Note:    Today's date: 2024  Patient name: Daniel Sanz Jr.  : 1940  MRN: [id number]  Referring provider: Jovanna Cavanaugh MD  Dx:   Encounter Diagnosis   Name Primary?    Parkinson's disease without dyskinesia, unspecified whether manifestations fluctuate Yes                 Eval/Re-Eval POC Expires Auth #/ Referral # Total Visits Start Date Expiration Date Extension Info Visits Limitation                                                                 1 2 3 4 5 6   2/19 2/26 2/28 3/4 3/6 3/20   7 8 9 10 11 12   3/25 3/27 4/1      13 14 15 16 17 18           19 20 21 22 23 24           25 26 27 28 29 30             POC START DATE: 2024  POC END DATE: 2024  NEXT PN DUE: 2024  FREQUENCY: 2x/wk for 12 weeks    Subjective: \"I am a bit tired and I have a headache.\"    Objective:     Neuromuscular Re-Education -     Tremor Reduction  UBE retrograde & prograde  8 minutes prograde & 2 minutes retrograde    Therapeutic Activity -     Immediate Memory, Working Memory, Fine Motor Coordination, Sustained Attention  Tanagrams  Completed: x 2  Accuracy: 30%  Required Max verbal, Visual, & Gestural cue  Time: 30 minutes    Assessment: Tolerated treatment well. Pt demonstrated increased confusion during today's session as pt required multiple verbal, visual, and gestural cues to complete activity. Due to increased confusion, pt was asked a series of orientation questions and was unable to correctly identify date, season, & year. While pt confusion was increased during today's session, when completing tanagrams activity pt had mild tremors.  Patient would benefit from continued skilled OT.    Plan: Continued skilled OT per POC.    Treatment conducted by student supervised and directed by Charlene Feliz, MS, OTR/L, CSRS, ZACHARY, DCAT. I agree with all treatment methods performed during this session by ETHAN Thomas. Student was directly supervised by me during the entirety of " session. Patient consent given to be treated by student.

## 2024-04-08 ENCOUNTER — OFFICE VISIT (OUTPATIENT)
Dept: PHYSICAL THERAPY | Facility: MEDICAL CENTER | Age: 84
End: 2024-04-08
Payer: COMMERCIAL

## 2024-04-08 DIAGNOSIS — G20.A1 PARKINSON'S DISEASE, UNSPECIFIED WHETHER DYSKINESIA PRESENT, UNSPECIFIED WHETHER MANIFESTATIONS FLUCTUATE: Primary | ICD-10-CM

## 2024-04-08 PROCEDURE — 97530 THERAPEUTIC ACTIVITIES: CPT | Performed by: PHYSICAL THERAPIST

## 2024-04-08 NOTE — PROGRESS NOTES
PT Progress Note         POC expires Auth Status Total     Start date  Expiration date PT/OT + Visit Limit? Co-Insurance   3/27/24                                                 Today's date: 2024  Patient name: Daniel Sanz Jr.  : 1940  MRN: [id number]  Referring provider: Jovanna Cavanaugh MD  Dx:   Encounter Diagnosis     ICD-10-CM    1. Parkinson's disease, unspecified whether dyskinesia present, unspecified whether manifestations fluctuate  G20.A1                 Assessment  Assessment details: Patient is a 83 y.o. Male who has been attending skilled outpatient PT with complaints of gait and balance deficits secondary to Parkinson's Disease. He arrives to treatment without AD and required CG/Yue to navigate throughout clinic safely. At rest patient display UE tremor and B/L LE rigidity which does not allow him to fully extend his knees. Since his most recent progress note patient displays improvements with his 5xSTS, TUG, 10 MWT, and 6 MWT. Improvements with these objective measures suggest improvements with his functional strength, endurance, and gait speed. However despite these improvements patients scores remain outside of normative values suggesting that he is at HIGH risk of falls. Per research provided by [employer], patient will benefit from skilled outpatient PT services to improve and maximize his function, to reduce risk for falls and potential injuries associated with falls, reduce healthcare costs via hospitalization, and reduce patient and national healthcare costs. In early stages of Parkinson's Disease, research indicates that intensive physical exercise can improve patient's motor control and assist in slowing the disease progression as a neuroprotective agent. Patient will benefit from skilled outpatient PT in order to maximize function in the short-term and long-term with overall improved postural strength with associated stability and mobility.      Impairments: Abnormal gait,  Abnormal muscle tone, Abnormal or restricted ROM, Activity intolerance, Impaired balance, Impaired physical strength, Lacks appropriate HEP, Poor posture, Poor body mechanics, Pain with function, Safety issue, Weight-bearing intolerance, Abnormal movement  Understanding of Dx/Px/POC: Good  Prognosis: Good      Patient verbalized understanding of POC.    Please contact me if you have any questions or recommendations. Thank you for the referral and the opportunity to share in Daniel Sanz 's care.           Plan  Plan details: 6 MWT, miniBEST  Planned modality interventions: Cryotherapy, Thermotherapy  Planned therapy interventions: ADL training, Balance, Balance/WB training, Breathing training, Body mechanics training, Coordination, Functional ROM exercises, Gait training, HEP, Manual Therapy, Motor coordination training, Neuromuscular re-education, Patient education, Postural training, Strengthening, Stretching, Therapeutic activities, Therapeutic exercises, Therapeutic training, Transfer training  Frequency: 2x/wk  Duration in weeks: 12  Plan of Care beginning date: 3/4/2024  Plan of Care expiration date: 12 weeks - 5/27/2024  Treatment plan discussed with: Patient and Family         Goals  Short Term Goals (4 weeks):  - Patient will improve TUG score by MDC of 4.8 sec in order to reduce risk of falls and to increase safety with functional mobility- NOT MET  - Patient will improve 5 STS by the MDC of 2.3 sec indicating an improvement in strength and decreased challenge with transfers- MET  - Patient will improve 6 MWT by the MDC of 269 ft indicating an improvement in cardiovascular endurance in order to maximize function with functional mobility throughout the community- Progressing  - Patient will improve MiniBESTest score by MDC of 5.52 points indicating an improvement with dynamic balance in order to further decrease risk of falls with functional tasks- NT  - Patient will be independent in basic HEP in  order to manage condition at home- NOT MET     Long Term Goals (12 weeks):  - Patient will score a low risk for falls 2/4 fall risks measures  - Patient will score age norm values for 1/2 endurance measures  - Patient will be able to ambulate on uneven surfaces with 50% reduction in near falls to increase safety with community mobility  - Patient will be able to perform a floor transfer without physical assistance to assist with fall recovery at home and in the community  - Patient will be independent in comprehensive HEP post discharge from program  - Patient will be able to walk up incline with decreased difficulty and no loss of balance in order to improve functional mobility throughout the community  - Patient will be able to perform sit to stands from softer surfaces such as a couch or bed in order to improvement function with transfers  - Patient will be consistent with program for at least 1 day per week to assist with management of condition and functional independence of their condition        Cut off score   All date taken from [employer] Neuro Section or Rehab Measures      PROCTOR Cutoff Scores:  MDC: 5 pts  Falls Risk Cutoff: 40.22/56 DGI Cutoff Scores:  Shady et al 2011, MDC: 2.9 pts  Darnell et al 2008, Ronny: Score <19/24   MiniBEST Cutoff Scores:  Mynor et al 2011, MDC: 5.52 pts  Ed and Na 2013, Ronny: <19/28 5xSTS Cutoff Scores:  MDC: 2.3 sec  Benito et al, 2011, Ronny: > 16 sec   TUG Cutoff Scores:  Lars Walden et al, 2011, MDC: 4.8 sec  Sophie et al, 2011, Fallers: Meds ON: < 12.21 sec, OFF: 15.5 sec 10 Meter Walk Test Cutoff Scores:  Galindo, 2008, MDC: 0.18 m/s  Age Norm Values, Ronny: < 1.0 m/s   ABC Cutoff Scores:  Pat, 2008, MDC: 13 pts  Lars Retana, MDC: 11.12 pts  Increased risk for falls: < 69% 6 Minute Walk Test Cutoff Scores:  Galinod, 2008, MDC: 269 ft  Yovany et al, 2002, Norm Data Healthy Adults:  60 - 69 years:   M: 572 m      F: 538 m  70  - 79 years:   M: 527 m      F: 471 m  80 - 89 years:   M: 417 m      F: 392 m   PDQ-39 Cutoff Scores:  Demi et al, 2004:  MDC: Mobility (12.24), ADL (16.72), Emotional (14.22), Stigma (21.21), Social Support (21.25), Cognition (21.12), Communication (21.04), Bodily Discomfort (24.48)  Sheron et al, 2007:  Normative Data: Mobility (49.25), ADL (38.94), Emotional (37.92), Stigma (27.54), Social Support (14.78), Cognition (33.03), Communication (27.99), Bodily Discomfort (40.91) Ariela and Yahr Stages  Stage 0: No S/S  Stage 1: Mild unilateral symptoms  Stage 1.5: Unilateral and axial symptoms  Stage 2: Bilateral symptoms, no balance impairment  Stage 2.5: Mild bilateral symptoms and recovery with pull test  Stage 3: Mild to moderate postural instability, independent  Stage 4: Severe disability, walking or standing unassisted  Stage 5: Bed bound, w/c bound           Subjective Evaluation    History of Present Illness  Mechanism of injury: Patient and wife arrive to evaluation after previous treatment for Parkinson's Disease. They would like to work on his walking, balance, and leg strength. Patient's wife reports that patient has had 1 fall over the last few months and frequently shuffles his feet. He has a SPC that he uses occasionally but does not bring today. Patient and wife report that he has some confusion and frequent urination, he is not currently driving    Update 24: Patient arrives to treatment without AD, reporting no changes since his last treatment. He has had 1 fall over the last month and has difficulty getting up from the ground. Patient reports back stiffness today.     Update 3/4/24: Patient arrives to treatment from OT with increased redness observed in right eye. Patient reports no visual deficits. Overall he reports no changes and is not able to recall any recent falls.     Primary AD: occasional SPC      Pain  Current pain ratin/10  Location: Shoulder    Social Support  Steps to  enter house: 1 step  Stairs in house: 1 flight  Lives in: house  Lives with: wife    Employment status: Retired      Treatments  Previous treatment: PT      Objective   Gait abnormalities: slow gisela, shuffling and fenestrating gait, B/L knee flexion, reduced UE swing, freezing with turning, poor heel strike          Outcome Measures Initial Eval  1/3/2024 PN  2/5/24 PN  3/4/24 PN  4/8/24     5xSTS 20.5 sec w/ 1 UE 23.4 sec w/ Yue 17.2 sec w/ Yue  14.4 w/ airex 16.1 sec w/ Yue  11.2 sec w/ airex     TUG  - Regular  - Cognitive  - Carry   23.9 sec  58.5 sec (unable to count)  30.8 sec (+ spillage)   29.4 sec  65.4 sec  43.3 sec (- spillage)   31.7 sec  32 sec  34.2 sec (- spillage)   24.2 sec  41.5 sec  34.5 sec (- spillage)     MiniBEST /28        10 meter 0.48 m/s 0.53 m/s 0.52 m/s 0.71 m/s     6MWT  ft 300 ft in 4:36 460 ft  550 ft in 4:47     ABC %        PDQ-39 /100        MDS-UPDRS  Part III   /128        H&Y Stage         Floor > Stand  sec                        Precautions:   Past Medical History:   Diagnosis Date    Arthritis     Asbestos exposure     Asbestosis (HCC)     GERD (gastroesophageal reflux disease)     Hemoptysis 5/11/2022    Hypertension     Lung disease     Parkinson's disease

## 2024-04-15 ENCOUNTER — OFFICE VISIT (OUTPATIENT)
Dept: PHYSICAL THERAPY | Facility: MEDICAL CENTER | Age: 84
End: 2024-04-15
Payer: COMMERCIAL

## 2024-04-15 DIAGNOSIS — G20.A1 PARKINSON'S DISEASE, UNSPECIFIED WHETHER DYSKINESIA PRESENT, UNSPECIFIED WHETHER MANIFESTATIONS FLUCTUATE: Primary | ICD-10-CM

## 2024-04-15 PROCEDURE — 97110 THERAPEUTIC EXERCISES: CPT | Performed by: PHYSICAL THERAPIST

## 2024-04-15 PROCEDURE — 97112 NEUROMUSCULAR REEDUCATION: CPT | Performed by: PHYSICAL THERAPIST

## 2024-04-15 NOTE — PROGRESS NOTES
"Daily Note   POC expires Auth Status Total     Start date  Expiration date PT/OT + Visit Limit? Co-Insurance   3/27/24                                                 Visit/Unit Tracking  AUTH Status:  Date 2/28 3/4 3/6 3/20 3/25            Authed:  Used 13 14 15 16 17           Remaining                  Today's date: 4/15/2024  Patient name: Daniel Sanz Jr. \"Alfonzo\"  : 1940  MRN: [id number]  Referring provider: Jovanna Cavanaugh MD  Dx:   Encounter Diagnosis     ICD-10-CM    1. Parkinson's disease, unspecified whether dyskinesia present, unspecified whether manifestations fluctuate  G20.A1                     Subjective: Patient arrives to treatment without AD stating his back is a little stiff this morning      Objective: See treatment diary below    TE:  - Physioball rollouts: 20x 10 sec holds    NMR:  - Floor to ceiling: 10x 10 sec holds  - Side to side: 10x 10 sec holds, 2 sets  - STS: 20x 2 sets  - Ambulation w/ 180 degree turns: 5 laps (8-14 ft)  - FWD/BWD ambulation: 5 laps x 12 ft      TA:  - HEP: Floor to ceiling, side to side, STS      Assessment: Patient tolerated treatment well. Due to increased low back stiffness reported, physioball rollouts completed to reduce patients pain level and improve his overall mobility. When completing 180 degree turns patient displays increased shuffling and B/L knee flexion resulting in slower gait speed and instability requiring PT assist to maintain balance. Patient will benefit from skilled PT services to reduce his risk of falls and maximize his level of safety and independence.      Plan: Continue per plan of care.      Outcome Measures Initial Eval  1/3/2024 PN  24 PN  3/4/24 PN  24     5xSTS 20.5 sec w/ 1 UE 23.4 sec w/ Yue 17.2 sec w/ Yue  14.4 w/ airex 16.1 sec w/ Yue  11.2 sec w/ airex     TUG  - Regular  - Cognitive  - Carry   23.9 sec  58.5 sec (unable to count)  30.8 sec (+ spillage)   29.4 sec  65.4 sec  43.3 sec (- spillage)   31.7 " sec  32 sec  34.2 sec (- spillage)   24.2 sec  41.5 sec  34.5 sec (- spillage)     MiniBEST /28        10 meter 0.48 m/s 0.53 m/s 0.52 m/s 0.71 m/s     6MWT  ft 300 ft in 4:36 460 ft  550 ft in 4:47     ABC %        PDQ-39 /100        MDS-UPDRS  Part III   /128        H&Y Stage         Floor > Stand  sec                         Precautions:   Past Medical History:   Diagnosis Date    Arthritis     Asbestos exposure     Asbestosis (HCC)     GERD (gastroesophageal reflux disease)     Hemoptysis 5/11/2022    Hypertension     Lung disease     Parkinson's disease

## 2024-04-16 ENCOUNTER — APPOINTMENT (EMERGENCY)
Dept: CT IMAGING | Facility: HOSPITAL | Age: 84
End: 2024-04-16
Payer: COMMERCIAL

## 2024-04-16 ENCOUNTER — HOSPITAL ENCOUNTER (EMERGENCY)
Facility: HOSPITAL | Age: 84
Discharge: HOME | End: 2024-04-16
Attending: EMERGENCY MEDICINE
Payer: COMMERCIAL

## 2024-04-16 ENCOUNTER — APPOINTMENT (EMERGENCY)
Dept: RADIOLOGY | Facility: HOSPITAL | Age: 84
End: 2024-04-16
Payer: COMMERCIAL

## 2024-04-16 VITALS
TEMPERATURE: 98.1 F | HEART RATE: 74 BPM | DIASTOLIC BLOOD PRESSURE: 61 MMHG | OXYGEN SATURATION: 96 % | RESPIRATION RATE: 18 BRPM | SYSTOLIC BLOOD PRESSURE: 124 MMHG
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
## 2024-04-16 DIAGNOSIS — S09.90XA CLOSED HEAD INJURY, INITIAL ENCOUNTER: Primary | ICD-10-CM

## 2024-04-16 DIAGNOSIS — M54.2 NECK PAIN: ICD-10-CM

## 2024-04-16 DIAGNOSIS — M54.50 LUMBAR BACK PAIN: ICD-10-CM

## 2024-04-16 LAB
ALBUMIN SERPL BCP-MCNC: 4 G/DL (ref 3.5–5)
ALP SERPL-CCNC: 73 U/L (ref 34–104)
ALT SERPL W P-5'-P-CCNC: 3 U/L (ref 7–52)
ANION GAP SERPL CALCULATED.3IONS-SCNC: 5 MMOL/L (ref 4–13)
AST SERPL W P-5'-P-CCNC: 11 U/L (ref 13–39)
BASOPHILS # BLD AUTO: 0.03 THOUSANDS/ÂΜL (ref 0–0.1)
BASOPHILS NFR BLD AUTO: 1 % (ref 0–1)
BILIRUB SERPL-MCNC: 0.92 MG/DL (ref 0.2–1)
BNP SERPL-MCNC: 128 PG/ML (ref 0–100)
BUN SERPL-MCNC: 40 MG/DL (ref 5–25)
CALCIUM SERPL-MCNC: 8.8 MG/DL (ref 8.4–10.2)
CARDIAC TROPONIN I PNL SERPL HS: 5 NG/L
CHLORIDE SERPL-SCNC: 110 MMOL/L (ref 96–108)
CO2 SERPL-SCNC: 27 MMOL/L (ref 21–32)
CREAT SERPL-MCNC: 1.25 MG/DL (ref 0.6–1.3)
EOSINOPHIL # BLD AUTO: 0.14 THOUSAND/ÂΜL (ref 0–0.61)
EOSINOPHIL NFR BLD AUTO: 2 % (ref 0–6)
ERYTHROCYTE [DISTWIDTH] IN BLOOD BY AUTOMATED COUNT: 13 % (ref 11.6–15.1)
GFR SERPL CREATININE-BSD FRML MDRD: 53 ML/MIN/1.73SQ M
GLUCOSE SERPL-MCNC: 93 MG/DL (ref 65–140)
HCT VFR BLD AUTO: 33.1 % (ref 36.5–49.3)
HGB BLD-MCNC: 10.9 G/DL (ref 12–17)
IMM GRANULOCYTES # BLD AUTO: 0.02 THOUSAND/UL (ref 0–0.2)
IMM GRANULOCYTES NFR BLD AUTO: 0 % (ref 0–2)
LYMPHOCYTES # BLD AUTO: 1.29 THOUSANDS/ÂΜL (ref 0.6–4.47)
LYMPHOCYTES NFR BLD AUTO: 20 % (ref 14–44)
MCH RBC QN AUTO: 32.7 PG (ref 26.8–34.3)
MCHC RBC AUTO-ENTMCNC: 32.9 G/DL (ref 31.4–37.4)
MCV RBC AUTO: 99 FL (ref 82–98)
MONOCYTES # BLD AUTO: 0.61 THOUSAND/ÂΜL (ref 0.17–1.22)
MONOCYTES NFR BLD AUTO: 9 % (ref 4–12)
NEUTROPHILS # BLD AUTO: 4.43 THOUSANDS/ÂΜL (ref 1.85–7.62)
NEUTS SEG NFR BLD AUTO: 68 % (ref 43–75)
NRBC BLD AUTO-RTO: 0 /100 WBCS
PLATELET # BLD AUTO: 181 THOUSANDS/UL (ref 149–390)
PMV BLD AUTO: 9.8 FL (ref 8.9–12.7)
POTASSIUM SERPL-SCNC: 4.2 MMOL/L (ref 3.5–5.3)
PROT SERPL-MCNC: 6.3 G/DL (ref 6.4–8.4)
RBC # BLD AUTO: 3.33 MILLION/UL (ref 3.88–5.62)
SODIUM SERPL-SCNC: 142 MMOL/L (ref 135–147)
WBC # BLD AUTO: 6.52 THOUSAND/UL (ref 4.31–10.16)

## 2024-04-16 PROCEDURE — 93005 ELECTROCARDIOGRAM TRACING: CPT

## 2024-04-16 PROCEDURE — 36415 COLL VENOUS BLD VENIPUNCTURE: CPT | Performed by: EMERGENCY MEDICINE

## 2024-04-16 PROCEDURE — 99285 EMERGENCY DEPT VISIT HI MDM: CPT | Performed by: EMERGENCY MEDICINE

## 2024-04-16 PROCEDURE — 71045 X-RAY EXAM CHEST 1 VIEW: CPT

## 2024-04-16 PROCEDURE — 83880 ASSAY OF NATRIURETIC PEPTIDE: CPT | Performed by: EMERGENCY MEDICINE

## 2024-04-16 PROCEDURE — 85025 COMPLETE CBC W/AUTO DIFF WBC: CPT | Performed by: EMERGENCY MEDICINE

## 2024-04-16 PROCEDURE — 72125 CT NECK SPINE W/O DYE: CPT

## 2024-04-16 PROCEDURE — 74176 CT ABD & PELVIS W/O CONTRAST: CPT

## 2024-04-16 PROCEDURE — 80053 COMPREHEN METABOLIC PANEL: CPT | Performed by: EMERGENCY MEDICINE

## 2024-04-16 PROCEDURE — 99285 EMERGENCY DEPT VISIT HI MDM: CPT

## 2024-04-16 PROCEDURE — 70450 CT HEAD/BRAIN W/O DYE: CPT

## 2024-04-16 PROCEDURE — 84484 ASSAY OF TROPONIN QUANT: CPT | Performed by: EMERGENCY MEDICINE

## 2024-04-16 NOTE — ED PROVIDER NOTES
History  Chief Complaint   Patient presents with    Fall     Was walking with walker around 0445 and per family his legs have been cramping and pt reports that he believes he tripped over his shoes, fell face forward over his walker. Is not on any thinners or aspirin. Wife unsure if he lost consciousness, but responded moments after the fall. Reports head pain and back pain. Has h/o parkinsons     HPI    Prior to Admission Medications   Prescriptions Last Dose Informant Patient Reported? Taking?   amLODIPine (NORVASC) 5 mg tablet   Yes No   Sig: Take 5 mg by mouth daily   carbidopa-levodopa (Sinemet)  mg per tablet   No No   Sig: Take 2.5 tablets by mouth 3 (three) times a day   ketorolac (ACULAR) 0.5 % ophthalmic solution  Spouse/Significant Other, Self Yes No   predniSONE 10 mg tablet  Spouse/Significant Other, Self Yes No   Sig: Take 10 mg by mouth 2 (two) times a day   prednisoLONE acetate (PRED FORTE) 1 % ophthalmic suspension  Spouse/Significant Other, Self Yes No      Facility-Administered Medications: None       Past Medical History:   Diagnosis Date    Arthritis     Asbestos exposure     Asbestosis (HCC)     GERD (gastroesophageal reflux disease)     Hemoptysis 5/11/2022    Hypertension     Lung disease     Parkinson's disease        Past Surgical History:   Procedure Laterality Date    HERNIA REPAIR      INTRACAPSULAR CATARACT EXTRACTION Left     KNEE SURGERY      many years ago    SHOULDER SURGERY         Family History   Problem Relation Age of Onset    Stroke Mother     Colon cancer Mother     Depression Sister     Hypertension Sister      I have reviewed and agree with the history as documented.    E-Cigarette/Vaping    E-Cigarette Use Never User      E-Cigarette/Vaping Substances    Nicotine No     THC No     CBD No     Flavoring No     Other No     Unknown No      Social History     Tobacco Use    Smoking status: Never     Passive exposure: Past    Smokeless tobacco: Former     Types: Chew      Quit date:    Vaping Use    Vaping status: Never Used   Substance Use Topics    Alcohol use: Not Currently    Drug use: Never       Review of Systems   Constitutional:  Negative for fever.   Respiratory:  Negative for shortness of breath.    Cardiovascular:  Positive for leg swelling (chronic). Negative for chest pain.   Gastrointestinal:  Negative for abdominal pain, nausea and vomiting.   Musculoskeletal:  Positive for neck pain.   Neurological:  Positive for weakness and headaches.   All other systems reviewed and are negative.      Physical Exam  Physical Exam  Vitals and nursing note reviewed.   Constitutional:       General: He is awake. He is not in acute distress.     Appearance: He is not toxic-appearing.      Comments: Small superficial abrasion on the anterior forehead   HENT:      Head: Normocephalic and atraumatic.   Eyes:      General: Vision grossly intact. Gaze aligned appropriately.   Cardiovascular:      Rate and Rhythm: Normal rate and regular rhythm.      Heart sounds: Normal heart sounds.   Pulmonary:      Effort: Pulmonary effort is normal. No respiratory distress.      Breath sounds: Normal breath sounds.   Abdominal:      Palpations: Abdomen is soft.      Tenderness: There is no abdominal tenderness.   Musculoskeletal:      Cervical back: Full passive range of motion without pain and neck supple. Spinous process tenderness present.      Right lower le+ Pitting Edema present.      Left lower le+ Pitting Edema present.   Skin:     General: Skin is warm and dry.   Neurological:      General: No focal deficit present.      Mental Status: He is alert. Mental status is at baseline.      Comments: Bilateral lower extremity weakness         Vital Signs  ED Triage Vitals   Temperature Pulse Respirations Blood Pressure SpO2   24 0626 24 0626 24 0626 24 0626 24 0626   98.1 °F (36.7 °C) 75 18 124/61 97 %      Temp Source Heart Rate Source Patient Position -  Orthostatic VS BP Location FiO2 (%)   04/16/24 0626 04/16/24 0626 04/16/24 0626 04/16/24 0626 --   Oral Monitor Sitting Right arm       Pain Score       04/16/24 0630       5           Vitals:    04/16/24 0626 04/16/24 0630 04/16/24 0700   BP: 124/61 123/58 124/61   Pulse: 75 77 74   Patient Position - Orthostatic VS: Sitting Lying          Visual Acuity  Visual Acuity      Flowsheet Row Most Recent Value   L Pupil Size (mm) 2   R Pupil Size (mm) 2            ED Medications  Medications - No data to display    Diagnostic Studies  Results Reviewed       Procedure Component Value Units Date/Time    CBC and differential [[id number]]  (Abnormal) Collected: 04/16/24 0657    Lab Status: Final result Specimen: Blood from Arm, Left Updated: 04/16/24 0727     WBC 6.52 Thousand/uL      RBC 3.33 Million/uL      Hemoglobin 10.9 g/dL      Hematocrit 33.1 %      MCV 99 fL      MCH 32.7 pg      MCHC 32.9 g/dL      RDW 13.0 %      MPV 9.8 fL      Platelets 181 Thousands/uL      nRBC 0 /100 WBCs      Segmented % 68 %      Immature Grans % 0 %      Lymphocytes % 20 %      Monocytes % 9 %      Eosinophils Relative 2 %      Basophils Relative 1 %      Absolute Neutrophils 4.43 Thousands/µL      Absolute Immature Grans 0.02 Thousand/uL      Absolute Lymphocytes 1.29 Thousands/µL      Absolute Monocytes 0.61 Thousand/µL      Eosinophils Absolute 0.14 Thousand/µL      Basophils Absolute 0.03 Thousands/µL     B-Type Natriuretic Peptide(BNP) [[id number]] Collected: 04/16/24 0657    Lab Status: In process Specimen: Blood from Arm, Left Updated: 04/16/24 0720    HS Troponin 0hr (reflex protocol) [[id number]] Collected: 04/16/24 0657    Lab Status: In process Specimen: Blood from Arm, Left Updated: 04/16/24 0720    Comprehensive metabolic panel [[id number]] Collected: 04/16/24 0657    Lab Status: No result Specimen: Blood from Arm, Left                    CT head without contrast    (Results Pending)   CT spine cervical without contrast     (Results Pending)   XR chest 1 view portable    (Results Pending)   CT abdomen pelvis wo contrast    (Results Pending)              Procedures  Procedures         ED Course                                             Medical Decision Making  Amount and/or Complexity of Data Reviewed  Labs: ordered.  Radiology: ordered.             Disposition  Final diagnoses:   None     ED Disposition       None          Follow-up Information    None         Patient's Medications   Discharge Prescriptions    No medications on file       No discharge procedures on file.    PDMP Review       None            ED Provider  Electronically Signed by           microangiopathy.                  Workstation performed: [id number]         CT spine cervical without contrast   Final Result by Dimitri Romeo MD (04/16 0812)      No cervical spine fracture or traumatic malalignment.                  Workstation performed: [id number]         CT abdomen pelvis wo contrast   Final Result by Dimitri Romeo MD (04/16 0830)      Decreased intracardiac blood pool density attributed to anemia.      No evidence of acute abdominopelvic process.      No fracture.      Colonic diverticulosis.      Additional chronic findings and negatives as above.            Workstation performed: [id number]         XR chest 1 view portable   Final Result by Danita Pineda MD (04/16 1320)      No acute cardiopulmonary disease.            Workstation performed: [id number]                    Procedures  ECG 12 Lead Documentation Only    Date/Time: 4/16/2024 7:50 AM    Performed by: Taylor Delgado DO  Authorized by: Taylor Delgado DO    Indications / Diagnosis:  Weakness  ECG reviewed by me, the ED Provider: yes    Patient location:  ED  Previous ECG:     Previous ECG:  Compared to current    Similarity:  No change    Comparison to cardiac monitor: Yes    Interpretation:     Interpretation: non-specific    Rate:     ECG rate:  72    ECG rate assessment: normal    Rhythm:     Rhythm: sinus rhythm and A-V block    Ectopy:     Ectopy: none    QRS:     QRS axis:  Normal    QRS intervals:  Normal  Conduction:     Conduction: abnormal      Abnormal conduction: 1st degree    ST segments:     ST segments:  Normal  T waves:     T waves: normal             ED Course                                             Medical Decision Making  83 year old male brought in for evaluation after a mechanical fall with headstrike. Patient endorsing a headache, neck pain, and low back pain. On exam, patient with normal vitals, in no acute distress. Small abrasion noted to the anterior forehead.  Patient does have midline cervical spine tenderness on exam, no step-offs or deformities noted. Patient also noted to have 2+ pitting edema in bilateral lower extremities with weakness in bilateral lower extremities as well. No other focal deficits or injuries noted. Given the patient's head injury with reported neck pain and low back pain, patient evaluated with CT head, cervical spine, and abdomen/pelvis to look for any acute injuries. Patient also evaluated with CBC, CMP, trop, and BNP given the reported weakness and lower extremity swelling.     Patient's CT scans negative for acute injuries at this time. CBC notable for mild anemia, and when compared to previous labs it appears that his Hgb has been slowly trending down. I discussed this finding with family and encouraged close follow up with the patient's PCP. Labs otherwise unremarkable for acute pathology at this time, including BNP which is only mildly elevated. No signs of fluid overload on CXR either. I discussed these results with family members, and they state that they are comfortable with him going home at this time as there are multiple family members that are able to help him get around the house at this time. The patient was discharged home in stable condition with symptomatic care instructions and strict ED return precautions.     Amount and/or Complexity of Data Reviewed  Labs: ordered.  Radiology: ordered.             Disposition  Final diagnoses:   Fall, initial encounter   Closed head injury, initial encounter   Neck pain   Lumbar back pain     Time reflects when diagnosis was documented in both MDM as applicable and the Disposition within this note       Time User Action Codes Description Comment    4/16/2024  9:14 AM Taylor Delgado [W19.XXXA] Fall, initial encounter     4/16/2024  9:14 AM Taylor Delgado [S09.90XA] Closed head injury, initial encounter     4/16/2024  9:14 AM Taylor Delgado [M54.2] Neck pain     4/16/2024  9:15 AM Sandy  Taylor Nevarez [M54.50] Lumbar back pain     4/16/2024  9:15 AM Taylor Delgado [W19.XXXA] Fall, initial encounter     4/16/2024  9:15 AM Taylor Delgado [S09.90XA] Closed head injury, initial encounter           ED Disposition       ED Disposition   Discharge    Condition   Stable    Date/Time   Tue Apr 16, 2024  9:14 AM    Comment   Daniel Sanz Jr. discharge to home/self care.                   Follow-up Information       Follow up With Specialties Details Why Contact Info Additional Information    Charlie Martinez DO Family Medicine Schedule an appointment as soon as possible for a visit in 1 week  [address] [place] [id number] [phone]       [facility] Emergency Medicine Go to  If symptoms worsen [facility] [address] [phone] [facility], [address] [place] [address], [id number]            Patient's Medications   Discharge Prescriptions    No medications on file       No discharge procedures on file.    PDMP Review       None            ED Provider  Electronically Signed by             Taylor Delgado DO  05/01/24 0104

## 2024-04-17 ENCOUNTER — APPOINTMENT (OUTPATIENT)
Dept: PHYSICAL THERAPY | Facility: MEDICAL CENTER | Age: 84
End: 2024-04-17
Payer: COMMERCIAL

## 2024-04-18 LAB
ATRIAL RATE: 72 BPM
P AXIS: 67 DEGREES
PR INTERVAL: 292 MS
QRS AXIS: 35 DEGREES
QRSD INTERVAL: 88 MS
QT INTERVAL: 386 MS
QTC INTERVAL: 422 MS
T WAVE AXIS: 35 DEGREES
VENTRICULAR RATE: 72 BPM

## 2024-04-18 PROCEDURE — 93010 ELECTROCARDIOGRAM REPORT: CPT | Performed by: INTERNAL MEDICINE

## 2024-04-23 ENCOUNTER — HOSPITAL ENCOUNTER (OUTPATIENT)
Dept: RADIOLOGY | Facility: MEDICAL CENTER | Age: 84
Discharge: HOME | End: 2024-04-23
Payer: COMMERCIAL

## 2024-04-23 DIAGNOSIS — J61 ASBESTOSIS (HCC): ICD-10-CM

## 2024-04-23 PROCEDURE — G1004 CDSM NDSC: HCPCS

## 2024-04-23 PROCEDURE — 71250 CT THORAX DX C-: CPT

## 2024-04-24 ENCOUNTER — APPOINTMENT (OUTPATIENT)
Dept: PHYSICAL THERAPY | Facility: MEDICAL CENTER | Age: 84
End: 2024-04-24
Payer: COMMERCIAL

## 2024-04-29 ENCOUNTER — APPOINTMENT (OUTPATIENT)
Dept: PHYSICAL THERAPY | Facility: MEDICAL CENTER | Age: 84
End: 2024-04-29
Payer: COMMERCIAL

## 2024-05-24 ENCOUNTER — HOSPITAL ENCOUNTER (OUTPATIENT)
Dept: RADIOLOGY | Facility: HOSPITAL | Age: 84
End: 2024-05-24
Payer: COMMERCIAL

## 2024-05-24 DIAGNOSIS — G20.B2 PARKINSON'S DISEASE WITH DYSKINESIA, WITH FLUCTUATIONS: ICD-10-CM

## 2024-05-24 DIAGNOSIS — R47.02 DYSPHASIA: ICD-10-CM

## 2024-05-24 PROCEDURE — 74220 X-RAY XM ESOPHAGUS 1CNTRST: CPT

## 2024-06-05 ENCOUNTER — TELEPHONE (OUTPATIENT)
Dept: HEMATOLOGY ONCOLOGY | Facility: CLINIC | Age: 84
End: 2024-06-05

## 2024-06-05 NOTE — TELEPHONE ENCOUNTER
Appointment Confirmation   Who are you speaking with? Spouse   If it is not the patient, are they listed on an active communication consent form? Yes   Which provider is the appointment scheduled with?  Dr. Crain   When is the appointment scheduled?  Please list date and time 9/16/24 @ 1140AM   At which location is the appointment scheduled to take place? [place]   Did caller verbalize understanding of appointment details? Yes

## 2024-06-12 ENCOUNTER — APPOINTMENT (EMERGENCY)
Dept: CT IMAGING | Facility: HOSPITAL | Age: 84
DRG: 155 | End: 2024-06-12
Payer: COMMERCIAL

## 2024-06-12 ENCOUNTER — APPOINTMENT (EMERGENCY)
Dept: RADIOLOGY | Facility: HOSPITAL | Age: 84
DRG: 155 | End: 2024-06-12
Payer: COMMERCIAL

## 2024-06-12 ENCOUNTER — HOSPITAL ENCOUNTER (INPATIENT)
Facility: HOSPITAL | Age: 84
LOS: 2 days | Discharge: NURSING, REHAB OR LONG-TERM CARE | DRG: 155 | End: 2024-06-14
Attending: EMERGENCY MEDICINE | Admitting: SURGERY
Payer: COMMERCIAL

## 2024-06-12 DIAGNOSIS — G20.A1 PARKINSON'S DISEASE, UNSPECIFIED WHETHER DYSKINESIA PRESENT, UNSPECIFIED WHETHER MANIFESTATIONS FLUCTUATE: ICD-10-CM

## 2024-06-12 DIAGNOSIS — S02.2XXA CLOSED FRACTURE OF NASAL BONE, INITIAL ENCOUNTER: Primary | ICD-10-CM

## 2024-06-12 PROBLEM — M25.511 CHRONIC RIGHT SHOULDER PAIN: Status: RESOLVED | Noted: 2019-03-12 | Resolved: 2024-06-12

## 2024-06-12 PROBLEM — S00.03XA SCALP HEMATOMA: Status: ACTIVE | Noted: 2024-06-12
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
## 2024-06-12 PROBLEM — I10 HYPERTENSION: Status: ACTIVE | Noted: 2024-06-12

## 2024-06-12 PROBLEM — G89.29 CHRONIC RIGHT SHOULDER PAIN: Status: RESOLVED | Noted: 2019-03-12 | Resolved: 2024-06-12

## 2024-06-12 PROBLEM — T07.XXXA MULTIPLE ABRASIONS: Status: ACTIVE | Noted: 2024-06-12

## 2024-06-12 LAB
ANION GAP SERPL CALCULATED.3IONS-SCNC: 6 MMOL/L (ref 4–13)
BASOPHILS # BLD AUTO: 0.02 THOUSANDS/ÂΜL (ref 0–0.1)
BASOPHILS NFR BLD AUTO: 0 % (ref 0–1)
BUN SERPL-MCNC: 28 MG/DL (ref 5–25)
CALCIUM SERPL-MCNC: 9.5 MG/DL (ref 8.4–10.2)
CHLORIDE SERPL-SCNC: 106 MMOL/L (ref 96–108)
CO2 SERPL-SCNC: 29 MMOL/L (ref 21–32)
CREAT SERPL-MCNC: 1.07 MG/DL (ref 0.6–1.3)
EOSINOPHIL # BLD AUTO: 0.18 THOUSAND/ÂΜL (ref 0–0.61)
EOSINOPHIL NFR BLD AUTO: 2 % (ref 0–6)
ERYTHROCYTE [DISTWIDTH] IN BLOOD BY AUTOMATED COUNT: 12.7 % (ref 11.6–15.1)
GFR SERPL CREATININE-BSD FRML MDRD: 64 ML/MIN/1.73SQ M
GLUCOSE SERPL-MCNC: 94 MG/DL (ref 65–140)
HCT VFR BLD AUTO: 36.5 % (ref 36.5–49.3)
HGB BLD-MCNC: 12.1 G/DL (ref 12–17)
IMM GRANULOCYTES # BLD AUTO: 0.03 THOUSAND/UL (ref 0–0.2)
IMM GRANULOCYTES NFR BLD AUTO: 0 % (ref 0–2)
LYMPHOCYTES # BLD AUTO: 1.15 THOUSANDS/ÂΜL (ref 0.6–4.47)
LYMPHOCYTES NFR BLD AUTO: 14 % (ref 14–44)
MCH RBC QN AUTO: 32.3 PG (ref 26.8–34.3)
MCHC RBC AUTO-ENTMCNC: 33.2 G/DL (ref 31.4–37.4)
MCV RBC AUTO: 97 FL (ref 82–98)
MONOCYTES # BLD AUTO: 0.56 THOUSAND/ÂΜL (ref 0.17–1.22)
MONOCYTES NFR BLD AUTO: 7 % (ref 4–12)
NEUTROPHILS # BLD AUTO: 6.02 THOUSANDS/ÂΜL (ref 1.85–7.62)
NEUTS SEG NFR BLD AUTO: 77 % (ref 43–75)
NRBC BLD AUTO-RTO: 0 /100 WBCS
PLATELET # BLD AUTO: 161 THOUSANDS/UL (ref 149–390)
PLATELET # BLD AUTO: 178 THOUSANDS/UL (ref 149–390)
PMV BLD AUTO: 9.7 FL (ref 8.9–12.7)
PMV BLD AUTO: 9.9 FL (ref 8.9–12.7)
POTASSIUM SERPL-SCNC: 5 MMOL/L (ref 3.5–5.3)
RBC # BLD AUTO: 3.75 MILLION/UL (ref 3.88–5.62)
SODIUM SERPL-SCNC: 141 MMOL/L (ref 135–147)
WBC # BLD AUTO: 7.96 THOUSAND/UL (ref 4.31–10.16)

## 2024-06-12 PROCEDURE — 85049 AUTOMATED PLATELET COUNT: CPT | Performed by: SURGERY

## 2024-06-12 PROCEDURE — 73130 X-RAY EXAM OF HAND: CPT

## 2024-06-12 PROCEDURE — 72170 X-RAY EXAM OF PELVIS: CPT

## 2024-06-12 PROCEDURE — 70486 CT MAXILLOFACIAL W/O DYE: CPT

## 2024-06-12 PROCEDURE — 72125 CT NECK SPINE W/O DYE: CPT

## 2024-06-12 PROCEDURE — 85025 COMPLETE CBC W/AUTO DIFF WBC: CPT

## 2024-06-12 PROCEDURE — 99222 1ST HOSP IP/OBS MODERATE 55: CPT | Performed by: SURGERY

## 2024-06-12 PROCEDURE — 80048 BASIC METABOLIC PNL TOTAL CA: CPT

## 2024-06-12 PROCEDURE — NC001 PR NO CHARGE: Performed by: FAMILY MEDICINE

## 2024-06-12 PROCEDURE — 70450 CT HEAD/BRAIN W/O DYE: CPT

## 2024-06-12 PROCEDURE — 99285 EMERGENCY DEPT VISIT HI MDM: CPT

## 2024-06-12 PROCEDURE — 73552 X-RAY EXAM OF FEMUR 2/>: CPT

## 2024-06-12 PROCEDURE — 36415 COLL VENOUS BLD VENIPUNCTURE: CPT

## 2024-06-12 RX ORDER — HYDROMORPHONE HCL IN WATER/PF 6 MG/30 ML
0.2 PATIENT CONTROLLED ANALGESIA SYRINGE INTRAVENOUS EVERY 2 HOUR PRN
Status: DISCONTINUED | OUTPATIENT
Start: 2024-06-12 | End: 2024-06-14 | Stop reason: HOSPADM

## 2024-06-12 RX ORDER — OXYCODONE HYDROCHLORIDE 5 MG/1
5 TABLET ORAL EVERY 4 HOURS PRN
Status: DISCONTINUED | OUTPATIENT
Start: 2024-06-12 | End: 2024-06-14 | Stop reason: HOSPADM

## 2024-06-12 RX ORDER — AMOXICILLIN 250 MG
1 CAPSULE ORAL
Status: DISCONTINUED | OUTPATIENT
Start: 2024-06-12 | End: 2024-06-13

## 2024-06-12 RX ORDER — ACETAMINOPHEN 325 MG/1
975 TABLET ORAL EVERY 8 HOURS SCHEDULED
Status: DISCONTINUED | OUTPATIENT
Start: 2024-06-12 | End: 2024-06-14 | Stop reason: HOSPADM

## 2024-06-12 RX ORDER — ENOXAPARIN SODIUM 100 MG/ML
30 INJECTION SUBCUTANEOUS EVERY 12 HOURS
Status: DISCONTINUED | OUTPATIENT
Start: 2024-06-12 | End: 2024-06-14 | Stop reason: HOSPADM

## 2024-06-12 RX ORDER — LANOLIN ALCOHOL/MO/W.PET/CERES
6 CREAM (GRAM) TOPICAL
Status: DISCONTINUED | OUTPATIENT
Start: 2024-06-12 | End: 2024-06-14 | Stop reason: HOSPADM

## 2024-06-12 RX ADMIN — SENNOSIDES, DOCUSATE SODIUM 1 TABLET: 8.6; 5 TABLET ORAL at 21:29

## 2024-06-12 RX ADMIN — ACETAMINOPHEN 975 MG: 325 TABLET, FILM COATED ORAL at 15:23

## 2024-06-12 RX ADMIN — ENOXAPARIN SODIUM 30 MG: 30 INJECTION SUBCUTANEOUS at 15:25

## 2024-06-12 RX ADMIN — Medication 6 MG: at 21:29

## 2024-06-12 RX ADMIN — ACETAMINOPHEN 975 MG: 325 TABLET, FILM COATED ORAL at 21:29

## 2024-06-12 NOTE — ASSESSMENT & PLAN NOTE
- Mechanical fall onto face while walking on the pavement at grocery store  - No LOC or blood thinner use  - Pt initially complaining of right-sided hand, hip, and upper leg pain  - XR right hand, femur, and pelvis pending  - CT head: No acute intracranial abnormality. Stable chronic microangiopathic changes within the brain  - Injuries noted below  - Neuro checks q4 hours  - PT/OT evaluation and treatment as indicated

## 2024-06-12 NOTE — ASSESSMENT & PLAN NOTE
- S/p mechanical fall  - CT facial bones: Leftward nasal septal deviation with acute appearing minimally displaced fracture of the anterior nasal septum (perpendicular plate of the ethmoid)  - Bleeding currently controlled, no evidence of septal hematoma  - OMFS consult placed, appreciate recommendations  - Minor abrasion located on nasal bridge  - Local wound care

## 2024-06-12 NOTE — PLAN OF CARE
Problem: Potential for Falls  Goal: Patient will remain free of falls  Description: INTERVENTIONS:  - Educate patient/family on patient safety including physical limitations  - Instruct patient to call for assistance with activity   - Consult OT/PT to assist with strengthening/mobility   - Keep Call bell within reach  - Keep bed low and locked with side rails adjusted as appropriate  - Keep care items and personal belongings within reach  - Initiate and maintain comfort rounds  - Make Fall Risk Sign visible to staff  - Offer Toileting every 2 Hours, in advance of need  - Initiate/Maintain bed alarm  - Obtain necessary fall risk management equipment:   - Apply yellow socks and bracelet for high fall risk patients  - Consider moving patient to room near nurses station  Outcome: Progressing     Problem: PAIN - ADULT  Goal: Verbalizes/displays adequate comfort level or baseline comfort level  Description: Interventions:  - Encourage patient to monitor pain and request assistance  - Assess pain using appropriate pain scale  - Administer analgesics based on type and severity of pain and evaluate response  - Implement non-pharmacological measures as appropriate and evaluate response  - Consider cultural and social influences on pain and pain management  - Notify physician/advanced practitioner if interventions unsuccessful or patient reports new pain  Outcome: Progressing     Problem: INFECTION - ADULT  Goal: Absence or prevention of progression during hospitalization  Description: INTERVENTIONS:  - Assess and monitor for signs and symptoms of infection  - Monitor lab/diagnostic results  - Monitor all insertion sites, i.e. indwelling lines, tubes, and drains  - Monitor endotracheal if appropriate and nasal secretions for changes in amount and color  - [place] appropriate cooling/warming therapies per order  - Administer medications as ordered  - Instruct and encourage patient and family to use good hand hygiene  technique  - Identify and instruct in appropriate isolation precautions for identified infection/condition  Outcome: Progressing  Goal: Absence of fever/infection during neutropenic period  Description: INTERVENTIONS:  - Monitor WBC    Outcome: Progressing     Problem: DISCHARGE PLANNING  Goal: Discharge to home or other facility with appropriate resources  Description: INTERVENTIONS:  - Identify barriers to discharge w/patient and caregiver  - Arrange for needed discharge resources and transportation as appropriate  - Identify discharge learning needs (meds, wound care, etc.)  - Arrange for interpretive services to assist at discharge as needed  - Refer to Case Management Department for coordinating discharge planning if the patient needs post-hospital services based on physician/advanced practitioner order or complex needs related to functional status, cognitive ability, or social support system  Outcome: Progressing

## 2024-06-12 NOTE — H&P
[facility]  H&P  Name: Daniel Sanz Jr. 83 y.o. male I MRN: [id number]  Unit/Bed#: W -01 I Date of Admission: 6/12/2024   Date of Service: 6/12/2024 I Hospital Day: 0      Assessment & Plan   Hypertension  Assessment & Plan  - Continue home regimen  - Monitor BP    Multiple abrasions  Assessment & Plan  - Bleeding currently controlled  - Pain control  - Local wound care    Scalp hematoma  Assessment & Plan  - S/p mechanical fall, located on left frontal scalp  - CT head/facial bones: Small left supraorbital/periorbital/anterior frontal scalp soft tissue swelling/hematoma without underlying calvarial fracture  - Pain control and ice as needed  - Continue to monitor    Closed fracture of nasal bone  Assessment & Plan  - S/p mechanical fall  - CT facial bones: Leftward nasal septal deviation with acute appearing minimally displaced fracture of the anterior nasal septum (perpendicular plate of the ethmoid)  - Bleeding currently controlled, no evidence of septal hematoma  - OMFS consult placed, appreciate recommendations  - Minor abrasion located on nasal bridge  - Local wound care    Stage 3a chronic kidney disease (HCC)  Assessment & Plan  Lab Results   Component Value Date    EGFR 64 06/12/2024    EGFR 53 04/16/2024    EGFR 54 03/05/2024    CREATININE 1.07 06/12/2024    CREATININE 1.25 04/16/2024    CREATININE 1.22 03/05/2024     - Baseline Cr around 1-1.25  - Currently at baseline  - Avoid nephrotoxic drugs, maintain adequate hydration  - Continue to monitor renal function    Parkinson's disease  Assessment & Plan  - A&O x2 to person and place, not time, at baseline  - Continue home regimen    Chronic right shoulder pain  Assessment & Plan  - Hx rotator cuff repair  - No acute injury or symptoms  - Pain control    * Fall  Assessment & Plan  - Mechanical fall onto face while walking on the pavement at grocery store  - No LOC or blood thinner use  - Pt initially complaining of  "right-sided hand, hip, and upper leg pain  - XR right hand, femur, and pelvis pending  - CT head: No acute intracranial abnormality. Stable chronic microangiopathic changes within the brain  - Injuries noted below  - Neuro checks q4 hours  - PT/OT evaluation and treatment as indicated         Trauma Alert: Evaluation; trauma team notified at 1420 via text   Model of Arrival: Ambulance    Trauma Team: Attending Dr. Marina Ramsey and AP Joel SAGASTUME  Consultants:     Oral Maxillofacial: routine consult; Epic consult order placed;     History of Present Illness     Chief Complaint: \"I'm doing fine\"  Mechanism:Fall     HPI:    Daniel Sanz Jr. is a 83 y.o. male with a PMH of Parkinson's disease and HTN who presents s/p mechanical fall with strike of face. Pt is an unreliable historian. He states he was walking on the sidewalk then tripped on something, denying LOC, use of blood thinners, or current headache. Also complaining of right-sided hip, right upper leg, and right hand pain due to fall. Notes he \"got cut\" on his b/l knees due to fall. Denies weakness, paresthesias, N/V, dizziness, or changes in vision. No chest pain, SOB, abdominal pain, back pain.    Review of Systems   HENT:  Positive for facial swelling and nosebleeds.    Eyes:  Negative for visual disturbance.   Respiratory:  Negative for shortness of breath.    Cardiovascular:  Negative for chest pain.   Musculoskeletal:  Positive for arthralgias (right hand, hip, and knee pain).   Skin:  Positive for wound (abrasions to b/l knees).   Neurological:  Negative for dizziness and headaches.   All other systems reviewed and are negative.    12-point, complete review of systems was reviewed and negative except as stated above.     Historical Information     Past Medical History:   Diagnosis Date    Arthritis     Asbestos exposure     Asbestosis (HCC)     GERD (gastroesophageal reflux disease)     Hemoptysis 5/11/2022    Hypertension     Lung disease     " Parkinson's disease      Past Surgical History:   Procedure Laterality Date    HERNIA REPAIR      INTRACAPSULAR CATARACT EXTRACTION Left     KNEE SURGERY      many years ago    SHOULDER SURGERY          Social History     Tobacco Use    Smoking status: Never     Passive exposure: Past    Smokeless tobacco: Former     Types: Chew     Quit date: 1998   Vaping Use    Vaping status: Never Used   Substance Use Topics    Alcohol use: Not Currently    Drug use: Never     Immunization History   Administered Date(s) Administered    COVID-19 PFIZER VACCINE 0.3 ML IM 02/14/2021, 03/07/2021, 10/28/2021    INFLUENZA 10/14/2014, 09/21/2015, 09/28/2016, 11/15/2017, 10/19/2018, 09/20/2019, 10/07/2020, 11/26/2021, 10/12/2022    Influenza Split High Dose Preservative Free IM 10/02/2016    Influenza, Seasonal Vaccine, Quadrivalent, Adjuvanted, .5e 10/07/2020, 11/26/2021, 10/12/2022    Pneumococcal Conjugate 13-Valent 09/28/2016    Pneumococcal Polysaccharide PPV23 12/12/2017    TD (adult) Preservative Free 05/20/2019    Td (adult), adsorbed 05/20/2019     Last Tetanus: 2019  Family History: Non-contributory    1. Before the illness or injury that brought you to the Emergency, did you need someone to help you on a regular basis? 1=Yes   2. Since the illness or injury that brought you to the Emergency, have you needed more help than usual to take care of yourself? 1=Yes   3. Have you been hospitalized for one or more nights during the past 6 months (excluding a stay in the Emergency Department)? 1=Yes   4. In general, do you see well? 1=No   5. In general, do you have serious problems with your memory? 1=Yes   6. Do you take more than three different medications everyday? 1=Yes   TOTAL   6     Did you order a geriatric consult if the score was 2 or greater?: yes     Meds/Allergies   all current active meds have been reviewed and PTA meds:   Prior to Admission Medications   Prescriptions Last Dose Informant Patient Reported? Taking?    amLODIPine (NORVASC) 5 mg tablet   Yes No   Sig: Take 5 mg by mouth daily   carbidopa-levodopa (Sinemet)  mg per tablet   No No   Sig: Take 2.5 tablets by mouth 3 (three) times a day   ketorolac (ACULAR) 0.5 % ophthalmic solution  Spouse/Significant Other, Self Yes No   predniSONE 10 mg tablet  Spouse/Significant Other, Self Yes No   Sig: Take 10 mg by mouth 2 (two) times a day   prednisoLONE acetate (PRED FORTE) 1 % ophthalmic suspension  Spouse/Significant Other, Self Yes No      Facility-Administered Medications: None        Allergies   Allergen Reactions    Morphine And Codeine Other (See Comments)     Patient gets delirious       Objective   Initial Vitals:   Temperature: 97.6 °F (36.4 °C) (06/12/24 1057)  Pulse: 84 (06/12/24 1057)  Respirations: 18 (06/12/24 1057)  Blood Pressure: (!) 214/88 (06/12/24 1057)    Primary Survey:   Airway:        Status: patent;        Pre-hospital Interventions: none        Hospital Interventions: none  Breathing:        Pre-hospital Interventions: none       Effort: normal       Right breath sounds: normal       Left breath sounds: normal  Circulation:        Rhythm: regular       Rate: regular   Right Pulses Left Pulses    R radial: 2+    R pedal: 2+     L radial: 2+    L pedal: 2+       Disability:        GCS: Eye: 4; Verbal: 4 Motor: 6 Total: 14       Right Pupil: round;  reactive         Left Pupil:  round;  reactive      R Motor Strength L Motor Strength    R : 5/5  R dorsiflex: 5/5  R plantarflex: 5/5 L : 5/5  L dorsiflex: 5/5  L plantarflex: 5/5        Sensory:  No sensory deficit  Exposure:       Completed: Yes      Secondary Survey:  Physical Exam  Constitutional:       Appearance: Normal appearance.   HENT:      Head: Normocephalic.      Comments: Left supraorbital hematoma with overlying ecchymosis, TTP     Right Ear: Tympanic membrane normal.      Left Ear: Tympanic membrane normal.      Nose: Septal deviation (left), signs of injury (small abrasion  over nasal bridge) and nasal tenderness (nasal bridge) present.      Right Nostril: No septal hematoma.      Left Nostril: No septal hematoma.      Mouth/Throat:      Mouth: Mucous membranes are moist.      Comments: Dried blood noted on lower lip  Eyes:      Extraocular Movements: Extraocular movements intact.      Pupils: Pupils are equal, round, and reactive to light.   Cardiovascular:      Rate and Rhythm: Normal rate and regular rhythm.      Pulses: Normal pulses.   Pulmonary:      Effort: Pulmonary effort is normal.      Breath sounds: Normal breath sounds.   Abdominal:      General: Abdomen is flat. There is no distension.      Palpations: Abdomen is soft.      Tenderness: There is no abdominal tenderness.   Musculoskeletal:         General: Swelling (Chronic b/l LE edema), tenderness (TTP over right dorsal hand, right hip, and right thigh) and signs of injury present. Normal range of motion.      Cervical back: Normal range of motion. No tenderness.      Comments: Chronic rigidity due to Parkinson's. No cervical, thoracic or lumbar spine tenderness   Skin:     Findings: Abrasion (bilateral knees and dorsal aspect of right hand) present.   Neurological:      General: No focal deficit present.      Mental Status: He is alert. Mental status is at baseline.      Sensory: Sensation is intact.      Motor: Tremor (chronic due to Parkinson's) present.      Comments: A&O x2 to person and place         Invasive Devices       Peripheral Intravenous Line  Duration             Peripheral IV 06/12/24 Left Antecubital <1 day                  Lab Results: I have personally reviewed all pertinent laboratory/test results from 06/12/24, including the preceding 24 hours.  Recent Labs     06/12/24  1151 06/12/24  1529   WBC 7.96  --    HGB 12.1  --    HCT 36.5  --     161   SODIUM 141  --    K 5.0  --      --    CO2 29  --    BUN 28*  --    CREATININE 1.07  --    GLUC 94  --        Imaging Results: I have personally  reviewed pertinent images saved in PACS. CT scan findings (and other pertinent positive findings on images) were discussed with radiology. My interpretation of the images/reports are as follows:  Chest Xray(s): N/A   FAST exam(s): N/A   CT Scan(s): positive for acute findings: anterior nasal septum fracture, frontal scalp soft tissue swelling/hematoma without underlying fracture   Additional Xray(s): pending     Other Studies: none    Code Status: Level 1 - Full Code  Advance Directive and Living Will:      Power of :    POLST:

## 2024-06-12 NOTE — CONSULTS
Consultation - Geriatric Medicine   Daniel Sanz  83 y.o. male MRN: [id number]  Unit/Bed#: ED-39 Encounter: [id number]      Assessment & Plan   Fall  Lamar fall precautions   Assess patient frequently for physical needs, encourage use of assistant devices as needed and directed by PT/OT  Identify cognitive and physical deficits and behaviors that affect risk of falls  Consider moving patient closer to nursing station to monitor more closely for impulsive behavior which may increase risk of falls  Educate patient/family on patient safety including physical limitations and importance of using call bell for assistance   Modify environment to reduce risk of injury including disconnecting from pole when not in use, ensuring adequate lighting in room and restroom, ensuring that path to restroom is clear and free of trip hazards  PT/OT consulted to assist with strengthening/mobility and assist with discharge planning to appropriate level of care    Closed fracture of nasal bone  S/P fall  CT facial bones showed leftward nasal septal deviation with acute appearing minimally displaced fracture of the anterior nasal septum  Oral maxillofacial surgery consulted    Parkinson's disease  History of dementia/Parkinson's disease  Follows with neurology as an outpatient  Wife reports a known history of hallucinations, he occasionally has them but infrequent recently  She is unclear what he has hallucinations of, just knows that he sees things that are not there  Most recent MoCA was a 7 out of 30  Carbidopa/levodopa 25/100 mg recently increased to 2-1/2 tablets 3 times daily  Recommend restarting home dose of carbidopa/levodopa  Delirium precautions    Hypertension  Blood pressures elevated on admission  Admission blood pressure 214/88  Most recent blood pressure 189/80    Gerd  Stable, currently not on any medication as per the wife    Insomnia  First-line treatment is behavior modification  Maintain sleep-wake cycle, avoid  nighttime interruptions  Avoid caffeine throughout the day  Avoid napping throughout the day  Encourage physical activity throughout the day  Avoid sedative hypnotics including benzodiazepines and benadryl  Takes melaton 6mg nightly at home, restarted here    Leg swelling  Right reported some leg swelling for which she was started on Lasix 20 mg daily, first dose was given this morning  Elevate legs  PCP also ordered compression stockings    Vision impairment  Patient does have vision impairment  Wears glasses at baseline   Recommend use of corrective lenses at all appropriate times  Encourage adequate lighting and encourage use of assistance with ambulation  Keep personal belongings close to avoid reaching  Encourage appropriate footwear at all times  Recommend large font for printed materials provided to patient    Hard of hearing  Patient does have hearing impairment   Hearing impairment  correlated with depression, cognitive impairment, delirium and falls in the older adult  Speak clearly  Use sound amplifier  Speak face to face  Use clear dictation and enunciation of words    Dysphagia  Patient was noted to be coughing while trying to eat his lunch  Patient's wife denies any issues with swallowing at home though does have therapy at home  Will consult speech for evaluation    Delirium precautions  Patient is alert oriented to person, place, and situation disoriented to time  At risk for delirium due to hospitalization, medications, sleep disturbance, acute pain, dementia  Recommend delirium precautions  Maintain sleep-wake cycle, avoid nighttime interruptions  minimize overnight interruptions, group overnight vitals/labs/nursing checks as possible  dim lights, close blinds and turn off tv to minimize stimulation and encourage sleep environment in evenings  Provide adequate pain control  Avoid urinary retention and constipation  Provide frequent and early mobilization  Provide frequent redirection and  reorientation as needed  Avoid medications that may worsen or precipitate delirium such as tramadol, benzodiazepines, anticholinergics, and Benadryl  Redirect unwanted behaviors as first-line therapy, avoid physical restraints as able to  Continue to monitor    Frailty  Clinical Frail Scale: 6  Encourage adequate hydration and nutrition, including protein in meals  OOB as tolerated  PT/OT consulted  Encourage early and frequent mobilization    Ambulatory dysfunction  At a baseline ambulates with walker or cane, although infrequently uses as per the wife  PT/OT following  Fall precautions  Out of bed as tolerated  Encourage early and frequent mobilization  Encourage adequate hydration and nutrition  Provide adequate pain management   Goal is undetermined  Continue with PT/OT for continued strength and balance training         Home medication review  Carbidopa-levidopa 25/100mg take 2.5 tablets tid- filled 3/21/24  Amlodipine 5mg daily- filled 3/10/24  Myrbetriq 25mg daily- filled 5/15/24  Amantadine 100mg daily- was discontinued  [facility]ally confirmed with [facility]      Lasix 20mg daily- just filled 6/10/24  Keflex 500mg bid -7-day supply  Myrbetriq 25mg daily  Losartan 50mg daily- filled 3/20 for 90 days  Personally confirmed with [facility] [id number]    History of Present Illness   Physician Requesting Consult: Marina Ramsey MD  Reason for Consult / Principal Problem: fall, parkinson's   Hx and PE limited by: Dementia  Additional history obtained from: Chart review and patient evaluation      HPI: Daniel Sanz Jr. is a 83 y.o. year old male who presents with history of Parkinson's disease, hypertension, GERD, arthritis, dementia, and ambulatory dysfunction.  He presented to the ER after he tripped and fell in the [employer] parking lot.    Patient lives at home with his wife in a ranch home.  He occasionally uses a walker or cane for ambulation, although does not use frequently.  He only uses it when he  feels necessary.  He has had at least 1 other fall in the last 6 months.  He is independent for bathing and dressing.  His wife is at the cooking, cleaning, medications, and finances.  He has had ongoing issues with insomnia for which he takes melatonin at bedtime.  Occasional issues with anxiety.  Appetite has been stable, no recent appetite loss or weight loss.  She reports he no longer drives, stopped driving in September.  Memory continues to wax and wane, although short-term is better than long-term.    Upon exam patient was lying in the stretcher, resting.  He appeared comfortable and was in no acute distress.  Reports some generalized aches and pains from the fall.  He was eating his lunch, reports feeling hungry.  He will be admitted for further management.  Per nursing no acute concerns or issues at this time.    Inpatient consult to Gerontology  Consult performed by: MITESH Estrada  Consult ordered by: MITESH Lucas          Review of Systems   Reason unable to perform ROS: limited.   Constitutional:  Negative for activity change and appetite change.   HENT:  Negative for trouble swallowing.    Eyes:  Negative for visual disturbance.   Respiratory:  Negative for cough and shortness of breath.    Cardiovascular:  Negative for chest pain and palpitations.   Gastrointestinal:  Positive for constipation. Negative for abdominal pain, diarrhea and vomiting.   Genitourinary:  Negative for difficulty urinating and hematuria.   Musculoskeletal:  Positive for arthralgias and gait problem. Negative for back pain.   Skin:  Negative for color change.   Neurological:  Positive for weakness. Negative for seizures.   Psychiatric/Behavioral:  Positive for confusion and sleep disturbance.        Historical Information   Past Medical History:   Diagnosis Date    Arthritis     Asbestos exposure     Asbestosis (HCC)     GERD (gastroesophageal reflux disease)     Hemoptysis 5/11/2022    Hypertension     Lung  disease     Parkinson's disease      Past Surgical History:   Procedure Laterality Date    HERNIA REPAIR      INTRACAPSULAR CATARACT EXTRACTION Left     KNEE SURGERY      many years ago    SHOULDER SURGERY       Social History   Social History     Substance and Sexual Activity   Alcohol Use Not Currently     Social History     Substance and Sexual Activity   Drug Use Never     Social History     Tobacco Use   Smoking Status Never    Passive exposure: Past   Smokeless Tobacco Former    Types: Chew    Quit date: 1998     Family History:   Family History   Problem Relation Age of Onset    Stroke Mother     Colon cancer Mother     Depression Sister     Hypertension Sister        Meds/Allergies   all current active meds have been reviewed    Allergies   Allergen Reactions    Morphine And Codeine Other (See Comments)     Patient gets delirious       Objective   No intake or output data in the 24 hours ending 06/12/24 1454  Invasive Devices       Peripheral Intravenous Line  Duration             Peripheral IV 06/12/24 Left Antecubital <1 day                    Physical Exam  Vitals reviewed.   Constitutional:       General: He is not in acute distress.     Appearance: He is well-developed. He is ill-appearing.      Comments: Frail-appearing   HENT:      Head: Normocephalic.      Comments: Swelling and bruising on face, nasal bone fracture  Cardiovascular:      Rate and Rhythm: Normal rate and regular rhythm.      Heart sounds: No murmur heard.  Pulmonary:      Effort: Pulmonary effort is normal. No respiratory distress.      Breath sounds: Normal breath sounds.   Abdominal:      General: Bowel sounds are normal. There is no distension.      Palpations: Abdomen is soft.   Musculoskeletal:      Right lower leg: Edema present.      Left lower leg: Edema present.   Skin:     General: Skin is warm and dry.      Findings: Bruising present.   Neurological:      General: No focal deficit present.      Mental Status: He is alert.  "Mental status is at baseline.      Cranial Nerves: No cranial nerve deficit.      Motor: Weakness present.      Gait: Gait abnormal.      Comments: Alert to person and partial place  With episodes of increased confusion   Psychiatric:         Mood and Affect: Mood is anxious.         Behavior: Behavior is agitated.         Judgment: Judgment is impulsive.         Lab Results:   I have personally reviewed pertinent lab results including the following:  -CBC, BMP    I have personally reviewed the following imaging study reports in PACS:  -CT head, CT facial bones, CT spine    Therapies:   PT: consulted  OT: consulted    VTE Prophylaxis: Sequential compression device (Venodyne)  and Enoxaparin (Lovenox)    Code Status: Level 1 - Full Code  Advance Directive and Living Will:    yes  Power of :  no  POLST:  no    Family and Social Support:   Living arrangements: Lives at home with wife  Assistance needed:yes    Goals of Care: Undetermined    Please note:  Voice-recognition software may have been used in the preparation of this document.  Occasional wrong word or \"sound-alike\" substitutions may have occurred due to the inherent limitations of voice recognition software.  Interpretation should be guided by context.    "

## 2024-06-12 NOTE — ASSESSMENT & PLAN NOTE
- S/p mechanical fall, located on left frontal scalp  - CT head/facial bones: Small left supraorbital/periorbital/anterior frontal scalp soft tissue swelling/hematoma without underlying calvarial fracture  - Pain control and ice as needed  - Continue to monitor

## 2024-06-12 NOTE — ED PROVIDER NOTES
History  Chief Complaint   Patient presents with    Fall     [facility]/p [facility] and fall, abrasion on nose, c/o R knee pain. Denies LOC/thinners/ASA.History of parkinsons       83-year-old male with past medical history of hypertension and Parkinson's disease presents today following mechanical ground-level fall.  Patient had a misstep just prior to arrival and tripped when going down the curb, striking his face on the ground.  Denies loss of consciousness.  Denies any anticoagulation or antiplatelet agent use.  He is reporting headache, right-sided hand pain and right-sided hip and proximal femur pain.      Fall  Associated symptoms: headaches    Associated symptoms: no abdominal pain, no back pain, no chest pain, no seizures and no vomiting        Prior to Admission Medications   Prescriptions Last Dose Informant Patient Reported? Taking?   amLODIPine (NORVASC) 5 mg tablet 6/12/2024 Spouse/Significant Other Yes Yes   Sig: Take 5 mg by mouth daily   carbidopa-levodopa (Sinemet)  mg per tablet 6/12/2024 Spouse/Significant Other No Yes   Sig: Take 2.5 tablets by mouth 3 (three) times a day   ketorolac (ACULAR) 0.5 % ophthalmic solution 6/12/2024 Spouse/Significant Other, Self Yes Yes   predniSONE 10 mg tablet More than a month Spouse/Significant Other, Self Yes No   Sig: Take 10 mg by mouth 2 (two) times a day   prednisoLONE acetate (PRED FORTE) 1 % ophthalmic suspension 6/12/2024 Spouse/Significant Other, Self Yes Yes      Facility-Administered Medications: None       Past Medical History:   Diagnosis Date    Arthritis     Asbestos exposure     Asbestosis (HCC)     GERD (gastroesophageal reflux disease)     Hemoptysis 5/11/2022    Hypertension     Lung disease     Parkinson's disease        Past Surgical History:   Procedure Laterality Date    HERNIA REPAIR      INTRACAPSULAR CATARACT EXTRACTION Left     KNEE SURGERY      many years ago    SHOULDER SURGERY         Family History    Problem Relation Age of Onset    Stroke Mother     Colon cancer Mother     Depression Sister     Hypertension Sister      I have reviewed and agree with the history as documented.    E-Cigarette/Vaping    E-Cigarette Use Never User      E-Cigarette/Vaping Substances    Nicotine No     THC No     CBD No     Flavoring No     Other No     Unknown No      Social History     Tobacco Use    Smoking status: Never     Passive exposure: Past    Smokeless tobacco: Former     Types: Chew     Quit date: 1998   Vaping Use    Vaping status: Never Used   Substance Use Topics    Alcohol use: Not Currently    Drug use: Never        Review of Systems   Constitutional:  Negative for chills and fever.   HENT:  Negative for ear pain and sore throat.    Eyes:  Negative for pain and visual disturbance.   Respiratory:  Negative for cough and shortness of breath.    Cardiovascular:  Negative for chest pain and palpitations.   Gastrointestinal:  Negative for abdominal pain and vomiting.   Genitourinary:  Negative for dysuria and hematuria.   Musculoskeletal:  Negative for arthralgias and back pain.   Skin:  Positive for wound. Negative for color change and rash.   Neurological:  Positive for headaches. Negative for seizures and syncope.   All other systems reviewed and are negative.      Physical Exam  ED Triage Vitals   Temperature Pulse Respirations Blood Pressure SpO2   06/12/24 1057 06/12/24 1057 06/12/24 1057 06/12/24 1057 06/12/24 1057   97.6 °F (36.4 °C) 84 18 (!) 214/88 98 %      Temp Source Heart Rate Source Patient Position - Orthostatic VS BP Location FiO2 (%)   06/12/24 1057 06/12/24 1057 06/12/24 1057 06/12/24 1057 --   Oral Monitor Lying Right arm       Pain Score       06/12/24 1523       5             Orthostatic Vital Signs  Vitals:    06/14/24 0319 06/14/24 0735 06/14/24 1101 06/14/24 1516   BP: (!) 178/91 148/70 111/57 156/69   Pulse: 90 83 80 80   Patient Position - Orthostatic VS: Lying          Physical Exam  Vitals  and nursing note reviewed.   Constitutional:       General: He is not in acute distress.     Appearance: Normal appearance. He is well-developed.   HENT:      Head:      Comments: Hematoma overlying the left eyebrow with overlying tenderness to palpation     Nose:      Comments: Abrasion overlying nasal bridge with dried blood around bilateral naris, left side has slow ooze with continued bleeding, no septal hematoma      Mouth/Throat:      Comments: Significant bruising to inner aspect of upper lip, swelling and tenderness along the maxilla, no jaw malocclusion, no loose dentition  Eyes:      Extraocular Movements: Extraocular movements intact.      Conjunctiva/sclera: Conjunctivae normal.      Pupils: Pupils are equal, round, and reactive to light.   Neck:      Comments: Cervical collar in place, no C-spine tenderness, step-offs or deformities  Cardiovascular:      Rate and Rhythm: Normal rate and regular rhythm.      Heart sounds: No murmur heard.  Pulmonary:      Effort: Pulmonary effort is normal. No respiratory distress.      Breath sounds: Normal breath sounds.   Abdominal:      Palpations: Abdomen is soft.      Tenderness: There is no abdominal tenderness.   Musculoskeletal:         General: No swelling.      Cervical back: Neck supple.      Comments: No cervical, thoracic or lumbar spine bony midline tenderness; full ROM in extremity joints, rigidity secondary to Parkinson's    Skin:     General: Skin is warm and dry.      Capillary Refill: Capillary refill takes less than 2 seconds.      Comments: Abrasion to bilateral knees, abrasion and ecchymosis overlying dorsal aspect of fourth and fifth digits on the right hand   Neurological:      Mental Status: He is alert.      Sensory: Sensation is intact.      Motor: Motor function is intact. No weakness.   Psychiatric:         Mood and Affect: Mood normal.         ED Medications  Medications - No data to display      Diagnostic Studies  Results Reviewed        Procedure Component Value Units Date/Time    Basic metabolic panel [[id number]]  (Abnormal) Collected: 06/13/24 0443    Lab Status: Final result Specimen: Blood from Hand, Left Updated: 06/13/24 0537     Sodium 140 mmol/L      Potassium 4.1 mmol/L      Chloride 109 mmol/L      CO2 26 mmol/L      ANION GAP 5 mmol/L      BUN 27 mg/dL      Creatinine 1.04 mg/dL      Glucose 88 mg/dL      Calcium 8.6 mg/dL      eGFR 66 ml/min/1.73sq m     Narrative:      National Kidney Disease Foundation guidelines for Chronic Kidney Disease (CKD):     Stage 1 with normal or high GFR (GFR > 90 mL/min/1.73 square meters)    Stage 2 Mild CKD (GFR = 60-89 mL/min/1.73 square meters)    Stage 3A Moderate CKD (GFR = 45-59 mL/min/1.73 square meters)    Stage 3B Moderate CKD (GFR = 30-44 mL/min/1.73 square meters)    Stage 4 Severe CKD (GFR = 15-29 mL/min/1.73 square meters)    Stage 5 End Stage CKD (GFR <15 mL/min/1.73 square meters)  Note: GFR calculation is accurate only with a steady state creatinine    CBC (With Platelets) [[id number]]  (Abnormal) Collected: 06/13/24 0443    Lab Status: Final result Specimen: Blood from Hand, Left Updated: 06/13/24 0517     WBC 7.76 Thousand/uL      RBC 3.32 Million/uL      Hemoglobin 10.7 g/dL      Hematocrit 32.7 %      MCV 99 fL      MCH 32.2 pg      MCHC 32.7 g/dL      RDW 12.7 %      Platelets 156 Thousands/uL      MPV 9.7 fL     Platelet count [[id number]]  (Normal) Collected: 06/12/24 1529    Lab Status: Final result Specimen: Blood from Arm, Left Updated: 06/12/24 1540     Platelets 161 Thousands/uL      MPV 9.7 fL     Basic metabolic panel [[id number]]  (Abnormal) Collected: 06/12/24 1151    Lab Status: Final result Specimen: Blood from Arm, Left Updated: 06/12/24 1221     Sodium 141 mmol/L      Potassium 5.0 mmol/L      Chloride 106 mmol/L      CO2 29 mmol/L      ANION GAP 6 mmol/L      BUN 28 mg/dL      Creatinine 1.07 mg/dL      Glucose 94 mg/dL      Calcium 9.5 mg/dL      eGFR 64 ml/min/1.73sq m      Narrative:      National Kidney Disease Foundation guidelines for Chronic Kidney Disease (CKD):     Stage 1 with normal or high GFR (GFR > 90 mL/min/1.73 square meters)    Stage 2 Mild CKD (GFR = 60-89 mL/min/1.73 square meters)    Stage 3A Moderate CKD (GFR = 45-59 mL/min/1.73 square meters)    Stage 3B Moderate CKD (GFR = 30-44 mL/min/1.73 square meters)    Stage 4 Severe CKD (GFR = 15-29 mL/min/1.73 square meters)    Stage 5 End Stage CKD (GFR <15 mL/min/1.73 square meters)  Note: GFR calculation is accurate only with a steady state creatinine    CBC and differential [[id number]]  (Abnormal) Collected: 06/12/24 1151    Lab Status: Final result Specimen: Blood from Arm, Left Updated: 06/12/24 1208     WBC 7.96 Thousand/uL      RBC 3.75 Million/uL      Hemoglobin 12.1 g/dL      Hematocrit 36.5 %      MCV 97 fL      MCH 32.3 pg      MCHC 33.2 g/dL      RDW 12.7 %      MPV 9.9 fL      Platelets 178 Thousands/uL      nRBC 0 /100 WBCs      Segmented % 77 %      Immature Grans % 0 %      Lymphocytes % 14 %      Monocytes % 7 %      Eosinophils Relative 2 %      Basophils Relative 0 %      Absolute Neutrophils 6.02 Thousands/µL      Absolute Immature Grans 0.03 Thousand/uL      Absolute Lymphocytes 1.15 Thousands/µL      Absolute Monocytes 0.56 Thousand/µL      Eosinophils Absolute 0.18 Thousand/µL      Basophils Absolute 0.02 Thousands/µL                    XR hand 3+ views RIGHT   ED Interpretation by Adeola Garza MD (06/12 5506)   No fracture      Final Result by Arnoldo Monroy MD (06/12 4712)      No acute osseous abnormality. Tiny radiopacities at the end of the fifth digit could be contamination or small foreign bodies. Correlate for any penetrating injury      Arthritis      Workstation performed: [id number]         XR femur 2 views RIGHT   ED Interpretation by Adeola Garza MD (06/12 8255)   No fracture      Final Result by Arnoldo Monroy MD (06/12 6873)      Arthritis of the hip and  knee. No fracture seen      Workstation performed: [id number]         XR pelvis ap only 1 or 2 vw   ED Interpretation by Adeola Garza MD (06/12 1339)   No fracture      Final Result by Arnoldo Monroy MD (06/12 1522)      No acute osseous abnormality.      Workstation performed: [id number]         CT head without contrast   Final Result by Ismael Drake MD (06/12 1326)      1.  No acute intracranial abnormality.  Stable chronic microangiopathic changes within the brain.   2. Small left supraorbital/periorbital/anterior frontal scalp soft tissue swelling/hematoma without underlying calvarial fracture.   3. Leftward nasal septal deviation with acute appearing minimally displaced fracture of the anterior nasal septum (perpendicular plate of the ethmoid).         The study was marked in EPIC for immediate notification.         Workstation performed: [id number]         CT facial bones without contrast   Final Result by Ismael Drake MD (06/12 1326)      1.  No acute intracranial abnormality.  Stable chronic microangiopathic changes within the brain.   2. Small left supraorbital/periorbital/anterior frontal scalp soft tissue swelling/hematoma without underlying calvarial fracture.   3. Leftward nasal septal deviation with acute appearing minimally displaced fracture of the anterior nasal septum (perpendicular plate of the ethmoid).         The study was marked in EPIC for immediate notification.         Workstation performed: [id number]         CT spine cervical without contrast   Final Result by Ismael Drake MD (06/12 1315)      No cervical spine fracture or traumatic malalignment.                  Workstation performed: [id number]               Procedures  Procedures      ED Course                             SBIRT 22yo+      Flowsheet Row Most Recent Value   Initial Alcohol Screen: US AUDIT-C     1. How often do you have a drink containing alcohol? 0 Filed at: 06/12/2024 1110    2. How many drinks containing alcohol do you have on a typical day you are drinking?  0 Filed at: 06/12/2024 1110   3b. FEMALE Any Age, or MALE 65+: How often do you have 4 or more drinks on one occassion? 0 Filed at: 06/12/2024 1110   Audit-C Score 0 Filed at: 06/12/2024 1110   WANDA: How many times in the past year have you...    Used an illegal drug or used a prescription medication for non-medical reasons? Never Filed at: 06/12/2024 1110                  Medical Decision Making  83-year-old male with past medical history of hypertension and Parkinson's disease presents today following mechanical ground-level fall where he sustained nasal bone fracture along with areas of scattered abrasions and contusions.  Bleeding from nose continues with slow ooze, will hold off on packing given current fracture with septal deviation.  Patient is still feeling weak secondary to the fall, does not feel comfortable attempting to ambulate at his baseline at present.  Family prefers admission to the hospital for further management.  Will admit to trauma for continued management along with PT/OT for ambulatory dysfunction.       Amount and/or Complexity of Data Reviewed  Labs: ordered.  Radiology: ordered and independent interpretation performed.    Risk  Decision regarding hospitalization.          Disposition  Final diagnoses:   Closed fracture of nasal bone, initial encounter   Parkinson's disease, unspecified whether dyskinesia present, unspecified whether manifestations fluctuate     Time reflects when diagnosis was documented in both MDM as applicable and the Disposition within this note       Time User Action Codes Description Comment    6/12/2024  2:36 PM Joel Bloom Add [S02.2XXA] Closed fracture of nasal bone, initial encounter     6/12/2024  2:38 PM Joel Bloom Add [G20.A1] Parkinson's disease, unspecified whether dyskinesia present, unspecified whether manifestations fluctuate           ED Disposition       ED  Disposition   Admit    Condition   Stable    Date/Time   Wed Jun 12, 2024 1442    Comment   Case was discussed with trauma and the patient's admission status was agreed to be Admission Status: observation status to the service of Dr. Ramsey .               MD Documentation      Flowsheet Row Most Recent Value   Accepting Facility Name, City & State  [facility]   Transported by (Company and Unit #) [employer]          RN Documentation      Flowsheet Row Most Recent Value   Accepting Facility Name, City & State  [facility]   Transported by (Company and Unit #) [employer]          Follow-up Information       Follow up With Specialties Details Why Contact Info    Charlie Martinez DO Family Medicine Follow up call to review events of recent hospitalziation [address] [facility]  [place] [address] [phone]      Charlie Martinez DO Family Medicine   [address]  [place] [address] [phone]      Patrick Ramires DMD Oral Maxillofacial Surgery Follow up call to follow up with nasal bone fractures [address] [place] [address]  [address]  [place] [address] [phone]              Discharge Medication List as of 6/14/2024  3:54 PM        START taking these medications    Details   acetaminophen (TYLENOL) 325 mg tablet Take 3 tablets (975 mg total) by mouth every 8 (eight) hours, Starting Fri 6/14/2024, Normal      lidocaine (LIDODERM) 5 % Apply 1 patch topically over 12 hours daily Remove & Discard patch within 12 hours or as directed by MD, Starting Sat 6/15/2024, Print      oxyCODONE (ROXICODONE) 5 immediate release tablet Take 0.5 tablets (2.5 mg total) by mouth every 4 (four) hours as needed for moderate pain for up to 10 days Max Daily Amount: 15 mg, Starting Fri 6/14/2024, Until Mon 6/24/2024 at 2359, Print      senna-docusate sodium (SENOKOT S) 8.6-50 mg per tablet Take 1 tablet by mouth 2 (two) times a day, Starting Fri 6/14/2024, Normal           CONTINUE these medications which have NOT CHANGED     Details   amLODIPine (NORVASC) 5 mg tablet Take 5 mg by mouth daily, Starting Thu 3/7/2024, Until Fri 3/7/2025, Historical Med      carbidopa-levodopa (Sinemet)  mg per tablet Take 2.5 tablets by mouth 3 (three) times a day, Starting Mon 3/18/2024, Normal      ketorolac (ACULAR) 0.5 % ophthalmic solution Starting Fri 9/15/2023, Historical Med      prednisoLONE acetate (PRED FORTE) 1 % ophthalmic suspension Starting Fri 9/15/2023, Historical Med      predniSONE 10 mg tablet Take 10 mg by mouth 2 (two) times a day, Starting Sat 8/26/2023, Historical Med           Outpatient Discharge Orders   BONIFACIO Pathway:  Medications to avoid: phosphate or magnesium based laxatives, NSAIDs     BONIFACIO Pathway: Maintain SBP between 120-140 mm/Hg     BONIFACIO Pathway: Call Nursing Home MD for decreased oral intake     BONIFACIO Pathway: Daily Weights     BONIFACIO Pathway: Strict I&O     BONIFACIO Pathway: Follow up bloodwork     Discharge Condtion:  Stabilized     Patient Aware of Diagnosis: Yes     Free of Communicable Disease:   Yes     Physical Therapy Eval And Treat     Occupational Therapy Eval and Treat     Activity:  As Tolerated       PDMP Review       None             ED Provider  Attending physically available and evaluated Daniel Sanz Jr.. I managed the patient along with the ED Attending.    Electronically Signed by           Adeola Garza MD  06/15/24 6439

## 2024-06-12 NOTE — ASSESSMENT & PLAN NOTE
Lab Results   Component Value Date    EGFR 64 06/12/2024    EGFR 53 04/16/2024    EGFR 54 03/05/2024    CREATININE 1.07 06/12/2024    CREATININE 1.25 04/16/2024    CREATININE 1.22 03/05/2024     - Baseline Cr around 1-1.25  - Currently at baseline  - Avoid nephrotoxic drugs, maintain adequate hydration  - Continue to monitor renal function

## 2024-06-13 ENCOUNTER — TELEPHONE (OUTPATIENT)
Dept: NEUROLOGY | Facility: CLINIC | Age: 84
End: 2024-06-13

## 2024-06-13 LAB
ANION GAP SERPL CALCULATED.3IONS-SCNC: 5 MMOL/L (ref 4–13)
BUN SERPL-MCNC: 27 MG/DL (ref 5–25)
CALCIUM SERPL-MCNC: 8.6 MG/DL (ref 8.4–10.2)
CHLORIDE SERPL-SCNC: 109 MMOL/L (ref 96–108)
CO2 SERPL-SCNC: 26 MMOL/L (ref 21–32)
CREAT SERPL-MCNC: 1.04 MG/DL (ref 0.6–1.3)
ERYTHROCYTE [DISTWIDTH] IN BLOOD BY AUTOMATED COUNT: 12.7 % (ref 11.6–15.1)
GFR SERPL CREATININE-BSD FRML MDRD: 66 ML/MIN/1.73SQ M
GLUCOSE SERPL-MCNC: 88 MG/DL (ref 65–140)
HCT VFR BLD AUTO: 32.7 % (ref 36.5–49.3)
HGB BLD-MCNC: 10.7 G/DL (ref 12–17)
MCH RBC QN AUTO: 32.2 PG (ref 26.8–34.3)
MCHC RBC AUTO-ENTMCNC: 32.7 G/DL (ref 31.4–37.4)
MCV RBC AUTO: 99 FL (ref 82–98)
PLATELET # BLD AUTO: 156 THOUSANDS/UL (ref 149–390)
PMV BLD AUTO: 9.7 FL (ref 8.9–12.7)
POTASSIUM SERPL-SCNC: 4.1 MMOL/L (ref 3.5–5.3)
RBC # BLD AUTO: 3.32 MILLION/UL (ref 3.88–5.62)
SODIUM SERPL-SCNC: 140 MMOL/L (ref 135–147)
WBC # BLD AUTO: 7.76 THOUSAND/UL (ref 4.31–10.16)

## 2024-06-13 PROCEDURE — 92610 EVALUATE SWALLOWING FUNCTION: CPT

## 2024-06-13 PROCEDURE — 97163 PT EVAL HIGH COMPLEX 45 MIN: CPT

## 2024-06-13 PROCEDURE — 99232 SBSQ HOSP IP/OBS MODERATE 35: CPT | Performed by: NURSE PRACTITIONER

## 2024-06-13 PROCEDURE — 80048 BASIC METABOLIC PNL TOTAL CA: CPT | Performed by: NURSE PRACTITIONER

## 2024-06-13 PROCEDURE — 85027 COMPLETE CBC AUTOMATED: CPT | Performed by: NURSE PRACTITIONER

## 2024-06-13 PROCEDURE — 99232 SBSQ HOSP IP/OBS MODERATE 35: CPT | Performed by: SURGERY

## 2024-06-13 PROCEDURE — 97116 GAIT TRAINING THERAPY: CPT

## 2024-06-13 PROCEDURE — 97167 OT EVAL HIGH COMPLEX 60 MIN: CPT

## 2024-06-13 RX ORDER — LIDOCAINE 50 MG/G
1 PATCH TOPICAL DAILY
Status: DISCONTINUED | OUTPATIENT
Start: 2024-06-13 | End: 2024-06-14 | Stop reason: HOSPADM

## 2024-06-13 RX ORDER — AMLODIPINE BESYLATE 5 MG/1
5 TABLET ORAL DAILY
Status: DISCONTINUED | OUTPATIENT
Start: 2024-06-13 | End: 2024-06-14 | Stop reason: HOSPADM

## 2024-06-13 RX ORDER — AMOXICILLIN 250 MG
1 CAPSULE ORAL 2 TIMES DAILY
Status: DISCONTINUED | OUTPATIENT
Start: 2024-06-13 | End: 2024-06-14 | Stop reason: HOSPADM

## 2024-06-13 RX ADMIN — ENOXAPARIN SODIUM 30 MG: 30 INJECTION SUBCUTANEOUS at 13:47

## 2024-06-13 RX ADMIN — ACETAMINOPHEN 975 MG: 325 TABLET, FILM COATED ORAL at 21:23

## 2024-06-13 RX ADMIN — DEXTRAN 70, GLYCERIN, HYPROMELLOSE 1 DROP: 1; 2; 3 SOLUTION/ DROPS OPHTHALMIC at 13:35

## 2024-06-13 RX ADMIN — SENNOSIDES, DOCUSATE SODIUM 1 TABLET: 8.6; 5 TABLET ORAL at 21:23

## 2024-06-13 RX ADMIN — ENOXAPARIN SODIUM 30 MG: 30 INJECTION SUBCUTANEOUS at 02:15

## 2024-06-13 RX ADMIN — Medication 6 MG: at 21:23

## 2024-06-13 RX ADMIN — CARBIDOPA AND LEVODOPA 2.5 TABLET: 25; 100 TABLET ORAL at 15:52

## 2024-06-13 RX ADMIN — CARBIDOPA AND LEVODOPA 2.5 TABLET: 25; 100 TABLET ORAL at 21:23

## 2024-06-13 RX ADMIN — ACETAMINOPHEN 975 MG: 325 TABLET, FILM COATED ORAL at 13:35

## 2024-06-13 RX ADMIN — CARBIDOPA AND LEVODOPA 2.5 TABLET: 25; 100 TABLET ORAL at 08:33

## 2024-06-13 RX ADMIN — LIDOCAINE 5% 1 PATCH: 700 PATCH TOPICAL at 12:30

## 2024-06-13 RX ADMIN — AMLODIPINE BESYLATE 5 MG: 5 TABLET ORAL at 08:33

## 2024-06-13 NOTE — PROGRESS NOTES
[facility]  Progress Note  Name: Daniel Edmonds I  MRN: [id number]  Unit/Bed#: W -01 I Date of Admission: 6/12/2024   Date of Service: 6/13/2024 I Hospital Day: 1    Assessment & Plan   Hypertension  Assessment & Plan  - Continue home regimen  - Monitor BP    Multiple abrasions  Assessment & Plan  - Bleeding currently controlled  - Pain control  - Local wound care    Scalp hematoma  Assessment & Plan  - S/p mechanical fall, located on left frontal scalp  - CT head/facial bones: Small left supraorbital/periorbital/anterior frontal scalp soft tissue swelling/hematoma without underlying calvarial fracture  - Pain control and ice as needed  - Continue to monitor    Closed fracture of nasal bone  Assessment & Plan  - S/p mechanical fall  - CT facial bones: Leftward nasal septal deviation with acute appearing minimally displaced fracture of the anterior nasal septum (perpendicular plate of the ethmoid)  - Bleeding currently controlled, no evidence of septal hematoma  - OMFS consult placed, appreciate recommendations  - Minor abrasion located on nasal bridge  - Local wound care    Stage 3a chronic kidney disease (HCC)  Assessment & Plan  Lab Results   Component Value Date    EGFR 64 06/12/2024    EGFR 53 04/16/2024    EGFR 54 03/05/2024    CREATININE 1.07 06/12/2024    CREATININE 1.25 04/16/2024    CREATININE 1.22 03/05/2024     - Baseline Cr around 1-1.25  - Currently at baseline  - Avoid nephrotoxic drugs, maintain adequate hydration  - Continue to monitor renal function    Parkinson's disease  Assessment & Plan  - A&O x2 to person and place, not time, at baseline  - Continue home regimen    Chronic right shoulder pain  Assessment & Plan  - Hx rotator cuff repair  - No acute injury or symptoms  - Pain control    * Fall  Assessment & Plan  - Mechanical fall onto face while walking on the pavement at grocery store  - No LOC or blood thinner use  - Pt initially complaining of right-sided  "hand, hip, and upper leg pain  - XR right hand, femur, and pelvis pending  - CT head: No acute intracranial abnormality. Stable chronic microangiopathic changes within the brain  - Injuries noted below  - Neuro checks q4 hours  - PT/OT evaluation and treatment as indicated             TRAUMA TERTIARY SURVEY NOTE    VTE Prophylaxis:Enoxaparin (Lovenox)     Disposition: Continue [facility] level of care, anticipate PT and OT evaluations today    Code status:  Level 1 - Full Code    Consultants: IP CONSULT TO ORAL AND MAXILLOFACIAL SURGERY  IP CONSULT TO CASE MANAGEMENT  IP CONSULT TO GERONTOLOGY  IP CONSULT TO GERONTOLOGY    Subjective   Transfer from: None    Mechanism of Injury:Fall     Chief Complaint: \" I have to go to the bathroom\"    HPI/Last 24 hour events: Patient is lying in bed and reports that he accidentally urinated on himself and he needs to be changed.  He also wants to get up and walk to the bathroom.  He appears uncomfortable and is mildly disoriented     Objective   Vitals:   Temp:  [97.6 °F (36.4 °C)-98.8 °F (37.1 °C)] 98.8 °F (37.1 °C)  HR:  [83-87] 83  Resp:  [12-18] 12  BP: (150-214)/(76-88) 165/82    I/O         06/11 0701  06/12 0700 06/12 0701  06/13 0700    P.O.  480    Total Intake(mL/kg)  480 (5.2)    Urine (mL/kg/hr)  650    Total Output  650    Net  -170          Unmeasured Urine Occurrence  2 x             Physical Exam:   GENERAL APPEARANCE: Patient in no acute distress.  HEENT: Nasal bridge abrasion and swelling, dried blood at the nares; PERRL, EOMs intact; Mucous membranes moist  CV: Regular rate and rhythm; no murmur/gallops/rubs appreciated.  CHEST / LUNGS: Clear to auscultation; no wheezes/rales/rhonci.  ABD: NABS; soft; non-distended; non-tender.  : Voiding  EXT: +2 pulses bilaterally upper & lower extremities; +2 pitting edema bilateral LE  NEURO: GCS 14 due to disorientation; no focal neurologic deficits; neurovascularly intact.  SKIN: Warm, dry and well perfused; no rash; no " jaundice.      Invasive Devices       Peripheral Intravenous Line  Duration             Peripheral IV 06/12/24 Left Antecubital <1 day                       Lab Results: Results: I have personally reviewed all pertinent laboratory/tests results, BMP/CMP:   Lab Results   Component Value Date    SODIUM 140 06/13/2024    K 4.1 06/13/2024     (H) 06/13/2024    CO2 26 06/13/2024    BUN 27 (H) 06/13/2024    CREATININE 1.04 06/13/2024    CALCIUM 8.6 06/13/2024    EGFR 66 06/13/2024   , and CBC:   Lab Results   Component Value Date    WBC 7.76 06/13/2024    HGB 10.7 (L) 06/13/2024    HCT 32.7 (L) 06/13/2024    MCV 99 (H) 06/13/2024     06/13/2024    RBC 3.32 (L) 06/13/2024    MCH 32.2 06/13/2024    MCHC 32.7 06/13/2024    RDW 12.7 06/13/2024    MPV 9.7 06/13/2024    NRBC 0 06/12/2024       Imaging Results: I have personally reviewed pertinent reports.    Chest Xray(s): negative for acute findings   FAST exam(s): negative for acute findings   CT Scan(s): positive for acute findings: nasal bone fx   Additional Xray(s): N/A     Other Studies: none

## 2024-06-13 NOTE — SPEECH THERAPY NOTE
"Speech-Language Pathology Bedside Swallow Evaluation        Patient Name: Daniel Sanz Jr.    Today's Date: 6/13/2024     Problem List  Principal Problem:    Fall  Active Problems:    Chronic right shoulder pain    Parkinson's disease    Stage 3a chronic kidney disease (HCC)    Closed fracture of nasal bone    Scalp hematoma    Multiple abrasions    Hypertension         Past Medical History  Past Medical History:   Diagnosis Date    Arthritis     Asbestos exposure     Asbestosis (HCC)     GERD (gastroesophageal reflux disease)     Hemoptysis 5/11/2022    Hypertension     Lung disease     Parkinson's disease        Past Surgical History  Past Surgical History:   Procedure Laterality Date    HERNIA REPAIR      INTRACAPSULAR CATARACT EXTRACTION Left     KNEE SURGERY      many years ago    SHOULDER SURGERY         Summary    Pt with oropharyngeal swallowing functions WNL. No overt s/s of aspiration.      Recommendations:   Diet: regular diet and thin liquids   Meds: whole with liquid   Frequent Oral care: 2x/day  Aspiration precautions and compensatory swallowing strategies: upright posture and only feed when fully alert  Other Recommendations/ considerations: No further follow up needed.        Current Medical Status  Pt is a 83 y.o. male who presented to [facility]  with a PMH of Parkinson's disease and HTN who presents s/p mechanical fall with strike of face. Pt is an unreliable historian. He states he was walking on the sidewalk then tripped on something, denying LOC, use of blood thinners, or current headache. Also complaining of right-sided hip, right upper leg, and right hand pain due to fall. Notes he \"got cut\" on his b/l knees due to fall. Denies weakness, paresthesias, N/V, dizziness, or changes in vision. No chest pain, SOB, abdominal pain, back pain.    Past medical history:   Please see H&P for details    Special Studies:  6/12/24 CT Head IMPRESSION: 1.  No acute intracranial abnormality.  Stable " chronic microangiopathic changes within the brain 2. Small left supraorbital/periorbital/anterior frontal scalp soft tissue swelling/hematoma without underlying calvarial fracture 3. Leftward nasal septal deviation with acute appearing minimally displaced fracture of the anterior nasal septum (perpendicular plate of the ethmoid).  5/24/24 FL Barium Swallow IMPRESSION: Mildly dilated esophagus with diminished motility.    Social/Education/Vocational Hx:  Pt lives with family    Swallow Information   Prior speech/swallowing tx: Outpatient [facility]T Parkinson's tx  Current Risks for Dysphagia & Aspiration: known history of dysphagia and dementia  Current Symptoms/Concerns: coughing with po at lunch; wife denies hx of dysphagia  Current Diet: regular diet and thin liquids   Baseline Diet: regular diet and thin liquids  Takes pills- whole with water    Baseline Assessment   Behavior/Cognition: alert  Speech/Language Status: able to participate in basic conversation, able to follow commands, and limited verbal output  Patient Positioning: upright in chair     Swallow Mechanism Exam   Facial: symmetrical  Labial: WFL  Lingual: WFL and right sided fasciculations   Velum: unable to visualize  Mandible: adequate ROM  Dentition: adequate  Vocal quality:clear/adequate,hypophonic  Volitional Cough: strong/productive   Respiratory: RA    Consistencies Assessed and Performance   Consistencies Administered: thin liquids, nectar thick, puree, and soft solids (applesauce and half pb&j sandwich)    Oral Stage: Pt able to self feed except applesauce; SLP student assisted. Pt able to bite through soft solids and adequately retrieve bolus from spoon, cup, and straw. Observed single and consecutive straw sip of NT and thin liquids. Mastication and oral control of solids and liquids WFL. Timely transfer. No labial leakage, no oral residue.      Pharyngeal Stage: Prompt swallow initiation. Hyolaryngeal excursion judged to be complete via  palpation. No coughing, choking, throat clearing, or changes in vocal or respiratory quality follow the swallows.       Esophageal Concerns: none reported      Results Reviewed with: patient, RN, and family

## 2024-06-13 NOTE — PLAN OF CARE
Problem: PHYSICAL THERAPY ADULT  Goal: Performs mobility at highest level of function for planned discharge setting.  See evaluation for individualized goals.  Description: Treatment/Interventions: Functional transfer training, LE strengthening/ROM, Elevations, Therapeutic exercise, Patient/family training, Equipment eval/education, Bed mobility, Gait training, Endurance training, Spoke to nursing, Spoke to case management, OT  Equipment Recommended: Walker       See flowsheet documentation for full assessment, interventions and recommendations.  Note: Prognosis: Fair  Problem List: Decreased strength, Decreased endurance, Impaired balance, Decreased mobility, Decreased cognition, Decreased safety awareness, Decreased skin integrity  Assessment: Pt is a 83 y.o. male seen for PT evaluation s/p admit to [facility] on 6/12/2024. Pt was admitted with a primary dx of: fall, head injury, closed fracture of nasal bone, parkinson's disease.  PT now consulted for assessment of mobility and d/c needs. Pt with Up in chair orders.  Pts current comorbidities and personal factors effecting treatment include: BMI, HTN, GERD, PD, MGUS. Pts current clinical presentation is Unstable/Unpredictable (high complexity) due to Ongoing medical management for primary dx, Increased reliance on more restrictive AD compared to baseline, Decreased activity tolerance compared to baseline, Fall risk, Increased assistance needed from caregiver at current time. Prior to admission, pt was independent without AD. Upon evaluation, pt currently is requiring Aayush for bed mobility; Aayush for transfers and Aayush for ambulation 10 ft w/ RW. Pt presents at PT eval functioning below baseline and currently w/ overall mobility deficits 2* to: BLE weakness, impaired balance, gait deviations, decreased activity tolerance compared to baseline, decreased functional mobility tolerance compared to baseline, decreased safety awareness, impaired judgement, fall  risk, impaired tone, decreased cognition. Pt currently at a fall risk 2* to impairments listed above.  Pt will continue to benefit from skilled acute PT interventions to address stated impairments; to maximize functional mobility; for ongoing pt/ family training; and DME needs. At conclusion of PT session chair alarm engaged, all needs in reach, RN notified of session findings/recommendations, and pt returned back in recliner chair with phone and call bell within reach. Pt denies any further questions at this time. Recommend Level II (Moderate Resource Intensity)  upon hospital D/C.        Rehab Resource Intensity Level, PT: II (Moderate Resource Intensity)    See flowsheet documentation for full assessment.

## 2024-06-13 NOTE — CASE MANAGEMENT
Case Management Assessment & Discharge Planning Note    Patient name Daniel Sanz Jr.  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     Shortness of breath      MGUS (monoclonal gammopathy of unknown significance) 2022     Neuropathy 2022     Parkinson's disease 2020     Tear of right supraspinatus tendon 2019     Chronic right shoulder pain 2019     Internal derangement of right shoulder 2019     Pleural plaque with presence of asbestos 2017     Allergic rhinitis with postnasal drip 2017       LOS (days): 1  Geometric Mean LOS (GMLOS) (days): 2.1  Days to GMLOS:1.1     OBJECTIVE:    Risk of Unplanned Readmission Score: 10.9         Current admission status: Inpatient       Preferred Pharmacy:   [facility] [place] [address]  Phone: [phone] Fax: [phone]    [facility]/pharmacy #[id number] - [address] [address]  Phone: [phone] Fax: [phone]    Primary Care Provider: Charlie Martinez DO    Primary Insurance: [employer] [facility]  Secondary Insurance:     ASSESSMENT:  Active Health Care Proxies       Sheng Sanz Health Care Representative - Spouse   Primary Phone: [phone] (Home)                 Readmission Root Cause  30 Day Readmission: No    Patient Information  Admitted from:: Home  Mental Status: Alert  During Assessment patient was accompanied by: Spouse  Assessment information  provided by:: Spouse  Primary Caregiver: Spouse  Caregiver's Name:: Sheng  Caregiver's Relationship to Patient:: Significant Other  Support Systems: Spouse/significant other  County of Residence: [place]  What city do you live in?: [place]  Home entry access options. Select all that apply.: Stairs  Number of steps to enter home.: 2  Type of Current Residence: [facility]  Living Arrangements: Lives w/ Spouse/significant other    Activities of Daily Living Prior to Admission  Functional Status: Independent  Completes ADLs independently?: Yes  Ambulates independently?: Yes  Does patient use assisted devices?: Yes  Assisted Devices (DME) used: Straight Cane, Walker  Does patient currently own DME?: Yes  What DME does the patient currently own?: Straight Cane, Walker  Does patient have a history of Outpatient Therapy (PT/OT)?: Yes  Does the patient have a history of Short-Term Rehab?: Yes  Does patient have a history of HHC?: Yes  Does patient currently have HHC?: No     Patient Information Continued  Income Source: SSI/SSD  Does patient have prescription coverage?: Yes  Does patient receive dialysis treatments?: No  Does patient have a history of substance abuse?: No  Does patient have a history of Mental Health Diagnosis?: No     Means of Transportation  Means of Transport to Appts:: Family transport      Social Determinants of Health (SDOH)      Flowsheet Row Most Recent Value   Housing Stability    In the last 12 months, was there a time when you were not able to pay the mortgage or rent on time? N   At any time in the past 12 months, were you homeless or living in a shelter (including now)? N   Transportation Needs    In the past 12 months, has lack of transportation kept you from medical appointments or from getting medications? no   In the past 12 months, has lack of transportation kept you from meetings, work, or from getting things needed for daily living? No   Food Insecurity    Within the past 12 months, you  worried that your food would run out before you got the money to buy more. Never true   Within the past 12 months, the food you bought just didn't last and you didn't have money to get more. Never true   Utilities    In the past 12 months has the electric, gas, oil, or water company threatened to shut off services in your home? No        CM reviewed the availability of treatment team to discuss questions or concerns patient and/or family may have regarding  understanding medications and recognizing signs and symptoms at discharge.  CM also encouraged patient to follow up with all recommended appointments after discharge. CM reviewed the information that will be provided to pt/family on the discharge instructions.  Patient advised of importance for patient and family to participate in managing patient's medical well being.      DISCHARGE DETAILS:    Discharge planning discussed with:: pt and wife  Freedom of Choice: Yes  Comments - Freedom of Choice: CM met with pt and wife to review the recommendations of the care team  for rehab.  Pt stated he would prefer to go home.  Wife is in agreement with pt returning home because he has been doing PT, OT, ST with [facility].  CM explained another script will be written in order to continue services.  Cm contacted [facility] at [phone] who confirmed pt was current with them.  They would like script faxed to [phone] once written.  CM contacted family/caregiver?: Yes  Were Treatment Team discharge recommendations reviewed with patient/caregiver?: Yes  Did patient/caregiver verbalize understanding of patient care needs?: Yes  Were patient/caregiver advised of the risks associated with not following Treatment Team discharge recommendations?: Yes    Contacts  Patient Contacts: spouse Sheng  Relationship to Patient:: Family  Contact Method: In Person  Reason/Outcome: Continuity of Care, Discharge Planning    Requested Home Health Care         Is the patient  interested in C at discharge?: No    DME Referral Provided  Referral made for DME?: No    Other Referral/Resources/Interventions Provided:  Referral Comments: script to be written for PT, OT, ST prior to DC adn faxed to [phone].         Treatment Team Recommendation: Short Term Rehab  Discharge Destination Plan:: Home, Other (OP therapy)

## 2024-06-13 NOTE — TELEPHONE ENCOUNTER
Spoke to patient's wife Sheng about confirming his appt  and he is currently admitted to the hospital and if he gets out in time they will try to make it

## 2024-06-13 NOTE — OCCUPATIONAL THERAPY NOTE
Occupational Therapy Evaluation     Patient Name: Daniel Sanz Jr.  Today's Date: 6/13/2024  Problem List  Principal Problem:    Fall  Active Problems:    Chronic right shoulder pain    Parkinson's disease    Stage 3a chronic kidney disease (HCC)    Closed fracture of nasal bone    Scalp hematoma    Multiple abrasions    Hypertension    Past Medical History  Past Medical History:   Diagnosis Date    Arthritis     Asbestos exposure     Asbestosis (HCC)     GERD (gastroesophageal reflux disease)     Hemoptysis 5/11/2022    Hypertension     Lung disease     Parkinson's disease      Past Surgical History  Past Surgical History:   Procedure Laterality Date    HERNIA REPAIR      INTRACAPSULAR CATARACT EXTRACTION Left     KNEE SURGERY      many years ago    SHOULDER SURGERY             06/13/24 1442   OT Last Visit   OT Visit Date 06/13/24   Note Type   Note type Evaluation   Pain Assessment   Pain Assessment Tool 0-10   Pain Score 5   Pain Location/Orientation Orientation: Right;Location: Leg   Restrictions/Precautions   Weight Bearing Precautions Per Order No   Other Precautions Cognitive;Chair Alarm;Bed Alarm;Fall Risk;Pain   Home Living   Type of Home House   Home Layout One level;Stairs to enter with rails;Performs ADLs on one level;Able to live on main level with bedroom/bathroom  (1 JAYLEN)   Bathroom Shower/Tub Walk-in shower   Bathroom Toilet Raised   Bathroom Equipment Grab bars in shower  (has grab bar that needs to be installed/)   Bathroom Accessibility Accessible   Home Equipment Walker;Cane;Reacher  (dressing stick.)   Additional Comments usually ambulates without AD but uses RW vs SPC as needed. Has been learning LHAE with home OT   Prior Function   Level of [place] Independent with functional mobility;Independent with ADLs;Needs assistance with IADLS   Lives With Spouse  (reports he is home 90% time.)   Receives Help From Home health;Family  (active c HHOT/PT ST right now, was previously doing OP.  also have support from son, granddaughter, newphew,)   IADLs Family/Friend/Other provides transportation;Family/Friend/Other provides meals;Family/Friend/Other provides medication management   Falls in the last 6 months (S)  1 to 4  (3 falls per spouse, but pt reported 14 earlier to PT)   Vocational Retired   Comments pt sleeps in lower bed with bed rail , used to have falls often from higher bed   Lifestyle   Autonomy PTA, pt is (I) c showering and grooming, spouse assists with LB ADLs. assists with cleaning dishes as able. Lives c spouse   Reciprocal Relationships supportive family , spouse, and therapists   Service to Others retired   Intrinsic Gratification enjoys playing with cats   General   Additional Pertinent History Pt admitted d/t fall resulting in closed nasal fracture, multiple abrasions. Hx of Parkinsons   Family/Caregiver Present Yes  (spouse present throughout session)   Subjective   Subjective pt with overall flat affect, but reports feeling well. Spouse mentions shes very happy is leg swelling is down significantly   ADL   Eating Assistance 5  Supervision/Setup   Grooming Assistance 5  Supervision/Setup   UB Bathing Assistance 4  Minimal Assistance   LB Bathing Assistance 3  Moderate Assistance   UB Dressing Assistance 4  Minimal Assistance   LB Dressing Assistance 2  Maximal Assistance   LB Dressing Deficit Thread RLE into underwear;Thread LLE into underwear;Increased time to complete;Supervision/safety;Pull up over hips;Setup;Steadying;Verbal cueing  (A to pull pants over hips, total A to thread BLE through underwear)   Toileting Assistance  1  Total Assistance   Toileting Deficit   (incontinent of urine upon arrival. total A posterior car e)   Functional Assistance 4  Minimal Assistance   Additional Comments Pt limited by decraesed coordination, balance, functional reach, safety awareness,   Bed Mobility   Additional Comments seated OOB in recliner upon arrival, end of session   Transfers   Sit  to Stand 4  Minimal assistance   Additional items Assist x 1;Increased time required;Verbal cues   Stand to Sit 4  Minimal assistance   Additional items Assist x 1;Increased time required;Verbal cues   Additional Comments sit<>stand x 2 from recliner chair. Education for proper hand placements during stands and for controlled descents. Spouse present for education on hand placements and body mechanics   Functional Mobility   Functional Mobility 4  Minimal assistance   Additional Comments household distance in hallway.  Big and loud cueing utilized . intermittent freezing during directional changes, shuffling noted.   Additional items Rolling walker   Balance   Static Sitting Fair +   Dynamic Sitting Fair   Static Standing Fair -  (c RW)   Dynamic Standing Poor +  (c RW)   Activity Tolerance   Activity Tolerance Patient tolerated treatment well   Medical Staff Made Aware PT Faustino. CM   Nurse Made Aware DOLORES Nina pre/post session   RUE Assessment   RUE Assessment X  (MMT grossly 3/5 throughout)   LUE Assessment   LUE Assessment X  (MMT grossly 3/5 throughout)   Hand Function   Gross Motor Coordination Impaired   Fine Motor Coordination Impaired   Hand Function Comments tremors noted during functional tasks   Sensation   Light Touch No apparent deficits   Cognition   Overall Cognitive Status Impaired   Arousal/Participation Alert;Cooperative   Attention Attends with cues to redirect   Orientation Level Oriented to person;Oriented to place;Oriented to time  (grossly month / year c increased time. Recalls fall.)   Memory Decreased recall of precautions;Decreased recall of recent events;Decreased short term memory   Following Commands Follows one step commands with increased time or repetition   Comments flat affect throughout. Pt requires increased reps/time for education provided, limited carryover. poor insight to deficits and problem solving. Would recommend ACLS for supervision recommendations if pt to D/C home, as  "spouse reports only being home\"90% of of the time \"   Assessment   Limitation Decreased ADL status;Decreased UE ROM;Decreased UE strength;Decreased cognition;Decreased endurance;Decreased Safe judgement during ADL;Decreased self-care trans;Decreased high-level ADLs   Prognosis Good   Assessment Patient is a 83 y.o. male seen for OT evaluation at [facility] following admission on 6/12/2024  s/p Fall. Please see above for comprehensive list of comorbidities and significant PMHx impacting functional performance.  Upon initial evaluation, pt appears to be performing below baseline functional status.   Occupational performance is affected by the following deficits: endurance ,  decreased muscular strength , impaired coordination , decreased balance , decreased trunk control , decreased activity tolerance , attention to task, impaired judgement and problem solving , impaired safety awareness , and (+) pain . Personal/Environmental factors impacting D/C include: (+) Hx of falls , Assistance needed for ADLs and functional mobility, decreased insight toward deficits , and decreased recall of precautions . Supporting factors include: support system available Patient would benefit from OT services within the acute care setting to maximize level of functional independence in the following areas self-care transfers, functional mobility, and ADLs.  From OT standpoint, recommendation at time of D/C would be Level 2: moderate resource intensity .   Goals   Patient Goals to return home   Plan   Treatment Interventions ADL retraining;Functional transfer training;UE strengthening/ROM;Endurance training;Patient/family training;Continued evaluation;Equipment evaluation/education   Goal Expiration Date 06/23/24   OT Treatment Day 0   OT Frequency 3-5x/wk   Discharge Recommendation   Rehab Resource Intensity Level, OT II (Moderate Resource Intensity)   Additional Comments  The patient's raw score on the AM-PAC Daily " Activity Inpatient Short Form is 15. A raw score of less than 19 suggests the patient may benefit from discharge to post-acute rehabilitation services. Please refer to the recommendation of the Occupational Therapist for safe discharge planning.   AM-PAC Daily Activity Inpatient   Lower Body Dressing 2   Bathing 2   Toileting 2   Upper Body Dressing 3   Grooming 3   Eating 3   Daily Activity Raw Score 15   Daily Activity Standardized Score (Calc for Raw Score >=11) 34.69   AM-PAC Applied Cognition Inpatient   Following a Speech/Presentation 2   Understanding Ordinary Conversation 3   Taking Medications 2   Remembering Where Things Are Placed or Put Away 3   Remembering List of 4-5 Errands 2   Taking Care of Complicated Tasks 2   Applied Cognition Raw Score 14   Applied Cognition Standardized Score 32.02   End of Consult   Education Provided Yes   Patient Position at End of Consult Bedside chair;Bed/Chair alarm activated;All needs within reach   Nurse Communication Nurse aware of consult   Goals established on initial evaluation in order to achieve pt's goal of returning home      Pt will complete UB ADLs Mod independent   for increased ADL independence within 10 days.     Pt will complete LB ADLs Supervision  c LHAE for increased ADL independence within 10 days.     Pt will complete toileting Supervision  with use of DME for increased ADL independence within 10 days.     Pt will demonstrate proper body mechanics to complete self-care transfers and functional mobility with Supervision household distances and use of LRAD for increased safety and functional independence within 10 days.     Pt will complete bed mobility Mod independent  for increased independence in repositioning, pressure offloading, and managing comfort.     Pt will demonstrate proper body mechanics and fall prevention strategies during 100% of tx sessions for increased safety awareness during ADL/IADLs    Pt will demonstrate activity tolerance of 30  min in therapeutic tasks for increased participation in meaningful activities upon D/C.    Pt will participate in ongoing cognitive assessments to assist with safe D/C planning and supervision/assistance recommendations.     Pt will demonstrate OOB sitting tolerance of 2-4 hr/day for increased activity tolerance and engagement in leisure activities within 10 days.   Kyung Pulido, OT

## 2024-06-13 NOTE — PHYSICAL THERAPY NOTE
Physical Therapy Evaluation    Patient's Name: Daniel Sanz Jr.    Admitting Diagnosis  Head injury [S09.90XA]  Closed fracture of nasal bone, initial encounter [S02.2XXA]  Parkinson's disease, unspecified whether dyskinesia present, unspecified whether manifestations fluctuate [G20.A1]    Problem List  Patient Active Problem List   Diagnosis    Chronic right shoulder pain    Internal derangement of right shoulder    Tear of right supraspinatus tendon    Parkinson's disease    MGUS (monoclonal gammopathy of unknown significance)    Neuropathy    Pleural plaque with presence of asbestos    Allergic rhinitis with postnasal drip    Shortness of breath    Elevated serum immunoglobulin free light chains    Stage 3a chronic kidney disease (HCC)    Closed fracture of nasal bone    Fall    Scalp hematoma    Multiple abrasions    Hypertension       Past Medical History  Past Medical History:   Diagnosis Date    Arthritis     Asbestos exposure     Asbestosis (HCC)     GERD (gastroesophageal reflux disease)     Hemoptysis 5/11/2022    Hypertension     Lung disease     Parkinson's disease        Past Surgical History  Past Surgical History:   Procedure Laterality Date    HERNIA REPAIR      INTRACAPSULAR CATARACT EXTRACTION Left     KNEE SURGERY      many years ago    SHOULDER SURGERY            06/13/24 1000   PT Last Visit   PT Visit Date 06/13/24   Note Type   Note type Evaluation   Pain Assessment   Pain Assessment Tool FLACC   Pain Location/Orientation Orientation: Bilateral;Location: Leg   Effect of Pain on Daily Activities limits comfort, limits mobility   Pain Rating: FLACC (Rest) - Face 0   Pain Rating: FLACC (Rest) - Legs 0   Pain Rating: FLACC (Rest) - Activity 0   Pain Rating: FLACC (Rest) - Cry 0   Pain Rating: FLACC (Rest) - Consolability 0   Score: FLACC (Rest) 0   Pain Rating: FLACC (Activity) - Face 1   Pain Rating: FLACC (Activity) - Legs 0   Pain Rating: FLACC (Activity) - Activity 0   Pain Rating: FLACC  "(Activity) - Cry 0   Pain Rating: FLACC (Activity) - Consolability 0   Score: FLACC (Activity) 1   Restrictions/Precautions   Weight Bearing Precautions Per Order No   Other Precautions Cognitive;Chair Alarm;Bed Alarm;Fall Risk   Home Living   Type of Home House   Home Layout Two level;Performs ADLs on one level;Able to live on main level with bedroom/bathroom;Stairs to enter with rails  (1 JAYLEN)   Bathroom Shower/Tub Walk-in shower   Bathroom Toilet Raised   Bathroom Equipment Grab bars in shower   Home Equipment Walker;Cane   Prior Function   Lives With Spouse   Receives Help From Home health  (current with HHPT/HHOT/ HH speech)   IADLs Family/Friend/Other provides transportation;Family/Friend/Other provides meals;Family/Friend/Other provides medication management   Falls in the last 6 months (S)  >10  (\"14\")   Vocational Retired   Comments at baseline pt ambulates independently without AD. per pt wife, pt will utilize RW vs SPC on a \"bad day\"   General   Family/Caregiver Present Yes  (wife)   Cognition   Orientation Level Oriented to person;Disoriented to time;Disoriented to place;Disoriented to situation   Following Commands Follows one step commands with increased time or repetition   Comments pt ID by wristband, name and    Subjective   Subjective pt agreeable to PT eval   RLE Assessment   RLE Assessment X   Strength RLE   R Hip Flexion 3+/5   R Hip ABduction 3+/5   R Knee Flexion 3+/5   R Knee Extension 3+/5   R Ankle Dorsiflexion 3/5   R Ankle Plantar Flexion 3/5   LLE Assessment   LLE Assessment X   Strength LLE   L Hip Flexion 3+/5   L Hip ABduction 3+/5   L Knee Flexion 3+/5   L Knee Extension 3+/5   L Ankle Dorsiflexion 3/5   L Ankle Plantar Flexion 3/5   Coordination   Movements are Fluid and Coordinated 0   Coordination and Movement Description bradykinetic   Light Touch   RLE Light Touch Grossly intact   LLE Light Touch Grossly intact   Bed Mobility   Supine to Sit 4  Minimal assistance   Additional " items Assist x 1;Increased time required  (trunk management)   Sit to Supine   (pt OOB in recliner post session)   Additional Comments once sitting EOB, pt L UE IV leaking blood, pressure applied, FRACISCO christianson made aware, present in room, acknowledged and stating she will address post PT session once bleeding stopped   Transfers   Sit to Stand 4  Minimal assistance   Additional items Assist x 1;Increased time required;Verbal cues   Stand to Sit 4  Minimal assistance   Additional items Assist x 1;Increased time required;Verbal cues   Additional Comments vc for hand placement. pt retropulsive upon standing requires tactile cues for anterior weight shift. retropulsion improves with cues   Ambulation/Elevation   Gait pattern Decreased foot clearance;Short stride;Shuffling;Decreased hip extension;Decreased toe off;Excessively slow   Gait Assistance 4  Minimal assist   Additional items Assist x 1;Verbal cues   Assistive Device Rolling walker   Distance 10'x1   Stair Management Assistance Not tested  (pt has 1 JAYLEN)   Ambulation/Elevation Additional Comments vc for increased step length, decreased forward head positioning. step length improves with pt self cueing(audible counting of steps)   Balance   Static Sitting Fair +   Dynamic Sitting Fair   Static Standing Fair -  (RW)   Dynamic Standing Poor +  (RW)   Ambulatory Poor  (RW)   Activity Tolerance   Activity Tolerance Patient limited by fatigue;Treatment limited secondary to medical complications (Comment)  (cognition)   Medical Staff Made Aware spoke with Trauma CHRISTIANA Maldonado, spoke with OT   Nurse Made Aware LPN sammy pre/present during IV bleeding, updated post   Assessment   Prognosis Fair   Problem List Decreased strength;Decreased endurance;Impaired balance;Decreased mobility;Decreased cognition;Decreased safety awareness;Decreased skin integrity   Assessment Pt is a 83 y.o. male seen for PT evaluation s/p admit to [facility] on [facility]/12/[facility]24. Pt was admitted with  a primary dx of: fall, head injury, closed fracture of nasal bone, parkinson's disease.  PT now consulted for assessment of mobility and d/c needs. Pt with Up in chair orders.  Pts current comorbidities and personal factors effecting treatment include: BMI, HTN, GERD, PD, MGUS. Pts current clinical presentation is Unstable/Unpredictable (high complexity) due to Ongoing medical management for primary dx, Increased reliance on more restrictive AD compared to baseline, Decreased activity tolerance compared to baseline, Fall risk, Increased assistance needed from caregiver at current time. Prior to admission, pt was independent without AD. Upon evaluation, pt currently is requiring Aayush for bed mobility; Aayush for transfers and Aayush for ambulation 10 ft w/ RW. Pt presents at PT eval functioning below baseline and currently w/ overall mobility deficits 2* to: BLE weakness, impaired balance, gait deviations, decreased activity tolerance compared to baseline, decreased functional mobility tolerance compared to baseline, decreased safety awareness, impaired judgement, fall risk, impaired tone, decreased cognition. Pt currently at a fall risk 2* to impairments listed above.  Pt will continue to benefit from skilled acute PT interventions to address stated impairments; to maximize functional mobility; for ongoing pt/ family training; and DME needs. At conclusion of PT session chair alarm engaged, all needs in reach, RN notified of session findings/recommendations, and pt returned back in recliner chair with phone and call bell within reach. Pt denies any further questions at this time. Recommend Level II (Moderate Resource Intensity)  upon hospital D/C.   Goals   Patient Goals to go home today   STG Expiration Date 06/27/24   Short Term Goal #1 In 14 days pt will be able to: 1. Demonstrate ability to perform all aspects of bed mobility independently to improve functional safety.  2. Perform functional transfers independently  to facilitate safe return to previous living environment.  3.  Ambulate 150 ft with LRAD independently with stable vitals to improve safety with household distances and reduce fall risk.  4. Improve LE strength grades by 1 to increase ease of functional mobility with transfers and gait. 5. Pt will demonstrate improved balance by one grade in order to decrease risk of falls. 6. Climb at least 1 steps with unilateral HR independently to simulate entrance to home.   PT Treatment Day 1   Plan   Treatment/Interventions Functional transfer training;LE strengthening/ROM;Elevations;Therapeutic exercise;Patient/family training;Equipment eval/education;Bed mobility;Gait training;Endurance training;Spoke to nursing;Spoke to case management;OT   PT Frequency 3-5x/wk   Discharge Recommendation   Rehab Resource Intensity Level, PT II (Moderate Resource Intensity)   Equipment Recommended Walker   Walker Package Recommended Wheeled walker   Change/add to Walker Package? No   AM-PAC Basic Mobility Inpatient   Turning in Flat Bed Without Bedrails 2   Lying on Back to Sitting on Edge of Flat Bed Without Bedrails 2   Moving Bed to Chair 3   Standing Up From Chair Using Arms 3   Walk in Room 3   Climb 3-5 Stairs With Railing 1   Basic Mobility Inpatient Raw Score 14   Basic Mobility Standardized Score 35.55   [facility] Highest Level Of Mobility   -HLM Goal 4: Move to chair/commode   -HL Achieved 4: Move to chair/commode   Additional Treatment Session   Start Time 0950   End Time 1000   Treatment Assessment Pt agreeable to further mobility. Pt performs STS from bed side recliner with minAx1, pt again retropulsive, requiring tactile facilitation for improved posture/anterior weight shift, improves with cues. Pt ambulates additional 25'x1, 35'x1 with RW and minAx1, pt requires min/mod assist for RW management during turn negotiation. Pt performs stand to sit to bed side recliner with minAx1, requiring tactile facilitation of UE to  arm rest. Post ambulation pt IV site started to bleed again. FRACISCO Nina made aware and acknowledged. pt would conitnue to benefit from skilled PT to address functional deficits and return to improved level of function.   Equipment Use RW   Additional Treatment Day 1   End of Consult   Patient Position at End of Consult Bedside chair;Bed/Chair alarm activated;All needs within reach   The patient's AM-PAC Basic Mobility Inpatient Short Form Raw Score is 14. A Raw score of less than or equal to 16 suggests the patient may benefit from discharge to post-acute rehabilitation services. Please also refer to the recommendation of the Physical Therapist for safe discharge planning.  Arnoldo Cerrato, PT

## 2024-06-13 NOTE — UTILIZATION REVIEW
Initial Clinical Review    Admission: Date/Time/Statement:   Admission Orders (From admission, onward)       Ordered        06/12/24 1438  Inpatient Admission  Once                          Orders Placed This Encounter   Procedures    Inpatient Admission     Standing Status:   Standing     Number of Occurrences:   1     Order Specific Question:   Level of Care     Answer:   Med Surg [16]     Order Specific Question:   Estimated length of stay     Answer:   More than 2 Midnights     Order Specific Question:   Certification     Answer:   I certify that inpatient services are medically necessary for this patient for a duration of greater than two midnights. See H&P and MD Progress Notes for additional information about the patient's course of treatment.     ED Arrival Information       Expected   -    Arrival   6/12/2024 10:50    Acuity   Urgent              Means of arrival   Ambulance    Escorted by   [facility]    Service   Trauma    Admission type   Emergency              Arrival complaint   Fall ems             Chief Complaint   Patient presents with    Fall     Pt arrives via EMS from [employer] parking lot s/p trip and fall, abrasion on nose, c/o R knee pain. Denies LOC/thinners/ASA.History of parkinsons         Initial Presentation: 83 y.o. male PMH of Parkinson's disease and HTN who presents ED via EMS s/p mechanical fall in parking lot with strike of face . Pt is an unreliable historian. He states he was walking on the sidewalk then tripped on something, denying LOC, use of blood thinners. Pt also c/o right-sided hip, right upper leg, and right hand pain due to fall .  On exam, pt oriented to person and place . GCS 14 , 5/5 motor strength, no sensory deficit. BP elevated . Left supraorbital hematoma with overlying ecchymosis . Abrasion over nasal bridge, L septal deviation noted w/nasal tenderness. Dried blood on lower lip . TTP over right dorsal hand, right hip, and right thigh . Has  bilateral knees and dorsal  aspect of right hand . CT shows minimally displaced fracture of the anterior nasal septum . Small left supraorbital/periorbital/anterior frontal scalp soft tissue swelling/hematoma . PT admitted as Inpatient by trauma service with closed nasal bone fx, scalp hematoma s/p fall. PLan-Neuro checks . PT/OT . Pain control. OMFS consult. Monitor BP . Local wound care . Monitor scalp hematoma .    Gerontology consult- fall monitoring. PT/OT . Parkinson's- wife reports occassional hallucinations . Restart home dose of carbidopa/levodopa . Delirium monitoring.     Date: 6/13    Day 2:   OMFS consult- closed fracture of nasal bone.  Patient is unable to communicate properly but exhibits signs of pain  w/ palpation of nasal bridge, cannot elaborate on nasal congestion .ON exam:Nose: erythema, TTP over nasal bridge, min deviation, dried nasal blood crusting in both nares, cannot perform ant rhinoscopy due to non-compliance  No acute OMFS interventions . HOB elevation, pain control. Ice to face aas needed . Afrin , NSS prn   Trauma- pt  appears uncomfortable, mildly disoriented, urinated on himself accidentally. GCS 14. +2 pitting edema BLE. Nasal bridge abrasion and swelling, dried blood at the nares . Await PT/OT     Date: 6/14    Day 3: Has surpassed a 2nd midnight with active treatments and services.   Pt did not sleep well overnight, didn't fall sleep until 3-4 am . Alert to person, place, and situation disoriented to time .  Reports some mild pain on his right hip, although improved from yesterday Prt c/o constipation - increased senna to BID, add Miralax . Seen by speech therapy yesterday, recommend continue regular diet with thin liquids   PT- level II rehab resource intensity Pt requiring min Ax1 for STS functional transfers to RW but required mod Ax1 for descending into recliner due to uncontrolled descent. pt limited with activity tolerance and ambulation distance as pt continues to demonstrate the inability to  ambulate house hold distances w/o requiring assistanec for safety and balance AM-PAC Basic Mobility Inpatient Short Form Raw Score is 14. A Raw score of less than or equal to 16 suggests the patient may benefit from discharge to post-acute rehabilitation services   Per IPCM, STR referrals have been made and [facility] is able to accept pt. CM requested auth be initiated.     ED Triage Vitals   Temperature Pulse Respirations Blood Pressure SpO2 Pain Score   06/12/24 1057 06/12/24 1057 06/12/24 1057 06/12/24 1057 06/12/24 1057 06/12/24 1523   97.6 °F (36.4 °C) 84 18 (!) 214/88 98 % 5     Weight (last 2 days)       Date/Time Weight    06/12/24 1723 92.4 (203.71)    06/12/24 1057 99 (218.26)            Vital Signs (last 3 days)       Date/Time Temp Pulse Resp BP MAP (mmHg) SpO2 O2 Device Patient Position - Orthostatic VS Alexandra Coma Scale Score Pain    06/14/24 1302 -- -- -- -- -- -- -- -- -- 3    06/14/24 11:01:36 -- 80 16 111/57 75 98 % -- -- -- --    06/14/24 1047 -- -- -- -- -- -- -- -- -- No Pain    06/14/24 0900 -- -- -- -- -- 99 % None (Room air) -- 14 No Pain    06/14/24 07:35:08 98.6 °F (37 °C) 83 16 148/70 96 97 % -- -- -- --    06/14/24 0612 -- -- -- -- -- -- -- -- -- 2    06/14/24 03:19:53 -- 90 20 178/91 120 98 % -- Lying -- --    06/14/24 03:19:17 -- 86 -- 178/91 120 97 % -- -- -- --    06/14/24 0305 -- -- -- -- -- -- -- -- -- 6    06/14/24 03:03:25 -- 93 -- 190/87 121 97 % -- -- -- --    06/13/24 22:46:23 96.9 °F (36.1 °C) 75 18 155/80 105 98 % None (Room air) Lying -- --    06/13/24 2123 -- -- -- -- -- -- -- -- -- 4    06/13/24 18:46:25 97.6 °F (36.4 °C) 75 16 140/66 91 100 % None (Room air) Sitting -- --    06/13/24 1630 -- -- -- -- -- -- -- -- 15 --    06/13/24 15:11:44 97.6 °F (36.4 °C) 74 -- 170/83 112 100 % -- -- -- --    06/13/24 15:10:35 96.2 °F (35.7 °C) 74 16 170/83 112 100 % None (Room air) Sitting -- --    06/13/24 1442 -- -- -- -- -- -- -- -- -- 5 06/13/24 1335 -- -- -- -- -- -- -- -- --  4    06/13/24 0830 -- -- -- -- -- -- -- -- 14 5    06/13/24 07:25:17 98.2 °F (36.8 °C) 80 -- 154/76 102 98 % -- -- -- --    06/12/24 2129 -- -- -- -- -- -- -- -- 14 3    06/12/24 19:08:33 98.8 °F (37.1 °C) 83 12 165/82 110 99 % -- -- -- --    06/12/24 1758 -- -- -- -- -- -- -- -- 14 --    06/12/24 1746 -- -- -- 150/80 -- -- -- Lying -- --    06/12/24 17:33:26 98.8 °F (37.1 °C) 86 16 171/76 108 97 % -- Lying -- --    06/12/24 1630 -- 85 18 172/78 112 97 % -- -- -- --    06/12/24 1553 -- -- -- -- -- -- -- -- -- 2    06/12/24 1523 -- -- -- -- -- -- -- -- -- 5    06/12/24 1500 -- -- -- -- -- -- -- -- 14 --    06/12/24 1430 -- 84 18 189/80 115 100 % None (Room air) -- -- --    06/12/24 1200 -- 87 18 188/83 119 97 % -- -- -- --    06/12/24 1100 -- -- -- -- -- -- -- -- 14 --    06/12/24 1058 -- -- -- -- -- -- -- -- 14 --    06/12/24 1057 97.6 °F (36.4 °C) 84 18 214/88 127 98 % None (Room air) Lying -- --                Pertinent Labs/Diagnostic Test Results:   Radiology:  XR hand 3+ views RIGHT   ED Interpretation by Adeola Garza MD (06/12 5019)   No fracture      Final Interpretation by Arnoldo Monroy MD (06/12 1519)      No acute osseous abnormality. Tiny radiopacities at the end of the fifth digit could be contamination or small foreign bodies. Correlate for any penetrating injury      Arthritis      Workstation performed: [id number]         XR femur 2 views RIGHT   ED Interpretation by Adeola Garza MD (06/12 1340)   No fracture      Final Interpretation by Arnoldo Monroy MD (06/12 1521)      Arthritis of the hip and knee. No fracture seen      Workstation performed: [id number]         XR pelvis ap only 1 or 2 vw   ED Interpretation by Adeola Garza MD (06/12 1339)   No fracture      Final Interpretation by Arnoldo Monroy MD (06/12 1522)      No acute osseous abnormality.      Workstation performed: [id number]         CT head without contrast   Final Interpretation by Ismael Edwards  MD Vidal (06/12 1326)      1.  No acute intracranial abnormality.  Stable chronic microangiopathic changes within the brain.   2. Small left supraorbital/periorbital/anterior frontal scalp soft tissue swelling/hematoma without underlying calvarial fracture.   3. Leftward nasal septal deviation with acute appearing minimally displaced fracture of the anterior nasal septum (perpendicular plate of the ethmoid).         The study was marked in EPIC for immediate notification.         Workstation performed: [id number]         CT facial bones without contrast   Final Interpretation by Ismael Drake MD (06/12 1326)      1.  No acute intracranial abnormality.  Stable chronic microangiopathic changes within the brain.   2. Small left supraorbital/periorbital/anterior frontal scalp soft tissue swelling/hematoma without underlying calvarial fracture.   3. Leftward nasal septal deviation with acute appearing minimally displaced fracture of the anterior nasal septum (perpendicular plate of the ethmoid).         The study was marked in EPIC for immediate notification.         Workstation performed: [id number]         CT spine cervical without contrast   Final Interpretation by Ismael Drake MD (06/12 1315)      No cervical spine fracture or traumatic malalignment.                  Workstation performed: [id number]           Cardiology:  No orders to display     GI:  No orders to display           Results from last 7 days   Lab Units 06/13/24  0443 06/12/24  1529 06/12/24  1151   WBC Thousand/uL 7.76  --  7.96   HEMOGLOBIN g/dL 10.7*  --  12.1   HEMATOCRIT % 32.7*  --  36.5   PLATELETS Thousands/uL 156 161 178   TOTAL NEUT ABS Thousands/µL  --   --  6.02         Results from last 7 days   Lab Units 06/13/24  0443 06/12/24  1151   SODIUM mmol/L 140 141   POTASSIUM mmol/L 4.1 5.0   CHLORIDE mmol/L 109* 106   CO2 mmol/L 26 29   ANION GAP mmol/L 5 6   BUN mg/dL 27* 28*   CREATININE mg/dL 1.04 1.07   EGFR  ml/min/1.73sq m 66 64   CALCIUM mg/dL 8.6 9.5             Results from last 7 days   Lab Units 06/13/24  0443 06/12/24  1151   GLUCOSE RANDOM mg/dL 88 94                       ED Treatment-Medication Administration from 06/12/2024 1050 to 06/12/2024 1723         Date/Time Order Dose Route Action     06/12/2024 1525 enoxaparin (LOVENOX) subcutaneous injection 30 mg 30 mg Subcutaneous Given     06/12/2024 1523 acetaminophen (TYLENOL) tablet 975 mg 975 mg Oral Given            Past Medical History:   Diagnosis Date    Arthritis     Asbestos exposure     Asbestosis (HCC)     GERD (gastroesophageal reflux disease)     Hemoptysis 5/11/2022    Hypertension     Lung disease     Parkinson's disease      Present on Admission:   Fall   Stage 3a chronic kidney disease (HCC)   Parkinson's disease   Chronic right shoulder pain      Admitting Diagnosis: Head injury [S09.90XA]  Closed fracture of nasal bone, initial encounter [S02.2XXA]  Parkinson's disease, unspecified whether dyskinesia present, unspecified whether manifestations fluctuate [G20.A1]  Age/Sex: 83 y.o. male  Admission Orders:  Scheduled Medications:  acetaminophen, 975 mg, Oral, Q8H MITCHELL  amLODIPine, 5 mg, Oral, Daily  carbidopa-levodopa, 2.5 tablet, Oral, TID  enoxaparin, 30 mg, Subcutaneous, Q12H  lidocaine, 1 patch, Topical, Daily  melatonin, 6 mg, Oral, HS  senna-docusate sodium, 1 tablet, Oral, BID      Continuous IV Infusions:     PRN Meds:  HYDROmorphone, 0.2 mg, Intravenous, Q2H PRN x1 6/14   oxyCODONE, 2.5 mg, Oral, Q4H PRN   Or  oxyCODONE, 5 mg, Oral, Q4H PRN    Dysphagia 3 , dental soft , thin liquid diet   SCD   OOB to chair TID   Neuro checks        IP CONSULT TO ORAL AND MAXILLOFACIAL SURGERY  IP CONSULT TO CASE MANAGEMENT  IP CONSULT TO GERONTOLOGY  IP CONSULT TO GERONTOLOGY    Network Utilization Review Department  ATTENTION: Please call with any questions or concerns to [phone] and carefully listen to the prompts so that you are directed to the  right person. All voicemails are confidential.   For Discharge needs, contact Care Management DC Support Team at [phone] opt. [phone]  Send all requests for admission clinical reviews, approved or denied determinations and any other requests to dedicated fax number below belonging to the campus where the patient is receiving treatment. List of dedicated fax numbers for the Facilities:  FACILITY NAME UR FAX NUMBER   ADMISSION DENIALS (Administrative/Medical Necessity) [phone]   DISCHARGE SUPPORT TEAM (NETWORK) [phone]   PARENT CHILD HEALTH (Maternity/NICU/Pediatrics) [phone]   [facility] [phone]   [facility] [phone]   [facility] [phone]   [facility] [phone]   [facility] [phone]   [facility] [phone]   [facility] [phone]   [facility] [phone]   [facility] [phone]   [facility] [phone]   [facility] [phone]   [facility] [phone]

## 2024-06-13 NOTE — CONSULTS
Oral and Maxillofacial Surgery Consult    Pt seen 06/13/24 8:08 AM     HPI: 83 y.o. male currently admitted after fall. Consult requested for closed fracture of nasal bone.  Patient is unable to communicate properly but exhibits signs of pain c palpation of nasal bridge, cannot elaborate on nasal congestion    PMH:   Past Medical History:   Diagnosis Date    Arthritis     Asbestos exposure     Asbestosis (HCC)     GERD (gastroesophageal reflux disease)     Hemoptysis 5/11/2022    Hypertension     Lung disease     Parkinson's disease         Allergies:   Allergies   Allergen Reactions    Morphine And Codeine Other (See Comments)     Patient gets delirious       Meds:     Current Facility-Administered Medications:     acetaminophen (TYLENOL) tablet 975 mg, 975 mg, Oral, Q8H MITCHELL, MITESH Lucas, 975 mg at 06/12/24 2129    amLODIPine (NORVASC) tablet 5 mg, 5 mg, Oral, Daily, Luis Swain PA-C    carbidopa-levodopa (SINEMET)  mg per tablet 2.5 tablet, 2.5 tablet, Oral, TID, Luis Swain PA-C    enoxaparin (LOVENOX) subcutaneous injection 30 mg, 30 mg, Subcutaneous, Q12H, MITESH Lucas, 30 mg at 06/13/24 0215    HYDROmorphone HCl (DILAUDID) injection 0.2 mg, 0.2 mg, Intravenous, Q2H PRN, MITESH Lucas    melatonin tablet 6 mg, 6 mg, Oral, HS, MITESH Estrada, 6 mg at 06/12/24 2129    oxyCODONE (ROXICODONE) split tablet 2.5 mg, 2.5 mg, Oral, Q4H PRN **OR** oxyCODONE (ROXICODONE) IR tablet 5 mg, 5 mg, Oral, Q4H PRN, MITESH Lucas    senna-docusate sodium (SENOKOT S) 8.6-50 mg per tablet 1 tablet, 1 tablet, Oral, HS, MITESH Lucas, 1 tablet at 06/12/24 2129    PSH:   Past Surgical History:   Procedure Laterality Date    HERNIA REPAIR      INTRACAPSULAR CATARACT EXTRACTION Left     KNEE SURGERY      many years ago    SHOULDER SURGERY        Family History   Problem Relation Age of Onset    Stroke Mother     Colon cancer Mother     Depression Sister     Hypertension  Sister         Review of Systems     Temp:  [97.6 °F (36.4 °C)-98.8 °F (37.1 °C)] 98.2 °F (36.8 °C)  HR:  [80-87] 80  Resp:  [12-18] 12  BP: (150-214)/(76-88) 154/76  SpO2:  [97 %-100 %] 98 %       Intake/Output Summary (Last 24 hours) at 6/13/2024 0808  Last data filed at 6/12/2024 1739  Gross per 24 hour   Intake 480 ml   Output 650 ml   Net -170 ml        Physical Exam:  Gen: disoriented  Resp: Unlabored on RA.  Neuro: cannot assess  HEENT:   Eye: external appearance normal  Ears: external appearance normal  Nose: erythema, TTP over nasal bridge, min deviation, dried nasal blood crusting in both nares, cannot perform ant rhinoscopy due to non-compliance    Imaging: I have personally reviewed pertinent reports.      CT facial bones (6/12/24):  1.  No acute intracranial abnormality.  Stable chronic microangiopathic changes within the brain.  2. Small left supraorbital/periorbital/anterior frontal scalp soft tissue swelling/hematoma without underlying calvarial fracture.  3. Leftward nasal septal deviation with acute appearing minimally displaced fracture of the anterior nasal septum (perpendicular plate of the ethmoid).    Assessment  83 y.o. male  evaluated for nasal bone fracture.     Plan:  - No acute OMFS interventions   - Analgesia as per primary  - Head of bed elevated  - Ice to face as needed  - Afrin (no more than 3d total), NSS PRN  - F/U with OMFS outpatient    D/w OMFS attg on call    Inpatient consult to Oral and Maxillofacial Surgery  Consult performed by: Inga Fuchs MD  Consult ordered by: MITESH Lucas           Counseling / Coordination of Care  Total floor / unit time spent today 30 minutes.  Greater than 50% of total time was spent with the patient and / or family counseling and / or coordination of care.  A description of the counseling / coordination of care: description of injury with proposed plan of care. Discussed risks, benefits, and alternatives to treatment plan. All questions  were answered. Patient in agreement with plan.

## 2024-06-13 NOTE — PLAN OF CARE

## 2024-06-13 NOTE — PROGRESS NOTES
Progress Note - Geriatric Medicine   Daniel CHRISTOPHER Sanz  83 y.o. male MRN: [id number]  Unit/Bed#: W -01 Encounter: [id number]      Assessment/Plan:  Fall  [place] fall precautions   Assess patient frequently for physical needs, encourage use of assistant devices as needed and directed by PT/OT  Identify cognitive and physical deficits and behaviors that affect risk of falls  Consider moving patient closer to nursing station to monitor more closely for impulsive behavior which may increase risk of falls  Educate patient/family on patient safety including physical limitations and importance of using call bell for assistance   Modify environment to reduce risk of injury including disconnecting from pole when not in use, ensuring adequate lighting in room and restroom, ensuring that path to restroom is clear and free of trip hazards  PT/OT consulted to assist with strengthening/mobility and assist with discharge planning to appropriate level of care    Closed fracture of nasal bone  S/p fall  CT facial bones showed leftward nasal septal deviation with acute appearing minimally displaced fracture of the anterior nasal septum   OMFS consulted-  no acute omfs intervention  Head of bed elevated  Ice prn  Afrin x3days max  F/u outpatient with omfs    Parkinson's disease  History of dementia/Parkinson's disease  Follows with neurology as an outpatient  Wife reports a known history of hallucinations, he occasionally has them but infrequent recently  She is unclear what he has hallucinations of, just knows that he sees things that are not there  Most recent MoCA was a 7 out of 30  Carbidopa/levodopa 25/100 mg recently increased to 2-1/2 tablets 3 times daily  Recommend restarting home dose of carbidopa/levodopa  Delirium precautions     Insomnia  First-line treatment is behavior modification  Maintain sleep-wake cycle, avoid nighttime interruptions  Avoid caffeine throughout the day  Avoid napping throughout the  day  Encourage physical activity throughout the day  Avoid sedative hypnotics including benzodiazepines and benadryl  Continue melatonin 6mg qhs  If necessary could consider a low-dose of Seroquel, although will try to avoid    Constipation  Patient complains of constipation  Last BM was prior to hospitalization  Is currently on senna S1 tablet daily, will change to twice daily  Encourage adequate p.o. Hydration  Encourage prune juice as tolerated    Dysphagia  Patient witch coughing when trying to swallow on 6/12/24  Speech therapy consulted placed  Aspiration precautions    Delirium precautions  Patient is alert oriented to person, partial place disoriented to time and situation  History of known dementia  Was anxious and agitated overnight, frequently trying to pull out IV  No EKG from this hospitalization on file  QTc from EKG on 4/16/2024 was 422  Would recommend trying to avoid antipsychotics and using redirection as able to  Consider adding gabapentin 100 mg which may help with sleep and anxiety and pain  At risk for delirium due to hospitalization, medications, sleep disturbance  Recommend delirium precautions  Maintain sleep-wake cycle, avoid nighttime interruptions  minimize overnight interruptions, group overnight vitals/labs/nursing checks as possible  dim lights, close blinds and turn off tv to minimize stimulation and encourage sleep environment in evenings  Provide adequate pain control  Avoid urinary retention and constipation  Provide frequent and early mobilization  Provide frequent redirection and reorientation as needed  Avoid medications that may worsen or precipitate delirium such as tramadol, benzodiazepines, anticholinergics, and Benadryl  Redirect unwanted behaviors as first-line therapy, avoid physical restraints as able to  Continue to monitor    Ambulatory dysfunction  At a baseline ambulates with walker or cane occasionally, does not always use  PT/OT following  Fall precautions  Out of  "bed as tolerated  Encourage early and frequent mobilization  Encourage adequate hydration and nutrition  Provide adequate pain management   Goal is to return home with wife  Continue with PT/OT for continued strength and balance training     Subjective:   Daniel is an 83-year-old male being seen for a geriatrics follow-up.  Upon exam patient was admitted in chair, resting.  He appeared comfortable and was in no acute distress.  He was reporting hip pain, lidocaine patch was ordered-imaging x-ray unremarkable.  He had trouble sleeping overnight.  Per nursing he has been cooperative today, overnight was impulsive and trying to pull out IVs and get out of bed.    Review of Systems   Reason unable to perform ROS: limited.   Constitutional:  Negative for activity change and appetite change.   HENT:  Negative for trouble swallowing.    Eyes:  Negative for visual disturbance.   Respiratory:  Negative for cough and shortness of breath.    Cardiovascular:  Negative for chest pain and palpitations.   Gastrointestinal:  Positive for constipation. Negative for abdominal pain, diarrhea and vomiting.   Genitourinary:  Negative for difficulty urinating and hematuria.   Musculoskeletal:  Positive for arthralgias and gait problem. Negative for back pain.   Skin:  Negative for color change.   Neurological:  Positive for weakness. Negative for seizures.   Psychiatric/Behavioral:  Positive for confusion and sleep disturbance. The patient is nervous/anxious.          Objective:     Vitals: Blood pressure 154/76, pulse 80, temperature 98.2 °F (36.8 °C), resp. rate 12, height 5' 8\" (1.727 m), weight 92.4 kg (203 lb 11.3 oz), SpO2 98%.,Body mass index is 30.97 kg/m².      Intake/Output Summary (Last 24 hours) at 6/13/2024 0934  Last data filed at 6/13/2024 0830  Gross per 24 hour   Intake 960 ml   Output 950 ml   Net 10 ml       Current Medications: Reviewed    Physical Exam:   Physical Exam  Vitals reviewed.   Constitutional:       " "General: He is not in acute distress.     Appearance: He is well-developed. He is ill-appearing.      Comments: Frail-appearing   HENT:      Head: Normocephalic.      Comments: Swelling and bruising on face, nasal bone fracture  Cardiovascular:      Rate and Rhythm: Normal rate and regular rhythm.      Heart sounds: No murmur heard.  Pulmonary:      Effort: Pulmonary effort is normal. No respiratory distress.      Breath sounds: Normal breath sounds.   Abdominal:      General: Bowel sounds are normal. There is no distension.      Palpations: Abdomen is soft.   Musculoskeletal:      Right lower leg: Edema present.      Left lower leg: Edema present.   Skin:     General: Skin is warm and dry.      Findings: Bruising present.   Neurological:      General: No focal deficit present.      Mental Status: He is alert. Mental status is at baseline.      Cranial Nerves: No cranial nerve deficit.      Motor: Weakness present.      Gait: Gait abnormal.      Comments: Alert to person and partial place  With episodes of increased confusion   Psychiatric:         Mood and Affect: Mood is anxious.         Behavior: Behavior is agitated.         Judgment: Judgment is impulsive.          Invasive Devices       Peripheral Intravenous Line  Duration             Peripheral IV 06/12/24 Left Antecubital <1 day                    Lab, Imaging and other studies:    Lab Results:   I have personally reviewed pertinent lab results including the following:  -CBC, BMP    I have personally reviewed the following imaging study reports in PACS:  No new imaging to review  - I have personally reviewed pertinent reports.      Please note:  Voice-recognition software may have been used in the preparation of this document.  Occasional wrong word or \"sound-alike\" substitutions may have occurred due to the inherent limitations of voice recognition software.  Interpretation should be guided by context.      "

## 2024-06-13 NOTE — PLAN OF CARE
Problem: OCCUPATIONAL THERAPY ADULT  Goal: Performs self-care activities at highest level of function for planned discharge setting.  See evaluation for individualized goals.  Description: Treatment Interventions: ADL retraining, Functional transfer training, UE strengthening/ROM, Endurance training, Patient/family training, Continued evaluation, Equipment evaluation/education          See flowsheet documentation for full assessment, interventions and recommendations.   Note: Limitation: Decreased ADL status, Decreased UE ROM, Decreased UE strength, Decreased cognition, Decreased endurance, Decreased Safe judgement during ADL, Decreased self-care trans, Decreased high-level ADLs  Prognosis: Good  Assessment: Patient is a 83 y.o. male seen for OT evaluation at [facility] following admission on 6/12/2024  s/p Fall. Please see above for comprehensive list of comorbidities and significant PMHx impacting functional performance.  Upon initial evaluation, pt appears to be performing below baseline functional status.   Occupational performance is affected by the following deficits: endurance ,  decreased muscular strength , impaired coordination , decreased balance , decreased trunk control , decreased activity tolerance , attention to task, impaired judgement and problem solving , impaired safety awareness , and (+) pain . Personal/Environmental factors impacting D/C include: (+) Hx of falls , Assistance needed for ADLs and functional mobility, decreased insight toward deficits , and decreased recall of precautions . Supporting factors include: support system available Patient would benefit from OT services within the acute care setting to maximize level of functional independence in the following areas self-care transfers, functional mobility, and ADLs.  From OT standpoint, recommendation at time of D/C would be Level 2: moderate resource intensity .     Rehab Resource Intensity Level, OT: II (Moderate  Resource Intensity)   Kyung Pulido, OT

## 2024-06-14 VITALS
OXYGEN SATURATION: 100 % | DIASTOLIC BLOOD PRESSURE: 69 MMHG | BODY MASS INDEX: 30.87 KG/M2 | TEMPERATURE: 98.3 F | WEIGHT: 203.71 LBS | SYSTOLIC BLOOD PRESSURE: 156 MMHG | HEIGHT: 68 IN | HEART RATE: 80 BPM | RESPIRATION RATE: 17 BRPM

## 2024-06-14 PROCEDURE — 99232 SBSQ HOSP IP/OBS MODERATE 35: CPT | Performed by: NURSE PRACTITIONER

## 2024-06-14 PROCEDURE — 99239 HOSP IP/OBS DSCHRG MGMT >30: CPT | Performed by: NURSE PRACTITIONER

## 2024-06-14 PROCEDURE — 97530 THERAPEUTIC ACTIVITIES: CPT

## 2024-06-14 PROCEDURE — 97116 GAIT TRAINING THERAPY: CPT

## 2024-06-14 PROCEDURE — NC001 PR NO CHARGE: Performed by: SURGERY

## 2024-06-14 RX ORDER — OXYCODONE HYDROCHLORIDE 5 MG/1
2.5 TABLET ORAL EVERY 4 HOURS PRN
Qty: 10 TABLET | Refills: 0 | Status: SHIPPED | OUTPATIENT
Start: 2024-06-14 | End: 2024-06-24

## 2024-06-14 RX ORDER — ACETAMINOPHEN 325 MG/1
975 TABLET ORAL EVERY 8 HOURS SCHEDULED
Qty: 30 TABLET | Refills: 0 | Status: SHIPPED | OUTPATIENT
Start: 2024-06-14

## 2024-06-14 RX ORDER — LIDOCAINE 50 MG/G
1 PATCH TOPICAL DAILY
Qty: 10 PATCH | Refills: 0 | Status: SHIPPED | OUTPATIENT
Start: 2024-06-15

## 2024-06-14 RX ORDER — POLYETHYLENE GLYCOL 3350 17 G/17G
17 POWDER, FOR SOLUTION ORAL DAILY PRN
Status: DISCONTINUED | OUTPATIENT
Start: 2024-06-14 | End: 2024-06-14 | Stop reason: HOSPADM

## 2024-06-14 RX ORDER — AMOXICILLIN 250 MG
1 CAPSULE ORAL 2 TIMES DAILY
Qty: 10 TABLET | Refills: 0 | Status: SHIPPED | OUTPATIENT
Start: 2024-06-14

## 2024-06-14 RX ADMIN — AMLODIPINE BESYLATE 5 MG: 5 TABLET ORAL at 09:04

## 2024-06-14 RX ADMIN — ACETAMINOPHEN 975 MG: 325 TABLET, FILM COATED ORAL at 06:12

## 2024-06-14 RX ADMIN — SENNOSIDES, DOCUSATE SODIUM 1 TABLET: 8.6; 5 TABLET ORAL at 09:04

## 2024-06-14 RX ADMIN — ENOXAPARIN SODIUM 30 MG: 30 INJECTION SUBCUTANEOUS at 03:05

## 2024-06-14 RX ADMIN — HYDROMORPHONE HYDROCHLORIDE 0.2 MG: 0.2 INJECTION, SOLUTION INTRAMUSCULAR; INTRAVENOUS; SUBCUTANEOUS at 03:05

## 2024-06-14 RX ADMIN — ACETAMINOPHEN 975 MG: 325 TABLET, FILM COATED ORAL at 13:02

## 2024-06-14 RX ADMIN — LIDOCAINE 5% 1 PATCH: 700 PATCH TOPICAL at 13:02

## 2024-06-14 RX ADMIN — CARBIDOPA AND LEVODOPA 2.5 TABLET: 25; 100 TABLET ORAL at 15:40

## 2024-06-14 RX ADMIN — CARBIDOPA AND LEVODOPA 2.5 TABLET: 25; 100 TABLET ORAL at 09:04

## 2024-06-14 RX ADMIN — ENOXAPARIN SODIUM 30 MG: 30 INJECTION SUBCUTANEOUS at 14:09

## 2024-06-14 NOTE — CASE MANAGEMENT
Case Management Progress Note    Patient name Daniel Sanz Jr.  Location W /W -01 MRN [id number]  : 1940 Date 2024       LOS (days): 2  Geometric Mean LOS (GMLOS) (days): 2.9  Days to GMLOS:1        OBJECTIVE:        Current admission status: Inpatient  Preferred Pharmacy:   [facility] - [place] [id number] [facility]  [address] [facility] [address]  Phone: [phone] Fax: [phone]    [id number] - [place], [address] [address]  Phone: [phone] Fax: [phone]    Primary Care Provider: Charlie Martinez DO    Primary Insurance: [employer] [facility] REP  Secondary Insurance:     PROGRESS NOTE:    CM was told wife is considering rehab at this time as PT was in working with pt and she saw how much assistance pt needs at this time.  CM met with wife to review options.  Wife was quite tearful as she does not want pt to be put into a nursing home.  CM explained he would not have to stay in the facility long term.  She would be able to take pt home when finished with rehab.      After multiple conversations with wife regarding DCP, she requested referrals be made to [facility] and [facility] as these are the closest.  Referrals have been made and [facility] is able to accept pt.  CM requested auth be initiated.      CM will continue to follow to assist with needs and planning as pt is medically stable for DC.

## 2024-06-14 NOTE — CASE MANAGEMENT
[facility] has received APPROVED authorization.  Insurance: [facility]    Called in by Rep: Malinda GEORGE#  [phone]  Authorization received for: SNF  Facility: [facility]   Authorization #:[id number]  Start of Care: 6/14  Next Review Date: 6/18  Continued Stay Care Coordinator: Yelena  Submit next review to: fax# [phone]    Care Manager notified: Chuckie COELHO     Please reach out to CM for updates on any clinical information.

## 2024-06-14 NOTE — ASSESSMENT & PLAN NOTE
Lab Results   Component Value Date    EGFR 66 06/13/2024    EGFR 64 06/12/2024    EGFR 53 04/16/2024    CREATININE 1.04 06/13/2024    CREATININE 1.07 06/12/2024    CREATININE 1.25 04/16/2024     - Baseline Cr around 1-1.25  - Currently at baseline  - Avoid nephrotoxic drugs, maintain adequate hydration  - Continue to monitor renal function

## 2024-06-14 NOTE — CASE MANAGEMENT
Case Management Discharge Planning Note    Patient name Daniel Sanz Jr.  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     Shortness of breath      MGUS (monoclonal gammopathy of unknown significance) 2022     Neuropathy 2022     Parkinson's disease 2020     Tear of right supraspinatus tendon 2019     Chronic right shoulder pain 2019     Internal derangement of right shoulder 2019     Pleural plaque with presence of asbestos 2017     Allergic rhinitis with postnasal drip 2017       LOS (days): 2  Geometric Mean LOS (GMLOS) (days): 2.9  Days to GMLOS:1     OBJECTIVE:  Risk of Unplanned Readmission Score: 11.19         Current admission status: Inpatient   Preferred Pharmacy:   [facility] [address] [address]  Phone: [phone] Fax: [phone]    [id number] - [place] [address] [address]  Phone: [phone] Fax: [phone]    Primary Care Provider: Charlie Martinez DO    Primary Insurance: [employer]  REP  Secondary Insurance:     DISCHARGE DETAILS:                                                                                                               Facility Insurance Auth Number: [id number]

## 2024-06-14 NOTE — PHYSICAL THERAPY NOTE
PHYSICAL THERAPY NOTE          Patient Name: Daniel Sanz Jr.  Today's Date: 6/14/2024 06/14/24 1047   PT Last Visit   PT Visit Date 06/14/24   Note Type   Note Type Treatment   Pain Assessment   Pain Assessment Tool 0-10   Pain Score No Pain   Pain Rating: FLACC (Rest) - Face 0   Pain Rating: FLACC (Rest) - Legs 0   Pain Rating: FLACC (Rest) - Activity 0   Pain Rating: FLACC (Rest) - Cry 0   Pain Rating: FLACC (Rest) - Consolability 0   Score: FLACC (Rest) 0   Pain Rating: FLACC (Activity) - Face 0   Pain Rating: FLACC (Activity) - Legs 0   Pain Rating: FLACC (Activity) - Activity 0   Pain Rating: FLACC (Activity) - Cry 0   Pain Rating: FLACC (Activity) - Consolability 0   Score: FLACC (Activity) 0   Restrictions/Precautions   Weight Bearing Precautions Per Order No   Other Precautions Chair Alarm;Cognitive;Bed Alarm;Pain;Fall Risk   General   Chart Reviewed Yes   Response to Previous Treatment Patient with no complaints from previous session.   Family/Caregiver Present Yes  (spouse)   Cognition   Overall Cognitive Status Impaired   Arousal/Participation Alert;Cooperative;Responsive   Attention Difficulty attending to directions   Orientation Level Oriented to person;Oriented to place;Oriented to situation;Disoriented to time   Memory Decreased short term memory;Decreased recall of recent events;Decreased recall of precautions   Following Commands Follows one step commands with increased time or repetition   Comments pt with poor insight into deficits. limited carryover for hand placement in order to increase safety and balance w/ transfers. pt with difficulty following directions for safety. flat affect throughout PT intervention   Subjective   Subjective pt was agreeable to participate in PT intervention   Bed Mobility   Additional Comments pt seated OOB pre/post tx session   Transfers   Sit to Stand 4  Minimal  assistance   Additional items Assist x 1;Armrests;Increased time required;Verbal cues  (w/ RW)   Stand to Sit 3  Moderate assistance   Additional items Assist x 1;Armrests;Increased time required;Verbal cues   Stand pivot 3  Moderate assistance   Additional items Assist x 1;Armrests;Increased time required;Verbal cues   Additional Comments pt completed 3 STS and 1 SPT in todays tx session in order to increase saqfety and balance w/ transfers   Ambulation/Elevation   Gait pattern Decreased foot clearance;Step to;Excessively slow;Shuffling;Decreased hip extension;Decreased heel strike;Decreased toe off;Retropulsion;Narrow ESTELITA   Gait Assistance 4  Minimal assist   Additional items Assist x 1;Verbal cues   Assistive Device Rolling walker   Distance 30'x1 RW   Stair Management Assistance Not tested   Ambulation/Elevation Additional Comments pt required VC's for RW management, stepping , RW proximity with ambulation   Balance   Static Sitting Fair +   Dynamic Sitting Fair   Static Standing Fair -   Dynamic Standing Poor +   Ambulatory Poor  (min to mod Ax1 for safety w/ ambulation)   Endurance Deficit   Endurance Deficit Yes   Endurance Deficit Description limited activity/standing tolerance, functional mobility, ambulation distance and safety /w all OOB activities   Activity Tolerance   Activity Tolerance Patient limited by fatigue;Other (Comment)  (cognition)   Nurse Made Aware Spoke to RN   Exercises   Hip Abduction Sitting;10 reps;AROM;Bilateral   Hip Adduction Sitting;10 reps;AROM;Bilateral  (pillow squeezes)   Knee AROM Long Arc Quad Sitting;10 reps;AROM;Bilateral   Marching Sitting;10 reps;AAROM;AROM;Bilateral   Assessment   Prognosis Fair   Problem List Decreased strength;Decreased endurance;Impaired balance;Decreased mobility;Decreased cognition;Impaired judgement;Decreased safety awareness;Decreased skin integrity   Assessment pt began tx session seated OOB in the recliner pre/post tx session as pt was agreeable  to participate in PT intervention. PT intervention to focus on transfer training, TE activities, posture/balance w/ gait. pt continues to remain consistant for requiring min Ax1 for STS functional transfers to RW but required mod Ax1 for descending into recliner due to uncontrolled descent. pt limited with activity tolerance and ambulation distance as pt continues to demonstrate the inability to ambulate house hold distances w/o requiring assistanec fro safety and balance. pt demonstarted retropulsion with ambulation and remains at an increased risk for falls and injuries due to impaired balance, NBOS, retropulsion and the inability to follow directions for safety and balance. Post tx pt in recliner with call bell, chair alarm activated and legs elevated. Continue to recomemnd Dc w/ level 2 moderate rehab resource intensity when medically cleared   Goals   Patient Goals none stated   STG Expiration Date 06/27/24   PT Treatment Day 2   Plan   Treatment/Interventions Functional transfer training;LE strengthening/ROM;Elevations;Endurance training;Therapeutic exercise;Cognitive reorientation;Patient/family training;Equipment eval/education;Bed mobility;Gait training;Spoke to nursing   Progress Slow progress, decreased activity tolerance   PT Frequency 3-5x/wk   Discharge Recommendation   Rehab Resource Intensity Level, PT II (Moderate Resource Intensity)   Equipment Recommended Walker   Walker Package Recommended Wheeled walker   Change/add to Walker Package? No   AM-PAC Basic Mobility Inpatient   Turning in Flat Bed Without Bedrails 2   Lying on Back to Sitting on Edge of Flat Bed Without Bedrails 2   Moving Bed to Chair 3   Standing Up From Chair Using Arms 3   Walk in Room 3   Climb 3-5 Stairs With Railing 1   Basic Mobility Inpatient Raw Score 14   Basic Mobility Standardized Score 35.55   [facility] Highest Level Of Mobility   -[facility] Goal 4: Move to chair/commode   -HLM Achieved 7: Walk 25 feet or more    Education   Education Provided Mobility training;Assistive device;Other  (functional transfers)   Patient Reinforcement needed   End of Consult   Patient Position at End of Consult Bedside chair;Bed/Chair alarm activated;All needs within reach   The patient's AM-PAC Basic Mobility Inpatient Short Form Raw Score is 14. A Raw score of less than or equal to 16 suggests the patient may benefit from discharge to post-acute rehabilitation services. Please also refer to the recommendation of the Physical Therapist for safe discharge planning.    Nikolai Agosto

## 2024-06-14 NOTE — CASE MANAGEMENT
[facility] received request for authorization from Care Manager.  Authorization request submitted for: SNF  Facility Name: [facility] NPI: [id number]   Facility MD: Samanta Kumar NPI: [id number]  Authorization initiated by contacting insurance:  [facility]  Via: [facility]   Clinicals submitted via fax # [phone]  Pending Reference #: [id number]    Care Manager notified: Chuckie COELHO     Updates to authorization status will be noted in chart. Please reach out to CM for updates on any clinical information.

## 2024-06-14 NOTE — CASE MANAGEMENT
Case Management Discharge Planning Note    Patient name Daniel Sanz Jr.  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     Shortness of breath      MGUS (monoclonal gammopathy of unknown significance) 2022     Neuropathy 2022     Parkinson's disease 2020     Tear of right supraspinatus tendon 2019     Chronic right shoulder pain 2019     Internal derangement of right shoulder 2019     Pleural plaque with presence of asbestos 2017     Allergic rhinitis with postnasal drip 2017       LOS (days): 2  Geometric Mean LOS (GMLOS) (days): 2.9  Days to GMLOS:0.9     OBJECTIVE:  Risk of Unplanned Readmission Score: 11.19         Current admission status: Inpatient   Preferred Pharmacy:   [facility] [place] [address]  Phone: [phone] Fax: [phone]    [id number] - [address] [address]  Phone: [phone] Fax: [phone]    Primary Care Provider: Charlie Martinez DO    Primary Insurance: [facility] REP  Secondary Insurance:     DISCHARGE DETAILS:    Discharge planning discussed with:: pt and wife  Freedom of Choice: Yes  Comments - Freedom of Choice: [facility] able [facility] pt as this was wife's first choice.  Auth was requested and received for pt to go to rehab.  Pt is stable for DC and will need transportation to rehab.        Contacts  Patient Contacts: spouse Sheng  Davie  to Patient:: Family  Contact Method: In Person  Reason/Outcome: Continuity of Care, Discharge Planning, Referral       Other Referral/Resources/Interventions Provided:  Interventions: Transportation  Referral Comments: Request has been made for [facility] transport for pt to go to rehab at [facility] at 1530.  [facility] will transport pt at 1545.  All parties involved have been notified of DC arrangements.     Treatment Team Recommendation: Short Term Rehab  Discharge Destination Plan:: Short Term Rehab  Transport at Discharge : Wheelchair van  Dispatcher Contacted: Yes  Number/Name of Dispatcher: roundtrip  Transported by (Company and Unit #): [place]  ETA of Transport (Date): 06/14/24  ETA of Transport (Time): 1545     Transfer Mode: Wheelchair  Accompanied by: EMS personnel      Accepting Facility Name, City & State : [facility]  Receiving Facility/Agency Phone Number: [phone]  Facility/Agency Fax Number: [phone]

## 2024-06-14 NOTE — PLAN OF CARE
Problem: PHYSICAL THERAPY ADULT  Goal: Performs mobility at highest level of function for planned discharge setting.  See evaluation for individualized goals.  Description: Treatment/Interventions: Functional transfer training, LE strengthening/ROM, Elevations, Therapeutic exercise, Patient/family training, Equipment eval/education, Bed mobility, Gait training, Endurance training, Spoke to nursing, Spoke to case management, OT  Equipment Recommended: Walker       See flowsheet documentation for full assessment, interventions and recommendations.  Outcome: Progressing  Note: Prognosis: Fair  Problem List: Decreased strength, Decreased endurance, Impaired balance, Decreased mobility, Decreased cognition, Impaired judgement, Decreased safety awareness, Decreased skin integrity  Assessment: pt began tx session seated OOB in the recliner pre/post tx session as pt was agreeable to participate in PT intervention. PT intervention to focus on transfer training, TE activities, posture/balance w/ gait. pt continues to remain consistant for requiring min Ax1 for STS functional transfers to RW but required mod Ax1 for descending into recliner due to uncontrolled descent. pt limited with activity tolerance and ambulation distance as pt continues to demonstrate the inability to ambulate house hold distances w/o requiring assistanec fro safety and balance. pt demonstarted retropulsion with ambulation and remains at an increased risk for falls and injuries due to impaired balance, NBOS, retropulsion and the inability to follow directions for safety and balance. Post tx pt in recliner with call bell, chair alarm activated and legs elevated. Continue to recomemnd Dc w/ level 2 moderate rehab resource intensity when medically cleared        Rehab Resource Intensity Level, PT: II (Moderate Resource Intensity)    See flowsheet documentation for full assessment.

## 2024-06-14 NOTE — PROGRESS NOTES
[facility]  Progress Note  Name: Daniel Edmonds I  MRN: [id number]  Unit/Bed#: W -01 I Date of Admission: 6/12/2024   Date of Service: 6/14/2024 I Hospital Day: 2    Assessment & Plan   Hypertension  Assessment & Plan  - Continue home regimen  - Monitor BP    Multiple abrasions  Assessment & Plan  - Bleeding currently controlled  - Pain control  - Local wound care    Scalp hematoma  Assessment & Plan  - S/p mechanical fall, located on left frontal scalp  - CT head/facial bones: Small left supraorbital/periorbital/anterior frontal scalp soft tissue swelling/hematoma without underlying calvarial fracture  - Pain control and ice as needed  - Continue to monitor    Closed fracture of nasal bone  Assessment & Plan  - S/p mechanical fall  - CT facial bones: Leftward nasal septal deviation with acute appearing minimally displaced fracture of the anterior nasal septum (perpendicular plate of the ethmoid)  - Bleeding currently controlled, no evidence of septal hematoma  - OMFS consult placed, appreciate recommendations  - Minor abrasion located on nasal bridge  - Local wound care    Stage 3a chronic kidney disease (HCC)  Assessment & Plan  Lab Results   Component Value Date    EGFR 66 06/13/2024    EGFR 64 06/12/2024    EGFR 53 04/16/2024    CREATININE 1.04 06/13/2024    CREATININE 1.07 06/12/2024    CREATININE 1.25 04/16/2024     - Baseline Cr around 1-1.25  - Currently at baseline  - Avoid nephrotoxic drugs, maintain adequate hydration  - Continue to monitor renal function    Parkinson's disease  Assessment & Plan  - A&O x2 to person and place, not time, at baseline  - Continue home regimen    Chronic right shoulder pain  Assessment & Plan  - Hx rotator cuff repair  - No acute injury or symptoms  - Pain control    * Fall  Assessment & Plan  - Mechanical fall onto face while walking on the pavement at grocery store  - No LOC or blood thinner use  - Pt initially complaining of right-sided  "hand, hip, and upper leg pain  - XR right hand, femur, and pelvis pending  - CT head: No acute intracranial abnormality. Stable chronic microangiopathic changes within the brain  - Injuries noted below  - Neuro checks q4 hours  - PT/OT evaluation and treatment as indicated             Bowel Regimen: Senna and Coalce  VTE Prophylaxis:Enoxaparin (Lovenox)     Disposition: rehab    Subjective   Chief Complaint: none' confused    Subjective: \" Hi\"     Objective   Vitals:   Temp:  [96.9 °F (36.1 °C)-98.6 °F (37 °C)] 98.3 °F (36.8 °C)  HR:  [75-93] 80  Resp:  [16-20] 17  BP: (111-190)/(57-91) 156/69    I/O         06/12 0701  06/13 0700 06/13 0701  06/14 0700 06/14 0701  06/15 0700    P.O. 480 960 120    Total Intake(mL/kg) 480 (5.2) 960 (10.4) 120 (1.3)    Urine (mL/kg/hr) 650 750 (0.3)     Total Output 650 750     Net -170 +210 +120           Unmeasured Urine Occurrence 2 x      Unmeasured Stool Occurrence   1 x             Physical Exam:   GENERAL APPEARANCE: sitting up in a chair  NEURO: confused, GCS - 14  HEENT: EOM''s intact  CV: RRR  LUNGS: CTA bilaterally  GI: tolerating a diet  : voiding  MSK: moving extremities  SKIN: warm and dry    Invasive Devices       Peripheral Intravenous Line  Duration             Peripheral IV 06/13/24 Left;Ventral (anterior) Forearm 1 day                          Lab Results: none  Imaging: none   Other Studies: none       "

## 2024-06-14 NOTE — PLAN OF CARE
Problem: Potential for Falls  Goal: Patient will remain free of falls  Description: INTERVENTIONS:  - Educate patient/family on patient safety including physical limitations  - Instruct patient to call for assistance with activity   - Consult OT/PT to assist with strengthening/mobility   - Keep Call bell within reach  - Keep bed low and locked with side rails adjusted as appropriate  - Keep care items and personal belongings within reach  - Initiate and maintain comfort rounds  - Make Fall Risk Sign visible to staff  - Offer Toileting every 2 Hours, in advance of need  - Initiate/Maintain bed/chair alarm    Problem: PAIN - ADULT  Goal: Verbalizes/displays adequate comfort level or baseline comfort level  Description: Interventions:  - Encourage patient to monitor pain and request assistance  - Assess pain using appropriate pain scale  - Administer analgesics based on type and severity of pain and evaluate response  - Implement non-pharmacological measures as appropriate and evaluate response  - Consider cultural and social influences on pain and pain management  - Notify physician/advanced practitioner if interventions unsuccessful or patient reports new pain  Outcome: Progressing     Problem: DISCHARGE PLANNING  Goal: Discharge to home or other facility with appropriate resources  Description: INTERVENTIONS:  - Identify barriers to discharge w/patient and caregiver  - Arrange for needed discharge resources and transportation as appropriate  - Identify discharge learning needs (meds, wound care, etc.)  - Arrange for interpretive services to assist at discharge as needed  - Refer to Case Management Department for coordinating discharge planning if the patient needs post-hospital services based on physician/advanced practitioner order or complex needs related to functional status, cognitive ability, or social support system  Outcome: Progressing   - Apply yellow socks and bracelet for high fall risk patients  -  Consider moving patient to room near nurses station  Outcome: Progressing

## 2024-06-14 NOTE — DISCHARGE SUMMARY
"  Discharge Summary - Daniel Sanz Jr. 83 y.o. male MRN: [id number]    Unit/Bed#: W -01 Encounter: [id number]    Admission Date:   Admission Orders (From admission, onward)       Ordered        06/12/24 1438  Inpatient Admission  Once                            Admitting Diagnosis: Head injury [S09.90XA]  Closed fracture of nasal bone, initial encounter [S02.2XXA]  Parkinson's disease, unspecified whether dyskinesia present, unspecified whether manifestations fluctuate [G20.A1]    HPI: per HERVE SAGASTUME:  \"Daniel Sanz Jr. is a 83 y.o. male with a PMH of Parkinson's disease and HTN who presents s/p mechanical fall with strike of face. Pt is an unreliable historian. He states he was walking on the sidewalk then tripped on something, denying LOC, use of blood thinners, or current headache. Also complaining of right-sided hip, right upper leg, and right hand pain due to fall. Notes he \"got cut\" on his b/l knees due to fall. Denies weakness, paresthesias, N/V, dizziness, or changes in vision. No chest pain, SOB, abdominal pain, back pain.\"    Procedures Performed: No orders of the defined types were placed in this encounter.      Summary of Hospital Course: 83 t/o male admitted to Trauma after a Mechanical fll resulting in a scalp hematoma and nasal bone fractures.  Remains non-op intervention, worked with PT and OT, has ambulatory dysfunction.  Will be discharged to rehab today.  For more details refer to medical records.  Will follow up with PCP and OMFS.      Significant Findings, Care, Treatment and Services Provided: XR pelvis ap only 1 or 2 vw    Result Date: 6/12/2024  Impression: No acute osseous abnormality. Workstation performed: [id number]     XR femur 2 views RIGHT    Result Date: 6/12/2024  Impression: Arthritis of the hip and knee. No fracture seen Workstation performed: [id number]     XR hand 3+ views RIGHT    Result Date: 6/12/2024  Impression: No acute osseous abnormality. Tiny radiopacities at " the end of the fifth digit could be contamination or small foreign bodies. Correlate for any penetrating injury Arthritis Workstation performed: [id number]     CT head without contrast    Result Date: 6/12/2024  Impression: 1.  No acute intracranial abnormality.  Stable chronic microangiopathic changes within the brain. 2. Small left supraorbital/periorbital/anterior frontal scalp soft tissue swelling/hematoma without underlying calvarial fracture. 3. Leftward nasal septal deviation with acute appearing minimally displaced fracture of the anterior nasal septum (perpendicular plate of the ethmoid). The study was marked in EPIC for immediate notification. Workstation performed: [id number]     CT facial bones without contrast    Result Date: 6/12/2024  Impression: 1.  No acute intracranial abnormality.  Stable chronic microangiopathic changes within the brain. 2. Small left supraorbital/periorbital/anterior frontal scalp soft tissue swelling/hematoma without underlying calvarial fracture. 3. Leftward nasal septal deviation with acute appearing minimally displaced fracture of the anterior nasal septum (perpendicular plate of the ethmoid). The study was marked in EPIC for immediate notification. Workstation performed: [id number]     CT spine cervical without contrast    Result Date: 6/12/2024  Impression: No cervical spine fracture or traumatic malalignment. Workstation performed: [id number]         Complications: none    Discharge Diagnosis: S/P Mechanical Fall  Ambulatory dysfunction  Nasal bone fractures  Parkinsons disease  Scalp hematoma    Medical Problems       Resolved Problems  Date Reviewed: 6/13/2024   None         Condition at Discharge: stable         Discharge instructions/Information to patient and family:   See after visit summary for information provided to patient and family.      Provisions for Follow-Up Care:  See after visit summary for information related to follow-up care and any pertinent  home health orders.      PCP: Charlie Martinez DO    Disposition:  [facility]    Planned Readmission: No      Discharge Statement   I spent 35 minutes discharging the patient. This time was spent on the day of discharge. I had direct contact with the patient on the day of discharge. Additional documentation is required if more than 30 minutes were spent on discharge.     Discharge Medications:  See after visit summary for reconciled discharge medications provided to patient and family.

## 2024-06-14 NOTE — TELEPHONE ENCOUNTER
Patient wife came into office to cancel his appt. Due to being admitted. Patient  wife stated she will call back when he released to reschedule.

## 2024-06-14 NOTE — PLAN OF CARE
Problem: Potential for Falls  Goal: Patient will remain free of falls  Description: INTERVENTIONS:  - Educate patient/family on patient safety including physical limitations  - Instruct patient to call for assistance with activity   - Consult OT/PT to assist with strengthening/mobility   - Keep Call bell within reach  - Keep bed low and locked with side rails adjusted as appropriate  - Keep care items and personal belongings within reach  - Initiate and maintain comfort rounds  - Make Fall Risk Sign visible to staff  - Offer Toileting every two hours, in advance of need  - Initiate/Maintain bed and chair alarm  - Obtain necessary fall risk management equipment:   - Apply yellow socks and bracelet for high fall risk patients  - Consider moving patient to room near nurses station  Outcome: Progressing     Problem: NEUROSENSORY - ADULT  Goal: Achieves stable or improved neurological status  Description: INTERVENTIONS  - Monitor and report changes in neurological status  - Monitor vital signs such as temperature, blood pressure, glucose, and any other labs ordered   - Initiate measures to prevent increased intracranial pressure  - Monitor for seizure activity and implement precautions if appropriate      Outcome: Progressing     Problem: NEUROSENSORY - ADULT  Goal: Remains free of injury related to seizures activity  Description: INTERVENTIONS  - Maintain airway, patient safety  and administer oxygen as ordered  - Monitor patient for seizure activity, document and report duration and description of seizure to physician/advanced practitioner  - If seizure occurs,  ensure patient safety during seizure  - Reorient patient post seizure  - Seizure pads on all 4 side rails  - Instruct patient/family to notify RN of any seizure activity including if an aura is experienced  - Instruct patient/family to call for assistance with activity based on nursing assessment  - Administer anti-seizure medications if ordered    Outcome:  Progressing     Problem: MUSCULOSKELETAL - ADULT  Goal: Maintain proper alignment of affected body part  Description: INTERVENTIONS:  - Support, maintain and protect limb and body alignment  - Provide patient/ family with appropriate education  Outcome: Progressing     Problem: MUSCULOSKELETAL - ADULT  Goal: Maintain or return mobility to safest level of function  Description: INTERVENTIONS:  - Assess patient's ability to carry out ADLs; assess patient's baseline for ADL function and identify physical deficits which impact ability to perform ADLs (bathing, care of mouth/teeth, toileting, grooming, dressing, etc.)  - Assess/evaluate cause of self-care deficits   - Assess range of motion  - Assess patient's mobility  - Assess patient's need for assistive devices and provide as appropriate  - Encourage maximum independence but intervene and supervise when necessary  - Involve family in performance of ADLs  - Assess for home care needs following discharge   - Consider OT consult to assist with ADL evaluation and planning for discharge  - Provide patient education as appropriate  Outcome: Progressing     Problem: PAIN - ADULT  Goal: Verbalizes/displays adequate comfort level or baseline comfort level  Description: Interventions:  - Encourage patient to monitor pain and request assistance  - Assess pain using appropriate pain scale  - Administer analgesics based on type and severity of pain and evaluate response  - Implement non-pharmacological measures as appropriate and evaluate response  - Consider cultural and social influences on pain and pain management  - Notify physician/advanced practitioner if interventions unsuccessful or patient reports new pain  Outcome: Progressing

## 2024-06-17 NOTE — UTILIZATION REVIEW
NOTIFICATION OF ADMISSION DISCHARGE   This is a Notification of Discharge from [facility]. Please be advised that this patient has been discharge from our facility. Below you will find the admission and discharge date and time including the patient’s disposition.   UTILIZATION REVIEW CONTACT:  Ember Dumont  Utilization   Network Utilization Review Department  Phone: [phone] x carefully listen to the prompts. All voicemails are confidential.  Email: [email or web address]     ADMISSION INFORMATION  PRESENTATION DATE: 6/12/2024 10:53 AM  OBERVATION ADMISSION DATE:   INPATIENT ADMISSION DATE: 6/12/24  2:38 PM   DISCHARGE DATE: 6/14/2024  6:30 PM   DISPOSITION:Non [facility] SNF/TCU/SNU    Network Utilization Review Department  ATTENTION: Please call with any questions or concerns to [phone] and carefully listen to the prompts so that you are directed to the right person. All voicemails are confidential.   For Discharge needs, contact Care Management DC Support Team at [phone] opt. 2  Send all requests for admission clinical reviews, approved or denied determinations and any other requests to dedicated fax number below belonging to the campus where the patient is receiving treatment. List of dedicated fax numbers for the Facilities:  FACILITY NAME UR FAX NUMBER   ADMISSION DENIALS (Administrative/Medical Necessity) [phone]   DISCHARGE SUPPORT TEAM ([facility]) [phone]   PARENT CHILD HEALTH (Maternity/NICU/Pediatrics) [phone]   [facility] [phone]   [facility] [phone]   [facility] [phone]   [facility] [phone]   [facility] [phone]   [facility] [phone]   [facility] [phone]   [facility]  [phone]   [facility] [phone]   [facility] [phone]   [facility] [phone]   [facility] [phone]

## 2024-07-01 ENCOUNTER — TELEPHONE (OUTPATIENT)
Age: 84
End: 2024-07-01

## 2024-07-01 DIAGNOSIS — G20.A1 PARKINSON DISEASE: ICD-10-CM

## 2024-07-01 RX ORDER — FUROSEMIDE 20 MG/1
20 TABLET ORAL 3 TIMES WEEKLY
COMMUNITY

## 2024-07-01 RX ORDER — LOSARTAN POTASSIUM 50 MG/1
50 TABLET ORAL DAILY
COMMUNITY

## 2024-07-01 NOTE — TELEPHONE ENCOUNTER
I spoke to Sheng (wife) she called to reschedule his appt he missed in June, due to being hospitalized. I scheduled patient on 7/15/24 with Dr Cavanaugh at 1030 am. Patient aware of arrival time at 1015 am at the [place] location.    Wife informed since Dr Cavanaugh increased Sinemet to 2.5 tabs 3 times a day (at last appt in March), Daniel has been more dizzy and sleeping so much more than before. She feels his tremors are the same as they were on the previous dosage in March. Wife requesting if Dr Cavanaugh can decrease his Sinemet dose, to his previous dosage prior to increase.     Wife aware I will send this info to Dr Cavanaugh, once she provides advises, a nurse will call her back at  with an update. Sheng was appreciative . She was advised to give him his Am Sinemet dose, until she hears back from us, but Sheng refused to give his Sinemet this am, and wants to wait until she hears back from office with Dr Cavanaugh's recommendations.      Routed to Provider.

## 2024-07-01 NOTE — TELEPHONE ENCOUNTER
I spoke to wife Sheng I informed per Dr Cavanaugh, Daniel can go back on Sinemet 2 tabs 3 times a day. She was appreciative of the quick call back with that update.      She also informed that his PCP put him on Amlodipine 5 mg daily (HTN), Losartan 50 mg daily (HTN ) and Furosemide 20 mg 3 times a week (leg swelling). Wife informed since being put on these 3 meds from his PCP, his BP and leg swelling has improved so much. Wife confirmed that the dizzy started once Sinemet was increased, which wife will now give pt 2 tabs three times a day, as previously per Dr Cavanaugh's recommendations today. Wife aware since PCP prescribed the Amlodipine, Losartan and Furosemide, any concerns with symptoms like if dizziness does not improve to call his PCP and make them aware. Wife understood and was appreciative of the update.

## 2024-07-11 ENCOUNTER — TELEPHONE (OUTPATIENT)
Dept: NEUROLOGY | Facility: CLINIC | Age: 84
End: 2024-07-11

## 2024-07-15 ENCOUNTER — OFFICE VISIT (OUTPATIENT)
Dept: NEUROLOGY | Facility: CLINIC | Age: 84
End: 2024-07-15
Payer: COMMERCIAL

## 2024-07-15 VITALS — DIASTOLIC BLOOD PRESSURE: 68 MMHG | SYSTOLIC BLOOD PRESSURE: 150 MMHG | HEART RATE: 74 BPM

## 2024-07-15 DIAGNOSIS — G20.A1 PARKINSON'S DISEASE: Primary | ICD-10-CM

## 2024-07-15 PROCEDURE — 99214 OFFICE O/P EST MOD 30 MIN: CPT | Performed by: STUDENT IN AN ORGANIZED HEALTH CARE EDUCATION/TRAINING PROGRAM

## 2024-07-15 NOTE — PROGRESS NOTES
[facility]  PATIENT:  Daniel Sanz Jr.  MRN:  [id number]  :  1940  DATE OF SERVICE:  2024  REFERRED BY: No ref. provider found  PMD: Charlie Martinez DO    Assessment/Plan:     Daniel Sanz Jr. is a very pleasant 83 y.o. male with a past medical history that includes MGUS who presents for f/u of PD and neuropathy.     Parkinson's disease   Moderate bradykinesia bilaterally R>L. Increased tone with cogwheeling throughout L>R, intermittent LUE rest tremor noted today, shuffling gait present. Previously increased sinemet  mg up to 2.5 tablets TID; however, per wife pt was experiencing increased sedation and dizziness with the increase. Of note, his BP medications were also being adjusted around the same time that he was experiencing these side effects, so it is unclear whether or not it was truly related to sinemet increase. Medication dose was only recently decreased back to 2 tablets TID. Wife/patient preferred to stay on this dose for now and monitor response. I did share with them that I would recommend retrial of an increased sinemet dose or additional medication given that his symptoms do not currently appear to be well-controlled. Discussed that sinemet could also be retimed for more frequent dosing throughout the day. Advised continuation of melatonin 5 mg before bed, limiting daytime naps, and mentally/physically stimulating activities during the day    CC:   PD and neuropathy    History of Present Illness:     Daniel Sanz is a pleasant 80 yo male seen in follow up for PD. He is accompanied by his wife today.     Interval hx  Since last visit, wife has noticed increased drowsiness and dizziness which she has been attributing to increase in sinemet. However, he also had some adjustments made in his BP medications which occurred around the same time she began to notice these symptoms. She recently requested decrease in sinemet back to 2 tablets TID from 2.5 tablets TID  which was started at our previous visit. She states that these side effects have improved, although his motor symptoms on exam today appear to still     Previous Hx:  Per chart review, first seen for consultation in 2018 in which he had noted 2-3 year history of weakness, stiffness, and moving slower. Noted worsening tremors over this period of time. He was started on sinemet with improvement of symptoms. Amantadine later added. He did have issues with leg swelling with higher doses of sinemet.      Last office visit 3/2022 in which PD was stable. Labs for neuropathy were ordered given EMG findings. he was referred to hematology for abnormal spep.      Current Medications:  sinemet 25/100 mg 1.5 tabs  8 am, 1 pm and 1 tab 8 PM  amantadine 100 mg QD     Interval History:   He feels his walking and balance are good, he tripped off his  but no other falls. He will need a chair with arms to get up.  Feels tremor is about the same, benefit from amantadine.  No trouble swallowing.  He sleeps well, He has occasional  talking in his sleep, he used to have more acting out of his dreams but has improved recently.  No hallucinations.   No dizziness.   He has some occasional drooling.  His wife can sometimes have a hard time hearing him sometimes.    No memory concerns.   He denies any numbness or tingling in his feet, he has occasional numbness/tingling in his hands b/l entire hand, has known carpal tunnel.   He is looking into stay strong program at [facility].    Performs ADls independent, needs help putting on pants.      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   He was admitted to [facility] for abnormal blood cultures, however repeat studies were normal, likely contamination. He did have BONIFACIO that was treated with fluids. He did have f/u with pulmonary due to hemoptysis that has since resolved. HX of asbestos exposure.      prior work up:  labs 3/2022: tsh 2.45, a1c 5.1, b12 370, folate 11.7, spep monoclonal  peak in gamma region, immunofixation-monoclonal gammopathy identified as IgG kappa, heavy metals screen normal except arsenic elevated at 15     EMG 9/2021 b/l UE: This is an abnormal EMG of the bilateral upper extremities due to changes consistent with a localized neuropathic process involving the median nerve at the wrist bilaterally consistent with moderate carpal tunnel syndrome with demyelinative change.  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    Last visit  Wife states that she has noticed motor improvement since he started physical therapy. He is currently taking sinemet 25/100 1.5 tab at 8 am, 1.5 tab 1 pm, 1 tab at 8 pm and amantadine 100 mg daily at 5 pm. States that PD symptoms have essentially been stable since his last visit in June.  Wife states that RBD has improved since he was started on sinemet.     Describes achy, burning pain in his fingers and toes but is not interested in starting gabapentin at this time. Hx CTS for which he is not interested in surgery for at this time.     Past Medical History:     Past Medical History:   Diagnosis Date    Arthritis     Asbestos exposure     Asbestosis (HCC)     GERD (gastroesophageal reflux disease)     Hemoptysis 5/11/2022    Hypertension     Lung disease     Parkinson's disease        Patient Active Problem List   Diagnosis    Chronic right shoulder pain    Internal derangement of right shoulder    Tear of right supraspinatus tendon    Parkinson's disease    MGUS (monoclonal gammopathy of unknown significance)    Neuropathy    Pleural plaque with presence of asbestos    Allergic rhinitis with postnasal drip    Shortness of breath    Elevated  serum immunoglobulin free light chains    Stage 3a chronic kidney disease (HCC)    Closed fracture of nasal bone    Fall    Scalp hematoma    Multiple abrasions    Hypertension       Medications:      Current Outpatient Medications   Medication Sig Dispense Refill    acetaminophen (TYLENOL) 325 mg tablet Take 3 tablets (975 mg total) by mouth every 8 (eight) hours 30 tablet 0    amLODIPine (NORVASC) 5 mg tablet Take 5 mg by mouth daily      carbidopa-levodopa (Sinemet)  mg per tablet Take 2 tablets by mouth 3 (three) times a day 540 tablet 3    furosemide (LASIX) 20 mg tablet Take 20 mg by mouth 3 (three) times a week      ketorolac (ACULAR) 0.5 % ophthalmic solution       lidocaine (LIDODERM) 5 % Apply 1 patch topically over 12 hours daily Remove & Discard patch within 12 hours or as directed by MD 10 patch 0    losartan (COZAAR) 50 mg tablet Take 50 mg by mouth daily      prednisoLONE acetate (PRED FORTE) 1 % ophthalmic suspension       predniSONE 10 mg tablet Take 10 mg by mouth 2 (two) times a day      senna-docusate sodium (SENOKOT S) 8.6-50 mg per tablet Take 1 tablet by mouth 2 (two) times a day 10 tablet 0     No current facility-administered medications for this visit.        Allergies:      Allergies   Allergen Reactions    Morphine And Codeine Other (See Comments)     Patient gets delirious       Family History:     Family History   Problem Relation Age of Onset    Stroke Mother     Colon cancer Mother     Depression Sister     Hypertension Sister        Social History:       Social History     Socioeconomic History    Marital status: /Civil Union     Spouse name: Not on file    Number of children: Not on file    Years of education: Not on file    Highest education level: Not on file   Occupational History    Not on file   Tobacco Use    Smoking status: Never     Passive exposure: Past    Smokeless tobacco: Former     Types: Chew     Quit date: 1998   Vaping Use    Vaping status: Never Used    Substance and Sexual Activity    Alcohol use: Not Currently    Drug use: Never    Sexual activity: Not on file   Other Topics Concern    Not on file   Social History Narrative    Not on file     Social Determinants of Health     Financial Resource Strain: Not on file   Food Insecurity: No Food Insecurity (6/13/2024)    Hunger Vital Sign     Worried About Running Out of Food in the Last Year: Never true     Ran Out of Food in the Last Year: Never true   Transportation Needs: No Transportation Needs (6/13/2024)    PRAPARE - Transportation     Lack of Transportation (Medical): No     Lack of Transportation (Non-Medical): No   Physical Activity: Not on file   Stress: Not on file   Social Connections: Not on file   Intimate Partner Violence: Not on file   Housing Stability: Unknown (6/13/2024)    Housing Stability Vital Sign     Unable to Pay for Housing in the Last Year: No     Number of Times Moved in the Last Year: Not on file     Homeless in the Last Year: No         Objective:   /68 (BP Location: Left arm, Patient Position: Sitting, Cuff Size: Large)   Pulse 74     General: Patient is not in any acute/apparent distress, well nourished, well developed and cooperative.   HEENT: normocephalic, atraumatic, moist membranes  Neck: supple  Extremities: no edema noted   Skin: no lesions or rash  Musculosketal: no bony abnormalities    Neurologic Examination:   Mental status: alert, awake, oriented X 3 and following commands.     Speech/Language: Speech is fluent without any dysarthria, no aphasia noted, can name, comprehension intact    Cranial Nerves:   CN I: smell not tested  CN II: Visual fields full to confrontation  CN III, IV, VI: Extraocular movements intact bilaterally. Pupils equal round and reactive to light bilaterally.  CN V: Facial sensation is normal.  CN VII: Full and symmetric facial movement.  CN VIII: Hearing is normal.  CN IX, X: Palate elevates symmetrically.   CN XI: Shoulder shrug strength is  normal.  CN XII: Tongue midline without atrophy or fasciculations.    Motor:   Strength 5/5 in all 4 extremities. Moderate bradykinesia bilaterally R>L. RUE cogwheeling, LUE mild cogwheeling. Increased tone noted in LLE as well. Rest tremor seen briefly in LUE only after walking.     Sensory:   Sensation intact to soft touch, vibration and pinprick in all 4 extremities.    Cerebellar:   Finger-to-nose intact, normal heel to shin.    Reflexes: 2+ in all 4 extremities  Pathologic reflexes - babinski reflex negative, Hoffmans reflex - negative    Gait:   Stooped posture. Short stride with shuffling gait, 5 steps to make turns. Decreased arm swing       Review of Systems:     ROS:    Review of Systems   Constitutional:  Negative for appetite change, fatigue and fever.   HENT: Negative.  Negative for hearing loss, tinnitus, trouble swallowing and voice change.    Eyes: Negative.  Negative for photophobia, pain and visual disturbance.   Respiratory: Negative.  Negative for shortness of breath.    Cardiovascular: Negative.  Negative for palpitations.   Gastrointestinal: Negative.  Negative for nausea and vomiting.   Endocrine: Negative.  Negative for cold intolerance.   Genitourinary: Negative.  Negative for dysuria, frequency and urgency.   Musculoskeletal:  Positive for gait problem. Negative for back pain, myalgias, neck pain and neck stiffness.        Patient wife he has a fall back in June. He was admitted and then sent to rehab. He is now using  a cane. Patient walking has gotten worse. A lot more shuffling and slower in walking.    Skin: Negative.  Negative for rash.   Allergic/Immunologic: Negative.    Neurological:  Negative for dizziness, tremors, seizures, syncope, facial asymmetry, speech difficulty, weakness, light-headedness, numbness and headaches.   Hematological: Negative.  Does not bruise/bleed easily.   Psychiatric/Behavioral: Negative.  Negative for confusion, hallucinations and sleep disturbance.       I have spent a total time of 35 minutes on 07/17/24 in caring for this patient including Risks and benefits of tx options, Instructions for management, Patient and family education, Risk factor reductions, Impressions, Documenting in the medical record, Reviewing / ordering tests, medicine, procedures  , and Obtaining or reviewing history  .

## 2024-08-16 ENCOUNTER — APPOINTMENT (OUTPATIENT)
Dept: LAB | Facility: MEDICAL CENTER | Age: 84
End: 2024-08-16
Payer: COMMERCIAL

## 2024-08-16 DIAGNOSIS — I10 ESSENTIAL HYPERTENSION, BENIGN: ICD-10-CM

## 2024-08-16 DIAGNOSIS — R60.0 EDEMA OF BOTH LOWER EXTREMITIES: ICD-10-CM

## 2024-08-16 LAB
ALBUMIN SERPL BCG-MCNC: 3.9 G/DL (ref 3.5–5)
ALP SERPL-CCNC: 79 U/L (ref 34–104)
ALT SERPL W P-5'-P-CCNC: 5 U/L (ref 7–52)
ANION GAP SERPL CALCULATED.3IONS-SCNC: 9 MMOL/L (ref 4–13)
AST SERPL W P-5'-P-CCNC: 11 U/L (ref 13–39)
BILIRUB SERPL-MCNC: 0.95 MG/DL (ref 0.2–1)
BUN SERPL-MCNC: 38 MG/DL (ref 5–25)
CALCIUM SERPL-MCNC: 8.8 MG/DL (ref 8.4–10.2)
CHLORIDE SERPL-SCNC: 108 MMOL/L (ref 96–108)
CHOLEST SERPL-MCNC: 132 MG/DL
CO2 SERPL-SCNC: 27 MMOL/L (ref 21–32)
CREAT SERPL-MCNC: 1.47 MG/DL (ref 0.6–1.3)
GFR SERPL CREATININE-BSD FRML MDRD: 43 ML/MIN/1.73SQ M
GLUCOSE P FAST SERPL-MCNC: 81 MG/DL (ref 65–99)
HDLC SERPL-MCNC: 37 MG/DL
LDLC SERPL CALC-MCNC: 79 MG/DL (ref 0–100)
NONHDLC SERPL-MCNC: 95 MG/DL
POTASSIUM SERPL-SCNC: 5 MMOL/L (ref 3.5–5.3)
PROT SERPL-MCNC: 6.5 G/DL (ref 6.4–8.4)
SODIUM SERPL-SCNC: 144 MMOL/L (ref 135–147)
TRIGL SERPL-MCNC: 79 MG/DL

## 2024-08-16 PROCEDURE — 80061 LIPID PANEL: CPT

## 2024-08-16 PROCEDURE — 80053 COMPREHEN METABOLIC PANEL: CPT

## 2024-08-16 PROCEDURE — 36415 COLL VENOUS BLD VENIPUNCTURE: CPT

## 2024-09-30 ENCOUNTER — TELEPHONE (OUTPATIENT)
Dept: PULMONOLOGY | Facility: CLINIC | Age: 84
End: 2024-09-30

## 2024-09-30 NOTE — TELEPHONE ENCOUNTER
Called patient to Cancel Appointment  scheduled for 10/1 As per Dr. [facility]. Left a Voicemail Message.

## 2024-10-09 ENCOUNTER — HOSPITAL ENCOUNTER (EMERGENCY)
Facility: HOSPITAL | Age: 84
Discharge: HOME | End: 2024-10-09
Attending: EMERGENCY MEDICINE
Payer: COMMERCIAL

## 2024-10-09 ENCOUNTER — APPOINTMENT (EMERGENCY)
Dept: CT IMAGING | Facility: HOSPITAL | Age: 84
End: 2024-10-09
Payer: COMMERCIAL

## 2024-10-09 VITALS
HEART RATE: 79 BPM | OXYGEN SATURATION: 96 % | DIASTOLIC BLOOD PRESSURE: 78 MMHG | TEMPERATURE: 97.9 F | RESPIRATION RATE: 18 BRPM | SYSTOLIC BLOOD PRESSURE: 178 MMHG

## 2024-10-09 DIAGNOSIS — S09.90XA CLOSED HEAD INJURY, INITIAL ENCOUNTER: ICD-10-CM

## 2024-10-09 DIAGNOSIS — S01.01XA LACERATION OF SCALP, INITIAL ENCOUNTER: Primary | ICD-10-CM

## 2024-10-09 PROCEDURE — 99284 EMERGENCY DEPT VISIT MOD MDM: CPT | Performed by: EMERGENCY MEDICINE

## 2024-10-09 PROCEDURE — 12002 RPR S/N/AX/GEN/TRNK2.6-7.5CM: CPT | Performed by: EMERGENCY MEDICINE

## 2024-10-09 PROCEDURE — 72125 CT NECK SPINE W/O DYE: CPT

## 2024-10-09 PROCEDURE — 70450 CT HEAD/BRAIN W/O DYE: CPT

## 2024-10-09 PROCEDURE — 99284 EMERGENCY DEPT VISIT MOD MDM: CPT

## 2024-10-09 RX ORDER — GINSENG 100 MG
1 CAPSULE ORAL ONCE
Status: COMPLETED | OUTPATIENT
Start: 2024-10-09 | End: 2024-10-09

## 2024-10-09 RX ADMIN — BACITRACIN 1 SMALL APPLICATION: 500 OINTMENT TOPICAL at 13:04

## 2024-10-09 NOTE — ED PROVIDER NOTES
Final diagnoses:   Laceration of scalp, initial encounter   Closed head injury, initial encounter     ED Disposition       ED Disposition   Discharge    Condition   Stable    Date/Time   Wed Oct 9, 2024  3:26 PM    Comment   Daniel Sanz Jr. discharge to home/self care.                   Assessment & Plan       Medical Decision Making  83-year-old male coming into the ED for evaluation of a fall and head injury.  CT head and cervical spine ordered to rule out ICH, neck injury.  Scalp laceration repaired with 4 staples.  Recommend removal in approximately 1 week.    Amount and/or Complexity of Data Reviewed  Radiology: ordered. Decision-making details documented in ED Course.    Risk  OTC drugs.             Medications   bacitracin topical ointment 1 small application (1 small application Topical Given 10/9/24 1304)       ED Risk Strat Scores                           SBIRT 22yo+      Flowsheet Row Most Recent Value   Initial Alcohol Screen: US AUDIT-C     1. How often do you have a drink containing alcohol? 0 Filed at: 10/09/2024 1227   2. How many drinks containing alcohol do you have on a typical day you are drinking?  0 Filed at: 10/09/2024 1227   3b. FEMALE Any Age, or MALE 65+: How often do you have 4 or more drinks on one occassion? 0 Filed at: 10/09/2024 1227   Audit-C Score 0 Filed at: 10/09/2024 1227   WANDA: How many times in the past year have you...    Used an illegal drug or used a prescription medication for non-medical reasons? Never Filed at: 10/09/2024 1227                            History of Present Illness       Chief Complaint   Patient presents with    Fall     Mechanical fall at home. + head strike. - thinners. - asa.        Past Medical History:   Diagnosis Date    Arthritis     Asbestos exposure     Asbestosis (HCC)     GERD (gastroesophageal reflux disease)     Hemoptysis 5/11/2022    Hypertension     Lung disease     Parkinson's disease (HCC)       Past Surgical History:   Procedure  Laterality Date    HERNIA REPAIR      INTRACAPSULAR CATARACT EXTRACTION Left     KNEE SURGERY      many years ago    SHOULDER SURGERY        Family History   Problem Relation Age of Onset    Stroke Mother     Colon cancer Mother     Depression Sister     Hypertension Sister       Social History     Tobacco Use    Smoking status: Never     Passive exposure: Past    Smokeless tobacco: Former     Types: Chew     Quit date: 1998   Vaping Use    Vaping status: Never Used   Substance Use Topics    Alcohol use: Not Currently    Drug use: Never      E-Cigarette/Vaping    E-Cigarette Use Never User       E-Cigarette/Vaping Substances    Nicotine No     THC No     CBD No     Flavoring No     Other No     Unknown No       I have reviewed and agree with the history as documented.     84 yo M in the ED for eval of fall in the kitchen, hitting his head on the countertop most likely.  No loss of conscious.  No blood thinners.  Laceration sustained to the back of his scalp.  Patient states otherwise feeling well.      History provided by:  Patient   used: No    Fall      Review of Systems   All other systems reviewed and are negative.          Objective       ED Triage Vitals [10/09/24 1220]   Temperature Pulse Blood Pressure Respirations SpO2 Patient Position - Orthostatic VS   97.9 °F (36.6 °C) 79 (!) 181/77 18 98 % Sitting      Temp src Heart Rate Source BP Location FiO2 (%) Pain Score    -- -- Left arm -- --      Vitals      Date and Time Temp Pulse SpO2 Resp BP Pain Score FACES Pain Rating User   10/09/24 1415 -- 79 96 % 18 178/78 -- -- SG   10/09/24 1220 97.9 °F (36.6 °C) 79 98 % 18 181/77 -- -- SG            Physical Exam  Vitals and nursing note reviewed.   Constitutional:       General: He is not in acute distress.     Appearance: Normal appearance. He is normal weight. He is not ill-appearing, toxic-appearing or diaphoretic.   HENT:      Head: Normocephalic and atraumatic.      Comments: 3 cm left  parietal occipital full-thickness laceration.  No active bleeding.     Right Ear: External ear normal.      Left Ear: External ear normal.      Nose: Nose normal. No congestion or rhinorrhea.      Mouth/Throat:      Mouth: Mucous membranes are moist.      Pharynx: Oropharynx is clear. No oropharyngeal exudate or posterior oropharyngeal erythema.   Eyes:      General: No scleral icterus.        Right eye: No discharge.         Left eye: No discharge.      Conjunctiva/sclera: Conjunctivae normal.   Cardiovascular:      Rate and Rhythm: Normal rate and regular rhythm.      Pulses: Normal pulses.      Heart sounds: Normal heart sounds.   Pulmonary:      Effort: Pulmonary effort is normal. No respiratory distress.      Breath sounds: Normal breath sounds.   Abdominal:      General: Abdomen is flat.      Palpations: Abdomen is soft.      Tenderness: There is no abdominal tenderness.   Musculoskeletal:         General: No swelling. Normal range of motion.      Cervical back: Normal range of motion and neck supple.   Skin:     General: Skin is warm and dry.      Capillary Refill: Capillary refill takes less than 2 seconds.      Comments: 2 cm skin tear to the left left posterior elbow.   Neurological:      General: No focal deficit present.      Mental Status: He is alert and oriented to person, place, and time. Mental status is at baseline.   Psychiatric:         Mood and Affect: Mood normal.         Behavior: Behavior normal.         Results Reviewed       None            CT head without contrast   Final Interpretation by Elvis Graf MD (10/09 6149)      No acute intracranial abnormality. Left parietal scalp soft tissue laceration with small hematoma and soft tissue swelling.      Stable cerebral volume loss, with ex vacuo dilatation of the ventricles. Mild chronic white matter microangiopathic changes again demonstrated, grossly unchanged.                  Workstation performed: [id number]         CT cervical spine  "without contrast   Final Interpretation by Elvis Graf MD (10/09 3711)      No cervical spine fracture or traumatic malalignment.      Degenerative changes as described above without high-grade central canal stenosis. There is at most moderate left neuroforaminal stenosis at C3-C4 due to facet arthropathy.            Workstation performed: [id number]             Universal Protocol:  procedure performed by consultantConsent: Verbal consent obtained.  Risks and benefits: risks, benefits and alternatives were discussed  Consent given by: patient  Time out: Immediately prior to procedure a \"time out\" was called to verify the correct patient, procedure, equipment, support staff and site/side marked as required.  Patient understanding: patient states understanding of the procedure being performed  Required items: required blood products, implants, devices, and special equipment available  Patient identity confirmed: verbally with patient and arm band  Laceration repair    Date/Time: 10/9/2024 1:00 PM    Performed by: Joel Seymour DO  Authorized by: Joel Seymour DO  Body area: head/neck  Location details: scalp  Laceration length: 3 cm  Foreign bodies: no foreign bodies  Tendon involvement: none  Nerve involvement: none  Vascular damage: no    Sedation:  Patient sedated: no      Wound Dehiscence:  Superficial Wound Dehiscence: simple closure      Procedure Details:  Preparation: Patient was prepped and draped in the usual sterile fashion.  Irrigation solution: saline  Irrigation method: syringe  Debridement: none  Degree of undermining: none  Skin closure: staples (4)  Approximation: close  Approximation difficulty: simple  Dressing: antibiotic ointment  Patient tolerance: patient tolerated the procedure well with no immediate complications          ED Medication and Procedure Management   Prior to Admission Medications   Prescriptions Last Dose Informant Patient Reported? Taking?   Mirabegron ER 25 MG TB24   Yes No "   Sig: Take 25 mg by mouth daily   acetaminophen (TYLENOL) 325 mg tablet   No No   Sig: Take 3 tablets (975 mg total) by mouth every 8 (eight) hours   amLODIPine (NORVASC) 5 mg tablet  Spouse/Significant Other Yes No   Sig: Take 5 mg by mouth daily   carbidopa-levodopa (Sinemet)  mg per tablet   No No   Sig: Take 2 tablets by mouth 3 (three) times a day   furosemide (LASIX) 20 mg tablet   Yes No   Sig: Take 20 mg by mouth 3 (three) times a week   ketorolac (ACULAR) 0.5 % ophthalmic solution  Spouse/Significant Other, Self Yes No   lidocaine (LIDODERM) 5 %   No No   Sig: Apply 1 patch topically over 12 hours daily Remove & Discard patch within 12 hours or as directed by MD   losartan (COZAAR) 50 mg tablet   Yes No   Sig: Take 50 mg by mouth daily   predniSONE 10 mg tablet  Spouse/Significant Other, Self Yes No   Sig: Take 10 mg by mouth 2 (two) times a day   prednisoLONE acetate (PRED FORTE) 1 % ophthalmic suspension  Spouse/Significant Other, Self Yes No   senna-docusate sodium (SENOKOT S) 8.6-50 mg per tablet   No No   Sig: Take 1 tablet by mouth 2 (two) times a day      Facility-Administered Medications: None     Discharge Medication List as of 10/9/2024  3:30 PM        CONTINUE these medications which have NOT CHANGED    Details   acetaminophen (TYLENOL) 325 mg tablet Take 3 tablets (975 mg total) by mouth every 8 (eight) hours, Starting Fri 6/14/2024, Normal      amLODIPine (NORVASC) 5 mg tablet Take 5 mg by mouth daily, Starting Thu 3/7/2024, Until Fri 3/7/2025, Historical Med      carbidopa-levodopa (Sinemet)  mg per tablet Take 2 tablets by mouth 3 (three) times a day, Starting Mon 7/1/2024, Normal      furosemide (LASIX) 20 mg tablet Take 20 mg by mouth 3 (three) times a week, Historical Med      ketorolac (ACULAR) 0.5 % ophthalmic solution Starting Fri 9/15/2023, Historical Med      lidocaine (LIDODERM) 5 % Apply 1 patch topically over 12 hours daily Remove & Discard patch within 12 hours or as  directed by MD, Starting Sat 6/15/2024, Print      losartan (COZAAR) 50 mg tablet Take 50 mg by mouth daily, Historical Med      Mirabegron ER 25 MG TB24 Take 25 mg by mouth daily, Starting Mon 5/13/2024, Historical Med      prednisoLONE acetate (PRED FORTE) 1 % ophthalmic suspension Starting Fri 9/15/2023, Historical Med      predniSONE 10 mg tablet Take 10 mg by mouth 2 (two) times a day, Starting Sat 8/26/2023, Historical Med      senna-docusate sodium (SENOKOT S) 8.6-50 mg per tablet Take 1 tablet by mouth 2 (two) times a day, Starting Fri 6/14/2024, Normal           No discharge procedures on file.  ED SEPSIS DOCUMENTATION   Time reflects when diagnosis was documented in both MDM as applicable and the Disposition within this note       Time User Action Codes Description Comment    10/9/2024  3:26 PM Joel Seymour [S01.01XA] Laceration of scalp, initial encounter     10/9/2024  3:29 PM Joel Seymour [S09.90XA] Closed head injury, initial encounter                  Joel Seymour DO  10/09/24 1821

## 2024-10-11 ENCOUNTER — APPOINTMENT (EMERGENCY)
Dept: RADIOLOGY | Facility: HOSPITAL | Age: 84
End: 2024-10-11
Payer: COMMERCIAL

## 2024-10-11 ENCOUNTER — APPOINTMENT (EMERGENCY)
Dept: CT IMAGING | Facility: HOSPITAL | Age: 84
End: 2024-10-11
Payer: COMMERCIAL

## 2024-10-11 ENCOUNTER — HOSPITAL ENCOUNTER (EMERGENCY)
Facility: HOSPITAL | Age: 84
Discharge: HOME | End: 2024-10-11
Attending: EMERGENCY MEDICINE
Payer: COMMERCIAL

## 2024-10-11 VITALS
SYSTOLIC BLOOD PRESSURE: 165 MMHG | HEART RATE: 74 BPM | RESPIRATION RATE: 18 BRPM | WEIGHT: 204.15 LBS | DIASTOLIC BLOOD PRESSURE: 81 MMHG | TEMPERATURE: 97.4 F | OXYGEN SATURATION: 100 % | BODY MASS INDEX: 31.04 KG/M2

## 2024-10-11 DIAGNOSIS — T14.8XXA CONTUSION: ICD-10-CM

## 2024-10-11 DIAGNOSIS — S09.90XA INJURY OF HEAD, INITIAL ENCOUNTER: ICD-10-CM

## 2024-10-11 DIAGNOSIS — S01.81XA LACERATION OF FOREHEAD, INITIAL ENCOUNTER: ICD-10-CM
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
## 2024-10-11 LAB
ATRIAL RATE: 76 BPM
P AXIS: 103 DEGREES
PR INTERVAL: 258 MS
QRS AXIS: 28 DEGREES
QRSD INTERVAL: 74 MS
QT INTERVAL: 376 MS
QTC INTERVAL: 423 MS
T WAVE AXIS: 8 DEGREES
VENTRICULAR RATE: 76 BPM

## 2024-10-11 PROCEDURE — 70450 CT HEAD/BRAIN W/O DYE: CPT

## 2024-10-11 PROCEDURE — 72125 CT NECK SPINE W/O DYE: CPT

## 2024-10-11 PROCEDURE — 93010 ELECTROCARDIOGRAM REPORT: CPT | Performed by: INTERNAL MEDICINE

## 2024-10-11 PROCEDURE — 72100 X-RAY EXAM L-S SPINE 2/3 VWS: CPT

## 2024-10-11 PROCEDURE — 73030 X-RAY EXAM OF SHOULDER: CPT

## 2024-10-11 PROCEDURE — 73522 X-RAY EXAM HIPS BI 3-4 VIEWS: CPT

## 2024-10-11 PROCEDURE — 99284 EMERGENCY DEPT VISIT MOD MDM: CPT

## 2024-10-11 PROCEDURE — 12014 RPR F/E/E/N/L/M 5.1-7.5 CM: CPT | Performed by: EMERGENCY MEDICINE

## 2024-10-11 PROCEDURE — 93005 ELECTROCARDIOGRAM TRACING: CPT

## 2024-10-11 PROCEDURE — 99284 EMERGENCY DEPT VISIT MOD MDM: CPT | Performed by: EMERGENCY MEDICINE

## 2024-10-11 RX ORDER — LIDOCAINE HYDROCHLORIDE 10 MG/ML
10 INJECTION, SOLUTION EPIDURAL; INFILTRATION; INTRACAUDAL; PERINEURAL ONCE
Status: COMPLETED | OUTPATIENT
Start: 2024-10-11 | End: 2024-10-11

## 2024-10-11 RX ORDER — ACETAMINOPHEN 325 MG/1
975 TABLET ORAL ONCE AS NEEDED
Status: COMPLETED | OUTPATIENT
Start: 2024-10-11 | End: 2024-10-11

## 2024-10-11 RX ORDER — GINSENG 100 MG
1 CAPSULE ORAL ONCE
Status: COMPLETED | OUTPATIENT
Start: 2024-10-11 | End: 2024-10-11

## 2024-10-11 RX ADMIN — LIDOCAINE HYDROCHLORIDE 10 ML: 10 INJECTION, SOLUTION EPIDURAL; INFILTRATION; INTRACAUDAL; PERINEURAL at 12:29

## 2024-10-11 RX ADMIN — ACETAMINOPHEN 975 MG: 325 TABLET ORAL at 12:29

## 2024-10-11 RX ADMIN — BACITRACIN 1 LARGE APPLICATION: 500 OINTMENT TOPICAL at 12:29

## 2024-10-11 NOTE — ED PROVIDER NOTES
Time reflects when diagnosis was documented in both MDM as applicable and the Disposition within this note       Time User Action Codes Description Comment    10/11/2024 11:52 AM Tonya Bryant Add [W19.XXXA] Fall, initial encounter     10/11/2024  3:31 PM Maria G Bryante Add [S09.90XA] Injury of head, initial encounter     10/11/2024  3:31 PM MannyRileyTonya Add [S06.2XAA] Laceration and contusion of cerebral cortex (HCC)     10/11/2024  3:31 PM Manny, Tonya Remove [S06.2XAA] Laceration and contusion of cerebral cortex (HCC)     10/11/2024  3:31 PM MannyRileyTonya Add [S01.119A] Eyelid skin and periocular area laceration     10/11/2024  3:32 PM MannyRileyTonya Add [T14.8XXA] Contusion     10/11/2024  8:30 PM MannyRileyTonya Remove [S01.119A] Eyelid skin and periocular area laceration     10/11/2024  8:30 PM MannyRiley panbie Add [S01.81XA] Laceration of forehead, initial encounter           ED Disposition       ED Disposition   Discharge    Condition   Stable    Date/Time   Fri Oct 11, 2024  3:33 PM    Comment   Daniel Sanz Jr. discharge to home/self care.                   Assessment & Plan       Medical Decision Making  Amount and/or Complexity of Data Reviewed  Radiology: ordered. Decision-making details documented in ED Course.    Risk  OTC drugs.  Prescription drug management.    See ED course for MDM.      ED Course as of 10/11/24 2031   Fri Oct 11, 2024   1130 Differential diagnosis including but not limited to: sprain, strain, fracture, dislocation, contusion, laceration, abrasion     1244 CT head without contrast  No acute intracranial abnormality.     New small right parietal scalp hematoma.     Surgical skin staples in left parietal occipital region, likely due to laceration repair.     Similar moderate chronic microangiopathy.     Please see same day CT cervical spine without contrast for further evaluation.     1244 CT cervical spine without contrast  No acute cervical spine fracture or traumatic malalignment.   1245 XR  hips bilateral 3-4 vw w pelvis if performed  No acute osseous abnormality.   1246 XR lumbar spine 2 or 3 views  No acute fx. Independently interpreted by myself.   1247 XR shoulder 2+ views RIGHT  No acute fracture. Independently interpreted by myself.   1250 Pt is UTD on tetanus, received in 2019   1359 Spoke with radiologist, Dr. Wilson regarding x-rays.  Does not suspect the radiopaque material in the pelvis is associated with symptoms.     1535 Laceration sutured without complication, see procedure details above.  No signs of foreign body contamination. Patient discharged in good condition with instructions for wound and suture care.     1537 Ambulated patient, patient was able to ambulate up and down the ER hallways with a walker without any difficulties.  Family at bedside states that this is his baseline.  Patient and feel family feel comfortable going home at this time.    Discussed results with patient and plan for discharge with outpatient follow up. Instructed patient to take Tylenol as needed for discomfort and to return to ED for new or worsening symptoms. Patient voices understanding and agrees with plan. No other concerns at this time.         Medications   lidocaine (PF) (XYLOCAINE-MPF) 1 % injection 10 mL (10 mL Infiltration Given 10/11/24 1229)   bacitracin topical ointment 1 large application (1 large application Topical Given 10/11/24 1229)   acetaminophen (TYLENOL) tablet 975 mg (975 mg Oral Given 10/11/24 1229)       ED Risk Strat Scores                                               History of Present Illness       Chief Complaint   Patient presents with    Fall     Pt arrives via EMS s/p fall at home, pt reports tripping and landing on the floor. - LOC, - thinners. GCS 14. Reports chronic right shoulder pain and chronic bilateral feet swelling.        Past Medical History:   Diagnosis Date    Arthritis     Asbestos exposure     Asbestosis (HCC)     GERD (gastroesophageal reflux disease)      Hemoptysis 5/11/2022    Hypertension     Lung disease     Parkinson's disease (HCC)       Past Surgical History:   Procedure Laterality Date    HERNIA REPAIR      INTRACAPSULAR CATARACT EXTRACTION Left     KNEE SURGERY      many years ago    SHOULDER SURGERY        Family History   Problem Relation Age of Onset    Stroke Mother     Colon cancer Mother     Depression Sister     Hypertension Sister       Social History     Tobacco Use    Smoking status: Never     Passive exposure: Past    Smokeless tobacco: Former     Types: Chew     Quit date: 1998   Vaping Use    Vaping status: Never Used   Substance Use Topics    Alcohol use: Not Currently    Drug use: Never      E-Cigarette/Vaping    E-Cigarette Use Never User       E-Cigarette/Vaping Substances    Nicotine No     THC No     CBD No     Flavoring No     Other No     Unknown No       I have reviewed and agree with the history as documented.     HPI    (Daniel Sanz ) Daniel Sanz Jr. is a 83 y.o. male with a significant PMHx of Parkinsons, dementia, frequent falls presents to the emergency department on October 11, 2024 for fall onset prior to arrival. Wife states patient was ambulating from the bathroom after forgetting to grab his cane when he tripped and fell, landing on his R side and colliding with furniture. No LOC, not on ASA or AC, and has been acting appropriately since the fall.  Pt fell 2 days ago and had a negative workup and received staples in the scalp. Currently reports neck pain, low back pain, R shoulder and hip pain. Also has a laceration to the right side of scalp as well as skin tears. Patient denies CP, SOB, or any other complaint at this time.      Allergies include:  Allergies   Allergen Reactions    Morphine And Codeine Other (See Comments)     Patient gets delirious         Immunizations:  Immunization History   Administered Date(s) Administered    COVID-19 PFIZER VACCINE 0.3 ML IM 02/14/2021, 03/07/2021, 10/28/2021    INFLUENZA  10/14/2014, 09/21/2015, 09/28/2016, 11/15/2017, 10/19/2018, 09/20/2019, 10/07/2020, 11/26/2021, 10/12/2022    Influenza Split High Dose Preservative Free IM 10/02/2016    Influenza, Seasonal Vaccine, Quadrivalent, Adjuvanted, .5e 10/07/2020, 11/26/2021, 10/12/2022    Pneumococcal Conjugate 13-Valent 09/28/2016    Pneumococcal Polysaccharide PPV23 12/12/2017    TD (adult) Preservative Free 05/20/2019    Td (adult), adsorbed 05/20/2019     Immunizations Reviewed.    Review of Systems   Constitutional:  Negative for activity change, fever and unexpected weight change.   Respiratory:  Negative for cough, chest tightness and shortness of breath.    Cardiovascular:  Negative for chest pain and palpitations.   Gastrointestinal:  Negative for abdominal pain, diarrhea, nausea and vomiting.   Genitourinary:  Negative for dysuria and hematuria.   Musculoskeletal:  Positive for arthralgias (R shoulder, R hip), back pain and neck pain.   Skin:  Positive for wound.   Allergic/Immunologic: Negative for immunocompromised state.   Neurological:  Negative for syncope.   All other systems reviewed and are negative.          Objective       ED Triage Vitals   Temperature Pulse Blood Pressure Respirations SpO2 Patient Position - Orthostatic VS   10/11/24 1113 10/11/24 1113 10/11/24 1113 10/11/24 1113 10/11/24 1113 10/11/24 1113   (!) 97.4 °F (36.3 °C) 77 (!) 172/76 18 100 % Sitting      Temp Source Heart Rate Source BP Location FiO2 (%) Pain Score    10/11/24 1113 10/11/24 1113 10/11/24 1113 -- 10/11/24 1229    Oral Monitor Right arm  4      Vitals      Date and Time Temp Pulse SpO2 Resp BP Pain Score FACES Pain Rating User   10/11/24 1421 -- -- -- -- -- No Pain -- PEREZ   10/11/24 1315 -- 74 -- 18 165/81 -- -- PEREZ   10/11/24 1229 -- -- -- -- -- 4 -- PEREZ   10/11/24 1113 97.4 °F (36.3 °C) 77 100 % 18 -- -- -- PEREZ   10/11/24 1113 -- -- -- -- 172/76 -- -- NP            Physical Exam  Vitals and nursing note reviewed.   Constitutional:        General: He is not in acute distress.     Appearance: Normal appearance. He is not ill-appearing.      Interventions: Cervical collar in place.   HENT:      Head: Normocephalic and atraumatic.      Nose: Nose normal.   Eyes:      Extraocular Movements: Extraocular movements intact.      Conjunctiva/sclera: Conjunctivae normal.   Neck:      Comments: No c-spine tenderness to palpation. No obvious step offs or deformities.  Cardiovascular:      Rate and Rhythm: Normal rate and regular rhythm.      Pulses: Normal pulses.      Heart sounds: No murmur heard.     No friction rub. No gallop.   Pulmonary:      Effort: Pulmonary effort is normal. No respiratory distress.      Breath sounds: Normal breath sounds. No wheezing.   Abdominal:      General: Bowel sounds are normal.      Palpations: Abdomen is soft.      Tenderness: There is no abdominal tenderness. There is no guarding or rebound.   Musculoskeletal:         General: No swelling or tenderness. Normal range of motion.      Cervical back: Normal range of motion. No rigidity or tenderness.      Right lower le+ Pitting Edema present.      Left lower le+ Pitting Edema present.      Comments: No T spine tenderness.     Midline lumbar tenderness to palpation. No obvious step offs or deformities.   Skin:     General: Skin is warm and dry.      Capillary Refill: Capillary refill takes less than 2 seconds.      Comments: 6 cm linear laceration to R temple region with mild swelling. Slow oozing of blood. Other small 2 cm skin tears on dorsum of R hand and R elbow. Neurovascularly intact.   Neurological:      Mental Status: He is alert and oriented to person, place, and time.      Cranial Nerves: No cranial nerve deficit.      Sensory: No sensory deficit.      Motor: No weakness.      Comments: Patient is speaking clearly in complete sentences.  Patient is answering appropriately and able follow commands.  Patient is moving all four extremities spontaneously.  No  facial droop.  Tongue midline.         Results Reviewed       None            XR lumbar spine 2 or 3 views   Final Interpretation by Esvin Cortez MD (10/11 1404)      1.  No acute osseous abnormality.      2.  Degenerative change with grade 1 anterolisthesis of L5 on S1.         Computerized Assisted Algorithm (CAA) may have been used to analyze all applicable images.         Workstation performed: [id number]         XR shoulder 2+ views RIGHT   Final Interpretation by Esvin Cortez MD (10/11 1406)      No acute osseous abnormality.      Degenerative change and high riding humeral head suggestive of chronic rotator cuff tear.      Computerized Assisted Algorithm (CAA) may have been used to analyze all applicable images.         Workstation performed: [id number]         XR hips bilateral 3-4 vw w pelvis if performed   Final Interpretation by Sanya Wilson MD (10/11 1400)      No acute osseous abnormality.         Computerized Assisted Algorithm (CAA) may have been used to analyze all applicable images.            Workstation performed: [id number]         CT head without contrast   Final Interpretation by Zen Cabrera MD (10/11 1211)      No acute intracranial abnormality.      New small right parietal scalp hematoma.      Surgical skin staples in left parietal occipital region, likely due to laceration repair.      Similar moderate chronic microangiopathy.      Please see same day CT cervical spine without contrast for further evaluation.      The study was marked in EPIC for immediate notification.                     Workstation performed: [id number]         CT cervical spine without contrast   Final Interpretation by Zen Cabrera MD (10/11 0200)      No acute cervical spine fracture or traumatic malalignment.      Additional chronic/incidental findings as detailed above.                  Workstation performed: [id number]             Universal Protocol:  procedure performed by  "consultantConsent: Verbal consent obtained.  Risks and benefits: risks, benefits and alternatives were discussed  Consent given by: patient  Time out: Immediately prior to procedure a \"time out\" was called to verify the correct patient, procedure, equipment, support staff and site/side marked as required.  Patient understanding: patient states understanding of the procedure being performed  Patient consent: the patient's understanding of the procedure matches consent given  Procedure consent: procedure consent matches procedure scheduled  Relevant documents: relevant documents present and verified  Test results: test results available and properly labeled  Site marked: the operative site was marked  Radiology Images displayed and confirmed. If images not available, report reviewed: imaging studies available  Required items: required blood products, implants, devices, and special equipment available  Patient identity confirmed: verbally with patient  Laceration repair    Date/Time: 10/11/2024 8:28 PM    Performed by: Tonya Bryant MD  Authorized by: Tonya Bryant MD  Body area: head/neck  Location details: forehead  Laceration length: 6 cm  Tendon involvement: none  Nerve involvement: none  Anesthesia: local infiltration    Anesthesia:  Local Anesthetic: lidocaine 1% without epinephrine  Anesthetic total: 4 mL    Sedation:  Patient sedated: no      Wound Dehiscence:  Superficial Wound Dehiscence: simple closure      Procedure Details:  Preparation: Patient was prepped and draped in the usual sterile fashion.  Irrigation solution: saline  Irrigation method: syringe  Amount of cleaning: standard  Skin closure: Ethilon (4-0)  Number of sutures: 8  Technique: simple  Approximation: close  Approximation difficulty: simple  Dressing: 4x4 sterile gauze and antibiotic ointment  Patient tolerance: patient tolerated the procedure well with no immediate complications      ECG 12 Lead Documentation Only    Date/Time: 10/11/2024 " 12:00 PM    Performed by: Tonya Bryant MD  Authorized by: Tonya Bryant MD    ECG reviewed by me, the ED Provider: yes    Patient location:  ED  Previous ECG:     Previous ECG:  Compared to current    Comparison ECG info:  4/16/2024    Similarity:  No change    Comparison to cardiac monitor: Yes    Quality:     Tracing quality:  Limited by artifact  Rate:     ECG rate:  76    ECG rate assessment: normal    Rhythm:     Rhythm: sinus rhythm    Ectopy:     Ectopy: none    QRS:     QRS axis:  Normal    QRS intervals:  Normal  Conduction:     Conduction: abnormal      Abnormal conduction: 1st degree    ST segments:     ST segments:  Normal  T waves:     T waves: normal        ED Medication and Procedure Management   Prior to Admission Medications   Prescriptions Last Dose Informant Patient Reported? Taking?   Mirabegron ER 25 MG TB24   Yes No   Sig: Take 25 mg by mouth daily   acetaminophen (TYLENOL) 325 mg tablet   No No   Sig: Take 3 tablets (975 mg total) by mouth every 8 (eight) hours   amLODIPine (NORVASC) 5 mg tablet  Spouse/Significant Other Yes No   Sig: Take 5 mg by mouth daily   carbidopa-levodopa (Sinemet)  mg per tablet   No No   Sig: Take 2 tablets by mouth 3 (three) times a day   furosemide (LASIX) 20 mg tablet   Yes No   Sig: Take 20 mg by mouth 3 (three) times a week   ketorolac (ACULAR) 0.5 % ophthalmic solution  Spouse/Significant Other, Self Yes No   lidocaine (LIDODERM) 5 %   No No   Sig: Apply 1 patch topically over 12 hours daily Remove & Discard patch within 12 hours or as directed by MD   losartan (COZAAR) 50 mg tablet   Yes No   Sig: Take 50 mg by mouth daily   predniSONE 10 mg tablet  Spouse/Significant Other, Self Yes No   Sig: Take 10 mg by mouth 2 (two) times a day   prednisoLONE acetate (PRED FORTE) 1 % ophthalmic suspension  Spouse/Significant Other, Self Yes No   senna-docusate sodium (SENOKOT S) 8.6-50 mg per tablet   No No   Sig: Take 1 tablet by mouth 2 (two) times a day       Facility-Administered Medications: None     Discharge Medication List as of 10/11/2024  3:33 PM        CONTINUE these medications which have NOT CHANGED    Details   acetaminophen (TYLENOL) 325 mg tablet Take 3 tablets (975 mg total) by mouth every 8 (eight) hours, Starting Fri 6/14/2024, Normal      amLODIPine (NORVASC) 5 mg tablet Take 5 mg by mouth daily, Starting Thu 3/7/2024, Until Fri 3/7/2025, Historical Med      carbidopa-levodopa (Sinemet)  mg per tablet Take 2 tablets by mouth 3 (three) times a day, Starting Mon 7/1/2024, Normal      furosemide (LASIX) 20 mg tablet Take 20 mg by mouth 3 (three) times a week, Historical Med      ketorolac (ACULAR) 0.5 % ophthalmic solution Starting Fri 9/15/2023, Historical Med      lidocaine (LIDODERM) 5 % Apply 1 patch topically over 12 hours daily Remove & Discard patch within 12 hours or as directed by MD, Starting Sat 6/15/2024, Print      losartan (COZAAR) 50 mg tablet Take 50 mg by mouth daily, Historical Med      Mirabegron ER 25 MG TB24 Take 25 mg by mouth daily, Starting Mon 5/13/2024, Historical Med      prednisoLONE acetate (PRED FORTE) 1 % ophthalmic suspension Starting Fri 9/15/2023, Historical Med      predniSONE 10 mg tablet Take 10 mg by mouth 2 (two) times a day, Starting Sat 8/26/2023, Historical Med      senna-docusate sodium (SENOKOT S) 8.6-50 mg per tablet Take 1 tablet by mouth 2 (two) times a day, Starting Fri 6/14/2024, Normal           Outpatient Discharge Orders   Walker     ED SEPSIS DOCUMENTATION   Time reflects when diagnosis was documented in both MDM as applicable and the Disposition within this note       Time User Action Codes Description Comment    10/11/2024 11:52 AM Tonya Bryant [W19.XXXA] Fall, initial encounter     10/11/2024  3:31 PM Tonya Bryant [S09.90XA] Injury of head, initial encounter     10/11/2024  3:31 PM Tonya Bryant [S06.2XAA] Laceration and contusion of cerebral cortex (HCC)     10/11/2024  3:31 PM  Tonya Bryant Remove [S06.2XAA] Laceration and contusion of cerebral cortex (HCC)     10/11/2024  3:31 PM Tonya Bryant Add [S01.119A] Eyelid skin and periocular area laceration     10/11/2024  3:32 PM Tonya Bryant Add [T14.8XXA] Contusion     10/11/2024  8:30 PM Tonya Bryant Remove [S01.119A] Eyelid skin and periocular area laceration     10/11/2024  8:30 PM Tonya Bryant Add [S01.81XA] Laceration of forehead, initial encounter                       Tonya Bryant MD  10/11/24 2033

## 2024-10-11 NOTE — ED ATTENDING ATTESTATION
10/11/2024  I, Yelena Valentine MD, saw and evaluated the patient. I have discussed the patient with the resident/non-physician practitioner and agree with the resident's/non-physician practitioner's findings, Plan of Care, and MDM as documented in the resident's/non-physician practitioner's note, except where noted. All available labs and Radiology studies were reviewed.  I was present for key portions of any procedure(s) performed by the resident/non-physician practitioner and I was immediately available to provide assistance.       At this point I agree with the current assessment done in the Emergency Department.  I have conducted an independent evaluation of this patient a history and physical is as follows:      83-year-old male with history of Parkinson's disease presenting for evaluation after a ground-level fall at home.  Patient was ambulating back from the bathroom without his walker when he fell, landing on his left side.  Of note, he was seen here in the emergency department 2 days ago for a similar fall at which point he had a laceration repaired to his scalp.    Patient denies any complaints on my assessment however on exam has some midline lumbar tenderness.  Denies headache.  No pain in the neck, chest, abdomen, or extremities.    He denies any chest pain, shortness of breath, palpitations, or dizziness preceding his fall.    Physical Exam  Vitals and nursing note reviewed.   Constitutional:       General: He is not in acute distress.     Appearance: He is well-developed. He is not ill-appearing, toxic-appearing or diaphoretic.   HENT:      Head: Normocephalic.      Comments: Approximately 5 cm linear non-gaping laceration over the R forehead/temple     Right Ear: Tympanic membrane and external ear normal.      Left Ear: Tympanic membrane and external ear normal.      Nose: Nose normal.      Mouth/Throat:      Mouth: Mucous membranes are moist.      Pharynx: Oropharynx is clear.   Eyes:       Extraocular Movements: Extraocular movements intact.      Conjunctiva/sclera: Conjunctivae normal.      Pupils: Pupils are equal, round, and reactive to light.   Neck:      Comments: Arrives with c-collar in place, no midline tenderness  Cardiovascular:      Rate and Rhythm: Normal rate and regular rhythm.      Pulses: Normal pulses.      Heart sounds: Normal heart sounds. No murmur heard.     No friction rub. No gallop.   Pulmonary:      Effort: Pulmonary effort is normal. No respiratory distress.      Breath sounds: Normal breath sounds. No wheezing or rales.   Chest:      Chest wall: No tenderness.   Abdominal:      General: Bowel sounds are normal. There is no distension.      Palpations: Abdomen is soft.      Tenderness: There is no abdominal tenderness. There is no guarding.   Musculoskeletal:         General: No deformity. Normal range of motion.      Comments: No midline tenderness over the T-spine.  Tenderness to palpation over the mid and lower lumbar spine and bilateral paraspinous musculature.  Extremities atraumatic.  2+ edema to the bilateral lower extremities below the knees with overlying venous stasis changes, family states is chronic.  Symmetric.   Skin:     General: Skin is warm and dry.   Neurological:      General: No focal deficit present.      Mental Status: He is alert. Mental status is at baseline.      Motor: No abnormal muscle tone.   Psychiatric:         Mood and Affect: Mood normal.         ED Course  ED Course as of 10/11/24 1633   Fri Oct 11, 2024   1254 CT head and C-spine without acute traumatic abnormalities.   1255 I personally interpreted the patient's EKG which reveals normal rate, sinus rhythm with first-degree AV block unchanged from prior, normal intervals, no ischemic changes.  Similar to most recent prior EKG.  Based on talking to the patient and his wife I now suspect that the patient's fall was mechanical as he was ambulating quickly back from the bathroom without his  walker which she is supposed to use at all times.   1255 Tetanus status is up-to-date.  Laceration to be repaired by resident physician.  No other signs of trauma on exam.         Critical Care Time  Procedures

## 2024-10-11 NOTE — DISCHARGE INSTRUCTIONS
WASHING: Keep the area dry for the next 24 hours after. After that, you can wash the area as normal with soap and water. Do not scrub vigorously at the area. You can take a shower and let the water run over the site.     Topical Ointment: You can use any topical antibiotic ointment on the area to aid in healing. You can also use petroleum jelly/Vaseline or Aquaphor if that is easier. The goal is to keep the area slightly moist. Apply the ointment twice per day until the sutures are removed or absorbed.    SUTURE REMOVAL: You will need to have your sutures removed by a healthcare professional. That can be either your primary care physician or an urgent care. Have the sutures removed in 7 days. There are 8 sutures.    SUN EXPOSURE: It is very important that you keep the area out of direct sunlight. For the first two weeks, or until the area is completely healed, you should not allow the area to be exposed to any sunlight and you should keep the area covered. For the next 6 months, you should be very careful to keep the area covered with sunblock and reapply it every hour while outside.

## 2024-10-24 ENCOUNTER — OFFICE VISIT (OUTPATIENT)
Dept: NEUROLOGY | Facility: CLINIC | Age: 84
End: 2024-10-24
Payer: COMMERCIAL

## 2024-10-24 VITALS
WEIGHT: 204 LBS | SYSTOLIC BLOOD PRESSURE: 122 MMHG | DIASTOLIC BLOOD PRESSURE: 80 MMHG | BODY MASS INDEX: 30.92 KG/M2 | HEART RATE: 78 BPM | HEIGHT: 68 IN

## 2024-10-24 DIAGNOSIS — G20.A1 PARKINSON DISEASE (HCC): ICD-10-CM

## 2024-10-24 PROCEDURE — 99214 OFFICE O/P EST MOD 30 MIN: CPT | Performed by: STUDENT IN AN ORGANIZED HEALTH CARE EDUCATION/TRAINING PROGRAM

## 2024-10-24 RX ORDER — CARBIDOPA AND LEVODOPA 25; 100 MG/1; MG/1
2.5 TABLET ORAL 3 TIMES DAILY
Qty: 540 TABLET | Refills: 3 | Status: SHIPPED | OUTPATIENT
Start: 2024-10-24 | End: 2024-10-28

## 2024-10-24 NOTE — PROGRESS NOTES
Ambulatory Visit  Name: Daniel Sanz Jr.      : 1940      MRN: [id number]  Encounter Provider: Jovanna Cavanaugh MD  Encounter Date: 10/24/2024   Encounter department: [facility] [place]    Assessment & Plan  Parkinson disease (HCC)  -Moderate bradykinesia bilaterally R>L. Increased tone with cogwheeling throughout L>R, intermittent LUE rest tremor noted today, shuffling gait present. Previously increased sinemet  mg up to 2.5 tablets TID; however, per wife pt was experiencing increased sedation and dizziness with the increase. He has been maintained on  mg 2 tablets TID but does not appear controlled on this current dose. Would recommend increasing back up to 2.5 tablets TID  -Advised continuation of melatonin 5 mg before bed, limiting daytime naps, and mentally/physically stimulating activities during the day  -discussed referral for PT, pt states that he will be starting this soon         CC:   PD and neuropathy    History of Present Illness:     Daniel Sanz is a pleasant 84 yo male seen in follow up for PD. He is accompanied by his wife today.     Interval hx  He has sustained several falls since last visit. He does not feel that he has had an improvement in his symptoms since he has been on sinemet 2 tabs TID. At our last visit, increase up to 2.5 tablets TID was discussed but pt/wife did not want to increase due to ?increased sedation the last time he had tried a medication incrase.     Last visit  Since last visit, wife has noticed increased drowsiness and dizziness which she has been attributing to increase in sinemet. However, he also had some adjustments made in his BP medications which occurred around the same time she began to notice these symptoms. She recently requested decrease in sinemet back to 2 tablets TID from 2.5 tablets TID which was started at our previous visit. She states that these side effects have improved, although his motor symptoms on exam  today appear to still     Previous Hx:  Per chart review, first seen for consultation in 2018 in which he had noted 2-3 year history of weakness, stiffness, and moving slower. Noted worsening tremors over this period of time. He was started on sinemet with improvement of symptoms. Amantadine later added. He did have issues with leg swelling with higher doses of sinemet.      Last office visit 3/2022 in which PD was stable. Labs for neuropathy were ordered given EMG findings. he was referred to hematology for abnormal spep.      Current Medications:  sinemet 25/100 mg 1.5 tabs  8 am, 1 pm and 1 tab 8 PM  amantadine 100 mg QD     Interval History:   He feels his walking and balance are good, he tripped off his  but no other falls. He will need a chair with arms to get up.  Feels tremor is about the same, benefit from amantadine.  No trouble swallowing.  He sleeps well, He has occasional  talking in his sleep, he used to have more acting out of his dreams but has improved recently.  No hallucinations.   No dizziness.   He has some occasional drooling.  His wife can sometimes have a hard time hearing him sometimes.    No memory concerns.   He denies any numbness or tingling in his feet, he has occasional numbness/tingling in his hands b/l entire hand, has known carpal tunnel.   He is looking into stay strong program at [facility].    Performs ADls independent, needs help putting on pants.      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   He was admitted to [facility] for abnormal blood cultures, however repeat studies were normal, likely contamination. He did have BONIFACIO that was treated with fluids. He did have f/u with pulmonary due to hemoptysis that has since resolved. HX of asbestos exposure.      prior work up:  labs 3/2022: tsh 2.45, a1c 5.1, b12 370, folate 11.7, spep monoclonal peak in gamma region, immunofixation-monoclonal gammopathy identified as IgG kappa, heavy metals screen normal except arsenic  elevated at 15     EMG 9/2021 b/l UE: This is an abnormal EMG of the bilateral upper extremities due to changes consistent with a localized neuropathic process involving the median nerve at the wrist bilaterally consistent with moderate carpal tunnel syndrome with demyelinative change.  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    Last visit  Wife states that she has noticed motor improvement since he started physical therapy. He is currently taking sinemet 25/100 1.5 tab at 8 am, 1.5 tab 1 pm, 1 tab at 8 pm and amantadine 100 mg daily at 5 pm. States that PD symptoms have essentially been stable since his last visit in June.  Wife states that RBD has improved since he was started on sinemet.     Describes achy, burning pain in his fingers and toes but is not interested in starting gabapentin at this time. Hx CTS for which he is not interested in surgery for at this time.     Past Medical History:     Past Medical History:   Diagnosis Date    Arthritis     Asbestos exposure     Asbestosis ([facility])     GERD (gastroesophageal reflux disease)     Hemoptysis 5/11/2022    Hypertension     Lung disease     Parkinson's disease ([facility])        Patient Active Problem List   Diagnosis    Chronic right shoulder pain    Internal derangement of right shoulder    Tear of right supraspinatus tendon    Parkinson's disease ([facility])    MGUS (monoclonal gammopathy of unknown significance)    Neuropathy    Pleural plaque with presence of asbestos    Allergic rhinitis with postnasal drip    Shortness of breath    Elevated serum immunoglobulin free light chains    Stage 3a chronic kidney disease ([facility])    Closed fracture of nasal bone     Fall    Scalp hematoma    Multiple abrasions    Hypertension    Fall, initial encounter       Medications:      Current Outpatient Medications   Medication Sig Dispense Refill    acetaminophen (TYLENOL) 325 mg tablet Take 3 tablets (975 mg total) by mouth every 8 (eight) hours 30 tablet 0    amLODIPine (NORVASC) 5 mg tablet Take 5 mg by mouth daily      carbidopa-levodopa (Sinemet)  mg per tablet Take 2.5 tablets by mouth 3 (three) times a day 540 tablet 3    furosemide (LASIX) 20 mg tablet Take 20 mg by mouth 3 (three) times a week      lidocaine (LIDODERM) 5 % Apply 1 patch topically over 12 hours daily Remove & Discard patch within 12 hours or as directed by MD 10 patch 0    losartan (COZAAR) 50 mg tablet Take 50 mg by mouth daily      ketorolac (ACULAR) 0.5 % ophthalmic solution  (Patient not taking: Reported on 10/24/2024)      Mirabegron ER 25 MG TB24 Take 25 mg by mouth daily (Patient not taking: Reported on 10/24/2024)      prednisoLONE acetate (PRED FORTE) 1 % ophthalmic suspension  (Patient not taking: Reported on 10/24/2024)      predniSONE 10 mg tablet Take 10 mg by mouth 2 (two) times a day (Patient not taking: Reported on 10/24/2024)      senna-docusate sodium (SENOKOT S) 8.6-50 mg per tablet Take 1 tablet by mouth 2 (two) times a day (Patient not taking: Reported on 10/24/2024) 10 tablet 0     No current facility-administered medications for this visit.        Allergies:      Allergies   Allergen Reactions    Morphine And Codeine Other (See Comments)     Patient gets delirious       Family History:     Family History   Problem Relation Age of Onset    Stroke Mother     Colon cancer Mother     Depression Sister     Hypertension Sister        Social History:       Social History     Socioeconomic History    Marital status: /Civil Union     Spouse name: Not on file    Number of children: Not on file    Years of education: Not on file    Highest education level: Not on file   Occupational  "History    Not on file   Tobacco Use    Smoking status: Never     Passive exposure: Past    Smokeless tobacco: Former     Types: Chew     Quit date: 1998   Vaping Use    Vaping status: Never Used   Substance and Sexual Activity    Alcohol use: Not Currently    Drug use: Never    Sexual activity: Not on file   Other Topics Concern    Not on file   Social History Narrative    Not on file     Social Determinants of Health     Financial Resource Strain: Not on file   Food Insecurity: No Food Insecurity (6/13/2024)    Nursing - Inadequate Food Risk Classification     Worried About Running Out of Food in the Last Year: Never true     Ran Out of Food in the Last Year: Never true     Ran Out of Food in the Last Year: Not on file   Transportation Needs: No Transportation Needs (6/13/2024)    PRAPARE - Transportation     Lack of Transportation (Medical): No     Lack of Transportation (Non-Medical): No   Physical Activity: Not on file   Stress: Not on file   Social Connections: Not on file   Intimate Partner Violence: Not on file   Housing Stability: Unknown (6/13/2024)    Housing Stability Vital Sign     Unable to Pay for Housing in the Last Year: No     Number of Times Moved in the Last Year: Not on file     Homeless in the Last Year: No         Objective:   /80 (BP Location: Left arm, Patient Position: Sitting, Cuff Size: Large)   Pulse 78   Ht 5' 8\" (1.727 m)   Wt 92.5 kg (204 lb)   BMI 31.02 kg/m²     General: Patient is not in any acute/apparent distress, well nourished, well developed and cooperative.   HEENT: normocephalic, atraumatic, moist membranes  Neck: supple  Extremities: no edema noted   Skin: no lesions or rash  Musculosketal: no bony abnormalities    Neurologic Examination:   Mental status: alert, awake, oriented X 3 and following commands.     Speech/Language: Speech is fluent without any dysarthria, no aphasia noted, can name, comprehension intact    Cranial Nerves:   CN I: smell not tested  CN " II: Visual fields full to confrontation  CN III, IV, VI: Extraocular movements intact bilaterally. Pupils equal round and reactive to light bilaterally.  CN V: Facial sensation is normal.  CN VII: Full and symmetric facial movement.  CN VIII: Hearing is normal.  CN IX, X: Palate elevates symmetrically.   CN XI: Shoulder shrug strength is normal.  CN XII: Tongue midline without atrophy or fasciculations.    Motor:   Strength 5/5 in all 4 extremities. Moderate bradykinesia bilaterally R>L. RUE cogwheeling, LUE mild cogwheeling. Increased tone noted in LLE as well. Rest tremor seen briefly in LUE only after walking.     Sensory:   Sensation intact to soft touch, vibration and pinprick in all 4 extremities.    Cerebellar:   Finger-to-nose intact, normal heel to shin.    Reflexes: 2+ in all 4 extremities  Pathologic reflexes - babinski reflex negative, Hoffmans reflex - negative    Gait:   Stooped posture. Short stride with shuffling gait, 5 steps to make turns. Decreased arm swing       Review of Systems:     Review of Systems   Constitutional:  Positive for appetite change (Less). Negative for fatigue and fever.   HENT: Negative.  Negative for hearing loss, tinnitus, trouble swallowing and voice change.    Eyes:  Positive for photophobia. Negative for pain and visual disturbance.   Respiratory:  Positive for shortness of breath.    Cardiovascular: Negative.  Negative for palpitations.   Gastrointestinal: Negative.  Negative for nausea and vomiting.   Endocrine: Negative.  Negative for cold intolerance.   Genitourinary:  Positive for frequency and urgency. Negative for dysuria.   Musculoskeletal:  Positive for back pain, gait problem, myalgias, neck pain and neck stiffness.   Skin: Negative.  Negative for rash.   Allergic/Immunologic: Negative.    Neurological:  Positive for tremors, weakness and light-headedness. Negative for dizziness, seizures, syncope, facial asymmetry, speech difficulty, numbness and headaches.    Hematological:  Bruises/bleeds easily.   Psychiatric/Behavioral:  Positive for confusion, hallucinations and sleep disturbance.      I have personally reviewed the MA's review of systems and made changes as necessary.    I have spent a total time of 30 minutes in caring for this patient on the day of the visit/encounter including Risks and benefits of tx options, Instructions for management, Patient and family education, Risk factor reductions, Impressions, Documenting in the medical record, Reviewing / ordering tests, medicine, procedures  , and Obtaining or reviewing history  .

## 2024-10-28 ENCOUNTER — TELEPHONE (OUTPATIENT)
Age: 84
End: 2024-10-28

## 2024-10-28 DIAGNOSIS — G20.A1 PARKINSON DISEASE (HCC): ICD-10-CM

## 2024-10-28 RX ORDER — CARBIDOPA AND LEVODOPA 25; 100 MG/1; MG/1
2.5 TABLET ORAL 3 TIMES DAILY
Qty: 540 TABLET | Refills: 3 | Status: SHIPPED | OUTPATIENT
Start: 2024-10-28

## 2024-10-28 NOTE — TELEPHONE ENCOUNTER
[facility] regard prescription for carbidopa-levodopa (Sinemet)  mg per tablet.  On the prescription it is requested that [employer] manufacture be dispensed. That manufacture has discontinued making that medication.  They are asking if it is ok to dispense the medication from Dr [facility] manufacture. If approved please send a new prescription allowing for.

## 2024-10-31 ENCOUNTER — TELEPHONE (OUTPATIENT)
Age: 84
End: 2024-10-31

## 2024-10-31 NOTE — TELEPHONE ENCOUNTER
Wife calling to report that pt legs are terribly swollen. Right leg is worse than Left leg.     She says this condition is chronic but with the help of Lasix 20 mg and PT (goes three times a week), the swelling has been maintained. She says at one point pt was taking Lasix every day but now he takes it three times a week. Wife did mention that he did stop taking it at one point because PT was helping with the swelling.    Wife says swelling started to increase two day after he started taking increased dose of Sinemet 25/100 (2.5 tablets TID). This is why she called. She is wondering if it could be the increased dose of Sinemet that is causing the increase in swelling.    Wife says she has been giving pt lasix daily for the past 4 days (today is day 4). However, there is no change.    Advised wife to notify PCP as well and inform them of swelling and that pt has been taking Furosemide daily. Wife says she will call PCP office and report swelling.    Per care everywhere, it appears in August, pt stopped Furosemide, then at PCP OV 10/15, pt was advised to restart Furosemide.    Dr. Cavanaugh - Please advise. Wife asking if Sinemet could be causing increase in swelling. Please see care everywhere for more info on PCP OV. Thank you!    Best jaswant [phone], ok to leave detailed message.

## 2024-11-01 ENCOUNTER — TELEPHONE (OUTPATIENT)
Dept: NEUROLOGY | Facility: CLINIC | Age: 84
End: 2024-11-01

## 2024-11-01 NOTE — TELEPHONE ENCOUNTER
Jovanna Cavanaugh MD       Sinemet 2 tablets TID when I examined him last week was not controlling his PD symptoms, which is why I advised increase in the dose. 2 visits ago we were running into this issue and the wife insisted on going back to 2 tablets TID, but on multiple occasions this has not be sufficient for control of his symptoms. I am more concerned now because he is experiencing more frequent falls. If the main concern is edema, I am interested to know if his PCP has any recommendations with regards to medical management of the edema rather than going back down to a dose of medication that I know will not be effective.          _____________________________________________    I spoke to wife and informed her of providers msg above. Wife informed that in past visit the falls Dr Cavanaugh felt could had been related to his BP med he was taking in the AM. Wife since then has been giving them in the afternoon and it drastically made an improvement for patient. He is so much better in the am, no falls or risk of per wife, he is even more stable walking without the cane.     The wife feels his hallucinations with being jumping ( seeing kids around that are not there)  and pt wondering around are from the  Sinemet 2.5 tabs TID, as he did not have hallucinations or wondering around prior when taking 2 tabs TID. Wife understands Dr Cavanaugh's guidance, feels she was right about the BP med and since moving it to afternoon he is better . Her biggest concern is the hallucinations and wondering around, and she only feels safe giving him the 2 tabs TID. She will give it to him that way , until she hears back from from office on Monday, per Dr Cavanaugh if she is still agreeable with that dosing regimen.     Wife informed PCP having pt take Lasix 2 times per day and reseeing pt again on Monday for F/U as he also ordered labs for pt to get done as well.    Patient aware I will update provider and once we get an update from  provider ,  wife request a call back at . May leave a detailed msg.

## 2024-11-01 NOTE — TELEPHONE ENCOUNTER
Pt's symptoms of PD are not currently well-controlled, and many of the medication alternatives we would use rather than sinemet have a greater likelihood of causing worsening edema. I would like to see if his PCP has any recommendations for management of the edema while he is maintained on the same dose. If not, I will likely have to transfer him to movement disorders for their expertise as management of his PD has become more challenging.

## 2024-11-01 NOTE — TELEPHONE ENCOUNTER
Wife called to f/u on previous call. Pt has PCP appt today @ 10am.     Wife states the swelling is d/t the increased carbidopa-levodopa as this happened previously when dose was increased and the problem was resolved once dose was decreased.     Please see note below for details.    No communication consent on file. Advised wife that pt will need to complete for so that we may speak w/her regarding medical issues. Wife requested form be sent via mail to home address.    Clerical - please send medical communication consent form to home address on file. Thank you.

## 2024-11-01 NOTE — TELEPHONE ENCOUNTER
I spoke to Wife whom was frustrated when I was provider the update,  as her main question for our office is pt was doing better on the Sinemet 2 tabs TID . She is requesting if he can go back on that , as his symptoms were not hyper as they are now, that he is taking Sinemet 2.5 tabs TID.     Routed to provider to advise, as wife wants a call back before end of day. I did advise patient once we get a response from my provider we will call her with an update, it may not be today, if we dont get a response by 430 pm today as provider is seeing pts today as well.

## 2024-11-01 NOTE — TELEPHONE ENCOUNTER
Sinemet 2 tablets TID when I examined him last week was not controlling his PD symptoms, which is why I advised increase in the dose. 2 visits ago we were running into this issue and the wife insisted on going back to 2 tablets TID, but on multiple occasions this has not be sufficient for control of his symptoms. I am more concerned now because he is experiencing more frequent falls. If the main concern is edema, I am interested to know if his PCP has any recommendations with regards to medical management of the edema rather than going back down to a dose of medication that I know will not be effective.

## 2024-11-05 NOTE — TELEPHONE ENCOUNTER
Pt's wife called regarding below.  She states that pt is doing better on sinemet 2 tab TID.  He has been on this dose since Friday.  PD symptoms are good.   Swelling is a little better since taking lasix bid.  BP has been going up and down.    States that he goes to PT 3 times a week now, walking is good.  No tremors.    Dr landry said that she (wife) knows him best.     Also states that on the higher dose all he did was sllep during the day.     Please also see carmen's message below

## 2024-12-11 ENCOUNTER — TELEPHONE (OUTPATIENT)
Dept: NEUROLOGY | Facility: CLINIC | Age: 84
End: 2024-12-11

## 2024-12-12 ENCOUNTER — OFFICE VISIT (OUTPATIENT)
Dept: WOUND CARE | Facility: CLINIC | Age: 84
End: 2024-12-12
Payer: COMMERCIAL

## 2024-12-12 VITALS
HEIGHT: 68 IN | WEIGHT: 218 LBS | RESPIRATION RATE: 20 BRPM | SYSTOLIC BLOOD PRESSURE: 190 MMHG | HEART RATE: 80 BPM | TEMPERATURE: 97.6 F | DIASTOLIC BLOOD PRESSURE: 81 MMHG | BODY MASS INDEX: 33.04 KG/M2

## 2024-12-12 DIAGNOSIS — R60.0 BILATERAL LEG EDEMA: ICD-10-CM

## 2024-12-12 DIAGNOSIS — S81.801A WOUND OF RIGHT LOWER EXTREMITY, INITIAL ENCOUNTER: Primary | ICD-10-CM

## 2024-12-12 DIAGNOSIS — L97.919 IDIOPATHIC CHRONIC VENOUS HYPERTENSION OF RIGHT LEG WITH ULCER (HCC): ICD-10-CM

## 2024-12-12 DIAGNOSIS — I87.311 IDIOPATHIC CHRONIC VENOUS HYPERTENSION OF RIGHT LEG WITH ULCER (HCC): ICD-10-CM

## 2024-12-12 PROCEDURE — 99213 OFFICE O/P EST LOW 20 MIN: CPT | Performed by: PODIATRIST

## 2024-12-12 RX ORDER — LIDOCAINE 40 MG/G
CREAM TOPICAL ONCE
Status: COMPLETED | OUTPATIENT
Start: 2024-12-12 | End: 2024-12-12

## 2024-12-12 RX ADMIN — LIDOCAINE: 40 CREAM TOPICAL at 15:04

## 2024-12-12 NOTE — PATIENT INSTRUCTIONS
Orders Placed This Encounter   Procedures    Wound cleansing and dressings Venous Ulcer Anterior;Right;Medial Leg     RIGHT LEG WOUND    Wash your hands with soap and water.  Remove old dressing, discard into plastic bag and place in trash.  Cleanse the wound with Mild Soap and Water prior to applying a clean dressing. Do not use tissue or cotton balls. Do not scrub the wound. Pat dry using gauze.    Shower: Yes. Only shower on dressing change days. Remove your old dressing prior to getting in the shower. Be sure to do your wound care and apply a clean dressing as soon as your are finished showering.      Apply Xeroform to the open wound.    The cover with a layer of Silver Alginate.  Cover with Gauze and ABD.   Secure with Zahra and Tape.   The wrap with ace wraps. Be sure not to wrap too tightly.     Change dressing 3 times per week.     Standing Status:   Future     Expiration Date:   12/19/2024    Wound cleansing and dressings Venous Ulcer Anterior;Right;Medial Leg     Miscellaneous Instructions     Decrease the amount of sodium in your diet.     Elevate your legs above heart level as much as possible.     Standing Status:   Future     Expiration Date:   12/19/2024

## 2024-12-12 NOTE — PROGRESS NOTES
Patient ID: Daniel Sanz Jr. is a 84 y.o. male Date of Birth 1940       Chief Complaint   Patient presents with    New Patient Visit     Right foot wound       Allergies  Morphine and codeine    Diagnosis:  1. Wound of right lower extremity, initial encounter  -     lidocaine (LMX) 4 % cream  -     Wound cleansing and dressings Venous Ulcer Anterior;Right;Medial Leg; Future  -     Wound cleansing and dressings Venous Ulcer Anterior;Right;Medial Leg; Future  -     Wound Procedure Treatment Venous Ulcer Anterior;Right;Medial Leg  2. Bilateral leg edema  3. 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 Wound of right lower extremity, initial encounter  S81.801A lidocaine (LMX) 4 % cream     Wound cleansing and dressings Venous Ulcer Anterior;Right;Medial Leg     Wound cleansing and dressings Venous Ulcer Anterior;Right;Medial Leg     Wound Procedure Treatment Venous Ulcer Anterior;Right;Medial Leg      2. Bilateral leg edema  R60.0       3. 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  He can wear the stocking with zipper on the left.  Elevate legs while seated.  Do not eat salty food.  Drink water to flush the system.  Take medications at prescribed and do not skip.  Chronic venous HTN with ulcer of the RLE:  Patient's leg was scrubbed with chlorhexidene for 3 minutes.  Patient will use soap and water to clean his leg.  Use xeroform, silver alginate, ABD and ACE.  VNA for wound care 3 times a week.  Follow up two weeks.  This is a new diagnosis and presents moderate risk due to potential for infection and the history of CKD.      Subjective:   This is an 83 yo male with ulcers of the RLE.  This patient is known to me from my office and presented with the wound when he arrived for footcare.   He was not getting any wound care prior to the visit.  His leg is swollen and leaking water.  Patient has a history of Parkinson's, HTN, stage 3 CKD, and leg edema.        The following portions of the patient's history were reviewed and updated as appropriate:   Patient Active Problem List   Diagnosis    Chronic right shoulder pain    Internal derangement of right shoulder    Tear of right supraspinatus tendon    Parkinson's disease (HCC)    MGUS (monoclonal gammopathy of unknown significance)    Neuropathy    Pleural plaque with presence of asbestos    Allergic rhinitis with postnasal drip    Shortness of breath    Elevated serum immunoglobulin free light chains    Stage 3a chronic kidney disease (HCC)    Closed fracture of nasal bone    Fall    Scalp hematoma    Multiple abrasions    Hypertension    Fall, initial encounter     Past Medical History:   Diagnosis Date    Arthritis     Asbestos exposure     Asbestosis (HCC)     GERD (gastroesophageal reflux disease)     Hemoptysis 5/11/2022    Hypertension     Lung disease     Parkinson's disease (HCC)      Past Surgical History:   Procedure Laterality Date    HERNIA REPAIR      INTRACAPSULAR CATARACT EXTRACTION Left     KNEE SURGERY      many years ago    SHOULDER SURGERY       Social History     Socioeconomic History    Marital status: /Civil Union     Spouse name: None    Number of children: 1    Years of education: 12    Highest education level: None   Occupational History    None   Tobacco Use    Smoking status: Never     Passive exposure: Past    Smokeless tobacco: Former     Types: Chew     Quit date: 1998   Vaping Use    Vaping status: Never Used   Substance and Sexual Activity    Alcohol use: Not Currently    Drug use: Never    Sexual activity: None   Other Topics Concern    None   Social History Narrative    None     Social Drivers of Health     Financial Resource Strain: Not on file   Food Insecurity: No Food Insecurity (6/13/2024)    Nursing -  Inadequate Food Risk Classification     Worried About Running Out of Food in the Last Year: Never true     Ran Out of Food in the Last Year: Never true     Ran Out of Food in the Last Year: Not on file   Transportation Needs: No Transportation Needs (6/13/2024)    PRAPARE - Transportation     Lack of Transportation (Medical): No     Lack of Transportation (Non-Medical): No   Physical Activity: Not on file   Stress: Not on file   Social Connections: Not on file   Intimate Partner Violence: Not on file   Housing Stability: Unknown (6/13/2024)    Housing Stability Vital Sign     Unable to Pay for Housing in the Last Year: No     Number of Times Moved in the Last Year: Not on file     Homeless in the Last Year: No        Current Outpatient Medications:     acetaminophen (TYLENOL) 325 mg tablet, Take 3 tablets (975 mg total) by mouth every 8 (eight) hours, Disp: 30 tablet, Rfl: 0    amLODIPine (NORVASC) 5 mg tablet, Take 5 mg by mouth daily, Disp: , Rfl:     carbidopa-levodopa (Sinemet)  mg per tablet, Take 2.5 tablets by mouth 3 (three) times a day, Disp: 540 tablet, Rfl: 3    furosemide (LASIX) 20 mg tablet, Take 20 mg by mouth 3 (three) times a week, Disp: , Rfl:     ketorolac (ACULAR) 0.5 % ophthalmic solution, , Disp: , Rfl:     lidocaine (LIDODERM) 5 %, Apply 1 patch topically over 12 hours daily Remove & Discard patch within 12 hours or as directed by MD, Disp: 10 patch, Rfl: 0    losartan (COZAAR) 50 mg tablet, Take 50 mg by mouth daily, Disp: , Rfl:     Mirabegron ER 25 MG TB24, Take 25 mg by mouth daily (Patient not taking: Reported on 10/24/2024), Disp: , Rfl:     prednisoLONE acetate (PRED FORTE) 1 % ophthalmic suspension, , Disp: , Rfl:     predniSONE 10 mg tablet, Take 10 mg by mouth 2 (two) times a day (Patient not taking: Reported on 10/24/2024), Disp: , Rfl:     senna-docusate sodium (SENOKOT S) 8.6-50 mg per tablet, Take 1 tablet by mouth 2 (two) times a day (Patient not taking: Reported on  "10/24/2024), Disp: 10 tablet, Rfl: 0  No current facility-administered medications for this visit.  Family History   Problem Relation Age of Onset    Stroke Mother     Colon cancer Mother     Depression Sister     Hypertension Sister       Review of Systems   Constitutional:  Negative for chills and fever.       Objective:  BP (!) 190/81   Pulse 80   Temp 97.6 °F (36.4 °C) (Temporal)   Resp 20   Ht 5' 8\" (1.727 m)   Wt 98.9 kg (218 lb)   BMI 33.15 kg/m²     Physical Exam  Neurological:      Mental Status: He is alert.             Wound 12/12/24 Venous Ulcer Leg Anterior;Right;Medial (Active)   Wound Image   12/12/24 1449   Wound Description Epithelialization;Yellow;Slough;Brown;White;Pink 12/12/24 1450   Perri-wound Assessment Erythema;Edema;Dry;Scaly;Maceration 12/12/24 1450   Wound Length (cm) 5.9 cm 12/12/24 1450   Wound Width (cm) 8.5 cm 12/12/24 1450   Wound Depth (cm) 0.1 cm 12/12/24 1450   Wound Surface Area (cm^2) 50.15 cm^2 12/12/24 1450   Wound Volume (cm^3) 5.015 cm^3 12/12/24 1450   Calculated Wound Volume (cm^3) 5.02 cm^3 12/12/24 1450   Drainage Amount Moderate 12/12/24 1450   Drainage Description Serosanguineous;Yellow 12/12/24 1450   Non-staged Wound Description Full thickness 12/12/24 1450                             Procedures           Wound Instructions:  Orders Placed This Encounter   Procedures    Wound cleansing and dressings Venous Ulcer Anterior;Right;Medial Leg     RIGHT LEG WOUND    Wash your hands with soap and water.  Remove old dressing, discard into plastic bag and place in trash.  Cleanse the wound with Mild Soap and Water prior to applying a clean dressing. Do not use tissue or cotton balls. Do not scrub the wound. Pat dry using gauze.    Shower: Yes. Only shower on dressing change days. Remove your old dressing prior to getting in the shower. Be sure to do your wound care and apply a clean dressing as soon as your are finished showering.      Apply Xeroform to the open wound.  " "  The cover with a layer of Silver Alginate.  Cover with Gauze and ABD.   Secure with Zahra and Tape.   The wrap with ace wraps. Be sure not to wrap too tightly.     Change dressing 3 times per week.     Standing Status:   Future     Expiration Date:   12/19/2024    Wound cleansing and dressings Venous Ulcer Anterior;Right;Medial Leg     Miscellaneous Instructions     Decrease the amount of sodium in your diet.     Elevate your legs above heart level as much as possible.     Standing Status:   Future     Expiration Date:   12/19/2024    Wound Procedure Treatment Venous Ulcer Anterior;Right;Medial Leg     This order was created via procedure documentation         Tom Verde DPM    Portions of the record may have been created with voice recognition software. Occasional wrong word or \"sound a like\" substitutions may have occurred due to the inherent limitations of voice recognition software. Read the chart carefully and recognize, using context, where substitutions have occurred.     "

## 2024-12-12 NOTE — PROGRESS NOTES
Wound Procedure Treatment Venous Ulcer Anterior;Right;Medial Leg    Performed by: Lubna Eckert RN  Authorized by: Tom Verde DPM    Associated wounds:   Wound 12/12/24 Venous Ulcer Leg Anterior;Right;Medial  Wound cleansed with:  Hibiclens  Applied primary dressing:  Silver, Calcium alginate and Other  Applied secondary dressing:  ABD and Gauze  Dressing secured with:  Zahra, Tape and Compression wrap    Xeroform  Wrapped with Ace Wrap

## 2024-12-13 ENCOUNTER — HOME HEALTH ADMISSION (OUTPATIENT)
Dept: HOME HEALTH SERVICES | Facility: HOME HEALTHCARE | Age: 84
End: 2024-12-13
Payer: COMMERCIAL

## 2024-12-15 ENCOUNTER — HOME CARE VISIT (OUTPATIENT)
Dept: HOME HEALTH SERVICES | Facility: HOME HEALTHCARE | Age: 84
End: 2024-12-15
Payer: COMMERCIAL

## 2024-12-15 VITALS
HEART RATE: 78 BPM | SYSTOLIC BLOOD PRESSURE: 162 MMHG | DIASTOLIC BLOOD PRESSURE: 86 MMHG | OXYGEN SATURATION: 97 % | RESPIRATION RATE: 18 BRPM | TEMPERATURE: 98 F

## 2024-12-15 PROCEDURE — 400013 VN SOC

## 2024-12-15 PROCEDURE — G0299 HHS/HOSPICE OF RN EA 15 MIN: HCPCS

## 2024-12-15 NOTE — CASE COMMUNICATION
Ship to Pt. Home      Ordering MD Martinez    Wound 1 rt leg   Full x    Frequency 3xw  Saline Tolar  NOT STOCKED  790752 1  Gauze 4x4 N/S 200 4x4s per unit  [id number] 1   Gauze Zahra stretch roll 4inch n/s 12 rolls per unit  060777 1  ABD 5x9 [id number] 10  Transpore white  2in 692420 1  Alginate with Silver 074985 1  Gauze Xeroform 5x9 710034 1

## 2024-12-15 NOTE — CASE COMMUNICATION
Medication discrepancies or Major drug interactions  Abnormal clinical findings  This report is informational only, no response is needed  [facility] has Admitted your patient to Home Health service with the following disciplines: SN  Patient stated goals of care wound healing  Potential barriers to goal achievement na  Primary focus of home health care:Wound  Anticipated visit pattern and next visit date 12.18.24 3xwx3w  Thank you  for allowing us to participate in the care of your patient.

## 2024-12-18 ENCOUNTER — HOME CARE VISIT (OUTPATIENT)
Dept: HOME HEALTH SERVICES | Facility: HOME HEALTHCARE | Age: 84
End: 2024-12-18
Payer: COMMERCIAL

## 2024-12-18 VITALS
DIASTOLIC BLOOD PRESSURE: 61 MMHG | TEMPERATURE: 98 F | HEART RATE: 68 BPM | WEIGHT: 218 LBS | OXYGEN SATURATION: 97 % | SYSTOLIC BLOOD PRESSURE: 126 MMHG | BODY MASS INDEX: 33.15 KG/M2

## 2024-12-18 PROCEDURE — G0299 HHS/HOSPICE OF RN EA 15 MIN: HCPCS

## 2024-12-19 ENCOUNTER — HOME CARE VISIT (OUTPATIENT)
Dept: HOME HEALTH SERVICES | Facility: HOME HEALTHCARE | Age: 84
End: 2024-12-19
Payer: COMMERCIAL

## 2024-12-26 ENCOUNTER — TELEPHONE (OUTPATIENT)
Dept: WOUND CARE | Facility: CLINIC | Age: 84
End: 2024-12-26

## 2024-12-26 NOTE — TELEPHONE ENCOUNTER
Returned call to patients wife Sheng. Sheng would like to move patients appointment to next week due to her being sick with Chrons and inability to bring patient in for visit. Wife agreeable to bring patient in for appointment next Thursday 1/2/25 at 230.

## 2024-12-27 ENCOUNTER — HOSPITAL ENCOUNTER (OUTPATIENT)
Dept: NON INVASIVE DIAGNOSTICS | Facility: CLINIC | Age: 84
Discharge: HOME | End: 2024-12-27
Payer: COMMERCIAL

## 2024-12-27 VITALS
SYSTOLIC BLOOD PRESSURE: 126 MMHG | BODY MASS INDEX: 33.04 KG/M2 | DIASTOLIC BLOOD PRESSURE: 61 MMHG | WEIGHT: 218 LBS | HEART RATE: 71 BPM | HEIGHT: 68 IN

## 2024-12-27 DIAGNOSIS — R06.02 SHORTNESS OF BREATH: ICD-10-CM

## 2024-12-27 DIAGNOSIS — I10 ESSENTIAL (PRIMARY) HYPERTENSION: ICD-10-CM

## 2024-12-27 LAB
AORTIC ROOT: 3.8 CM
APICAL FOUR CHAMBER EJECTION FRACTION: 54 %
ASCENDING AORTA: 3.6 CM
AV LVOT MEAN GRADIENT: 1 MMHG
AV LVOT PEAK GRADIENT: 2 MMHG
AV REGURGITATION PRESSURE HALF TIME: 540 MS
BSA FOR ECHO PROCEDURE: 2.12 M2
DOP CALC LVOT PEAK VEL VTI: 19.72 CM
DOP CALC LVOT PEAK VEL: 0.77 M/S
E WAVE DECELERATION TIME: 172 MS
E/A RATIO: 0.81
FRACTIONAL SHORTENING: 34 (ref 28–44)
INTERVENTRICULAR SEPTUM IN DIASTOLE (PARASTERNAL SHORT AXIS VIEW): 1.1 CM
INTERVENTRICULAR SEPTUM: 1.1 CM (ref 0.6–1.1)
LAAS-AP2: 12.2 CM2
LAAS-AP4: 19.3 CM2
LEFT ATRIUM SIZE: 3.7 CM
LEFT ATRIUM VOLUME (MOD BIPLANE): 47 ML
LEFT ATRIUM VOLUME INDEX (MOD BIPLANE): 22.2 ML/M2
LEFT INTERNAL DIMENSION IN SYSTOLE: 3.5 CM (ref 2.1–4)
LEFT VENTRICLE DIASTOLIC VOLUME (MOD BIPLANE): 105 ML
LEFT VENTRICLE DIASTOLIC VOLUME INDEX (MOD BIPLANE): 49.5 ML/M2
LEFT VENTRICLE SYSTOLIC VOLUME (MOD BIPLANE): 42 ML
LEFT VENTRICLE SYSTOLIC VOLUME INDEX (MOD BIPLANE): 19.8 ML/M2
LEFT VENTRICULAR INTERNAL DIMENSION IN DIASTOLE: 5.3 CM (ref 3.5–6)
LEFT VENTRICULAR POSTERIOR WALL IN END DIASTOLE: 1.1 CM
LEFT VENTRICULAR STROKE VOLUME: 83 ML
LV EF: 60 %
LVSV (TEICH): 83 ML
MV E'TISSUE VEL-SEP: 12 CM/S
MV PEAK A VEL: 0.86 M/S
MV PEAK E VEL: 70 CM/S
MV STENOSIS PRESSURE HALF TIME: 50 MS
MV VALVE AREA P 1/2 METHOD: 4.4
RIGHT ATRIUM AREA SYSTOLE A4C: 17.1 CM2
RIGHT VENTRICLE ID DIMENSION: 4.2 CM
SL CV AV DECELERATION TIME RETROGRADE: 1863 MS
SL CV AV PEAK GRADIENT RETROGRADE: 47 MMHG
SL CV LEFT ATRIUM LENGTH A2C: 3.9 CM
SL CV LV EF: 62
SL CV PED ECHO LEFT VENTRICLE DIASTOLIC VOLUME (MOD BIPLANE) 2D: 133 ML
SL CV PED ECHO LEFT VENTRICLE SYSTOLIC VOLUME (MOD BIPLANE) 2D: 50 ML
TR MAX PG: 32 MMHG
TR PEAK VELOCITY: 2.8 M/S
TRICUSPID ANNULAR PLANE SYSTOLIC EXCURSION: 3 CM
TRICUSPID VALVE PEAK REGURGITATION VELOCITY: 2.84 M/S

## 2024-12-27 PROCEDURE — 93306 TTE W/DOPPLER COMPLETE: CPT | Performed by: INTERNAL MEDICINE

## 2024-12-27 PROCEDURE — 93306 TTE W/DOPPLER COMPLETE: CPT

## 2024-12-31 ENCOUNTER — TELEPHONE (OUTPATIENT)
Age: 84
End: 2024-12-31

## 2024-12-31 NOTE — TELEPHONE ENCOUNTER
Luis from outpatient [facility] speech team calling asking for swallow study order for pt.  Nurse states it should say Video barium swallow. Please fax to [phone].     Please call luis when order is in. Thank you.

## 2025-01-21 ENCOUNTER — APPOINTMENT (EMERGENCY)
Dept: RADIOLOGY | Facility: HOSPITAL | Age: 85
End: 2025-01-21
Payer: COMMERCIAL

## 2025-01-21 ENCOUNTER — NURSE TRIAGE (OUTPATIENT)
Age: 85
End: 2025-01-21

## 2025-01-21 ENCOUNTER — HOSPITAL ENCOUNTER (INPATIENT)
Facility: HOSPITAL | Age: 85
LOS: 10 days | End: 2025-01-31
Attending: EMERGENCY MEDICINE
Payer: COMMERCIAL

## 2025-01-21 ENCOUNTER — APPOINTMENT (EMERGENCY)
Dept: CT IMAGING | Facility: HOSPITAL | Age: 85
End: 2025-01-21
Payer: COMMERCIAL

## 2025-01-21 DIAGNOSIS — S01.111A LACERATION OF RIGHT EYEBROW, INITIAL ENCOUNTER: ICD-10-CM

## 2025-01-21 DIAGNOSIS — T68.XXXA HYPOTHERMIA, INITIAL ENCOUNTER: ICD-10-CM

## 2025-01-21 DIAGNOSIS — R41.82 ALTERED MENTAL STATUS, UNSPECIFIED ALTERED MENTAL STATUS TYPE: ICD-10-CM

## 2025-01-21 DIAGNOSIS — E87.5 HYPERKALEMIA: Primary | ICD-10-CM

## 2025-01-21 DIAGNOSIS — S09.90XA CLOSED HEAD INJURY, INITIAL ENCOUNTER: ICD-10-CM

## 2025-01-21 DIAGNOSIS — G20.B1 PARKINSON'S DISEASE WITH DYSKINESIA, UNSPECIFIED WHETHER MANIFESTATIONS FLUCTUATE (HCC): ICD-10-CM

## 2025-01-21 DIAGNOSIS — G20.A1 PARKINSON'S DISEASE (HCC): ICD-10-CM

## 2025-01-21 DIAGNOSIS — R41.0 CONFUSION: ICD-10-CM

## 2025-01-21 PROBLEM — L97.919 IDIOPATHIC CHRONIC VENOUS HYPERTENSION OF RIGHT LEG WITH ULCER (HCC): Status: ACTIVE | Noted: 2025-01-21

## 2025-01-21 PROBLEM — I87.311 IDIOPATHIC CHRONIC VENOUS HYPERTENSION OF RIGHT LEG WITH ULCER (HCC): Status: ACTIVE | Noted: 2025-01-21

## 2025-01-21 LAB
25(OH)D3 SERPL-MCNC: 26.9 NG/ML (ref 30–100)
2HR DELTA HS TROPONIN: 1 NG/L
ALBUMIN SERPL BCG-MCNC: 3.6 G/DL (ref 3.5–5)
ALP SERPL-CCNC: 112 U/L (ref 34–104)
ALT SERPL W P-5'-P-CCNC: 4 U/L (ref 7–52)
ANION GAP SERPL CALCULATED.3IONS-SCNC: 4 MMOL/L (ref 4–13)
ANION GAP SERPL CALCULATED.3IONS-SCNC: 7 MMOL/L (ref 4–13)
APTT PPP: 32 SECONDS (ref 23–34)
AST SERPL W P-5'-P-CCNC: 28 U/L (ref 13–39)
ATRIAL RATE: 39 BPM
BACTERIA UR QL AUTO: NORMAL /HPF
BASOPHILS # BLD AUTO: 0.01 THOUSANDS/ΜL (ref 0–0.1)
BASOPHILS NFR BLD AUTO: 0 % (ref 0–1)
BILIRUB SERPL-MCNC: 0.81 MG/DL (ref 0.2–1)
BILIRUB UR QL STRIP: NEGATIVE
BUN SERPL-MCNC: 32 MG/DL (ref 5–25)
BUN SERPL-MCNC: 33 MG/DL (ref 5–25)
CALCIUM SERPL-MCNC: 8.5 MG/DL (ref 8.4–10.2)
CALCIUM SERPL-MCNC: 8.7 MG/DL (ref 8.4–10.2)
CARDIAC TROPONIN I PNL SERPL HS: 3 NG/L (ref ?–50)
CARDIAC TROPONIN I PNL SERPL HS: 4 NG/L (ref ?–50)
CHLORIDE SERPL-SCNC: 108 MMOL/L (ref 96–108)
CHLORIDE SERPL-SCNC: 109 MMOL/L (ref 96–108)
CLARITY UR: CLEAR
CO2 SERPL-SCNC: 25 MMOL/L (ref 21–32)
CO2 SERPL-SCNC: 26 MMOL/L (ref 21–32)
COLOR UR: ABNORMAL
CREAT SERPL-MCNC: 0.98 MG/DL (ref 0.6–1.3)
CREAT SERPL-MCNC: 0.98 MG/DL (ref 0.6–1.3)
EOSINOPHIL # BLD AUTO: 0.12 THOUSAND/ΜL (ref 0–0.61)
EOSINOPHIL NFR BLD AUTO: 2 % (ref 0–6)
ERYTHROCYTE [DISTWIDTH] IN BLOOD BY AUTOMATED COUNT: 13.5 % (ref 11.6–15.1)
FLUAV AG UPPER RESP QL IA.RAPID: NEGATIVE
FLUBV AG UPPER RESP QL IA.RAPID: NEGATIVE
FOLATE SERPL-MCNC: 15.7 NG/ML
GFR SERPL CREATININE-BSD FRML MDRD: 71 ML/MIN/1.73SQ M
GFR SERPL CREATININE-BSD FRML MDRD: 71 ML/MIN/1.73SQ M
GLUCOSE SERPL-MCNC: 66 MG/DL (ref 65–140)
GLUCOSE SERPL-MCNC: 92 MG/DL (ref 65–140)
GLUCOSE UR STRIP-MCNC: NEGATIVE MG/DL
HCT VFR BLD AUTO: 30.6 % (ref 36.5–49.3)
HGB BLD-MCNC: 9.9 G/DL (ref 12–17)
HGB UR QL STRIP.AUTO: NEGATIVE
IMM GRANULOCYTES # BLD AUTO: 0.01 THOUSAND/UL (ref 0–0.2)
IMM GRANULOCYTES NFR BLD AUTO: 0 % (ref 0–2)
INR PPP: 1.03 (ref 0.85–1.19)
KETONES UR STRIP-MCNC: NEGATIVE MG/DL
LACTATE SERPL-SCNC: 0.5 MMOL/L (ref 0.5–2)
LEUKOCYTE ESTERASE UR QL STRIP: NEGATIVE
LYMPHOCYTES # BLD AUTO: 0.87 THOUSANDS/ΜL (ref 0.6–4.47)
LYMPHOCYTES NFR BLD AUTO: 16 % (ref 14–44)
MCH RBC QN AUTO: 32.2 PG (ref 26.8–34.3)
MCHC RBC AUTO-ENTMCNC: 32.4 G/DL (ref 31.4–37.4)
MCV RBC AUTO: 100 FL (ref 82–98)
MONOCYTES # BLD AUTO: 0.45 THOUSAND/ΜL (ref 0.17–1.22)
MONOCYTES NFR BLD AUTO: 8 % (ref 4–12)
NEUTROPHILS # BLD AUTO: 3.89 THOUSANDS/ΜL (ref 1.85–7.62)
NEUTS SEG NFR BLD AUTO: 74 % (ref 43–75)
NITRITE UR QL STRIP: NEGATIVE
NON-SQ EPI CELLS URNS QL MICRO: NORMAL /HPF
NRBC BLD AUTO-RTO: 0 /100 WBCS
PH UR STRIP.AUTO: 5.5 [PH]
PLATELET # BLD AUTO: 167 THOUSANDS/UL (ref 149–390)
PLATELET # BLD AUTO: 174 THOUSANDS/UL (ref 149–390)
PMV BLD AUTO: 10 FL (ref 8.9–12.7)
PMV BLD AUTO: 10.3 FL (ref 8.9–12.7)
POTASSIUM SERPL-SCNC: 4.5 MMOL/L (ref 3.5–5.3)
POTASSIUM SERPL-SCNC: 6 MMOL/L (ref 3.5–5.3)
PROCALCITONIN SERPL-MCNC: <0.05 NG/ML
PROT SERPL-MCNC: 6.1 G/DL (ref 6.4–8.4)
PROT UR STRIP-MCNC: ABNORMAL MG/DL
PROTHROMBIN TIME: 14.3 SECONDS (ref 12.3–15)
QRS AXIS: 46 DEGREES
QRSD INTERVAL: 90 MS
QT INTERVAL: 390 MS
QTC INTERVAL: 449 MS
RBC # BLD AUTO: 3.07 MILLION/UL (ref 3.88–5.62)
RBC #/AREA URNS AUTO: NORMAL /HPF
SARS-COV+SARS-COV-2 AG RESP QL IA.RAPID: NEGATIVE
SODIUM SERPL-SCNC: 138 MMOL/L (ref 135–147)
SODIUM SERPL-SCNC: 141 MMOL/L (ref 135–147)
SP GR UR STRIP.AUTO: 1.02 (ref 1–1.03)
T WAVE AXIS: 17 DEGREES
TSH SERPL DL<=0.05 MIU/L-ACNC: 2.63 UIU/ML (ref 0.45–4.5)
UROBILINOGEN UR STRIP-ACNC: <2 MG/DL
VENTRICULAR RATE: 80 BPM
WBC # BLD AUTO: 5.35 THOUSAND/UL (ref 4.31–10.16)
WBC #/AREA URNS AUTO: NORMAL /HPF

## 2025-01-21 PROCEDURE — 87804 INFLUENZA ASSAY W/OPTIC: CPT

## 2025-01-21 PROCEDURE — 83605 ASSAY OF LACTIC ACID: CPT

## 2025-01-21 PROCEDURE — 73030 X-RAY EXAM OF SHOULDER: CPT

## 2025-01-21 PROCEDURE — 80053 COMPREHEN METABOLIC PANEL: CPT

## 2025-01-21 PROCEDURE — 99285 EMERGENCY DEPT VISIT HI MDM: CPT | Performed by: EMERGENCY MEDICINE

## 2025-01-21 PROCEDURE — 84145 PROCALCITONIN (PCT): CPT

## 2025-01-21 PROCEDURE — 85730 THROMBOPLASTIN TIME PARTIAL: CPT

## 2025-01-21 PROCEDURE — 87040 BLOOD CULTURE FOR BACTERIA: CPT

## 2025-01-21 PROCEDURE — 85610 PROTHROMBIN TIME: CPT

## 2025-01-21 PROCEDURE — 80048 BASIC METABOLIC PNL TOTAL CA: CPT | Performed by: PHYSICIAN ASSISTANT

## 2025-01-21 PROCEDURE — 84443 ASSAY THYROID STIM HORMONE: CPT | Performed by: EMERGENCY MEDICINE

## 2025-01-21 PROCEDURE — 81001 URINALYSIS AUTO W/SCOPE: CPT

## 2025-01-21 PROCEDURE — 87811 SARS-COV-2 COVID19 W/OPTIC: CPT

## 2025-01-21 PROCEDURE — 84484 ASSAY OF TROPONIN QUANT: CPT | Performed by: PHYSICIAN ASSISTANT

## 2025-01-21 PROCEDURE — 85049 AUTOMATED PLATELET COUNT: CPT | Performed by: PHYSICIAN ASSISTANT

## 2025-01-21 PROCEDURE — 12013 RPR F/E/E/N/L/M 2.6-5.0 CM: CPT | Performed by: EMERGENCY MEDICINE

## 2025-01-21 PROCEDURE — 72125 CT NECK SPINE W/O DYE: CPT

## 2025-01-21 PROCEDURE — 85025 COMPLETE CBC W/AUTO DIFF WBC: CPT

## 2025-01-21 PROCEDURE — 99222 1ST HOSP IP/OBS MODERATE 55: CPT | Performed by: PHYSICIAN ASSISTANT

## 2025-01-21 PROCEDURE — 93010 ELECTROCARDIOGRAM REPORT: CPT | Performed by: STUDENT IN AN ORGANIZED HEALTH CARE EDUCATION/TRAINING PROGRAM

## 2025-01-21 PROCEDURE — 36415 COLL VENOUS BLD VENIPUNCTURE: CPT

## 2025-01-21 PROCEDURE — 96374 THER/PROPH/DIAG INJ IV PUSH: CPT

## 2025-01-21 PROCEDURE — 99285 EMERGENCY DEPT VISIT HI MDM: CPT

## 2025-01-21 PROCEDURE — 96375 TX/PRO/DX INJ NEW DRUG ADDON: CPT

## 2025-01-21 PROCEDURE — 0JQ10ZZ REPAIR FACE SUBCUTANEOUS TISSUE AND FASCIA, OPEN APPROACH: ICD-10-PCS | Performed by: EMERGENCY MEDICINE

## 2025-01-21 PROCEDURE — 70450 CT HEAD/BRAIN W/O DYE: CPT

## 2025-01-21 PROCEDURE — 71045 X-RAY EXAM CHEST 1 VIEW: CPT

## 2025-01-21 PROCEDURE — 82746 ASSAY OF FOLIC ACID SERUM: CPT | Performed by: PHYSICIAN ASSISTANT

## 2025-01-21 PROCEDURE — 93005 ELECTROCARDIOGRAM TRACING: CPT

## 2025-01-21 PROCEDURE — 94640 AIRWAY INHALATION TREATMENT: CPT

## 2025-01-21 PROCEDURE — 82306 VITAMIN D 25 HYDROXY: CPT | Performed by: PHYSICIAN ASSISTANT

## 2025-01-21 RX ORDER — AMOXICILLIN 250 MG
1 CAPSULE ORAL 2 TIMES DAILY PRN
Status: DISCONTINUED | OUTPATIENT
Start: 2025-01-21 | End: 2025-01-22

## 2025-01-21 RX ORDER — SODIUM CHLORIDE 9 MG/ML
75 INJECTION, SOLUTION INTRAVENOUS CONTINUOUS
Status: DISCONTINUED | OUTPATIENT
Start: 2025-01-21 | End: 2025-01-22

## 2025-01-21 RX ORDER — ONDANSETRON 2 MG/ML
4 INJECTION INTRAMUSCULAR; INTRAVENOUS EVERY 6 HOURS PRN
Status: DISCONTINUED | OUTPATIENT
Start: 2025-01-21 | End: 2025-01-31 | Stop reason: HOSPADM

## 2025-01-21 RX ORDER — FUROSEMIDE 10 MG/ML
40 INJECTION INTRAMUSCULAR; INTRAVENOUS ONCE
Status: COMPLETED | OUTPATIENT
Start: 2025-01-21 | End: 2025-01-21

## 2025-01-21 RX ORDER — MULTIVIT-MIN/IRON/FOLIC ACID/K 18-600-40
1 CAPSULE ORAL 3 TIMES WEEKLY
COMMUNITY

## 2025-01-21 RX ORDER — CALCIUM CARBONATE 500 MG/1
1000 TABLET, CHEWABLE ORAL DAILY PRN
Status: DISCONTINUED | OUTPATIENT
Start: 2025-01-21 | End: 2025-01-31 | Stop reason: HOSPADM

## 2025-01-21 RX ORDER — LANOLIN ALCOHOL/MO/W.PET/CERES
1000 CREAM (GRAM) TOPICAL 3 TIMES WEEKLY
COMMUNITY

## 2025-01-21 RX ORDER — ALBUTEROL SULFATE 0.83 MG/ML
2.5 SOLUTION RESPIRATORY (INHALATION) ONCE
Status: COMPLETED | OUTPATIENT
Start: 2025-01-21 | End: 2025-01-21

## 2025-01-21 RX ORDER — CARBIDOPA AND LEVODOPA 25; 100 MG/1; MG/1
2.5 TABLET ORAL 3 TIMES DAILY
Status: DISCONTINUED | OUTPATIENT
Start: 2025-01-21 | End: 2025-01-23

## 2025-01-21 RX ORDER — AMLODIPINE BESYLATE 5 MG/1
5 TABLET ORAL DAILY
Status: DISCONTINUED | OUTPATIENT
Start: 2025-01-22 | End: 2025-01-31 | Stop reason: HOSPADM

## 2025-01-21 RX ORDER — ENOXAPARIN SODIUM 100 MG/ML
40 INJECTION SUBCUTANEOUS DAILY
Status: DISCONTINUED | OUTPATIENT
Start: 2025-01-22 | End: 2025-01-31 | Stop reason: HOSPADM

## 2025-01-21 RX ORDER — LOSARTAN POTASSIUM 50 MG/1
50 TABLET ORAL DAILY
Status: DISCONTINUED | OUTPATIENT
Start: 2025-01-22 | End: 2025-01-21

## 2025-01-21 RX ORDER — DEXTROSE MONOHYDRATE 25 G/50ML
25 INJECTION, SOLUTION INTRAVENOUS ONCE
Status: COMPLETED | OUTPATIENT
Start: 2025-01-21 | End: 2025-01-21

## 2025-01-21 RX ADMIN — Medication 1 APPLICATION: at 15:00

## 2025-01-21 RX ADMIN — INSULIN HUMAN 5 UNITS: 100 INJECTION, SOLUTION PARENTERAL at 15:43

## 2025-01-21 RX ADMIN — FUROSEMIDE 40 MG: 10 INJECTION, SOLUTION INTRAMUSCULAR; INTRAVENOUS at 15:45

## 2025-01-21 RX ADMIN — ALBUTEROL SULFATE 2.5 MG: 2.5 SOLUTION RESPIRATORY (INHALATION) at 15:51

## 2025-01-21 RX ADMIN — DEXTROSE MONOHYDRATE 25 ML: 25 INJECTION, SOLUTION INTRAVENOUS at 15:40

## 2025-01-21 RX ADMIN — SODIUM CHLORIDE 75 ML/HR: 0.9 INJECTION, SOLUTION INTRAVENOUS at 18:14

## 2025-01-21 RX ADMIN — CARBIDOPA AND LEVODOPA 2.5 TABLET: 25; 100 TABLET ORAL at 21:58

## 2025-01-21 RX ADMIN — CARBIDOPA AND LEVODOPA 2.5 TABLET: 25; 100 TABLET ORAL at 17:38

## 2025-01-21 NOTE — TELEPHONE ENCOUNTER
"last visit Dr. Cavanaugh, 10/24  F/u scheduled 1/27    Patient's wife, Sheng,(on consent)  called crying on the phone reporting patient with  decline in  behavior, especially during night, awakening frequently from sleep with hallucinations about things needing to be done that aren't happening; also more agitated and \"nasty\", \"body jumping up and down\" while sleeping in recliner, also reporting patient's speech is garbled at times then returns to clear speech, increase in sleep noted during the day.    Patient's wife said she is fearful that \"he will die in front of me\" she is asking for staging of parkinson's as to what she can expect.    Discussed possible infections as potential trigger; ; wife reports patient does have 3rd stage kidney disease and recently had wound care for leg wound/rash; also said patient has \"terrible cough that bothers him at night\" however said PCP is aware.    Recent med adjustments x 1 week ago increase in   carbidopa/levodopa 25/100 2.5 tablets at 8:30A, 2:30 P, 9 P    Was taking 2 tablets 8:30A, 2:30P and 9P and patient's wife said patient's behavior was more stable while on that dose; reports body movements may be a little improved since increasing dose    F/u scheduled 1/27; patient's wife would like to discuss patient prognosis  We discussed if patient's condition worsens to include danger to her or himself, increased agitation/hallucinations, call 911.Patient's wife verbalized understanding.    Please provide recommendation, thank you.    Reason for Disposition   Longstanding confusion (e.g., dementia, stroke) and getting worse    Additional Information   Confusion, disorientation, or hallucinations is main symptom    Answer Assessment - Initial Assessment Questions  1. SYMPTOM: \"What is the main symptom you are concerned about?\" (e.g., weakness, numbness)      Behavior change including hallucinations worse during the night, only sleeping 3 hours at a time despite melatonin, new onset " "\"mumbling\" at times other times speech is clear, \"nasty\" this morning, currently while lying in recliner patient's wife noted arm/leg movement (\"body jumps up and down\"); changes in movement  2. ONSET: \"When did this start?\" (minutes, hours, days; while sleeping)      Started x 3 nights ago  3. LAST NORMAL: \"When was the last time you (the patient) were normal (no symptoms)?\"      4 days ago  4. PATTERN \"Does this come and go, or has it been constant since it started?\"  \"Is it present now?\"      intermittent  5. CARDIAC SYMPTOMS: \"Have you had any of the following symptoms: chest pain, difficulty breathing, palpitations?\"      N/A  6. NEUROLOGIC SYMPTOMS: \"Have you had any of the following symptoms: headache, dizziness, vision loss, double vision, changes in speech, unsteady on your feet?\"      Currently asleep on recliner, sleeping hours  7. OTHER SYMPTOMS: \"Do you have any other symptoms?\"      Patient still able to perform ADLs, I.e. can shave on his own wife said she has always assisted with dressing, said during the night awakens sleep does not use cane/walker however seems steady on his feet however wife is fearful patient may fall and try  to get out of the house  8. PREGNANCY: \"Is there any chance you are pregnant?\" \"When was your last menstrual period?\"      N/A    Answer Assessment - Initial Assessment Questions  1. LEVEL OF CONSCIOUSNESS: \"How are they (the patient) acting right now?\" (e.g., alert-oriented, confused, lethargic, stuporous, comatose)      Patient currently sleeping in recliner  2. ONSET: \"When did the confusion start?\"  (e.g., minutes, hours, days)      4 days ago  3. PATTERN: \"Does this come and go, or has it been constant since it started?\"  \"Is it present now?\"      intermittent  4. ALCOHOL or DRUGS: \"Have they been drinking alcohol or taking any drugs?\"       N/A  5. NARCOTIC MEDICINES: \"Have they been receiving any narcotic medications?\" (e.g., morphine, Vicodin)      N/A  6. CAUSE: " "\"What do you think is causing the confusion?\"       Recent med increase x 1 week ago, patient has hx of parkinson's  7. OTHER SYMPTOMS: \"Are there any other symptoms?\" (e.g., difficulty breathing, fever, headache, weakness)      Increased fatigue, garbled speech alternating with clear; agitation, hallucinations, awakens easily at night and frequently, body jumps up and down    Protocols used: Neurologic Deficit-Adult-OH, Confusion - Delirium-Adult-OH    "

## 2025-01-21 NOTE — ASSESSMENT & PLAN NOTE
Recent Labs     01/21/25  1226   K 6.0*     S/p albuterol, insulin, amp of d50, Lasix 40 mg in ED  Repeat BMP pending  Etiology unclear  Hold ARB

## 2025-01-21 NOTE — ED NOTES
C-collar applied and 4 person lift using backboard onto stretcher.      Andrea Rowell V RN  01/21/25 [id number]

## 2025-01-21 NOTE — ED PROVIDER NOTES
Time reflects when diagnosis was documented in both MDM as applicable and the Disposition within this note       Time User Action Codes Description Comment    1/21/2025  4:27 PM [place], Tomy Add [S09.90XA] Closed head injury, initial encounter     1/21/2025  4:27 PM [place], Tomy Add [R41.0] Confusion     1/21/2025  4:27 PM [place], Tomy Add [E87.5] Hyperkalemia     1/21/2025  4:27 PM [place], Tomy Add [T68.XXXA] Hypothermia, initial encounter     1/21/2025  4:27 PM [place], Tomy Modify [S09.90XA] Closed head injury, initial encounter     1/21/2025  4:27 PM [place], Tomy Modify [E87.5] Hyperkalemia     1/21/2025  4:27 PM [place], Tomy Add [S01.111A] Laceration of right eyebrow, initial encounter     1/21/2025  4:41 PM Symone Kelly Add [R41.82] Altered mental status, unspecified altered mental status type           ED Disposition       ED Disposition   Admit    Condition   Stable    Date/Time   Tue Jan 21, 2025  4:27 PM    Comment   Case was discussed with BLAKE and the patient's admission status was agreed to be Admission Status: inpatient status to the service of Dr. Rodriguez .               Assessment & Plan       Medical Decision Making  Male patient who presented to the emergency department for increasing confusion as well as a fall.  On physical examination, patient was in cervical spine collar at bedside with midline cervical tenderness.  He was also noted to have an approximate 3 cm laceration over his right eyebrow with minimal bleeding and tenderness to palpation of his left posterior shoulder.  Patient's labs showed hyperkalemia but no other acute concerns.  Patient's CT head and cervical spine imaging showed no acute abnormality as per radiology.  Patient's x-ray images also showed no acute cardiopulmonary disease or osseous abnormality.  While in the emergency department, patient was given insulin, Lasix, albuterol, and an amp of D50.  Patient's laceration over his right eyebrow was also repaired after being anesthetized  using LET gel.  Six sutures were placed using 5-0 fast-absorbing plain gut which she tolerated well.  Patient's case was discussed with internal medicine who accepted the patient for inpatient admission under the service of Dr. Rodriguez.  Patient and his family were agreeable to plan.    Amount and/or Complexity of Data Reviewed  Labs: ordered. Decision-making details documented in ED Course.  Radiology: ordered. Decision-making details documented in ED Course.    Risk  OTC drugs.  Prescription drug management.  Decision regarding hospitalization.        ED Course as of 01/22/25 1654   Tue Jan 21, 2025   1242 Temperature(!)(S): 95.6 °F (35.3 °C)  Rectally   1307 FLU/COVID Rapid Antigen (30 min. TAT) - Preferred screening test in ED  Negative   1307 Hemoglobin(!): 9.9   1333 CT cervical spine without contrast  No cervical spine fracture or traumatic malalignment.   1333 CT head without contrast  No acute intracranial abnormality.   1341 Cervical collar removed at bedside.   1358 Potassium(!): 6.0   1421 XR chest 1 view portable  No acute cardiopulmonary disease.     No acute displaced fractures. 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     1439 XR shoulder 2+ views LEFT  No acute osseous abnormality.     Rotator cuff calcific tendinitis.        1500 Let gel applied to patient's laceration.   1611 LACTIC ACID: 0.5   1611 Procalcitonin: <0.05   1611 Laceration repaired at bedside with six 5-0 fast-absorbing plain gut sutures and let gel.  Patient tolerated well.   1724 TSH 3RD GENERATON: 2.635       Medications   carbidopa-levodopa (SINEMET)  mg per tablet 2.5 tablet (2.5 tablets Oral Given 1/21/25 1738)   amLODIPine (NORVASC) tablet 5 mg (has no administration in time range)   Cholecalciferol (VITAMIN D3) tablet 1,000 Units (has no administration in time range)   cyanocobalamin (VITAMIN B-12) tablet 1,000 mcg (has no administration in time range)   LET gel 1 Application (1 Application  Topical Given 1/21/25 1500)   albuterol inhalation solution 2.5 mg (2.5 mg Nebulization Given 1/21/25 1551)   insulin regular (HumuLIN R,NovoLIN R) injection 5 Units (5 Units Intravenous Given 1/21/25 1543)   furosemide (LASIX) injection 40 mg (40 mg Intravenous Given 1/21/25 1545)   dextrose 50 % IV solution 25 mL (25 mL Intravenous Given 1/21/25 1540)       ED Risk Strat Scores                                              History of Present Illness       Chief Complaint   Patient presents with    Fall     Pt presents to the ED s/p fall. Hx of parkinsons. Fall with head injury. No thinners. From standing.        Past Medical History:   Diagnosis Date    Arthritis     Asbestos exposure     Asbestosis (HCC)     GERD (gastroesophageal reflux disease)     Hemoptysis 5/11/2022    Hypertension     Lung disease     Parkinson's disease (HCC)       Past Surgical History:   Procedure Laterality Date    HERNIA REPAIR      INTRACAPSULAR CATARACT EXTRACTION Left     KNEE SURGERY      many years ago    SHOULDER SURGERY        Family History   Problem Relation Age of Onset    Stroke Mother     Colon cancer Mother     Depression Sister     Hypertension Sister       Social History     Tobacco Use    Smoking status: Never     Passive exposure: Past    Smokeless tobacco: Former     Types: Chew     Quit date: 1998   Vaping Use    Vaping status: Never Used   Substance Use Topics    Alcohol use: Not Currently    Drug use: Never      E-Cigarette/Vaping    E-Cigarette Use Never User       E-Cigarette/Vaping Substances    Nicotine No     THC No     CBD No     Flavoring No     Other No     Unknown No       I have reviewed and agree with the history as documented.     84-year-old male with significant PMH including HTN and Parkinson's disease who presents to the emergency department with increased confusion.  Patient's wife states that she had concerns for urinary tract infection given he had foul-smelling urine for the past few days as well  as a cough.  As patient was entering the emergency department, patient had lost his footing and fell with head strike.  No loss of consciousness along with no anticoagulation/antiplatelet therapy.  Patient now has a wound over his right eyebrow with minimal bleeding.  He is complaining of pain in his neck as well as his left shoulder.  No abdominal tenderness and no hematuria.  No other acute concerns at this time. Denies chest pain, SOB, cough, abdominal pain, n/v/d, fever, chills, dizziness, lightheadedness, HA, dysuria, hematuria, hematochezia, or melena.         Fall  Associated symptoms: neck pain    Associated symptoms: no abdominal pain, no back pain, no chest pain, no headaches, no nausea and no vomiting        Review of Systems   Constitutional:  Negative for appetite change, chills, diaphoresis, fatigue and fever.   HENT: Negative.  Negative for congestion, ear discharge, ear pain, postnasal drip, rhinorrhea, sore throat and trouble swallowing.    Eyes: Negative.  Negative for pain and visual disturbance.   Respiratory: Negative.  Negative for cough and shortness of breath.    Cardiovascular: Negative.  Negative for chest pain.   Gastrointestinal: Negative.  Negative for abdominal pain, blood in stool, constipation, diarrhea, nausea and vomiting.   Genitourinary: Negative.  Negative for decreased urine volume, difficulty urinating, dysuria, flank pain and hematuria.   Musculoskeletal:  Positive for neck pain. Negative for arthralgias and back pain.   Skin:  Positive for wound. Negative for color change and rash.   Neurological: Negative.  Negative for dizziness, syncope, weakness, light-headedness and headaches.           Objective       ED Triage Vitals   Temperature Pulse Blood Pressure Respirations SpO2 Patient Position - Orthostatic VS   01/21/25 1136 01/21/25 1136 01/21/25 1136 01/21/25 1136 01/21/25 1136 01/21/25 1136   (S) (!) 96 °F (35.6 °C) 76 132/60 16 95 % Sitting      Temp Source Heart Rate  Source BP Location FiO2 (%) Pain Score    01/21/25 1136 01/21/25 1136 01/21/25 1136 -- 01/21/25 1800    Axillary Monitor Right arm  No Pain      Vitals      Date and Time Temp Pulse SpO2 Resp BP Pain Score FACES Pain Rating User   01/22/25 1629 97.2 °F (36.2 °C) 80 95 % 18 162/75 -- -- DII   01/22/25 1517 -- 79 98 % -- 114/47 -- -- DII   01/22/25 1511 -- 79 96 % -- 114/47 -- -- DII   01/22/25 1507 -- 82 95 % -- 93/49 -- -- DII   01/22/25 1300 97.6 °F (36.4 °C) 84 96 % -- 144/67 -- -- DII   01/22/25 1258 97.6 °F (36.4 °C) 84 95 % -- 144/67 -- -- DII   01/22/25 1257 -- 83 97 % -- 144/67 -- -- DII   01/22/25 0700 98.1 °F (36.7 °C) 89 95 % 20 159/70 -- -- AL   01/22/25 0216 -- -- -- -- -- 3 -- JOSE MARTIN   01/21/25 2216 97.5 °F (36.4 °C) 73 95 % 18 169/72 -- -- C   01/21/25 2031 97.7 °F (36.5 °C) 84 98 % 18 164/69 -- -- [facility]   01/21/25 2009 -- 84 97 % 12 157/71 -- --    01/21/25 2000 -- 87 93 % 14 160/65 -- --    01/21/25 1800 -- -- -- -- -- No Pain -- MA   01/21/25 1715 -- 87 -- 12 152/67 -- --    01/21/25 1714 -- 86 -- 14 142/64 -- --    01/21/25 1702 97.6 °F (36.4 °C) -- -- -- -- -- --    01/21/25 1700 -- 84 95 % 16 129/58 -- --    01/21/25 1234  95.6 °F (35.3 °C) -- -- -- -- -- -- KD   01/21/25 1136  96 °F (35.6 °C) 76 95 % 16 132/60 -- -- HVL            Physical Exam  Vitals and nursing note reviewed.   Constitutional:       General: He is not in acute distress.     Appearance: Normal appearance. He is normal weight.   HENT:      Head: Normocephalic and atraumatic.      Right Ear: External ear normal.      Left Ear: External ear normal.      Nose: Nose normal.      Mouth/Throat:      Pharynx: Oropharynx is clear.   Eyes:      Extraocular Movements: Extraocular movements intact.      Conjunctiva/sclera: Conjunctivae normal.      Pupils: Pupils are equal, round, and reactive to light.   Neck:      Comments: Cervical spinal collar in place at bedside.  Tenderness to palpation of midline cervical  spine.  Cardiovascular:      Rate and Rhythm: Normal rate and regular rhythm.      Pulses: Normal pulses.      Heart sounds: Normal heart sounds.      Comments: RRR with +S1 and S2, no murmurs appreciated on exam. Peripheral pulses intact.    Pulmonary:      Effort: Pulmonary effort is normal. No respiratory distress.      Breath sounds: Normal breath sounds. No wheezing, rhonchi or rales.      Comments: CTABL with no abnormal lung sounds such as wheezes or rales appreciated on exam.     Abdominal:      General: Abdomen is flat. Bowel sounds are normal. There is no distension.      Palpations: Abdomen is soft.      Tenderness: There is no abdominal tenderness.      Comments: Soft, non tender, normo-active bowel sounds. Without rigidity, guarding, or distension.     Musculoskeletal:         General: Tenderness present. No deformity. Normal range of motion.      Cervical back: Normal range of motion. Tenderness present.      Right lower leg: Edema present.      Left lower leg: Edema present.      Comments: Tenderness to palpation of the left posterior shoulder but with full ROM during active and passive movements.  Severe bilateral lower extremity pitting edema.   Skin:     General: Skin is warm and dry.      Findings: Laceration present.      Comments: Approximately 3 cm laceration along the right eyebrow with minimal bleeding.  Borders are clean with close approximation.   Neurological:      General: No focal deficit present.      Mental Status: He is alert and oriented to person, place, and time. Mental status is at baseline.      Comments: CN grossly intact on visualization. No focal neurologic deficits noted on exam. 5/5 strength in all extremities. Neurovascularly intact with normal sensation and motor function.             Results Reviewed       Procedure Component Value Units Date/Time    Vitamin B12 [[id number]]  (Normal) Collected: 01/22/25 0449    Lab Status: Final result Specimen: Blood from Arm, Right  Updated: 01/22/25 1226     Vitamin B-12 551 pg/mL     Basic metabolic panel [[id number]]  (Abnormal) Collected: 01/22/25 0449    Lab Status: Final result Specimen: Blood from Arm, Right Updated: 01/22/25 0520     Sodium 139 mmol/L      Potassium 4.7 mmol/L      Chloride 106 mmol/L      CO2 27 mmol/L      ANION GAP 6 mmol/L      BUN 32 mg/dL      Creatinine 1.11 mg/dL      Glucose 78 mg/dL      Calcium 9.1 mg/dL      eGFR 61 ml/min/1.73sq m     Narrative:      National Kidney Disease Foundation guidelines for Chronic Kidney Disease (CKD):     Stage 1 with normal or high GFR (GFR > 90 mL/min/1.73 square meters)    Stage 2 Mild CKD (GFR = 60-89 mL/min/1.73 square meters)    Stage 3A Moderate CKD (GFR = 45-59 mL/min/1.73 square meters)    Stage 3B Moderate CKD (GFR = 30-44 mL/min/1.73 square meters)    Stage 4 Severe CKD (GFR = 15-29 mL/min/1.73 square meters)    Stage 5 End Stage CKD (GFR <15 mL/min/1.73 square meters)  Note: GFR calculation is accurate only with a steady state creatinine    CBC [[id number]]  (Abnormal) Collected: 01/22/25 0449    Lab Status: Final result Specimen: Blood from Arm, Right Updated: 01/22/25 0500     WBC 6.42 Thousand/uL      RBC 3.38 Million/uL      Hemoglobin 10.9 g/dL      Hematocrit 33.2 %      MCV 98 fL      MCH 32.2 pg      MCHC 32.8 g/dL      RDW 13.4 %      Platelets 176 Thousands/uL      MPV 9.8 fL     Vitamin D 25 hydroxy [[id number]]  (Abnormal) Collected: 01/21/25 1702    Lab Status: Final result Specimen: Blood from Line, Venous Updated: 01/21/25 2232     Vit D, 25-Hydroxy 26.9 ng/mL     Folate [[id number]]  (Normal) Collected: 01/21/25 1702    Lab Status: Final result Specimen: Blood from Line, Venous Updated: 01/21/25 2232     Folate 15.7 ng/mL     Blood culture #1 [[id number]] Collected: 01/21/25 1452    Lab Status: Preliminary result Specimen: Blood from Arm, Left Updated: 01/21/25 2101     Blood Culture Received in Microbiology Lab. Culture in Progress.    Blood culture #2  [[id number]] Collected: 01/21/25 1230    Lab Status: Preliminary result Specimen: Blood from Arm, Left Updated: 01/21/25 2101     Blood Culture Received in Microbiology Lab. Culture in Progress.    HS Troponin 0hr (reflex protocol) [[id number]]  (Normal) Collected: 01/1941    Lab Status: Final result Specimen: Blood from Line, Venous Updated: 01/21/25 2022     hs TnI 0hr 3 ng/L     Basic metabolic panel [[id number]]  (Abnormal) Collected: 01/21/25 1702    Lab Status: Final result Specimen: Blood from Line, Venous Updated: 01/21/25 1818     Sodium 141 mmol/L      Potassium 4.5 mmol/L      Chloride 108 mmol/L      CO2 26 mmol/L      ANION GAP 7 mmol/L      BUN 32 mg/dL      Creatinine 0.98 mg/dL      Glucose 66 mg/dL      Calcium 8.7 mg/dL      eGFR 71 ml/min/1.73sq m     Narrative:      National Kidney Disease Foundation guidelines for Chronic Kidney Disease (CKD):     Stage 1 with normal or high GFR (GFR > 90 mL/min/1.73 square meters)    Stage 2 Mild CKD (GFR = 60-89 mL/min/1.73 square meters)    Stage 3A Moderate CKD (GFR = 45-59 mL/min/1.73 square meters)    Stage 3B Moderate CKD (GFR = 30-44 mL/min/1.73 square meters)    Stage 4 Severe CKD (GFR = 15-29 mL/min/1.73 square meters)    Stage 5 End Stage CKD (GFR <15 mL/min/1.73 square meters)  Note: GFR calculation is accurate only with a steady state creatinine    TSH, 3rd generation with Free T4 reflex [[id number]]  (Normal) Collected: 01/21/25 1226    Lab Status: Final result Specimen: Blood from Arm, Left Updated: 01/21/25 1721     TSH 3RD GENERATON 2.635 uIU/mL     Procalcitonin [[id number]]  (Normal) Collected: 01/21/25 1226    Lab Status: Final result Specimen: Blood from Arm, Left Updated: 01/21/25 1601     Procalcitonin <0.05 ng/ml     Urine Microscopic [[id number]]  (Normal) Collected: 01/21/25 1512    Lab Status: Final result Specimen: Urine, Clean Catch Updated: 01/21/25 1550     RBC, UA None Seen /hpf      WBC, UA None Seen /hpf      Epithelial Cells  None Seen /hpf      Bacteria, UA None Seen /hpf     Lactic acid, plasma (w/reflex if result > 2.0) [[id number]]  (Normal) Collected: 01/21/25 1452    Lab Status: Final result Specimen: Blood from Arm, Left Updated: 01/21/25 1539     LACTIC ACID 0.5 mmol/L     Narrative:      Result may be elevated if tourniquet was used during collection.    UA w Reflex to Microscopic w Reflex to Culture [[id number]]  (Abnormal) Collected: 01/21/25 1512    Lab Status: Final result Specimen: Urine, Clean Catch Updated: 01/21/25 1539     Color, UA Light Yellow     Clarity, UA Clear     Specific Gravity, UA 1.019     pH, UA 5.5     Leukocytes, UA Negative     Nitrite, UA Negative     Protein, UA Trace mg/dl      Glucose, UA Negative mg/dl      Ketones, UA Negative mg/dl      Urobilinogen, UA <2.0 mg/dl      Bilirubin, UA Negative     Occult Blood, UA Negative    Protime-INR [[id number]]  (Normal) Collected: 01/21/25 1230    Lab Status: Final result Specimen: Blood from Arm, Left Updated: 01/21/25 1524     Protime 14.3 seconds      INR 1.03    Narrative:      INR Therapeutic Range    Indication                                             INR Range      Atrial Fibrillation                                               2.0-3.0  Hypercoagulable State                                    2.0.2.3  Left Ventricular Asist Device                            2.0-3.0  Mechanical Heart Valve                                  -    Aortic(with afib, MI, embolism, HF, LA enlargement,    and/or coagulopathy)                                     2.0-3.0 (2.5-3.5)     Mitral                                                             2.5-3.5  Prosthetic/Bioprosthetic Heart Valve               2.0-3.0  Venous thromboembolism (VTE: VT, PE        2.0-3.0    APTT [[id number]]  (Normal) Collected: 01/21/25 1230    Lab Status: Final result Specimen: Blood from Arm, Left Updated: 01/21/25 1524     PTT 32 seconds     Comprehensive metabolic panel [[id number]]  (Abnormal)  Collected: 01/21/25 1226    Lab Status: Final result Specimen: Blood from Arm, Left Updated: 01/21/25 1347     Sodium 138 mmol/L      Potassium 6.0 mmol/L      Chloride 109 mmol/L      CO2 25 mmol/L      ANION GAP 4 mmol/L      BUN 33 mg/dL      Creatinine 0.98 mg/dL      Glucose 92 mg/dL      Calcium 8.5 mg/dL      AST 28 U/L      ALT 4 U/L      Alkaline Phosphatase 112 U/L      Total Protein 6.1 g/dL      Albumin 3.6 g/dL      Total Bilirubin 0.81 mg/dL      eGFR 71 ml/min/1.73sq m     Narrative:      National Kidney Disease Foundation guidelines for Chronic Kidney Disease (CKD):     Stage 1 with normal or high GFR (GFR > 90 mL/min/1.73 square meters)    Stage 2 Mild CKD (GFR = 60-89 mL/min/1.73 square meters)    Stage 3A Moderate CKD (GFR = 45-59 mL/min/1.73 square meters)    Stage 3B Moderate CKD (GFR = 30-44 mL/min/1.73 square meters)    Stage 4 Severe CKD (GFR = 15-29 mL/min/1.73 square meters)    Stage 5 End Stage CKD (GFR <15 mL/min/1.73 square meters)  Note: GFR calculation is accurate only with a steady state creatinine    FLU/COVID Rapid Antigen (30 min. TAT) - Preferred screening test in ED [[id number]]  (Normal) Collected: 01/21/25 1226    Lab Status: Final result Specimen: Nares from Nose Updated: 01/21/25 1304     SARS COV Rapid Antigen Negative     Influenza A Rapid Antigen Negative     Influenza B Rapid Antigen Negative    Narrative:      This test has been performed using the Quidel Ludmila 2 FLU+SARS Antigen test under the Emergency Use Authorization (EUA). This test has been validated by the  and verified by the performing laboratory. The Ludmila uses lateral flow immunofluorescent sandwich assay to detect SARS-COV, Influenza A and Influenza B Antigen.     The Quidel Ludmila 2 SARS Antigen test does not differentiate between SARS-CoV and SARS-CoV-2.     Negative results are presumptive and may be confirmed with a molecular assay, if necessary, for patient management. Negative results do not  rule out SARS-CoV-2 or influenza infection and should not be used as the sole basis for treatment or patient management decisions. A negative test result may occur if the level of antigen in a sample is below the limit of detection of this test.     Positive results are indicative of the presence of viral antigens, but do not rule out bacterial infection or co-infection with other viruses.     All test results should be used as an adjunct to clinical observations and other information available to the provider.    FOR PEDIATRIC PATIENTS - copy/paste COVID Guidelines URL to browser: [email or web address]    CBC and differential [[id number]]  (Abnormal) Collected: 01/21/25 1226    Lab Status: Final result Specimen: Blood from Arm, Left Updated: 01/21/25 1255     WBC 5.35 Thousand/uL      RBC 3.07 Million/uL      Hemoglobin 9.9 g/dL      Hematocrit 30.6 %       fL      MCH 32.2 pg      MCHC 32.4 g/dL      RDW 13.5 %      MPV 10.3 fL      Platelets 174 Thousands/uL      nRBC 0 /100 WBCs      Segmented % 74 %      Immature Grans % 0 %      Lymphocytes % 16 %      Monocytes % 8 %      Eosinophils Relative 2 %      Basophils Relative 0 %      Absolute Neutrophils 3.89 Thousands/µL      Absolute Immature Grans 0.01 Thousand/uL      Absolute Lymphocytes 0.87 Thousands/µL      Absolute Monocytes 0.45 Thousand/µL      Eosinophils Absolute 0.12 Thousand/µL      Basophils Absolute 0.01 Thousands/µL             XR shoulder 2+ views LEFT   Final Interpretation by Arnoldo Monroy MD (01/21 1427)      No acute osseous abnormality.      Rotator cuff calcific tendinitis.         Computerized Assisted Algorithm (CAA) may have been used to analyze all applicable images.         Workstation performed: [id number]         XR chest 1 view portable   Final Interpretation by Kylah Garcia MD (01/21 1411)      No acute cardiopulmonary disease.      No acute displaced  fractures. 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      Workstation performed: [id number]         CT head without contrast   Final Interpretation by Refugio Lagos MD (01/21 1315)      No acute intracranial abnormality.                  Workstation performed: [id number]         CT cervical spine without contrast   Final Interpretation by Refugio Lagos MD (01/21 1321)      No cervical spine fracture or traumatic malalignment.                  Workstation performed: [id number]             Universal Protocol:  Consent: Verbal consent obtained.  Risks and benefits: risks, benefits and alternatives were discussed  Consent given by: patient  Patient understanding: patient states understanding of the procedure being performed  Patient consent: the patient's understanding of the procedure matches consent given  Procedure consent: procedure consent matches procedure scheduled  Radiology Images displayed and confirmed. If images not available, report reviewed: imaging studies available  Required items: required blood products, implants, devices, and special equipment available  Patient identity confirmed: verbally with patient, arm band, provided demographic data and hospital-assigned identification number  Laceration repair    Date/Time: 1/21/2025 4:12 PM    Performed by: Tomy Rondon MD  Authorized by: Tomy Rondon MD  Body area: head/neck  Location details: right eyebrow  Laceration length: 3 cm  Foreign bodies: no foreign bodies  Tendon involvement: none  Nerve involvement: none  Vascular damage: no  Anesthesia: local infiltration    Anesthesia:  Local Anesthetic: LET (lido, epi, tetracaine)  Anesthetic total: 3 mL      Procedure Details:  Irrigation solution: saline  Irrigation method: syringe  Amount of cleaning: standard  Wound subcutaneous closure material used: 5-0 Fast absorbing plain gut.  Number of sutures: 6  Technique: simple  Approximation: close  Approximation  difficulty: simple  Dressing: Bandage.  Patient tolerance: patient tolerated the procedure well with no immediate complications      ECG 12 Lead Documentation Only    Date/Time: 1/21/2025 11:52 AM    Performed by: Tomy Rondon MD  Authorized by: Tmoy Rondon MD    ECG reviewed by me, the ED Provider: yes    Patient location:  ED  Previous ECG:     Previous ECG:  Compared to current    Similarity:  Changes noted  Interpretation:     Interpretation: abnormal    Rate:     ECG rate:  80    ECG rate assessment: normal    Rhythm:     Rhythm: sinus rhythm    Ectopy:     Ectopy: PVCs    QRS:     QRS axis:  Normal    QRS intervals:  Normal  Conduction:     Conduction: normal    ST segments:     ST segments:  Normal  T waves:     T waves: normal        ED Medication and Procedure Management   Prior to Admission Medications   Prescriptions Last Dose Informant Patient Reported? Taking?   Vitamin D, Cholecalciferol, 25 MCG (1000 UT) TABS Past Week  Yes Yes   Sig: Take 1 tablet by mouth 3 (three) times a week   acetaminophen (TYLENOL) 325 mg tablet Past Month  No Yes   Sig: Take 3 tablets (975 mg total) by mouth every 8 (eight) hours   Patient taking differently: Take 975 mg by mouth every 6 (six) hours as needed for moderate pain   amLODIPine (NORVASC) 5 mg tablet 1/20/2025 at  6:00 PM Spouse/Significant Other Yes Yes   Sig: Take 5 mg by mouth daily   carbidopa-levodopa (Sinemet)  mg per tablet 1/21/2025 Noon  No Yes   Sig: Take 2.5 tablets by mouth 3 (three) times a day   carboxymethylcellulose (Refresh Tears) 0.5 % SOLN Past Week  Yes Yes   Sig: Administer 1 drop to both eyes daily.   furosemide (LASIX) 20 mg tablet Past Week  Yes Yes   Sig: Take 20 mg by mouth 3 (three) times a week   losartan (COZAAR) 50 mg tablet 1/21/2025 Morning  Yes Yes   Sig: Take 50 mg by mouth daily   senna-docusate sodium (SENOKOT S) 8.6-50 mg per tablet Past Month  No Yes   Sig: Take 1 tablet by mouth 2 (two) times a day   vitamin B-12 (VITAMIN  B-12) 1,000 mcg tablet Past Week  Yes Yes   Sig: Take 1,000 mcg by mouth 3 (three) times a week      Facility-Administered Medications: None     Current Discharge Medication List        CONTINUE these medications which have NOT CHANGED    Details   acetaminophen (TYLENOL) 325 mg tablet Take 3 tablets (975 mg total) by mouth every 8 (eight) hours  Qty: 30 tablet, Refills: 0    Associated Diagnoses: Closed fracture of nasal bone, initial encounter      amLODIPine (NORVASC) 5 mg tablet Take 5 mg by mouth daily      carbidopa-levodopa (Sinemet)  mg per tablet Take 2.5 tablets by mouth 3 (three) times a day  Qty: 540 tablet, Refills: 3    Associated Diagnoses: Parkinson disease (HCC)      carboxymethylcellulose (Refresh Tears) 0.5 % SOLN Administer 1 drop to both eyes daily.      furosemide (LASIX) 20 mg tablet Take 20 mg by mouth 3 (three) times a week      losartan (COZAAR) 50 mg tablet Take 50 mg by mouth daily      senna-docusate sodium (SENOKOT S) 8.6-50 mg per tablet Take 1 tablet by mouth 2 (two) times a day  Qty: 10 tablet, Refills: 0    Associated Diagnoses: Closed fracture of nasal bone, initial encounter      vitamin B-12 (VITAMIN B-12) 1,000 mcg tablet Take 1,000 mcg by mouth 3 (three) times a week      Vitamin D, Cholecalciferol, 25 MCG (1000 UT) TABS Take 1 tablet by mouth 3 (three) times a week           No discharge procedures on file.  ED SEPSIS DOCUMENTATION   Time reflects when diagnosis was documented in both MDM as applicable and the Disposition within this note       Time User Action Codes Description Comment    1/21/2025  4:27 PM Jefferson, Tomy Add [S09.90XA] Closed head injury, initial encounter     1/21/2025  4:27 PM Jefferson, Tomy Add [R41.0] Confusion     1/21/2025  4:27 PM Jefferson, Tomy Add [E87.5] Hyperkalemia     1/21/2025  4:27 PM Jefferson, Tomy Add [T68.XXXA] Hypothermia, initial encounter     1/21/2025  4:27 PM Jefferson, Tomy Modify [S09.90XA] Closed head injury, initial encounter     1/21/2025   4:27 PM Tomy Rondon Modify [E87.5] Hyperkalemia     1/21/2025  4:27 PM Tomy Rondon Add [S01.111A] Laceration of right eyebrow, initial encounter     1/21/2025  4:41 PM Symone Kelly Add [R41.82] Altered mental status, unspecified altered mental status type                  Tomy Rondon MD  01/22/25 [id number]

## 2025-01-21 NOTE — ASSESSMENT & PLAN NOTE
"Patient's wife endorsing recent abnormal behaviors.  Patient waking frequently at night, hallucinations noted when awaking from sleep, patient noted to be more \"nasty\" and agitated with at times garbled speech which clears.  Presents to ED after fall at home with concern for possible UTI. S/p head strike in ED w/ R eyebrow laceration.  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 with mild acute encephalopathy  CT head w/o acute findings  TSH benign   Check folate, B12, vitamin D levels  Wife notes B12/vitamin D supplementation 3 times weekly PTA  Change to q daily dosing   Negative infxn work-up (as below)   Consult geriatrics  Recommend outpatient neurology follow-up  Clinically appears dry; provide gentle IV fluids  "

## 2025-01-21 NOTE — ED ATTENDING ATTESTATION
1/21/2025  I, Deepali Yadav MD, saw and evaluated the patient. I have discussed the patient with the resident/non-physician practitioner and agree with the resident's/non-physician practitioner's findings, Plan of Care, and MDM as documented in the resident's/non-physician practitioner's note, except where noted. All available labs and Radiology studies were reviewed.  I was present for key portions of any procedure(s) performed by the resident/non-physician practitioner and I was immediately available to provide assistance.       At this point I agree with the current assessment done in the Emergency Department.  I have conducted an independent evaluation of this patient a history and physical is as follows:    Patient is an 84-year-old male presents to the emergency department from home with increased confusion over the last few days.  He has felt weaker than typical and his urine has had a strong odor.  He was too weak to participate in physical therapy yesterday and upon contacting physician's office today advised to come in for evaluation.  He fell upon getting out of car striking the right side of his head.  At time of my assessment he reports feeling okay without any discomfort.    Appetite has been unchanged/already.  No nausea or vomiting.  + cough, increased from baseline w/ clear sputum. No headache, chest pain or dyspnea.  He does have Parkinson's.  No history of UTI    On exam he is alert with moist mucous membranes.  Bandage in place over right forehead laceration.  Occasional wet sounding cough.  Heart sounds regular.  Requires assistance with repositioning/sitting forward.  Lungs clear.  No abdominal tenderness.    Differential diagnosis includes but is not limited to UTI, pneumonia, sepsis, hypothyroidism, BONIFACIO, progression of Parkinson's, ACS, injury from fall including laceration, possible ICH or neck injury.    ED Course  ED Course as of 01/22/25 0257   Tue Jan 21, 2025   [id number]  Patient identified to have hyperkalemia.  No hemolysis appreciated.  EKG does not reveal any peaked T's/changes consistent with this.  Medications to follow.         Critical Care Time  Procedures

## 2025-01-21 NOTE — ASSESSMENT & PLAN NOTE
Known to [facility]   Sinemet recently increased to 2 tabs TID in 10/2024  Likely contributing to altered mentation

## 2025-01-21 NOTE — H&P
"H&P - Hospitalist   Name: Daniel Sanz Jr. 84 y.o. male I MRN: [id number]  Unit/Bed#: ED-19 I Date of Admission: 1/21/2025   Date of Service: 1/21/2025 I Hospital Day: 0     Assessment & Plan  Altered mental status  Patient's wife endorsing recent abnormal behaviors.  Patient waking frequently at night, hallucinations noted when awaking from sleep, patient noted to be more \"nasty\" and agitated with at times garbled speech which clears.  Presents to ED after fall at home with concern for possible UTI. S/p head strike in ED w/ R eyebrow laceration.  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 with mild acute encephalopathy  CT head w/o acute findings  TSH benign   Check folate, B12, vitamin D levels  Wife notes B12/vitamin D supplementation 3 times weekly PTA  Change to q daily dosing   Negative infxn work-up (as below)   Consult geriatrics  Recommend outpatient neurology follow-up  Clinically appears dry; provide gentle IV fluids  Fall  S/p head strike in ED w/ R eyebrow laceration; unclear if mechanical versus orthostatic hypotensive episode  CT head/neck and left shoulder benign  Laceration cleaned with dressing placed; no sutures placed  F/u orthostatic vital signs  For completeness, request troponin; ECG without ischemia  Hypothermia  POA, temp 95.6-96  Received air hunger in the ED with resolution of hypothermia; patient grossly asymptomatic; temp stable off Keith hugger  No other SIRS criteria  Follow-up blood cultures  UA bland; LA & procal WNL; no leukocytosis or bandemia; flu/COVID/RSV negative  Etiology likely autonomic dysregulation in setting of Parkinson's disease  Hyperkalemia  Recent Labs     01/21/25  1226   K 6.0*     S/p albuterol, insulin, amp of d50, Lasix 40 mg in ED  Repeat BMP pending  Etiology unclear  Hold ARB  Parkinson's disease (HCC)  Known to [facility]   Sinemet recently increased to 2 tabs TID in 10/2024  Likely contributing to altered mentation  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  Code Status: FULL CODE  Discussion with family: Updated  (wife) via phone.    Anticipated Length of Stay: Patient will be admitted on an inpatient basis with an anticipated length of stay of greater than 2 midnights secondary to encephalopathy.    History of Present Illness   Chief Complaint: Encephalopathy    Daniel Sanz Jr. is a 84 y.o. male with a PMH of PAD, HTN, who presents with altered mentation.  Wife notes patient's mood has been somewhat unstable for approximately a couple weeks.  Patient is noting agitation intermittently specifically at night and upon awakening from naps.  Wife endorses hallucinations upon patient awakening.  Patient is normally cooperative and follows directions at home.  Upon ambulation into the ED patient fell and struck his head sustaining a right above eyebrow laceration which was cleaned with a dressing.  No sutures placed in the ED.  Has no specific complaints.  Patient was found to be mildly hypothermic in ED.  At time of admission patient's temperature has corrected/normalized following Keith hugger therapy.  Temp stable off Keith hugger.    Review of Systems   Constitutional:  Negative for activity change, appetite change, chills and fatigue.   HENT:  Negative for congestion.    Respiratory:  Negative for cough, shortness of breath and wheezing.    Cardiovascular:  Negative for chest pain, palpitations and leg swelling.   Gastrointestinal:  Negative for abdominal distention, abdominal pain, diarrhea, nausea and vomiting.   Genitourinary:  Negative for difficulty urinating and dysuria.    Musculoskeletal:  Negative for arthralgias and back pain.   Neurological:  Positive for speech difficulty. Negative for dizziness, syncope and light-headedness.   Psychiatric/Behavioral:  Positive for agitation.        Historical Information   Past Medical History:   Diagnosis Date    Arthritis     Asbestos exposure     Asbestosis (HCC)     GERD (gastroesophageal reflux disease)     Hemoptysis 5/11/2022    Hypertension     Lung disease     Parkinson's disease (HCC)      Past Surgical History:   Procedure Laterality Date    HERNIA REPAIR      INTRACAPSULAR CATARACT EXTRACTION Left     KNEE SURGERY      many years ago    SHOULDER SURGERY       Social History     Tobacco Use    Smoking status: Never     Passive exposure: Past    Smokeless tobacco: Former     Types: Chew     Quit date: 1998   Vaping Use    Vaping status: Never Used   Substance and Sexual Activity    Alcohol use: Not Currently    Drug use: Never    Sexual activity: Not on file     E-Cigarette/Vaping    E-Cigarette Use Never User      E-Cigarette/Vaping Substances    Nicotine No     THC No     CBD No     Flavoring No     Other No     Unknown No      Family history non-contributory  Social History:  Marital Status: /Civil Union   Occupation: Retired  Patient Pre-hospital Living Situation: Lives at home with wife  Patient Pre-hospital Level of Mobility: walks  Patient Pre-hospital Diet Restrictions: None    Meds/Allergies   Medications reviewed with wife  Prior to Admission medications    Medication Sig Start Date End Date Taking? Authorizing Provider   acetaminophen (TYLENOL) 325 mg tablet Take 3 tablets (975 mg total) by mouth every 8 (eight) hours 6/14/24   MITESH Boyd   amLODIPine (NORVASC) 5 mg tablet Take 5 mg by mouth daily 3/7/24 3/7/25  Historical Provider, MD   carbidopa-levodopa (Sinemet)  mg per tablet Take 2.5 tablets by mouth 3 (three) times a day 10/28/24   Jovanna Cavanaugh MD   carboxymethylcellulose (Refresh Tears)  0.5 % SOLN Administer 1 drop to both eyes daily.    Historical Provider, MD   furosemide (LASIX) 20 mg tablet Take 20 mg by mouth 3 (three) times a week    Historical Provider, MD   ketorolac (ACULAR) 0.5 % ophthalmic solution  9/15/23   Historical Provider, MD   lidocaine (LIDODERM) 5 % Apply 1 patch topically over 12 hours daily Remove & Discard patch within 12 hours or as directed by MD  Patient not taking: Reported on 12/15/2024 6/15/24   MITESH Boyd   losartan (COZAAR) 50 mg tablet Take 50 mg by mouth daily    Historical Provider, MD   Mirabegron ER 25 MG TB24 Take 25 mg by mouth daily  Patient not taking: Reported on 10/24/2024 5/13/24   Historical Provider, MD   prednisoLONE acetate (PRED FORTE) 1 % ophthalmic suspension  9/15/23   Historical Provider, MD   predniSONE 10 mg tablet Take 10 mg by mouth 2 (two) times a day  Patient not taking: Reported on 10/24/2024 8/26/23   Historical Provider, MD   senna-docusate sodium (SENOKOT S) 8.6-50 mg per tablet Take 1 tablet by mouth 2 (two) times a day  Patient not taking: Reported on 10/24/2024 6/14/24   MITESH Boyd     Allergies   Allergen Reactions    Morphine And Codeine Other (See Comments)     Patient gets delirious       Objective :  Temp:  [95.6 °F (35.3 °C)-97.6 °F (36.4 °C)] 97.6 °F (36.4 °C)  HR:  [76-87] 87  BP: (129-152)/(58-67) 152/67  Resp:  [12-16] 12  SpO2:  [95 %] 95 %  O2 Device: None (Room air)    Physical Exam  Constitutional:       Comments: Pleasant, elderly, frail   HENT:      Head: Normocephalic.      Right Ear: External ear normal.      Left Ear: External ear normal.      Nose: Nose normal.      Mouth/Throat:      Mouth: Mucous membranes are dry.      Pharynx: Oropharynx is clear.   Eyes:      Extraocular Movements: Extraocular movements intact.      Conjunctiva/sclera: Conjunctivae normal.      Pupils: Pupils are equal, round, and reactive to light.   Cardiovascular:      Rate and Rhythm: Normal rate and regular rhythm.       Pulses: Normal pulses.      Heart sounds: Normal heart sounds.   Pulmonary:      Effort: Pulmonary effort is normal.      Breath sounds: Normal breath sounds.   Abdominal:      General: Abdomen is flat. Bowel sounds are normal. There is no distension.      Palpations: Abdomen is soft.      Tenderness: There is no abdominal tenderness. There is no guarding.   Musculoskeletal:         General: No swelling. Normal range of motion.      Cervical back: Normal range of motion.   Skin:     General: Skin is warm and dry.      Coloration: Skin is not jaundiced.      Findings: No bruising or lesion.      Comments: Chronic venous stasis ulcerations on right lower extremity; no active infection; U-shaped laceration above right eyebrow   Neurological:      Mental Status: He is alert.      Cranial Nerves: No cranial nerve deficit.      Motor: No weakness.      Comments: No specific neurofocal deficits, chronic resting tremor worsened with, cogwheel rigidity present, patient is aware of location, date including month and year, and who her current president is   Psychiatric:         Mood and Affect: Mood normal.         Behavior: Behavior normal.              Lab Results: I have reviewed the following results:  Results from last 7 days   Lab Units 01/21/25  1226   WBC Thousand/uL 5.35   HEMOGLOBIN g/dL 9.9*   HEMATOCRIT % 30.6*   PLATELETS Thousands/uL 174   SEGS PCT % 74   LYMPHO PCT % 16   MONO PCT % 8   EOS PCT % 2     Results from last 7 days   Lab Units 01/21/25  1226   SODIUM mmol/L 138   POTASSIUM mmol/L 6.0*   CHLORIDE mmol/L 109*   CO2 mmol/L 25   BUN mg/dL 33*   CREATININE mg/dL 0.98   ANION GAP mmol/L 4   CALCIUM mg/dL 8.5   ALBUMIN g/dL 3.6   TOTAL BILIRUBIN mg/dL 0.81   ALK PHOS U/L 112*   ALT U/L 4*   AST U/L 28   GLUCOSE RANDOM mg/dL 92     Results from last 7 days   Lab Units 01/21/25  1230   INR  1.03         Lab Results   Component Value Date    HGBA1C 5.1 03/23/2022    HGBA1C 5.2 09/07/2021     Results from  last 7 days   Lab Units 01/21/25  1452 01/21/25  1226   LACTIC ACID mmol/L 0.5  --    PROCALCITONIN ng/ml  --  <0.05       Imaging Results Review: I reviewed radiology reports from this admission including: CT head and CT C-spine.  Other Study Results Review: EKG was reviewed.     Administrative Statements   I have spent a total time of 60 minutes in caring for this patient on the day of the visit/encounter including Diagnostic results, Prognosis, Risk factor reductions, Impressions, Counseling / Coordination of care, Documenting in the medical record, Reviewing / ordering tests, medicine, procedures  , Obtaining or reviewing history  , and Communicating with other healthcare professionals .    ** Please Note: This note has been constructed using a voice recognition system. **

## 2025-01-21 NOTE — ASSESSMENT & PLAN NOTE
S/p head strike in ED w/ R eyebrow laceration; unclear if mechanical versus orthostatic hypotensive episode  CT head/neck and left shoulder benign  Laceration cleaned with dressing placed; no sutures placed  F/u orthostatic vital signs  For completeness, request troponin; ECG without ischemia

## 2025-01-21 NOTE — TELEPHONE ENCOUNTER
Pt's wife, Sheng called back and stated that she will be taking pt to the ED for an evaluation. Both his PCP and physical therapist recommended this.

## 2025-01-21 NOTE — ASSESSMENT & PLAN NOTE
POA, temp 95.6-96  Received air hunger in the ED with resolution of hypothermia; patient grossly asymptomatic; temp stable off Keith hugger  No other SIRS criteria  Follow-up blood cultures  UA bland; LA & procal WNL; no leukocytosis or bandemia; flu/COVID/RSV negative  Etiology likely autonomic dysregulation in setting of Parkinson's disease

## 2025-01-21 NOTE — ASSESSMENT & PLAN NOTE
Chronic right lower extremity venous ulcers   PTA Lasix for LE edema management   Known to [facility]

## 2025-01-22 PROBLEM — K59.00 CONSTIPATION: Status: ACTIVE | Noted: 2025-01-22

## 2025-01-22 PROBLEM — F03.90 DEMENTIA (HCC): Status: ACTIVE | Noted: 2025-01-22

## 2025-01-22 LAB
4HR DELTA HS TROPONIN: 1 NG/L
ANION GAP SERPL CALCULATED.3IONS-SCNC: 6 MMOL/L (ref 4–13)
BUN SERPL-MCNC: 32 MG/DL (ref 5–25)
CALCIUM SERPL-MCNC: 9.1 MG/DL (ref 8.4–10.2)
CARDIAC TROPONIN I PNL SERPL HS: 4 NG/L (ref ?–50)
CHLORIDE SERPL-SCNC: 106 MMOL/L (ref 96–108)
CO2 SERPL-SCNC: 27 MMOL/L (ref 21–32)
CREAT SERPL-MCNC: 1.11 MG/DL (ref 0.6–1.3)
ERYTHROCYTE [DISTWIDTH] IN BLOOD BY AUTOMATED COUNT: 13.4 % (ref 11.6–15.1)
GFR SERPL CREATININE-BSD FRML MDRD: 61 ML/MIN/1.73SQ M
GLUCOSE SERPL-MCNC: 78 MG/DL (ref 65–140)
HCT VFR BLD AUTO: 33.2 % (ref 36.5–49.3)
HGB BLD-MCNC: 10.9 G/DL (ref 12–17)
MCH RBC QN AUTO: 32.2 PG (ref 26.8–34.3)
MCHC RBC AUTO-ENTMCNC: 32.8 G/DL (ref 31.4–37.4)
MCV RBC AUTO: 98 FL (ref 82–98)
PLATELET # BLD AUTO: 176 THOUSANDS/UL (ref 149–390)
PMV BLD AUTO: 9.8 FL (ref 8.9–12.7)
POTASSIUM SERPL-SCNC: 4.7 MMOL/L (ref 3.5–5.3)
RBC # BLD AUTO: 3.38 MILLION/UL (ref 3.88–5.62)
SODIUM SERPL-SCNC: 139 MMOL/L (ref 135–147)
VIT B12 SERPL-MCNC: 551 PG/ML (ref 180–914)
WBC # BLD AUTO: 6.42 THOUSAND/UL (ref 4.31–10.16)

## 2025-01-22 PROCEDURE — 82607 VITAMIN B-12: CPT | Performed by: PHYSICIAN ASSISTANT

## 2025-01-22 PROCEDURE — 97167 OT EVAL HIGH COMPLEX 60 MIN: CPT

## 2025-01-22 PROCEDURE — 97163 PT EVAL HIGH COMPLEX 45 MIN: CPT

## 2025-01-22 PROCEDURE — 99232 SBSQ HOSP IP/OBS MODERATE 35: CPT

## 2025-01-22 PROCEDURE — 85027 COMPLETE CBC AUTOMATED: CPT | Performed by: PHYSICIAN ASSISTANT

## 2025-01-22 PROCEDURE — 99223 1ST HOSP IP/OBS HIGH 75: CPT | Performed by: FAMILY MEDICINE

## 2025-01-22 PROCEDURE — 84484 ASSAY OF TROPONIN QUANT: CPT | Performed by: PHYSICIAN ASSISTANT

## 2025-01-22 PROCEDURE — 97535 SELF CARE MNGMENT TRAINING: CPT

## 2025-01-22 PROCEDURE — 80048 BASIC METABOLIC PNL TOTAL CA: CPT | Performed by: PHYSICIAN ASSISTANT

## 2025-01-22 RX ORDER — DOCUSATE SODIUM 100 MG/1
100 CAPSULE, LIQUID FILLED ORAL 2 TIMES DAILY
Status: DISCONTINUED | OUTPATIENT
Start: 2025-01-22 | End: 2025-01-31 | Stop reason: HOSPADM

## 2025-01-22 RX ORDER — SENNOSIDES 8.6 MG
2 TABLET ORAL 2 TIMES DAILY
Status: DISCONTINUED | OUTPATIENT
Start: 2025-01-22 | End: 2025-01-31 | Stop reason: HOSPADM

## 2025-01-22 RX ORDER — ACETAMINOPHEN 325 MG/1
650 TABLET ORAL 3 TIMES DAILY
Status: DISCONTINUED | OUTPATIENT
Start: 2025-01-22 | End: 2025-01-31 | Stop reason: HOSPADM

## 2025-01-22 RX ORDER — SENNOSIDES 8.6 MG
1 TABLET ORAL
Status: DISCONTINUED | OUTPATIENT
Start: 2025-01-22 | End: 2025-01-22

## 2025-01-22 RX ORDER — QUETIAPINE FUMARATE 25 MG/1
12.5 TABLET, FILM COATED ORAL EVERY 8 HOURS PRN
Status: DISCONTINUED | OUTPATIENT
Start: 2025-01-22 | End: 2025-01-31 | Stop reason: HOSPADM

## 2025-01-22 RX ORDER — POLYETHYLENE GLYCOL 3350 17 G/17G
17 POWDER, FOR SOLUTION ORAL DAILY
Status: DISCONTINUED | OUTPATIENT
Start: 2025-01-22 | End: 2025-01-23

## 2025-01-22 RX ORDER — POLYETHYLENE GLYCOL 3350 17 G/17G
17 POWDER, FOR SOLUTION ORAL DAILY PRN
Status: DISCONTINUED | OUTPATIENT
Start: 2025-01-22 | End: 2025-01-22

## 2025-01-22 RX ORDER — BISACODYL 10 MG
10 SUPPOSITORY, RECTAL RECTAL DAILY PRN
Status: DISCONTINUED | OUTPATIENT
Start: 2025-01-22 | End: 2025-01-23

## 2025-01-22 RX ADMIN — ENOXAPARIN SODIUM 40 MG: 40 INJECTION SUBCUTANEOUS at 11:15

## 2025-01-22 RX ADMIN — CARBIDOPA AND LEVODOPA 2.5 TABLET: 25; 100 TABLET ORAL at 17:52

## 2025-01-22 RX ADMIN — CARBIDOPA AND LEVODOPA 2.5 TABLET: 25; 100 TABLET ORAL at 11:15

## 2025-01-22 RX ADMIN — DOCUSATE SODIUM 100 MG: 100 CAPSULE, LIQUID FILLED ORAL at 17:52

## 2025-01-22 RX ADMIN — POLYETHYLENE GLYCOL 3350 17 G: 17 POWDER, FOR SOLUTION ORAL at 11:15

## 2025-01-22 RX ADMIN — AMLODIPINE BESYLATE 5 MG: 5 TABLET ORAL at 11:15

## 2025-01-22 RX ADMIN — CARBIDOPA AND LEVODOPA 2.5 TABLET: 25; 100 TABLET ORAL at 20:02

## 2025-01-22 RX ADMIN — ACETAMINOPHEN 650 MG: 325 TABLET, FILM COATED ORAL at 17:52

## 2025-01-22 RX ADMIN — DOCUSATE SODIUM 100 MG: 100 CAPSULE, LIQUID FILLED ORAL at 11:15

## 2025-01-22 RX ADMIN — Medication 1000 UNITS: at 11:15

## 2025-01-22 RX ADMIN — QUETIAPINE FUMARATE 12.5 MG: 25 TABLET ORAL at 20:02

## 2025-01-22 RX ADMIN — ACETAMINOPHEN 650 MG: 325 TABLET, FILM COATED ORAL at 20:02

## 2025-01-22 RX ADMIN — MELATONIN 3 MG: 3 TAB ORAL at 20:02

## 2025-01-22 RX ADMIN — CYANOCOBALAMIN TAB 500 MCG 1000 MCG: 500 TAB at 11:15

## 2025-01-22 RX ADMIN — SENNOSIDES 17.2 MG: 8.6 TABLET ORAL at 17:53

## 2025-01-22 NOTE — ASSESSMENT & PLAN NOTE
"Patient's wife endorsing recent abnormal behaviors.  Patient waking frequently at night, hallucinations noted when awaking from sleep, patient noted to be more \"nasty\" and agitated with at times garbled speech which clears.  Presents to ED after fall at home with concern for possible UTI. S/p head strike in ED w/ R eyebrow laceration.  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

## 2025-01-22 NOTE — CONSULTS
Consultation - Geriatric Medicine   Daniel Sanz Jr. 84 y.o. male MRN: [id number]  Unit/Bed#: S -01 Encounter: [id number]      Assessment & Plan     Constipation  Assessment & Plan  Patient unable to state when last BM was  Wife doesn't know when last BM was  No BM documented  Increased senna to 2 tab BID  Ordered bladder scan x 1    Dementia (HCC)  Assessment & Plan  Patient has history of Parkinson's  Wife describes sundowning.   Patient scored 0/5 on mini cog today  Patient AAO x 1 currently  CT head (1/21/25) showed moderate microangiopathic changes  Pt. Is AAOx2 at baseline, he needs assistance with all iADL's at baseline  TSH 2.635 (1/21/25)  B12 551 (1/21/25)  [place] to person, place, time, and situation as needed  Monitor for behaviors   Monitor for mental status change  Provide supportive care  Consider Seroquel 12.5mg Q8H prn for agitation and hallucinations, monitor QTc    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 ([facility], located within [facility])  Assessment & Plan  Chronic right lower extremity venous ulcers  Wound care following    Fall  Assessment & Plan  Patient has history of multiple falls in the past 6 months  Patient uses walker or cane for ambulation at baseline  Will continue PT/OT.  Monitor orthostatic vitals  Avoid hypotension and hypoglycemia  Fall precautions     Parkinson's disease (HCC)  Assessment & Plan  - Patient on Sinemet  - Monitor orthostatic vitals  - Ambulate patient    * Altered mental status  Assessment & Plan  -Patient is high risk of delirium due to hospitalization, dementia, recent fall, and metabolic imbalances.   -Initiate delirium precautions  -maintain normal sleep/wake cycle  -minimize overnight interruptions, group overnight vitals/labs/nursing checks as possible  -dim lights, close blinds and turn off tv to minimize stimulation and encourage sleep environment in evenings  -ensure that pain is well controlled consider Tylenol 975mg Q8H scheduled   -monitor for fecal  and urinary retention which may precipitate delirium  -encourage early mobilization and ambulation  -provide frequent reorientation and redirection  -encourage family and friends at the bedside to help calm patient if anxious  -avoid medications which may precipitate or worsen delirium such as tramadol, benzodiazepine, anticholinergics, and antihistaminics  -encourage hydration and nutrition , assist with feeding if needed  -redirect unwanted behaviors as first line, avoid physical restraints.            History of Present Illness   Physician Requesting Consult: Mable Yu DO  Reason for Consult / Principal Problem: Altered Mental Status  Hx and PE limited by: Dementia  Additional history obtained from: Wife and niece      HPI: Daniel Sanz Jr. is a 84 y.o. year old male who presents sitting up in bed comfortably.  Patient stated he is not in any pain.  Patient stated he slept well last night.  He stated he woke up to use the bathroom but was able to fall back asleep.  Patient was oriented to person.    Per wife and niece: Patient lives at home in a [place]-story [place] with his wife.  There are stairs to the basement which the patient uses.  He is able to go up and down stairs with handrails.  Wife stated OT visits twice a week and she takes the patient to PT 3 times a week.  Patient uses a walker and occasionally uses a cane.  Wife reports multiple falls within the last 6 months.  Niece states that the patient has good days and bad days.  Wife states in the evening the patient may start to get agitated and start hallucinating talking to people who are not there.  Most recently wife stated the patient is trying to fix the neighbor's car.  Wife states the patient has wandered off once before.  Wife denies patient getting physically or verbally aggressive.  Wife states the patient does not have a license and does not drive, but will give directions on how to get somewhere.  Patient sometimes will be confused.   Patient is typically oriented to person and place when he is at home at baseline.  He is able to recognize his wife and niece.  Wife stated that there was an incident recently when the patient needed to go to the bathroom but did not know where it was in his own home.  Patient wears reading glasses.  Patient does not wear hearing aids but does have hearing impairment.  Patient does not wear dentures.  Patient showers himself, but does need help with drying off due to past rotator cuff injuries.  Wife states patient is incontinent of bladder at baseline and continent of bowels.  Wife reports that there are grab bars located in the bathroom.  Patient does need some assistance with dressing.  Patient can shave himself.  Patient helps wash dishes.  Wife cooks and cleans and manages the finances.  Patient has a good support system of family who lives near by.  Wife stated patient was having some shoulder pain earlier and mentioned previously using Voltaren gel.    Per nurse: Patient doing well.  Family upset during why patient was made NPO.  Nurse stated she explained that speech was coming to do a swallow evaluation.    Inpatient consult to Gerontology  Consult performed by: Angelita Agosto MD  Consult ordered by: Symone Kelly PA-C          Review of Systems   Unable to perform ROS: Dementia   Respiratory:  Negative for cough and shortness of breath.    Cardiovascular:  Negative for chest pain.   Gastrointestinal:  Negative for abdominal pain.           Historical Information   Past Medical History:   Diagnosis Date    Arthritis     Asbestos exposure     Asbestosis (HCC)     GERD (gastroesophageal reflux disease)     Hemoptysis 5/11/2022    Hypertension     Lung disease     Parkinson's disease (HCC)      Past Surgical History:   Procedure Laterality Date    HERNIA REPAIR      INTRACAPSULAR CATARACT EXTRACTION Left     KNEE SURGERY      many years ago    SHOULDER SURGERY       Social History   Social History     Substance  and Sexual Activity   Alcohol Use Not Currently     Social History     Substance and Sexual Activity   Drug Use Never     Social History     Tobacco Use   Smoking Status Never    Passive exposure: Past   Smokeless Tobacco Former    Types: Chew    Quit date: 1998     Family History:   Family History   Problem Relation Age of Onset    Stroke Mother     Colon cancer Mother     Depression Sister     Hypertension Sister        Meds/Allergies   current meds:   Current Facility-Administered Medications:     acetaminophen (TYLENOL) tablet 650 mg, TID    amLODIPine (NORVASC) tablet 5 mg, Daily    Artificial Tears Op Soln 1 drop, Q4H PRN    bisacodyl (DULCOLAX) rectal suppository 10 mg, Daily PRN    calcium carbonate (TUMS) chewable tablet 1,000 mg, Daily PRN    carbidopa-levodopa (SINEMET)  mg per tablet 2.5 tablet, TID    Cholecalciferol (VITAMIN D3) tablet 1,000 Units, Daily    cyanocobalamin (VITAMIN B-12) tablet 1,000 mcg, Daily    docusate sodium (COLACE) capsule 100 mg, BID    enoxaparin (LOVENOX) subcutaneous injection 40 mg, Daily    melatonin tablet 3 mg, HS    ondansetron (ZOFRAN) injection 4 mg, Q6H PRN    polyethylene glycol (MIRALAX) packet 17 g, Daily    QUEtiapine (SEROquel) tablet 12.5 mg, Q8H PRN    senna (SENOKOT) tablet 17.2 mg, BID    Allergies   Allergen Reactions    Morphine And Codeine Other (See Comments)     Patient gets delirious       Objective     Intake/Output Summary (Last 24 hours) at 1/22/2025 1529  Last data filed at 1/22/2025 1101  Gross per 24 hour   Intake 928.75 ml   Output 1725 ml   Net -796.25 ml     Invasive Devices       Peripheral Intravenous Line  Duration             Peripheral IV 01/22/25 Dorsal (posterior);Left Forearm <1 day                    Physical Exam  Vitals and nursing note reviewed.   Constitutional:       General: He is not in acute distress.     Appearance: He is well-developed. He is obese.   HENT:      Head: Normocephalic.      Comments: Laceration above right  eyebrow     Mouth/Throat:      Mouth: Mucous membranes are dry.   Eyes:      Conjunctiva/sclera: Conjunctivae normal.   Cardiovascular:      Rate and Rhythm: Normal rate and regular rhythm.      Pulses: Normal pulses.      Heart sounds: Normal heart sounds. No murmur heard.  Pulmonary:      Effort: Pulmonary effort is normal. No respiratory distress.      Breath sounds: Normal breath sounds.   Abdominal:      Palpations: Abdomen is soft.      Tenderness: There is no abdominal tenderness.   Musculoskeletal:      Cervical back: Neck supple.      Right lower leg: Edema (3+ pitting edema) present.      Left lower leg: Edema (3+ pitting edema) present.   Skin:     General: Skin is warm and dry.      Capillary Refill: Capillary refill takes less than 2 seconds.      Comments: Venous stasis ulcers   Neurological:      Mental Status: He is alert. He is disoriented.      Comments: AAO x 1   Psychiatric:         Mood and Affect: Mood normal.         Behavior: Behavior normal.         Lab Results:   I have personally reviewed pertinent lab results including the following:  - CBC, CMP    I have personally reviewed the following imaging study reports in PACS:  - CT head      Therapies:   PT: Pending evaluation  OT: Pending evaluation    VTE Prophylaxis: VTE covered by:  enoxaparin, Subcutaneous, 40 mg at 01/22/25 1115        Code Status: Level 1 - Full Code  Advance Directive and Living Will:      Power of :    POLST:      Family and Social Support: Wife and niece  No data recorded    Goals of Care: Return to baseline    Thank you for allowing me to participate in your patients' care. Please do not hesitate to call with any additional questions.  Angelita Agosto MD

## 2025-01-22 NOTE — WOUND OSTOMY CARE
Consult Note - Wound   Daniel Sanz Jr. 84 y.o. male MRN: [id number]  Unit/Bed#: -01 Encounter: [id number]        Assessment :   Patient admitted to [facility] due to altered mental status. History of asbestosis, GERD, HTN, parkinson's disease. Wound care nurse consulted for  right leg venous ulcer. Patient is agreeable to assessment, alert and oriented to self, incontinent of bowel and bladder, assist of 2 to turn for assessment, is an assist with care.     1. Bilateral sacrum, buttock, hips, elbows, and heels- Skin is dry, intact, blanchable.     2. Right medial tibia- Venous ulcer. Wound is oval in shape, partial thickness, 100% beefy red/pink tissue with some old adhered drainage with black discoloration, no active drainage noted. Perri-wound is dry, intact, no redness.     Educated patient on importance of frequent offloading of pressure via turning, repositioning, and weight-shifting.     No induration, fluctuance, odor, warmth, redness, or purulence noted to the above noted wound. New dressing applied. Patient tolerated well, denies pain to the wound. See flow sheets for more detailed assessment findings. Will follow along.    Skin care plans:  1-Hydraguard/Silicone Cream to bilateral sacrum, buttock, and heels BID and PRN  2-Elevate heels to offload pressure.  3-Ehob cushion in chair when out of bed.  4-Moisturize skin daily with skin nourishing cream.  5-Turn/reposition q2h for pressure re-distribution on skin.   6-Right medial leg- Cleanse wound with NSS, pat dry. Apply Dermagran over wound bed and cover with Silicone foam Mepilex dressing. Change every other day and as needed for soilage/displacement.       Wound 12/12/24 Venous Ulcer Leg Anterior;Right;Medial (Active)   Wound Image   01/22/25 1016   Wound Description Beefy red;Black;Drainage 01/22/25 1016   Perri-wound Assessment Dry;Intact 01/22/25 1016   Wound Length (cm) 0.6 cm 01/22/25 1016   Wound Width (cm) 1.5 cm 01/22/25 1016   Wound Depth (cm) 0  cm 01/22/25 1016   Wound Surface Area (cm^2) 0.9 cm^2 01/22/25 1016   Wound Volume (cm^3) 0 cm^3 01/22/25 1016   Calculated Wound Volume (cm^3) 0 cm^3 01/22/25 1016   Change in Wound Size % 100 01/22/25 1016   Drainage Amount None 01/22/25 1016   Non-staged Wound Description Partial thickness 01/22/25 1016   Treatments Cleansed;Irrigation with NSS;Site care 01/22/25 1016   Dressing Foam, Silicon (eg. Allevyn, etc) 01/22/25 1016   Wound packed? No 01/22/25 1016   Dressing Changed Changed 01/22/25 1016   Patient Tolerance Tolerated well 01/22/25 1016   Dressing Status Clean;Dry;Intact 01/22/25 1016     Contact through [employer] Secure Chat with any questions  Wound Care will continue to follow while inpatient    [place] WERNERN RN CWON  Wound and Ostomy care

## 2025-01-22 NOTE — UTILIZATION REVIEW
Initial Clinical Review    Admission: Date/Time/Statement:   Admission Orders (From admission, onward)       Ordered        01/21/25 1627  INPATIENT ADMISSION  Once                          Orders Placed This Encounter   Procedures    INPATIENT ADMISSION     Standing Status:   Standing     Number of Occurrences:   1     Level of Care:   Med Surg [16]     Estimated length of stay:   More than 2 Midnights     Certification:   I certify that inpatient services are medically necessary for this patient for a duration of greater than two midnights. See H&P and MD Progress Notes for additional information about the patient's course of treatment.     ED Arrival Information       Expected   -    Arrival   1/21/2025 11:30    Acuity   Urgent              Means of arrival   Walk-In    Escorted by   Spouse    Service   Hospitalist    Admission type   Emergency              Arrival complaint   poss UTI/+Fall/+HeadStrike/-THINNERS             Chief Complaint   Patient presents with    Fall     Pt presents to the ED s/p fall. Hx of parkinsons. Fall with head injury. No thinners. From standing.        Initial Presentation: 84 y.o. male  with a PMH of PAD, HTN, who presents with altered mentation. Wife notes patient's mood has been somewhat unstable for approximately a couple weeks. Patient is noting agitation intermittently specifically at night and upon awakening from naps. Wife endorses hallucinations upon patient awakening. Patient is normally cooperative and follows directions at home. Upon ambulation into the ED patient fell and struck his head sustaining a right above eyebrow laceration which was cleaned with a dressing.  No sutures placed in the ED. Patient was found to be mildly hypothermic in ED. At time of admission patient's temperature has corrected/normalized following Keith hugger therapy. Temp stable off Keith hugger. Plan: Inpatient admission for evaluation and treatment of altered mental status, fall, hypothermia,  hyperkalemia, Parkinson's disease, chronic RLE venous ulcers, HTN: folate, B12, vitamin D levels,change B12/vitamin D supplementation 3 times weekly to daily, IV fluids, orthostatic vitals, follow blood cultures, BMP in am, hold ARB, continue amlodipine.     Anticipated Length of Stay/Certification Statement: Patient will be admitted on an inpatient basis with an anticipated length of stay of greater than 2 midnights secondary to encephalopathy.     Date: 1/22   Day 2:     Internal medicine: Folate WNL, B12 pending, vitamin D low at 26.9. B12/vitamin D supplementation was changed to daily. Wife reports patient struggles with constipation she is unsure when last bowel movement was, will increase bowel regimen. Orthostatic vitals. PT/OT eval. Follow blood cultures. S/p albuterol, insulin, amp of d50, Lasix 40 mg in ED. Potassium decreased from 6.0 to 4.7. Continue amlodipine.     Date: 1/23  Day 3: Has surpassed a 2nd midnight with active treatments and services. Neurology consulted for recent sinemet increase. Continue IV fluids. PT/OT eval. Follow blood cultures. Hold ARB. Wound care consult. Continue amlodipine. Unknown LBM, wife estimates it may have been a week. KUB ordered to rule out obstruction. NPO for ST. Patient somnolent on evaluation so they are currently recommending dysphagia 1 diet with nectar thick liquids while awake and pills crushed. Patient was to have outpatient VBS test, per speech he may need during admission but will have to be consistently awake first.      ED Treatment-Medication Administration from 01/21/2025 1130 to 01/21/2025 2018         Date/Time Order Dose Route Action     01/21/2025 1500 LET gel 1 Application 1 Application Topical Given     01/21/2025 1551 albuterol inhalation solution 2.5 mg 2.5 mg Nebulization Given     01/21/2025 1543 insulin regular (HumuLIN R,NovoLIN R) injection 5 Units 5 Units Intravenous Given     01/21/2025 1545 furosemide (LASIX) injection 40 mg 40 mg  Intravenous Given     01/21/2025 1540 dextrose 50 % IV solution 25 mL 25 mL Intravenous Given     01/21/2025 1738 carbidopa-levodopa (SINEMET)  mg per tablet 2.5 tablet 2.5 tablet Oral Given     01/21/2025 1814 sodium chloride 0.9 % infusion 75 mL/hr Intravenous New Bag            Scheduled Medications:  amLODIPine, 5 mg, Oral, Daily  carbidopa-levodopa, 2.5 tablet, Oral, TID  Cholecalciferol, 1,000 Units, Oral, Daily  vitamin B-12, 1,000 mcg, Oral, Daily  docusate sodium, 100 mg, Oral, BID  enoxaparin, 40 mg, Subcutaneous, Daily  polyethylene glycol, 17 g, Oral, Daily  senna, 1 tablet, Oral, HS      Continuous IV Infusions:     PRN Meds:  Artificial Tears, 1 drop, Both Eyes, Q4H PRN  bisacodyl, 10 mg, Rectal, Daily PRN  calcium carbonate, 1,000 mg, Oral, Daily PRN  ondansetron, 4 mg, Intravenous, Q6H PRN      ED Triage Vitals   Temperature Pulse Respirations Blood Pressure SpO2 Pain Score   01/21/25 1136 01/21/25 1136 01/21/25 1136 01/21/25 1136 01/21/25 1136 01/21/25 1800   (S) (!) 96 °F (35.6 °C) 76 16 132/60 95 % No Pain     Weight (last 2 days)       None            Vital Signs (last 3 days)       Date/Time Temp Pulse Resp BP MAP (mmHg) SpO2 O2 Device Patient Position - Orthostatic VS [place] Coma Scale Score Pain    01/22/25 0700 98.1 °F (36.7 °C) 89 20 159/70 100 95 % None (Room air) Lying -- --    01/22/25 0216 -- -- -- -- -- -- -- -- 15 3    01/21/25 2216 97.5 °F (36.4 °C) 73 18 169/72 104 95 % None (Room air) Lying -- --    01/21/25 2031 97.7 °F (36.5 °C) 84 18 164/69 99 98 % None (Room air) Lying -- --    01/21/25 2009 -- 84 12 157/71 -- 97 % None (Room air) -- -- --    01/21/25 2000 -- 87 14 160/65 93 93 % None (Room air) Sitting -- --    01/21/25 1800 -- -- -- -- -- -- -- -- 15 No Pain    01/21/25 1716 -- -- -- -- -- -- -- Standing - Orthostatic VS -- --    01/21/25 1715 -- 87 12 152/67 -- -- -- Sitting - Orthostatic VS -- --    01/21/25 1714 -- 86 14 142/64 -- -- -- Lying - Orthostatic VS -- --     01/21/25 1702 97.6 °F (36.4 °C) -- -- -- -- -- -- -- -- --    01/21/25 1700 -- 84 16 129/58 83 95 % None (Room air) Lying -- --    01/21/25 1518 -- -- -- -- -- -- None (Room air) -- -- --    01/21/25 1500 -- -- -- -- -- -- -- -- 14 --    01/21/25 1234 95.6 °F (35.3 °C)  -- -- -- -- -- -- -- -- --    01/21/25 1136 96 °F (35.6 °C)  76 16 132/60 -- 95 % None (Room air) Sitting -- --              Pertinent Labs/Diagnostic Test Results:   Radiology:  XR shoulder 2+ views LEFT   Final Interpretation by Arnoldo Monroy MD (01/21 1427)      No acute osseous abnormality.      Rotator cuff calcific tendinitis.         Computerized Assisted Algorithm (CAA) may have been used to analyze all applicable images.         Workstation performed: [id number]         XR chest 1 view portable   Final Interpretation by Kylah Garcia MD (01/21 1411)      No acute cardiopulmonary disease.      No acute displaced fractures. 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      Workstation performed: [id number]         CT head without contrast   Final Interpretation by Refugio Lagos MD (01/21 1315)      No acute intracranial abnormality.                  Workstation performed: [id number]         CT cervical spine without contrast   Final Interpretation by Refugio Lagos MD (01/21 1321)      No cervical spine fracture or traumatic malalignment.                  Workstation performed: [id number]           Cardiology:  ECG 12 lead   Final Result by Kyung Esteban MD (01/21 1711)    Poor data quality, interpretation may be adversely affected   Probable sinus rhythm with baseline artifact   Abnormal ECG      Repeat tracing recommended   Confirmed by Kyung Esteban ([id number]) on 1/21/2025 5:11:31 PM        GI:  No orders to display           Results from last 7 days   Lab Units 01/22/25  0449 01/21/25  2319 01/21/25  1226   WBC Thousand/uL 6.42  --  5.35   HEMOGLOBIN g/dL 10.9*  --  9.9*    HEMATOCRIT % 33.2*  --  30.6*   PLATELETS Thousands/uL 176 167 174   TOTAL NEUT ABS Thousands/µL  --   --  3.89         Results from last 7 days   Lab Units 01/22/25  0449 01/21/25  1702 01/21/25  1226   SODIUM mmol/L 139 141 138   POTASSIUM mmol/L 4.7 4.5 6.0*   CHLORIDE mmol/L 106 108 109*   CO2 mmol/L 27 26 25   ANION GAP mmol/L 6 7 4   BUN mg/dL 32* 32* 33*   CREATININE mg/dL 1.11 0.98 0.98   EGFR ml/min/1.73sq m 61 71 71   CALCIUM mg/dL 9.1 8.7 8.5     Results from last 7 days   Lab Units 01/21/25  1226   AST U/L 28   ALT U/L 4*   ALK PHOS U/L 112*   TOTAL PROTEIN g/dL 6.1*   ALBUMIN g/dL 3.6   TOTAL BILIRUBIN mg/dL 0.81         Results from last 7 days   Lab Units 01/22/25  0449 01/21/25  1702 01/21/25  1226   GLUCOSE RANDOM mg/dL 78 66 92         Results from last 7 days   Lab Units 01/22/25  0239 01/21/25  2318 01/21/25  1941   HS TNI 0HR ng/L  --   --  3   HS TNI 2HR ng/L  --  4  --    HSTNI D2 ng/L  --  1  --    HS TNI 4HR ng/L 4  --   --    HSTNI D4 ng/L 1  --   --          Results from last 7 days   Lab Units 01/21/25  1230   PROTIME seconds 14.3   INR  1.03   PTT seconds 32     Results from last 7 days   Lab Units 01/21/25  1226   TSH 3RD GENERATON uIU/mL 2.635     Results from last 7 days   Lab Units 01/21/25  1226   PROCALCITONIN ng/ml <0.05     Results from last 7 days   Lab Units 01/21/25  1452   LACTIC ACID mmol/L 0.5           Results from last 7 days   Lab Units 01/21/25  1512   CLARITY UA  Clear   COLOR UA  Light Yellow   SPEC GRAV UA  1.019   PH UA  5.5   GLUCOSE UA mg/dl Negative   KETONES UA mg/dl Negative   BLOOD UA  Negative   PROTEIN UA mg/dl Trace*   NITRITE UA  Negative   BILIRUBIN UA  Negative   UROBILINOGEN UA (BE) mg/dl <2.0   LEUKOCYTES UA  Negative   WBC UA /hpf None Seen   RBC UA /hpf None Seen   BACTERIA UA /hpf None Seen   EPITHELIAL CELLS WET PREP /hpf None Seen           Results from last 7 days   Lab Units 01/21/25  1452 01/21/25  1230   BLOOD CULTURE  Received in  Microbiology Lab. Culture in Progress. Received in Microbiology Lab. Culture in Progress.         Past Medical History:   Diagnosis Date    Arthritis     Asbestos exposure     Asbestosis (HCC)     GERD (gastroesophageal reflux disease)     Hemoptysis 5/11/2022    Hypertension     Lung disease     Parkinson's disease (HCC)      Present on Admission:   Fall   Parkinson's disease (HCC)   HTN (hypertension)      Admitting Diagnosis: Hyperkalemia [E87.5]  Confusion [R41.0]  Closed head injury, initial encounter [S09.90XA]  Laceration of right eyebrow, initial encounter [S01.111A]  Hypothermia, initial encounter [T68.XXXA]  Altered mental status, unspecified altered mental status type [R41.82]  Unspecified multiple injuries, initial encounter [T07.XXXA]  Age/Sex: 84 y.o. male    Network Utilization Review Department  ATTENTION: Please call with any questions or concerns to [phone] and carefully listen to the prompts so that you are directed to the right person. All voicemails are confidential.   For Discharge needs, contact Care Management DC Support Team at [phone] opt. 2  Send all requests for admission clinical reviews, approved or denied determinations and any other requests to dedicated fax number below belonging to the campus where the patient is receiving treatment. List of dedicated fax numbers for the Facilities:  FACILITY NAME UR FAX NUMBER   ADMISSION DENIALS (Administrative/Medical Necessity) [phone]   DISCHARGE SUPPORT TEAM (NETWORK) [phone]   PARENT CHILD HEALTH (Maternity/NICU/Pediatrics) [phone]   [facility] [phone]   [facility] [phone]   [facility] [phone]   [facility] [phone]   [facility] [phone]   [facility] [phone]   [facility] [phone]   [facility]  [facility] [phone]   [facility] [phone]   [facility] [phone]   [facility] [phone]   [facility] [phone]

## 2025-01-22 NOTE — PLAN OF CARE
Problem: OCCUPATIONAL THERAPY ADULT  Goal: Performs self-care activities at highest level of function for planned discharge setting.  See evaluation for individualized goals.  Description: Treatment Interventions: ADL retraining, Functional transfer training, UE strengthening/ROM, Endurance training, Cognitive reorientation, Patient/family training, Equipment evaluation/education, Compensatory technique education, Cardiac education, Energy conservation, Activityengagement, Fine motor coordination activities          See flowsheet documentation for full assessment, interventions and recommendations.   Note: Limitation: Decreased ADL status, Decreased UE strength, Decreased Safe judgement during ADL, Decreased cognition, Decreased endurance, Decreased self-care trans, Decreased high-level ADLs, Decreased fine motor control (impaired pain, sitting balance, standing balance, insight into deficits, activity tolerance, forward functional reach, problem solving)     Assessment: Pt is an 85yo male admitted to [facility] on 1/21/25 w/ increased confusion from home. Pt fell while entering the ED w/ head strike. Diagnosed w/ altered mental status. Significant PMH impacting his occupational performance includes Parkinson's disease, chronic R shoulder pain, HTN, arthritis, hx scalp hematoma. Pt lives w/ his wife in 1 SH w/ 1 JAYLEN. Pt needs assistance w/ bathing and pulling up his pants. Pt uses no AD vs RW vs cane for functional mobility. Pt actively receiving HHOT/PT/ST. Pt needs assistance w/ IADLs and is not an accurate historian upon eval or at baseline. Upon eval, pt alert and pleasantly confused. Able to follow one step directions w/ + time and cues. Pt required max A bed mobility, max A sit <>stand, max A SPT w/ HHA EOB to commode, max A toileting, max A LBD, min A UBD. Pt is completing ADLs below baseline level of I and would benefit from OT In acute care to max I w/ ADLs, achieve highest level of function. Recommend level II  rehab resource intensity when medically stable for discharge from acute care at this time. Will continue to follow     Rehab Resource Intensity Level, OT: II (Moderate Resource Intensity)

## 2025-01-22 NOTE — PLAN OF CARE
Problem: PAIN - ADULT  Goal: Verbalizes/displays adequate comfort level or baseline comfort level  Description: Interventions:  - Encourage patient to monitor pain and request assistance  - Assess pain using appropriate pain scale  - Administer analgesics based on type and severity of pain and evaluate response  - Implement non-pharmacological measures as appropriate and evaluate response  - Consider cultural and social influences on pain and pain management  - Notify physician/advanced practitioner if interventions unsuccessful or patient reports new pain  Outcome: Progressing     Problem: INFECTION - ADULT  Goal: Absence or prevention of progression during hospitalization  Description: INTERVENTIONS:  - Assess and monitor for signs and symptoms of infection  - Monitor lab/diagnostic results  - Monitor all insertion sites, i.e. indwelling lines, tubes, and drains  - Monitor endotracheal if appropriate and nasal secretions for changes in amount and color  - [place] appropriate cooling/warming therapies per order  - Administer medications as ordered  - Instruct and encourage patient and family to use good hand hygiene technique  - Identify and instruct in appropriate isolation precautions for identified infection/condition  Outcome: Progressing  Goal: Absence of fever/infection during neutropenic period  Description: INTERVENTIONS:  - Monitor WBC    Outcome: Progressing     Problem: SAFETY ADULT  Goal: Patient will remain free of falls  Description: INTERVENTIONS:  - Educate patient/family on patient safety including physical limitations  - Instruct patient to call for assistance with activity   - Consult OT/PT to assist with strengthening/mobility   - Keep Call bell within reach  - Keep bed low and locked with side rails adjusted as appropriate  - Keep care items and personal belongings within reach  - Initiate and maintain comfort rounds  - Make Fall Risk Sign visible to staff  - Offer Toileting every  Hours,  in advance of need  - Initiate/Maintain alarm  - Obtain necessary fall risk management equipment:   - Apply yellow socks and bracelet for high fall risk patients  - Consider moving patient to room near nurses station  Outcome: Progressing  Goal: Maintain or return to baseline ADL function  Description: INTERVENTIONS:  -  Assess patient's ability to carry out ADLs; assess patient's baseline for ADL function and identify physical deficits which impact ability to perform ADLs (bathing, care of mouth/teeth, toileting, grooming, dressing, etc.)  - Assess/evaluate cause of self-care deficits   - Assess range of motion  - Assess patient's mobility; develop plan if impaired  - Assess patient's need for assistive devices and provide as appropriate  - Encourage maximum independence but intervene and supervise when necessary  - Involve family in performance of ADLs  - Assess for home care needs following discharge   - Consider OT consult to assist with ADL evaluation and planning for discharge  - Provide patient education as appropriate  Outcome: Not Progressing  Goal: Maintains/Returns to pre admission functional level  Description: INTERVENTIONS:  - Perform AM-PAC 6 Click Basic Mobility/ Daily Activity assessment daily.  - Set and communicate daily mobility goal to care team and patient/family/caregiver.   - Collaborate with rehabilitation services on mobility goals if consulted  - Perform Range of Motion  times a day.  - Reposition patient every  hours.  - Dangle patient  times a day  - Stand patient  times a day  - Ambulate patient times a day  - Out of bed to chair  times a day   - Out of bed for meals  times a day  - Out of bed for toileting  - Record patient progress and toleration of activity level   Outcome: Not Progressing     Problem: DISCHARGE PLANNING  Goal: Discharge to home or other facility with appropriate resources  Description: INTERVENTIONS:  - Identify barriers to discharge w/patient and caregiver  -  Arrange for needed discharge resources and transportation as appropriate  - Identify discharge learning needs (meds, wound care, etc.)  - Arrange for interpretive services to assist at discharge as needed  - Refer to Case Management Department for coordinating discharge planning if the patient needs post-hospital services based on physician/advanced practitioner order or complex needs related to functional status, cognitive ability, or social support system  Outcome: Not Progressing     Problem: Knowledge Deficit  Goal: Patient/family/caregiver demonstrates understanding of disease process, treatment plan, medications, and discharge instructions  Description: Complete learning assessment and assess knowledge base.  Interventions:  - Provide teaching at level of understanding  - Provide teaching via preferred learning methods  Outcome: Not Progressing

## 2025-01-22 NOTE — PROGRESS NOTES
"Progress Note - Hospitalist   Name: Daniel Sanz Jr. 84 y.o. male I MRN: [id number]  Unit/Bed#: [facility] I Date of Admission: 1/21/2025   Date of Service: 1/22/2025 I Hospital Day: 1    Assessment & Plan  Altered mental status  Patient's wife endorsing recent abnormal behaviors.  Patient waking frequently at night, hallucinations noted when awaking from sleep, patient noted to be more \"nasty\" and agitated with at times garbled speech which clears.  Presents to ED after fall at home with concern for possible UTI. S/p head strike in ED w/ R eyebrow laceration.  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 with mild acute encephalopathy  CT head w/o acute findings  TSH benign   Folate WNL, B12 pending, vitamin D low at 26.9  Wife notes B12/vitamin D supplementation 3 times weekly PTA  Change to q daily dosing   Negative infxn work-up (as below)   Consult geriatrics, input appreciated  Recommend outpatient neurology follow-up  Clinically appears dry; provide gentle IV fluids, stopped 1/22  Wife reports patient struggles with constipation she is unsure when last bowel movement was, will increase bowel regimen  Fall  S/p head strike in ED w/ R eyebrow laceration; unclear if mechanical versus orthostatic hypotensive episode  CT head/neck and left shoulder benign  Laceration cleaned with dressing placed; no sutures placed  F/u orthostatic vital signs  ECG without ischemia, troponins negative x 3  PT OT consulted  Hypothermia  POA, temp 95.6-96  Received air hunger in the ED with resolution of hypothermia; patient grossly asymptomatic; temp stable off Keith hugger  No other SIRS criteria  Follow-up blood cultures  UA bland; LA & procal WNL; no leukocytosis or bandemia; flu/COVID/RSV negative  Etiology likely autonomic dysregulation in setting of Parkinson's disease  Hyperkalemia  Recent Labs     01/21/25  1226 01/21/25  1702 01/22/25  0449   K 6.0* 4.5 4.7     S/p albuterol, insulin, amp of d50, Lasix 40 mg in ED  Repeat BMP " pending  Etiology unclear  Hold ARB  Parkinson's disease (HCC)  Known to  neurology   Sinemet recently increased to 2 tabs TID in 10/2024  Likely contributing to altered mentation  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 (HCC)  Chronic right lower extremity venous ulcers   PTA Lasix for LE edema management   Known to [facility] podiatry/[facility]   Wound care consulted  HTN (hypertension)  BP stable  Hold PTA ARB w/ hyperkalemia, resume when able  Continue PTA amlodipine    VTE Pharmacologic Prophylaxis:   Moderate Risk (Score 3-4) - Pharmacological DVT Prophylaxis Ordered: enoxaparin (Lovenox).    Mobility:   -HLM Achieved: 2: Bed activities/Dependent transfer  JH-HLM Goal NOT achieved. Continue with multidisciplinary rounding and encourage appropriate mobility to improve upon -HLM goals.    Patient Centered Rounds: I evaluated the patient without nursing staff present due to RN unavailable, communicated via epic chat    Discussions with Specialists or Other Care Team Provider: Gerontology    Education and Discussions with Family / Patient: Updated  (wife) via phone.    Current Length of Stay: 1 day(s)  Current Patient Status: Inpatient   Certification Statement: The patient will continue to require additional inpatient hospital stay due to PT OT evaluation gerontology consult  Discharge Plan: Anticipate discharge in 48-72 hrs to discharge location to be determined pending rehab evaluations.    Code Status: Level 1 - Full Code    Subjective   Patient is oriented x 1 and poor historian.  He denies pain.  Updated wife over the phone.  She reports confusion started on Friday.  She is unsure when last bowel movement was and says he struggles with constipation.  All questions and concerns addressed.    Objective :  Temp:  [95.6 °F (35.3 °C)-98.1 °F (36.7 °C)] 98.1 °F (36.7 °C)  HR:  [73-89] 89  BP: (129-169)/(58-72) 159/70  Resp:  [12-20] 20  SpO2:  [93 %-98 %] 95 %  O2 Device: None  (Room air)    There is no height or weight on file to calculate BMI.     Input and Output Summary (last 24 hours):     Intake/Output Summary (Last 24 hours) at 1/22/2025 0855  Last data filed at 1/22/2025 0501  Gross per 24 hour   Intake 808.75 ml   Output 1475 ml   Net -666.25 ml       Physical Exam  Vitals and nursing note reviewed.   Constitutional:       General: He is not in acute distress.     Appearance: He is obese. He is ill-appearing.   HENT:      Head: Normocephalic.      Mouth/Throat:      Pharynx: Oropharynx is clear.   Cardiovascular:      Rate and Rhythm: Normal rate and regular rhythm.      Heart sounds: Normal heart sounds. No murmur heard.  Pulmonary:      Effort: Pulmonary effort is normal. No respiratory distress.      Breath sounds: No wheezing.   Abdominal:      General: Bowel sounds are increased. There is no distension.      Palpations: Abdomen is soft.      Tenderness: There is no abdominal tenderness.   Musculoskeletal:      Right lower leg: 3+ Pitting Edema present.      Left lower leg: 3+ Pitting Edema present.   Skin:     General: Skin is dry.      Coloration: Skin is pale.      Comments: Band-Aid CDI to right forehead.  Open venous ulcer on right leg   Neurological:      Comments: Oriented x 1   Psychiatric:         Speech: Speech is delayed.                 Lines/Drains:              Lab Results: I have reviewed the following results:   Results from last 7 days   Lab Units 01/22/25  0449 01/21/25  2319 01/21/25  1226   WBC Thousand/uL 6.42  --  5.35   HEMOGLOBIN g/dL 10.9*  --  9.9*   HEMATOCRIT % 33.2*  --  30.6*   PLATELETS Thousands/uL 176   < > 174   SEGS PCT %  --   --  74   LYMPHO PCT %  --   --  16   MONO PCT %  --   --  8   EOS PCT %  --   --  2    < > = values in this interval not displayed.     Results from last 7 days   Lab Units 01/22/25  0449 01/21/25  1702 01/21/25  1226   SODIUM mmol/L 139   < > 138   POTASSIUM mmol/L 4.7   < > 6.0*   CHLORIDE mmol/L 106   < > 109*   CO2  mmol/L 27   < > 25   BUN mg/dL 32*   < > 33*   CREATININE mg/dL 1.11   < > 0.98   ANION GAP mmol/L 6   < > 4   CALCIUM mg/dL 9.1   < > 8.5   ALBUMIN g/dL  --   --  3.6   TOTAL BILIRUBIN mg/dL  --   --  0.81   ALK PHOS U/L  --   --  112*   ALT U/L  --   --  4*   AST U/L  --   --  28   GLUCOSE RANDOM mg/dL 78   < > 92    < > = values in this interval not displayed.     Results from last 7 days   Lab Units 01/21/25  1230   INR  1.03             Results from last 7 days   Lab Units 01/21/25  1452 01/21/25  1226   LACTIC ACID mmol/L 0.5  --    PROCALCITONIN ng/ml  --  <0.05       Recent Cultures (last 7 days):   Results from last 7 days   Lab Units 01/21/25  1452 01/21/25  1230   BLOOD CULTURE  Received in Microbiology Lab. Culture in Progress. Received in Microbiology Lab. Culture in Progress.       Imaging Results Review: I reviewed radiology reports from this admission including: chest xray, CT head, CT C-spine, and xray(s).  Other Study Results Review: EKG was reviewed.     Last 24 Hours Medication List:     Current Facility-Administered Medications:     amLODIPine (NORVASC) tablet 5 mg, Daily    Artificial Tears Op Soln 1 drop, Q4H PRN    calcium carbonate (TUMS) chewable tablet 1,000 mg, Daily PRN    carbidopa-levodopa (SINEMET)  mg per tablet 2.5 tablet, TID    Cholecalciferol (VITAMIN D3) tablet 1,000 Units, Daily    cyanocobalamin (VITAMIN B-12) tablet 1,000 mcg, Daily    docusate sodium (COLACE) capsule 100 mg, BID    enoxaparin (LOVENOX) subcutaneous injection 40 mg, Daily    ondansetron (ZOFRAN) injection 4 mg, Q6H PRN    polyethylene glycol (MIRALAX) packet 17 g, Daily PRN    Administrative Statements   Today, Patient Was Seen By: MITESH Oswald      **Please Note: This note may have been constructed using a voice recognition system.**

## 2025-01-22 NOTE — DISCHARGE INSTR - OTHER ORDERS
Skin care plans:  1-Hydraguard/Silicone Cream to bilateral sacrum, buttock, and heels BID and PRN  2-Elevate heels to offload pressure.  3-Ehob cushion in chair when out of bed.  4-Moisturize skin daily with skin nourishing cream.  5-Turn/reposition q2h for pressure re-distribution on skin.

## 2025-01-22 NOTE — ASSESSMENT & PLAN NOTE
Chronic right lower extremity venous ulcers   PTA Lasix for LE edema management   Known to [facility] podiatry/[facility]   Wound care consulted

## 2025-01-22 NOTE — ASSESSMENT & PLAN NOTE
-Patient is high risk of delirium due to hospitalization, dementia, recent fall, and metabolic imbalances, pain  -delirium precautions  -maintain normal sleep/wake cycle, start melatonin 3 mg QHS  -minimize overnight interruptions, group overnight vitals/labs/nursing checks as possible  -dim lights, close blinds and turn off tv to minimize stimulation and encourage sleep environment in evenings  -Continue Tylenol 975mg Q8H scheduled   -monitor for fecal and urinary retention which may precipitate delirium  -encourage early mobilization and ambulation  -provide frequent reorientation and redirection  -encourage family and friends at the bedside to help calm patient if anxious  -avoid medications which may precipitate or worsen delirium such as tramadol, benzodiazepine, anticholinergics, and antihistaminics  -encourage hydration and nutrition , assist with feeding if needed  -redirect unwanted behaviors as first line, avoid physical restraints.

## 2025-01-22 NOTE — PLAN OF CARE
Problem: PHYSICAL THERAPY ADULT  Goal: Performs mobility at highest level of function for planned discharge setting.  See evaluation for individualized goals.  Description: Treatment/Interventions: Functional transfer training, LE strengthening/ROM, Elevations, Therapeutic exercise, Endurance training, Cognitive reorientation, Patient/family training, Equipment eval/education, Bed mobility, Compensatory technique education, Gait training          See flowsheet documentation for full assessment, interventions and recommendations.  1/22/2025 1639 by Charisse Dodson PT  Note: Prognosis: Fair  Problem List: Decreased strength, Decreased mobility, Decreased coordination, Impaired balance, Decreased endurance, Decreased cognition, Decreased safety awareness  Assessment: Daniel Sanz Jr. is a 84 y.o. Male who presents to [facility] on [facility]/21[facility]25 due to confusion and weakness w/ diagnosis of AMS. Orders for PT eval and treat received. Comorbidities affecting pt's functional mobility at time of evaluation include: Parkinson's Disease, HTN, dementia, neuropathy. Personal factors affecting DC include: stairs to enter home, inability to ambulate household distances, inability to navigate level surfaces w/o external assistance, decreased cognition, positive fall history, limited insight into impairments, inability to perform IADLs, and inability to perform ADLs. At baseline, pt mobilizes independently w/ use of AD prn, and w/ + fall(s) in the previous 6 months. Upon evaluation, pt presents w/ the following deficits: impaired strength, impaired balance, impaired cognition, decreased safety awareness, decreased endurance/activity tolerance, and pain affecting mobility. Pt currently requires  max Ax2 for bed mobility, max Ax2 for transfers. Pt's clinical presentation is unstable/unpredictable due to abnormal lab values, need for increased assistance w/ functional mobility compared to baseline, pain affecting mobility tolerance, need for  input for mobility technique, need for input for task focus, need to input for safety awareness, recent drastic decline in mobility status, recent h/o falls, ongoing medical management. From a PT/mobility standpoint given the above findings, DC recommendation is level: II (Moderate Rehab Resource Intensity). During current admission, pt will benefit from continued skilled inpatient PT in the acute care setting in order to address the above deficits and to maximize function and mobility prior to DC from acute care.  Barriers to Discharge: Inaccessible home environment     Rehab Resource Intensity Level, PT: II (Moderate Resource Intensity)    See flowsheet documentation for full assessment.

## 2025-01-22 NOTE — ASSESSMENT & PLAN NOTE
S/p head strike in ED w/ R eyebrow laceration; unclear if mechanical versus orthostatic hypotensive episode  CT head/neck and left shoulder benign  Laceration cleaned with dressing placed; no sutures placed  F/u orthostatic vital signs  ECG without ischemia, troponins negative x 3  PT OT consulted

## 2025-01-22 NOTE — ASSESSMENT & PLAN NOTE
Patient unable to state when last BM was  Nurse reported no BM today.  No abdominal pain, nausea, vomiting.  Encouraged po hydration.  Continue bowel regimen.

## 2025-01-22 NOTE — ASSESSMENT & PLAN NOTE
Patient has history of multiple falls in the past 6 months  Patient uses walker or cane for ambulation at baseline  Will continue PT/OT.  Monitor orthostatic vitals  Avoid hypotension and hypoglycemia  Fall precautions   Can consider lidocaine patch for right shoulder pain.

## 2025-01-22 NOTE — ASSESSMENT & PLAN NOTE
- Patient on Sinemet, neurology follows, appreciate input  - Monitor orthostatic vitals  - Ambulate patient

## 2025-01-22 NOTE — PHYSICAL THERAPY NOTE
PHYSICAL THERAPY EVALUATION NOTE          Patient Name: Daniel Sanz Jr.  Today's Date: 2025        AGE:   84 y.o.  Mrn:   [id number]  ADMIT DX:  Hyperkalemia [E87.5]  Confusion [R41.0]  Closed head injury, initial encounter [S09.90XA]  Laceration of right eyebrow, initial encounter [S01.111A]  Hypothermia, initial encounter [T68.XXXA]  Altered mental status, unspecified altered mental status type [R41.82]  Unspecified multiple injuries, initial encounter [T07.XXXA]    Past Medical History:  Past Medical History:   Diagnosis Date    Arthritis     Asbestos exposure     Asbestosis (HCC)     GERD (gastroesophageal reflux disease)     Hemoptysis 2022    Hypertension     Lung disease     Parkinson's disease (HCC)        Past Surgical History:  Past Surgical History:   Procedure Laterality Date    HERNIA REPAIR      INTRACAPSULAR CATARACT EXTRACTION Left     KNEE SURGERY      many years ago    SHOULDER SURGERY       Length Of Stay: 1        PHYSICAL THERAPY EVALUATION:    Patient's identity confirmed via 2 patient identifiers (full name and ) at start of session       25 1505   PT Last Visit   PT Visit Date 25   Note Type   Note type Evaluation   Pain Assessment   Pain Assessment Tool FLACC   Pain Location/Orientation Location: Generalized   Pain Rating: FLACC (Rest) - Face 0   Pain Rating: FLACC (Rest) - Legs 0   Pain Rating: FLACC (Rest) - Activity 0   Pain Rating: FLACC (Rest) - Cry 0   Pain Rating: FLACC (Rest) - Consolability 0   Score: FLACC (Rest) 0   Pain Rating: FLACC (Activity) - Face 1   Pain Rating: FLACC (Activity) - Legs 0   Pain Rating: FLACC (Activity) - Activity 0   Pain Rating: FLACC (Activity) - Cry 1   Pain Rating: FLACC (Activity) - Consolability 1   Score: FLACC (Activity) 3   Restrictions/Precautions   Weight Bearing Precautions Per Order No   Other Precautions Cognitive;Chair Alarm;Bed Alarm;Fall Risk  (posey belt  restraint)   Home Living   Type of Home House   Home Layout One level;Performs ADLs on one level;Able to live on main level with bedroom/bathroom  (1 JAYLEN)   Bathroom Shower/Tub Walk-in shower   Bathroom Toilet Raised   Bathroom Equipment Grab bars in shower;Shower chair   Home Equipment Walker;Cane;Reacher;Grab bars   Prior Function   Level of [place] Independent with functional mobility;Needs assistance with functional mobility;Needs assistance with IADLS   Lives With Spouse   Receives Help From Family;Home health  (HHPT/OT/ST, supportive son, niece, nephew, extended family)   IADLs Family/Friend/Other provides transportation;Family/Friend/Other provides meals;Family/Friend/Other provides medication management   Falls in the last 6 months 1 to 4   Comments Pt is a questionable historian, information obtained from chart review (OT spoke to pt's family at Health system), at baseline pt amb ind w/ RW vs SPC vs no AD, has assistance w/ ADLs   Cognition   Overall Cognitive Status Impaired   Arousal/Participation Cooperative   Attention Attends with cues to redirect   Orientation Level Oriented to person   Memory Decreased recall of precautions   Following Commands Follows one step commands with increased time or repetition   Comments Pt ID via name and ; pt pleasantly confused throughout, requires increased processing time   RLE Assessment   RLE Assessment X   Strength RLE   RLE Overall Strength   (assessed w/ functional mobility, grossly 3-/5)   LLE Assessment   LLE Assessment X   Strength LLE   LLE Overall Strength   (assessed w/ functional mobility, grossly 3-/5)   Bed Mobility   Sit to Supine 2  Maximal assistance   Additional items Assist x 2;Increased time required;Verbal cues;LE management   Additional Comments pt sitting OOB in the commode w/ OT present upon arrival to room   Transfers   Sit to Stand 2  Maximal assistance   Additional items Assist x 2;Increased time required;Verbal cues   Stand to Sit 2   Maximal assistance   Additional items Assist x 2;Increased time required;Verbal cues   Stand pivot 2  Maximal assistance   Additional items Assist x 2;Increased time required;Verbal cues   Additional Comments pt performed a stand-pivot from commode>EOB w/ max Ax2 and an additional sit<>stand from EOB w/ max Ax2 to allow for pericare. pt retropuslive in standing w/ verbal and tactile cues provided to improve standing positioning   Balance   Static Sitting Fair   Dynamic Sitting Fair -   Static Standing Poor   Dynamic Standing   (Ax2)   Activity Tolerance   Activity Tolerance Patient limited by fatigue  (pt limited by cognition)   Medical Staff Made Aware Pt benefited from PT/OT care coordination w/ OT Nuria due to signficant level of assistance required w/ mobility, cognitive/behavioral impairments affecting functional mobility, and to allow for challenge of pt's activity tolerance, PT and OT goals were addressed individually during session, LPN Crystal   Assessment   Prognosis Fair   Problem List Decreased strength;Decreased mobility;Decreased coordination;Impaired balance;Decreased endurance;Decreased cognition;Decreased safety awareness   Assessment Daniel Sanz  is a 84 y.o. Male who presents to [facility] on 1/21[facility]25 due to confusion and weakness w/ diagnosis of AMS. Orders for PT eval and treat received. Comorbidities affecting pt's functional mobility at time of evaluation include: Parkinson's Disease, HTN, dementia, neuropathy. Personal factors affecting DC include: stairs to enter home, inability to ambulate household distances, inability to navigate level surfaces w/o external assistance, decreased cognition, positive fall history, limited insight into impairments, inability to perform IADLs, and inability to perform ADLs. At baseline, pt mobilizes independently w/ use of AD prn, and w/ + fall(s) in the previous 6 months. Upon evaluation, pt presents w/ the following deficits: impaired strength, impaired  balance, impaired cognition, decreased safety awareness, decreased endurance/activity tolerance, and pain affecting mobility. Pt currently requires  max Ax2 for bed mobility, max Ax2 for transfers. Pt's clinical presentation is unstable/unpredictable due to abnormal lab values, need for increased assistance w/ functional mobility compared to baseline, pain affecting mobility tolerance, need for input for mobility technique, need for input for task focus, need to input for safety awareness, recent drastic decline in mobility status, recent h/o falls, ongoing medical management. From a PT/mobility standpoint given the above findings, DC recommendation is level: II (Moderate Rehab Resource Intensity). During current admission, pt will benefit from continued skilled inpatient PT in the acute care setting in order to address the above deficits and to maximize function and mobility prior to DC from acute care.   Barriers to Discharge Inaccessible home environment   Goals   STG Expiration Date 02/01/25   Short Term Goal #1 Pt will: perform bed mobility w/ min Ax1 to decrease pt's burden of care and increase pt's independence w/ repositioning in bed; perform transfers w/ min Ax1 to promote OOB mobility; ambulate 50' w/ LRAD and min Ax1 to increase pt's ambulatory endurance/tolerance; negotiate 1 stair(s) w/ UE support and min Ax1 to facilitate pt returning to previous living environment; increase all balance ratings by at least 1 grade to decrease pt's risk of falls   PT Treatment Day 0   Plan   Treatment/Interventions Functional transfer training;LE strengthening/ROM;Elevations;Therapeutic exercise;Endurance training;Cognitive reorientation;Patient/family training;Equipment eval/education;Bed mobility;Compensatory technique education;Gait training   PT Frequency 3-5x/wk   Discharge Recommendation   Rehab Resource Intensity Level, PT II (Moderate Resource Intensity)   AM-PAC Basic Mobility Inpatient   Turning in Flat Bed  Without Bedrails 2   Lying on Back to Sitting on Edge of Flat Bed Without Bedrails 2   Moving Bed to Chair 1   Standing Up From Chair Using Arms 2   Walk in Room 1   Climb 3-5 Stairs With Railing 1   Basic Mobility Inpatient Raw Score 9   [facility] Highest Level Of Mobility   -[facility] Goal 3: Sit at edge of bed   -HL Achieved 4: Move to chair/commode   End of Consult   Patient Position at End of Consult Supine;Bed/Chair alarm activated;All needs within reach  (posey belt donned)       The patient's AM-PAC Basic Mobility Inpatient Short Form Raw Score is 9. A Raw score of less than or equal to 16 suggests the patient may benefit from discharge to post-acute rehabilitation services. Please also refer to the recommendation of the Physical Therapist for safe discharge planning.    Pt will benefit from skilled inpatient PT during this admission in order to facilitate progress towards goals and to maximize functional independence prior to DC      DC rec: level II (Moderate Rehab Resource Intensity)        Charisse Dodson, PT, DPT  01/22/25

## 2025-01-22 NOTE — ASSESSMENT & PLAN NOTE
Patient has history of Parkinson's  Wife describes sundowning.   Patient scored 0/5 on mini cog 1/22/23  Patient AAO x 2 currently and at baseline  CT head (1/21/25) showed moderate microangiopathic changes  Pt. Is AAOx2 at baseline, he needs assistance with all iADL's at baseline  TSH 2.635 (1/21/25)  B12 551 (1/21/25)  Humble to person, place, time, and situation as needed  Monitor for behaviors   Monitor for mental status change  Provide supportive care  Consider Seroquel 12.5mg Q8H prn for agitation and hallucinations, monitor QTc

## 2025-01-22 NOTE — ASSESSMENT & PLAN NOTE
Recent Labs     01/21/25  1226 01/21/25  1702 01/22/25  0449   K 6.0* 4.5 4.7     S/p albuterol, insulin, amp of d50, Lasix 40 mg in ED  Repeat BMP pending  Etiology unclear  Hold ARB

## 2025-01-22 NOTE — OCCUPATIONAL THERAPY NOTE
"    Occupational Therapy Evaluation     Patient Name: Daniel Sanz Jr.  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)    Dementia (HCC)    Constipation    Past Medical History  Past Medical History:   Diagnosis Date    Arthritis     Asbestos exposure     Asbestosis (HCC)     GERD (gastroesophageal reflux disease)     Hemoptysis 5/11/2022    Hypertension     Lung disease     Parkinson's disease (HCC)      Past Surgical History  Past Surgical History:   Procedure Laterality Date    HERNIA REPAIR      INTRACAPSULAR CATARACT EXTRACTION Left     KNEE SURGERY      many years ago    SHOULDER SURGERY           01/22/25 1518   OT Last Visit   OT Visit Date 01/22/25  (Wednesday)   Note Type   Note type Evaluation  (Eval 2002-7935; tx 7607-4250)   Additional Comments Identified pt by full name and birthdate. Wife present upon therapist arrival reported pt prefers \"Alfonzo\"   Pain Assessment   Pain Assessment Tool FLACC   Pain Rating: FLACC (Rest) - Face 0   Pain Rating: FLACC (Rest) - Legs 0   Pain Rating: FLACC (Rest) - Activity 0   Pain Rating: FLACC (Rest) - Cry 0   Pain Rating: FLACC (Rest) - Consolability 0   Score: FLACC (Rest) 0   Pain Rating: FLACC (Activity) - Face 1   Pain Rating: FLACC (Activity) - Legs 1   Pain Rating: FLACC (Activity) - Activity 1   Pain Rating: FLACC (Activity) - Cry 1   Pain Rating: FLACC (Activity) - Consolability 1   Score: FLACC (Activity) 5   Restrictions/Precautions   Weight Bearing Precautions Per Order No   Other Precautions Cognitive;Chair Alarm;Bed Alarm;Restraints;Fall Risk;Pain  (Ritesh, room air; Delmis belt upon arrival and post eval)   Home Living   Type of Home House   Home Layout One level;Performs ADLs on one level;Other (Comment)  (1 JAYLEN)   Bathroom Shower/Tub " "Walk-in shower   Bathroom Toilet Raised   Bathroom Equipment Grab bars in shower;Shower chair   Bathroom Accessibility Accessible   Home Equipment Walker;Cane;Reacher;Grab bars;Other (Comment)  (dressing stick)   Additional Comments Pt lives w/ wife in 1 SH w/ 1 JAYLEN   Prior Function   Level of [place] Needs assistance with IADLS   Lives With Spouse   Receives Help From Family;Home health  (HHOT/PT/ST; supportive son, niece, nephew, family)   IADLs Family/Friend/Other provides transportation;Family/Friend/Other provides meals;Family/Friend/Other provides medication management   Falls in the last 6 months 1 to 4  (+ fall history; per previous OT eval (06/13/24) at least 3)   Vocational Retired   Comments Pt is not an accurate historian upon eval or at baseline; pleasantly confused. Pt uses AD at times for functional mobility. Needs assistance w/ IADLs, pulling up his pants and assist to dry off after showering   Lifestyle   Autonomy Pt needs assistance w/ ADLs / IADLs at home w/ wife.   Reciprocal Relationships Supportive wife, family and actively receiving HHOT/PT/ST   Service to Others Pt is retired    Intrinsic Gratification Per previous OT eval, enjoys playing with his cats. Pt enjoysing tv post eval   General   Additional Pertinent History Pt admitted to [facility] on 1/21/25. Diagnosed w/ altered mental status   Family/Caregiver Present Yes  (wife and niece present upon therapist arrival but left)   Additional General Comments Personal and environmental factors supporting performance includes supportive wife / family, access to AD/ DME, first floor set- up; barriers include advanced age, fall history, difficulty completing IADLs, difficulty completing ADLs, insight into deficits   Subjective   Subjective \"It is coming out\" (stated after reporting urge to use the bathroom)   ADL   Where Assessed Edge of bed  (vs seated on commode)   Eating Assistance Unable to assess  (anticipate min A based on fxal obs " skills, clinical judgement)   Eating Deficit NPO;Other (Comment)  (pend ST consult)   Grooming Assistance 4  Minimal Assistance   Grooming Deficit Setup;Verbal cueing;Supervision/safety;Increased time to complete  (+ time to initiate while seated; slow to respond, flat affect)   UB Bathing Assistance Unable to assess   LB Bathing Assistance 2  Maximal Assistance   LB Bathing Deficit Setup;Steadying;Right lower leg including foot;Left lower leg including foot;Buttocks   UB Dressing Assistance 4  Minimal Assistance   UB Dressing Deficit Setup;Verbal cueing;Supervision/safety;Increased time to complete  (seated at EOB)   LB Dressing Assistance 2  Maximal Assistance   LB Dressing Deficit Setup;Steadying;Don/doff R sock;Don/doff L sock;Verbal cueing;Supervision/safety;Increased time to complete   Toileting Assistance  2  Maximal Assistance   Toileting Deficit Setup;Bedside commode;Perineal hygiene;Clothing management up;Clothing management down   Additional Comments (S)  BP 93/49 seated on commode   Bed Mobility   Supine to Sit 2  Maximal assistance   Additional items Assist x 1;HOB elevated;Increased time required;Verbal cues;LE management  (to pt's R)   Sit to Supine Unable to assess   Additional Comments Pt seated on commode post eval attempting to void   Transfers   Sit to Stand 2  Maximal assistance   Additional items Assist x 1;Increased time required;Verbal cues;Bedrails;Armrests  (from EOB w/ HHA)   Stand to Sit 2  Maximal assistance   Additional items Assist x 1;Increased time required;Bedrails;Armrests;Verbal cues  (multi sensory cues/ prompts to sit; rigid)   Stand pivot 2  Maximal assistance  (max HHA x1 EOB to commode on L)   Additional items Assist x 1;Increased time required;Verbal cues   Functional Mobility   Additional Comments Will continue to assess   Balance   Static Sitting Fair   Static Standing Poor   Ambulatory   (Will continue to assess)   Activity Tolerance   Activity Tolerance Patient limited  by fatigue   Medical Staff Made Aware care coordination w/ PTCharisse for portion of session due to decreased activity tolerance, regression from baseline and skilled physical assistance required. Spoke w/ Poonam FOSTER   Nurse Made Aware spoke w/ Crystal YAP   RUSYLVESTER Assessment   RUE Assessment   (observed during ADLs)   RUE Strength   RUE Overall Strength Deficits;Due to cognitive deficits;Other (Comment)  (able to participate in ADLs, limited shoulder ROM (premobid))   LUE Assessment   LUE Assessment   (observed during ADLs)   LUE Strength   LUE Overall Strength Deficits;Due to cognitive deficits  (able to participate in ADLs, limited shoulder ROM (premobid))   Hand Function   Gross Motor Coordination Functional   Fine Motor Coordination Impaired   Hand Function Comments Recommend ongoing eval. Pt reports R hand dominance   Cognition   Overall Cognitive Status Impaired   Arousal/Participation Alert   Attention Attends with cues to redirect   Orientation Level Oriented to person;Disoriented to place;Disoriented to time;Disoriented to situation   Memory Decreased short term memory;Decreased recall of recent events;Decreased recall of precautions   Following Commands Follows one step commands with increased time or repetition   Comments Alert, pleasantly confused, and able to follow directions during ADLs w/ + time and cues. Slow to respond and not an accurate / consistent historian. Recommend ongoing eval of functional cognition as appropriate to assist in DC Planning.   Assessment   Limitation Decreased ADL status;Decreased UE strength;Decreased Safe judgement during ADL;Decreased cognition;Decreased endurance;Decreased self-care trans;Decreased high-level ADLs;Decreased fine motor control  (impaired pain, sitting balance, standing balance, insight into deficits, activity tolerance, forward functional reach, problem solving)   Assessment Pt is an 85yo male admitted to [facility] on 1/21/25 w/ increased confusion from home. Pt  fell while entering the ED w/ head strike. Diagnosed w/ altered mental status. Significant PMH impacting his occupational performance includes Parkinson's disease, chronic R shoulder pain, HTN, arthritis, hx scalp hematoma. Pt lives w/ his wife in 1 SH w/ 1 JAYLEN. Pt needs assistance w/ bathing and pulling up his pants. Pt uses no AD vs RW vs cane for functional mobility. Pt actively receiving HHOT/PT/ST. Pt needs assistance w/ IADLs and is not an accurate historian upon eval or at baseline. Upon eval, pt alert and pleasantly confused. Able to follow one step directions w/ + time and cues. Pt required max A bed mobility, max A sit <>stand, max A SPT w/ HHA EOB to commode, max A toileting, max A LBD, min A UBD. Pt is completing ADLs below baseline level of I and would benefit from OT In acute care to max I w/ ADLs, achieve highest level of function. Recommend level II rehab resource intensity when medically stable for discharge from acute care at this time. Will continue to follow   Goals   Patient Goals Pt did not state. Will continue to assess   LTG Time Frame 7-10   Long Term Goal #1 see below   Plan   Treatment Interventions ADL retraining;Functional transfer training;UE strengthening/ROM;Endurance training;Cognitive reorientation;Patient/family training;Equipment evaluation/education;Compensatory technique education;Cardiac education;Energy conservation;Activityengagement;Fine motor coordination activities   Goal Expiration Date 02/01/25   OT Treatment Day 0  (Wed 1/22/25)   OT Frequency 3-5x/wk   Discharge Recommendation   Rehab Resource Intensity Level, OT II (Moderate Resource Intensity)   AM-PAC Daily Activity Inpatient   Lower Body Dressing 2   Bathing 2   Toileting 2   Upper Body Dressing 2   Grooming 3   Eating 3   Daily Activity Raw Score 14   Daily Activity Standardized Score (Calc for Raw Score >=11) 33.39   AM-PAC Applied Cognition Inpatient   Following a Speech/Presentation 1   Understanding Ordinary  "Conversation 2   Taking Medications 1   Remembering Where Things Are Placed or Put Away 2   Remembering List of 4-5 Errands 1   Taking Care of Complicated Tasks 1   Applied Cognition Raw Score 8   Applied Cognition Standardized Score 19.32   Barthel Index   Feeding 5   Bathing 0   Grooming Score 0   Dressing Score 5   Bladder Score 5   Bowels Score 5   Toilet Use Score 5   Transfers (Bed/Chair) Score 5   Mobility (Level Surface) Score 0   Stairs Score 0   Barthel Index Score 30   Additional Treatment Session   Start Time 1508   End Time 1518   Treatment Assessment Pt seen for skilled OT tx session day 1 following eval. Pt seated on commode attempting to void. Care coordination w/ PT, Charisse due to decreased activity tolerance, regression from baseline, and physical assistance required. Pt required max A X1 to resposition himself on commode using arm rests for support. Pt required max Ax1 sit to stand from commode and max HHA x2 to complete SPT from commode to EOB.  Pt required total A to complete personal hygiene. Pt tolerated standing approx 1 minute w/ poor <> poor + balance. Pt stated \"I feel like I am falling forward and now that way\". /47 in standing. Pt required max A to complete bed mobility sit to supine and total A to reposition in bed. Continue to recommend level II rehab resource intensity when medically stable for discharge from acute care. Pt supine HOB elevated w/ needs met, call bell in reach and bed alarm activated w/ [place] belt in place. Will continue to follow   Additional Treatment Day 1  (Wed 1/22/25)   End of Consult   Education Provided Yes;Family or social support of family present for education by provider   Patient Position at End of Consult Supine;Bed/Chair alarm activated;All needs within reach   Nurse Communication Nurse aware of consult   Licensure   [place] License Number  Nuria Haider, OTR/L         The patient's raw score on the AM-PAC Daily Activity Inpatient Short Form is 14. A " raw score of less than 19 suggests the patient may benefit from discharge to post-acute rehabilitation services. Please refer to the recommendation of the Occupational Therapist for safe discharge planning.      Pt goals to be met by 2/1/25 to max I w/ ADLs and improve engagement to return home w/ wife includes:    -Pt will demonstrate good attention and participation in ongoing eval of functional cognition to assist in DC planning    -Pt will consistently follow 1 step directions during ADLs w/ good recall    -Pt will complete bed mobility supine <> sit w/ min A to minimize burden of care in preparation for ADL tasks    -Pt will complete functional transfers to bed, chair, and toilet using LRAD, DME as needed    -Pt will complete UBD w/ S after set- up    -Pt will complete LBD w/ min A to max I and minimize burden of care    -Pt will demonstrate good attention and participation in ongoing eval of functional mobility to assist in DC planning    -Pt will demonstrate improved activity and sitting tolerance OOB in chair for all meals    -Pt will demonstrate improved AMPAC score to at least 18 to max I w/ ADLs and assist in DC planning.    -Pt will complete grooming w/ S after set- up    -Pt will complete feeding w/ S after set- up in supported sitting.       Nuria Haider, OTR/L  [id number]  [id number]

## 2025-01-22 NOTE — TELEPHONE ENCOUNTER
Wife called to inform our provider that when she was taking pt to ED and he was walking into building he fell and hit his head and has a cut on his head.     During work up the noted he is hypothermic (using marcella hugger- using warming blanket) and hyperkalemia. OT in hospital told wife that she seen in pts that this may be a cause from an infection. Wife wants to know if this is from his medication or an infection. Wife informed she is going to Hospital soon . I informed she needs to speak to the Hospital provider's that are caring for him and treating him, so they can advise how he is doing and provide her an update as our provider can not advise as she is not caring for pt during his inpt stay. Wife  understood she just wanted to make our provider aware. She will discuss with hospital provider today regarding her questions    She also informed that pt has an appt this Monday 1/27/25 with Dr Cavanaugh. Wife unsure if he will still be admitted by then or home , but her biggest concern is even if he is home, she may be getting snow and she will need to r/s appt. I informed wife that it would be best to see if he is d/c since at that time he may need a hospital follow up (the hospitalist would advise who he needs to see). Wife understood and she will call back Friday to discuss update to see if she needs to r/s appt for Monday.

## 2025-01-23 ENCOUNTER — APPOINTMENT (INPATIENT)
Dept: RADIOLOGY | Facility: HOSPITAL | Age: 85
End: 2025-01-23
Payer: COMMERCIAL

## 2025-01-23 PROBLEM — D64.9 ANEMIA: Status: ACTIVE | Noted: 2025-01-23

## 2025-01-23 PROBLEM — R13.10 DYSPHAGIA: Status: ACTIVE | Noted: 2025-01-23

## 2025-01-23 LAB
ANION GAP SERPL CALCULATED.3IONS-SCNC: 6 MMOL/L (ref 4–13)
BUN SERPL-MCNC: 27 MG/DL (ref 5–25)
CALCIUM SERPL-MCNC: 8.8 MG/DL (ref 8.4–10.2)
CHLORIDE SERPL-SCNC: 108 MMOL/L (ref 96–108)
CO2 SERPL-SCNC: 28 MMOL/L (ref 21–32)
CREAT SERPL-MCNC: 1.09 MG/DL (ref 0.6–1.3)
ERYTHROCYTE [DISTWIDTH] IN BLOOD BY AUTOMATED COUNT: 13.4 % (ref 11.6–15.1)
GFR SERPL CREATININE-BSD FRML MDRD: 62 ML/MIN/1.73SQ M
GLUCOSE SERPL-MCNC: 77 MG/DL (ref 65–140)
HCT VFR BLD AUTO: 30.1 % (ref 36.5–49.3)
HGB BLD-MCNC: 9.6 G/DL (ref 12–17)
MAGNESIUM SERPL-MCNC: 2.2 MG/DL (ref 1.9–2.7)
MCH RBC QN AUTO: 31.9 PG (ref 26.8–34.3)
MCHC RBC AUTO-ENTMCNC: 31.9 G/DL (ref 31.4–37.4)
MCV RBC AUTO: 100 FL (ref 82–98)
PLATELET # BLD AUTO: 164 THOUSANDS/UL (ref 149–390)
PMV BLD AUTO: 10.4 FL (ref 8.9–12.7)
POTASSIUM SERPL-SCNC: 4.9 MMOL/L (ref 3.5–5.3)
RBC # BLD AUTO: 3.01 MILLION/UL (ref 3.88–5.62)
SODIUM SERPL-SCNC: 142 MMOL/L (ref 135–147)
WBC # BLD AUTO: 5.09 THOUSAND/UL (ref 4.31–10.16)

## 2025-01-23 PROCEDURE — 80048 BASIC METABOLIC PNL TOTAL CA: CPT

## 2025-01-23 PROCEDURE — 85027 COMPLETE CBC AUTOMATED: CPT

## 2025-01-23 PROCEDURE — 74018 RADEX ABDOMEN 1 VIEW: CPT

## 2025-01-23 PROCEDURE — 99232 SBSQ HOSP IP/OBS MODERATE 35: CPT | Performed by: FAMILY MEDICINE

## 2025-01-23 PROCEDURE — 99232 SBSQ HOSP IP/OBS MODERATE 35: CPT

## 2025-01-23 PROCEDURE — 92610 EVALUATE SWALLOWING FUNCTION: CPT

## 2025-01-23 PROCEDURE — 83735 ASSAY OF MAGNESIUM: CPT

## 2025-01-23 PROCEDURE — 99223 1ST HOSP IP/OBS HIGH 75: CPT | Performed by: PSYCHIATRY & NEUROLOGY

## 2025-01-23 RX ORDER — SODIUM CHLORIDE 9 MG/ML
75 INJECTION, SOLUTION INTRAVENOUS CONTINUOUS
Status: DISCONTINUED | OUTPATIENT
Start: 2025-01-23 | End: 2025-01-25

## 2025-01-23 RX ORDER — CARBIDOPA AND LEVODOPA 25; 100 MG/1; MG/1
2 TABLET ORAL 3 TIMES DAILY
Status: DISCONTINUED | OUTPATIENT
Start: 2025-01-23 | End: 2025-01-24

## 2025-01-23 RX ORDER — BISACODYL 10 MG
10 SUPPOSITORY, RECTAL RECTAL DAILY
Status: DISCONTINUED | OUTPATIENT
Start: 2025-01-23 | End: 2025-01-31 | Stop reason: HOSPADM

## 2025-01-23 RX ORDER — LIDOCAINE 50 MG/G
1 PATCH TOPICAL DAILY
Status: DISCONTINUED | OUTPATIENT
Start: 2025-01-23 | End: 2025-01-31 | Stop reason: HOSPADM

## 2025-01-23 RX ORDER — POLYETHYLENE GLYCOL 3350 17 G/17G
17 POWDER, FOR SOLUTION ORAL 2 TIMES DAILY
Status: DISCONTINUED | OUTPATIENT
Start: 2025-01-23 | End: 2025-01-28

## 2025-01-23 RX ADMIN — ACETAMINOPHEN 650 MG: 325 TABLET, FILM COATED ORAL at 09:06

## 2025-01-23 RX ADMIN — LIDOCAINE 1 PATCH: 700 PATCH TOPICAL at 16:19

## 2025-01-23 RX ADMIN — CYANOCOBALAMIN TAB 500 MCG 1000 MCG: 500 TAB at 08:56

## 2025-01-23 RX ADMIN — SENNOSIDES 17.2 MG: 8.6 TABLET ORAL at 08:55

## 2025-01-23 RX ADMIN — AMLODIPINE BESYLATE 5 MG: 5 TABLET ORAL at 08:56

## 2025-01-23 RX ADMIN — DOCUSATE SODIUM 100 MG: 100 CAPSULE, LIQUID FILLED ORAL at 08:56

## 2025-01-23 RX ADMIN — Medication 1000 UNITS: at 08:56

## 2025-01-23 RX ADMIN — BISACODYL 10 MG: 10 SUPPOSITORY RECTAL at 16:15

## 2025-01-23 RX ADMIN — MELATONIN 3 MG: 3 TAB ORAL at 20:32

## 2025-01-23 RX ADMIN — ENOXAPARIN SODIUM 40 MG: 40 INJECTION SUBCUTANEOUS at 08:57

## 2025-01-23 RX ADMIN — SODIUM CHLORIDE 75 ML/HR: 0.9 INJECTION, SOLUTION INTRAVENOUS at 12:13

## 2025-01-23 RX ADMIN — ACETAMINOPHEN 650 MG: 325 TABLET, FILM COATED ORAL at 16:19

## 2025-01-23 RX ADMIN — CARBIDOPA AND LEVODOPA 2.5 TABLET: 25; 100 TABLET ORAL at 08:56

## 2025-01-23 RX ADMIN — CARBIDOPA AND LEVODOPA 2 TABLET: 25; 100 TABLET ORAL at 20:33

## 2025-01-23 RX ADMIN — ACETAMINOPHEN 650 MG: 325 TABLET, FILM COATED ORAL at 20:33

## 2025-01-23 RX ADMIN — CARBIDOPA AND LEVODOPA 2 TABLET: 25; 100 TABLET ORAL at 16:18

## 2025-01-23 RX ADMIN — POLYETHYLENE GLYCOL 3350 17 G: 17 POWDER, FOR SOLUTION ORAL at 08:57

## 2025-01-23 NOTE — ASSESSMENT & PLAN NOTE
Unknown LBM, wife estimates it may have been a week  1/23 KUB: Moderate colonic burden  Increased Miralax to BID and ordered dulcolax suppository

## 2025-01-23 NOTE — CONSULTS
Consultation - Neurology   Name: Daniel Sanz Jr. 84 y.o. male I MRN: [id number]  Unit/Bed#: S -01 I Date of Admission: 1/21/2025   Date of Service: 1/23/2025 I Hospital Day: 2   Inpatient consult to Neurology  Consult performed by: Trudy Perez PA-C  Consult ordered by: MITESH Oswald        Physician Requesting Evaluation: Mable Yu DO   Reason for Evaluation / Principal Problem: Parkinson's disease and AMS    Assessment & Plan  Altered mental status  84 y.o. male with Parkinson's disease on Sinemet, dementia (scored 7/30 on MoCA in 3/2024), HTN, chronic lower extremity edema, and frequent falls who presented to [facility] on [facility]25 with AMS.  Patient Sinemet was increased 1 week ago from 2 tab TID to 2.5 tab TID due to patient having significant bradykinesia, cogwheeling, and tremor at the lower dose.  Since then, patient has had a decline in his behavior, especially at night.  His wife reports that he will frequently have abnormal movements while sleeping and awaken from sleep with hallucinations.  He has also been agitated and aggressive with garbled speech.    Upon ambulation into the ED, patient fell and struck his head, sustaining a laceration above the right eyebrow.  CT head negative for acute intracranial abnormalities.  Patient was hypothermic with temperature 96 °F.  Labs revealed potassium 6.0 and hemoglobin 9.6.  Remainder of CBC, BMP, lactic acid, influenza, and UA unremarkable.  Neurology was consulted to give guidance on Sinemet dosing and other recommendations for his Parkinson's disease/altered mental status.    On exam, patient is pleasantly confused.  He does demonstrate advanced Parkinson's disease with hypomimia, hypophonia, resting tremor in bilateral hands, diffuse bradykinesia, and cogwheel rigidity in BUEs.  He is only oriented to self and year.  AMS possibly related to recently increasing the dose of Sinemet, but cannot fully rule out progression of  Parkinson's disease and dementia.    Plan:  - Per discussion with attending neurologist, will decrease Sinemet  back to 2 tabs TID and monitor exam  - Geriatrics following; started patient on low dose Seroquel at night time  - PT/OT  - Continue to monitor and notify neurology with any changes.   - Medical management and supportive care per primary team. Correction of any metabolic or infectious disturbances  Parkinson's disease (HCC)  - See assessment/plan above  Dementia (HCC)  - Conservative management of delirium: minimize noise, maintain day/night cycle, provide frequent redirection, avoid restraints if possible, etc.  - Geriatrics following  - Patient scored 7/30 on MOCA in March 2024.  - Seroquel for delirium  Fall  - PT/OT      Recommendations for outpatient neurological follow up have yet to be determined.    History of Present Illness   Daniel Sanz Jr. is a 84 y.o. male with Parkinson's disease (increased Sinemet from 2 tab TID to 2.5 tab TID 1 week ago), dementia (scored 7/30 on MoCA in 3/2024), HTN, chronic lower extremity edema, and frequent falls who presented to [facility] on 1/21/2025 with AMS and fall with head strike.    Obtained per chart review due to patient being a poor historian given baseline dementia.  Patient follows with Dr. Cavanaugh in the outpatient neurology office with last appointment on 10/24/2024.  Patient was noted to have moderate bradykinesia, increased tone, and intermittent resting tremor with shuffling gait.  Due to patient not appearing to be controlled on Sinemet  mg 2 tablets TID, it was recommended that patient increase his Sinemet to 2.5 tablets TID.  Patient's wife was hesitant as patient had experienced worsening sedation and dizziness in the past with higher doses.  However, approximately 1 week ago, they did increase the Sinemet dose.  On 1/21/2025, patient's wife called the office crying and reporting that patient has had decline in behavior, especially  at night.  He will frequently awaken from sleep with hallucinations.  He has also been agitated and aggressive.  He will have abnormal movements while sleeping and speech is garbled.  It was recommended he be evaluated in the ED.    Upon ambulation into the ED, patient fell and struck his head, sustaining a laceration above the right eyebrow.  CT head negative for acute intracranial abnormalities.  Patient was hypothermic with temperature 96 °F.  Labs revealed potassium 6.0 and hemoglobin 9.6.  Remainder of CBC, BMP, lactic acid, influenza, and UA unremarkable.    Patient was eval by geriatrics who started the patient on low-dose Seroquel at nighttime.  He has not had any reports of agitation since then.  Neurology was consulted to help weigh in on patient's Sinemet dosing.    On exam, patient initially sleeping, but arouses with repeated verbal and tactile stimuli.  Patient is in a [place] belt.  He is pleasantly confused.  Denies any complaints, but unclear if this is an accurate ROS due to baseline dementia.      Review of Systems   Unable to perform ROS: Dementia        I have reviewed the patient's PMH, PSH, Social History, Family History, Meds, and Allergies  Historical Information   Past Medical History:   Diagnosis Date    Arthritis     Asbestos exposure     Asbestosis (HCC)     GERD (gastroesophageal reflux disease)     Hemoptysis 5/11/2022    Hypertension     Lung disease     Parkinson's disease (HCC)      Past Surgical History:   Procedure Laterality Date    HERNIA REPAIR      INTRACAPSULAR CATARACT EXTRACTION Left     KNEE SURGERY      many years ago    SHOULDER SURGERY       Social History     Tobacco Use    Smoking status: Never     Passive exposure: Past    Smokeless tobacco: Former     Types: Chew     Quit date: 1998   Vaping Use    Vaping status: Never Used   Substance and Sexual Activity    Alcohol use: Not Currently    Drug use: Never    Sexual activity: Not on file     E-Cigarette/Vaping     E-Cigarette Use Never User      E-Cigarette/Vaping Substances    Nicotine No     THC No     CBD No     Flavoring No     Other No     Unknown No      Family History   Problem Relation Age of Onset    Stroke Mother     Colon cancer Mother     Depression Sister     Hypertension Sister      Social History     Tobacco Use    Smoking status: Never     Passive exposure: Past    Smokeless tobacco: Former     Types: Chew     Quit date: 1998   Vaping Use    Vaping status: Never Used   Substance and Sexual Activity    Alcohol use: Not Currently    Drug use: Never    Sexual activity: Not on file       Current Facility-Administered Medications:     acetaminophen (TYLENOL) tablet 650 mg, TID    amLODIPine (NORVASC) tablet 5 mg, Daily    Artificial Tears Op Soln 1 drop, Q4H PRN    bisacodyl (DULCOLAX) rectal suppository 10 mg, Daily    calcium carbonate (TUMS) chewable tablet 1,000 mg, Daily PRN    carbidopa-levodopa (SINEMET)  mg per tablet 2 tablet, TID    Cholecalciferol (VITAMIN D3) tablet 1,000 Units, Daily    cyanocobalamin (VITAMIN B-12) tablet 1,000 mcg, Daily    docusate sodium (COLACE) capsule 100 mg, BID    enoxaparin (LOVENOX) subcutaneous injection 40 mg, Daily    lidocaine (LIDODERM) 5 % patch 1 patch, Daily    melatonin tablet 3 mg, HS    ondansetron (ZOFRAN) injection 4 mg, Q6H PRN    polyethylene glycol (MIRALAX) packet 17 g, BID    QUEtiapine (SEROquel) tablet 12.5 mg, Q8H PRN    senna (SENOKOT) tablet 17.2 mg, BID    sodium chloride 0.9 % infusion, Continuous, Last Rate: 75 mL/hr (01/23/25 1213)  Prior to Admission Medications   Prescriptions Last Dose Informant Patient Reported? Taking?   Vitamin D, Cholecalciferol, 25 MCG (1000 UT) TABS Past Week  Yes Yes   Sig: Take 1 tablet by mouth 3 (three) times a week   acetaminophen (TYLENOL) 325 mg tablet Past Month  No Yes   Sig: Take 3 tablets (975 mg total) by mouth every 8 (eight) hours   Patient taking differently: Take 975 mg by mouth every 6 (six) hours as  needed for moderate pain   amLODIPine (NORVASC) 5 mg tablet 1/20/2025 at  6:00 PM Spouse/Significant Other Yes Yes   Sig: Take 5 mg by mouth daily   carbidopa-levodopa (Sinemet)  mg per tablet 1/21/2025 Noon  No Yes   Sig: Take 2.5 tablets by mouth 3 (three) times a day   carboxymethylcellulose (Refresh Tears) 0.5 % SOLN Past Week  Yes Yes   Sig: Administer 1 drop to both eyes daily.   furosemide (LASIX) 20 mg tablet Past Week  Yes Yes   Sig: Take 20 mg by mouth 3 (three) times a week   losartan (COZAAR) 50 mg tablet 1/21/2025 Morning  Yes Yes   Sig: Take 50 mg by mouth daily   senna-docusate sodium (SENOKOT S) 8.6-50 mg per tablet Past Month  No Yes   Sig: Take 1 tablet by mouth 2 (two) times a day   vitamin B-12 (VITAMIN B-12) 1,000 mcg tablet Past Week  Yes Yes   Sig: Take 1,000 mcg by mouth 3 (three) times a week      Facility-Administered Medications: None     Morphine and codeine    Objective :  Temp:  [97.2 °F (36.2 °C)-98.4 °F (36.9 °C)] 97.9 °F (36.6 °C)  HR:  [77-80] 79  BP: (120-162)/(70-78) 158/77  Resp:  [18-20] 20  SpO2:  [95 %-96 %] 96 %  O2 Device: None (Room air)    Physical Exam  Vitals and nursing note reviewed.   Constitutional:       Comments: Chronically ill-appearing male with hypomimia.  Initially sleeping, but arouses with repeated verbal and tactile stimuli.  Patient is in a [place] belt   HENT:      Head: Normocephalic.      Comments: Abrasion above the right eyebrow     Mouth/Throat:      Mouth: Mucous membranes are moist.      Pharynx: Oropharynx is clear.   Eyes:      Extraocular Movements: Extraocular movements intact.      Conjunctiva/sclera: Conjunctivae normal.      Pupils: Pupils are equal, round, and reactive to light.   Pulmonary:      Effort: Pulmonary effort is normal.   Musculoskeletal:      Right lower leg: Edema present.      Left lower leg: Edema present.   Skin:     General: Skin is warm and dry.   Neurological:      Mental Status: He is alert.      Deep Tendon  "Reflexes:      Reflex Scores:       Bicep reflexes are 2+ on the right side and 2+ on the left side.       Brachioradialis reflexes are 2+ on the right side and 2+ on the left side.       Patellar reflexes are 2+ on the right side and 2+ on the left side.    Neurological Exam  Mental Status  Alert.  Patient initially sleeping, but arouses after repeated verbal and tactile stimuli.  Patient remains lethargic throughout the exam  Oriented to self and year.  Correctly stated that the month is November and he thought that we were in \"someone's backyard.\"  Intermittently follows some simple midline and appendicular commands, but difficulty with multistep commands.  Does better with mimicking   Processing speed  Hypomimia  Hypophonia  Moderate dysarthria and occasionally speech is unintelligible  No aphasia.  Able to name objects around the room.  .    Cranial Nerves  CN III, IV, VI: Extraocular movements intact bilaterally. Pupils equal round and reactive to light bilaterally.  Pupils 3 mm, round, active to light bilaterally.  EOMs intact  Blink to threat intact bilaterally.  Facial sensation to light touch intact throughout.  Hypomimia  No obvious facial asymmetry  Patient attempts to stick out tongue, but significant tongue tremor and cannot go past the lips.    Motor    Significant cogwheel rigidity in bilateral upper extremities  Bradykinesia with all movements  Intermittent resting tremor in bilateral hands and left foot.  Significant intention tremor with finger-to-nose testing.  5/5 strength BUEs and BLEs.    Sensory  Sensation to light touch and temperature intact throughout..    Reflexes                                            Right                      Left  Brachioradialis                    2+                         2+  Biceps                                 2+                         2+  Patellar                                2+                         2+  Positive glabellar sign  No ankle clonus " "bilaterally.  Downgoing toes bilaterally..    Coordination    No clear ataxia with finger-to-nose bilaterally, but movements are significantly bradykinetic and tremor impairs fluidity of movements.  Occasional past point abnormality..    Gait    Deferred for patient safety..        Lab Results: I have reviewed the following results:I have personally reviewed pertinent reports.  , CBC:   Results from last 7 days   Lab Units 01/23/25  0436 01/22/25  0449 01/21/25  2319 01/21/25  1226   WBC Thousand/uL 5.09 6.42  --  5.35   RBC Million/uL 3.01* 3.38*  --  3.07*   HEMOGLOBIN g/dL 9.6* 10.9*  --  9.9*   HEMATOCRIT % 30.1* 33.2*  --  30.6*   MCV fL 100* 98  --  100*   PLATELETS Thousands/uL 164 176 167 174   , BMP/CMP:   Results from last 7 days   Lab Units 01/23/25  0436 01/22/25  0449 01/21/25  1702 01/21/25  1226   SODIUM mmol/L 142 139 141 138   POTASSIUM mmol/L 4.9 4.7 4.5 6.0*   CHLORIDE mmol/L 108 106 108 109*   CO2 mmol/L 28 27 26 25   BUN mg/dL 27* 32* 32* 33*   CREATININE mg/dL 1.09 1.11 0.98 0.98   CALCIUM mg/dL 8.8 9.1 8.7 8.5   AST U/L  --   --   --  28   ALT U/L  --   --   --  4*   ALK PHOS U/L  --   --   --  112*   EGFR ml/min/1.73sq m 62 61 71 71   , Vitamin B12:   Results from last 7 days   Lab Units 01/22/25  0449   VITAMIN B 12 pg/mL 551   , HgBA1C:   , TSH:   Results from last 7 days   Lab Units 01/21/25  1226   TSH 3RD GENERATON uIU/mL 2.635   , Coagulation:   Results from last 7 days   Lab Units 01/21/25  1230   INR  1.03   , Lipid Profile:   , Ammonia:   , Urinalysis:   Results from last 7 days   Lab Units 01/21/25  1512   COLOR UA  Light Yellow   CLARITY UA  Clear   SPEC GRAV UA  1.019   PH UA  5.5   LEUKOCYTES UA  Negative   NITRITE UA  Negative   GLUCOSE UA mg/dl Negative   KETONES UA mg/dl Negative   BILIRUBIN UA  Negative   BLOOD UA  Negative   , Drug Screen:   , Medication Drug Levels:       Invalid input(s): \"CARBAMAZEPINE\", \"OXCARBAZEPINE\"  Recent Labs     01/23/25  0436   WBC 5.09   HGB " 9.6*   HCT 30.1*      SODIUM 142   K 4.9      CO2 28   BUN 27*   CREATININE 1.09   GLUC 77   MG 2.2     Imaging Results Review: I reviewed radiology reports from this admission including: CT head, CT cervical spine.      VTE Prophylaxis: VTE covered by:  enoxaparin, Subcutaneous, 40 mg at 01/23/25 0857    and Sequential compression device (Venodyne)

## 2025-01-23 NOTE — PROGRESS NOTES
Progress Note - Geriatric Medicine   Name: Daniel Sanz Jr. 84 y.o. male I MRN: [id number]  Unit/Bed#: S -01 I Date of Admission: 1/21/2025   Date of Service: 1/23/2025 I Hospital Day: 2     Assessment & Plan  Altered mental status  -Patient is high risk of delirium due to hospitalization, dementia, recent fall, and metabolic imbalances, pain  -delirium precautions  -maintain normal sleep/wake cycle, start melatonin 3 mg QHS  -minimize overnight interruptions, group overnight vitals/labs/nursing checks as possible  -dim lights, close blinds and turn off tv to minimize stimulation and encourage sleep environment in evenings  -Continue Tylenol 975mg Q8H scheduled   -monitor for fecal and urinary retention which may precipitate delirium  -encourage early mobilization and ambulation  -provide frequent reorientation and redirection  -encourage family and friends at the bedside to help calm patient if anxious  -avoid medications which may precipitate or worsen delirium such as tramadol, benzodiazepine, anticholinergics, and antihistaminics  -encourage hydration and nutrition , assist with feeding if needed  -redirect unwanted behaviors as first line, avoid physical restraints.     Parkinson's disease (HCC)  - Patient on Sinemet, neurology follows, appreciate input  - Monitor orthostatic vitals  - Ambulate patient  Fall  Patient has history of multiple falls in the past 6 months  Patient uses walker or cane for ambulation at baseline  Will continue PT/OT.  Monitor orthostatic vitals  Avoid hypotension and hypoglycemia  Fall precautions   Can consider lidocaine patch for right shoulder pain.  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   Patient scored 0/5 on mini cog 1/22/23  Patient AAO x 2 currently and at baseline  CT head  (1/21/25) showed moderate microangiopathic changes  Pt. Is AAOx2 at baseline, he needs assistance with all iADL's at baseline  TSH 2.635 (1/21/25)  B12 551 (1/21/25)  [place] to person, place, time, and situation as needed  Monitor for behaviors   Monitor for mental status change  Provide supportive care  Consider Seroquel 12.5mg Q8H prn for agitation and hallucinations, monitor QTc  Constipation  Patient unable to state when last BM was  Nurse reported no BM today.  No abdominal pain, nausea, vomiting.  Encouraged po hydration.  Continue bowel regimen.        Dysphagia  Patient seen by speech therapy.  Will continue purée diet with nectar thick liquids  Continue aspiration precautions  Anemia  Hemoglobin 9.6 today, continue to monitor and manage as per primary team    Subjective:   84-year-old male presents lying comfortably in bed in restraint.  Patient's son is at the bedside.  Patient stated he slept well and has been sleepy.  Patient stated he has aches and pains in the morning, specifically in his shoulders.  Patient states that this is not out of the ordinary.  Patient states he has been urinating.  Per patient's son patient has not had a bowel movement.     Per Speech: Mild-moderate oropharyngeal dysphagia. Recommends NPO if patient is lethargic. Consider puree with nectar thick liquids.    Per nurse: No issues at this time.  Patient has been sleeping most of the morning.  IV fluids were started.    Review of Systems   Constitutional:  Negative for chills and fever.   HENT:  Negative for sore throat.    Eyes:  Negative for visual disturbance.   Respiratory:  Negative for cough and shortness of breath.    Cardiovascular:  Negative for chest pain and palpitations.   Gastrointestinal:  Negative for abdominal pain and constipation.   Genitourinary:  Negative for dysuria and hematuria.   Musculoskeletal:  Positive for arthralgias (shoulder pain). Negative for back pain.   Skin:  Negative for color change and rash.    Neurological:  Negative for seizures and syncope.   All other systems reviewed and are negative.        Objective:     Vitals: Blood pressure 158/77, pulse 79, temperature 97.9 °F (36.6 °C), resp. rate 20, SpO2 96%.,There is no height or weight on file to calculate BMI.      Intake/Output Summary (Last 24 hours) at 1/23/2025 1614  Last data filed at 1/23/2025 0906  Gross per 24 hour   Intake 0 ml   Output 306 ml   Net -306 ml       Current Medications: Reviewed    Physical Exam:   Physical Exam  Vitals and nursing note reviewed.   Constitutional:       General: He is not in acute distress.     Appearance: He is well-developed. He is obese.   HENT:      Head: Normocephalic and atraumatic.      Right Ear: External ear normal.      Left Ear: External ear normal.      Ears:      Comments: [place]     Mouth/Throat:      Mouth: Mucous membranes are dry.      Pharynx: Oropharynx is clear.   Eyes:      Conjunctiva/sclera: Conjunctivae normal.   Cardiovascular:      Rate and Rhythm: Normal rate and regular rhythm.      Pulses: Normal pulses.      Heart sounds: Normal heart sounds. No murmur heard.  Pulmonary:      Effort: Pulmonary effort is normal. No respiratory distress.      Breath sounds: Normal breath sounds.   Abdominal:      General: Bowel sounds are normal.      Palpations: Abdomen is soft.      Tenderness: There is no abdominal tenderness.   Musculoskeletal:         General: No swelling.      Cervical back: Neck supple.      Right lower leg: Edema present.      Left lower leg: Edema present.   Skin:     General: Skin is warm and dry.      Capillary Refill: Capillary refill takes less than 2 seconds.   Neurological:      Mental Status: He is alert. Mental status is at baseline.      Comments: AAO x2   Psychiatric:      Comments: Agitated at times          Invasive Devices       Peripheral Intravenous Line  Duration             Peripheral IV 01/22/25 Dorsal (posterior);Left Forearm 1 day                    Lab, Imaging  and other studies: Results Review Statement: I reviewed radiology reports from this admission including: xray(s). Abdomen KUB

## 2025-01-23 NOTE — DISCHARGE INSTR - DIET
Dysphagia 2 mech soft w/  thin liquids  Meds in applesauce/ crushd as needed   Aspiration precautions

## 2025-01-23 NOTE — ASSESSMENT & PLAN NOTE
"Patient's wife endorsing recent abnormal behaviors.  Patient waking frequently at night, hallucinations noted when awaking from sleep, patient noted to be more \"nasty\" and agitated with at times garbled speech which clears.  Presents to ED after fall at home with concern for possible UTI. S/p head strike in ED w/ R eyebrow laceration.  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 with mild acute encephalopathy  CT head w/o acute findings  TSH benign   Folate WNL, B12 pending, vitamin D low at 26.9  Wife notes B12/vitamin D supplementation 3 times weekly PTA  Change to q daily dosing   Negative infxn work-up (as below)   Consult geriatrics, input appreciated  Sinemet 12.5 mg q8h for agitation and hallucinations  Acetaminophen 650mg TID  Neurology consulted for recent sinemet increase  Clinically appears dry; provide gentle IV fluids  Wife reports patient struggles with constipation she is unsure when last bowel movement was, will increase bowel regimen  "

## 2025-01-23 NOTE — ASSESSMENT & PLAN NOTE
POA, temp 95.6-96  Received marcella hunger in the ED with resolution of hypothermia; patient grossly asymptomatic; temp stable off Marcella hugger  No other SIRS criteria  Follow-up blood cultures  UA bland; LA & procal WNL; no leukocytosis or bandemia; flu/COVID/RSV negative  Etiology likely autonomic dysregulation in setting of Parkinson's disease

## 2025-01-23 NOTE — ASSESSMENT & PLAN NOTE
Patient seen by speech therapy.  Will continue purée diet with nectar thick liquids  Continue aspiration precautions

## 2025-01-23 NOTE — ASSESSMENT & PLAN NOTE
- Conservative management of delirium: minimize noise, maintain day/night cycle, provide frequent redirection, avoid restraints if possible, etc.  - Geriatrics following  - Patient scored 7/30 on MOCA in March 2024.  - Seroquel for delirium

## 2025-01-23 NOTE — ASSESSMENT & PLAN NOTE
Recent Labs     01/21/25  1702 01/22/25  0449 01/23/25  0436   K 4.5 4.7 4.9     S/p albuterol, insulin, amp of d50, Lasix 40 mg in ED  Repeat BMP pending  Etiology unclear  Hold ARB

## 2025-01-23 NOTE — ASSESSMENT & PLAN NOTE
Patient observed coughing while taking medications by nursing.  Speech therapy consulted  Patient somnolent on evaluation so they are currently recommending dysphagia 1 diet with nectar thick liquids while awake and pills crushed.  Patient was to have outpatient VBS test, per speech he may need during admission but will have to be consistently awake first  Speech to continue to follow

## 2025-01-23 NOTE — SPEECH THERAPY NOTE
Speech-Language Pathology Bedside Swallow Evaluation        Patient Name: Daniel Sanz Jr.    Today's Date: 1/23/2025     Problem List  Principal Problem:    Altered mental status  Active Problems:    Parkinson's disease (HCC)    Fall    HTN (hypertension)    Hypothermia    Hyperkalemia    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 (HCC)    Dementia (HCC)    Constipation         Summary    Pt presents with limited assessment due to lethargy/somnolence; pt presented w/ at least mild-moderate oropharyngeal dysphagia. Pt needed constant stim/cues to swallow.      Recommendations:   Diet:  NPO if pt continues to be lethargic, but if becomes more awake and alert during the day, consider puree w/ NTL.     Meds: crushed with puree   Frequent Oral care: 4x/day  Aspiration precautions and compensatory swallowing strategies: upright posture, only feed when fully alert, slow rate of feeding, small bites/sips, and alternating bites and sips  Other Recommendations/ considerations: will cont to follow for diet tolerance and ongoing assessment.        Current Medical Status  Pt is a 84 y.o. male who presented to [facility]  with altered mental status.  Patient wife reports patient has becoming intermittently agitated usually around times of sleep or napping. He was brought to the ED today after a fall at home where he struck his head and developed a laceration of the right eye. While in the ED patient noted to be hypothermic with labs on admission showing new hyperkalemia. Patient has no complaints for me at this time.     Past medical history:   Please see H&P for details    Special Studies:  CXR: 1/21/25 No acute cardiopulmonary disease.     Social/Education/Vocational Hx:  Pt lives with family    Swallow Information   Prior speech/swallowing tx: seen by ST  June 2024, rec regular diet w/ thin liquids. Seen outpt  for LSVT  Current Risks for Dysphagia & Aspiration: AMS and hx of  parkinson's  Current Symptoms/Concerns: coughing with po and AMS  Current Diet: NPO   Baseline Diet: regular diet and thin liquids  Takes pills- unknown     Baseline Assessment   Behavior/Cognition: lethargic and waxing and waning arousal level  Speech/Language Status: able to follow commands inconsistently and limited verbal output- 2* lethargy  Patient Positioning: upright in bed     Swallow Mechanism Exam   Facial: symmetrical  Labial: WFL  Lingual: WFL  Velum: unable to visualize  Mandible: adequate ROM  Dentition: adequate  Vocal quality:clear/adequate   Volitional Cough: unable to initiate volitional cough   Respiratory: RA    Consistencies Assessed and Performance   Consistencies Administered: nectar thick, puree, and mechanical soft solids    Oral Stage: pt w/ slow, prolonged mastication/manipulation. Mod lingual residue noted.  Slow, delayed bolus manipulation- pt needed verbal cues at times to transfer and swallow puree bolus. Pt assisted w/ hand over hand cup drinking, also able to drink from straw also.     Pharyngeal Stage: swallows appeared delayed w/ fair laryngeal excursion. No coughing, throat clearing noted.       Esophageal Concerns: none reported      Results Reviewed with: patient, RN, and MITESH   Dysphagia Goals: pt will tolerate puree with nectar thick liquids without s/s of aspiration x2-4 sessions   Dysphagia tx likely needed post discharge         Kayleigh Ybarra MA CCC-SLP  Speech Pathologist  [place] license # [id number]  [place] license # [id number]  Available via Secure Chat

## 2025-01-23 NOTE — ASSESSMENT & PLAN NOTE
Chronic right lower extremity venous ulcers   PTA Lasix for LE edema management   Known to [facility] podiatry/[facility]   Wound care consulted

## 2025-01-23 NOTE — ASSESSMENT & PLAN NOTE
Known to [facility] neurology   Sinemet recently increased just over a week ago per wife  Likely contributing to altered mentation

## 2025-01-23 NOTE — ASSESSMENT & PLAN NOTE
84 y.o. male with Parkinson's disease on Sinemet, dementia (scored 7/30 on MoCA in 3/2024), HTN, chronic lower extremity edema, and frequent falls who presented to [facility] on 1/21/2025 with AMS.  Patient Sinemet was increased 1 week ago from 2 tab TID to 2.5 tab TID due to patient having significant bradykinesia, cogwheeling, and tremor at the lower dose.  Since then, patient has had a decline in his behavior, especially at night.  His wife reports that he will frequently have abnormal movements while sleeping and awaken from sleep with hallucinations.  He has also been agitated and aggressive with garbled speech.    Upon ambulation into the ED, patient fell and struck his head, sustaining a laceration above the right eyebrow.  CT head negative for acute intracranial abnormalities.  Patient was hypothermic with temperature 96 °F.  Labs revealed potassium 6.0 and hemoglobin 9.6.  Remainder of CBC, BMP, lactic acid, influenza, and UA unremarkable.  Neurology was consulted to give guidance on Sinemet dosing and other recommendations for his Parkinson's disease/altered mental status.    On exam, patient is pleasantly confused.  He does demonstrate advanced Parkinson's disease with hypomimia, hypophonia, resting tremor in bilateral hands, diffuse bradykinesia, and cogwheel rigidity in BUEs.  He is only oriented to self and year.  AMS possibly related to recently increasing the dose of Sinemet, but cannot fully rule out progression of Parkinson's disease and dementia.    Plan:  - Per discussion with attending neurologist, will decrease Sinemet  back to 2 tabs TID and monitor exam  - Geriatrics following; started patient on low dose Seroquel at night time  - PT/OT  - Continue to monitor and notify neurology with any changes.   - Medical management and supportive care per primary team. Correction of any metabolic or infectious disturbances

## 2025-01-24 ENCOUNTER — TELEPHONE (OUTPATIENT)
Age: 85
End: 2025-01-24

## 2025-01-24 PROBLEM — T68.XXXA HYPOTHERMIA: Status: RESOLVED | Noted: 2025-01-21 | Resolved: 2025-01-24

## 2025-01-24 PROBLEM — E87.5 HYPERKALEMIA: Status: RESOLVED | Noted: 2025-01-21 | Resolved: 2025-01-24

## 2025-01-24 PROBLEM — K59.00 CONSTIPATION: Status: RESOLVED | Noted: 2025-01-22 | Resolved: 2025-01-24

## 2025-01-24 LAB
ANION GAP SERPL CALCULATED.3IONS-SCNC: 6 MMOL/L (ref 4–13)
BUN SERPL-MCNC: 24 MG/DL (ref 5–25)
CALCIUM SERPL-MCNC: 8.9 MG/DL (ref 8.4–10.2)
CHLORIDE SERPL-SCNC: 108 MMOL/L (ref 96–108)
CO2 SERPL-SCNC: 27 MMOL/L (ref 21–32)
CREAT SERPL-MCNC: 1.07 MG/DL (ref 0.6–1.3)
ERYTHROCYTE [DISTWIDTH] IN BLOOD BY AUTOMATED COUNT: 13.3 % (ref 11.6–15.1)
GFR SERPL CREATININE-BSD FRML MDRD: 63 ML/MIN/1.73SQ M
GLUCOSE SERPL-MCNC: 82 MG/DL (ref 65–140)
HCT VFR BLD AUTO: 32.7 % (ref 36.5–49.3)
HGB BLD-MCNC: 10.8 G/DL (ref 12–17)
MCH RBC QN AUTO: 32.5 PG (ref 26.8–34.3)
MCHC RBC AUTO-ENTMCNC: 33 G/DL (ref 31.4–37.4)
MCV RBC AUTO: 99 FL (ref 82–98)
PLATELET # BLD AUTO: 175 THOUSANDS/UL (ref 149–390)
PMV BLD AUTO: 10.1 FL (ref 8.9–12.7)
POTASSIUM SERPL-SCNC: 4.7 MMOL/L (ref 3.5–5.3)
RBC # BLD AUTO: 3.32 MILLION/UL (ref 3.88–5.62)
SODIUM SERPL-SCNC: 141 MMOL/L (ref 135–147)
WBC # BLD AUTO: 6.2 THOUSAND/UL (ref 4.31–10.16)

## 2025-01-24 PROCEDURE — 80048 BASIC METABOLIC PNL TOTAL CA: CPT

## 2025-01-24 PROCEDURE — 99232 SBSQ HOSP IP/OBS MODERATE 35: CPT | Performed by: FAMILY MEDICINE

## 2025-01-24 PROCEDURE — 99232 SBSQ HOSP IP/OBS MODERATE 35: CPT

## 2025-01-24 PROCEDURE — 99233 SBSQ HOSP IP/OBS HIGH 50: CPT | Performed by: PSYCHIATRY & NEUROLOGY

## 2025-01-24 PROCEDURE — 85027 COMPLETE CBC AUTOMATED: CPT

## 2025-01-24 RX ORDER — CARBIDOPA AND LEVODOPA 25; 100 MG/1; MG/1
1.5 TABLET ORAL 4 TIMES DAILY
Status: DISCONTINUED | OUTPATIENT
Start: 2025-01-25 | End: 2025-01-31 | Stop reason: HOSPADM

## 2025-01-24 RX ORDER — CARBIDOPA AND LEVODOPA 25; 100 MG/1; MG/1
2 TABLET ORAL 3 TIMES DAILY
Status: COMPLETED | OUTPATIENT
Start: 2025-01-24 | End: 2025-01-24

## 2025-01-24 RX ORDER — RIVASTIGMINE 4.6 MG/24H
4.6 PATCH, EXTENDED RELEASE TRANSDERMAL DAILY
Status: DISCONTINUED | OUTPATIENT
Start: 2025-01-25 | End: 2025-01-31 | Stop reason: HOSPADM

## 2025-01-24 RX ADMIN — Medication 1000 UNITS: at 09:58

## 2025-01-24 RX ADMIN — SENNOSIDES 17.2 MG: 8.6 TABLET ORAL at 09:58

## 2025-01-24 RX ADMIN — CARBIDOPA AND LEVODOPA 2 TABLET: 25; 100 TABLET ORAL at 09:59

## 2025-01-24 RX ADMIN — AMLODIPINE BESYLATE 5 MG: 5 TABLET ORAL at 09:58

## 2025-01-24 RX ADMIN — ACETAMINOPHEN 650 MG: 325 TABLET, FILM COATED ORAL at 16:41

## 2025-01-24 RX ADMIN — CARBIDOPA AND LEVODOPA 2 TABLET: 25; 100 TABLET ORAL at 20:53

## 2025-01-24 RX ADMIN — ACETAMINOPHEN 650 MG: 325 TABLET, FILM COATED ORAL at 09:58

## 2025-01-24 RX ADMIN — QUETIAPINE FUMARATE 12.5 MG: 25 TABLET ORAL at 16:41

## 2025-01-24 RX ADMIN — MELATONIN 3 MG: 3 TAB ORAL at 20:53

## 2025-01-24 RX ADMIN — ACETAMINOPHEN 650 MG: 325 TABLET, FILM COATED ORAL at 20:53

## 2025-01-24 RX ADMIN — ENOXAPARIN SODIUM 40 MG: 40 INJECTION SUBCUTANEOUS at 09:58

## 2025-01-24 RX ADMIN — DOCUSATE SODIUM 100 MG: 100 CAPSULE, LIQUID FILLED ORAL at 09:58

## 2025-01-24 RX ADMIN — CARBIDOPA AND LEVODOPA 2 TABLET: 25; 100 TABLET ORAL at 16:41

## 2025-01-24 RX ADMIN — CYANOCOBALAMIN TAB 500 MCG 1000 MCG: 500 TAB at 09:58

## 2025-01-24 NOTE — ASSESSMENT & PLAN NOTE
Recent Labs     01/22/25  0449 01/23/25  0436 01/24/25  0639   K 4.7 4.9 4.7     S/p albuterol, insulin, amp of d50, Lasix 40 mg in ED  Repeat BMP pending  Etiology unclear  Hold ARB

## 2025-01-24 NOTE — ASSESSMENT & PLAN NOTE
Chronic right lower extremity venous ulcers   PTA Lasix for LE edema management   Known to [facility] podiatry/[facility]   Wound care consulted

## 2025-01-24 NOTE — PLAN OF CARE
Problem: PAIN - ADULT  Goal: Verbalizes/displays adequate comfort level or baseline comfort level  Description: Interventions:  - Encourage patient to monitor pain and request assistance  - Assess pain using appropriate pain scale  - Administer analgesics based on type and severity of pain and evaluate response  - Implement non-pharmacological measures as appropriate and evaluate response  - Consider cultural and social influences on pain and pain management  - Notify physician/advanced practitioner if interventions unsuccessful or patient reports new pain  Outcome: Progressing     Problem: INFECTION - ADULT  Goal: Absence or prevention of progression during hospitalization  Description: INTERVENTIONS:  - Assess and monitor for signs and symptoms of infection  - Monitor lab/diagnostic results  - Monitor all insertion sites, i.e. indwelling lines, tubes, and drains  - Monitor endotracheal if appropriate and nasal secretions for changes in amount and color  - [place] appropriate cooling/warming therapies per order  - Administer medications as ordered  - Instruct and encourage patient and family to use good hand hygiene technique  - Identify and instruct in appropriate isolation precautions for identified infection/condition  Outcome: Progressing  Goal: Absence of fever/infection during neutropenic period  Description: INTERVENTIONS:  - Monitor WBC    Outcome: Progressing     Problem: SAFETY ADULT  Goal: Patient will remain free of falls  Description: INTERVENTIONS:  - Educate patient/family on patient safety including physical limitations  - Instruct patient to call for assistance with activity   - Consult OT/PT to assist with strengthening/mobility   - Keep Call bell within reach  - Keep bed low and locked with side rails adjusted as appropriate  - Keep care items and personal belongings within reach  - Initiate and maintain comfort rounds  - Make Fall Risk Sign visible to staff  - Offer Toileting every 2 Hours,  in advance of need  - Initiate/Maintain bed/chair alarm  - Obtain necessary fall risk management equipment:   - Apply yellow socks and bracelet for high fall risk patients  - Consider moving patient to room near nurses station  Outcome: Progressing  Goal: Maintain or return to baseline ADL function  Description: INTERVENTIONS:  -  Assess patient's ability to carry out ADLs; assess patient's baseline for ADL function and identify physical deficits which impact ability to perform ADLs (bathing, care of mouth/teeth, toileting, grooming, dressing, etc.)  - Assess/evaluate cause of self-care deficits   - Assess range of motion  - Assess patient's mobility; develop plan if impaired  - Assess patient's need for assistive devices and provide as appropriate  - Encourage maximum independence but intervene and supervise when necessary  - Involve family in performance of ADLs  - Assess for home care needs following discharge   - Consider OT consult to assist with ADL evaluation and planning for discharge  - Provide patient education as appropriate  Outcome: Progressing  Goal: Maintains/Returns to pre admission functional level  Description: INTERVENTIONS:  - Perform AM-PAC 6 Click Basic Mobility/ Daily Activity assessment daily.  - Set and communicate daily mobility goal to care team and patient/family/caregiver.   - Collaborate with rehabilitation services on mobility goals if consulted  - Perform Range of Motion 3 times a day.  - Reposition patient every 2 hours.  - Dangle patient 3 times a day  - Stand patient 3 times a day  - Ambulate patient 3 times a day  - Out of bed to chair 3 times a day   - Out of bed for meals 3 times a day  - Out of bed for toileting  - Record patient progress and toleration of activity level   Outcome: Progressing     Problem: DISCHARGE PLANNING  Goal: Discharge to home or other facility with appropriate resources  Description: INTERVENTIONS:  - Identify barriers to discharge w/patient and  caregiver  - Arrange for needed discharge resources and transportation as appropriate  - Identify discharge learning needs (meds, wound care, etc.)  - Arrange for interpretive services to assist at discharge as needed  - Refer to Case Management Department for coordinating discharge planning if the patient needs post-hospital services based on physician/advanced practitioner order or complex needs related to functional status, cognitive ability, or social support system  Outcome: Progressing     Problem: Knowledge Deficit  Goal: Patient/family/caregiver demonstrates understanding of disease process, treatment plan, medications, and discharge instructions  Description: Complete learning assessment and assess knowledge base.  Interventions:  - Provide teaching at level of understanding  - Provide teaching via preferred learning methods  Outcome: Progressing     Problem: Prexisting or High Potential for Compromised Skin Integrity  Goal: Skin integrity is maintained or improved  Description: INTERVENTIONS:  - Identify patients at risk for skin breakdown  - Assess and monitor skin integrity  - Assess and monitor nutrition and hydration status  - Monitor labs   - Assess for incontinence   - Turn and reposition patient  - Assist with mobility/ambulation  - Relieve pressure over bony prominences  - Avoid friction and shearing  - Provide appropriate hygiene as needed including keeping skin clean and dry  - Evaluate need for skin moisturizer/barrier cream  - Collaborate with interdisciplinary team   - Patient/family teaching  - Consider wound care consult   Outcome: Progressing     Problem: Nutrition/Hydration-ADULT  Goal: Nutrient/Hydration intake appropriate for improving, restoring or maintaining nutritional needs  Description: Monitor and assess patient's nutrition/hydration status for malnutrition. Collaborate with interdisciplinary team and initiate plan and interventions as ordered.  Monitor patient's weight and  dietary intake as ordered or per policy. Utilize nutrition screening tool and intervene as necessary. Determine patient's food preferences and provide high-protein, high-caloric foods as appropriate.     INTERVENTIONS:  - Monitor oral intake, urinary output, labs, and treatment plans  - Assess nutrition and hydration status and recommend course of action  - Evaluate amount of meals eaten  - Assist patient with eating if necessary   - Allow adequate time for meals  - Recommend/ encourage appropriate diets, oral nutritional supplements, and vitamin/mineral supplements  - Order, calculate, and assess calorie counts as needed  - Recommend, monitor, and adjust tube feedings and TPN/PPN based on assessed needs  - Assess need for intravenous fluids  - Provide specific nutrition/hydration education as appropriate  - Include patient/family/caregiver in decisions related to nutrition  Outcome: Progressing

## 2025-01-24 NOTE — TELEPHONE ENCOUNTER
STILL ADMITTED:1/21/2025 - present (3 days)  [facility]      SCHEDULE WITH , SAME DX AND LOV WITH  ON 10/24/2024      HFU/ LUIS REYEZ/     AILYN-     ----- Message from Trudy Reyez PA-C sent at 1/24/2025 10:52 AM EST -----  Regarding: AARON Sanz Jr. will need follow-up in  4 weeks  with movement disorder team for Established Parkinson's disease responsive to Sinemet in 30 minute appointment. He can continue to follow with Dr. Cavanaugh.    Thank you!

## 2025-01-24 NOTE — ASSESSMENT & PLAN NOTE
S/p head strike in ED w/ R eyebrow laceration; unclear if mechanical versus orthostatic hypotensive episode  CT head/neck and left shoulder benign  Laceration cleaned with dressing placed; no sutures placed  F/u orthostatic vital signs  ECG without ischemia, troponins negative x 3  PT OT consulted- recommending rehab

## 2025-01-24 NOTE — PROGRESS NOTES
Progress Note - Neurology   Name: Daniel Sanz Jr. 84 y.o. male I MRN: [id number]  Unit/Bed#: S -01 I Date of Admission: 1/21/2025   Date of Service: 1/24/2025 I Hospital Day: 3    Assessment & Plan  Altered mental status  84 y.o. male with Parkinson's disease on Sinemet, dementia (scored 7/30 on MoCA in 3/2024), HTN, chronic lower extremity edema, and frequent falls who presented to [facility] on 1/21/2025 with AMS.  Patient's Sinemet was increased 1 week ago from 2 tab TID to 2.5 tab TID due to patient having significant bradykinesia, cogwheeling, and tremor at the lower dose.  Since then, patient has had a decline in his behavior, especially at night.  His wife reports that he will frequently have abnormal movements while sleeping and awaken from sleep with hallucinations.  He has also been agitated and aggressive with garbled speech.    Upon ambulation into the ED, patient fell and struck his head, sustaining a laceration above the right eyebrow.  CT head negative for acute intracranial abnormalities.  Patient was hypothermic with temperature 96 °F.  Labs revealed potassium 6.0 and hemoglobin 9.6.  Remainder of CBC, BMP, lactic acid, influenza, and UA unremarkable.  Neurology was consulted to give guidance on Sinemet dosing and other recommendations for his Parkinson's disease/altered mental status.      Sinemet dose was reduced to 2 tabs TID on 1/23.  On exam, patient is pleasantly confused.  He does demonstrate advanced Parkinson's disease with hypomimia, hypophonia, subtle intermittent resting tremor in bilateral hands, diffuse bradykinesia, and cogwheel rigidity in BUEs.  He is only oriented to self and year.  No worsening in symptoms since decreasing Sinemet dose.  AMS possibly related to recently increasing the dose of Sinemet, but cannot fully rule out progression of Parkinson's disease and dementia.    Plan:  - Per attending Neurologist, will adjust Sinemet  from 2 tabs TID to 1.5 tabs, 4x  daily. (9AM, 1PM, 5PM, 9PM)  - Geriatrics following; started patient on low dose Seroquel PRN at night time  - PT/OT  - Continue to monitor and notify neurology with any changes.   - Medical management and supportive care per primary team. Correction of any metabolic or infectious disturbances  Parkinson's disease (HCC)  - See assessment/plan above  Dementia (HCC)  - Conservative management of delirium: minimize noise, maintain day/night cycle, provide frequent redirection, avoid restraints if possible, etc.  - Geriatrics following  - Patient scored 7/30 on MOCA in March 2024.  - Seroquel for delirium  Fall  - PT/OT    Daniel Sanz Jr. will need follow-up in  4 weeks  with movement disorder team for Established Parkinson's disease responsive to Sinemet in 30 minute appointment. He can continue to follow with Dr. Cavanaugh.  Message sent to the schedulers.    Subjective   Patient seen and examined at bedside.  Upon entering the room, patient appeared to be talking in his sleep or possibly attending to hallucinations.  However, when patient was aroused, he no hallucinations were noted.  He denies any complaints other than some shoulder discomfort.  Unclear if ROS is accurate given baseline dementia.    Review of Systems   Unable to perform ROS: Dementia       Objective :  Temp:  [97.7 °F (36.5 °C)-97.9 °F (36.6 °C)] 97.7 °F (36.5 °C)  HR:  [73-79] 73  BP: (129-158)/(59-77) 143/59  Resp:  [16-20] 17  SpO2:  [95 %-97 %] 97 %  O2 Device: None (Room air)    Physical Exam  Vitals and nursing note reviewed.   Constitutional:       Appearance: Normal appearance.      Comments: Elderly male lying in bed in no acute distress  Initially, patient was lying in bed with his eyes closed and talking out loud.  Possibly attending to hallucinations versus talking in his sleep.  This resolved once patient was more awake.  [place] belt in place   HENT:      Head: Normocephalic and atraumatic.      Mouth/Throat:      Mouth: Mucous membranes  "are moist.      Pharynx: Oropharynx is clear.   Eyes:      Extraocular Movements: Extraocular movements intact.      Conjunctiva/sclera: Conjunctivae normal.      Pupils: Pupils are equal, round, and reactive to light.   Pulmonary:      Effort: Pulmonary effort is normal.   Musculoskeletal:         General: Normal range of motion.   Skin:     General: Skin is warm and dry.   Neurological:      Mental Status: He is alert.      Deep Tendon Reflexes:      Reflex Scores:       Bicep reflexes are 2+ on the right side and 2+ on the left side.       Brachioradialis reflexes are 2+ on the right side and 2+ on the left side.       Patellar reflexes are 1+ on the right side and 1+ on the left side.    Neurological Exam  Mental Status  Alert.  Patient lying in bed with eyes closed.  With repeated verbal and tactile stimuli, patient became more awake and opened his eyes.  Oriented to person and year.  Incorrectly stated that that we are currently \"in assisted\" and that the month is \"March\"  Able to follow midline commands, but needed prompting with appendicular commands.  Did better with mimicking.  Hypophonic speech  Moderate to severe dysarthria.  Speech is occasionally unintelligible.  No aphasia.  Able to name objects around the room.  Delayed processing speed..    Cranial Nerves  CN III, IV, VI: Extraocular movements intact bilaterally. Pupils equal round and reactive to light bilaterally.  Pupils 3 mm, round, reactive to light bilaterally.  EOMs intact.  Blink to threat intact bilaterally.  Facial sensation to light touch intact throughout.  Hypomimia  Subtle left facial asymmetry.  Tongue midline.  Soft palate raises symmetrically..    Motor  Normal muscle bulk throughout.  Increased tone with cogwheeling in bilateral wrists (similar to yesterday)  Occasionally had subtle resting tremor in bilateral hands.  More pronounced intention tremor with finger-to-nose  Subtle tremor in bilateral feet  Bradykinesia with all " movements  5/5 strength throughout BUEs/BLEs.    Sensory  Sensation to light touch intact throughout..    Reflexes                                            Right                      Left  Brachioradialis                    2+                         2+  Biceps                                 2+                         2+  Patellar                                1+                         1+  No ankle clonus bilaterally.  Downgoing toes bilaterally..    Coordination    No clear ataxia, but intention tremor with finger-to-nose bilaterally.    Gait    Deferred for patient's safety.        Lab Results: I have reviewed the following results:  Imaging Results Review: I reviewed radiology reports from this admission including: CT head.      VTE Pharmacologic Prophylaxis: VTE covered by:  enoxaparin, Subcutaneous, 40 mg at 01/24/25 0965    and Sequential compression device (Venodyne)

## 2025-01-24 NOTE — ASSESSMENT & PLAN NOTE
"Patient's wife endorsing recent abnormal behaviors.  Patient waking frequently at night, hallucinations noted when awaking from sleep, patient noted to be more \"nasty\" and agitated with at times garbled speech which clears.  Presents to ED after fall at home with concern for possible UTI. S/p head strike in ED w/ R eyebrow laceration.  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 with mild acute encephalopathy  CT head w/o acute findings  TSH benign   Folate WNL, B12 normal, vitamin D low at 26.9  Wife notes B12/vitamin D supplementation 3 times weekly PTA  Continue to q daily dosing of vit D  B12 3x weekly  Negative infxn work-up (as below)   Consult geriatrics, input appreciated  Sinemet 12.5 mg q8h for agitation and hallucinations  Acetaminophen 650mg TID  Neurology consulted for recent sinemet increase  Clinically appears dry; provide gentle IV fluids  BM 1/23  "

## 2025-01-24 NOTE — ASSESSMENT & PLAN NOTE
Patient has history of Parkinson's  Wife describes sundowning.   Patient scored 0/5 on mini cog 1/22/23  Patient AAO x 2 currently and at baseline  CT head (1/21/25) showed moderate microangiopathic changes  Pt. Is AAOx2 at baseline, he needs assistance with all iADL's at baseline  TSH 2.635 (1/21/25)  B12 551 (1/21/25)  [place] to person, place, time, and situation as needed  Monitor for behaviors   Monitor for mental status change  Provide supportive care  Consider Seroquel 12.5mg Q8H prn for agitation and hallucinations, monitor QTc

## 2025-01-24 NOTE — ASSESSMENT & PLAN NOTE
84 y.o. male with Parkinson's disease on Sinemet, dementia (scored 7/30 on MoCA in 3/2024), HTN, chronic lower extremity edema, and frequent falls who presented to [facility] on 1/21/2025 with AMS.  Patient's Sinemet was increased 1 week ago from 2 tab TID to 2.5 tab TID due to patient having significant bradykinesia, cogwheeling, and tremor at the lower dose.  Since then, patient has had a decline in his behavior, especially at night.  His wife reports that he will frequently have abnormal movements while sleeping and awaken from sleep with hallucinations.  He has also been agitated and aggressive with garbled speech.    Upon ambulation into the ED, patient fell and struck his head, sustaining a laceration above the right eyebrow.  CT head negative for acute intracranial abnormalities.  Patient was hypothermic with temperature 96 °F.  Labs revealed potassium 6.0 and hemoglobin 9.6.  Remainder of CBC, BMP, lactic acid, influenza, and UA unremarkable.  Neurology was consulted to give guidance on Sinemet dosing and other recommendations for his Parkinson's disease/altered mental status.      Sinemet dose was reduced to 2 tabs TID on 1/23.  On exam, patient is pleasantly confused.  He does demonstrate advanced Parkinson's disease with hypomimia, hypophonia, subtle intermittent resting tremor in bilateral hands, diffuse bradykinesia, and cogwheel rigidity in BUEs.  He is only oriented to self and year.  No worsening in symptoms since decreasing Sinemet dose.  AMS possibly related to recently increasing the dose of Sinemet, but cannot fully rule out progression of Parkinson's disease and dementia.    Plan:  - Per attending Neurologist, will adjust Sinemet  from 2 tabs TID to 1.5 tabs, 4x daily. (9AM, 1PM, 5PM, 9PM)  - Geriatrics following; started patient on low dose Seroquel PRN at night time  - PT/OT  - Continue to monitor and notify neurology with any changes.   - Medical management and supportive care per  primary team. Correction of any metabolic or infectious disturbances

## 2025-01-24 NOTE — PROGRESS NOTES
Progress Note - Geriatric Medicine   Name: Daniel Sanz Jr. 84 y.o. male I MRN: [id number]  Unit/Bed#: S -01 I Date of Admission: 1/21/2025   Date of Service: 1/24/2025 I Hospital Day: 3     Assessment & Plan  Altered mental status  -Patient is high risk of delirium due to hospitalization, dementia, recent fall, and metabolic imbalances, pain  -delirium precautions  -maintain normal sleep/wake cycle, start melatonin 3 mg QHS  -minimize overnight interruptions, group overnight vitals/labs/nursing checks as possible  -dim lights, close blinds and turn off tv to minimize stimulation and encourage sleep environment in evenings  -Continue Tylenol 975mg Q8H scheduled   -monitor for fecal and urinary retention which may precipitate delirium  -encourage early mobilization and ambulation  -provide frequent reorientation and redirection  -encourage family and friends at the bedside to help calm patient if anxious  -avoid medications which may precipitate or worsen delirium such as tramadol, benzodiazepine, anticholinergics, and antihistaminics  -encourage hydration and nutrition , assist with feeding if needed  -redirect unwanted behaviors as first line, avoid physical restraints.     Parkinson's disease (HCC)  - Patient on Sinemet, neurology follows, appreciate input  - Monitor orthostatic vitals  - Ambulate patient  Fall  Patient has history of multiple falls in the past 6 months  Patient uses walker or cane for ambulation at baseline  Will continue PT/OT.  Monitor orthostatic vitals  Avoid hypotension and hypoglycemia  Fall precautions   Can consider lidocaine patch for right shoulder pain.  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   Patient scored 0/5 on mini cog 1/22/23  Patient AAO x 2 currently and at baseline  CT head  (1/21/25) showed moderate microangiopathic changes  Pt. Is AAOx2 at baseline, he needs assistance with all iADL's at baseline  TSH 2.635 (1/21/25)  B12 551 (1/21/25)  Rogers to person, place, time, and situation as needed  Monitor for behaviors   Monitor for mental status change  Provide supportive care  Consider Seroquel 12.5mg Q8H prn for agitation and hallucinations, monitor QTc  Constipation  Patient reported BM yesterday.  Documentation shows patient had large bowel movement yesterday.   No abdominal pain, nausea, vomiting.  Encouraged po hydration.  Continue bowel regimen.        Dysphagia  Patient evaluated by speech therapy yesterday.   Will continue purée diet with nectar thick liquids  Continue aspiration precautions  Anemia  Hemoglobin 10.8 today, continue to monitor and manage as per primary team  Hypothermia    Hyperkalemia      Subjective:   84-year-old male presents sitting up in bed.  Patient's wife, niece, nephew present at bedside.  Patient stated he is doing well and has more energy than yesterday.  Patient stated he has had a good appetite and ate a good lunch.  Patient stated he has been sleeping well.  He denies any pain except for his usual shoulder aches.  He stated the lidocaine patches were helping.  Patient reports he had a bowel movement yesterday.  Patient has been urinating without any difficulty.  Patient is alert and oriented x 2.  Patient's family noticed that the patient was rubbing his eyes frequently and stated that he typically uses artificial tears.  Patient's family wishes he could be up and moving more frequently.  Physical therapy is following.    Per nurse: She is unsure if the patient is sleeping.  Patient was awake this morning.  She stated the patient has not had any agitation or behaviors.  She stated patient does try to get out of bed occasionally and can be redirected.    Review of Systems   Constitutional:  Negative for chills and fever.   HENT:  Negative for ear pain  and sore throat.    Eyes:  Negative for pain and visual disturbance.   Respiratory:  Negative for cough and shortness of breath.    Cardiovascular:  Negative for chest pain and palpitations.   Gastrointestinal:  Negative for abdominal pain and vomiting.   Genitourinary:  Negative for dysuria and hematuria.   Musculoskeletal:  Positive for arthralgias (shoulders ache bilaterally). Negative for back pain.   Skin:  Negative for color change and rash.   Neurological:  Negative for seizures and syncope.   All other systems reviewed and are negative.        Objective:     Vitals: Blood pressure 143/59, pulse 73, temperature 97.7 °F (36.5 °C), resp. rate 17, SpO2 97%.,There is no height or weight on file to calculate BMI.      Intake/Output Summary (Last 24 hours) at 1/24/2025 1312  Last data filed at 1/24/2025 0232  Gross per 24 hour   Intake --   Output 300 ml   Net -300 ml       Current Medications: Reviewed    Physical Exam:   Physical Exam  Vitals and nursing note reviewed.   Constitutional:       General: He is not in acute distress.     Appearance: He is well-developed.   HENT:      Head: Normocephalic and atraumatic.      Right Ear: External ear normal.      Left Ear: External ear normal.      Mouth/Throat:      Mouth: Mucous membranes are dry.      Pharynx: Oropharynx is clear.   Eyes:      Conjunctiva/sclera: Conjunctivae normal.   Cardiovascular:      Rate and Rhythm: Normal rate and regular rhythm.      Pulses: Normal pulses.      Heart sounds: Normal heart sounds. No murmur heard.  Pulmonary:      Effort: Pulmonary effort is normal. No respiratory distress.      Breath sounds: Normal breath sounds.   Abdominal:      General: Bowel sounds are normal.      Palpations: Abdomen is soft.      Tenderness: There is no abdominal tenderness.   Musculoskeletal:         General: No swelling.      Cervical back: Neck supple.      Right lower leg: Edema present.      Left lower leg: Edema present.   Skin:     General: Skin  is warm and dry.      Capillary Refill: Capillary refill takes less than 2 seconds.      Comments: Laceration above right eyebrow   Neurological:      Mental Status: He is alert. Mental status is at baseline.      Comments: AAO x2   Psychiatric:         Mood and Affect: Mood normal.         Behavior: Behavior normal.          Invasive Devices       Peripheral Intravenous Line  Duration             Peripheral IV 01/23/25 Distal;Dorsal (posterior);Right Forearm <1 day

## 2025-01-24 NOTE — ASSESSMENT & PLAN NOTE
Patient evaluated by speech therapy yesterday.   Will continue purée diet with nectar thick liquids  Continue aspiration precautions

## 2025-01-24 NOTE — ASSESSMENT & PLAN NOTE
Patient reported BM yesterday.  Documentation shows patient had large bowel movement yesterday.   No abdominal pain, nausea, vomiting.  Encouraged po hydration.  Continue bowel regimen.

## 2025-01-24 NOTE — ASSESSMENT & PLAN NOTE
Known to [facility]   Sinemet now will be 1.5 tab 4x a day  Likely contributing to altered mentation

## 2025-01-25 LAB
ANION GAP SERPL CALCULATED.3IONS-SCNC: 4 MMOL/L (ref 4–13)
BASOPHILS # BLD AUTO: 0.02 THOUSANDS/ΜL (ref 0–0.1)
BASOPHILS NFR BLD AUTO: 0 % (ref 0–1)
BUN SERPL-MCNC: 25 MG/DL (ref 5–25)
CALCIUM SERPL-MCNC: 8.8 MG/DL (ref 8.4–10.2)
CHLORIDE SERPL-SCNC: 109 MMOL/L (ref 96–108)
CO2 SERPL-SCNC: 29 MMOL/L (ref 21–32)
CREAT SERPL-MCNC: 1.06 MG/DL (ref 0.6–1.3)
EOSINOPHIL # BLD AUTO: 0.18 THOUSAND/ΜL (ref 0–0.61)
EOSINOPHIL NFR BLD AUTO: 3 % (ref 0–6)
ERYTHROCYTE [DISTWIDTH] IN BLOOD BY AUTOMATED COUNT: 13.2 % (ref 11.6–15.1)
GFR SERPL CREATININE-BSD FRML MDRD: 64 ML/MIN/1.73SQ M
GLUCOSE SERPL-MCNC: 80 MG/DL (ref 65–140)
HCT VFR BLD AUTO: 31.8 % (ref 36.5–49.3)
HGB BLD-MCNC: 10.4 G/DL (ref 12–17)
IMM GRANULOCYTES # BLD AUTO: 0.01 THOUSAND/UL (ref 0–0.2)
IMM GRANULOCYTES NFR BLD AUTO: 0 % (ref 0–2)
LYMPHOCYTES # BLD AUTO: 1.01 THOUSANDS/ΜL (ref 0.6–4.47)
LYMPHOCYTES NFR BLD AUTO: 19 % (ref 14–44)
MAGNESIUM SERPL-MCNC: 2.2 MG/DL (ref 1.9–2.7)
MCH RBC QN AUTO: 32.5 PG (ref 26.8–34.3)
MCHC RBC AUTO-ENTMCNC: 32.7 G/DL (ref 31.4–37.4)
MCV RBC AUTO: 99 FL (ref 82–98)
MONOCYTES # BLD AUTO: 0.69 THOUSAND/ΜL (ref 0.17–1.22)
MONOCYTES NFR BLD AUTO: 13 % (ref 4–12)
NEUTROPHILS # BLD AUTO: 3.33 THOUSANDS/ΜL (ref 1.85–7.62)
NEUTS SEG NFR BLD AUTO: 65 % (ref 43–75)
NRBC BLD AUTO-RTO: 0 /100 WBCS
PLATELET # BLD AUTO: 163 THOUSANDS/UL (ref 149–390)
PMV BLD AUTO: 9.9 FL (ref 8.9–12.7)
POTASSIUM SERPL-SCNC: 4.5 MMOL/L (ref 3.5–5.3)
RBC # BLD AUTO: 3.2 MILLION/UL (ref 3.88–5.62)
SODIUM SERPL-SCNC: 142 MMOL/L (ref 135–147)
WBC # BLD AUTO: 5.24 THOUSAND/UL (ref 4.31–10.16)

## 2025-01-25 PROCEDURE — 99233 SBSQ HOSP IP/OBS HIGH 50: CPT | Performed by: PSYCHIATRY & NEUROLOGY

## 2025-01-25 PROCEDURE — 80048 BASIC METABOLIC PNL TOTAL CA: CPT

## 2025-01-25 PROCEDURE — 85025 COMPLETE CBC W/AUTO DIFF WBC: CPT

## 2025-01-25 PROCEDURE — 83735 ASSAY OF MAGNESIUM: CPT

## 2025-01-25 PROCEDURE — 99232 SBSQ HOSP IP/OBS MODERATE 35: CPT

## 2025-01-25 RX ADMIN — POLYETHYLENE GLYCOL 3350 17 G: 17 POWDER, FOR SOLUTION ORAL at 17:13

## 2025-01-25 RX ADMIN — Medication 1000 UNITS: at 09:19

## 2025-01-25 RX ADMIN — DOCUSATE SODIUM 100 MG: 100 CAPSULE, LIQUID FILLED ORAL at 17:13

## 2025-01-25 RX ADMIN — ACETAMINOPHEN 650 MG: 325 TABLET, FILM COATED ORAL at 09:19

## 2025-01-25 RX ADMIN — CARBIDOPA AND LEVODOPA 1.5 TABLET: 25; 100 TABLET ORAL at 12:52

## 2025-01-25 RX ADMIN — ACETAMINOPHEN 650 MG: 325 TABLET, FILM COATED ORAL at 21:51

## 2025-01-25 RX ADMIN — CARBIDOPA AND LEVODOPA 1.5 TABLET: 25; 100 TABLET ORAL at 21:51

## 2025-01-25 RX ADMIN — DOCUSATE SODIUM 100 MG: 100 CAPSULE, LIQUID FILLED ORAL at 09:19

## 2025-01-25 RX ADMIN — CARBIDOPA AND LEVODOPA 1.5 TABLET: 25; 100 TABLET ORAL at 09:19

## 2025-01-25 RX ADMIN — ENOXAPARIN SODIUM 40 MG: 40 INJECTION SUBCUTANEOUS at 09:20

## 2025-01-25 RX ADMIN — MELATONIN 3 MG: 3 TAB ORAL at 20:18

## 2025-01-25 RX ADMIN — ACETAMINOPHEN 650 MG: 325 TABLET, FILM COATED ORAL at 17:13

## 2025-01-25 RX ADMIN — POLYETHYLENE GLYCOL 3350 17 G: 17 POWDER, FOR SOLUTION ORAL at 09:20

## 2025-01-25 RX ADMIN — SODIUM CHLORIDE 75 ML/HR: 0.9 INJECTION, SOLUTION INTRAVENOUS at 02:37

## 2025-01-25 RX ADMIN — CARBIDOPA AND LEVODOPA 1.5 TABLET: 25; 100 TABLET ORAL at 17:13

## 2025-01-25 RX ADMIN — SENNOSIDES 17.2 MG: 8.6 TABLET ORAL at 17:13

## 2025-01-25 RX ADMIN — RIVASTIGMINE 4.6 MG: 4.6 PATCH TRANSDERMAL at 09:21

## 2025-01-25 RX ADMIN — SENNOSIDES 17.2 MG: 8.6 TABLET ORAL at 09:19

## 2025-01-25 RX ADMIN — AMLODIPINE BESYLATE 5 MG: 5 TABLET ORAL at 09:19

## 2025-01-25 NOTE — PROGRESS NOTES
"Progress Note - Hospitalist   Name: Daniel Sanz Jr. 84 y.o. male I MRN: [id number]  Unit/Bed#: S -01 I Date of Admission: 1/21/2025   Date of Service: 1/25/2025 I Hospital Day: 4    Assessment & Plan  Altered mental status  Patient's wife endorsing recent abnormal behaviors.  Patient waking frequently at night, hallucinations noted when awaking from sleep, patient noted to be more \"nasty\" and agitated with at times garbled speech which clears.  Presents to ED after fall at home with concern for possible UTI. S/p head strike in ED w/ R eyebrow laceration.  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 with mild acute encephalopathy  CT head w/o acute findings  TSH benign   Folate WNL, B12 normal, vitamin D low at 26.9  Wife notes B12/vitamin D supplementation 3 times weekly PTA  Continue to q daily dosing of vit D  B12 3x weekly  Negative infxn work-up (as below)   Consult geriatrics, input appreciated  Sinemet as ordered by neurology (below)  Acetaminophen 650mg TID  Neurology consulted for recent sinemet increase  BM 1/23  Fall  S/p head strike in ED w/ R eyebrow laceration; unclear if mechanical versus orthostatic hypotensive episode  CT head/neck and left shoulder benign  Laceration cleaned with dressing placed; no sutures placed  F/u orthostatic vital signs  ECG without ischemia, troponins negative x 3  PT OT consulted- recommending rehab  Parkinson's disease (HCC)  Known to [facility]   Sinemet now will be 1.5 tab 4x a day  Rivastigmine added  Likely contributing to altered mentation  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 (HCC)  Chronic right lower extremity venous ulcers   PTA Lasix for LE edema management   Known to [facility]   Wound care consulted  HTN (hypertension)  BP stable  Hold PTA ARB w/ hyperkalemia, resume when able  Continue PTA amlodipine  Dementia (HCC)    Dysphagia  Patient observed coughing while taking medications by nursing.  Speech therapy " consulted  Patient somnolent on evaluation so they are currently recommending dysphagia 1 diet with nectar thick liquids while awake and pills crushed.  Patient was to have outpatient VBS test, per speech he may need during admission but will have to be consistently awake first  Speech to continue to follow  Anemia  Chronic disease    VTE Pharmacologic Prophylaxis: VTE Score: 4 Moderate Risk (Score 3-4) - Pharmacological DVT Prophylaxis Ordered: enoxaparin (Lovenox).    Mobility:   Basic Mobility Inpatient Raw Score: 8  JH-HLM Goal: 3: Sit at edge of bed  JH-HLM Achieved: 2: Bed activities/Dependent transfer  JH-HLM Goal NOT achieved. Continue with multidisciplinary rounding and encourage appropriate mobility to improve upon JH-HLM goals.    Patient Centered Rounds: I performed bedside rounds with nursing staff today.   Discussions with Specialists or Other Care Team Provider: CM    Education and Discussions with Family / Patient: Updated  (daughter in law) via phone.    Current Length of Stay: 4 day(s)  Current Patient Status: Inpatient   Certification Statement: The patient will continue to require additional inpatient hospital stay due to placement  Discharge Plan: Anticipate discharge in 24-48 hrs to rehab facility.    Code Status: Level 1 - Full Code    Subjective   Patient feeling fine, though is pleasantly disoriented at baseline. Did require posey last night due to interference with safety. Soft limb restraints removed this morning.     Objective :  Temp:  [98.1 °F (36.7 °C)-98.4 °F (36.9 °C)] 98.4 °F (36.9 °C)  HR:  [67-83] 67  BP: (139-158)/(63-87) 158/73  Resp:  [16-17] 16  SpO2:  [96 %-98 %] 97 %  O2 Device: None (Room air)    There is no height or weight on file to calculate BMI.     Input and Output Summary (last 24 hours):     Intake/Output Summary (Last 24 hours) at 1/25/2025 1248  Last data filed at 1/25/2025 0547  Gross per 24 hour   Intake 680 ml   Output 1036 ml   Net -356 ml        Physical Exam  Vitals and nursing note reviewed.   Constitutional:       General: He is not in acute distress.     Appearance: He is well-developed.   HENT:      Head: Normocephalic and atraumatic.   Eyes:      Conjunctiva/sclera: Conjunctivae normal.   Cardiovascular:      Rate and Rhythm: Normal rate and regular rhythm.      Heart sounds: No murmur heard.  Pulmonary:      Effort: Pulmonary effort is normal. No respiratory distress.      Breath sounds: Normal breath sounds. No wheezing or rales.   Abdominal:      Palpations: Abdomen is soft.      Tenderness: There is no abdominal tenderness.   Musculoskeletal:         General: No swelling.      Cervical back: Neck supple.      Right lower leg: Edema present.      Left lower leg: Edema present.      Comments: Chronic, but improved   Skin:     General: Skin is warm and dry.      Capillary Refill: Capillary refill takes less than 2 seconds.   Neurological:      Mental Status: He is alert.   Psychiatric:         Mood and Affect: Mood normal.           Lines/Drains:              Lab Results: I have reviewed the following results:   Results from last 7 days   Lab Units 01/25/25  0547   WBC Thousand/uL 5.24   HEMOGLOBIN g/dL 10.4*   HEMATOCRIT % 31.8*   PLATELETS Thousands/uL 163   SEGS PCT % 65   LYMPHO PCT % 19   MONO PCT % 13*   EOS PCT % 3     Results from last 7 days   Lab Units 01/25/25  0547 01/21/25  1702 01/21/25  1226   SODIUM mmol/L 142   < > 138   POTASSIUM mmol/L 4.5   < > 6.0*   CHLORIDE mmol/L 109*   < > 109*   CO2 mmol/L 29   < > 25   BUN mg/dL 25   < > 33*   CREATININE mg/dL 1.06   < > 0.98   ANION GAP mmol/L 4   < > 4   CALCIUM mg/dL 8.8   < > 8.5   ALBUMIN g/dL  --   --  3.6   TOTAL BILIRUBIN mg/dL  --   --  0.81   ALK PHOS U/L  --   --  112*   ALT U/L  --   --  4*   AST U/L  --   --  28   GLUCOSE RANDOM mg/dL 80   < > 92    < > = values in this interval not displayed.     Results from last 7 days   Lab Units 01/21/25  1230   INR  1.03              Results from last 7 days   Lab Units 01/21/25  1452 01/21/25  1226   LACTIC ACID mmol/L 0.5  --    PROCALCITONIN ng/ml  --  <0.05       Recent Cultures (last 7 days):   Results from last 7 days   Lab Units 01/21/25  1452 01/21/25  1230   BLOOD CULTURE  No Growth at 72 hrs. No Growth at 72 hrs.             Last 24 Hours Medication List:     Current Facility-Administered Medications:     acetaminophen (TYLENOL) tablet 650 mg, TID    amLODIPine (NORVASC) tablet 5 mg, Daily    Artificial Tears Op Soln 1 drop, Q4H PRN    bisacodyl (DULCOLAX) rectal suppository 10 mg, Daily    calcium carbonate (TUMS) chewable tablet 1,000 mg, Daily PRN    [COMPLETED] carbidopa-levodopa (SINEMET)  mg per tablet 2 tablet, TID **FOLLOWED BY** carbidopa-levodopa (SINEMET)  mg per tablet 1.5 tablet, 4x Daily    Cholecalciferol (VITAMIN D3) tablet 1,000 Units, Daily    [START ON 1/27/2025] cyanocobalamin (VITAMIN B-12) tablet 1,000 mcg, Once per day on Monday Wednesday Friday    docusate sodium (COLACE) capsule 100 mg, BID    enoxaparin (LOVENOX) subcutaneous injection 40 mg, Daily    lidocaine (LIDODERM) 5 % patch 1 patch, Daily    melatonin tablet 3 mg, HS    ondansetron (ZOFRAN) injection 4 mg, Q6H PRN    polyethylene glycol (MIRALAX) packet 17 g, BID    QUEtiapine (SEROquel) tablet 12.5 mg, Q8H PRN    rivastigmine (EXELON) 4.6 mg/24 hr TD 24 hr patch 4.6 mg, Daily    senna (SENOKOT) tablet 17.2 mg, BID    sodium chloride 0.9 % infusion, Continuous, Last Rate: 75 mL/hr (01/25/25 7622)    Administrative Statements   Today, Patient Was Seen By: Mable Yu DO      **Please Note: This note may have been constructed using a voice recognition system.**

## 2025-01-25 NOTE — ASSESSMENT & PLAN NOTE
Known to [facility]   Sinemet now will be 1.5 tab 4x a day  Rivastigmine added  Likely contributing to altered mentation

## 2025-01-25 NOTE — ASSESSMENT & PLAN NOTE
Chronic right lower extremity venous ulcers   PTA Lasix for LE edema management   Known to [facility]   Wound care consulted

## 2025-01-25 NOTE — CASE MANAGEMENT
Case Management Discharge Planning Note    Patient name Daniel Sanz Jr.  Location S /S -01 MRN [id number]  : 1940 Date 2025       Current Admission Date: 2025  Current Admission Diagnosis:Altered mental status   Patient Active Problem List    Diagnosis Date Noted Date Diagnosed    Dysphagia 2025     Anemia 2025     Dementia (HCC) 2025     Altered mental status 2025     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 (HCC) 2025     Fall, initial encounter 10/11/2024     Closed fracture of nasal bone 2024     Fall 2024     Scalp hematoma 2024     Multiple abrasions 2024     HTN (hypertension) 2024     Stage 3a chronic kidney disease ([facility]) 03/15/2024     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     Shortness of breath      MGUS (monoclonal gammopathy of unknown significance) 2022     Neuropathy 2022     Parkinson's disease ([facility]) 2020     Tear of right supraspinatus tendon 2019     Chronic right shoulder pain 2019     Internal derangement of right shoulder 2019     Pleural plaque with presence of asbestos 2017     Allergic rhinitis with postnasal drip 2017       LOS (days): 4  Geometric Mean LOS (GMLOS) (days): 3.8  Days to GMLOS:-0.1     OBJECTIVE:  Risk of Unplanned Readmission Score: 21.74         Current admission status: Inpatient   Preferred Pharmacy:   [facility] [address] [address]  Phone: [phone] Fax: [phone]    [id number] [place] [address] [address]  Phone: [phone] Fax: [phone]    Primary Care Provider: Charlie Martinez DO    Primary Insurance: [facility]  Secondary Insurance:     DISCHARGE DETAILS:    Discharge planning discussed with:: Patient & Sheng-spouse  Freedom of Choice:  Yes  Comments - Freedom of Choice: CM met with Pt and spouse to discuss the recommendation of SNF which they reported being agreeable to and gave permission for a blanket referral with [facility] being the first preference.  CM contacted family/caregiver?: Yes  Were Treatment Team discharge recommendations reviewed with patient/caregiver?: Yes  Did patient/caregiver verbalize understanding of patient care needs?: Yes  Were patient/caregiver advised of the risks associated with not following Treatment Team discharge recommendations?: Yes    Contacts  Patient Contacts: Sheng  Relationship to Patient:: Family  Contact Method: In Person  Reason/Outcome: Referral, Discharge Planning    Requested Home Health Care         Is the patient interested in C at discharge?: No    DME Referral Provided  Referral made for DME?: No    Other Referral/Resources/Interventions Provided:  Interventions: Short Term Rehab  Referral Comments: CM made a blanket SNF referral for an accepting facility.         Treatment Team Recommendation: Short Term Rehab  Discharge Destination Plan:: Short Term Rehab  [facility]

## 2025-01-25 NOTE — ASSESSMENT & PLAN NOTE
"Patient's wife endorsing recent abnormal behaviors.  Patient waking frequently at night, hallucinations noted when awaking from sleep, patient noted to be more \"nasty\" and agitated with at times garbled speech which clears.  Presents to ED after fall at home with concern for possible UTI. S/p head strike in ED w/ R eyebrow laceration.  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 with mild acute encephalopathy  CT head w/o acute findings  TSH benign   Folate WNL, B12 normal, vitamin D low at 26.9  Wife notes B12/vitamin D supplementation 3 times weekly PTA  Continue to q daily dosing of vit D  B12 3x weekly  Negative infxn work-up (as below)   Consult geriatrics, input appreciated  Sinemet as ordered by neurology (below)  Acetaminophen 650mg TID  Neurology consulted for recent sinemet increase  BM 1/23  "

## 2025-01-25 NOTE — ASSESSMENT & PLAN NOTE
1/25/2025: Seen again today by same neurology team/attending is this 84 y.o. male with several year history of Parkinson's disease on Sinemet who has had several dosage adjustments over the last year or so.  This patient also has a generalized dementia (scored 7/30 on MoCA in 3/2024), history of a few other falls likely related to his Parkinson's and/or his dementia along with HTN, chronic lower extremity edema.  He actually presented to [facility] on 1/21/2025 with AMS and foul-smelling urine by the report of his wife.  He walked into the ED but apparently tripped or fell as he was coming in..  Patient's Sinemet was increased 1 week ago from 2 tab TID to 2.5 tab TID due to patient having significant bradykinesia, cogwheeling, and tremor at the lower dose.  Since then, patient has had a decline in his behavior, especially at night.  His wife reports that he will frequently have abnormal movements while sleeping and awaken from sleep with hallucinations.  He has also been agitated and aggressive with garbled speech.    Upon ambulation into the ED, patient fell and struck his head, sustaining a laceration above the right eyebrow.  CT head negative for acute intracranial abnormalities.  Patient was hypothermic with temperature 96 °F.  Labs revealed potassium 6.0 and hemoglobin 9.6.  Remainder of CBC, BMP, lactic acid, influenza, and UA unremarkable.  Neurology was consulted to give guidance on Sinemet dosing and other recommendations for his Parkinson's disease/altered mental status.    After seeing this patient initially:  Sinemet dose was reduced to 2 tabs TID on 1/23. Yesterday, his dose was changed to 1-1/2 tablets 4 times daily.  First dose around 9 AM last dose around 9 PM.    On exam today: He is awake and alert.  Although he has a soft waist restraint Posey on the staff nurse reports he has had less confusion his wife reports he is significantly better and more appropriate today, although I think his baseline  confusion and dementia is present in terms of his orientation.  He remains with some advanced Parkinson's features,  hypomimia, but he is not particularly hypophonic at this time.  Subtle intermittent resting tremor in bilateral hands, diffuse bradykinesia seems improved today somewhat with the 4 times daily dosing he did well with repetitive arm movements and there was little rigidity of his bilateral upper extremities on today's exam. He remains oriented to self and his wife, he was unable to report the year or the season he knew that he was in the hospital but it was [place], close.   No worsening in symptoms since decreasing Sinemet dose.  AMS seems clearer today from the report of the wife and the staff nurse.    Plan:  -Continue Sinemet  from 1.5 tabs QID daily. (9AM, 1PM, 5PM, 9PM) approximately as this is the schedule that the wife tries to maintain at home.  -A referral has been placed to [facility] this patient would benefit from a more acute rehab particularly in neuro/PD GERD rehab that is available at [facility] and now that his meds have been tweaked he may do better.   -We will try to increase his out of bed activity, if not his ambulation here before his transfer to rehab.  - Geriatrics following; started patient on low dose Seroquel PRN at night time  - PT/OT  - Continue to monitor and notify neurology with any changes.   - Medical management and supportive care per primary team. Correction of any metabolic or infectious disturbances

## 2025-01-25 NOTE — PROGRESS NOTES
Progress Note - Neurology   Name: Daniel Sanz Jr. 84 y.o. male I MRN: [id number]  Unit/Bed#: S -01 I Date of Admission: 1/21/2025   Date of Service: 1/25/2025 I Hospital Day: 4    Assessment & Plan  Altered mental status  1/25/2025: Seen again today by same neurology team/attending is this 84 y.o. male with several year history of Parkinson's disease on Sinemet who has had several dosage adjustments over the last year or so.  This patient also has a generalized dementia (scored 7/30 on MoCA in 3/2024), history of a few other falls likely related to his Parkinson's and/or his dementia along with HTN, chronic lower extremity edema.  He actually presented to [facility] on 1/21/2025 with AMS and foul-smelling urine by the report of his wife.  He walked into the ED but apparently tripped or fell as he was coming in..  Patient's Sinemet was increased 1 week ago from 2 tab TID to 2.5 tab TID due to patient having significant bradykinesia, cogwheeling, and tremor at the lower dose.  Since then, patient has had a decline in his behavior, especially at night.  His wife reports that he will frequently have abnormal movements while sleeping and awaken from sleep with hallucinations.  He has also been agitated and aggressive with garbled speech.    Upon ambulation into the ED, patient fell and struck his head, sustaining a laceration above the right eyebrow.  CT head negative for acute intracranial abnormalities.  Patient was hypothermic with temperature 96 °F.  Labs revealed potassium 6.0 and hemoglobin 9.6.  Remainder of CBC, BMP, lactic acid, influenza, and UA unremarkable.  Neurology was consulted to give guidance on Sinemet dosing and other recommendations for his Parkinson's disease/altered mental status.    After seeing this patient initially:  Sinemet dose was reduced to 2 tabs TID on 1/23. Yesterday, his dose was changed to 1-1/2 tablets 4 times daily.  First dose around 9 AM last dose around 9 PM.    On exam  today: He is awake and alert.  Although he has a soft waist restraint Posey on the staff nurse reports he has had less confusion his wife reports he is significantly better and more appropriate today, although I think his baseline confusion and dementia is present in terms of his orientation.  He remains with some advanced Parkinson's features,  hypomimia, but he is not particularly hypophonic at this time.  Subtle intermittent resting tremor in bilateral hands, diffuse bradykinesia seems improved today somewhat with the 4 times daily dosing he did well with repetitive arm movements and there was little rigidity of his bilateral upper extremities on today's exam. He remains oriented to self and his wife, he was unable to report the year or the season he knew that he was in the hospital but it was [place], close.   No worsening in symptoms since decreasing Sinemet dose.  AMS seems clearer today from the report of the wife and the staff nurse.    Plan:  -Continue Sinemet  from 1.5 tabs QID daily. (9AM, 1PM, 5PM, 9PM) approximately as this is the schedule that the wife tries to maintain at home.  -A referral has been placed to [facility] this patient would benefit from a more acute rehab particularly in neuro/PD GERD rehab that is available at [facility] and now that his meds have been tweaked he may do better.   -We will try to increase his out of bed activity, if not his ambulation here before his transfer to rehab.  - Geriatrics following; started patient on low dose Seroquel PRN at night time  - PT/OT  - Continue to monitor and notify neurology with any changes.   - Medical management and supportive care per primary team. Correction of any metabolic or infectious disturbances  Parkinson's disease (HCC)  - See assessment/plan with changes in his meds as noted.  We will try and get him into the [facility] residents clinic with Dr. Oviedo as it will take him longer to be seen by the specialist Dr. Vee in our  office.  Dementia (HCC)  - Conservative management of delirium: minimize noise, maintain day/night cycle, provide frequent redirection, avoid restraints if possible, etc.  - Geriatrics following  - Patient scored 7/30 on MOCA in March 2024.  - Seroquel for delirium  Fall  - PT/OT  As noted above we will try and get this patient seen by Dr. Oviedo in the residents at the [facility] clinic as an outpatient hopefully within a month or so.  This patient would be appropriate for [facility] as he is clearly more awake and had such a high level of premorbid activity including the big and loud program.  He still was quite ambulatory prior to admission.    Subjective/Objective     Subjective: I am not as sleepy as I was    ROS: The patient reports that he does feel somewhat rested better.  His wife reports that he does seem more awake today.  She reports his confusion, consistent with his dementia is about the same.  The staff nurse reports no adverse events.    Current Facility-Administered Medications   Medication Dose Route Frequency    acetaminophen  650 mg Oral TID    amLODIPine  5 mg Oral Daily    Artificial Tears  1 drop Both Eyes Q4H PRN    bisacodyl  10 mg Rectal Daily    calcium carbonate  1,000 mg Oral Daily PRN    carbidopa-levodopa  1.5 tablet Oral 4x Daily    Cholecalciferol  1,000 Units Oral Daily    vitamin B-12  1,000 mcg Oral Once per day on Monday Wednesday Friday    docusate sodium  100 mg Oral BID    enoxaparin  40 mg Subcutaneous Daily    lidocaine  1 patch Topical Daily    melatonin  3 mg Oral HS    ondansetron  4 mg Intravenous Q6H PRN    polyethylene glycol  17 g Oral BID    QUEtiapine  12.5 mg Oral Q8H PRN    rivastigmine  4.6 mg Transdermal Daily    senna  2 tablet Oral BID       Artificial Tears    calcium carbonate    ondansetron    QUEtiapine    Vitals: Blood pressure 134/62, pulse 68, temperature 97.6 °F (36.4 °C), resp. rate 16, SpO2 96%.,There is no height or weight on file to  calculate BMI.        Physical Exam:     Pat seen in: In bed, he has a soft [place]mis  General appearance: alert, watching TV talking with his wife  Neck, Lungs, Heart, & abdomen: WNL  Extremities: atraumatic, no cyanosis or edema    Neurologic:   Mental status: Alert, as noted he is talking with his wife.  He is oriented to her in the general place.  He initially reported that it was fall then corrected when he looked outside to February.  He was able to follow simple commands his speech was less hypophonic than yesterday although it still did have some hypomimia.  CN: exam EOM's I, Gaze conjugate. Non lateralizing sensory & motor exam, (PP not tested on face). Reminder CNVIII-XII normal.   Motor: full power age appropriate x 4 limbs, there was no significantly increased tone.  He did well with repetitive arm movements.  He is able to reverse fairly quickly.  We saw him approximately 2 hours after his Sinemet dose.  Sensory: grossly intact  X 4 limbs, PP not tested  Cerebellar: no ataxia or past pointing w pronation from a modified Romberg position.   Gait: We did not gait him today at this time.  He was seen previously by PT 3 days ago he has had some positive changes since then.  DTR's: Age appropriate,  Plantars mute.      Lab Results: I have personally reviewed pertinent reports.  , CBC:   Results from last 7 days   Lab Units 01/25/25  0547 01/24/25  0639 01/23/25  0436   WBC Thousand/uL 5.24 6.20 5.09   RBC Million/uL 3.20* 3.32* 3.01*   HEMOGLOBIN g/dL 10.4* 10.8* 9.6*   HEMATOCRIT % 31.8* 32.7* 30.1*   MCV fL 99* 99* 100*   PLATELETS Thousands/uL 163 175 164   , BMP/CMP:   Results from last 7 days   Lab Units 01/25/25  0547 01/24/25  0639 01/23/25  0436 01/21/25  1702 01/21/25  1226   SODIUM mmol/L 142 141 142   < > 138   POTASSIUM mmol/L 4.5 4.7 4.9   < > 6.0*   CHLORIDE mmol/L 109* 108 108   < > 109*   CO2 mmol/L 29 27 28   < > 25   BUN mg/dL 25 24 27*   < > 33*   CREATININE mg/dL 1.06 1.07 1.09   < > 0.98    CALCIUM mg/dL 8.8 8.9 8.8   < > 8.5   AST U/L  --   --   --   --  28   ALT U/L  --   --   --   --  4*   ALK PHOS U/L  --   --   --   --  112*   EGFR ml/min/1.73sq m 64 63 62   < > 71    < > = values in this interval not displayed.   , Vitamin B12:   Results from last 7 days   Lab Units 01/22/25  0449   VITAMIN B 12 pg/mL 551   , HgBA1C:   , TSH:   Results from last 7 days   Lab Units 01/21/25  1226   TSH 3RD GENERATON uIU/mL 2.635   , Coagulation:   Results from last 7 days   Lab Units 01/21/25  1230   INR  1.03   , Lipid Profile:        Imaging Studies: No new neuroimaging done overnight.      Counseling / Coordination of Care  Total time spent today approximately 60 minutes. Greater than 50% of total time was spent with the patient and / or family counseling and / or coordination of care. A description of the counseling / coordination of care: All of the above was discussed with the patient's wife.  She had several questions and concerns regarding his meds the changes, the possibility to go to [facility] rehab and the big change from his premorbid status to now.  I have discussed all of these things with her at length.  I believe she is satisfied with where we stand and now.  We will try and increase this patient's out of bed activity time.  I have discussed also with the  to try and refer this patient to a [facility].

## 2025-01-25 NOTE — ASSESSMENT & PLAN NOTE
- See assessment/plan with changes in his meds as noted.  We will try and get him into the [facility] residents clinic with Dr. Oviedo as it will take him longer to be seen by the specialist Dr. Vee in our office.

## 2025-01-26 LAB
ANION GAP SERPL CALCULATED.3IONS-SCNC: 9 MMOL/L (ref 4–13)
BACTERIA BLD CULT: NORMAL
BACTERIA BLD CULT: NORMAL
BASOPHILS # BLD AUTO: 0.04 THOUSANDS/ΜL (ref 0–0.1)
BASOPHILS NFR BLD AUTO: 1 % (ref 0–1)
BUN SERPL-MCNC: 25 MG/DL (ref 5–25)
CALCIUM SERPL-MCNC: 8.9 MG/DL (ref 8.4–10.2)
CHLORIDE SERPL-SCNC: 106 MMOL/L (ref 96–108)
CO2 SERPL-SCNC: 25 MMOL/L (ref 21–32)
CREAT SERPL-MCNC: 1.02 MG/DL (ref 0.6–1.3)
EOSINOPHIL # BLD AUTO: 0.21 THOUSAND/ΜL (ref 0–0.61)
EOSINOPHIL NFR BLD AUTO: 3 % (ref 0–6)
ERYTHROCYTE [DISTWIDTH] IN BLOOD BY AUTOMATED COUNT: 13.2 % (ref 11.6–15.1)
GFR SERPL CREATININE-BSD FRML MDRD: 67 ML/MIN/1.73SQ M
GLUCOSE SERPL-MCNC: 101 MG/DL (ref 65–140)
HCT VFR BLD AUTO: 34 % (ref 36.5–49.3)
HGB BLD-MCNC: 10.9 G/DL (ref 12–17)
IMM GRANULOCYTES # BLD AUTO: 0.03 THOUSAND/UL (ref 0–0.2)
IMM GRANULOCYTES NFR BLD AUTO: 0 % (ref 0–2)
LYMPHOCYTES # BLD AUTO: 1.01 THOUSANDS/ΜL (ref 0.6–4.47)
LYMPHOCYTES NFR BLD AUTO: 13 % (ref 14–44)
MAGNESIUM SERPL-MCNC: 2.2 MG/DL (ref 1.9–2.7)
MCH RBC QN AUTO: 31.4 PG (ref 26.8–34.3)
MCHC RBC AUTO-ENTMCNC: 32.1 G/DL (ref 31.4–37.4)
MCV RBC AUTO: 98 FL (ref 82–98)
MONOCYTES # BLD AUTO: 0.94 THOUSAND/ΜL (ref 0.17–1.22)
MONOCYTES NFR BLD AUTO: 12 % (ref 4–12)
NEUTROPHILS # BLD AUTO: 5.82 THOUSANDS/ΜL (ref 1.85–7.62)
NEUTS SEG NFR BLD AUTO: 71 % (ref 43–75)
NRBC BLD AUTO-RTO: 0 /100 WBCS
PLATELET # BLD AUTO: 193 THOUSANDS/UL (ref 149–390)
PMV BLD AUTO: 10.4 FL (ref 8.9–12.7)
POTASSIUM SERPL-SCNC: 4.5 MMOL/L (ref 3.5–5.3)
RBC # BLD AUTO: 3.47 MILLION/UL (ref 3.88–5.62)
SODIUM SERPL-SCNC: 140 MMOL/L (ref 135–147)
WBC # BLD AUTO: 8.05 THOUSAND/UL (ref 4.31–10.16)

## 2025-01-26 PROCEDURE — 85025 COMPLETE CBC W/AUTO DIFF WBC: CPT

## 2025-01-26 PROCEDURE — 99232 SBSQ HOSP IP/OBS MODERATE 35: CPT

## 2025-01-26 PROCEDURE — 83735 ASSAY OF MAGNESIUM: CPT

## 2025-01-26 PROCEDURE — 80048 BASIC METABOLIC PNL TOTAL CA: CPT

## 2025-01-26 RX ADMIN — ACETAMINOPHEN 650 MG: 325 TABLET, FILM COATED ORAL at 09:36

## 2025-01-26 RX ADMIN — QUETIAPINE FUMARATE 12.5 MG: 25 TABLET ORAL at 20:59

## 2025-01-26 RX ADMIN — ACETAMINOPHEN 650 MG: 325 TABLET, FILM COATED ORAL at 17:23

## 2025-01-26 RX ADMIN — Medication 1000 UNITS: at 09:36

## 2025-01-26 RX ADMIN — MELATONIN 3 MG: 3 TAB ORAL at 20:59

## 2025-01-26 RX ADMIN — SENNOSIDES 17.2 MG: 8.6 TABLET ORAL at 09:36

## 2025-01-26 RX ADMIN — ACETAMINOPHEN 650 MG: 325 TABLET, FILM COATED ORAL at 20:59

## 2025-01-26 RX ADMIN — ENOXAPARIN SODIUM 40 MG: 40 INJECTION SUBCUTANEOUS at 09:36

## 2025-01-26 RX ADMIN — CARBIDOPA AND LEVODOPA 1.5 TABLET: 25; 100 TABLET ORAL at 09:36

## 2025-01-26 RX ADMIN — QUETIAPINE FUMARATE 12.5 MG: 25 TABLET ORAL at 03:44

## 2025-01-26 RX ADMIN — RIVASTIGMINE 4.6 MG: 4.6 PATCH TRANSDERMAL at 10:24

## 2025-01-26 RX ADMIN — CARBIDOPA AND LEVODOPA 1.5 TABLET: 25; 100 TABLET ORAL at 17:24

## 2025-01-26 RX ADMIN — CARBIDOPA AND LEVODOPA 1.5 TABLET: 25; 100 TABLET ORAL at 20:59

## 2025-01-26 RX ADMIN — DOCUSATE SODIUM 100 MG: 100 CAPSULE, LIQUID FILLED ORAL at 09:36

## 2025-01-26 RX ADMIN — CARBIDOPA AND LEVODOPA 1.5 TABLET: 25; 100 TABLET ORAL at 13:48

## 2025-01-26 RX ADMIN — AMLODIPINE BESYLATE 5 MG: 5 TABLET ORAL at 09:36

## 2025-01-26 NOTE — PLAN OF CARE
Problem: NEUROSENSORY - ADULT  Goal: Achieves stable or improved neurological status  Description: INTERVENTIONS  - Monitor and report changes in neurological status  - Monitor vital signs such as temperature, blood pressure, glucose, and any other labs ordered   - Initiate measures to prevent increased intracranial pressure  - Monitor for seizure activity and implement precautions if appropriate      Outcome: Progressing     Problem: NEUROSENSORY - ADULT  Goal: Achieves maximal functionality and self care  Description: INTERVENTIONS  - Monitor swallowing and airway patency with patient fatigue and changes in neurological status  - Encourage and assist patient to increase activity and self care.   - Encourage visually impaired, hearing impaired and aphasic patients to use assistive/communication devices  Outcome: Progressing     Problem: PAIN - ADULT  Goal: Verbalizes/displays adequate comfort level or baseline comfort level  Description: Interventions:  - Encourage patient to monitor pain and request assistance  - Assess pain using appropriate pain scale  - Administer analgesics based on type and severity of pain and evaluate response  - Implement non-pharmacological measures as appropriate and evaluate response  - Consider cultural and social influences on pain and pain management  - Notify physician/advanced practitioner if interventions unsuccessful or patient reports new pain  Outcome: Progressing     Problem: INFECTION - ADULT  Goal: Absence or prevention of progression during hospitalization  Description: INTERVENTIONS:  - Assess and monitor for signs and symptoms of infection  - Monitor lab/diagnostic results  - Monitor all insertion sites, i.e. indwelling lines, tubes, and drains  - Monitor endotracheal if appropriate and nasal secretions for changes in amount and color  - [place] appropriate cooling/warming therapies per order  - Administer medications as ordered  - Instruct and encourage patient and  family to use good hand hygiene technique  - Identify and instruct in appropriate isolation precautions for identified infection/condition  Outcome: Progressing     Problem: SAFETY,RESTRAINT: NV/NON-SELF DESTRUCTIVE BEHAVIOR  Goal: Remains free of harm/injury (restraint for non violent/non self-detsructive behavior)  Description: INTERVENTIONS:  - Instruct patient/family regarding restraint use   - Assess and monitor physiologic and psychological status   - Provide interventions and comfort measures to meet assessed patient needs   - Identify and implement measures to help patient regain control  - Assess readiness for release of restraint   Outcome: Progressing     Problem: SAFETY,RESTRAINT: NV/NON-SELF DESTRUCTIVE BEHAVIOR  Goal: Returns to optimal restraint-free functioning  Description: INTERVENTIONS:  - Assess the patient's behavior and symptoms that indicate continued need for restraint  - Identify and implement measures to help patient regain control  - Assess readiness for release of restraint   Outcome: Progressing     Problem: Potential for Falls  Goal: Patient will remain free of falls  Description: INTERVENTIONS:  - Educate patient/family on patient safety including physical limitations  - Instruct patient to call for assistance with activity   - Consult OT/PT to assist with strengthening/mobility   - Keep Call bell within reach  - Keep bed low and locked with side rails adjusted as appropriate  - Keep care items and personal belongings within reach  - Initiate and maintain comfort rounds  - Make Fall Risk Sign visible to staff  - Offer Toileting every two hours, in advance of need  - Initiate/Maintain bed alarm  - Obtain necessary fall risk management equipment:   - Apply yellow socks and bracelet for high fall risk patients  - Consider moving patient to room near nurses station  Outcome: Progressing

## 2025-01-26 NOTE — ASSESSMENT & PLAN NOTE
Chronic right lower extremity venous ulcers   PTA Lasix for LE edema management   Known to [facility] podiatry/[facility]   Wound care consulted

## 2025-01-26 NOTE — PROGRESS NOTES
"Progress Note - Hospitalist   Name: Daniel Sanz Jr. 84 y.o. male I MRN: [id number]  Unit/Bed#: S -01 I Date of Admission: 1/21/2025   Date of Service: 1/26/2025 I Hospital Day: 5    Assessment & Plan  Altered mental status  Patient's wife endorsing recent abnormal behaviors.  Patient waking frequently at night, hallucinations noted when awaking from sleep, patient noted to be more \"nasty\" and agitated with at times garbled speech which clears.  Presents to ED after fall at home with concern for possible UTI. S/p head strike in ED w/ R eyebrow laceration.  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 with mild acute encephalopathy  CT head w/o acute findings  TSH benign   Folate WNL, B12 normal, vitamin D low at 26.9  Wife notes B12/vitamin D supplementation 3 times weekly PTA  Continue to q daily dosing of vit D  B12 3x weekly  Negative infxn work-up (as below)   Consult geriatrics, input appreciated  Sinemet as ordered by neurology (below)  Acetaminophen 650mg TID  Neurology consulted for recent sinemet increase  BM 1/23  Fall  S/p head strike in ED w/ R eyebrow laceration; unclear if mechanical versus orthostatic hypotensive episode  CT head/neck and left shoulder benign  Laceration cleaned with dressing placed; no sutures placed  F/u orthostatic vital signs  ECG without ischemia, troponins negative x 3  PT OT consulted- recommending rehab  Parkinson's disease (HCC)  Known to [facility]y   Sinemet now will be 1.5 tab 4x a day  Rivastigmine TD patch added  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 (HCC)  Chronic right lower extremity venous ulcers   PTA Lasix for LE edema management   Known to [facility] podiatry/[facility]   Wound care consulted  HTN (hypertension)  BP stable  Hold PTA ARB w/ hyperkalemia, resume when able  Continue PTA amlodipine  Dementia (HCC)    Dysphagia  Patient observed coughing while taking medications by nursing.  Speech therapy consulted  Patient somnolent on evaluation " so they are currently recommending dysphagia 1 diet with nectar thick liquids while awake and pills crushed.  Patient was to have outpatient VBS test, per speech he may need during admission but will have to be consistently awake first  Speech to continue to follow  Anemia  Chronic disease    VTE Pharmacologic Prophylaxis: VTE Score: 4 Moderate Risk (Score 3-4) - Pharmacological DVT Prophylaxis Ordered: enoxaparin (Lovenox).    Mobility:   Basic Mobility Inpatient Raw Score: 8  JH-HLM Goal: 3: Sit at edge of bed  JH-HLM Achieved: 2: Bed activities/Dependent transfer  JH-HLM Goal NOT achieved. Continue with multidisciplinary rounding and encourage appropriate mobility to improve upon JH-HLM goals.    Patient Centered Rounds: I performed bedside rounds with nursing staff today.   Discussions with Specialists or Other Care Team Provider: Neurology    Education and Discussions with Family / Patient: Updated  (wife, son, and daughter in law) at bedside.    Current Length of Stay: 5 day(s)  Current Patient Status: Inpatient   Certification Statement: The patient will continue to require additional inpatient hospital stay due to placement  Discharge Plan: Anticipate discharge in 24-48 hrs to rehab facility.    Code Status: Level 1 - Full Code    Subjective   Patient doing well per nursing. He didn't have any agitation overnight and was able to sit at the edge of the bed with supervision. Today he was antsy to get out of bed, but once in the chair, seems calm and cooperative.     Objective :  Temp:  [97.4 °F (36.3 °C)-98.4 °F (36.9 °C)] 97.4 °F (36.3 °C)  HR:  [68-87] 73  BP: (134-183)/(62-84) 160/78  Resp:  [16-17] 16  SpO2:  [96 %-97 %] 97 %    There is no height or weight on file to calculate BMI.     Input and Output Summary (last 24 hours):     Intake/Output Summary (Last 24 hours) at 1/26/2025 1704  Last data filed at 1/25/2025 6528  Gross per 24 hour   Intake --   Output 700 ml   Net -700 ml        Physical Exam  Vitals and nursing note reviewed.   Constitutional:       General: He is not in acute distress.     Appearance: He is well-developed.   HENT:      Head: Normocephalic and atraumatic.   Eyes:      Conjunctiva/sclera: Conjunctivae normal.   Cardiovascular:      Rate and Rhythm: Normal rate and regular rhythm.      Heart sounds: No murmur heard.  Pulmonary:      Effort: Pulmonary effort is normal. No respiratory distress.      Breath sounds: Normal breath sounds. No wheezing or rales.   Abdominal:      General: Bowel sounds are normal. There is no distension.      Palpations: Abdomen is soft.      Tenderness: There is no abdominal tenderness.   Musculoskeletal:         General: No swelling.      Cervical back: Neck supple.      Right lower leg: No edema.      Left lower leg: No edema.   Skin:     General: Skin is warm and dry.      Capillary Refill: Capillary refill takes less than 2 seconds.   Neurological:      Mental Status: He is alert. Mental status is at baseline. He is disoriented.   Psychiatric:         Mood and Affect: Mood normal.           Lines/Drains:              Lab Results: I have reviewed the following results:   Results from last 7 days   Lab Units 01/26/25  0534   WBC Thousand/uL 8.05   HEMOGLOBIN g/dL 10.9*   HEMATOCRIT % 34.0*   PLATELETS Thousands/uL 193   SEGS PCT % 71   LYMPHO PCT % 13*   MONO PCT % 12   EOS PCT % 3     Results from last 7 days   Lab Units 01/26/25  0534 01/21/25  1702 01/21/25  1226   SODIUM mmol/L 140   < > 138   POTASSIUM mmol/L 4.5   < > 6.0*   CHLORIDE mmol/L 106   < > 109*   CO2 mmol/L 25   < > 25   BUN mg/dL 25   < > 33*   CREATININE mg/dL 1.02   < > 0.98   ANION GAP mmol/L 9   < > 4   CALCIUM mg/dL 8.9   < > 8.5   ALBUMIN g/dL  --   --  3.6   TOTAL BILIRUBIN mg/dL  --   --  0.81   ALK PHOS U/L  --   --  112*   ALT U/L  --   --  4*   AST U/L  --   --  28   GLUCOSE RANDOM mg/dL 101   < > 92    < > = values in this interval not displayed.     Results  from last 7 days   Lab Units 01/21/25  1230   INR  1.03             Results from last 7 days   Lab Units 01/21/25  1452 01/21/25  1226   LACTIC ACID mmol/L 0.5  --    PROCALCITONIN ng/ml  --  <0.05       Recent Cultures (last 7 days):   Results from last 7 days   Lab Units 01/21/25  1452 01/21/25  1230   BLOOD CULTURE  No Growth After 4 Days. No Growth After 4 Days.             Last 24 Hours Medication List:     Current Facility-Administered Medications:     acetaminophen (TYLENOL) tablet 650 mg, TID    amLODIPine (NORVASC) tablet 5 mg, Daily    Artificial Tears Op Soln 1 drop, Q4H PRN    bisacodyl (DULCOLAX) rectal suppository 10 mg, Daily    calcium carbonate (TUMS) chewable tablet 1,000 mg, Daily PRN    [COMPLETED] carbidopa-levodopa (SINEMET)  mg per tablet 2 tablet, TID **FOLLOWED BY** carbidopa-levodopa (SINEMET)  mg per tablet 1.5 tablet, 4x Daily    Cholecalciferol (VITAMIN D3) tablet 1,000 Units, Daily    [START ON 1/27/2025] cyanocobalamin (VITAMIN B-12) tablet 1,000 mcg, Once per day on Monday Wednesday Friday    docusate sodium (COLACE) capsule 100 mg, BID    enoxaparin (LOVENOX) subcutaneous injection 40 mg, Daily    lidocaine (LIDODERM) 5 % patch 1 patch, Daily    melatonin tablet 3 mg, HS    ondansetron (ZOFRAN) injection 4 mg, Q6H PRN    polyethylene glycol (MIRALAX) packet 17 g, BID    QUEtiapine (SEROquel) tablet 12.5 mg, Q8H PRN    rivastigmine (EXELON) 4.6 mg/24 hr TD 24 hr patch 4.6 mg, Daily    senna (SENOKOT) tablet 17.2 mg, BID    Administrative Statements   Today, Patient Was Seen By: Mable Yu DO      **Please Note: This note may have been constructed using a voice recognition system.**

## 2025-01-27 LAB
ANION GAP SERPL CALCULATED.3IONS-SCNC: 5 MMOL/L (ref 4–13)
BUN SERPL-MCNC: 21 MG/DL (ref 5–25)
CALCIUM SERPL-MCNC: 8.8 MG/DL (ref 8.4–10.2)
CHLORIDE SERPL-SCNC: 107 MMOL/L (ref 96–108)
CO2 SERPL-SCNC: 31 MMOL/L (ref 21–32)
CREAT SERPL-MCNC: 1.04 MG/DL (ref 0.6–1.3)
ERYTHROCYTE [DISTWIDTH] IN BLOOD BY AUTOMATED COUNT: 13.2 % (ref 11.6–15.1)
GFR SERPL CREATININE-BSD FRML MDRD: 66 ML/MIN/1.73SQ M
GLUCOSE SERPL-MCNC: 119 MG/DL (ref 65–140)
GLUCOSE SERPL-MCNC: 85 MG/DL (ref 65–140)
HCT VFR BLD AUTO: 33.3 % (ref 36.5–49.3)
HGB BLD-MCNC: 10.6 G/DL (ref 12–17)
MAGNESIUM SERPL-MCNC: 2.2 MG/DL (ref 1.9–2.7)
MCH RBC QN AUTO: 31.7 PG (ref 26.8–34.3)
MCHC RBC AUTO-ENTMCNC: 31.8 G/DL (ref 31.4–37.4)
MCV RBC AUTO: 100 FL (ref 82–98)
PLATELET # BLD AUTO: 158 THOUSANDS/UL (ref 149–390)
PMV BLD AUTO: 9.9 FL (ref 8.9–12.7)
POTASSIUM SERPL-SCNC: 4.5 MMOL/L (ref 3.5–5.3)
RBC # BLD AUTO: 3.34 MILLION/UL (ref 3.88–5.62)
SODIUM SERPL-SCNC: 143 MMOL/L (ref 135–147)
WBC # BLD AUTO: 5.99 THOUSAND/UL (ref 4.31–10.16)

## 2025-01-27 PROCEDURE — 92526 ORAL FUNCTION THERAPY: CPT

## 2025-01-27 PROCEDURE — 99233 SBSQ HOSP IP/OBS HIGH 50: CPT | Performed by: PSYCHIATRY & NEUROLOGY

## 2025-01-27 PROCEDURE — 97110 THERAPEUTIC EXERCISES: CPT

## 2025-01-27 PROCEDURE — 85027 COMPLETE CBC AUTOMATED: CPT

## 2025-01-27 PROCEDURE — 97535 SELF CARE MNGMENT TRAINING: CPT

## 2025-01-27 PROCEDURE — 80048 BASIC METABOLIC PNL TOTAL CA: CPT

## 2025-01-27 PROCEDURE — 99232 SBSQ HOSP IP/OBS MODERATE 35: CPT

## 2025-01-27 PROCEDURE — 97116 GAIT TRAINING THERAPY: CPT

## 2025-01-27 PROCEDURE — 82948 REAGENT STRIP/BLOOD GLUCOSE: CPT

## 2025-01-27 PROCEDURE — 83735 ASSAY OF MAGNESIUM: CPT

## 2025-01-27 RX ADMIN — Medication 1000 UNITS: at 10:07

## 2025-01-27 RX ADMIN — ENOXAPARIN SODIUM 40 MG: 40 INJECTION SUBCUTANEOUS at 10:06

## 2025-01-27 RX ADMIN — DOCUSATE SODIUM 100 MG: 100 CAPSULE, LIQUID FILLED ORAL at 10:07

## 2025-01-27 RX ADMIN — ACETAMINOPHEN 650 MG: 325 TABLET, FILM COATED ORAL at 21:08

## 2025-01-27 RX ADMIN — ACETAMINOPHEN 650 MG: 325 TABLET, FILM COATED ORAL at 10:07

## 2025-01-27 RX ADMIN — CARBIDOPA AND LEVODOPA 1.5 TABLET: 25; 100 TABLET ORAL at 12:41

## 2025-01-27 RX ADMIN — SENNOSIDES 17.2 MG: 8.6 TABLET ORAL at 17:07

## 2025-01-27 RX ADMIN — CARBIDOPA AND LEVODOPA 1.5 TABLET: 25; 100 TABLET ORAL at 10:07

## 2025-01-27 RX ADMIN — MELATONIN 3 MG: 3 TAB ORAL at 21:08

## 2025-01-27 RX ADMIN — AMLODIPINE BESYLATE 5 MG: 5 TABLET ORAL at 10:07

## 2025-01-27 RX ADMIN — CYANOCOBALAMIN TAB 500 MCG 1000 MCG: 500 TAB at 10:07

## 2025-01-27 RX ADMIN — DOCUSATE SODIUM 100 MG: 100 CAPSULE, LIQUID FILLED ORAL at 17:07

## 2025-01-27 RX ADMIN — ACETAMINOPHEN 650 MG: 325 TABLET, FILM COATED ORAL at 17:07

## 2025-01-27 RX ADMIN — RIVASTIGMINE 4.6 MG: 4.6 PATCH TRANSDERMAL at 11:48

## 2025-01-27 RX ADMIN — SENNOSIDES 17.2 MG: 8.6 TABLET ORAL at 10:07

## 2025-01-27 RX ADMIN — CARBIDOPA AND LEVODOPA 1.5 TABLET: 25; 100 TABLET ORAL at 17:07

## 2025-01-27 RX ADMIN — CARBIDOPA AND LEVODOPA 1.5 TABLET: 25; 100 TABLET ORAL at 21:07

## 2025-01-27 NOTE — ASSESSMENT & PLAN NOTE
Patient observed coughing while taking medications by nursing.  Speech therapy consulted  Patient somnolent on evaluation so they recommended dysphagia 1 diet with nectar thick liquids while awake and pills crushed.  Patient was to have outpatient VBS test, per speech he may need during admission but will have to be consistently awake first  Diet advanced today to dysphagia 2  VBS can be completed at [facility]

## 2025-01-27 NOTE — CASE MANAGEMENT
[facility] received request for authorization from Care Manager.  Authorization request submitted for: Acute Rehab  Facility Name: [facility] Acute Rehab NPI: [id number]  Facility MD: Ashley DePadua NPI: [id number]  Authorization initiated by contacting insurance: [facility]   Via: H&CC Portal   Clinicals submitted via Portal attachment   Pending Reference #: [id number]    Care Manager notified: Dana Severini     Updates to authorization status will be noted in chart. Please reach out to CM for updates on any clinical information.

## 2025-01-27 NOTE — NURSING NOTE
Wife at bedside very anxious about patient being transferred possibly today vs tomorrow for STR. RN explained to patient's wife that she will be informed when patient is being transferred. At this time, RN answered all questions.

## 2025-01-27 NOTE — NURSING NOTE
This RN received call from unit clerk in regard to patient's wife asking for update and has several concerns. Per patient wife, she received a call from a woman who was asking if patient would be interested in [place] [facility] RN asked the wife if she remembers the individual's name and wife denied. RN explained that to her understanding that authorization was sent to insurance and that the facility of choice ([facility]) and insurance needed to see PT/OT notes. RN ensured patient's wife that patient will not be going anywhere tonight and that wife will be informed of any changes.     Please ensure that wife is being updated accordingly as wife is anxious that patient will be transferred to a facility she does not want him to attend. Per conversation with wife this evening, wife emphasized she ONLY wants patient to go to [facility] for STR.

## 2025-01-27 NOTE — OCCUPATIONAL THERAPY NOTE
"  Occupational Therapy Progress Note     Patient Name: Daniel Sanz Jr.  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)    Dementia (HCC)    Dysphagia    Anemia       01/27/25 0953   OT Last Visit   OT Visit Date 01/27/25  (Monday)   Note Type   Note Type Treatment for insurance authorization  (tx session 2356-0903)   Pain Assessment   Pain Assessment Tool FLACC   Pain Location/Orientation Orientation: Bilateral  (\"heels\")   Effect of Pain on Daily Activities limits activity tolerance and I w/ ADLs   Patient's Stated Pain Goal No pain   Hospital Pain Intervention(s) Repositioned;Ambulation/increased activity;Emotional support   Pain Rating: FLACC (Rest) - Face 0   Pain Rating: FLACC (Rest) - Legs 0   Pain Rating: FLACC (Rest) - Activity 0   Pain Rating: FLACC (Rest) - Cry 0   Pain Rating: FLACC (Rest) - Consolability 0   Score: FLACC (Rest) 0   Pain Rating: FLACC (Activity) - Face 1   Pain Rating: FLACC (Activity) - Legs 1   Pain Rating: FLACC (Activity) - Activity 1   Pain Rating: FLACC (Activity) - Cry 1   Pain Rating: FLACC (Activity) - Consolability 1   Score: FLACC (Activity) 5   Restrictions/Precautions   Weight Bearing Precautions Per Order No   Other Precautions Cognitive;Chair Alarm;Bed Alarm;Restraints;Fall Risk;Pain;Aspiration  (Posey belt restraint)   Lifestyle   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   Intrinsic Gratification Pt reports enjoying watching tv and their cat   ADL   Where Assessed Edge of bed   Eating Assistance 5  Supervision/Setup   Eating Deficit Setup;Supervision/safety;Increased time to complete;Pureed diet;Thickened liquids  (pureed, nectar thick liquids)   Eating Comments finishing breakfast upon therapist arrival " supine HOB elevated   Grooming Assistance 4  Minimal Assistance   Grooming Deficit Setup;Supervision/safety;Increased time to complete;Standing with assistive device;Shaving   Grooming Comments seated at EOB to wash his face; required max A to shave using electric razor   UB Bathing Assistance 4  Minimal Assistance   UB Bathing Deficit Setup;Verbal cueing;Supervision/safety;Increased time to complete   UB Bathing Comments seated at EOB   LB Bathing Assistance 2  Maximal Assistance   LB Bathing Deficit Setup;Steadying;Perineal area;Buttocks   LB Bathing Comments in stance w/ poor + balance   UB Dressing Assistance 4  Minimal Assistance   UB Dressing Deficit Setup;Pull around back;Fasteners   LB Dressing Assistance 2  Maximal Assistance   LB Dressing Deficit Setup;Steadying;Don/doff R sock;Don/doff L sock   LB Dressing Comments supine HOB elevated   Toileting Assistance  2  Maximal Assistance   Toileting Comments condom catheter fell off, pad saturated upon therapist arrival   Bed Mobility   Supine to Sit 3  Moderate assistance   Additional items Assist x 1;HOB elevated;Bedrails;Increased time required;Verbal cues;LE management  (to pt's R)   Sit to Supine 3  Moderate assistance   Additional items Assist x 1;Increased time required;Verbal cues;Bedrails   Additional Comments Pt supine HOB elevated upon arrival and at end of session w/ needs met, call bell in reach and bed alarm activated. Tolerated sitting at EOB 20 minutes w/ fair balance   Transfers   Sit to Stand 3  Moderate assistance   Additional items Assist x 1;Increased time required;Verbal cues;Bedrails;Armrests   Stand to Sit 3  Moderate assistance   Additional items Assist x 1;Bedrails;Armrests;Increased time required;Verbal cues   Additional Comments (S)  Pt performed sit <> stand 2X from EOB w/ mod A. Tolerated standing approx 2 minutes. Pt able to take few lateral side steps to his R w/ mod HHA. Able to march in place w/ min HHA. /54 supine, 128/53  "seated at EOB, 77/53 in standing and 127/54 upon return to sitting at EOB.   Functional Mobility   Additional Comments Will continue to assess   Subjective   Subjective \"I hate this belt around me\"   Cognition   Overall Cognitive Status Impaired   Arousal/Participation Alert;Cooperative   Attention Attends with cues to redirect   Orientation Level Oriented to person  (able to report the year 2025.)   Memory Decreased short term memory;Decreased recall of recent events;Decreased recall of precautions   Following Commands Follows one step commands with increased time or repetition   Comments Identified pt by full name and birthdate. Alert, oriented to person and year. Able to follow directions this AM and more alert, conversant   Activity Tolerance   Activity Tolerance Patient limited by fatigue;Patient limited by pain   Medical Staff Made Aware spoke w/ RNYeimy; Neurology and PT, Norma   Assessment   Assessment Pt seen for skilled OT tx session focusing on activity engagement, challenging activity tolerance and pt education. Pt more alert and able to follow directions during ADLs. Pt required less physical assistance to complete bed mobility and sit to stand. Pt demonstrated improved sitting tolerance and balance at edge of bed to participate in bathing, grooming. Continue to recommend level II rehab resource intensity when medically stable for discharge from acute care. Will continue to follow   Plan   Treatment Interventions ADL retraining;Endurance training;Functional transfer training;UE strengthening/ROM;Patient/family training;Equipment evaluation/education;Compensatory technique education;Continued evaluation;Energy conservation;Activityengagement   Goal Expiration Date 02/01/25   OT Treatment Day 2  (Monday 1/27/25)   OT Frequency 3-5x/wk   Discharge Recommendation   Rehab Resource Intensity Level, OT II (Moderate Resource Intensity)   AM-PAC Daily Activity Inpatient   Lower Body Dressing 2   Bathing 2 "   Toileting 2   Upper Body Dressing 3   Grooming 3   Eating 3   Daily Activity Raw Score 15   Daily Activity Standardized Score (Calc for Raw Score >=11) 34.69   AM-PAC Applied Cognition Inpatient   Following a Speech/Presentation 1   Understanding Ordinary Conversation 2   Taking Medications 1   Remembering Where Things Are Placed or Put Away 2   Remembering List of 4-5 Errands 1   Taking Care of Complicated Tasks 1   Applied Cognition Raw Score 8   Applied Cognition Standardized Score 19.32   Barthel Index   Feeding 5   Bathing 0   Grooming Score 0   Dressing Score 5   Bladder Score 5   Bowels Score 5   Toilet Use Score 5   Transfers (Bed/Chair) Score 5   Mobility (Level Surface) Score 0   Stairs Score 0   Barthel Index Score 30   End of Consult   Education Provided Yes;Family or social support of family present for education by provider   Patient Position at End of Consult Supine;Bed/Chair alarm activated;All needs within reach   Nurse Communication Nurse aware of consult   Licensure   [place] License Number  Nuria Haider OTR/L      Vitals:    01/27/25 0920 01/27/25 0924 01/27/25 0930 01/27/25 0931   BP: 139/56 128/53 (!) 77/53 127/54   Pulse: 74 77 75 75   Patient Position - Orthostatic VS: Lying - Orthostatic VS Sitting - Orthostatic VS Standing - Orthostatic VS Sitting       The patient's raw score on the AM-PAC Daily Activity Inpatient Short Form is 15. A raw score of less than 19 suggests the patient may benefit from discharge to post-acute rehabilitation services. Please refer to the recommendation of the Occupational Therapist for safe discharge planning.        Nuria Haider OTR/L  [id number]  [id number]

## 2025-01-27 NOTE — NURSING NOTE
Patient's wife came outside in hallway once again anxiously asking if patient will have private room at [facility]. RN explained that majority of the time it is NOT private rooms unless patient has a medical reason (such as isolation status) which this patient does not have. RN went to ask CM (Poonam) in regard to wife's request and CM informed RN about the same, but case management will ask the facility. This RN relayed information to patient's wife. All questions answered at this time.

## 2025-01-27 NOTE — SPEECH THERAPY NOTE
Speech Language/Pathology    Speech/Language Pathology Progress Note    Patient Name: Daniel Sanz Jr.  Today's Date: 1/27/2025       Subjective:  Pt seen for dysphagia tx follow up. Per nsg, pt's wife asking about swallow study to be completed as rec by outpt SLP.  Pt on dysphagia 1 puree diet w/ nectar thick liquids. Pt more awake and alert.   Discussed  w/ wife, she indicated that outpt SLP recommended a VBS so vital stim tx can be done for pt's dysphagia due to parkinson's.  Relayed to wife that VBS and vital stim can be done at next level of care ie [facility]. Vital stim tx is not acute care level of treatment. Wife stated she just wants to know if pt can eat regular foods and she wants to know what to make for him when goes home.   Objective:  Pt consecutive sips of NTL by straw w/ good oral control and no overt s/s aspiration. Pt trialed w/ shortbread cookies x3, pt took small bites, and sips of thin liquids by straw. Slow, inconsistent mastication/manipulation noted. Pt needed cues to continue chewing at times. Mastication appeared effective. Pt requested sips of liquids in between bites, good oral control noted. Mild oral residue noted. Swallow initiation appeared delayed w/ fair laryngeal excursion. No overt s/s aspiration.     Assessment:  Pt w/ increased CHRIS, but continues w/ at least mild-moderate oral dysphagia; no overt s/s aspiration noted with thin liquids.     Plan/Recommendations:  Rec advance diet to dysphagia 2 w/ thin liquids  Meds in puree  Aspiration precautions  Cont speech/ swallow tx at next level of care.   Will cont to follow as needed in hospital       Kayleigh Ybarra MA CCC-SLP  Speech Pathologist  Available via [employer]

## 2025-01-27 NOTE — PLAN OF CARE
Problem: PAIN - ADULT  Goal: Verbalizes/displays adequate comfort level or baseline comfort level  Description: Interventions:  - Encourage patient to monitor pain and request assistance  - Assess pain using appropriate pain scale  - Administer analgesics based on type and severity of pain and evaluate response  - Implement non-pharmacological measures as appropriate and evaluate response  - Consider cultural and social influences on pain and pain management  - Notify physician/advanced practitioner if interventions unsuccessful or patient reports new pain  Outcome: Progressing     Problem: INFECTION - ADULT  Goal: Absence or prevention of progression during hospitalization  Description: INTERVENTIONS:  - Assess and monitor for signs and symptoms of infection  - Monitor lab/diagnostic results  - Monitor all insertion sites, i.e. indwelling lines, tubes, and drains  - Monitor endotracheal if appropriate and nasal secretions for changes in amount and color  - [place] appropriate cooling/warming therapies per order  - Administer medications as ordered  - Instruct and encourage patient and family to use good hand hygiene technique  - Identify and instruct in appropriate isolation precautions for identified infection/condition  Outcome: Progressing  Goal: Absence of fever/infection during neutropenic period  Description: INTERVENTIONS:  - Monitor WBC    Outcome: Progressing     Problem: SAFETY ADULT  Goal: Patient will remain free of falls  Description: INTERVENTIONS:  - Educate patient/family on patient safety including physical limitations  - Instruct patient to call for assistance with activity   - Consult OT/PT to assist with strengthening/mobility   - Keep Call bell within reach  - Keep bed low and locked with side rails adjusted as appropriate  - Keep care items and personal belongings within reach  - Initiate and maintain comfort rounds  - Make Fall Risk Sign visible to staff  - Offer Toileting every 2 Hours,  in advance of need  - Initiate/Maintain bed/chair alarm  - Obtain necessary fall risk management equipment: yellow bracelet/socks  - Apply yellow socks and bracelet for high fall risk patients  - Consider moving patient to room near nurses station  Outcome: Progressing  Goal: Maintain or return to baseline ADL function  Description: INTERVENTIONS:  -  Assess patient's ability to carry out ADLs; assess patient's baseline for ADL function and identify physical deficits which impact ability to perform ADLs (bathing, care of mouth/teeth, toileting, grooming, dressing, etc.)  - Assess/evaluate cause of self-care deficits   - Assess range of motion  - Assess patient's mobility; develop plan if impaired  - Assess patient's need for assistive devices and provide as appropriate  - Encourage maximum independence but intervene and supervise when necessary  - Involve family in performance of ADLs  - Assess for home care needs following discharge   - Consider OT consult to assist with ADL evaluation and planning for discharge  - Provide patient education as appropriate  Outcome: Progressing  Goal: Maintains/Returns to pre admission functional level  Description: INTERVENTIONS:  - Perform AM-PAC 6 Click Basic Mobility/ Daily Activity assessment daily.  - Set and communicate daily mobility goal to care team and patient/family/caregiver.   - Collaborate with rehabilitation services on mobility goals if consulted  - Perform Range of Motion 3 times a day.  - Reposition patient every 2 hours.  - Dangle patient 3 times a day  - Stand patient 3 times a day  - Ambulate patient 3 times a day  - Out of bed to chair 3 times a day   - Out of bed for meals 3 times a day  - Out of bed for toileting  - Record patient progress and toleration of activity level   Outcome: Progressing     Problem: DISCHARGE PLANNING  Goal: Discharge to home or other facility with appropriate resources  Description: INTERVENTIONS:  - Identify barriers to discharge  w/patient and caregiver  - Arrange for needed discharge resources and transportation as appropriate  - Identify discharge learning needs (meds, wound care, etc.)  - Arrange for interpretive services to assist at discharge as needed  - Refer to Case Management Department for coordinating discharge planning if the patient needs post-hospital services based on physician/advanced practitioner order or complex needs related to functional status, cognitive ability, or social support system  Outcome: Progressing     Problem: Knowledge Deficit  Goal: Patient/family/caregiver demonstrates understanding of disease process, treatment plan, medications, and discharge instructions  Description: Complete learning assessment and assess knowledge base.  Interventions:  - Provide teaching at level of understanding  - Provide teaching via preferred learning methods  Outcome: Progressing     Problem: Prexisting or High Potential for Compromised Skin Integrity  Goal: Skin integrity is maintained or improved  Description: INTERVENTIONS:  - Identify patients at risk for skin breakdown  - Assess and monitor skin integrity  - Assess and monitor nutrition and hydration status  - Monitor labs   - Assess for incontinence   - Turn and reposition patient  - Assist with mobility/ambulation  - Relieve pressure over bony prominences  - Avoid friction and shearing  - Provide appropriate hygiene as needed including keeping skin clean and dry  - Evaluate need for skin moisturizer/barrier cream  - Collaborate with interdisciplinary team   - Patient/family teaching  - Consider wound care consult   Outcome: Progressing     Problem: Nutrition/Hydration-ADULT  Goal: Nutrient/Hydration intake appropriate for improving, restoring or maintaining nutritional needs  Description: Monitor and assess patient's nutrition/hydration status for malnutrition. Collaborate with interdisciplinary team and initiate plan and interventions as ordered.  Monitor patient's  weight and dietary intake as ordered or per policy. Utilize nutrition screening tool and intervene as necessary. Determine patient's food preferences and provide high-protein, high-caloric foods as appropriate.     INTERVENTIONS:  - Monitor oral intake, urinary output, labs, and treatment plans  - Assess nutrition and hydration status and recommend course of action  - Evaluate amount of meals eaten  - Assist patient with eating if necessary   - Allow adequate time for meals  - Recommend/ encourage appropriate diets, oral nutritional supplements, and vitamin/mineral supplements  - Order, calculate, and assess calorie counts as needed  - Recommend, monitor, and adjust tube feedings and TPN/PPN based on assessed needs  - Assess need for intravenous fluids  - Provide specific nutrition/hydration education as appropriate  - Include patient/family/caregiver in decisions related to nutrition  Outcome: Progressing     Problem: SAFETY,RESTRAINT: NV/NON-SELF DESTRUCTIVE BEHAVIOR  Goal: Remains free of harm/injury (restraint for non violent/non self-detsructive behavior)  Description: INTERVENTIONS:  - Instruct patient/family regarding restraint use   - Assess and monitor physiologic and psychological status   - Provide interventions and comfort measures to meet assessed patient needs   - Identify and implement measures to help patient regain control  - Assess readiness for release of restraint   Outcome: Progressing  Goal: Returns to optimal restraint-free functioning  Description: INTERVENTIONS:  - Assess the patient's behavior and symptoms that indicate continued need for restraint  - Identify and implement measures to help patient regain control  - Assess readiness for release of restraint   Outcome: Progressing     Problem: Potential for Falls  Goal: Patient will remain free of falls  Description: INTERVENTIONS:  - Educate patient/family on patient safety including physical limitations  - Instruct patient to call for  assistance with activity   - Consult OT/PT to assist with strengthening/mobility   - Keep Call bell within reach  - Keep bed low and locked with side rails adjusted as appropriate  - Keep care items and personal belongings within reach  - Initiate and maintain comfort rounds  - Make Fall Risk Sign visible to staff  - Offer Toileting every 2 Hours, in advance of need  - Initiate/Maintain bed/chair alarm  - Obtain necessary fall risk management equipment: yellow bracelet/socks  - Apply yellow socks and bracelet for high fall risk patients  - Consider moving patient to room near nurses station  Outcome: Progressing     Problem: NEUROSENSORY - ADULT  Goal: Achieves stable or improved neurological status  Description: INTERVENTIONS  - Monitor and report changes in neurological status  - Monitor vital signs such as temperature, blood pressure, glucose, and any other labs ordered   - Initiate measures to prevent increased intracranial pressure  - Monitor for seizure activity and implement precautions if appropriate      Outcome: Progressing  Goal: Remains free of injury related to seizures activity  Description: INTERVENTIONS  - Maintain airway, patient safety  and administer oxygen as ordered  - Monitor patient for seizure activity, document and report duration and description of seizure to physician/advanced practitioner  - If seizure occurs,  ensure patient safety during seizure  - Reorient patient post seizure  - Seizure pads on all 4 side rails  - Instruct patient/family to notify RN of any seizure activity including if an aura is experienced  - Instruct patient/family to call for assistance with activity based on nursing assessment  - Administer anti-seizure medications if ordered    Outcome: Progressing  Goal: Achieves maximal functionality and self care  Description: INTERVENTIONS  - Monitor swallowing and airway patency with patient fatigue and changes in neurological status  - Encourage and assist patient to  increase activity and self care.   - Encourage visually impaired, hearing impaired and aphasic patients to use assistive/communication devices  Outcome: Progressing     Problem: METABOLIC, FLUID AND ELECTROLYTES - ADULT  Goal: Electrolytes maintained within normal limits  Description: INTERVENTIONS:  - Monitor labs and assess patient for signs and symptoms of electrolyte imbalances  - Administer electrolyte replacement as ordered  - Monitor response to electrolyte replacements, including repeat lab results as appropriate  - Instruct patient on fluid and nutrition as appropriate  Outcome: Progressing  Goal: Fluid balance maintained  Description: INTERVENTIONS:  - Monitor labs   - Monitor I/O and WT  - Instruct patient on fluid and nutrition as appropriate  - Assess for signs & symptoms of volume excess or deficit  Outcome: Progressing

## 2025-01-27 NOTE — ASSESSMENT & PLAN NOTE
S/p head strike in ED w/ R eyebrow laceration; unclear if mechanical versus orthostatic hypotensive episode  CT head/neck and left shoulder benign  Laceration cleaned with dressing placed; no sutures placed  ECG without ischemia, troponins negative x 3  PT OT consulted- recommending rehab

## 2025-01-27 NOTE — PHYSICAL THERAPY NOTE
PT TREATMENT     01/27/25 1417   PT Last Visit   PT Visit Date 01/27/25   Note Type   Note Type Treatment   Pain Assessment   Pain Assessment Tool 0-10   Pain Score No Pain   Restrictions/Precautions   Other Precautions Fall Risk;Bed Alarm;Chair Alarm;Cognitive;Restraints  (Posey belt restraint; Ritesh on room air; Rhodes catheter)   General   Chart Reviewed Yes   Family/Caregiver Present Yes  (Patient's spouse + other family member)   Cognition   Overall Cognitive Status Impaired   Arousal/Participation Cooperative   Attention Attends with cues to redirect   Orientation Level Oriented to person;Disoriented to time;Disoriented to situation  (Oriented generally to place, knows he is in the hospital)   Memory Decreased recall of precautions;Decreased recall of recent events;Decreased short term memory   Following Commands Follows one step commands with increased time or repetition   Comments At least 2 patient identifiers including name and date of birth   Subjective   Subjective Patient agreeable to work with PT   Bed Mobility   Sit to Supine 3  Moderate assistance   Additional items Assist x 1;Verbal cues;Increased time required  (To patient's right side)   Transfers   Sit to Stand 3  Moderate assistance  (Progressing to min assist)   Additional items Assist x 1;Verbal cues;Increased time required;Armrests  (Cues for safe hand placement)   Stand to Sit 3  Moderate assistance   Additional items Armrests;Assist x 1;Verbal cues;Increased time required  (Cues for safe hand placement)   Additional Comments Patient sit to stand transfer with a roller walker and mod progressing to min assist of 1 from edge of bed; patient stood with a roller walker for 1 minute working on static sitting balance with minimal assistance   Ambulation/Elevation   Gait pattern Short stride;Step to;Decreased foot clearance;Foward flexed   Gait Assistance 3  Moderate assist   Additional items Assist x 1;Verbal cues;Tactile cues   Assistive  "Device Rolling walker   Distance 6-7 steps bed to chair; cues to \"take big steps\"   Balance   Static Sitting Fair   Static Standing Fair -  (With roller walker)   Ambulatory   (Poor to P+ with roller walker; gait degradation after a few steps)   Endurance Deficit   Endurance Deficit Yes   Endurance Deficit Description Limited overall activity, standing and gait endurance   Activity Tolerance   Activity Tolerance Patient limited by fatigue;Treatment limited secondary to medical complications (Comment)  (Weakness and deconditioning; impaired cognition)   Medical Staff Made Aware CM   Nurse Made Aware DOLORES Gaffney   Exercises   Heelslides AAROM;10 reps;Bilateral;Supine   Hip Abduction AAROM;10 reps;Bilateral;Supine   Hip Adduction AAROM;10 reps;Bilateral;Supine   Knee AROM Short Arc Quad AAROM;10 reps;Bilateral;Supine   Ankle Pumps 10 reps;Bilateral;Supine   Assessment   Problem List Decreased strength;Decreased endurance;Impaired balance;Decreased mobility;Decreased coordination;Decreased cognition;Impaired judgement;Decreased safety awareness   Assessment Patient agreeable to PT session this afternoon.  Patient with fairly good tolerance to bilateral lower extremity exercise, bed mobility, transfers and short distance ambulation with a roller walker bed to chair.  Patient needing less assistance overall for bed mobility, transfers and ambulation with a roller walker.  Patient is making steady progress toward PT goals.  When medically stable for discharge patient remains appropriate for Level II Moderate Resource Intensity.    The patient's AM-PAC Basic Mobility Inpatient Short Form Raw Score is 11. A Raw score of less than or equal to 16 suggests the patient may benefit from discharge to post-acute rehabilitation services. Please also refer to the recommendation of the Physical Therapist for safe discharge planning.   Goals   Patient Goals Patient unable to state secondary to impaired cognition   STG Expiration Date " 02/01/25   Short Term Goal #1 Pt will: perform bed mobility w/ min Ax1 to decrease pt's burden of care and increase pt's independence w/ repositioning in bed; perform transfers w/ min Ax1 to promote OOB mobility; ambulate 50' w/ LRAD and min Ax1 to increase pt's ambulatory endurance/tolerance; negotiate 1 stair(s) w/ UE support and min Ax1 to facilitate pt returning to previous living environment; increase all balance ratings by at least 1 grade to decrease pt's risk of falls   Plan   Treatment/Interventions ADL retraining;Functional transfer training;LE strengthening/ROM;Therapeutic exercise;Endurance training;Cognitive reorientation;Patient/family training;Equipment eval/education;Bed mobility;Gait training;Compensatory technique education;Spoke to nursing;Spoke to case management   PT Frequency 3-5x/wk   Discharge Recommendation   Rehab Resource Intensity Level, PT II (Moderate Resource Intensity)   AM-PAC Basic Mobility Inpatient   Turning in Flat Bed Without Bedrails 2   Lying on Back to Sitting on Edge of Flat Bed Without Bedrails 2   Moving Bed to Chair 2   Standing Up From Chair Using Arms 2   Walk in Room 2   Climb 3-5 Stairs With Railing 1   Basic Mobility Inpatient Raw Score 11   Basic Mobility Standardized Score 30.25   [facility] Highest Level Of Mobility   -HL Goal 4: Move to chair/commode   -[facility] Achieved 4: Move to chair/commode   Education   Education Provided Mobility training;Assistive device   Patient Reinforcement needed   End of Consult   Patient Position at End of Consult Bed/Chair alarm activated;Bedside chair;All needs within reach   Licensure   [place] License Number  Ankita L Ellen, PT   Portions of the documentation may have been created using voice recognition software. Occasional wrong word or sound alike substitutions may have occurred due to the inherent limitation of the voice recognition software. Read the chart carefully and recognize, using context, where substitutions have  occurred.

## 2025-01-27 NOTE — ASSESSMENT & PLAN NOTE
Known to [facility]   Sinemet now will be 1.5 tab 4x a day  Rivastigmine TD patch added  Follow-up in the office

## 2025-01-27 NOTE — PROGRESS NOTES
"Progress Note - Hospitalist   Name: Daniel Sanz Jr. 84 y.o. male I MRN: [id number]  Unit/Bed#: S -01 I Date of Admission: 1/21/2025   Date of Service: 1/27/2025 I Hospital Day: 6    Assessment & Plan  Altered mental status  Patient's wife endorsing recent abnormal behaviors.  Patient waking frequently at night, hallucinations noted when awaking from sleep, patient noted to be more \"nasty\" and agitated with at times garbled speech which clears.  Presents to ED after fall at home with concern for possible UTI. S/p head strike in ED w/ R eyebrow laceration.  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 with mild acute encephalopathy  CT head w/o acute findings  TSH benign   Folate WNL, B12 normal, vitamin D low at 26.9  Wife notes B12/vitamin D supplementation 3 times weekly PTA  Continue to q daily dosing of vit D  B12 3x weekly  Negative infxn work-up (as below)   Consult geriatrics, input appreciated  Sinemet as ordered by neurology (below)  Acetaminophen 650mg TID  Neurology consulted for recent sinemet increase  BM 1/23, 1/27  Fall  S/p head strike in ED w/ R eyebrow laceration; unclear if mechanical versus orthostatic hypotensive episode  CT head/neck and left shoulder benign  Laceration cleaned with dressing placed; no sutures placed  ECG without ischemia, troponins negative x 3  PT OT consulted- recommending rehab  Parkinson's disease (HCC)  Known to [facility] neurology   Sinemet now will be 1.5 tab 4x a day  Rivastigmine TD patch added  Follow-up in the office  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 (HCC)  Chronic right lower extremity venous ulcers   PTA Lasix for LE edema management   Known to [facility] podiatry/[facility]   Wound care consulted  HTN (hypertension)  BP stable  Hold PTA ARB w/ hyperkalemia, resume when able  Continue PTA amlodipine  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  Speech therapy consulted  Patient " somnolent on evaluation so they recommended dysphagia 1 diet with nectar thick liquids while awake and pills crushed.  Patient was to have outpatient VBS test, per speech he may need during admission but will have to be consistently awake first  Diet advanced today to dysphagia 2  VBS can be completed at [facility]  Anemia  Chronic disease    VTE Pharmacologic Prophylaxis: VTE Score: 4 Moderate Risk (Score 3-4) - Pharmacological DVT Prophylaxis Ordered: enoxaparin (Lovenox).    Mobility:   Basic Mobility Inpatient Raw Score: 11  JH-HLM Goal: 4: Move to chair/commode  JH-HLM Achieved: 4: Move to chair/commode  JH-HLM Goal achieved. Continue to encourage appropriate mobility.    Patient Centered Rounds: I performed bedside rounds with nursing staff today.   Discussions with Specialists or Other Care Team Provider: Neurology, speech, case management    Education and Discussions with Family / Patient: Updated  (wife and daughter in law) at bedside.    Current Length of Stay: 6 day(s)  Current Patient Status: Inpatient   Certification Statement: The patient will continue to require additional inpatient hospital stay due to pending Auth  Discharge Plan: Anticipate discharge tomorrow to rehab facility.    Code Status: Level 1 - Full Code    Subjective   Patient doing well today.  Denies any complaints, nursing denies any overnight events.  No agitation noted.  Patient's wife was wondering about VBS, however this can be performed at [facility].    Objective :  Temp:  [97.3 °F (36.3 °C)-98 °F (36.7 °C)] 98 °F (36.7 °C)  HR:  [69-77] 75  BP: ()/(40-80) 127/54  Resp:  [12-18] 18  SpO2:  [95 %-98 %] 97 %  O2 Device: None (Room air)    There is no height or weight on file to calculate BMI.     Input and Output Summary (last 24 hours):     Intake/Output Summary (Last 24 hours) at 1/27/2025 1529  Last data filed at 1/27/2025 1400  Gross per 24 hour   Intake 240 ml   Output 250 ml   Net -10 ml       Physical Exam  Vitals and  nursing note reviewed.   Constitutional:       General: He is not in acute distress.     Appearance: He is well-developed.   HENT:      Head: Normocephalic and atraumatic.   Eyes:      Conjunctiva/sclera: Conjunctivae normal.   Cardiovascular:      Rate and Rhythm: Normal rate and regular rhythm.      Heart sounds: No murmur heard.  Pulmonary:      Effort: Pulmonary effort is normal. No respiratory distress.      Breath sounds: Normal breath sounds. No wheezing or rales.   Abdominal:      General: There is no distension.      Palpations: Abdomen is soft.      Tenderness: There is no abdominal tenderness.   Musculoskeletal:         General: No swelling.      Cervical back: Neck supple.      Right lower leg: Edema present.      Left lower leg: Edema present.      Comments: Dependent  leg edema   Skin:     General: Skin is warm and dry.      Capillary Refill: Capillary refill takes less than 2 seconds.   Neurological:      Mental Status: He is alert. Mental status is at baseline. He is disoriented.   Psychiatric:         Mood and Affect: Mood normal.           Lines/Drains:  Lines/Drains/Airways       Active Status       Name Placement date Placement time Site Days    External Urinary Catheter 01/27/25  1005  -- less than 1                            Lab Results: I have reviewed the following results:   Results from last 7 days   Lab Units 01/27/25  0620 01/26/25  0534   WBC Thousand/uL 5.99 8.05   HEMOGLOBIN g/dL 10.6* 10.9*   HEMATOCRIT % 33.3* 34.0*   PLATELETS Thousands/uL 158 193   SEGS PCT %  --  71   LYMPHO PCT %  --  13*   MONO PCT %  --  12   EOS PCT %  --  3     Results from last 7 days   Lab Units 01/27/25  0620 01/21/25  1702 01/21/25  1226   SODIUM mmol/L 143   < > 138   POTASSIUM mmol/L 4.5   < > 6.0*   CHLORIDE mmol/L 107   < > 109*   CO2 mmol/L 31   < > 25   BUN mg/dL 21   < > 33*   CREATININE mg/dL 1.04   < > 0.98   ANION GAP mmol/L 5   < > 4   CALCIUM mg/dL 8.8   < > 8.5   ALBUMIN g/dL  --   --  3.6    TOTAL BILIRUBIN mg/dL  --   --  0.81   ALK PHOS U/L  --   --  112*   ALT U/L  --   --  4*   AST U/L  --   --  28   GLUCOSE RANDOM mg/dL 85   < > 92    < > = values in this interval not displayed.     Results from last 7 days   Lab Units 01/21/25  1230   INR  1.03             Results from last 7 days   Lab Units 01/21/25  1452 01/21/25  1226   LACTIC ACID mmol/L 0.5  --    PROCALCITONIN ng/ml  --  <0.05       Recent Cultures (last 7 days):   Results from last 7 days   Lab Units 01/21/25  1452 01/21/25  1230   BLOOD CULTURE  No Growth After 5 Days. No Growth After 5 Days.             Last 24 Hours Medication List:     Current Facility-Administered Medications:     acetaminophen (TYLENOL) tablet 650 mg, TID    amLODIPine (NORVASC) tablet 5 mg, Daily    Artificial Tears Op Soln 1 drop, Q4H PRN    bisacodyl (DULCOLAX) rectal suppository 10 mg, Daily    calcium carbonate (TUMS) chewable tablet 1,000 mg, Daily PRN    [COMPLETED] carbidopa-levodopa (SINEMET)  mg per tablet 2 tablet, TID **FOLLOWED BY** carbidopa-levodopa (SINEMET)  mg per tablet 1.5 tablet, 4x Daily    Cholecalciferol (VITAMIN D3) tablet 1,000 Units, Daily    cyanocobalamin (VITAMIN B-12) tablet 1,000 mcg, Once per day on Monday Wednesday Friday    docusate sodium (COLACE) capsule 100 mg, BID    enoxaparin (LOVENOX) subcutaneous injection 40 mg, Daily    lidocaine (LIDODERM) 5 % patch 1 patch, Daily    melatonin tablet 3 mg, HS    ondansetron (ZOFRAN) injection 4 mg, Q6H PRN    polyethylene glycol (MIRALAX) packet 17 g, BID    QUEtiapine (SEROquel) tablet 12.5 mg, Q8H PRN    rivastigmine (EXELON) 4.6 mg/24 hr TD 24 hr patch 4.6 mg, Daily    senna (SENOKOT) tablet 17.2 mg, BID    Administrative Statements   Today, Patient Was Seen By: Mable Yu DO      **Please Note: This note may have been constructed using a voice recognition system.**

## 2025-01-27 NOTE — PROGRESS NOTES
Progress Note - Neurology   Name: Daniel Sanz Jr. 84 y.o. male I MRN: [id number]  Unit/Bed#: S -01 I Date of Admission: 1/21/2025   Date of Service: 1/27/2025 I Hospital Day: 6    Assessment & Plan  Altered mental status  84 y.o. male with Parkinson's disease on Sinemet, dementia (scored 7/30 on MoCA in 3/2024), frequent falls, HTN, and chronic lower extremity edema who presented to [facility] on 1/21/2025 with AMS.  Patient's Sinemet was increased 1 week ago from 2 tab TID to 2.5 tab TID due to patient having significant bradykinesia, cogwheeling, and tremor at the lower dose.  Since then, patient has had a decline in his behavior, especially at night.  His wife reports that he will frequently have abnormal movements while sleeping and awaken from sleep with hallucinations.  He has also been agitated and aggressive with garbled speech.    Upon ambulation into the ED, patient fell and struck his head, sustaining a laceration above the right eyebrow.  CT head negative for acute intracranial abnormalities.  Patient was hypothermic with temperature 96 °F.  Labs revealed potassium 6.0 and hemoglobin 9.6.  Remainder of CBC, BMP, lactic acid, influenza, and UA unremarkable.  Neurology was consulted to give guidance on Sinemet dosing and other recommendations for his Parkinson's disease/altered mental status.      Patient is doing better since decreasing Sinemet dose.  The hand tremors have improved, patient is less bradykinetic, and less hypophonic.    Plan:  - Continue Sinemet , 1.5 tabs 4x daily. (9AM, 1PM, 5PM, 9PM)  - Patient was started on Exelon patch by prior Neurologist.  Continue at this time as patient is doing well.  - Geriatrics following; started patient on low dose Seroquel PRN at night time  - PT/OT; recommending rehab  - Continue to monitor and notify neurology with any changes.   - Medical management and supportive care per primary team. Correction of any metabolic or infectious  disturbances  - No further inpatient Neuro recommendations  Parkinson's disease (HCC)  - See assessment/plan with changes in his meds as noted.  We will try and get him into the [place] [facility] residents clinic with Dr. Oviedo as it will take him longer to be seen by the specialist Dr. Vee in our office.  Dementia (HCC)  - Conservative management of delirium: minimize noise, maintain day/night cycle, provide frequent redirection, avoid restraints if possible, etc.  - Geriatrics following  - Patient scored 7/30 on MOCA in March 2024.  - Seroquel for delirium  Fall  - PT/OT    Daniel WHITE Anselmonikolas . will need follow-up in 6 weeks with general neurology team for Other in 60 minute appointment. They will not require outpatient neurological testing.  Patient's wife would prefer the patient follow up with Dr. Oviedo in the resident clinic at [place].  Message sent to the schedulers.    Subjective   Patient seen and examined at bedside.  Patient states he has some shoulder discomfort, but otherwise denies any complaints.  Patient worked with PT who stated that he was doing better today compared to last week.    Review of Systems   Unable to perform ROS: Dementia         Objective :  Temp:  [97.3 °F (36.3 °C)-98 °F (36.7 °C)] 98 °F (36.7 °C)  HR:  [69-77] 75  BP: ()/(40-80) 127/54  Resp:  [12-18] 18  SpO2:  [95 %-98 %] 97 %  O2 Device: None (Room air)    Physical Exam  Vitals and nursing note reviewed.   Constitutional:       Appearance: Normal appearance.      Comments: Patient sitting comfortably in bedside chair with [place] in place.   HENT:      Head: Normocephalic and atraumatic.      Mouth/Throat:      Mouth: Mucous membranes are moist.      Pharynx: Oropharynx is clear.   Eyes:      Extraocular Movements: Extraocular movements intact.      Conjunctiva/sclera: Conjunctivae normal.      Pupils: Pupils are equal, round, and reactive to light.   Pulmonary:      Effort: Pulmonary effort is normal.   Musculoskeletal:          General: Normal range of motion.   Skin:     General: Skin is warm and dry.   Neurological:      Mental Status: He is alert.     Neurological Exam  Mental Status  Alert.  Patient awake and alert.  Oriented to person, place, and year.  Could not state the month  No dysarthria or aphasia.  Speech is slightly hypophonic, but improved compared to last week  Able to follow simple midline and appendicular commands..    Cranial Nerves  CN III, IV, VI: Extraocular movements intact bilaterally. Pupils equal round and reactive to light bilaterally.  Pupils 3 mm, round, reactive to light bilaterally.  EOMs intact without nystagmus.  No visual field deficits.  Facial sensation to light touch intact throughout.  Hypomimia  No facial asymmetry  Tongue midline.  Hearing grossly intact..    Motor  Normal muscle bulk throughout.    Only occasional resting tremor in the R hand (improved compared to last week)  Cogwheeling in bilateral wrists  Increased tone throughout  Slight bradykinesia, but improved compared to last week  5/5 strength in bilateral upper and lower extremities..    Sensory  Sensation to light touch intact throughout..    Reflexes  No ankle clonus bilaterally.    Coordination    No ataxia with finger to nose bilaterally.    Gait    Deferred for patient's safety.        Lab Results: I have reviewed the following results:  Imaging Results Review: I reviewed radiology reports from this admission including: CT head, CT cervical spine.      VTE Pharmacologic Prophylaxis: VTE covered by:  enoxaparin, Subcutaneous, 40 mg at 01/27/25 1006    and Sequential compression device (Venodyne)

## 2025-01-27 NOTE — ASSESSMENT & PLAN NOTE
84 y.o. male with Parkinson's disease on Sinemet, dementia (scored 7/30 on MoCA in 3/2024), frequent falls, HTN, and chronic lower extremity edema who presented to [facility] on 1/21/2025 with AMS.  Patient's Sinemet was increased 1 week ago from 2 tab TID to 2.5 tab TID due to patient having significant bradykinesia, cogwheeling, and tremor at the lower dose.  Since then, patient has had a decline in his behavior, especially at night.  His wife reports that he will frequently have abnormal movements while sleeping and awaken from sleep with hallucinations.  He has also been agitated and aggressive with garbled speech.    Upon ambulation into the ED, patient fell and struck his head, sustaining a laceration above the right eyebrow.  CT head negative for acute intracranial abnormalities.  Patient was hypothermic with temperature 96 °F.  Labs revealed potassium 6.0 and hemoglobin 9.6.  Remainder of CBC, BMP, lactic acid, influenza, and UA unremarkable.  Neurology was consulted to give guidance on Sinemet dosing and other recommendations for his Parkinson's disease/altered mental status.      Patient is doing better since decreasing Sinemet dose.  The hand tremors have improved, patient is less bradykinetic, and less hypophonic.    Plan:  - Continue Sinemet , 1.5 tabs 4x daily. (9AM, 1PM, 5PM, 9PM)  - Patient was started on Exelon patch by prior Neurologist.  Continue at this time as patient is doing well.  - Geriatrics following; started patient on low dose Seroquel PRN at night time  - PT/OT; recommending rehab  - Continue to monitor and notify neurology with any changes.   - Medical management and supportive care per primary team. Correction of any metabolic or infectious disturbances  - No further inpatient Neuro recommendations

## 2025-01-27 NOTE — PLAN OF CARE
Problem: OCCUPATIONAL THERAPY ADULT  Goal: Performs self-care activities at highest level of function for planned discharge setting.  See evaluation for individualized goals.  Description: Treatment Interventions: ADL retraining, Functional transfer training, UE strengthening/ROM, Endurance training, Cognitive reorientation, Patient/family training, Equipment evaluation/education, Compensatory technique education, Cardiac education, Energy conservation, Activityengagement, Fine motor coordination activities          See flowsheet documentation for full assessment, interventions and recommendations.   1/27/2025 1058 by Nuria Haider OT  Outcome: Progressing  Note: Limitation: Decreased ADL status, Decreased UE strength, Decreased Safe judgement during ADL, Decreased cognition, Decreased endurance, Decreased self-care trans, Decreased high-level ADLs, Decreased fine motor control (impaired pain, sitting balance, standing balance, insight into deficits, activity tolerance, forward functional reach, problem solving)     Assessment: Pt seen for skilled OT tx session focusing on activity engagement, challenging activity tolerance and pt education. Pt more alert and able to follow directions during ADLs. Pt required less physical assistance to complete bed mobility and sit to stand. Pt demonstrated improved sitting tolerance and balance at edge of bed to participate in bathing, grooming. Continue to recommend level II rehab resource intensity when medically stable for discharge from acute care. Will continue to follow     Rehab Resource Intensity Level, OT: II (Moderate Resource Intensity)       1/27/2025 1058 by Nuria Haider OT  Outcome: Progressing

## 2025-01-27 NOTE — ASSESSMENT & PLAN NOTE
Chronic right lower extremity venous ulcers   PTA Lasix for LE edema management   Known to [facility] podiatry/[facility]   Wound care consulted

## 2025-01-27 NOTE — ASSESSMENT & PLAN NOTE
- See assessment/plan with changes in his meds as noted.  We will try and get him into the [facility] residents clinic with Dr. Oviedo as it will take him longer to be seen by the specialist Dr. Vee in our office.

## 2025-01-27 NOTE — PLAN OF CARE
Problem: PAIN - ADULT  Goal: Verbalizes/displays adequate comfort level or baseline comfort level  Description: Interventions:  - Encourage patient to monitor pain and request assistance  - Assess pain using appropriate pain scale  - Administer analgesics based on type and severity of pain and evaluate response  - Implement non-pharmacological measures as appropriate and evaluate response  - Consider cultural and social influences on pain and pain management  - Notify physician/advanced practitioner if interventions unsuccessful or patient reports new pain  Outcome: Progressing     Problem: INFECTION - ADULT  Goal: Absence or prevention of progression during hospitalization  Description: INTERVENTIONS:  - Assess and monitor for signs and symptoms of infection  - Monitor lab/diagnostic results  - Monitor all insertion sites, i.e. indwelling lines, tubes, and drains  - Monitor endotracheal if appropriate and nasal secretions for changes in amount and color  - [place] appropriate cooling/warming therapies per order  - Administer medications as ordered  - Instruct and encourage patient and family to use good hand hygiene technique  - Identify and instruct in appropriate isolation precautions for identified infection/condition  Outcome: Progressing  Goal: Absence of fever/infection during neutropenic period  Description: INTERVENTIONS:  - Monitor WBC    Outcome: Progressing     Problem: SAFETY ADULT  Goal: Patient will remain free of falls  Description: INTERVENTIONS:  - Educate patient/family on patient safety including physical limitations  - Instruct patient to call for assistance with activity   - Consult OT/PT to assist with strengthening/mobility   - Keep Call bell within reach  - Keep bed low and locked with side rails adjusted as appropriate  - Keep care items and personal belongings within reach  - Initiate and maintain comfort rounds  - Make Fall Risk Sign visible to staff  - Offer Toileting every 2 Hours,  in advance of need  - Initiate/Maintain bed alarm  - Obtain necessary fall risk management equipment  - Apply yellow socks and bracelet for high fall risk patients  - Consider moving patient to room near nurses station  Outcome: Progressing  Goal: Maintain or return to baseline ADL function  Description: INTERVENTIONS:  -  Assess patient's ability to carry out ADLs; assess patient's baseline for ADL function and identify physical deficits which impact ability to perform ADLs (bathing, care of mouth/teeth, toileting, grooming, dressing, etc.)  - Assess/evaluate cause of self-care deficits   - Assess range of motion  - Assess patient's mobility; develop plan if impaired  - Assess patient's need for assistive devices and provide as appropriate  - Encourage maximum independence but intervene and supervise when necessary  - Involve family in performance of ADLs  - Assess for home care needs following discharge   - Consider OT consult to assist with ADL evaluation and planning for discharge  - Provide patient education as appropriate  Outcome: Progressing  Goal: Maintains/Returns to pre admission functional level  Description: INTERVENTIONS:  - Perform AM-PAC 6 Click Basic Mobility/ Daily Activity assessment daily.  - Set and communicate daily mobility goal to care team and patient/family/caregiver.   - Collaborate with rehabilitation services on mobility goals if consulted  - Perform Range of Motion 2 times a day.  - Reposition patient every 2 hours.  - Dangle patient 2 times a day  - Stand patient 3 times a day  - Ambulate patient 3 times a day  - Out of bed to chair 3 times a day   - Out of bed for meals 3 times a day  - Out of bed for toileting  - Record patient progress and toleration of activity level   Outcome: Progressing     Problem: DISCHARGE PLANNING  Goal: Discharge to home or other facility with appropriate resources  Description: INTERVENTIONS:  - Identify barriers to discharge w/patient and caregiver  -  Arrange for needed discharge resources and transportation as appropriate  - Identify discharge learning needs (meds, wound care, etc.)  - Arrange for interpretive services to assist at discharge as needed  - Refer to Case Management Department for coordinating discharge planning if the patient needs post-hospital services based on physician/advanced practitioner order or complex needs related to functional status, cognitive ability, or social support system  Outcome: Progressing     Problem: Knowledge Deficit  Goal: Patient/family/caregiver demonstrates understanding of disease process, treatment plan, medications, and discharge instructions  Description: Complete learning assessment and assess knowledge base.  Interventions:  - Provide teaching at level of understanding  - Provide teaching via preferred learning methods  Outcome: Progressing     Problem: Prexisting or High Potential for Compromised Skin Integrity  Goal: Skin integrity is maintained or improved  Description: INTERVENTIONS:  - Identify patients at risk for skin breakdown  - Assess and monitor skin integrity  - Assess and monitor nutrition and hydration status  - Monitor labs   - Assess for incontinence   - Turn and reposition patient  - Assist with mobility/ambulation  - Relieve pressure over bony prominences  - Avoid friction and shearing  - Provide appropriate hygiene as needed including keeping skin clean and dry  - Evaluate need for skin moisturizer/barrier cream  - Collaborate with interdisciplinary team   - Patient/family teaching  - Consider wound care consult   Outcome: Progressing     Problem: Nutrition/Hydration-ADULT  Goal: Nutrient/Hydration intake appropriate for improving, restoring or maintaining nutritional needs  Description: Monitor and assess patient's nutrition/hydration status for malnutrition. Collaborate with interdisciplinary team and initiate plan and interventions as ordered.  Monitor patient's weight and dietary intake as  ordered or per policy. Utilize nutrition screening tool and intervene as necessary. Determine patient's food preferences and provide high-protein, high-caloric foods as appropriate.     INTERVENTIONS:  - Monitor oral intake, urinary output, labs, and treatment plans  - Assess nutrition and hydration status and recommend course of action  - Evaluate amount of meals eaten  - Assist patient with eating if necessary   - Allow adequate time for meals  - Recommend/ encourage appropriate diets, oral nutritional supplements, and vitamin/mineral supplements  - Order, calculate, and assess calorie counts as needed  - Recommend, monitor, and adjust tube feedings and TPN/PPN based on assessed needs  - Assess need for intravenous fluids  - Provide specific nutrition/hydration education as appropriate  - Include patient/family/caregiver in decisions related to nutrition  Outcome: Progressing     Problem: SAFETY,RESTRAINT: NV/NON-SELF DESTRUCTIVE BEHAVIOR  Goal: Remains free of harm/injury (restraint for non violent/non self-detsructive behavior)  Description: INTERVENTIONS:  - Instruct patient/family regarding restraint use   - Assess and monitor physiologic and psychological status   - Provide interventions and comfort measures to meet assessed patient needs   - Identify and implement measures to help patient regain control  - Assess readiness for release of restraint   Outcome: Progressing  Goal: Returns to optimal restraint-free functioning  Description: INTERVENTIONS:  - Assess the patient's behavior and symptoms that indicate continued need for restraint  - Identify and implement measures to help patient regain control  - Assess readiness for release of restraint   Outcome: Progressing     Problem: Potential for Falls  Goal: Patient will remain free of falls  Description: INTERVENTIONS:  - Educate patient/family on patient safety including physical limitations  - Instruct patient to call for assistance with activity   -  Consult OT/PT to assist with strengthening/mobility   - Keep Call bell within reach  - Keep bed low and locked with side rails adjusted as appropriate  - Keep care items and personal belongings within reach  - Initiate and maintain comfort rounds  - Make Fall Risk Sign visible to staff  - Offer Toileting every 2 Hours, in advance of need  - Initiate/Maintain bed alarm  - Obtain necessary fall risk management equipment  - Apply yellow socks and bracelet for high fall risk patients  - Consider moving patient to room near nurses station  Outcome: Progressing     Problem: NEUROSENSORY - ADULT  Goal: Achieves stable or improved neurological status  Description: INTERVENTIONS  - Monitor and report changes in neurological status  - Monitor vital signs such as temperature, blood pressure, glucose, and any other labs ordered   - Initiate measures to prevent increased intracranial pressure  - Monitor for seizure activity and implement precautions if appropriate      Outcome: Progressing  Goal: Remains free of injury related to seizures activity  Description: INTERVENTIONS  - Maintain airway, patient safety  and administer oxygen as ordered  - Monitor patient for seizure activity, document and report duration and description of seizure to physician/advanced practitioner  - If seizure occurs,  ensure patient safety during seizure  - Reorient patient post seizure  - Seizure pads on all 4 side rails  - Instruct patient/family to notify RN of any seizure activity including if an aura is experienced  - Instruct patient/family to call for assistance with activity based on nursing assessment  - Administer anti-seizure medications if ordered    Outcome: Progressing  Goal: Achieves maximal functionality and self care  Description: INTERVENTIONS  - Monitor swallowing and airway patency with patient fatigue and changes in neurological status  - Encourage and assist patient to increase activity and self care.   - Encourage visually  impaired, hearing impaired and aphasic patients to use assistive/communication devices  Outcome: Progressing     Problem: METABOLIC, FLUID AND ELECTROLYTES - ADULT  Goal: Electrolytes maintained within normal limits  Description: INTERVENTIONS:  - Monitor labs and assess patient for signs and symptoms of electrolyte imbalances  - Administer electrolyte replacement as ordered  - Monitor response to electrolyte replacements, including repeat lab results as appropriate  - Instruct patient on fluid and nutrition as appropriate  Outcome: Progressing  Goal: Fluid balance maintained  Description: INTERVENTIONS:  - Monitor labs   - Monitor I/O and WT  - Instruct patient on fluid and nutrition as appropriate  - Assess for signs & symptoms of volume excess or deficit  Outcome: Progressing

## 2025-01-27 NOTE — ASSESSMENT & PLAN NOTE
"Patient's wife endorsing recent abnormal behaviors.  Patient waking frequently at night, hallucinations noted when awaking from sleep, patient noted to be more \"nasty\" and agitated with at times garbled speech which clears.  Presents to ED after fall at home with concern for possible UTI. S/p head strike in ED w/ R eyebrow laceration.  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 with mild acute encephalopathy  CT head w/o acute findings  TSH benign   Folate WNL, B12 normal, vitamin D low at 26.9  Wife notes B12/vitamin D supplementation 3 times weekly PTA  Continue to q daily dosing of vit D  B12 3x weekly  Negative infxn work-up (as below)   Consult geriatrics, input appreciated  Sinemet as ordered by neurology (below)  Acetaminophen 650mg TID  Neurology consulted for recent sinemet increase  BM 1/23, 1/27  " abdominal pain

## 2025-01-27 NOTE — NURSING NOTE
Patient's wife approached RN again to ask about possible discharge for patient to STR today vs tomorrow. RN informed that this RN has not yet received any information from case management about transfer yet. RN explained that PT still needs to see patient too. RN emphasized that patient's wife will be included and updated when a time has been selected/chosen for transfer to STR facility.

## 2025-01-27 NOTE — PLAN OF CARE
Problem: PHYSICAL THERAPY ADULT  Goal: Performs mobility at highest level of function for planned discharge setting.  See evaluation for individualized goals.  Description: Treatment/Interventions: Functional transfer training, LE strengthening/ROM, Elevations, Therapeutic exercise, Endurance training, Cognitive reorientation, Patient/family training, Equipment eval/education, Bed mobility, Compensatory technique education, Gait training          See flowsheet documentation for full assessment, interventions and recommendations.  Outcome: Progressing  Note: Prognosis: Fair  Problem List: Decreased strength, Decreased endurance, Impaired balance, Decreased mobility, Decreased coordination, Decreased cognition, Impaired judgement, Decreased safety awareness  Assessment: Patient agreeable to PT session this afternoon.  Patient with fairly good tolerance to bilateral lower extremity exercise, bed mobility, transfers and short distance ambulation with a roller walker bed to chair.  Patient needing less assistance overall for bed mobility, transfers and ambulation with a roller walker.  Patient is making steady progress toward PT goals.  When medically stable for discharge patient remains appropriate for Level II Moderate Resource Intensity.  Barriers to Discharge: Inaccessible home environment     Rehab Resource Intensity Level, PT: II (Moderate Resource Intensity)    See flowsheet documentation for full assessment.

## 2025-01-28 LAB
ANION GAP SERPL CALCULATED.3IONS-SCNC: 3 MMOL/L (ref 4–13)
BUN SERPL-MCNC: 21 MG/DL (ref 5–25)
CALCIUM SERPL-MCNC: 8.8 MG/DL (ref 8.4–10.2)
CHLORIDE SERPL-SCNC: 104 MMOL/L (ref 96–108)
CO2 SERPL-SCNC: 32 MMOL/L (ref 21–32)
CREAT SERPL-MCNC: 1.05 MG/DL (ref 0.6–1.3)
ERYTHROCYTE [DISTWIDTH] IN BLOOD BY AUTOMATED COUNT: 13.2 % (ref 11.6–15.1)
GFR SERPL CREATININE-BSD FRML MDRD: 65 ML/MIN/1.73SQ M
GLUCOSE SERPL-MCNC: 84 MG/DL (ref 65–140)
HCT VFR BLD AUTO: 33 % (ref 36.5–49.3)
HGB BLD-MCNC: 10.7 G/DL (ref 12–17)
MCH RBC QN AUTO: 32.4 PG (ref 26.8–34.3)
MCHC RBC AUTO-ENTMCNC: 32.4 G/DL (ref 31.4–37.4)
MCV RBC AUTO: 100 FL (ref 82–98)
PLATELET # BLD AUTO: 188 THOUSANDS/UL (ref 149–390)
PMV BLD AUTO: 9.9 FL (ref 8.9–12.7)
POTASSIUM SERPL-SCNC: 4.9 MMOL/L (ref 3.5–5.3)
RBC # BLD AUTO: 3.3 MILLION/UL (ref 3.88–5.62)
SODIUM SERPL-SCNC: 139 MMOL/L (ref 135–147)
WBC # BLD AUTO: 6.19 THOUSAND/UL (ref 4.31–10.16)

## 2025-01-28 PROCEDURE — 80048 BASIC METABOLIC PNL TOTAL CA: CPT

## 2025-01-28 PROCEDURE — 99232 SBSQ HOSP IP/OBS MODERATE 35: CPT | Performed by: INTERNAL MEDICINE

## 2025-01-28 PROCEDURE — 99232 SBSQ HOSP IP/OBS MODERATE 35: CPT | Performed by: FAMILY MEDICINE

## 2025-01-28 PROCEDURE — 85027 COMPLETE CBC AUTOMATED: CPT

## 2025-01-28 RX ADMIN — DOCUSATE SODIUM 100 MG: 100 CAPSULE, LIQUID FILLED ORAL at 17:35

## 2025-01-28 RX ADMIN — CARBIDOPA AND LEVODOPA 1.5 TABLET: 25; 100 TABLET ORAL at 16:02

## 2025-01-28 RX ADMIN — RIVASTIGMINE 4.6 MG: 4.6 PATCH TRANSDERMAL at 08:24

## 2025-01-28 RX ADMIN — ACETAMINOPHEN 650 MG: 325 TABLET, FILM COATED ORAL at 16:02

## 2025-01-28 RX ADMIN — MELATONIN 3 MG: 3 TAB ORAL at 20:03

## 2025-01-28 RX ADMIN — CARBIDOPA AND LEVODOPA 1.5 TABLET: 25; 100 TABLET ORAL at 14:01

## 2025-01-28 RX ADMIN — SENNOSIDES 17.2 MG: 8.6 TABLET ORAL at 08:23

## 2025-01-28 RX ADMIN — DOCUSATE SODIUM 100 MG: 100 CAPSULE, LIQUID FILLED ORAL at 08:22

## 2025-01-28 RX ADMIN — CARBIDOPA AND LEVODOPA 1.5 TABLET: 25; 100 TABLET ORAL at 08:23

## 2025-01-28 RX ADMIN — Medication 1000 UNITS: at 08:22

## 2025-01-28 RX ADMIN — DEXTRAN 70, GLYCERIN, HYPROMELLOSE 1 DROP: 1; 2; 3 SOLUTION/ DROPS OPHTHALMIC at 11:22

## 2025-01-28 RX ADMIN — SENNOSIDES 17.2 MG: 8.6 TABLET ORAL at 17:35

## 2025-01-28 RX ADMIN — ACETAMINOPHEN 650 MG: 325 TABLET, FILM COATED ORAL at 20:02

## 2025-01-28 RX ADMIN — ENOXAPARIN SODIUM 40 MG: 40 INJECTION SUBCUTANEOUS at 08:22

## 2025-01-28 RX ADMIN — CARBIDOPA AND LEVODOPA 1.5 TABLET: 25; 100 TABLET ORAL at 20:02

## 2025-01-28 RX ADMIN — AMLODIPINE BESYLATE 5 MG: 5 TABLET ORAL at 08:23

## 2025-01-28 RX ADMIN — ACETAMINOPHEN 650 MG: 325 TABLET, FILM COATED ORAL at 08:22

## 2025-01-28 NOTE — ASSESSMENT & PLAN NOTE
Patient has history of multiple falls in the past 6 months  Patient uses walker or cane for ambulation at baseline  Will continue PT/OT.  Monitor orthostatic vitals  Avoid hypotension and hypoglycemia  Fall precautions   Continue lidocaine patches for shoulders

## 2025-01-28 NOTE — CASE MANAGEMENT
Case Management Discharge Planning Note    Patient name Daniel Sanz Jr.  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) 2025     Fall, initial encounter 10/11/2024     Closed fracture of nasal bone 2024     Fall 2024     Scalp hematoma 2024     Multiple abrasions 2024     HTN (hypertension) 2024     Stage 3a chronic kidney disease ([facility]) 03/15/2024     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     Shortness of breath      MGUS (monoclonal gammopathy of unknown significance) 2022     Neuropathy 2022     Parkinson's disease ([facility]) 2020     Tear of right supraspinatus tendon 2019     Chronic right shoulder pain 2019     Internal derangement of right shoulder 2019     Pleural plaque with presence of asbestos 2017     Allergic rhinitis with postnasal drip 2017       LOS (days): 7  Geometric Mean LOS (GMLOS) (days): 3.8  Days to GMLOS:-2.9     OBJECTIVE:  Risk of Unplanned Readmission Score: 22.16     Current admission status: Inpatient   Preferred Pharmacy:   [facility] [address] [address]  Phone: [phone] Fax: [phone]    [id number] [place] [address] [address]  Phone: [phone] Fax: [phone]    Primary Care Provider: Charlie Martinez DO    Primary Insurance: [facility]  Secondary Insurance:     DISCHARGE DETAILS:    Discharge planning discussed with:: Patient, Wife Sheng and LEVON Bangura  Choice: Yes  Comments - Freedom of Choice: CM met with patient and family at bedside to introduce self/role and make aware of patient's acceptance by [facility]. CM also provided Sheng with a list of subacute facilities and explained that if for any reason patient doesn't get an approved insurance authorization these other facilities have accepted patient. Sheng stated she isn't at all interested in any subacute faclities, she is only open to patient going to acute rehab. CM made patient/family aware CM will provide another update as soon as a response from insurance is received.  CM contacted family/caregiver?: Yes  Were Treatment Team discharge recommendations reviewed with patient/caregiver?: Yes  Did patient/caregiver verbalize understanding of patient care needs?: Yes    Contacts  Patient Contacts: Wife Sheng  Relationship to Patient:: Family  Contact Method: In Person  Reason/Outcome: Discharge Planning      Other Referral/Resources/Interventions Provided:  Referral Comments: CM reserved [facility] in [place].         Treatment Team Recommendation: Short Term Rehab  Discharge Destination Plan:: Acute Rehab    CM tasked CMDS with insurance authorization request.     Addendum at 1345:  CM met with patient's wife and DIL to answer questions regarding how the discharge process works and estimated time to receive insurance authorization determination. CM made both aware CM will continue to provide updates.

## 2025-01-28 NOTE — ASSESSMENT & PLAN NOTE
-Patient is high risk of delirium due to hospitalization, dementia, recent fall, and metabolic imbalances, pain  -delirium precautions  -maintain normal sleep/wake cycle, start melatonin 3 mg QHS  -minimize overnight interruptions, group overnight vitals/labs/nursing checks as possible  -dim lights, close blinds and turn off tv to minimize stimulation and encourage sleep environment in evenings  -Continue Tylenol 975mg Q8H scheduled , consider adding gabapentin 100 mg nightly for pain  -monitor for fecal and urinary retention which may precipitate delirium  -encourage early mobilization and ambulation  -provide frequent reorientation and redirection  -encourage family and friends at the bedside to help calm patient if anxious  -avoid medications which may precipitate or worsen delirium such as tramadol, benzodiazepine, anticholinergics, and antihistaminics  -encourage hydration and nutrition , assist with feeding if needed  -redirect unwanted behaviors as first line, avoid physical restraints.

## 2025-01-28 NOTE — CASE MANAGEMENT
"Per [facility], IRF auth still pended at this time. CM notified.     Per [facility] rep, \"The case was sent to our MD to review. We are waiting for additional updates.\" CM Notified.   "

## 2025-01-28 NOTE — PROGRESS NOTES
"Progress Note - Hospitalist   Name: Daniel Sanz Jr. 84 y.o. male I MRN: [id number]  Unit/Bed#: S -01 I Date of Admission: 1/21/2025   Date of Service: 1/28/2025 I Hospital Day: 7    Assessment & Plan  Altered mental status  Patient's wife endorsing recent abnormal behaviors.  Patient waking frequently at night, hallucinations noted when awaking from sleep, patient noted to be more \"nasty\" and agitated with at times garbled speech which clears.  Presents to ED after fall at home with concern for possible UTI. S/p head strike in ED w/ R eyebrow laceration.  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 with mild acute encephalopathy  CT head w/o acute findings  TSH benign   Folate WNL, B12 normal, vitamin D low at 26.9  Wife notes B12/vitamin D supplementation 3 times weekly PTA  Continue to q daily dosing of vit D  B12 3x weekly  Negative infxn work-up (as below)   Consult geriatrics, input appreciated  Sinemet as ordered by neurology (below)  Acetaminophen 650mg TID  Neurology consulted for recent sinemet increase  BM 1/23, 1/27  Fall  S/p head strike in ED w/ R eyebrow laceration; unclear if mechanical versus orthostatic hypotensive episode  CT head/neck and left shoulder benign  Laceration cleaned with dressing placed; no sutures placed  ECG without ischemia, troponins negative x 3  PT OT consulted- recommending rehab  Parkinson's disease (HCC)  Known to [facility]   Sinemet now will be 1.5 tab 4x a day  Rivastigmine TD patch added  Follow-up in the office  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 (HCC)  Chronic right lower extremity venous ulcers   PTA Lasix for LE edema management   Known to [facility] podiatry/[facility]   Wound care consulted  HTN (hypertension)  BP stable  Hold PTA ARB w/ hyperkalemia, resume when able  Continue PTA amlodipine  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  Speech therapy consulted  Patient " "somnolent on evaluation so they recommended dysphagia 1 diet with nectar thick liquids while awake and pills crushed.  Patient was to have outpatient VBS test, per speech he may need during admission but will have to be consistently awake first  Diet advanced today to dysphagia 2  [facility]  Anemia  Chronic disease    VTE Pharmacologic Prophylaxis: VTE Score: 4 Moderate Risk (Score 3-4) - Pharmacological DVT Prophylaxis Ordered: heparin.    Mobility:   Mobility not assessed by myself.     Patient Centered Rounds: I performed bedside rounds with nursing staff today.   Discussions with Specialists or Other Care Team Provider: Discussed with patient, discussed with nursing. Discussed with CM.     Education and Discussions with Family / Patient: Updated  (wife) at bedside.    Current Length of Stay: 7 day(s)  Current Patient Status: Inpatient   Certification Statement: The patient will continue to require additional inpatient hospital stay due to awaiting insurance auth.   Discharge Plan:  medically stable for DC.     Code Status: Level 1 - Full Code    Subjective   Patient seen and examined   Feeling \"okay\"    Objective :  Temp:  [98.2 °F (36.8 °C)-98.5 °F (36.9 °C)] 98.4 °F (36.9 °C)  HR:  [68-78] 68  BP: (136-163)/(58-73) 152/68  Resp:  [18-19] 19  SpO2:  [97 %-98 %] 97 %  O2 Device: None (Room air)    There is no height or weight on file to calculate BMI.     Input and Output Summary (last 24 hours):     Intake/Output Summary (Last 24 hours) at 1/28/2025 1426  Last data filed at 1/28/2025 0601  Gross per 24 hour   Intake 240 ml   Output 1550 ml   Net -1310 ml       Physical Exam  Constitutional:       General: He is not in acute distress.     Appearance: He is not ill-appearing, toxic-appearing or diaphoretic.   HENT:      Head: Normocephalic.      Mouth/Throat:      Mouth: Mucous membranes are moist.   Eyes:      General: No scleral icterus.        Right eye: No discharge.         " Left eye: No discharge.      Pupils: Pupils are equal, round, and reactive to light.   Cardiovascular:      Rate and Rhythm: Normal rate.      Heart sounds: No murmur heard.     No friction rub. No gallop.   Pulmonary:      Effort: No respiratory distress.      Breath sounds: No stridor. No wheezing, rhonchi or rales.   Chest:      Chest wall: No tenderness.   Abdominal:      General: Abdomen is flat. There is no distension.      Palpations: There is no mass.      Tenderness: There is no abdominal tenderness. There is no right CVA tenderness, left CVA tenderness, guarding or rebound.      Hernia: No hernia is present.   Musculoskeletal:      Right lower leg: No edema.      Left lower leg: No edema.   Skin:     Coloration: Skin is not jaundiced or pale.      Findings: No bruising, erythema, lesion or rash.   Neurological:      General: No focal deficit present.      Mental Status: He is alert.      Cranial Nerves: No cranial nerve deficit.      Sensory: No sensory deficit.      Motor: No weakness.      Coordination: Coordination normal.      Gait: Gait normal.      Deep Tendon Reflexes: Reflexes normal.   Psychiatric:         Mood and Affect: Mood normal.           Lines/Drains:  Lines/Drains/Airways       Active Status       Name Placement date Placement time Site Days    External Urinary Catheter 01/27/25  1005  -- 1                            Lab Results: I have reviewed the following results:   Results from last 7 days   Lab Units 01/28/25  0626 01/27/25  0620 01/26/25  0534   WBC Thousand/uL 6.19   < > 8.05   HEMOGLOBIN g/dL 10.7*   < > 10.9*   HEMATOCRIT % 33.0*   < > 34.0*   PLATELETS Thousands/uL 188   < > 193   SEGS PCT %  --   --  71   LYMPHO PCT %  --   --  13*   MONO PCT %  --   --  12   EOS PCT %  --   --  3    < > = values in this interval not displayed.     Results from last 7 days   Lab Units 01/28/25  0626   SODIUM mmol/L 139   POTASSIUM mmol/L 4.9   CHLORIDE mmol/L 104   CO2 mmol/L 32   BUN mg/dL 21    CREATININE mg/dL 1.05   ANION GAP mmol/L 3*   CALCIUM mg/dL 8.8   GLUCOSE RANDOM mg/dL 84         Results from last 7 days   Lab Units 01/27/25  1643   POC GLUCOSE mg/dl 119         Results from last 7 days   Lab Units 01/21/25  1452   LACTIC ACID mmol/L 0.5       Recent Cultures (last 7 days):   Results from last 7 days   Lab Units 01/21/25  1452   BLOOD CULTURE  No Growth After 5 Days.       Imaging Results Review: No pertinent imaging studies reviewed.  Other Study Results Review: No additional pertinent studies reviewed.    Last 24 Hours Medication List:     Current Facility-Administered Medications:     acetaminophen (TYLENOL) tablet 650 mg, TID    amLODIPine (NORVASC) tablet 5 mg, Daily    Artificial Tears Op Soln 1 drop, Q4H PRN    bisacodyl (DULCOLAX) rectal suppository 10 mg, Daily    calcium carbonate (TUMS) chewable tablet 1,000 mg, Daily PRN    [COMPLETED] carbidopa-levodopa (SINEMET)  mg per tablet 2 tablet, TID **FOLLOWED BY** carbidopa-levodopa (SINEMET)  mg per tablet 1.5 tablet, 4x Daily    Cholecalciferol (VITAMIN D3) tablet 1,000 Units, Daily    cyanocobalamin (VITAMIN B-12) tablet 1,000 mcg, Once per day on Monday Wednesday Friday    docusate sodium (COLACE) capsule 100 mg, BID    enoxaparin (LOVENOX) subcutaneous injection 40 mg, Daily    lidocaine (LIDODERM) 5 % patch 1 patch, Daily    melatonin tablet 3 mg, HS    ondansetron (ZOFRAN) injection 4 mg, Q6H PRN    polyethylene glycol (MIRALAX) packet 17 g, BID    QUEtiapine (SEROquel) tablet 12.5 mg, Q8H PRN    rivastigmine (EXELON) 4.6 mg/24 hr TD 24 hr patch 4.6 mg, Daily    senna (SENOKOT) tablet 17.2 mg, BID    Administrative Statements   Today, Patient Was Seen By: Jeannie Rodriguez MD  I have spent a total time of 30 minutes in caring for this patient on the day of the visit/encounter including Diagnostic results, Prognosis, Risks and benefits of tx options, Instructions for management, Patient and family education, Importance of  tx compliance, Risk factor reductions, Impressions, Counseling / Coordination of care, Documenting in the medical record, Reviewing / ordering tests, medicine, procedures  , Obtaining or reviewing history  , and Communicating with other healthcare professionals .    **Please Note: This note may have been constructed using a voice recognition system.**

## 2025-01-28 NOTE — ASSESSMENT & PLAN NOTE
Chronic right lower extremity venous ulcers   PTA Lasix for LE edema management   Known to [facility] podiatry/[facility]   Wound care consulted

## 2025-01-28 NOTE — PLAN OF CARE
Problem: PAIN - ADULT  Goal: Verbalizes/displays adequate comfort level or baseline comfort level  Description: Interventions:  - Encourage patient to monitor pain and request assistance  - Assess pain using appropriate pain scale  - Administer analgesics based on type and severity of pain and evaluate response  - Implement non-pharmacological measures as appropriate and evaluate response  - Consider cultural and social influences on pain and pain management  - Notify physician/advanced practitioner if interventions unsuccessful or patient reports new pain  Outcome: Progressing     Problem: INFECTION - ADULT  Goal: Absence or prevention of progression during hospitalization  Description: INTERVENTIONS:  - Assess and monitor for signs and symptoms of infection  - Monitor lab/diagnostic results  - Monitor all insertion sites, i.e. indwelling lines, tubes, and drains  - Monitor endotracheal if appropriate and nasal secretions for changes in amount and color  - [place] appropriate cooling/warming therapies per order  - Administer medications as ordered  - Instruct and encourage patient and family to use good hand hygiene technique  - Identify and instruct in appropriate isolation precautions for identified infection/condition  Outcome: Progressing  Goal: Absence of fever/infection during neutropenic period  Description: INTERVENTIONS:  - Monitor WBC    Outcome: Progressing     Problem: SAFETY ADULT  Goal: Patient will remain free of falls  Description: INTERVENTIONS:  - Educate patient/family on patient safety including physical limitations  - Instruct patient to call for assistance with activity   - Consult OT/PT to assist with strengthening/mobility   - Keep Call bell within reach  - Keep bed low and locked with side rails adjusted as appropriate  - Keep care items and personal belongings within reach  - Initiate and maintain comfort rounds  - Make Fall Risk Sign visible to staff  - Offer Toileting every 2 Hours,  in advance of need  - Initiate/Maintain bed/chair alarm  - Obtain necessary fall risk management equipment: yellow bracelet/socks  - Apply yellow socks and bracelet for high fall risk patients  - Consider moving patient to room near nurses station  Outcome: Progressing  Goal: Maintain or return to baseline ADL function  Description: INTERVENTIONS:  -  Assess patient's ability to carry out ADLs; assess patient's baseline for ADL function and identify physical deficits which impact ability to perform ADLs (bathing, care of mouth/teeth, toileting, grooming, dressing, etc.)  - Assess/evaluate cause of self-care deficits   - Assess range of motion  - Assess patient's mobility; develop plan if impaired  - Assess patient's need for assistive devices and provide as appropriate  - Encourage maximum independence but intervene and supervise when necessary  - Involve family in performance of ADLs  - Assess for home care needs following discharge   - Consider OT consult to assist with ADL evaluation and planning for discharge  - Provide patient education as appropriate  Outcome: Progressing  Goal: Maintains/Returns to pre admission functional level  Description: INTERVENTIONS:  - Perform AM-PAC 6 Click Basic Mobility/ Daily Activity assessment daily.  - Set and communicate daily mobility goal to care team and patient/family/caregiver.   - Collaborate with rehabilitation services on mobility goals if consulted  - Perform Range of Motion 3 times a day.  - Reposition patient every 2 hours.  - Dangle patient 3 times a day  - Stand patient 3 times a day  - Ambulate patient 3 times a day  - Out of bed to chair 3 times a day   - Out of bed for meals 3 times a day  - Out of bed for toileting  - Record patient progress and toleration of activity level   Outcome: Progressing     Problem: DISCHARGE PLANNING  Goal: Discharge to home or other facility with appropriate resources  Description: INTERVENTIONS:  - Identify barriers to discharge  w/patient and caregiver  - Arrange for needed discharge resources and transportation as appropriate  - Identify discharge learning needs (meds, wound care, etc.)  - Arrange for interpretive services to assist at discharge as needed  - Refer to Case Management Department for coordinating discharge planning if the patient needs post-hospital services based on physician/advanced practitioner order or complex needs related to functional status, cognitive ability, or social support system  Outcome: Progressing     Problem: Knowledge Deficit  Goal: Patient/family/caregiver demonstrates understanding of disease process, treatment plan, medications, and discharge instructions  Description: Complete learning assessment and assess knowledge base.  Interventions:  - Provide teaching at level of understanding  - Provide teaching via preferred learning methods  Outcome: Progressing     Problem: Prexisting or High Potential for Compromised Skin Integrity  Goal: Skin integrity is maintained or improved  Description: INTERVENTIONS:  - Identify patients at risk for skin breakdown  - Assess and monitor skin integrity  - Assess and monitor nutrition and hydration status  - Monitor labs   - Assess for incontinence   - Turn and reposition patient  - Assist with mobility/ambulation  - Relieve pressure over bony prominences  - Avoid friction and shearing  - Provide appropriate hygiene as needed including keeping skin clean and dry  - Evaluate need for skin moisturizer/barrier cream  - Collaborate with interdisciplinary team   - Patient/family teaching  - Consider wound care consult   Outcome: Progressing     Problem: Nutrition/Hydration-ADULT  Goal: Nutrient/Hydration intake appropriate for improving, restoring or maintaining nutritional needs  Description: Monitor and assess patient's nutrition/hydration status for malnutrition. Collaborate with interdisciplinary team and initiate plan and interventions as ordered.  Monitor patient's  weight and dietary intake as ordered or per policy. Utilize nutrition screening tool and intervene as necessary. Determine patient's food preferences and provide high-protein, high-caloric foods as appropriate.     INTERVENTIONS:  - Monitor oral intake, urinary output, labs, and treatment plans  - Assess nutrition and hydration status and recommend course of action  - Evaluate amount of meals eaten  - Assist patient with eating if necessary   - Allow adequate time for meals  - Recommend/ encourage appropriate diets, oral nutritional supplements, and vitamin/mineral supplements  - Order, calculate, and assess calorie counts as needed  - Recommend, monitor, and adjust tube feedings and TPN/PPN based on assessed needs  - Assess need for intravenous fluids  - Provide specific nutrition/hydration education as appropriate  - Include patient/family/caregiver in decisions related to nutrition  Outcome: Progressing     Problem: SAFETY,RESTRAINT: NV/NON-SELF DESTRUCTIVE BEHAVIOR  Goal: Remains free of harm/injury (restraint for non violent/non self-detsructive behavior)  Description: INTERVENTIONS:  - Instruct patient/family regarding restraint use   - Assess and monitor physiologic and psychological status   - Provide interventions and comfort measures to meet assessed patient needs   - Identify and implement measures to help patient regain control  - Assess readiness for release of restraint   Outcome: Progressing  Goal: Returns to optimal restraint-free functioning  Description: INTERVENTIONS:  - Assess the patient's behavior and symptoms that indicate continued need for restraint  - Identify and implement measures to help patient regain control  - Assess readiness for release of restraint   Outcome: Progressing     Problem: Potential for Falls  Goal: Patient will remain free of falls  Description: INTERVENTIONS:  - Educate patient/family on patient safety including physical limitations  - Instruct patient to call for  assistance with activity   - Consult OT/PT to assist with strengthening/mobility   - Keep Call bell within reach  - Keep bed low and locked with side rails adjusted as appropriate  - Keep care items and personal belongings within reach  - Initiate and maintain comfort rounds  - Make Fall Risk Sign visible to staff  - Offer Toileting every 2 Hours, in advance of need  - Initiate/Maintain bed/chair alarm  - Obtain necessary fall risk management equipment: yellow bracelet/socks  - Apply yellow socks and bracelet for high fall risk patients  - Consider moving patient to room near nurses station  Outcome: Progressing     Problem: NEUROSENSORY - ADULT  Goal: Achieves stable or improved neurological status  Description: INTERVENTIONS  - Monitor and report changes in neurological status  - Monitor vital signs such as temperature, blood pressure, glucose, and any other labs ordered   - Initiate measures to prevent increased intracranial pressure  - Monitor for seizure activity and implement precautions if appropriate      Outcome: Progressing  Goal: Remains free of injury related to seizures activity  Description: INTERVENTIONS  - Maintain airway, patient safety  and administer oxygen as ordered  - Monitor patient for seizure activity, document and report duration and description of seizure to physician/advanced practitioner  - If seizure occurs,  ensure patient safety during seizure  - Reorient patient post seizure  - Seizure pads on all 4 side rails  - Instruct patient/family to notify RN of any seizure activity including if an aura is experienced  - Instruct patient/family to call for assistance with activity based on nursing assessment  - Administer anti-seizure medications if ordered    Outcome: Progressing  Goal: Achieves maximal functionality and self care  Description: INTERVENTIONS  - Monitor swallowing and airway patency with patient fatigue and changes in neurological status  - Encourage and assist patient to  increase activity and self care.   - Encourage visually impaired, hearing impaired and aphasic patients to use assistive/communication devices  Outcome: Progressing     Problem: METABOLIC, FLUID AND ELECTROLYTES - ADULT  Goal: Electrolytes maintained within normal limits  Description: INTERVENTIONS:  - Monitor labs and assess patient for signs and symptoms of electrolyte imbalances  - Administer electrolyte replacement as ordered  - Monitor response to electrolyte replacements, including repeat lab results as appropriate  - Instruct patient on fluid and nutrition as appropriate  Outcome: Progressing  Goal: Fluid balance maintained  Description: INTERVENTIONS:  - Monitor labs   - Monitor I/O and WT  - Instruct patient on fluid and nutrition as appropriate  - Assess for signs & symptoms of volume excess or deficit  Outcome: Progressing

## 2025-01-28 NOTE — ASSESSMENT & PLAN NOTE
"Patient's wife endorsing recent abnormal behaviors.  Patient waking frequently at night, hallucinations noted when awaking from sleep, patient noted to be more \"nasty\" and agitated with at times garbled speech which clears.  Presents to ED after fall at home with concern for possible UTI. S/p head strike in ED w/ R eyebrow laceration.  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 with mild acute encephalopathy  CT head w/o acute findings  TSH benign   Folate WNL, B12 normal, vitamin D low at 26.9  Wife notes B12/vitamin D supplementation 3 times weekly PTA  Continue to q daily dosing of vit D  B12 3x weekly  Negative infxn work-up (as below)   Consult geriatrics, input appreciated  Sinemet as ordered by neurology (below)  Acetaminophen 650mg TID  Neurology consulted for recent sinemet increase  BM 1/23, 1/27  "

## 2025-01-28 NOTE — CASE MANAGEMENT
Support Center has received INTENT TO DENY for Acute Rehab Authorization.   Insurance: [facility]   Information obtained via Insurance Rep: Danielle   Intent to Deny Reason: Does not meet CMS guidelines   Facility: [facility]   Pending Auth #: [id number]   Peer to Peer Phone#: [phone] opt. 5 Deadline: 01/29 by 1130am   CM to task P2P to Attending to complete if desired.    Please notify Discharge Support if P2P will not be completed.     Care Manager notified: Poonam LUONG     Please reach out to CM for updates on any clinical information.

## 2025-01-28 NOTE — ASSESSMENT & PLAN NOTE
Speech therapy following  Will continue purée diet with nectar thick liquids  Continue aspiration precautions

## 2025-01-28 NOTE — PROGRESS NOTES
Progress Note - Geriatric Medicine   Name: Daniel Sanz Jr. 84 y.o. male I MRN: [id number]  Unit/Bed#: S -01 I Date of Admission: 1/21/2025   Date of Service: 1/28/2025 I Hospital Day: 7     Assessment & Plan  Altered mental status  -Patient is high risk of delirium due to hospitalization, dementia, recent fall, and metabolic imbalances, pain  -delirium precautions  -maintain normal sleep/wake cycle, start melatonin 3 mg QHS  -minimize overnight interruptions, group overnight vitals/labs/nursing checks as possible  -dim lights, close blinds and turn off tv to minimize stimulation and encourage sleep environment in evenings  -Continue Tylenol 975mg Q8H scheduled , consider adding gabapentin 100 mg nightly for pain  -monitor for fecal and urinary retention which may precipitate delirium  -encourage early mobilization and ambulation  -provide frequent reorientation and redirection  -encourage family and friends at the bedside to help calm patient if anxious  -avoid medications which may precipitate or worsen delirium such as tramadol, benzodiazepine, anticholinergics, and antihistaminics  -encourage hydration and nutrition , assist with feeding if needed  -redirect unwanted behaviors as first line, avoid physical restraints.     Parkinson's disease (HCC)  - Patient on Sinemet, neurology follows, appreciate input  - Monitor orthostatic vitals  - Ambulate patient  Fall  Patient has history of multiple falls in the past 6 months  Patient uses walker or cane for ambulation at baseline  Will continue PT/OT.  Monitor orthostatic vitals  Avoid hypotension and hypoglycemia  Fall precautions   Continue lidocaine patches for shoulders  HTN (hypertension)    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 (HCC)  Chronic right lower extremity venous ulcers  Wound care following  Dementia (HCC)  Patient has history of Parkinson's  Wife describes sundowning.   Patient scored 0/5 on mini cog 1/22/23  Patient AAO  x 2 currently and at baseline  CT head (1/21/25) showed moderate microangiopathic changes  Pt. Is AAOx2 at baseline, he needs assistance with all iADL's at baseline  TSH 2.635 (1/21/25)  B12 551 (1/21/25)  [place] to person, place, time, and situation as needed  Monitor for behaviors   Monitor for mental status change  Provide supportive care  Consider Seroquel 12.5mg Q8H prn for agitation and hallucinations, monitor Qtc  Consider gabapentin 100 mg at night for shoulder pain, increase if well-tolerated  Dysphagia  Speech therapy following  Will continue purée diet with nectar thick liquids  Continue aspiration precautions  Anemia  Hemoglobin 10.7 today, continue to monitor and manage as per primary team    Subjective:   84-year-old male presents sitting up comfortably in bed.  Patient's wife present at bedside.  Patient alert and oriented to person and place.  Patient stated his shoulders are achy.  Patient's wife that this has been an ongoing issue for years as he has a history of rotator cuff injuries.  Wife also confirms that he had been receiving Tylenol and lidocaine patches.  Patient's wife reported that he had a bowel movement yesterday and is wondering about trying MiraLAX every other day.  Patient denied any chest pain or abdominal pain.  He stated that he slept well last night and has been sleeping through the night.  Patient stated he has a good appetite.  Wife confirmed that patient has been eating more.  Patient worked with physical therapy and stated that it went well.  Wife confirmed and said that he enjoyed the physical therapy session.    Per physical therapy (1/27/25): Patient has fairly good tolerance to bilateral lower extremity exercise, bed mobility, transfers, and short distance ambulation with a roller walker. Patient is making steady progress toward PT goals.     Review of Systems   Constitutional:  Negative for chills and fever.   HENT:  Negative for ear pain and sore throat.    Eyes:   Negative for pain and visual disturbance.   Respiratory:  Negative for cough and shortness of breath.    Cardiovascular:  Negative for chest pain and palpitations.   Gastrointestinal:  Negative for abdominal pain and vomiting.   Genitourinary:  Negative for dysuria and hematuria.   Musculoskeletal:  Positive for arthralgias (shoulder pain). Negative for back pain.   Skin:  Negative for color change and rash.   Neurological:  Negative for seizures and syncope.   All other systems reviewed and are negative.        Objective:     Vitals: Blood pressure 152/68, pulse 68, temperature 98.4 °F (36.9 °C), temperature source Oral, resp. rate 19, SpO2 97%.,There is no height or weight on file to calculate BMI.      Intake/Output Summary (Last 24 hours) at 1/28/2025 1340  Last data filed at 1/28/2025 0601  Gross per 24 hour   Intake 360 ml   Output 1550 ml   Net -1190 ml       Current Medications: Reviewed    Physical Exam:   Physical Exam  Vitals and nursing note reviewed.   Constitutional:       General: He is not in acute distress.     Appearance: He is well-developed.      Comments: Frail looking   HENT:      Head: Normocephalic and atraumatic.      Right Ear: External ear normal.      Left Ear: External ear normal.      Mouth/Throat:      Mouth: Mucous membranes are moist.      Pharynx: Oropharynx is clear.   Eyes:      Conjunctiva/sclera: Conjunctivae normal.   Cardiovascular:      Rate and Rhythm: Normal rate and regular rhythm.      Pulses: Normal pulses.      Heart sounds: Normal heart sounds. No murmur heard.  Pulmonary:      Effort: Pulmonary effort is normal. No respiratory distress.      Breath sounds: Normal breath sounds.   Abdominal:      General: Bowel sounds are normal.      Palpations: Abdomen is soft.      Tenderness: There is no abdominal tenderness.   Musculoskeletal:         General: No swelling.      Cervical back: Neck supple.      Right lower leg: Edema present.      Left lower leg: Edema present.    Skin:     General: Skin is warm and dry.      Capillary Refill: Capillary refill takes less than 2 seconds.      Comments: Laceration above right eyebrow   Neurological:      Mental Status: He is alert. Mental status is at baseline.      Comments: Alert oriented x 2   Psychiatric:         Mood and Affect: Mood normal.          Invasive Devices       Peripheral Intravenous Line  Duration             Peripheral IV 01/27/25 Left;Ventral (anterior) Forearm 1 day              Drain  Duration             External Urinary Catheter 1 day

## 2025-01-28 NOTE — CASE MANAGEMENT
Case Management Progress Note    Patient name Daniel Sanz Jr.  Location S /S -01 MRN [id number]  : 1940 Date 2025       LOS (days): 7  Geometric Mean LOS (GMLOS) (days): 3.8  Days to GMLOS:-3.2        OBJECTIVE:        Current admission status: Inpatient  Preferred Pharmacy:   [facility] - [address] [address]  Phone: [phone] Fax: [phone]    [id number] [place] [address] [address]  Phone: [phone] Fax: [phone]    Primary Care Provider: Charlie Martinez DO    Primary Insurance: [facility] REP  Secondary Insurance:     PROGRESS NOTE:    CM made aware by CMDS of insurance company's intent to deny.     Cm made provider aware.     Support Center has received INTENT TO DENY for Acute Rehab Authorization.   Insurance: [facility]   Information obtained via Insurance Rep: Danielle   Intent to Deny Reason: Does not meet CMS guidelines   Facility: [facility]   Pending Auth #: [id number]   Peer to Peer Phone#: [phone] opt. 5 Deadline:  by 1130am   CM to task P2P to Attending to complete if desired.    Please notify Discharge Support if P2P will not be completed.

## 2025-01-28 NOTE — ARC ADMISSION
[facility]  spoke with  patient's spouse, Sheng via phone. Introduced self, explained role, reviewed ARC program, services offered, acute rehab criteria, review of referral by [facility] Medical Director, insurance authorization process, three [facility] locations and anticipated rehab length of stay. All questions were answered. Patient's spouse's only choice is a private room, [facility]. Patient was made aware ARC Reviewer will communicate with their Care Manager who will keep patient updated on referral status.

## 2025-01-28 NOTE — ASSESSMENT & PLAN NOTE
Patient has history of Parkinson's  Wife describes sundowning.   Patient scored 0/5 on mini cog 1/22/23  Patient AAO x 2 currently and at baseline  CT head (1/21/25) showed moderate microangiopathic changes  Pt. Is AAOx2 at baseline, he needs assistance with all iADL's at baseline  TSH 2.635 (1/21/25)  B12 551 (1/21/25)  [place] to person, place, time, and situation as needed  Monitor for behaviors   Monitor for mental status change  Provide supportive care  Consider Seroquel 12.5mg Q8H prn for agitation and hallucinations, monitor Qtc  Consider gabapentin 100 mg at night for shoulder pain, increase if well-tolerated

## 2025-01-29 LAB
ALBUMIN SERPL BCG-MCNC: 3.8 G/DL (ref 3.5–5)
ALP SERPL-CCNC: 125 U/L (ref 34–104)
ALT SERPL W P-5'-P-CCNC: 7 U/L (ref 7–52)
ANION GAP SERPL CALCULATED.3IONS-SCNC: 7 MMOL/L (ref 4–13)
AST SERPL W P-5'-P-CCNC: 15 U/L (ref 13–39)
BASOPHILS # BLD AUTO: 0.04 THOUSANDS/ΜL (ref 0–0.1)
BASOPHILS NFR BLD AUTO: 0 % (ref 0–1)
BILIRUB SERPL-MCNC: 0.71 MG/DL (ref 0.2–1)
BUN SERPL-MCNC: 23 MG/DL (ref 5–25)
CALCIUM SERPL-MCNC: 9 MG/DL (ref 8.4–10.2)
CHLORIDE SERPL-SCNC: 105 MMOL/L (ref 96–108)
CO2 SERPL-SCNC: 28 MMOL/L (ref 21–32)
CREAT SERPL-MCNC: 0.99 MG/DL (ref 0.6–1.3)
EOSINOPHIL # BLD AUTO: 0.13 THOUSAND/ΜL (ref 0–0.61)
EOSINOPHIL NFR BLD AUTO: 1 % (ref 0–6)
ERYTHROCYTE [DISTWIDTH] IN BLOOD BY AUTOMATED COUNT: 13 % (ref 11.6–15.1)
GFR SERPL CREATININE-BSD FRML MDRD: 70 ML/MIN/1.73SQ M
GLUCOSE SERPL-MCNC: 87 MG/DL (ref 65–140)
HCT VFR BLD AUTO: 34.2 % (ref 36.5–49.3)
HGB BLD-MCNC: 11.3 G/DL (ref 12–17)
IMM GRANULOCYTES # BLD AUTO: 0.04 THOUSAND/UL (ref 0–0.2)
IMM GRANULOCYTES NFR BLD AUTO: 0 % (ref 0–2)
LYMPHOCYTES # BLD AUTO: 1.28 THOUSANDS/ΜL (ref 0.6–4.47)
LYMPHOCYTES NFR BLD AUTO: 13 % (ref 14–44)
MCH RBC QN AUTO: 32.2 PG (ref 26.8–34.3)
MCHC RBC AUTO-ENTMCNC: 33 G/DL (ref 31.4–37.4)
MCV RBC AUTO: 97 FL (ref 82–98)
MONOCYTES # BLD AUTO: 0.99 THOUSAND/ΜL (ref 0.17–1.22)
MONOCYTES NFR BLD AUTO: 10 % (ref 4–12)
NEUTROPHILS # BLD AUTO: 7.38 THOUSANDS/ΜL (ref 1.85–7.62)
NEUTS SEG NFR BLD AUTO: 76 % (ref 43–75)
NRBC BLD AUTO-RTO: 0 /100 WBCS
PLATELET # BLD AUTO: 203 THOUSANDS/UL (ref 149–390)
PMV BLD AUTO: 9.8 FL (ref 8.9–12.7)
POTASSIUM SERPL-SCNC: 4.3 MMOL/L (ref 3.5–5.3)
PROT SERPL-MCNC: 6.6 G/DL (ref 6.4–8.4)
RBC # BLD AUTO: 3.51 MILLION/UL (ref 3.88–5.62)
SODIUM SERPL-SCNC: 140 MMOL/L (ref 135–147)
WBC # BLD AUTO: 9.86 THOUSAND/UL (ref 4.31–10.16)

## 2025-01-29 PROCEDURE — 99232 SBSQ HOSP IP/OBS MODERATE 35: CPT | Performed by: INTERNAL MEDICINE

## 2025-01-29 PROCEDURE — 85025 COMPLETE CBC W/AUTO DIFF WBC: CPT | Performed by: INTERNAL MEDICINE

## 2025-01-29 PROCEDURE — 97110 THERAPEUTIC EXERCISES: CPT

## 2025-01-29 PROCEDURE — 80053 COMPREHEN METABOLIC PANEL: CPT | Performed by: INTERNAL MEDICINE

## 2025-01-29 PROCEDURE — 92526 ORAL FUNCTION THERAPY: CPT

## 2025-01-29 PROCEDURE — 97530 THERAPEUTIC ACTIVITIES: CPT

## 2025-01-29 PROCEDURE — 97116 GAIT TRAINING THERAPY: CPT

## 2025-01-29 RX ADMIN — DEXTRAN 70, GLYCERIN, HYPROMELLOSE 1 DROP: 1; 2; 3 SOLUTION/ DROPS OPHTHALMIC at 12:01

## 2025-01-29 RX ADMIN — ACETAMINOPHEN 650 MG: 325 TABLET, FILM COATED ORAL at 17:00

## 2025-01-29 RX ADMIN — ACETAMINOPHEN 650 MG: 325 TABLET, FILM COATED ORAL at 21:58

## 2025-01-29 RX ADMIN — Medication 1000 UNITS: at 08:35

## 2025-01-29 RX ADMIN — AMLODIPINE BESYLATE 5 MG: 5 TABLET ORAL at 08:35

## 2025-01-29 RX ADMIN — CARBIDOPA AND LEVODOPA 1.5 TABLET: 25; 100 TABLET ORAL at 08:35

## 2025-01-29 RX ADMIN — RIVASTIGMINE 4.6 MG: 4.6 PATCH TRANSDERMAL at 08:34

## 2025-01-29 RX ADMIN — ACETAMINOPHEN 650 MG: 325 TABLET, FILM COATED ORAL at 08:35

## 2025-01-29 RX ADMIN — MELATONIN 3 MG: 3 TAB ORAL at 21:58

## 2025-01-29 RX ADMIN — ENOXAPARIN SODIUM 40 MG: 40 INJECTION SUBCUTANEOUS at 08:34

## 2025-01-29 RX ADMIN — CARBIDOPA AND LEVODOPA 1.5 TABLET: 25; 100 TABLET ORAL at 21:59

## 2025-01-29 RX ADMIN — CARBIDOPA AND LEVODOPA 1.5 TABLET: 25; 100 TABLET ORAL at 17:00

## 2025-01-29 RX ADMIN — CYANOCOBALAMIN TAB 500 MCG 1000 MCG: 500 TAB at 08:35

## 2025-01-29 RX ADMIN — CARBIDOPA AND LEVODOPA 1.5 TABLET: 25; 100 TABLET ORAL at 13:03

## 2025-01-29 NOTE — PHYSICAL THERAPY NOTE
PHYSICAL THERAPY NOTE          Patient Name: Daniel Sanz Jr.  Today's Date: 1/29/2025 01/29/25 0916   PT Last Visit   PT Visit Date 01/29/25   Note Type   Note Type Treatment   Pain Assessment   Pain Assessment Tool 0-10   Pain Score No Pain   Patient's Stated Pain Goal No pain   Hospital Pain Intervention(s) Repositioned;Ambulation/increased activity;Elevated;Rest   Multiple Pain Sites No   Pain Rating: FLACC (Rest) - Face 0   Pain Rating: FLACC (Rest) - Legs 0   Pain Rating: FLACC (Rest) - Activity 0   Pain Rating: FLACC (Rest) - Cry 0   Pain Rating: FLACC (Rest) - Consolability 0   Score: FLACC (Rest) 0   Pain Rating: FLACC (Activity) - Face 0   Pain Rating: FLACC (Activity) - Legs 0   Pain Rating: FLACC (Activity) - Activity 0   Pain Rating: FLACC (Activity) - Cry 0   Pain Rating: FLACC (Activity) - Consolability 0   Score: FLACC (Activity) 0   Restrictions/Precautions   Weight Bearing Precautions Per Order No   Other Precautions Chair Alarm;Bed Alarm;Fall Risk;Cognitive;Restraints  (whitley catheter, posey, room air and masimo)   General   Chart Reviewed Yes   Response to Previous Treatment Patient with no complaints from previous session.   Family/Caregiver Present Yes  (spouse)   Cognition   Overall Cognitive Status Impaired   Arousal/Participation Alert;Responsive;Cooperative   Attention Attends with cues to redirect   Orientation Level Oriented to person;Oriented to place;Disoriented to time;Disoriented to situation   Memory Decreased short term memory;Decreased recall of recent events;Decreased recall of precautions   Following Commands Follows one step commands with increased time or repetition   Comments pt was cooperative in todays tx session   Subjective   Subjective ptwas agreeable to participate in PT intervention and sttaed no pain pre/post tx session   Bed Mobility   Supine to Sit 3  Moderate assistance    Additional items Assist x 1;HOB elevated;Bedrails;Increased time required;Verbal cues;Other  (HHA)   Sit to Supine Unable to assess   Additional Comments pt seated OOB in the recliner post tx session with legs elevated, chair alarm activated and posey donned   Transfers   Sit to Stand 3  Moderate assistance  (pt required mod ax1 to complete STS but max Ax1 to correct retropulsion)   Additional items Assist x 1;Increased time required;Verbal cues   Stand to Sit 3  Moderate assistance   Additional items Assist x 1;Armrests;Increased time required;Verbal cues   Stand pivot 3  Moderate assistance   Additional items Assist x 1;Armrests;Increased time required;Verbal cues  (w/ RW, VC's for lateral stepping and RW management)   Additional Comments pt with severe retropulsion with multiple STS in todays tx session   Ambulation/Elevation   Gait pattern Shuffling;Short stride;Step to;Excessively slow;Decreased heel strike;Decreased hip extension;Decreased toe off   Gait Assistance 3  Moderate assist   Additional items Assist x 1;Verbal cues   Assistive Device Rolling walker   Distance 25'x1 RW   Stair Management Assistance Not tested   Balance   Static Sitting Fair   Dynamic Sitting Fair   Static Standing Poor  (w/ RW and pt was retropulsive)   Ambulatory Poor   Endurance Deficit   Endurance Deficit Yes   Endurance Deficit Description limited activoity tolerance, functional mobility, standing balance/tolerance   Activity Tolerance   Activity Tolerance Patient limited by fatigue;Other (Comment)  (generalized weakness, impaired cognition)   Nurse Made Aware Spoke to RN   Exercises   Heelslides Supine;10 reps;AAROM;Bilateral   Hip Abduction Sitting;15 reps;AROM;Bilateral   Hip Adduction Sitting;15 reps;AROM;Bilateral  (pillow squeezes)   Knee AROM Long Arc Quad Sitting;10 reps;AROM;Bilateral  (pt required tactile cues to complete LAQ properly as pt demonstrated bilateral hip compensation with activity)   Assessment   Prognosis  Fair   Problem List Decreased strength;Decreased endurance;Impaired balance;Decreased mobility;Decreased cognition;Impaired judgement;Decreased safety awareness   Assessment pt began tx session lying supine in bed w/ elidia mejia. RN stated was appropriate for PT intervention. Pt to focus on bed mobility, transfer training, TE activities, posture/balance with gait and increasing pt activity tolerance. pt continues to remain consistant fro requiring mod ax1 for all bed mobility and functional transfers with RW. pt with severe retropulsion with multiple STS's in todays tx session that required max Ax1 to correct. pt was able to sit EOB 6 minutes w/o LOB while being educated on functional transfers and participating in TE activities. pt continues to demonstrate the inability to ambulate house hold distances as pt was limited to 25'x1 RW mod ax1 for safety and balance. pt required seated therapeutic rest breaks due to fatigue in [facility] tx session. pt required constant VC's for step length and RW management.pt would benefit from continued skilled PT intervention in order to address deficits listed above. Continue to recommend DC w/ level 2 moderate rehab resource intensity when medically cleared   Barriers to Discharge Inaccessible home environment   Goals   Patient Goals pt was unable to state due to impaired cognition   STG Expiration Date 02/01/25   PT Treatment Day 1   Plan   Treatment/Interventions ADL retraining;Functional transfer training;LE strengthening/ROM;Therapeutic exercise;Endurance training;Cognitive reorientation;Patient/family training;Equipment eval/education;Bed mobility;Gait training;Spoke to nursing;Elevations   Progress Slow progress, decreased activity tolerance   PT Frequency 3-5x/wk   Discharge Recommendation   Rehab Resource Intensity Level, PT II (Moderate Resource Intensity)   AM-PAC Basic Mobility Inpatient   Turning in Flat Bed Without Bedrails 2   Lying on Back to Sitting on Edge of Flat  Bed Without Bedrails 2   Moving Bed to Chair 2   Standing Up From Chair Using Arms 2   Walk in Room 2   Climb 3-5 Stairs With Railing 1   Basic Mobility Inpatient Raw Score 11   Basic Mobility Standardized Score 30.25   [facility] Highest Level Of Mobility   -HL Goal 4: Move to chair/commode   -HL Achieved 7: Walk 25 feet or more   Education   Education Provided Mobility training;Assistive device   Patient Reinforcement needed   End of Consult   Patient Position at End of Consult Bedside chair;Bed/Chair alarm activated;All needs within reach   The patient's AM-PAC Basic Mobility Inpatient Short Form Raw Score is 11. A Raw score of less than or equal to 16 suggests the patient may benefit from discharge to post-acute rehabilitation services. Please also refer to the recommendation of the Physical Therapist for safe discharge planning.    Nikolai Agosto

## 2025-01-29 NOTE — TELEPHONE ENCOUNTER
Hello,     I have Scheduled with [facility].   3/6/2025/Ivelisse/Rene/11 am     *Spoke with patient spouse to schedule     Placed patient on waiting list.     Thank you,

## 2025-01-29 NOTE — CASE MANAGEMENT
[facility] has received APPROVED authorization.  Insurance: [facility]    Auth obtained via portal.  Authorization received for: Acute Rehab  Facility: [facility]   Authorization #: [id number]   H&CC Ref#: [id number]   Start of Care: 01/29  Next Review Date: 02/04  Submit next review to: H&CC Portal / F#: [phone]    Care Manager notified: Dana Severini     Please reach out to  for updates on any clinical information.

## 2025-01-29 NOTE — CASE MANAGEMENT
Updated PT eval uploaded to H&CC case per P@P request. CM notified.     01/29 @ 1230pm - Per H&CC rep April, PT note received and will send to MD. CRISTIAN notified.

## 2025-01-29 NOTE — TELEPHONE ENCOUNTER
Latest Update :    STILL ADMITTED  1/21/2025 - present (8 days)  [facility]      [facility]AMANNA/ Altered mental status       Trudy Perez PA-C  [facility] Discharge  Daniel Sanz Jr. will need follow-up in 6 weeks with general neurology team for Other = ATTENDING in 60 minute appointment. They will not require outpatient neurological testing.  Patient's wife would prefer the patient follow up with Dr. Oviedo in the resident clinic at [place].    Thank you!

## 2025-01-29 NOTE — CASE MANAGEMENT
Case Management Progress Note    Patient name Daniel Sanz Jr.  Location S /S -01 MRN [id number]  : 1940 Date 2025       LOS (days): 7  Geometric Mean LOS (GMLOS) (days): 3.8  Days to GMLOS:-3.4        OBJECTIVE:        Current admission status: Inpatient  Preferred Pharmacy:   [facility] - [address] [address]  Phone: [phone] Fax: [phone]    [id number] [place] [address] [address]  Phone: [phone] Fax: [phone]    Primary Care Provider: Charlie Martinez DO    Primary Insurance: [facility] REP  Secondary Insurance:     PROGRESS NOTE: Patient discussed in LOS meeting held today. Insurance auth for acute rehab denied: P2P pending.  Assigned CM following for acute rehab vs possible STR @ SNF if P2P results in additional denial.

## 2025-01-29 NOTE — CASE MANAGEMENT
Case Management Discharge Planning Note    Patient name Daniel Sanz Jr.  Location S /S -01 MRN [id number]  : 1940 Date 2025       Current Admission Date: 2025  Current Admission Diagnosis:Altered mental status   Patient Active Problem List    Diagnosis Date Noted Date Diagnosed    Dysphagia 2025     Anemia 2025     Dementia (HCC) 2025     Altered mental status 2025     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 (HCC) 2025     Fall, initial encounter 10/11/2024     Closed fracture of nasal bone 2024     Fall 2024     Scalp hematoma 2024     Multiple abrasions 2024     HTN (hypertension) 2024     Stage 3a chronic kidney disease ([facility]) 03/15/2024     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     Shortness of breath      MGUS (monoclonal gammopathy of unknown significance) 2022     Neuropathy 2022     Parkinson's disease ([facility]) 2020     Tear of right supraspinatus tendon 2019     Chronic right shoulder pain 2019     Internal derangement of right shoulder 2019     Pleural plaque with presence of asbestos 2017     Allergic rhinitis with postnasal drip 2017       LOS (days): 8  Geometric Mean LOS (GMLOS) (days): 3.8  Days to GMLOS:-3.9     OBJECTIVE:  Risk of Unplanned Readmission Score: 22.25     Current admission status: Inpatient   Preferred Pharmacy:   [facility] [address] [address]  Phone: [phone] Fax: [phone]    [id number] [place] [address] [address]  Phone: [phone] Fax: [phone]    Primary Care Provider: Charlie Martinez DO    Primary Insurance: [facility]  Secondary Insurance:     DISCHARGE DETAILS:    Discharge planning discussed with:: Patient's Wife Sheng    Contacts  Patient  Contacts: Wife Sheng  Relationship to Patient:: Family  Contact Method: In Person  Reason/Outcome: Discharge Planning, Continuity of Care      CM met with patient's wife Sheng to make her aware of insurance intent to deny authorization request for acute level rehab.  CM made Sheng aware that a subacute facility from the list provided yesterday may need to be chosen if denied. Sheng verbalized her understanding.

## 2025-01-29 NOTE — SPEECH THERAPY NOTE
Speech Language/Pathology    Speech/Language Pathology Progress Note    Patient Name: Daniel Sanz Jr.  Today's Date: 1/29/2025         Subjective:  Pt seen for dysphagia tx at lunch. Pt awake, alert. Able to feed self. Wife at bedside   Objective:  Pt self fed chopped meatloaf, pureed broccoli, and mac and cheese, drank OJ by straw w/ good oral control and transfer. Mastication/manipulation was slow and appeared functional. Slow bolus transfer w/ mild residue noted.   Swallows were slow with no coughing, throat clearing, or wet voice noted     Assessment:  Pt appears to be tolerating dysphagia 2 mech soft w/ thin liquids w/o sig dysphagia symptoms or overt s/s aspiration.     Plan/Recommendations:  Cont dysphagia 2 diet w/ thin liquids   Meds whole in puree  Aspiration precautions  Will cont to follow for possible diet upgrade.       Kayleigh Ybarra MA CCC-SLP  Speech Pathologist  Available via [employer]

## 2025-01-29 NOTE — PLAN OF CARE
Problem: PHYSICAL THERAPY ADULT  Goal: Performs mobility at highest level of function for planned discharge setting.  See evaluation for individualized goals.  Description: Treatment/Interventions: Functional transfer training, LE strengthening/ROM, Elevations, Therapeutic exercise, Endurance training, Cognitive reorientation, Patient/family training, Equipment eval/education, Bed mobility, Compensatory technique education, Gait training          See flowsheet documentation for full assessment, interventions and recommendations.  Outcome: Progressing  Note: Prognosis: Fair  Problem List: Decreased strength, Decreased endurance, Impaired balance, Decreased mobility, Decreased cognition, Impaired judgement, Decreased safety awareness  Assessment: pt began tx session lying supine in bed w/ elidia mejia. RN stated was appropriate for PT intervention. Pt to focus on bed mobility, transfer training, TE activities, posture/balance with gait and increasing pt activity tolerance. pt continues to remain consistant fro requiring mod ax1 for all bed mobility and functional transfers with RW. pt with severe retropulsion with multiple STS's in todays tx session that required max Ax1 to correct. pt was able to sit EOB 6 minutes w/o LOB while being educated on functional transfers and participating in TE activities. pt continues to demonstrate the inability to ambulate house hold distances as pt was limited to 25'x1 RW mod ax1 for safety and balance. pt required seated therapeutic rest breaks due to fatigue in todays tx session. pt required constant VC's for step length and RW management.pt would benefit from continued skilled PT intervention in order to address deficits listed above. Continue to recommend DC w/ level 2 moderate rehab resource intensity when medically cleared  Barriers to Discharge: Inaccessible home environment     Rehab Resource Intensity Level, PT: II (Moderate Resource Intensity)    See flowsheet  documentation for full assessment.

## 2025-01-29 NOTE — CASE MANAGEMENT
Case Management Discharge Planning Note    Patient name Daniel Sanz Jr.  Location S /S -01 MRN [id number]  : 1940 Date 2025       Current Admission Date: 2025  Current Admission Diagnosis:Altered mental status   Patient Active Problem List    Diagnosis Date Noted Date Diagnosed    Dysphagia 2025     Anemia 2025     Dementia (HCC) 2025     Altered mental status 2025     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 (HCC) 2025     Fall, initial encounter 10/11/2024     Closed fracture of nasal bone 2024     Fall 2024     Scalp hematoma 2024     Multiple abrasions 2024     HTN (hypertension) 2024     Stage 3a chronic kidney disease ([facility]) 03/15/2024     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     Shortness of breath      MGUS (monoclonal gammopathy of unknown significance) 2022     Neuropathy 2022     Parkinson's disease ([facility]) 2020     Tear of right supraspinatus tendon 2019     Chronic right shoulder pain 2019     Internal derangement of right shoulder 2019     Pleural plaque with presence of asbestos 2017     Allergic rhinitis with postnasal drip 2017       LOS (days): 8  Geometric Mean LOS (GMLOS) (days): 3.8  Days to GMLOS:-4.2     OBJECTIVE:  Risk of Unplanned Readmission Score: 22.35         Current admission status: Inpatient   Preferred Pharmacy:   [facility] [address] [address]  Phone: [phone] Fax: [phone]    [id number] [place] [address] [address]  Phone: [phone] Fax: [phone]    Primary Care Provider: Charlie Martinez DO    Primary Insurance: [facility]  Secondary Insurance:     DISCHARGE DETAILS:    Discharge planning discussed with:: Patient's wife Sheng  Freedom of Choice:  Yes     CM contacted family/caregiver?: Yes    Contacts  Patient Contacts: Wife Sheng  Relationship to Patient:: Family  Contact Method: Phone  Phone Number: [phone]  Reason/Outcome: Discharge Planning      Treatment Team Recommendation: Short Term Rehab  Discharge Destination Plan:: Acute Rehab    CM called patient's wife Sheng to make her aware that insurance authorization for acute rehab ( [facility]) was approved. CM made Sheng aware tomorrow patient should discharge to rehab pending medical clearance. CM also made Sheng aware [facility] needs to confirm which location patient will be admitting to as that hasn't been confirmed yet.

## 2025-01-29 NOTE — WOUND OSTOMY CARE
Progress Note - Wound   Daniel Sanz Jr. 84 y.o. male MRN: [id number]  Unit/Bed#: S -01 Encounter: [id number]        Assessment:   Patient admitted to [facility] due to altered mental status Patient is seen for wound care follow-up. Patient wife at bedside is agreeable to assessment. alert and oriented to self, incontinent of bowel and bladder, assist of 2 to turn for assessment, is an assist with care. On assessment patient is OOB to recliner, with waffle cushion in place and lap belt in use.     Findings:  B/L heels are dry intact and fabienne with no skin loss or wounds present. Recommend preventative Hydraguard Cream and proper offloading/ repositioning.      Right medial tibia- Venous ulcer. Wound is oval in shape, partial thickness, 100% dried brown well adhered scab in place. Perri-wound is dry, intact, no redness. Wound is resolved.         No induration, fluctuance, odor, warmth/temperature differences, redness, or purulence noted to the above noted wounds and skin areas assessed. New dressings applied per orders listed below. Patient tolerated well- no s/s of non-verbal pain or discomfort observed during the encounter. Bedside nurse aware of plan of care. See flow sheets for more detailed assessment findings.      Wound care will sign off at this time, please re-consult if needed. Please follow skin care recommendations written as orders for skin protection and prevention.    Skin care plans:  1-Hydraguard to bilateral sacrum, buttock and heels BID and PRN  2-Elevate heels to offload pressure.  3-Ehob cushion in chair when out of bed.  4-Moisturize skin daily with skin nourishing cream.  5-Turn/reposition q2h for pressure re-distribution on skin.        Symone Felipe RN, BSN, CCRN

## 2025-01-29 NOTE — QUICK NOTE
Spoke with [facility] and answered all their questions.   They are requesting that I stay on hold

## 2025-01-30 PROCEDURE — 97535 SELF CARE MNGMENT TRAINING: CPT

## 2025-01-30 PROCEDURE — 99232 SBSQ HOSP IP/OBS MODERATE 35: CPT | Performed by: INTERNAL MEDICINE

## 2025-01-30 RX ADMIN — CARBIDOPA AND LEVODOPA 1.5 TABLET: 25; 100 TABLET ORAL at 17:25

## 2025-01-30 RX ADMIN — RIVASTIGMINE 4.6 MG: 4.6 PATCH TRANSDERMAL at 09:33

## 2025-01-30 RX ADMIN — ACETAMINOPHEN 650 MG: 325 TABLET, FILM COATED ORAL at 21:07

## 2025-01-30 RX ADMIN — Medication 1000 UNITS: at 09:26

## 2025-01-30 RX ADMIN — AMLODIPINE BESYLATE 5 MG: 5 TABLET ORAL at 09:26

## 2025-01-30 RX ADMIN — ACETAMINOPHEN 650 MG: 325 TABLET, FILM COATED ORAL at 09:26

## 2025-01-30 RX ADMIN — CARBIDOPA AND LEVODOPA 1.5 TABLET: 25; 100 TABLET ORAL at 13:20

## 2025-01-30 RX ADMIN — ACETAMINOPHEN 650 MG: 325 TABLET, FILM COATED ORAL at 17:25

## 2025-01-30 RX ADMIN — ENOXAPARIN SODIUM 40 MG: 40 INJECTION SUBCUTANEOUS at 09:26

## 2025-01-30 RX ADMIN — SENNOSIDES 17.2 MG: 8.6 TABLET ORAL at 09:26

## 2025-01-30 RX ADMIN — MELATONIN 3 MG: 3 TAB ORAL at 21:07

## 2025-01-30 RX ADMIN — CARBIDOPA AND LEVODOPA 1.5 TABLET: 25; 100 TABLET ORAL at 09:26

## 2025-01-30 RX ADMIN — CARBIDOPA AND LEVODOPA 1.5 TABLET: 25; 100 TABLET ORAL at 21:07

## 2025-01-30 RX ADMIN — SENNOSIDES 17.2 MG: 8.6 TABLET ORAL at 17:25

## 2025-01-30 NOTE — PLAN OF CARE
Problem: SAFETY,RESTRAINT: NV/NON-SELF DESTRUCTIVE BEHAVIOR  Goal: Remains free of harm/injury (restraint for non violent/non self-detsructive behavior)  Description: INTERVENTIONS:  - Instruct patient/family regarding restraint use   - Assess and monitor physiologic and psychological status   - Provide interventions and comfort measures to meet assessed patient needs   - Identify and implement measures to help patient regain control  - Assess readiness for release of restraint   1/30/2025 1418 by Kaity Jasso RN  Outcome: Completed  1/30/2025 1118 by Kaity Jasso RN  Outcome: Progressing  Goal: Returns to optimal restraint-free functioning  Description: INTERVENTIONS:  - Assess the patient's behavior and symptoms that indicate continued need for restraint  - Identify and implement measures to help patient regain control  - Assess readiness for release of restraint   1/30/2025 1418 by Kaity Jasso RN  Outcome: Completed  1/30/2025 1118 by Kaity Jasso, RN  Outcome: Progressing      Assessment:  1  Chronic bilateral low back pain with bilateral sciatica    2  Neuropathic pain        Plan: At this point the patient's pain persists despite time, relative rest, activity modification, and nonsteroidal anti-inflammatories  His pain is significantly interfering with his daily living activities  At this point, I believe is appropriate to order an MRI of the lumbar spine to rule out any significant etiology of his symptoms  Unfortunately due to the angelina in his leg he can not undergo an MRI and will get a CT scan    I will start him on a titrating dose of gabapentin to address any neuropathic component of the patient's pain  I did review the potential side effects of gabapentin, not limited to, but including dizziness, drowsiness, weakness, tired feeling, nausea, diarrhea, constipation, blurred vision, headache, swelling, dry mouth; or loss of balance or coordination  Once we obtain CT results we will proceed from there  My impressions and treatment recommendations were discussed in detail with the patient who verbalized understanding and had no further questions  Discharge instructions were provided  I personally saw and examined the patient and I agree with the above discussed plan of care  Orders Placed This Encounter   Procedures    CT lumbar spine without contrast     Scheduling Instructions: There is no prep for this study  Please bring your physician order, insurance cards, a form of photo ID and a list of your medications with you  Arrive 15 minutes prior to your appointment time to register  On the day of your test, please bring any prior CT or MRI studies of this area with you that were not performed at a [facility]  To schedule this appointment, please contact Central Scheduling at [phone]  Order Specific Question:   What is the patient's sedation requirement?      Answer:   No Sedation    XR spine lumbar minimum 4 views non injury Scheduling Instructions:      Bring along any outside films relating to this procedure  Order Specific Question:   Reason for Exam:     Answer:   LOW BACK PAIN     New Medications Ordered This Visit   Medications    lisinopril (ZESTRIL) 20 mg tablet     Sig: Take 1 tablet by mouth daily    pravastatin (PRAVACHOL) 40 mg tablet     Sig: Take 1 tablet by mouth daily    SIMVASTATIN PO     Sig: Take by mouth    gabapentin (NEURONTIN) 100 mg capsule     Sig: TAKE 1 CAPSULE 3 TIMES A DAY FOR 5 DAYS THEN 2 CAPSULES 3 TIMES A DAY FOR 5 DAYS THEN 3 CAPSULES 3 TIMES A DAY     Dispense:  90 capsule     Refill:  0       History of Present Illness:    Nino Goddard is a 67 y o  male with a 4-6 year history of worsening low back pain  He obtains numbness in his feet and legs when he walks his describes his symptoms as dull achy and pressure like he notes that standing, bending and walking all increase his symptoms  His pain is moderate rates it as 5/10 on the visual analog scale in significant interfering with his daily living activities  I have personally reviewed and/or updated the patient's past medical history, past surgical history, family history, social history, current medications, allergies, and vital signs today  Review of Systems:    Review of Systems   Constitutional: Negative for fever and unexpected weight change  HENT: Negative for trouble swallowing  Eyes: Negative for visual disturbance  Respiratory: Negative for shortness of breath and wheezing  Cardiovascular: Negative for chest pain and palpitations  Gastrointestinal: Negative for constipation, diarrhea, nausea and vomiting  Endocrine: Negative for cold intolerance, heat intolerance and polydipsia  Genitourinary: Negative for difficulty urinating and frequency  Musculoskeletal: Positive for gait problem (difficulty walking ) and joint swelling (joint stiffness)  Negative for arthralgias and myalgias     Skin: Negative for rash  Neurological: Negative for dizziness, seizures, syncope, weakness and headaches  Hematological: Does not bruise/bleed easily  Psychiatric/Behavioral: Negative for dysphoric mood  All other systems reviewed and are negative  Patient Active Problem List   Diagnosis    Type 2 or unspecified type diabetes mellitus    Hyperlipidemia    Hypertension    Lower back pain    Obesity    Peripheral neuropathy    Psoriasis       Past Medical History:   Diagnosis Date    Diabetes ([facility] [address] [facility]     Hyperlipidemia     Hypertension        Past Surgical History:   Procedure Laterality Date    APPENDECTOMY      LEG SURGERY         Family History   Problem Relation Age of Onset    Diabetes Family     Hypertension Family        Social History     Occupational History    Not on file  Social History Main Topics    Smoking status: Current Every Day Smoker    Smokeless tobacco: Current User    Alcohol use Yes    Drug use: No    Sexual activity: Not on file       No current outpatient prescriptions on file prior to visit  No current facility-administered medications on file prior to visit  No Known Allergies    Physical Exam:    /64   Pulse 76   Temp 98 1 °F (36 7 °C) (Oral)   Ht 5' 7 5" (1 715 m)   Wt 129 kg (285 lb)   BMI 43 98 kg/m²     Constitutional: normal, well developed, well nourished, alert, in no distress and non-toxic and no overt pain behavior  and obese  Eyes: anicteric  HEENT: grossly intact  Neck: supple, symmetric, trachea midline and no masses   Pulmonary:even and unlabored  Cardiovascular:No edema or pitting edema present  Skin:Normal without rashes or lesions and well hydrated  Psychiatric:Mood and affect appropriate  Neurologic:Cranial Nerves II-XII grossly intact  Musculoskeletal:  Difficulty going from sitting to standing to sitting position      Mild exaggeration of his lumbar lordosis no obvious skin lesions or erythema lumbar sacral spine there is tenderness palpation the bilateral lumbar paravertebral   I could not appreciate any focal deficits in his lower limbs he does have decreased sensation in a stocking-glove distribution deep tendon reflexes diminished but symmetrical bilateral patella Achilles

## 2025-01-30 NOTE — PLAN OF CARE
Problem: OCCUPATIONAL THERAPY ADULT  Goal: Performs self-care activities at highest level of function for planned discharge setting.  See evaluation for individualized goals.  Description: Treatment Interventions: ADL retraining, Functional transfer training, UE strengthening/ROM, Endurance training, Cognitive reorientation, Patient/family training, Equipment evaluation/education, Compensatory technique education, Cardiac education, Energy conservation, Activityengagement, Fine motor coordination activities          See flowsheet documentation for full assessment, interventions and recommendations.   Outcome: Progressing  Note: Limitation: Decreased ADL status, Decreased UE strength, Decreased Safe judgement during ADL, Decreased cognition, Decreased endurance, Decreased self-care trans, Decreased high-level ADLs, Decreased fine motor control (impaired pain, sitting balance, standing balance, insight into deficits, activity tolerance, forward functional reach, problem solving)     Assessment: Patient seen for OT treatment on 1/30/2025 s/p admission for Altered mental status Patient agreeable to OT session. Patient participated in fall prevention , self-care transfers, bed mobility , functional mobility, and ADLs with intervention focus on optimizing independence in functional mobility, self-care transfers and ADL tasks. Daniel Sanz Jr. is showing improvements in activity tolerance, transfers, balance, fnxl mobility, strength and cognition but is continuing to perform below baseline due to the following deficits: decreased standing tolerance for self care tasks , decreased dynamic balance impacting functional reach, decreased activity tolerance , impaired memory , impaired safety awareness , and (+) pain . Personal factors continuing to impact D/C include: (+) Hx of falls , Assistance needed for ADL/IADLs, Assistance needed for ADLs and functional mobility, High fall risk , decreased insight toward deficits ,  decreased recall of precautions , and health management From OT standpoint, patient would benefit from skilled intervention to maximize independence with ADLs and functional mobility. Goals remain appropriate, continue POC. At this time, recommending D/C to: level II (moderate resource intensity).     Rehab Resource Intensity Level, OT: II (Moderate Resource Intensity)     Lubna Young MS OTR/L   [place] Licensure# [id number]

## 2025-01-30 NOTE — ASSESSMENT & PLAN NOTE
"Patient's wife endorsing recent abnormal behaviors.  Patient waking frequently at night, hallucinations noted when awaking from sleep, patient noted to be more \"nasty\" and agitated with at times garbled speech which clears.  Presents to ED after fall at home with concern for possible UTI. S/p head strike in ED w/ R eyebrow laceration.  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 with mild acute encephalopathy  CT head w/o acute findings  TSH benign   Folate WNL, B12 normal, vitamin D low at 26.9  Wife notes B12/vitamin D supplementation 3 times weekly PTA  Continue to q daily dosing of vit D  B12 3x weekly  Negative infxn work-up (as below)   Consult geriatrics, input appreciated  Sinemet as ordered by neurology (below)  Acetaminophen 650mg TID  Neurology consulted for recent sinemet increase  BM 1/23, 1/27  Clear what the cause of the patient's encephalopathy is, no clear toxic cause so would not attribute this to toxic metabolic encephalopathy    "

## 2025-01-30 NOTE — ASSESSMENT & PLAN NOTE
Chronic right lower extremity venous ulcers   PTA Lasix for LE edema management   Known to [facility] podiatry/[facility]   Wound care consulted

## 2025-01-30 NOTE — ASSESSMENT & PLAN NOTE
Chronic right lower extremity venous ulcers   PTA Lasix for LE edema management   Known to [facility] podiatry/[facility]   Wound care consulted

## 2025-01-30 NOTE — ASSESSMENT & PLAN NOTE
S/p head strike in ED w/ R eyebrow laceration; unclear if mechanical versus orthostatic hypotensive episode  CT head/neck and left shoulder benign  Laceration cleaned with dressing placed; no sutures placed  ECG without ischemia, troponins negative x 3  PT OT consulted- acute rehab is authorized by insurance   Discussed with CM - patient cannot go today   Likely tomorrow

## 2025-01-30 NOTE — PLAN OF CARE
Problem: PAIN - ADULT  Goal: Verbalizes/displays adequate comfort level or baseline comfort level  Description: Interventions:  - Encourage patient to monitor pain and request assistance  - Assess pain using appropriate pain scale  - Administer analgesics based on type and severity of pain and evaluate response  - Implement non-pharmacological measures as appropriate and evaluate response  - Consider cultural and social influences on pain and pain management  - Notify physician/advanced practitioner if interventions unsuccessful or patient reports new pain  Outcome: Progressing     Problem: INFECTION - ADULT  Goal: Absence or prevention of progression during hospitalization  Description: INTERVENTIONS:  - Assess and monitor for signs and symptoms of infection  - Monitor lab/diagnostic results  - Monitor all insertion sites, i.e. indwelling lines, tubes, and drains  - Monitor endotracheal if appropriate and nasal secretions for changes in amount and color  - [place] appropriate cooling/warming therapies per order  - Administer medications as ordered  - Instruct and encourage patient and family to use good hand hygiene technique  - Identify and instruct in appropriate isolation precautions for identified infection/condition  Outcome: Progressing  Goal: Absence of fever/infection during neutropenic period  Description: INTERVENTIONS:  - Monitor WBC    Outcome: Progressing     Problem: SAFETY ADULT  Goal: Patient will remain free of falls  Description: INTERVENTIONS:  - Educate patient/family on patient safety including physical limitations  - Instruct patient to call for assistance with activity   - Consult OT/PT to assist with strengthening/mobility   - Keep Call bell within reach  - Keep bed low and locked with side rails adjusted as appropriate  - Keep care items and personal belongings within reach  - Initiate and maintain comfort rounds  - Make Fall Risk Sign visible to staff  - Offer Toileting every 2 Hours,  in advance of need  - Initiate/Maintain bed/chair alarm  - Obtain necessary fall risk management equipment  - Apply yellow socks and bracelet for high fall risk patients  - Consider moving patient to room near nurses station  Outcome: Progressing  Goal: Maintain or return to baseline ADL function  Description: INTERVENTIONS:  -  Assess patient's ability to carry out ADLs; assess patient's baseline for ADL function and identify physical deficits which impact ability to perform ADLs (bathing, care of mouth/teeth, toileting, grooming, dressing, etc.)  - Assess/evaluate cause of self-care deficits   - Assess range of motion  - Assess patient's mobility; develop plan if impaired  - Assess patient's need for assistive devices and provide as appropriate  - Encourage maximum independence but intervene and supervise when necessary  - Involve family in performance of ADLs  - Assess for home care needs following discharge   - Consider OT consult to assist with ADL evaluation and planning for discharge  - Provide patient education as appropriate  Outcome: Progressing  Goal: Maintains/Returns to pre admission functional level  Description: INTERVENTIONS:  - Perform AM-PAC 6 Click Basic Mobility/ Daily Activity assessment daily.  - Set and communicate daily mobility goal to care team and patient/family/caregiver.   - Collaborate with rehabilitation services on mobility goals if consulted  - Perform Range of Motion 3 times a day.  - Reposition patient every 2 hours.  - Dangle patient 3 times a day  - Stand patient 3 times a day  - Ambulate patient 3 times a day  - Out of bed to chair 3 times a day   - Out of bed for meals 3 times a day  - Out of bed for toileting  - Record patient progress and toleration of activity level   Outcome: Progressing     Problem: DISCHARGE PLANNING  Goal: Discharge to home or other facility with appropriate resources  Description: INTERVENTIONS:  - Identify barriers to discharge w/patient and  caregiver  - Arrange for needed discharge resources and transportation as appropriate  - Identify discharge learning needs (meds, wound care, etc.)  - Arrange for interpretive services to assist at discharge as needed  - Refer to Case Management Department for coordinating discharge planning if the patient needs post-hospital services based on physician/advanced practitioner order or complex needs related to functional status, cognitive ability, or social support system  Outcome: Progressing     Problem: Knowledge Deficit  Goal: Patient/family/caregiver demonstrates understanding of disease process, treatment plan, medications, and discharge instructions  Description: Complete learning assessment and assess knowledge base.  Interventions:  - Provide teaching at level of understanding  - Provide teaching via preferred learning methods  Outcome: Progressing     Problem: Prexisting or High Potential for Compromised Skin Integrity  Goal: Skin integrity is maintained or improved  Description: INTERVENTIONS:  - Identify patients at risk for skin breakdown  - Assess and monitor skin integrity  - Assess and monitor nutrition and hydration status  - Monitor labs   - Assess for incontinence   - Turn and reposition patient  - Assist with mobility/ambulation  - Relieve pressure over bony prominences  - Avoid friction and shearing  - Provide appropriate hygiene as needed including keeping skin clean and dry  - Evaluate need for skin moisturizer/barrier cream  - Collaborate with interdisciplinary team   - Patient/family teaching  - Consider wound care consult   Outcome: Progressing     Problem: Nutrition/Hydration-ADULT  Goal: Nutrient/Hydration intake appropriate for improving, restoring or maintaining nutritional needs  Description: Monitor and assess patient's nutrition/hydration status for malnutrition. Collaborate with interdisciplinary team and initiate plan and interventions as ordered.  Monitor patient's weight and  dietary intake as ordered or per policy. Utilize nutrition screening tool and intervene as necessary. Determine patient's food preferences and provide high-protein, high-caloric foods as appropriate.     INTERVENTIONS:  - Monitor oral intake, urinary output, labs, and treatment plans  - Assess nutrition and hydration status and recommend course of action  - Evaluate amount of meals eaten  - Assist patient with eating if necessary   - Allow adequate time for meals  - Recommend/ encourage appropriate diets, oral nutritional supplements, and vitamin/mineral supplements  - Order, calculate, and assess calorie counts as needed  - Recommend, monitor, and adjust tube feedings and TPN/PPN based on assessed needs  - Assess need for intravenous fluids  - Provide specific nutrition/hydration education as appropriate  - Include patient/family/caregiver in decisions related to nutrition  Outcome: Progressing     Problem: SAFETY,RESTRAINT: NV/NON-SELF DESTRUCTIVE BEHAVIOR  Goal: Remains free of harm/injury (restraint for non violent/non self-detsructive behavior)  Description: INTERVENTIONS:  - Instruct patient/family regarding restraint use   - Assess and monitor physiologic and psychological status   - Provide interventions and comfort measures to meet assessed patient needs   - Identify and implement measures to help patient regain control  - Assess readiness for release of restraint   Outcome: Progressing  Goal: Returns to optimal restraint-free functioning  Description: INTERVENTIONS:  - Assess the patient's behavior and symptoms that indicate continued need for restraint  - Identify and implement measures to help patient regain control  - Assess readiness for release of restraint   Outcome: Progressing     Problem: Potential for Falls  Goal: Patient will remain free of falls  Description: INTERVENTIONS:  - Educate patient/family on patient safety including physical limitations  - Instruct patient to call for assistance with  activity   - Consult OT/PT to assist with strengthening/mobility   - Keep Call bell within reach  - Keep bed low and locked with side rails adjusted as appropriate  - Keep care items and personal belongings within reach  - Initiate and maintain comfort rounds  - Make Fall Risk Sign visible to staff  - Offer Toileting every 2 Hours, in advance of need  - Initiate/Maintain bed/chair alarm  - Obtain necessary fall risk management equipment  - Apply yellow socks and bracelet for high fall risk patients  - Consider moving patient to room near nurses station  Outcome: Progressing     Problem: NEUROSENSORY - ADULT  Goal: Achieves stable or improved neurological status  Description: INTERVENTIONS  - Monitor and report changes in neurological status  - Monitor vital signs such as temperature, blood pressure, glucose, and any other labs ordered   - Initiate measures to prevent increased intracranial pressure  - Monitor for seizure activity and implement precautions if appropriate      Outcome: Progressing  Goal: Remains free of injury related to seizures activity  Description: INTERVENTIONS  - Maintain airway, patient safety  and administer oxygen as ordered  - Monitor patient for seizure activity, document and report duration and description of seizure to physician/advanced practitioner  - If seizure occurs,  ensure patient safety during seizure  - Reorient patient post seizure  - Seizure pads on all 4 side rails  - Instruct patient/family to notify RN of any seizure activity including if an aura is experienced  - Instruct patient/family to call for assistance with activity based on nursing assessment  - Administer anti-seizure medications if ordered    Outcome: Progressing  Goal: Achieves maximal functionality and self care  Description: INTERVENTIONS  - Monitor swallowing and airway patency with patient fatigue and changes in neurological status  - Encourage and assist patient to increase activity and self care.   -  Encourage visually impaired, hearing impaired and aphasic patients to use assistive/communication devices  Outcome: Progressing     Problem: METABOLIC, FLUID AND ELECTROLYTES - ADULT  Goal: Electrolytes maintained within normal limits  Description: INTERVENTIONS:  - Monitor labs and assess patient for signs and symptoms of electrolyte imbalances  - Administer electrolyte replacement as ordered  - Monitor response to electrolyte replacements, including repeat lab results as appropriate  - Instruct patient on fluid and nutrition as appropriate  Outcome: Progressing  Goal: Fluid balance maintained  Description: INTERVENTIONS:  - Monitor labs   - Monitor I/O and WT  - Instruct patient on fluid and nutrition as appropriate  - Assess for signs & symptoms of volume excess or deficit  Outcome: Progressing

## 2025-01-30 NOTE — OCCUPATIONAL THERAPY NOTE
Occupational Therapy Treatment Note     Patient Name: Daniel Sanz Jr.  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)    Dementia (HCC)    Dysphagia    Anemia       01/30/25 0931   OT Last Visit   OT Visit Date 01/30/25   Note Type   Note Type Treatment   Pain Assessment   Pain Assessment Tool 0-10   Pain Score 5   Pain Location/Orientation Orientation: Right;Location: Hip   Pain Onset/Description Frequency: Constant/Continuous;Descriptor: Burning;Descriptor: Aching   Effect of Pain on Daily Activities limits fnxl mobility, weight-bearing, activity tolerance   Patient's Stated Pain Goal No pain   Hospital Pain Intervention(s) Repositioned;Ambulation/increased activity;Emotional support   Multiple Pain Sites No   Restrictions/Precautions   Weight Bearing Precautions Per Order No   Other Precautions Cognitive;Chair Alarm;Bed Alarm;Restraints;Fall Risk;Pain;Hard of hearing  ([place] lap belt)   Lifestyle   Autonomy Pt needs assistance w/ ADLs / IADLs at home w/ wife.   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   Intrinsic Gratification Pt reports enjoying watching tv and their cat   ADL   Eating Assistance 5  Supervision/Setup   Eating Deficit Setup;Beverage management  (from bedside table)   Grooming Assistance 4  Minimal Assistance   Grooming Deficit Setup;Verbal cueing;Wash/dry face   Grooming Comments (A) for thoroughness, use of mirror to manage around sitiches   UB Bathing Assistance 4  Minimal Assistance   UB Bathing Deficit Setup;Verbal cueing;Supervision/safety;Increased time to complete  ((A) to sponge bathe back region)   UB Bathing Comments seated at the EOB   UB Dressing Assistance 4  Minimal Assistance   UB Dressing Deficit Setup;Verbal  "cueing;Supervision/safety;Pull around back;Pull down in back   UB Dressing Comments [facility] gown   LB Dressing Assistance 2  Maximal Assistance   LB Dressing Deficit Setup;Don/doff R sock;Don/doff L sock   LB Dressing Comments seated in recliner chair, decreased fnxl reach   Toileting Assistance  2  Maximal Assistance   Toileting Deficit Perineal hygiene   Toileting Comments grossly incontient of urine   Functional Standing Tolerance   Time 40 seconds   Activity hygiene   Comments support of UEs on RW - poor + static balance   Bed Mobility   Supine to Sit 5  Supervision   Additional items Assist x 1;Increased time required;Verbal cues  (increased effort)   Sit to Supine   (NT: OOB to recliner chair)   Additional Comments Fair - static sitting balance at the EOB. Mild lightheadedness w/ postional changes. 140/59.   Transfers   Sit to Stand 3  Moderate assistance   Additional items Assist x 1;Increased time required;Verbal cues  (from the EOB)   Stand to Sit 4  Minimal assistance   Additional items Assist x 1;Increased time required;Verbal cues;Armrests   Additional Comments Additional STS from the recliner chair w/ MARIA DEL CARMEN. Heavy use of BUEs on armrests - use of momentum.   Functional Mobility   Functional Mobility 3  Moderate assistance   Additional Comments Ax1, for short distance from the EOB to the recliner chair. Shuffling, narrow gait.   Additional items Rolling walker   Subjective   Subjective \"This belt is a fire hazard\"   Cognition   Overall Cognitive Status Impaired   Arousal/Participation Alert;Responsive;Cooperative   Attention Within functional limits   Orientation Level Oriented X4  (grossly oriented to time)   Memory Decreased short term memory   Following Commands Follows one step commands without difficulty   Comments Pt ID via wristband, name and . Pt is more alert and oriented today. Improved insight into current limitaitions and deficits. Very cooperative and motivated   Activity " Tolerance   Activity Tolerance (S)  Patient limited by pain  (R hip pain : notified RN and [facility] Attending Dr. Rodriguez)   Medical Staff Made Aware Spoke with RN and CM   Assessment   Assessment Patient seen for OT treatment on 1/30/2025 s/p admission for Altered mental status Patient agreeable to OT session. Patient participated in fall prevention , self-care transfers, bed mobility , functional mobility, and ADLs with intervention focus on optimizing independence in functional mobility, self-care transfers and ADL tasks. Daniel Sanz Jr. is showing improvements in activity tolerance, transfers, balance, fnxl mobility, strength and cognition but is continuing to perform below baseline due to the following deficits: decreased standing tolerance for self care tasks , decreased dynamic balance impacting functional reach, decreased activity tolerance , impaired memory , impaired safety awareness , and (+) pain . Personal factors continuing to impact D/C include: (+) Hx of falls , Assistance needed for ADL/IADLs, Assistance needed for ADLs and functional mobility, High fall risk , decreased insight toward deficits , decreased recall of precautions , and health management From OT standpoint, patient would benefit from skilled intervention to maximize independence with ADLs and functional mobility. Initial evaluation goals expiring, POC reviewed remains appropriate. Will extend goals + 10 days.  At this time, recommending D/C to: level II (moderate resource intensity).   Plan   Treatment Interventions ADL retraining;Functional transfer training;UE strengthening/ROM;Endurance training;Patient/family training;Cognitive reorientation;Equipment evaluation/education;Compensatory technique education;Energy conservation;Activityengagement   Goal Expiration Date 02/09/25  (initial evaluation goals expiring, POC reviewed - remains appropriate. Will extend goals + 10 days.)   OT Treatment Day 3   OT Frequency 3-5x/wk   Discharge  Recommendation   Rehab Resource Intensity Level, OT II (Moderate Resource Intensity)   AM-PAC Daily Activity Inpatient   Lower Body Dressing 2   Bathing 2   Toileting 2   Upper Body Dressing 3   Grooming 3   Eating 3   Daily Activity Raw Score 15   Daily Activity Standardized Score (Calc for Raw Score >=11) 34.69   AM-PAC Applied Cognition Inpatient   Following a Speech/Presentation 3   Understanding Ordinary Conversation 4   Taking Medications 2   Remembering Where Things Are Placed or Put Away 3   Remembering List of 4-5 Errands 2   Taking Care of Complicated Tasks 2   Applied Cognition Raw Score 16   Applied Cognition Standardized Score 35.03   End of Consult   Education Provided Yes   Patient Position at End of Consult Bedside chair;Bed/Chair alarm activated;All needs within reach  (posey belt donned w/ RN and PCA assist / present)   Nurse Communication Nurse aware of consult     GOALS:      -Pt will demonstrate good attention and participation in ongoing eval of functional cognition to assist in DC planning PROGRESSING      -Pt will consistently follow 1 step directions during ADLs w/ good recall PROGRESSING     -Pt will complete bed mobility supine <> sit w/ min A to minimize burden of care in preparation for ADL tasks GOAL MET: progress to Mod I      -Pt will complete functional transfers to bed, chair, and toilet using LRAD, DME as needed PROGRESSING     -Pt will complete UBD w/ S after set- up PROGRESSING     -Pt will complete LBD w/ min A to max I and minimize burden of care NOT PROGRESSING      -Pt will demonstrate good attention and participation in ongoing eval of functional mobility to assist in DC planning PROGRESSING     -Pt will demonstrate improved activity and sitting tolerance OOB in chair for all meals GOAL MET     -Pt will demonstrate improved AMPAC score to at least 18 to max I w/ ADLs and assist in DC planning. PROGRESSING     -Pt will complete grooming w/ S after set- up PROGRESSING     -Pt  will complete feeding w/ S after set- up in supported sitting.  PROGRESSING    The patient's raw score on the AM-PAC Daily Activity Inpatient Short Form is 15. A raw score of less than 19 suggests the patient may benefit from discharge to post-acute rehabilitation services. Please refer to the recommendation of the Occupational Therapist for safe discharge planning.    Lubna Young MS OTR/L   [place] Licensure# [id number]

## 2025-01-30 NOTE — PROGRESS NOTES
"Progress Note - Hospitalist   Name: Daniel Sanz Jr. 84 y.o. male I MRN: [id number]  Unit/Bed#: S -01 I Date of Admission: 1/21/2025   Date of Service: 1/29/2025 I Hospital Day: 8    Assessment & Plan  Altered mental status  Patient's wife endorsing recent abnormal behaviors.  Patient waking frequently at night, hallucinations noted when awaking from sleep, patient noted to be more \"nasty\" and agitated with at times garbled speech which clears.  Presents to ED after fall at home with concern for possible UTI. S/p head strike in ED w/ R eyebrow laceration.  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 with mild acute encephalopathy  CT head w/o acute findings  TSH benign   Folate WNL, B12 normal, vitamin D low at 26.9  Wife notes B12/vitamin D supplementation 3 times weekly PTA  Continue to q daily dosing of vit D  B12 3x weekly  Negative infxn work-up (as below)   Consult geriatrics, input appreciated  Sinemet as ordered by neurology (below)  Acetaminophen 650mg TID  Neurology consulted for recent sinemet increase  BM 1/23, 1/27  Clear what the cause of the patient's encephalopathy is, no clear toxic cause so would not attribute this to toxic metabolic encephalopathy    Fall  S/p head strike in ED w/ R eyebrow laceration; unclear if mechanical versus orthostatic hypotensive episode  CT head/neck and left shoulder benign  Laceration cleaned with dressing placed; no sutures placed  ECG without ischemia, troponins negative x 3  PT OT consulted- recommending rehab  Parkinson's disease (HCC)  Known to [facility]   Sinemet now will be 1.5 tab 4x a day  Rivastigmine TD patch added  Follow-up in the office  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 (HCC)  Chronic right lower extremity venous ulcers   PTA Lasix for LE edema management   Known to [facility] podiatry/[facility]   Wound care consulted  HTN (hypertension)  BP stable  Hold PTA ARB w/ hyperkalemia, resume when able  Continue PTA " "amlodipine  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  Speech therapy consulted  Patient somnolent on evaluation so they recommended dysphagia 1 diet with nectar thick liquids while awake and pills crushed.  Patient was to have outpatient VBS test, per speech he may need during admission but will have to be consistently awake first  Diet advanced today to dysphagia 2  [facility] completed at [facility]  Anemia  Chronic disease    VTE Pharmacologic Prophylaxis: VTE Score: 4 Moderate Risk (Score 3-4) - Pharmacological DVT Prophylaxis Ordered: heparin.    Mobility:   Basic Mobility Inpatient Raw Score: 11  -M Goal: 4: Move to chair/commode  JH-HLM Achieved: 6: Walk 10 steps or more      Patient Centered Rounds: I performed bedside rounds with nursing staff today.   Discussions with Specialists or Other Care Team Provider: Discussed with CM and okay for DC to [facility] tomorrow.     Education and Discussions with Family / Patient: Discussed with wife.     Current Length of Stay: 8 day(s)  Current Patient Status: Inpatient   Certification Statement: The patient will continue to require additional inpatient hospital stay due to awaiting rehab.   Discharge Plan: Anticipate discharge tomorrow to [facility].     Code Status: Level 1 - Full Code    Subjective   Patient seen and examined   Feeling \"good\"      Objective :  Temp:  [98 °F (36.7 °C)] 98 °F (36.7 °C)  HR:  [71-78] 71  BP: (111-151)/(50-70) 111/52  Resp:  [17] 17  SpO2:  [91 %-98 %] 98 %  O2 Device: None (Room air)    There is no height or weight on file to calculate BMI.     Input and Output Summary (last 24 hours):     Intake/Output Summary (Last 24 hours) at 1/29/2025 2114  Last data filed at 1/29/2025 1657  Gross per 24 hour   Intake 660 ml   Output 1756 ml   Net -1096 ml       Physical Exam  Constitutional:       General: He is not in acute distress.     Appearance: He is not ill-appearing, toxic-appearing or " diaphoretic.   HENT:      Head: Normocephalic.   Eyes:      Pupils: Pupils are equal, round, and reactive to light.   Cardiovascular:      Rate and Rhythm: Normal rate.      Heart sounds: No murmur heard.     No friction rub. No gallop.   Pulmonary:      Effort: No respiratory distress.      Breath sounds: No stridor. No wheezing, rhonchi or rales.   Chest:      Chest wall: No tenderness.   Abdominal:      General: Abdomen is flat. There is no distension.      Palpations: There is no mass.      Tenderness: There is no abdominal tenderness. There is no right CVA tenderness, left CVA tenderness or guarding.      Hernia: No hernia is present.   Skin:     Capillary Refill: Capillary refill takes less than 2 seconds.      Coloration: Skin is not jaundiced or pale.      Findings: No bruising, erythema, lesion or rash.   Neurological:      General: No focal deficit present.      Mental Status: He is alert.      Cranial Nerves: No cranial nerve deficit.      Sensory: No sensory deficit.      Motor: No weakness.      Coordination: Coordination normal.      Gait: Gait normal.      Deep Tendon Reflexes: Reflexes normal.   Psychiatric:         Mood and Affect: Mood normal.           Lines/Drains:  Lines/Drains/Airways       Active Status       Name Placement date Placement time Site Days    External Urinary Catheter 01/27/25  1005  -- 2                            Lab Results: I have reviewed the following results:   Results from last 7 days   Lab Units 01/29/25  0531   WBC Thousand/uL 9.86   HEMOGLOBIN g/dL 11.3*   HEMATOCRIT % 34.2*   PLATELETS Thousands/uL 203   SEGS PCT % 76*   LYMPHO PCT % 13*   MONO PCT % 10   EOS PCT % 1     Results from last 7 days   Lab Units 01/29/25  0531   SODIUM mmol/L 140   POTASSIUM mmol/L 4.3   CHLORIDE mmol/L 105   CO2 mmol/L 28   BUN mg/dL 23   CREATININE mg/dL 0.99   ANION GAP mmol/L 7   CALCIUM mg/dL 9.0   ALBUMIN g/dL 3.8   TOTAL BILIRUBIN mg/dL 0.71   ALK PHOS U/L 125*   ALT U/L 7   AST U/L  15   GLUCOSE RANDOM mg/dL 87         Results from last 7 days   Lab Units 01/27/25  1643   POC GLUCOSE mg/dl 119               Recent Cultures (last 7 days):         Imaging Results Review: No pertinent imaging studies reviewed.  Other Study Results Review: No additional pertinent studies reviewed.    Last 24 Hours Medication List:     Current Facility-Administered Medications:     acetaminophen (TYLENOL) tablet 650 mg, TID    amLODIPine (NORVASC) tablet 5 mg, Daily    Artificial Tears Op Soln 1 drop, Q4H PRN    bisacodyl (DULCOLAX) rectal suppository 10 mg, Daily    calcium carbonate (TUMS) chewable tablet 1,000 mg, Daily PRN    [COMPLETED] carbidopa-levodopa (SINEMET)  mg per tablet 2 tablet, TID **FOLLOWED BY** carbidopa-levodopa (SINEMET)  mg per tablet 1.5 tablet, 4x Daily    Cholecalciferol (VITAMIN D3) tablet 1,000 Units, Daily    cyanocobalamin (VITAMIN B-12) tablet 1,000 mcg, Once per day on Monday Wednesday Friday    docusate sodium (COLACE) capsule 100 mg, BID    enoxaparin (LOVENOX) subcutaneous injection 40 mg, Daily    lidocaine (LIDODERM) 5 % patch 1 patch, Daily    melatonin tablet 3 mg, HS    ondansetron (ZOFRAN) injection 4 mg, Q6H PRN    QUEtiapine (SEROquel) tablet 12.5 mg, Q8H PRN    rivastigmine (EXELON) 4.6 mg/24 hr TD 24 hr patch 4.6 mg, Daily    senna (SENOKOT) tablet 17.2 mg, BID    Administrative Statements   Today, Patient Was Seen By: Jeannie Rodriguez MD  I have spent a total time of 30 minutes in caring for this patient on the day of the visit/encounter including Diagnostic results, Prognosis, Risks and benefits of tx options, Instructions for management, Patient and family education, Importance of tx compliance, Risk factor reductions, Impressions, Counseling / Coordination of care, Documenting in the medical record, Reviewing / ordering tests, medicine, procedures  , Obtaining or reviewing history  , and Communicating with other healthcare professionals .    **Please Note:  This note may have been constructed using a voice recognition system.**

## 2025-01-30 NOTE — ASSESSMENT & PLAN NOTE
"Patient's wife endorsing recent abnormal behaviors.  Patient waking frequently at night, hallucinations noted when awaking from sleep, patient noted to be more \"nasty\" and agitated with at times garbled speech which clears.  Presents to ED after fall at home with concern for possible UTI. S/p head strike in ED w/ R eyebrow laceration.  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 with mild acute encephalopathy  CT head w/o acute findings  TSH benign   Folate WNL, B12 normal, vitamin D low at 26.9  Wife notes B12/vitamin D supplementation 3 times weekly PTA  Continue to q daily dosing of vit D  B12 3x weekly  Negative infxn work-up (as below)   Consult geriatrics, input appreciated  Sinemet as ordered by neurology (below)  Acetaminophen 650mg TID  Neurology consulted for recent sinemet increase  BM 1/23, 1/27  Clear what the cause of the patient's encephalopathy is, no clear toxic cause so would not attribute this to toxic metabolic encephalopathy    Patient is pleasant but remains confused.   "

## 2025-01-30 NOTE — PROGRESS NOTES
"Progress Note - Hospitalist   Name: Daniel Sanz Jr. 84 y.o. male I MRN: [id number]  Unit/Bed#: S -01 I Date of Admission: 1/21/2025   Date of Service: 1/30/2025 I Hospital Day: 9    Assessment & Plan  Altered mental status  Patient's wife endorsing recent abnormal behaviors.  Patient waking frequently at night, hallucinations noted when awaking from sleep, patient noted to be more \"nasty\" and agitated with at times garbled speech which clears.  Presents to ED after fall at home with concern for possible UTI. S/p head strike in ED w/ R eyebrow laceration.  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 with mild acute encephalopathy  CT head w/o acute findings  TSH benign   Folate WNL, B12 normal, vitamin D low at 26.9  Wife notes B12/vitamin D supplementation 3 times weekly PTA  Continue to q daily dosing of vit D  B12 3x weekly  Negative infxn work-up (as below)   Consult geriatrics, input appreciated  Sinemet as ordered by neurology (below)  Acetaminophen 650mg TID  Neurology consulted for recent sinemet increase  BM 1/23, 1/27  Clear what the cause of the patient's encephalopathy is, no clear toxic cause so would not attribute this to toxic metabolic encephalopathy    Patient is pleasant but remains confused.   Fall  S/p head strike in ED w/ R eyebrow laceration; unclear if mechanical versus orthostatic hypotensive episode  CT head/neck and left shoulder benign  Laceration cleaned with dressing placed; no sutures placed  ECG without ischemia, troponins negative x 3  PT OT consulted- acute rehab is authorized by insurance   Discussed with CM - patient cannot go today   Likely tomorrow    Parkinson's disease (HCC)  Known to [facility]   Sinemet now will be 1.5 tab 4x a day  Rivastigmine TD patch added  Follow-up in the office  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 (HCC)  Chronic right lower extremity venous ulcers   PTA Lasix for LE edema management   Known to [facility] podiatry/[facility] " "  Wound care consulted  HTN (hypertension)  BP stable is 140/59  Will continue current meds  Dysphagia  Patient observed coughing while taking medications by nursing.  Speech therapy consulted  Patient somnolent on evaluation so they recommended dysphagia 1 diet with nectar thick liquids while awake and pills crushed.  Patient was to have outpatient VBS test, per speech he may need during admission but will have to be consistently awake first  Diet advanced today to dysphagia 2  VBS can be completed at [facility]    VTE Pharmacologic Prophylaxis: VTE Score: 4 Moderate Risk (Score 3-4) - Pharmacological DVT Prophylaxis Ordered: heparin.    Mobility:   Basic Mobility Inpatient Raw Score: 11  -[facility] Goal: 4: Move to chair/commode  -HL Achieved: 5: Stand (1 or more minutes)  Patient to be discharged to [facility]    Patient Centered Rounds: I performed bedside rounds with nursing staff today.   Discussions with Specialists or Other Care Team Provider: Discussed with CM several times. Cleared medically for DC to [facility]. Yesterday I performed a peer to peer and insurance auth was granted. Today patient still unable to DC as [facility] unable to accept until tomorrow.     Education and Discussions with Family / Patient: Updated  (wife and daughter in law) at bedside.    Current Length of Stay: 9 day(s)  Current Patient Status: Inpatient   Certification Statement: The patient will continue to require additional inpatient hospital stay due to awaiting DC to rehab.   Discharge Plan: Anticipate discharge tomorrow to [facility]    Code Status: Level 1 - Full Code    Subjective   Patient seen and examined   Feeling \"good\"   Denies any pain  Says when he stands his hips sometimes hurt   Wife in room says this is not new.   No new complaints.     Objective :  Temp:  [97.5 °F (36.4 °C)] 97.5 °F (36.4 °C)  HR:  [67-76] 76  BP: (111-165)/(52-74) 140/59  Resp:  [18] 18  SpO2:  [95 %-98 %] 96 %  O2 Device: None (Room air)    There is no height or " weight on file to calculate BMI.     Input and Output Summary (last 24 hours):     Intake/Output Summary (Last 24 hours) at 1/30/2025 1432  Last data filed at 1/30/2025 1001  Gross per 24 hour   Intake 420 ml   Output 100 ml   Net 320 ml       Physical Exam  Constitutional:       General: He is not in acute distress.     Appearance: He is not ill-appearing, toxic-appearing or diaphoretic.   HENT:      Head: Normocephalic.      Nose: Nose normal.      Mouth/Throat:      Mouth: Mucous membranes are moist.   Eyes:      General: No scleral icterus.        Right eye: No discharge.         Left eye: No discharge.      Pupils: Pupils are equal, round, and reactive to light.   Cardiovascular:      Rate and Rhythm: Normal rate.      Heart sounds: No murmur heard.     No friction rub. No gallop.   Pulmonary:      Effort: Pulmonary effort is normal. No respiratory distress.      Breath sounds: No stridor. No wheezing, rhonchi or rales.   Chest:      Chest wall: No tenderness.   Abdominal:      General: There is no distension.      Palpations: There is no mass.      Tenderness: There is no abdominal tenderness. There is no right CVA tenderness, left CVA tenderness, guarding or rebound.      Hernia: No hernia is present.   Musculoskeletal:      Right lower leg: No edema.      Left lower leg: No edema.   Skin:     Coloration: Skin is not jaundiced or pale.      Findings: No bruising, erythema, lesion or rash.   Neurological:      General: No focal deficit present.      Mental Status: He is alert.      Cranial Nerves: No cranial nerve deficit.      Motor: No weakness.           Lines/Drains:  Lines/Drains/Airways       Active Status       Name Placement date Placement time Site Days    External Urinary Catheter 01/27/25  1005  -- 3                            Lab Results: I have reviewed the following results:   Results from last 7 days   Lab Units 01/29/25  0531   WBC Thousand/uL 9.86   HEMOGLOBIN g/dL 11.3*   HEMATOCRIT % 34.2*    PLATELETS Thousands/uL 203   SEGS PCT % 76*   LYMPHO PCT % 13*   MONO PCT % 10   EOS PCT % 1     Results from last 7 days   Lab Units 01/29/25  0531   SODIUM mmol/L 140   POTASSIUM mmol/L 4.3   CHLORIDE mmol/L 105   CO2 mmol/L 28   BUN mg/dL 23   CREATININE mg/dL 0.99   ANION GAP mmol/L 7   CALCIUM mg/dL 9.0   ALBUMIN g/dL 3.8   TOTAL BILIRUBIN mg/dL 0.71   ALK PHOS U/L 125*   ALT U/L 7   AST U/L 15   GLUCOSE RANDOM mg/dL 87         Results from last 7 days   Lab Units 01/27/25  1643   POC GLUCOSE mg/dl 119               Recent Cultures (last 7 days):         Imaging Results Review: No pertinent imaging studies reviewed.  Other Study Results Review: No additional pertinent studies reviewed.    Last 24 Hours Medication List:     Current Facility-Administered Medications:     acetaminophen (TYLENOL) tablet 650 mg, TID    amLODIPine (NORVASC) tablet 5 mg, Daily    Artificial Tears Op Soln 1 drop, Q4H PRN    bisacodyl (DULCOLAX) rectal suppository 10 mg, Daily    calcium carbonate (TUMS) chewable tablet 1,000 mg, Daily PRN    [COMPLETED] carbidopa-levodopa (SINEMET)  mg per tablet 2 tablet, TID **FOLLOWED BY** carbidopa-levodopa (SINEMET)  mg per tablet 1.5 tablet, 4x Daily    Cholecalciferol (VITAMIN D3) tablet 1,000 Units, Daily    cyanocobalamin (VITAMIN B-12) tablet 1,000 mcg, Once per day on Monday Wednesday Friday    docusate sodium (COLACE) capsule 100 mg, BID    enoxaparin (LOVENOX) subcutaneous injection 40 mg, Daily    lidocaine (LIDODERM) 5 % patch 1 patch, Daily    melatonin tablet 3 mg, HS    ondansetron (ZOFRAN) injection 4 mg, Q6H PRN    QUEtiapine (SEROquel) tablet 12.5 mg, Q8H PRN    rivastigmine (EXELON) 4.6 mg/24 hr TD 24 hr patch 4.6 mg, Daily    senna (SENOKOT) tablet 17.2 mg, BID    Administrative Statements   Today, Patient Was Seen By: Jeannie Rodriguez MD  I have spent a total time of 30 minutes in caring for this patient on the day of the visit/encounter including Diagnostic  results, Prognosis, Risks and benefits of tx options, Instructions for management, Patient and family education, Importance of tx compliance, Risk factor reductions, Impressions, Counseling / Coordination of care, Documenting in the medical record, Reviewing / ordering tests, medicine, procedures  , Obtaining or reviewing history  , and Communicating with other healthcare professionals .    **Please Note: This note may have been constructed using a voice recognition system.**

## 2025-01-31 ENCOUNTER — HOSPITAL ENCOUNTER (INPATIENT)
Facility: HOSPITAL | Age: 85
LOS: 14 days | Discharge: HOME | DRG: 057 | End: 2025-02-14
Payer: COMMERCIAL

## 2025-01-31 VITALS
SYSTOLIC BLOOD PRESSURE: 162 MMHG | HEART RATE: 71 BPM | TEMPERATURE: 97.9 F | DIASTOLIC BLOOD PRESSURE: 72 MMHG | OXYGEN SATURATION: 97 % | RESPIRATION RATE: 19 BRPM

## 2025-01-31 DIAGNOSIS — N18.31 STAGE 3A CHRONIC KIDNEY DISEASE (HCC): ICD-10-CM

## 2025-01-31 DIAGNOSIS — K59.00 CONSTIPATION, UNSPECIFIED CONSTIPATION TYPE: ICD-10-CM

## 2025-01-31 DIAGNOSIS — G20.B1 PARKINSON'S DISEASE WITH DYSKINESIA, UNSPECIFIED WHETHER MANIFESTATIONS FLUCTUATE (HCC): Primary | ICD-10-CM

## 2025-01-31 DIAGNOSIS — R13.10 DYSPHAGIA, UNSPECIFIED TYPE: ICD-10-CM

## 2025-01-31 DIAGNOSIS — M25.511 CHRONIC PAIN OF BOTH SHOULDERS: ICD-10-CM

## 2025-01-31 DIAGNOSIS — G20.A1 PARKINSON'S DISEASE (HCC): ICD-10-CM

## 2025-01-31 DIAGNOSIS — M75.101 TEAR OF RIGHT SUPRASPINATUS TENDON: ICD-10-CM

## 2025-01-31 DIAGNOSIS — F02.B18 MODERATE DEMENTIA DUE TO PARKINSON'S DISEASE, WITH OTHER BEHAVIORAL DISTURBANCE (HCC): ICD-10-CM

## 2025-01-31 DIAGNOSIS — R41.0 DELIRIUM: ICD-10-CM

## 2025-01-31 DIAGNOSIS — S02.2XXA CLOSED FRACTURE OF NASAL BONE, INITIAL ENCOUNTER: ICD-10-CM

## 2025-01-31 DIAGNOSIS — G20.A1 MODERATE DEMENTIA DUE TO PARKINSON'S DISEASE, WITH OTHER BEHAVIORAL DISTURBANCE (HCC): ICD-10-CM

## 2025-01-31 DIAGNOSIS — M25.512 CHRONIC PAIN OF BOTH SHOULDERS: ICD-10-CM

## 2025-01-31 DIAGNOSIS — G89.29 CHRONIC PAIN OF BOTH SHOULDERS: ICD-10-CM

## 2025-01-31 DIAGNOSIS — M24.811 INTERNAL DERANGEMENT OF RIGHT SHOULDER: ICD-10-CM

## 2025-01-31 PROBLEM — R15.9 BOWEL AND BLADDER INCONTINENCE: Status: RESOLVED | Noted: 2025-01-31 | Resolved: 2025-01-31

## 2025-01-31 PROBLEM — R15.9 BOWEL AND BLADDER INCONTINENCE: Status: ACTIVE | Noted: 2025-01-31

## 2025-01-31 PROBLEM — R32 BOWEL AND BLADDER INCONTINENCE: Status: ACTIVE | Noted: 2025-01-31

## 2025-01-31 PROBLEM — R32 BOWEL AND BLADDER INCONTINENCE: Status: RESOLVED | Noted: 2025-01-31 | Resolved: 2025-01-31

## 2025-01-31 PROCEDURE — 99255 IP/OBS CONSLTJ NEW/EST HI 80: CPT | Performed by: PHYSICAL MEDICINE & REHABILITATION

## 2025-01-31 PROCEDURE — 97110 THERAPEUTIC EXERCISES: CPT

## 2025-01-31 PROCEDURE — 97116 GAIT TRAINING THERAPY: CPT

## 2025-01-31 PROCEDURE — 99239 HOSP IP/OBS DSCHRG MGMT >30: CPT | Performed by: INTERNAL MEDICINE

## 2025-01-31 PROCEDURE — 92526 ORAL FUNCTION THERAPY: CPT

## 2025-01-31 PROCEDURE — 99223 1ST HOSP IP/OBS HIGH 75: CPT | Performed by: PHYSICIAN ASSISTANT

## 2025-01-31 PROCEDURE — NC001 PR NO CHARGE: Performed by: PHYSICAL MEDICINE & REHABILITATION

## 2025-01-31 RX ORDER — SENNOSIDES 8.6 MG
2 TABLET ORAL 2 TIMES DAILY
Status: DISCONTINUED | OUTPATIENT
Start: 2025-01-31 | End: 2025-02-14 | Stop reason: HOSPADM

## 2025-01-31 RX ORDER — BISACODYL 10 MG
10 SUPPOSITORY, RECTAL RECTAL DAILY
Status: DISCONTINUED | OUTPATIENT
Start: 2025-02-01 | End: 2025-01-31

## 2025-01-31 RX ORDER — ONDANSETRON 4 MG/1
4 TABLET, ORALLY DISINTEGRATING ORAL EVERY 6 HOURS PRN
Status: DISCONTINUED | OUTPATIENT
Start: 2025-01-31 | End: 2025-02-14 | Stop reason: HOSPADM

## 2025-01-31 RX ORDER — RIVASTIGMINE 4.6 MG/24H
1 PATCH, EXTENDED RELEASE TRANSDERMAL DAILY
Qty: 30 PATCH | Refills: 0 | Status: ON HOLD
Start: 2025-01-31 | End: 2025-02-03

## 2025-01-31 RX ORDER — LIDOCAINE 50 MG/G
1 PATCH TOPICAL DAILY
Status: DISCONTINUED | OUTPATIENT
Start: 2025-02-01 | End: 2025-02-03

## 2025-01-31 RX ORDER — QUETIAPINE FUMARATE 25 MG/1
12.5 TABLET, FILM COATED ORAL EVERY 8 HOURS PRN
Status: DISCONTINUED | OUTPATIENT
Start: 2025-01-31 | End: 2025-02-13

## 2025-01-31 RX ORDER — ACETAMINOPHEN 325 MG/1
650 TABLET ORAL 3 TIMES DAILY
Status: DISCONTINUED | OUTPATIENT
Start: 2025-01-31 | End: 2025-02-14 | Stop reason: HOSPADM

## 2025-01-31 RX ORDER — CARBIDOPA AND LEVODOPA 25; 100 MG/1; MG/1
1.5 TABLET ORAL 4 TIMES DAILY
Qty: 180 TABLET | Refills: 0
Start: 2025-01-31

## 2025-01-31 RX ORDER — BISACODYL 10 MG
10 SUPPOSITORY, RECTAL RECTAL DAILY PRN
Status: DISCONTINUED | OUTPATIENT
Start: 2025-01-31 | End: 2025-02-14 | Stop reason: HOSPADM

## 2025-01-31 RX ORDER — POLYETHYLENE GLYCOL 3350 17 G/17G
17 POWDER, FOR SOLUTION ORAL DAILY
Status: DISCONTINUED | OUTPATIENT
Start: 2025-02-01 | End: 2025-02-14 | Stop reason: HOSPADM

## 2025-01-31 RX ORDER — DOCUSATE SODIUM 100 MG/1
100 CAPSULE, LIQUID FILLED ORAL 2 TIMES DAILY
Qty: 60 CAPSULE | Refills: 0
Start: 2025-01-31

## 2025-01-31 RX ORDER — LIDOCAINE 50 MG/G
1 PATCH TOPICAL DAILY
Status: DISCONTINUED | OUTPATIENT
Start: 2025-01-31 | End: 2025-01-31

## 2025-01-31 RX ORDER — CARBIDOPA AND LEVODOPA 25; 100 MG/1; MG/1
1.5 TABLET ORAL 4 TIMES DAILY
Status: DISCONTINUED | OUTPATIENT
Start: 2025-01-31 | End: 2025-02-14 | Stop reason: HOSPADM

## 2025-01-31 RX ORDER — AMLODIPINE BESYLATE 5 MG/1
5 TABLET ORAL DAILY
Status: DISCONTINUED | OUTPATIENT
Start: 2025-02-01 | End: 2025-02-14 | Stop reason: HOSPADM

## 2025-01-31 RX ORDER — LACTULOSE 10 G/15ML
20 SOLUTION ORAL DAILY PRN
Status: DISCONTINUED | OUTPATIENT
Start: 2025-01-31 | End: 2025-02-14 | Stop reason: HOSPADM

## 2025-01-31 RX ORDER — CALCIUM CARBONATE 500 MG/1
1000 TABLET, CHEWABLE ORAL DAILY PRN
Status: DISCONTINUED | OUTPATIENT
Start: 2025-01-31 | End: 2025-02-14 | Stop reason: HOSPADM

## 2025-01-31 RX ORDER — ENOXAPARIN SODIUM 100 MG/ML
40 INJECTION SUBCUTANEOUS DAILY
Status: DISCONTINUED | OUTPATIENT
Start: 2025-02-01 | End: 2025-02-14 | Stop reason: HOSPADM

## 2025-01-31 RX ORDER — CALCIUM CARBONATE 500 MG/1
1000 TABLET, CHEWABLE ORAL DAILY PRN
Qty: 2 TABLET | Refills: 0
Start: 2025-01-31

## 2025-01-31 RX ORDER — OLANZAPINE 10 MG/2ML
2.5 INJECTION, POWDER, FOR SOLUTION INTRAMUSCULAR EVERY 4 HOURS PRN
Status: DISCONTINUED | OUTPATIENT
Start: 2025-01-31 | End: 2025-02-04

## 2025-01-31 RX ORDER — QUETIAPINE FUMARATE 25 MG/1
12.5 TABLET, FILM COATED ORAL EVERY 8 HOURS PRN
Qty: 30 TABLET | Refills: 0 | Status: ON HOLD
Start: 2025-01-31 | End: 2025-02-14

## 2025-01-31 RX ORDER — DOCUSATE SODIUM 100 MG/1
100 CAPSULE, LIQUID FILLED ORAL 2 TIMES DAILY
Status: DISCONTINUED | OUTPATIENT
Start: 2025-01-31 | End: 2025-02-14 | Stop reason: HOSPADM

## 2025-01-31 RX ORDER — RIVASTIGMINE 4.6 MG/24H
1 PATCH, EXTENDED RELEASE TRANSDERMAL DAILY
Status: DISCONTINUED | OUTPATIENT
Start: 2025-01-31 | End: 2025-02-14 | Stop reason: HOSPADM

## 2025-01-31 RX ADMIN — Medication 1000 UNITS: at 09:32

## 2025-01-31 RX ADMIN — AMLODIPINE BESYLATE 5 MG: 5 TABLET ORAL at 11:31

## 2025-01-31 RX ADMIN — DOCUSATE SODIUM 100 MG: 100 CAPSULE, LIQUID FILLED ORAL at 09:32

## 2025-01-31 RX ADMIN — DOCUSATE SODIUM 100 MG: 100 CAPSULE, LIQUID FILLED ORAL at 17:19

## 2025-01-31 RX ADMIN — CARBIDOPA AND LEVODOPA 1.5 TABLET: 25; 100 TABLET ORAL at 11:31

## 2025-01-31 RX ADMIN — CARBIDOPA AND LEVODOPA 1.5 TABLET: 25; 100 TABLET ORAL at 21:07

## 2025-01-31 RX ADMIN — RIVASTIGMINE 1 PATCH: 4.6 PATCH, EXTENDED RELEASE TRANSDERMAL at 17:22

## 2025-01-31 RX ADMIN — ACETAMINOPHEN 650 MG: 325 TABLET, FILM COATED ORAL at 15:26

## 2025-01-31 RX ADMIN — MELATONIN TAB 3 MG 3 MG: 3 TAB at 21:07

## 2025-01-31 RX ADMIN — ACETAMINOPHEN 650 MG: 325 TABLET, FILM COATED ORAL at 21:07

## 2025-01-31 RX ADMIN — SENNOSIDES 17.2 MG: 8.6 TABLET ORAL at 17:18

## 2025-01-31 RX ADMIN — SENNOSIDES 17.2 MG: 8.6 TABLET ORAL at 09:33

## 2025-01-31 RX ADMIN — LIDOCAINE 1 PATCH: 50 PATCH CUTANEOUS at 15:23

## 2025-01-31 RX ADMIN — CYANOCOBALAMIN TAB 500 MCG 1000 MCG: 500 TAB at 09:32

## 2025-01-31 NOTE — ASSESSMENT & PLAN NOTE
Patient observed coughing while taking medications by nursing at acute   Cont diet dysphagia 2/ thins.  Aspiration precautions   Timing or need for VBS as per SLP.   SLP dysphagia eval and treat

## 2025-01-31 NOTE — ASSESSMENT & PLAN NOTE
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  - Will need f/u with Neurology after discharge.

## 2025-01-31 NOTE — ASSESSMENT & PLAN NOTE
Home: Amlodipine 5mg daily, Lasix 40mg daily, Losartan 50mg daily (unclear how long he was taking). Here: Amlodipine 5mg daily.   Monitor and adjust as appropriate

## 2025-01-31 NOTE — ASSESSMENT & PLAN NOTE
"Patient's wife endorsing recent abnormal behaviors.  Patient waking frequently at night, hallucinations noted when awaking from sleep, patient noted to be more \"nasty\" and agitated with at times garbled speech which clears.  Presents to ED after fall at home with concern for possible UTI. S/p head strike in ED w/ R eyebrow laceration.  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 with mild acute encephalopathy  CT head w/o acute findings  TSH benign   Folate WNL, B12 normal, vitamin D low at 26.9  Wife notes B12/vitamin D supplementation 3 times weekly PTA  Continue to q daily dosing of vit D  B12 3x weekly  Negative infxn work-up  Sinemet as ordered by neurology (below)  Acetaminophen 650mg TID    Patient oriented, and has some decent recall. Impaired insight, and he sundowns.   - Low dose seroquel PRN ordered by Neuro  - Added PRN olanzapine for severe agitation ONLY.    - Did receive this on 2/3 in the AM after some aggressive behaviors during blood draw/BP.  - Redirect, reorient first  - Low dose melatonin in the evening for sleep/wake.  - Bed/chair alarms  - Optimize bowel/bladder program  - Avoid deliriogenic meds and physical restraint.  "

## 2025-01-31 NOTE — H&P
H&P - Hospitalist   Name: Daniel Sanz Jr. 84 y.o. male I MRN: [id number]  Unit/Bed#: [facility] 457-01 I Date of Admission: 1/31/2025   Date of Service: 1/31/2025 I Hospital Day: 0     Assessment & Plan  Parkinson's disease (HCC)  Continue Sinemet 1.5 tabs 4x daily. Currently ordered at times pt takes the medication at home. May need to adjust timing based on therapy schedule.  Follows with Dr. Cavanaugh as an outpt.  Therapy per PMR.  HTN (hypertension)  Home: amlodpine 5mg daily  Here: Same as home medication.  BP stable.  Monitor for orthostasis due to comorbid PD.  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  Continue rivastigmine patch - will check price prior to DC.  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  He was taking Lasix 3x weekly.  It is not currently ordered.  Will monitor for edema and add back as indicated.  Dysphagia  Continue modified diet.  SLP to follow.    History of Present Illness   Daniel Sanz Jr. is a 84 y.o. male, with PAD, Parkindon's disease, and HTN, who presented 1/21/25 with altered mentation for a few weeks. He would become agitated upon awakening from naps or at night. While walking into the ED he fell and struck his head, sustaining a laceration above the Rt eyebrow. He was found to be hypothermic in the ED. His temp did improve with the Keith hugger. Work-up for a cause of encephalopathy was negative. It was believed that the pt likely had probable Parkinson's dementia with acute encephalopathy. He was seen by Neurology and Simemet dosing was decreased to 1.5 tabs 4x daily. An Exelon Patch was added as well. He was observed coughing while taking medications and his diet was modified. He was determined to be stable for admission to the [facility] 1/31/25.    A 10 point review of systems was negative except for what is noted in the HPI.    Historical Information   Past Medical History:   Diagnosis Date  "   Arthritis     Asbestos exposure     Asbestosis (HCC)     GERD (gastroesophageal reflux disease)     Hemoptysis 5/11/2022    Hypertension     Lung disease     Parkinson's disease (HCC)      Past Surgical History:   Procedure Laterality Date    HERNIA REPAIR      INTRACAPSULAR CATARACT EXTRACTION Left     KNEE SURGERY      many years ago    SHOULDER SURGERY       Social History     Tobacco Use    Smoking status: Never     Passive exposure: Past    Smokeless tobacco: Former     Types: Chew     Quit date: 1998   Vaping Use    Vaping status: Never Used   Substance and Sexual Activity    Alcohol use: Not Currently    Drug use: Never    Sexual activity: Not on file     E-Cigarette/Vaping    E-Cigarette Use Never User      E-Cigarette/Vaping Substances    Nicotine No     THC No     CBD No     Flavoring No     Other No     Unknown No      Family History   Problem Relation Age of Onset    Stroke Mother     Colon cancer Mother     Depression Sister     Hypertension Sister        Objective :  Temp:  [97.7 °F (36.5 °C)-98 °F (36.7 °C)] 97.7 °F (36.5 °C)  HR:  [71-77] 75  BP: (129-162)/(63-72) 150/70  Resp:  [16-20] 16  SpO2:  [96 %-100 %] 96 %  O2 Device: None (Room air)    Wt Readings from Last 1 Encounters:   01/31/25 90.2 kg (198 lb 13.7 oz)     Estimated body mass index is 30.24 kg/m² as calculated from the following:    Height as of 12/27/24: 5' 8\" (1.727 m).    Weight as of this encounter: 90.2 kg (198 lb 13.7 oz).    Physical Exam  Vitals reviewed.   HENT:      Head: Normocephalic and atraumatic.      Nose: Nose normal.      Mouth/Throat:      Mouth: Mucous membranes are moist.   Eyes:      Extraocular Movements: Extraocular movements intact.      Conjunctiva/sclera: Conjunctivae normal.      Pupils: Pupils are equal, round, and reactive to light.   Cardiovascular:      Rate and Rhythm: Normal rate and regular rhythm.      Pulses: Normal pulses.   Pulmonary:      Effort: Pulmonary effort is normal.      Breath sounds: " Normal breath sounds.   Abdominal:      General: Bowel sounds are normal.      Palpations: Abdomen is soft.   Musculoskeletal:         General: Normal range of motion.      Cervical back: Normal range of motion.   Skin:     General: Skin is warm and dry.   Neurological:      Mental Status: He is alert.      Comments: Confused.           Lab Results: I have reviewed the following results:  Results from last 7 days   Lab Units 01/29/25  0531 01/28/25  0626 01/27/25  0620   HEMOGLOBIN g/dL 11.3* 10.7* 10.6*   HEMATOCRIT % 34.2* 33.0* 33.3*   WBC Thousand/uL 9.86 6.19 5.99     Results from last 7 days   Lab Units 01/29/25  0531 01/28/25  0626 01/27/25  0620   BUN mg/dL 23 21 21   SODIUM mmol/L 140 139 143   POTASSIUM mmol/L 4.3 4.9 4.5   CHLORIDE mmol/L 105 104 107   CREATININE mg/dL 0.99 1.05 1.04   AST U/L 15  --   --    ALT U/L 7  --   --             Imaging Results Review: I reviewed radiology reports from this admission including: chest xray, CT head, and CT C-spine.  Other Study Results Review: Other studies reviewed include: blood work    Review of Scheduled Meds:  Current Facility-Administered Medications   Medication Dose Route Frequency Provider Last Rate    acetaminophen  650 mg Oral TID Chelsea Dasilva PA-C      [START ON 2/1/2025] amLODIPine  5 mg Oral Daily Chelsea Dasilva PA-C      Artificial Tears  1 drop Both Eyes Q4H PRN Chelsea Dasilva PA-C      [START ON 2/1/2025] bisacodyl  10 mg Rectal Daily Chelsea Dasilva PA-C      calcium carbonate  1,000 mg Oral Daily PRN Chelsea Dasilva PA-C      carbidopa-levodopa  1.5 tablet Oral 4x Daily Chelsea Dasilva PA-C      [START ON 2/1/2025] Cholecalciferol  1,000 Units Oral Daily Chelsea Dasilva PA-C      [START ON 2/3/2025] vitamin B-12  1,000 mcg Oral Once per day on Monday Wednesday Friday Chelsea Dasilva PA-C      docusate sodium  100 mg Oral BID Chelsea Dasilva PA-C      [START ON 2/1/2025] enoxaparin  40 mg  Subcutaneous Daily Chelsea Dasilva PA-C      lidocaine  1 patch Topical Daily Chelsea Dasilva PA-C      melatonin  3 mg Oral HS Chelsea Dasilva PA-C      OLANZapine  2.5 mg Intramuscular Q4H PRN Bakari Rivera MD      ondansetron  4 mg Oral Q6H PRN Chelsea Dasilva PA-C      QUEtiapine  12.5 mg Oral Q8H PRN Chelsea Dasilva PA-C      rivastigmine  1 patch Transdermal Daily Chelsea Dasilva PA-C      senna  2 tablet Oral BID Chelsea Dasilva PA-C         Code Status: Level 1 - Full Code    Administrative Statements   I have spent a total time of 60 minutes in caring for this patient on the day of the visit/encounter including Diagnostic results, Prognosis, Risks and benefits of tx options, Instructions for management, Patient and family education, Importance of tx compliance, Risk factor reductions, Impressions, Counseling / Coordination of care, Documenting in the medical record, Reviewing / ordering tests, medicine, procedures  , Obtaining or reviewing history  , and Communicating with other healthcare professionals .  ** Please Note:  voice to text software may have been used in the creation of this document. Although proof errors in transcription or interpretation are a potential of such software**

## 2025-01-31 NOTE — CASE MANAGEMENT
Case Management Discharge Planning Note    Patient name Daniel Sanz Jr.  Location S /S -01 MRN [id number]  : 1940 Date 2025       Current Admission Date: 2025  Current Admission Diagnosis:Altered mental status   Patient Active Problem List    Diagnosis Date Noted Date Diagnosed    Dysphagia 2025     Anemia 2025     Dementia (HCC) 2025     Altered mental status 2025     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 (HCC) 2025     Fall, initial encounter 10/11/2024     Closed fracture of nasal bone 2024     Fall 2024     Scalp hematoma 2024     Multiple abrasions 2024     HTN (hypertension) 2024     Stage 3a chronic kidney disease (HCC) 03/15/2024     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     Shortness of breath      MGUS (monoclonal gammopathy of unknown significance) 2022     Neuropathy 2022     Parkinson's disease ([facility]) 2020     Tear of right supraspinatus tendon 2019     Chronic right shoulder pain 2019     Internal derangement of right shoulder 2019     Pleural plaque with presence of asbestos 2017     Allergic rhinitis with postnasal drip 2017       LOS (days): 10  Geometric Mean LOS (GMLOS) (days): 3.8  Days to GMLOS:-5.9     OBJECTIVE:  Risk of Unplanned Readmission Score: 22.86         Current admission status: Inpatient   Preferred Pharmacy:   [facility] [address] [address]  Phone: [phone] Fax: [phone]    [id number] [place] [address] [address]  Phone: [phone] Fax: [phone]    Primary Care Provider: Charlie Martinez DO    Primary Insurance: [employer] [facility]  Secondary Insurance:     DISCHARGE DETAILS:    Discharge planning discussed with:: Patient, Sheng-rosette, Crystal-RN &  Ryanael-SL ARC     Comments - Freedom of Choice: CRISTIAN notified all the above mentioned individuals of the Pt's d/c to [facility] today. [facility] referral made for a stretcher van.       Contacts  Patient Contacts: Sheng  Relationship to Patient:: Family  Contact Method: Phone  Phone Number: [phone]  Reason/Outcome: Discharge Planning      Other Referral/Resources/Interventions Provided:  Referral Comments: Pt for d/c to [facility] today.       [id number]  Transported by (Company and Unit #): [employer]  ETA of Transport (Date): 01/31/25  ETA of Transport (Time): 1200        IMM Given (Date):: 01/31/25  IMM Given to:: Family  Family notified:: CRISTIAN reviewed the Pt's Medicare Rights with his spouse Sheng via phone

## 2025-01-31 NOTE — SPEECH THERAPY NOTE
Speech Language/Pathology    Speech/Language Pathology Progress Note    Patient Name: Daniel Sanz Jr.  Today's Date: 1/31/2025         Subjective:  Pt seen for dysphagia tx follow up at lunch. Pt sitting OOB in chair. Pt  tent for discharge to [facility] this afternoon. Moist coughing noted prior to po taken  Objective:  Pt assisted w/ set up, but able to self feed. Pt ate chopped meatloaf and chopped green beans, more timely mastication noted. No oral residue. Pt encourage to take sips of liquids in between bites. Pt drank OJ by straw w/ good oral control and no overt s/s  aspiration noted.     Assessment:  Pt tolerating dysphagia 2 w/ thin liquids w/o significant dysphagia symptoms or overt s/s aspiration.     Plan/Recommendations:  Cont dysphagia 2 diet  for now, pt tent for DC to [facility] this afternoon, consider speech tx follow up to advance diet.         Kayleigh Ybarra MA CCC-SLP  Speech Pathologist  Available via [employer]

## 2025-01-31 NOTE — PLAN OF CARE
Problem: PHYSICAL THERAPY ADULT  Goal: Performs mobility at highest level of function for planned discharge setting.  See evaluation for individualized goals.  Description: Treatment/Interventions: Functional transfer training, LE strengthening/ROM, Elevations, Therapeutic exercise, Endurance training, Cognitive reorientation, Patient/family training, Equipment eval/education, Bed mobility, Compensatory technique education, Gait training          See flowsheet documentation for full assessment, interventions and recommendations.  Outcome: Progressing  Note: Prognosis: Fair  Problem List: Decreased strength, Decreased endurance, Impaired balance, Decreased mobility, Decreased cognition, Impaired judgement, Decreased safety awareness  Assessment: pt began tx session lying supine in the bed as pt was agreeable to participate in PT intervention. Pt to focus on bed mobility, transfer training, TE activities, posture/balance with gait and increasing pt activity tolerance. Progress was noted with bed mobility as pt was able to complete a supine<>sit EOB transfer with close /s, no LOB as pt utilized bed rail to assist with completing transfer. pt continues to remain consistant for requiring mod ax1 for all functional transfers with RW and ambulation with RW short distances. pt required VC's for hand placement w/ transfers and demonstrated several times retropulsion w/ transfers. pt continues to be at an increased risk for falls and injuries w/ all OOB activities as w/ short distance ambulation pt had LOB posterior/Right side x2. pt continues to require constant VC's stride length due to pt continuing to demonstrate a shuffled gait pattern. pt did participate in TE activities while seated in the recliner with AROM, no increases in pain. Post tx pt in the recliner with call bell, chair alarm activated and all pt needs met. Continue to recommen DC w/ level 2 moderate rehab resource intensity when medically cleared  Barriers  to Discharge: Inaccessible home environment     Rehab Resource Intensity Level, PT: II (Moderate Resource Intensity)    See flowsheet documentation for full assessment.

## 2025-01-31 NOTE — ASSESSMENT & PLAN NOTE
Home: amlodpine 5mg daily  Here: Same as home medication.  BP stable.  Monitor for orthostasis due to comorbid PD.

## 2025-01-31 NOTE — PHYSICAL THERAPY NOTE
PHYSICAL THERAPY NOTE          Patient Name: Daniel Sanz Jr.  Today's Date: 25 0930   PT Last Visit   PT Visit Date 25   Note Type   Note Type Treatment   Pain Assessment   Pain Assessment Tool 0-10   Pain Score No Pain   Patient's Stated Pain Goal No pain   Hospital Pain Intervention(s) Repositioned;Ambulation/increased activity;Rest   Multiple Pain Sites No   Pain Rating: FLACC (Rest) - Face 0   Pain Rating: FLACC (Rest) - Legs 0   Pain Rating: FLACC (Rest) - Activity 0   Pain Rating: FLACC (Rest) - Cry 0   Pain Rating: FLACC (Rest) - Consolability 0   Score: FLACC (Rest) 0   Pain Rating: FLACC (Activity) - Face 0   Pain Rating: FLACC (Activity) - Legs 0   Pain Rating: FLACC (Activity) - Activity 0   Pain Rating: FLACC (Activity) - Cry 0   Pain Rating: FLACC (Activity) - Consolability 0   Score: FLACC (Activity) 0   Restrictions/Precautions   Weight Bearing Precautions Per Order No   Other Precautions Cognitive;Chair Alarm;Bed Alarm;Fall Risk;Pain;Hard of hearing  (pt not in any restraints in todays tx session)   General   Chart Reviewed Yes   Response to Previous Treatment Patient with no complaints from previous session.   Family/Caregiver Present No   Cognition   Overall Cognitive Status Impaired   Arousal/Participation Alert;Responsive;Cooperative   Attention Within functional limits   Orientation Level Oriented to person;Oriented to place;Oriented to situation;Disoriented to time   Memory Decreased short term memory   Following Commands Follows one step commands without difficulty   Comments pt ID by name and  on hospital wrist band   Subjective   Subjective pt was agreeable to participate in PT intervention and reported no pain pre/post tx session   Bed Mobility   Supine to Sit 5  Supervision   Additional items Assist x 1;HOB elevated;Bedrails;Increased time required;Verbal cues;Other  (pt  utilized bed rail to assist with completing transfer to seated EOB from supine)   Sit to Supine Unable to assess   Additional Comments pt seated OOB in claudia recliner post tx w/ call bell,c hair alarm activated and all pt needs met   Transfers   Sit to Stand 3  Moderate assistance   Additional items Assist x 1;Increased time required;Verbal cues  (w/ RW from EOB)   Stand to Sit 4  Minimal assistance   Additional items Assist x 1;Armrests;Increased time required;Verbal cues   Stand pivot Unable to assess   Toilet transfer 3  Moderate assistance   Additional items Assist x 1;Increased time required;Standard toilet  (pt required min ax1 while descending to toilet but mod ax1 to ascend back to RW)   Additional Comments pt continues to require RW and VC's for all functional transfers   Ambulation/Elevation   Gait pattern Narrow ESTELITA;Shuffling;Short stride;Step to;Excessively slow;Decreased hip extension;Decreased heel strike;Decreased toe off  (VC's for stride length)   Gait Assistance 3  Moderate assist   Additional items Assist x 1;Verbal cues   Assistive Device Rolling walker   Distance 30'x1 RW 15'x2 RW   Stair Management Assistance Not tested   Ambulation/Elevation Additional Comments pt continues to have LOB with ambulation and continues to require mod ax1 for all ambulation with RW short distances   Balance   Static Sitting Fair   Dynamic Sitting Fair   Static Standing Poor   Dynamic Standing Poor   Ambulatory Poor  (w/ RW)   Endurance Deficit   Endurance Deficit Yes   Endurance Deficit Description limited activity tolerance  and ambulation distance   Activity Tolerance   Activity Tolerance Patient tolerated treatment well   Nurse Made Aware Spoke to RN   Exercises   Hip Abduction Sitting;15 reps;AROM;Bilateral   Hip Adduction Sitting;15 reps;AROM;Bilateral  (pillow squeezes)   Knee AROM Long Arc Quad Sitting;15 reps;AROM;Bilateral   Ankle Pumps Sitting;20 reps;AROM;Bilateral   Assessment   Prognosis Fair   Problem  List Decreased strength;Decreased endurance;Impaired balance;Decreased mobility;Decreased cognition;Impaired judgement;Decreased safety awareness   Assessment pt began tx session lying supine in the bed as pt was agreeable to participate in PT intervention. Pt to focus on bed mobility, transfer training, TE activities, posture/balance with gait and increasing pt activity tolerance. Progress was noted with bed mobility as pt was able to complete a supine<>sit EOB transfer with close /s, no LOB as pt utilized bed rail to assist with completing transfer. pt continues to remain consistant for requiring mod ax1 for all functional transfers with RW and ambulation with RW short distances. pt required VC's for hand placement w/ transfers and demonstrated several times retropulsion w/ transfers. pt continues to be at an increased risk for falls and injuries w/ all OOB activities as w/ short distance ambulation pt had LOB posterior/Right side x2. pt continues to require constant VC's stride length due to pt continuing to demonstrate a shuffled gait pattern. pt did participate in TE activities while seated in the recliner with AROM, no increases in pain. Post tx pt in the recliner with call bell, chair alarm activated and all pt needs met. Continue to recommen DC w/ level 2 moderate rehab resource intensity when medically cleared   Goals   Patient Goals none stated   STG Expiration Date 02/01/25   PT Treatment Day 2   Plan   Treatment/Interventions ADL retraining;Functional transfer training;LE strengthening/ROM;Therapeutic exercise;Endurance training;Cognitive reorientation;Patient/family training;Equipment eval/education;Bed mobility;Gait training;Spoke to nursing   Progress Slow progress, decreased activity tolerance   PT Frequency 3-5x/wk   Discharge Recommendation   Rehab Resource Intensity Level, PT II (Moderate Resource Intensity)   AM-PAC Basic Mobility Inpatient   Turning in Flat Bed Without Bedrails 3   Lying on Back  to Sitting on Edge of Flat Bed Without Bedrails 3   Moving Bed to Chair 2   Standing Up From Chair Using Arms 2   Walk in Room 2   Climb 3-5 Stairs With Railing 1   Basic Mobility Inpatient Raw Score 13   Basic Mobility Standardized Score 33.99   [facility] Highest Level Of Mobility   -[facility] Goal 4: Move to chair/commode   -[facility] Achieved 7: Walk 25 feet or more   Education   Education Provided Mobility training;Assistive device   Patient Reinforcement needed   End of Consult   Patient Position at End of Consult Bedside chair;Bed/Chair alarm activated;All needs within reach   The patient's AM-PAC Basic Mobility Inpatient Short Form Raw Score is 13. A Raw score of less than or equal to 16 suggests the patient may benefit from discharge to post-acute rehabilitation services. Please also refer to the recommendation of the Physical Therapist for safe discharge planning.    Nikolai Agosto

## 2025-01-31 NOTE — PROGRESS NOTES
PHYSICAL MEDICINE AND REHABILITATION   PREADMISSION ASSESSMENT     Projected IGC and Rehabilitation Diagnoses:  Impairment of mobility, safety, Activities of Daily Living (ADLs), and cognitive/communication skills due to Neurologic Conditions:  03.2  Parkinsonism  Etiologic: Parkinson's dementia with mild acute encephalopathy   Date of Onset: 1/21/25  Date of surgery: n/a    PATIENT INFORMATION  Name: Daniel Sanz Jr. Phone #: [phone] (home)   Address: [address] [place] [address] [place] [address]  YOB: 1940 Age: 84 y.o. #   Marital Status: /Civil Union  Ethnicity:   Employment Status: retired  Extended Emergency Contact Information  Primary Emergency Contact: Sheng Sanz  Home Phone: [phone]  Mobile Phone: [phone]  Relation: Spouse  Secondary Emergency Contact: Pamela Yañez  Mobile Phone: [phone]  Relation: Daughter In-Law  Advance Directive: Level 1 Full Code ( No advance directive on file )    INSURANCE/COVERAGE:     Primary Payor: [employer]  REP / Plan: [facility] MEDICARE ADVANTAGE  REP / Product Type: Medicare HMO /   Secondary Payer: SELF PAY    Payer Contact: see below  Payer Contact: n/a   Contact Phone: see below  Contact Phone: n/a      Authorization #: [id number]   H&CC Ref#: [id number]   Start of Care: 01/31/25  Next Review Date: 02/04/25  Submit next review to: H&CC Portal  or  F#: [phone]    Benefits : Deductible: $0  Coinsurance: 0%  Copay: $0 OOP: $0  (Confirmed in: Mo)         MEDICARE #: [id number]   Medicare Days: n/a    Medical Record #: [id number]    REFERRAL SOURCE:   Referring provider: Jeannie Rodriguez MD  Referring facility: [facility]   Room: S /S -01  PCP: Charlie Martinez DO PCP phone number: [phone]    MEDICAL INFORMATION  HPI: Daniel is a 85 y/o male with a medical history of but not limited to Parkinson's disease with dementia ; HTN; chronic lower extremity edema who  "presented to [facility] ED on 1/21/25  with altered mental status after a fall at home where he struck his head and developed a laceration of the right eye. His wife reported he had been becoming intermittently agitated usually around times of sleep or napping. While in the ED he was noted to be hypothermic with labs on admission showing new hyperkalemia. Daniel was admitted for further eval/assessment/consultation. Imaging was negative for any acute findings . Neuro was consulted. Per neuro - \"Patient's Sinemet was increased 1 week prior to hospital admission from 2 tab TID to 2.5 tab TID due to patient having significant bradykinesia, cogwheeling, and tremor at the lower dose. Since then, patient has had a decline in his behavior, especially at night. His wife reports that he will frequently have abnormal movements while sleeping and awaken from sleep with hallucinations. He has also been agitated and aggressive with garbled speech.\"  Per neuro \"Continue Sinemet , 1.5 tabs 4x daily ; He was started on Exelon patch by prior Neurologist and will Continue at this time as long as he continues to do well. During hospital course Daniel was noted impulsive with use of restraints at times during admission. PT/OT therapies were consulted and continue to follow patient at this time and are recommending inpatient acute rehab when medically stable. All involved medical disciplines feel/agree patient is medically ready for discharge at this time. Inpatient acute rehabilitation physician was consulted. Upon review of patient's case and correspondence with PT/OT therapy services, [facility] physician feels Daniel will benefit and is a good candidate / appropriate for inpatient acute rehab at this time. He has demonstrated the willingness / desire and tolerance to participate in the required 3 hours or more of therapies per day.       Past Medical History:   Past Surgical History:   Allergies:     Past Medical " History:   Diagnosis Date    Arthritis     Asbestos exposure     Asbestosis (HCC)     GERD (gastroesophageal reflux disease)     Hemoptysis 5/11/2022    Hypertension     Lung disease     Parkinson's disease (HCC)     Past Surgical History:   Procedure Laterality Date    HERNIA REPAIR      INTRACAPSULAR CATARACT EXTRACTION Left     KNEE SURGERY      many years ago    SHOULDER SURGERY       Allergies   Allergen Reactions    Morphine And Codeine Other (See Comments)     Patient gets delirious         Medical/functional conditions requiring inpatient rehabilitation: impaired mobility/self care ; impaired balance /ambulation     Risk for medical/clinical complications: risk for vary cog issues ; risk for falls ; pain ; skin breakdown ; infection     Comorbidities/Surgeries in the last 100 days:   S/p fall   Parkinson's with dementia   HTN  Constipation   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   Dysphagia   CURRENT VITAL SIGNS:   Temp:  [97.7 °F (36.5 °C)-98 °F (36.7 °C)] 97.9 °F (36.6 °C)  HR:  [71-77] 71  BP: (129-162)/(63-72) 162/72  Resp:  [18-20] 19  SpO2:  [97 %-100 %] 97 %   Intake/Output Summary (Last 24 hours) at 1/31/2025 1153  Last data filed at 1/30/2025 2132  Gross per 24 hour   Intake 118 ml   Output 550 ml   Net -432 ml        LABORATORY RESULTS:      Lab Results   Component Value Date    HGB 11.3 (L) 01/29/2025    HGB 14.6 04/29/2015    HCT 34.2 (L) 01/29/2025    HCT 42.3 04/29/2015    WBC 9.86 01/29/2025    WBC 7.23 04/29/2015     Lab Results   Component Value Date    BUN 23 01/29/2025    BUN 34 (H) 11/01/2024     04/29/2015    K 4.3 01/29/2025    K 5.3 (H) 11/01/2024     01/29/2025     11/01/2024    GLUCOSE 94 04/29/2015    CREATININE 0.99 01/29/2025    CREATININE 1.29 11/01/2024     Lab Results   Component Value Date    PROTIME 14.3 01/21/2025    INR 1.03 01/21/2025        DIAGNOSTIC STUDIES:  XR abdomen 1 view kub  Result Date: 1/23/2025  Impression: Moderate  colonic stool burden. Workstation performed: [id number]     XR shoulder 2+ views LEFT  Result Date: 1/21/2025  Impression: No acute osseous abnormality. Rotator cuff calcific tendinitis. Computerized Assisted Algorithm (CAA) may have been used to analyze all applicable images. Workstation performed: [id number]     XR chest 1 view portable  Result Date: 1/21/2025  Impression: No acute cardiopulmonary disease. No acute displaced fractures. 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 Workstation performed: [id number]     CT cervical spine without contrast  Result Date: 1/21/2025  Impression: No cervical spine fracture or traumatic malalignment. Workstation performed: [id number]     CT head without contrast  Result Date: 1/21/2025  Impression: No acute intracranial abnormality. Workstation performed: [id number]       PRECAUTIONS/SPECIAL NEEDS:  Tobacco:   Social History     Tobacco Use   Smoking Status Never    Passive exposure: Past   Smokeless Tobacco Former    Types: Chew    Quit date: 1998   , Alcohol:    Social History     Substance and Sexual Activity   Alcohol Use Not Currently   , Weight Bearing Precautions:  weight bearing as tolerated, Anticoagulation:   Lovenox SQ @ ordered by MD , Edema Management, Safety Concerns, Pain Management, Dietary Restrictions: Dysphagia 2 - mechanical soft / thin liquids, Language Preference: English, and Fall precautions    MEDICATIONS:     Current Facility-Administered Medications:     acetaminophen (TYLENOL) tablet 650 mg, 650 mg, Oral, TID, Angelita Agosto MD, 650 mg at 01/30/25 2107    amLODIPine (NORVASC) tablet 5 mg, 5 mg, Oral, Daily, Symone Kelly PA-C, 5 mg at 01/31/25 1131    Artificial Tears Op Soln 1 drop, 1 drop, Both Eyes, Q4H PRN, Symone Kelly PA-C, 1 drop at 01/29/25 1201    bisacodyl (DULCOLAX) rectal suppository 10 mg, 10 mg, Rectal, Daily, MITESH Oswald, 10 mg at 01/23/25 1615    calcium carbonate (TUMS) chewable tablet 1,000 mg,  1,000 mg, Oral, Daily PRN, Symone Kelly PA-C    [COMPLETED] carbidopa-levodopa (SINEMET)  mg per tablet 2 tablet, 2 tablet, Oral, TID, 2 tablet at 01/24/25 2053 **FOLLOWED BY** carbidopa-levodopa (SINEMET)  mg per tablet 1.5 tablet, 1.5 tablet, Oral, 4x Daily, Trudy Perez PA-C, 1.5 tablet at 01/31/25 1131    Cholecalciferol (VITAMIN D3) tablet 1,000 Units, 1,000 Units, Oral, Daily, Symone Kelly PA-C, 1,000 Units at 01/31/25 0932    cyanocobalamin (VITAMIN B-12) tablet 1,000 mcg, 1,000 mcg, Oral, Once per day on Monday Wednesday Friday, Mable Yu DO, 1,000 mcg at 01/31/25 0932    docusate sodium (COLACE) capsule 100 mg, 100 mg, Oral, BID, MITESH Oswald, 100 mg at 01/31/25 0932    enoxaparin (LOVENOX) subcutaneous injection 40 mg, 40 mg, Subcutaneous, Daily, Symone Kelly PA-C, 40 mg at 01/30/25 0926    lidocaine (LIDODERM) 5 % patch 1 patch, 1 patch, Topical, Daily, Angelita Agosto MD, 1 patch at 01/23/25 1619    melatonin tablet 3 mg, 3 mg, Oral, HS, Angelita Agosto MD, 3 mg at 01/30/25 2107    ondansetron (ZOFRAN) injection 4 mg, 4 mg, Intravenous, Q6H PRN, Symone Kelly PA-C    QUEtiapine (SEROquel) tablet 12.5 mg, 12.5 mg, Oral, Q8H PRN, Angelita Agosto MD, 12.5 mg at 01/26/25 2059    rivastigmine (EXELON) 4.6 mg/24 hr TD 24 hr patch 4.6 mg, 4.6 mg, Transdermal, Daily, Marcus Oviedo MD, 4.6 mg at 01/30/25 0933    senna (SENOKOT) tablet 17.2 mg, 2 tablet, Oral, BID, Angelita Agosto MD, 17.2 mg at 01/31/25 0933    SKIN INTEGRITY:   Laceration above right eye - treatment as ordered    PRIOR LEVEL OF FUNCTION:  He lives in a(n) single family home  Daniel Sanz Jr. is  and lives with their spouse.  Self Care: Independent, Indoor Mobility: Needed some help, Stairs (in/outdoor): Needed some help, and Cognition: Needed some help    FALLS IN THE LAST 6 MONTHS: 1 to 4    HOME ENVIRONMENT:  The living area: can live on one level  There are 1 step to enter the home.    The  patient will have 24 hour supervision/physical assistance available upon discharge.    PREVIOUS DME:  Equipment in home (previous DME): Shower Chair, Grab Bars, Rolling Walker, Single Point Cane, and walk in shower ; reacher ; raised toilet    FUNCTIONAL STATUS:  Physical Therapy Occupational Therapy Speech Therapy   As per PT:     PT Visit Date 25   Note Type   Note Type Treatment   Pain Assessment   Pain Assessment Tool 0-10   Pain Score No Pain   Patient's Stated Pain Goal No pain   Hospital Pain Intervention(s) Repositioned;Ambulation/increased activity;Rest   Multiple Pain Sites No   Pain Rating: FLACC (Rest) - Face 0   Pain Rating: FLACC (Rest) - Legs 0   Pain Rating: FLACC (Rest) - Activity 0   Pain Rating: FLACC (Rest) - Cry 0   Pain Rating: FLACC (Rest) - Consolability 0   Score: FLACC (Rest) 0   Pain Rating: FLACC (Activity) - Face 0   Pain Rating: FLACC (Activity) - Legs 0   Pain Rating: FLACC (Activity) - Activity 0   Pain Rating: FLACC (Activity) - Cry 0   Pain Rating: FLACC (Activity) - Consolability 0   Score: FLACC (Activity) 0   Restrictions/Precautions   Weight Bearing Precautions Per Order No   Other Precautions Cognitive;Chair Alarm;Bed Alarm;Fall Risk;Pain;Hard of hearing  (pt not in any restraints in todays tx session)   General   Chart Reviewed Yes   Response to Previous Treatment Patient with no complaints from previous session.   Family/Caregiver Present No   Cognition   Overall Cognitive Status Impaired   Arousal/Participation Alert;Responsive;Cooperative   Attention Within functional limits   Orientation Level Oriented to person;Oriented to place;Oriented to situation;Disoriented to time   Memory Decreased short term memory   Following Commands Follows one step commands without difficulty   Comments pt ID by name and  on hospital wrist band   Subjective   Subjective pt was agreeable to participate in PT intervention and reported no pain pre/post tx session   Bed Mobility   Supine  to Sit 5  Supervision   Additional items Assist x 1;HOB elevated;Bedrails;Increased time required;Verbal cues;Other  (pt utilized bed rail to assist with completing transfer to seated EOB from supine)   Sit to Supine Unable to assess   Additional Comments pt seated OOB in claudia recliner post tx w/ call bell,c hair alarm activated and all pt needs met   Transfers   Sit to Stand 3  Moderate assistance   Additional items Assist x 1;Increased time required;Verbal cues  (w/ RW from EOB)   Stand to Sit 4  Minimal assistance   Additional items Assist x 1;Armrests;Increased time required;Verbal cues   Stand pivot Unable to assess   Toilet transfer 3  Moderate assistance   Additional items Assist x 1;Increased time required;Standard toilet  (pt required min ax1 while descending to toilet but mod ax1 to ascend back to RW)   Additional Comments pt continues to require RW and VC's for all functional transfers   Ambulation/Elevation   Gait pattern Narrow ESTELITA;Shuffling;Short stride;Step to;Excessively slow;Decreased hip extension;Decreased heel strike;Decreased toe off  (VC's for stride length)   Gait Assistance 3  Moderate assist   Additional items Assist x 1;Verbal cues   Assistive Device Rolling walker   Distance 30'x1 RW 15'x2 RW   Stair Management Assistance Not tested   Ambulation/Elevation Additional Comments pt continues to have LOB with ambulation and continues to require mod ax1 for all ambulation with RW short distances   Balance   Static Sitting Fair   Dynamic Sitting Fair   Static Standing Poor   Dynamic Standing Poor   Ambulatory Poor  (w/ RW)   Endurance Deficit   Endurance Deficit Yes   Endurance Deficit Description limited activity tolerance  and ambulation distance   Activity Tolerance   Activity Tolerance Patient tolerated treatment well   Nurse Made Aware Spoke to RN   Exercises   Hip Abduction Sitting;15 reps;AROM;Bilateral   Hip Adduction Sitting;15 reps;AROM;Bilateral  (pillow squeezes)   Knee AROM Long Arc  Quad Sitting;15 reps;AROM;Bilateral   Ankle Pumps Sitting;20 reps;AROM;Bilateral   Assessment   Prognosis Fair   Problem List Decreased strength;Decreased endurance;Impaired balance;Decreased mobility;Decreased cognition;Impaired judgement;Decreased safety awareness   Assessment pt began tx session lying supine in the bed as pt was agreeable to participate in PT intervention. Pt to focus on bed mobility, transfer training, TE activities, posture/balance with gait and increasing pt activity tolerance. Progress was noted with bed mobility as pt was able to complete a supine<>sit EOB transfer with close /s, no LOB as pt utilized bed rail to assist with completing transfer. pt continues to remain consistant for requiring mod ax1 for all functional transfers with RW and ambulation with RW short distances. pt required VC's for hand placement w/ transfers and demonstrated several times retropulsion w/ transfers. pt continues to be at an increased risk for falls and injuries w/ all OOB activities as w/ short distance ambulation pt had LOB posterior/Right side x2. pt continues to require constant VC's stride length due to pt continuing to demonstrate a shuffled gait pattern. pt did participate in TE activities while seated in the recliner with AROM, no increases in pain. Post tx pt in the recliner with call bell, chair alarm activated and all pt needs met. Continue to recommen DC w/ level 2 moderate rehab resource intensity when medically cleared    As per OT:     OT Visit Date 01/30/25   Note Type   Note Type Treatment   Pain Assessment   Pain Assessment Tool 0-10   Pain Score 5   Pain Location/Orientation Orientation: Right;Location: Hip   Pain Onset/Description Frequency: Constant/Continuous;Descriptor: Burning;Descriptor: Aching   Effect of Pain on Daily Activities limits fnxl mobility, weight-bearing, activity tolerance   Patient's Stated Pain Goal No pain   Hospital Pain Intervention(s)  Repositioned;Ambulation/increased activity;Emotional support   Multiple Pain Sites No   Restrictions/Precautions   Weight Bearing Precautions Per Order No   Other Precautions Cognitive;Chair Alarm;Bed Alarm;Restraints;Fall Risk;Pain;Hard of hearing  (Delmis lap belt)   Lifestyle   Autonomy Pt needs assistance w/ ADLs / IADLs at home w/ wife.   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   Intrinsic Gratification Pt reports enjoying watching tv and their cat   ADL   Eating Assistance 5  Supervision/Setup   Eating Deficit Setup;Beverage management  (from bedside table)   Grooming Assistance 4  Minimal Assistance   Grooming Deficit Setup;Verbal cueing;Wash/dry face   Grooming Comments (A) for thoroughness, use of mirror to manage around sitiches   UB Bathing Assistance 4  Minimal Assistance   UB Bathing Deficit Setup;Verbal cueing;Supervision/safety;Increased time to complete  ((A) to sponge bathe back region)   UB Bathing Comments seated at the EOB   UB Dressing Assistance 4  Minimal Assistance   UB Dressing Deficit Setup;Verbal cueing;Supervision/safety;Pull around back;Pull down in back   UB Dressing Comments doff/[facility]   LB Dressing Assistance 2  Maximal Assistance   LB Dressing Deficit Setup;Don/doff R sock;Don/doff L sock   LB Dressing Comments seated in recliner chair, decreased fnxl reach   Toileting Assistance  2  Maximal Assistance   Toileting Deficit Perineal hygiene   Toileting Comments grossly incontient of urine   Functional Standing Tolerance   Time 40 seconds   Activity hygiene   Comments support of UEs on RW - poor + static balance   Bed Mobility   Supine to Sit 5  Supervision   Additional items Assist x 1;Increased time required;Verbal cues  (increased effort)   Sit to Supine    (NT: OOB to recliner chair)   Additional Comments Fair - static sitting balance at the EOB. Mild lightheadedness w/ postional  "changes. 140/59.   Transfers   Sit to Stand 3  Moderate assistance   Additional items Assist x 1;Increased time required;Verbal cues  (from the EOB)   Stand to Sit 4  Minimal assistance   Additional items Assist x 1;Increased time required;Verbal cues;Armrests   Additional Comments Additional STS from the recliner chair w/ MARIA DEL CARMEN. Heavy use of BUEs on armrests - use of momentum.   Functional Mobility   Functional Mobility 3  Moderate assistance   Additional Comments Ax1, for short distance from the EOB to the recliner chair. Shuffling, narrow gait.   Additional items Rolling walker   Subjective   Subjective \"This belt is a fire hazard\"   Cognition   Overall Cognitive Status Impaired   Arousal/Participation Alert;Responsive;Cooperative   Attention Within functional limits   Orientation Level Oriented X4  (grossly oriented to time)   Memory Decreased short term memory   Following Commands Follows one step commands without difficulty   Comments Pt ID via wristband, name and . Pt is more alert and oriented today. Improved insight into current limitaitions and deficits. Very cooperative and motivated   Activity Tolerance   Activity Tolerance (S)  Patient limited by pain  (R hip pain : notified RN and [facility] Attending Dr. Rodriguez)   Medical Staff Made Aware Spoke with RN and CM   Assessment   Assessment Patient seen for OT treatment on 2025 s/p admission for Altered mental status Patient agreeable to OT session. Patient participated in fall prevention , self-care transfers, bed mobility , functional mobility, and ADLs with intervention focus on optimizing independence in functional mobility, self-care transfers and ADL tasks. Daniel Sanz Jr. is showing improvements in activity tolerance, transfers, balance, fnxl mobility, strength and cognition but is continuing to perform below baseline due to the following deficits: decreased standing tolerance for self care tasks , decreased dynamic balance impacting functional " reach, decreased activity tolerance , impaired memory , impaired safety awareness , and (+) pain . Personal factors continuing to impact D/C include: (+) Hx of falls , Assistance needed for ADL/IADLs, Assistance needed for ADLs and functional mobility, High fall risk , decreased insight toward deficits , decreased recall of precautions , and health management From OT standpoint, patient would benefit from skilled intervention to maximize independence with ADLs and functional mobility. Initial evaluation goals expiring, POC reviewed remains appropriate. Will extend goals + 10 days.  At this time, recommending D/C to: level II (moderate resource intensity).    As per SLP:    2025           Subjective:  Pt seen for dysphagia tx at lunch. Pt awake, alert. Able to feed self. Wife at bedside   Objective:  Pt self fed chopped meatloaf, pureed broccoli, and mac and cheese, drank OJ by straw w/ good oral control and transfer. Mastication/manipulation was slow and appeared functional. Slow bolus transfer w/ mild residue noted.   Swallows were slow with no coughing, throat clearing, or wet voice noted      Assessment:  Pt appears to be tolerating dysphagia 2 mech soft w/ thin liquids w/o sig dysphagia symptoms or overt s/s aspiration.      Plan/Recommendations:  Cont dysphagia 2 diet w/ thin liquids   Meds whole in puree  Aspiration precautions  Will cont to follow for possible diet upgrade.         CARE SCORES:  Self Care:  Eatin: Supervision or touching  assistance  Oral hygiene: 04: Supervision or touching  assistance  Toilet hygiene: 03: Partial/moderate assistance  Shower/bathing self: 02: Substantial/maximal assistance  Upper body dressin: Partial/moderate assistance  Lower body dressin: Substantial/maximal assistance  Putting on/taking off footwear: 02: Substantial/maximal assistance  Transfers:  Roll left and right: 04: Supervision or touching  assistance  Sit to lyin: Partial/moderate  assistance  Lying to sitting on side of bed: 03: Partial/moderate assistance  Sit to stand: 03: Partial/moderate assistance  Chair/bed to chair transfer: 03: Partial/moderate assistance  Toilet transfer: 03: Partial/moderate assistance  Mobility:  Walk 10 ft: 03: Partial/moderate assistance  Walk 50 ft with two turns: 09: Not applicable  Walk 150ft: 09: Not applicable    CURRENT GAP IN FUNCTION  Prior to Admission: Functional Status: Patient was independent with mobility/ambulation, transfers, ADL's, IADL's.    Expected functional outcomes: It is expected that with skilled acute rehabilitation services the patient will progress to Supervision for self care and Supervision for mobility     Estimated length of stay: 10 to 14 days    Anticipated Post-Discharge Disposition/Treatment  Disposition: Return to previous home/apartment.  Outpatient Services: Physical Therapy (PT), Occupational Therapy (OT), and Speech Therapy    BARRIERS TO DISCHARGE  Lovenox, Weakness, Agitation/Restlesness, Diminished cognition/Mentation change, Balance Difficulty, Fatigue, Caregiver Accessibility, and Equipment Needs    INTERVENTIONS FOR DISCHARGE  Adaptive equipment, Patient/Family/Caregiver Education, Arrange DME needs, Medication Changes per MD , Therapy exercises, and Energy conservation education     REQUIRED THERAPY:  Patient will require PT and OT 90 minutes each per day, five days per week to achieve rehab goals.     REQUIRED FUNCTIONAL AND MEDICAL MANAGEMENT FOR INPATIENT REHABILITATION:  Pain Management: Overall pain is well controlled, Deep Vein Thrombosis (DVT) Prophylaxis:  low molecular weight heparin, SCD's while in bed, and   Nursing education and bowel/bladder management, internal medicine to manage/monitor medical conditions, PM&R to maximize function and provide medical oversight, PT/OT intervention, patient/family education/training, and any consults as needed     RECOMMENDED LEVEL OF CARE:   Daniel is a 85 y/o male  "with a medical history of but not limited to Parkinson's disease with dementia ; HTN; chronic lower extremity edema who presented to [facility] ED on 1/21/25  with altered mental status after a fall at home where he struck his head and developed a laceration of the right eye. His wife reported he had been becoming intermittently agitated usually around times of sleep or napping. While in the ED he was noted to be hypothermic with labs on admission showing new hyperkalemia. Daniel was admitted for further eval/assessment/consultation. Imaging was negative for any acute findings . Neuro was consulted. Per neuro - \"Patient's Sinemet was increased 1 week prior to hospital admission from 2 tab TID to 2.5 tab TID due to patient having significant bradykinesia, cogwheeling, and tremor at the lower dose. Since then, patient has had a decline in his behavior, especially at night. His wife reports that he will frequently have abnormal movements while sleeping and awaken from sleep with hallucinations. He has also been agitated and aggressive with garbled speech.\"  Per neuro \"Continue Sinemet , 1.5 tabs 4x daily ; He was started on Exelon patch by prior Neurologist and will Continue at this time as long as he continues to do well. During hospital course Daniel was noted impulsive with use of restraints at times during admission. PT/OT therapies were consulted and continue to follow patient at this time and are recommending inpatient acute rehab when medically stable. All involved medical disciplines feel/agree patient is medically ready for discharge at this time. Inpatient acute rehabilitation physician was consulted. Upon review of patient's case and correspondence with PT/OT therapy services, [facility] physician feels Daniel will benefit and is a good candidate / appropriate for inpatient acute rehab at this time. He has demonstrated the willingness / desire and tolerance to participate in the required 3 " hours or more of therapies per day. Prior to admission / hospital stay Daniel was reported as independent  with all ADLs /functional mobility / needing assist with iADLs. Currently with PT therapies /  OT therapies : supervision-min A with upper body ADLs ; mod- max A with lower ADLs. Nursing is being recommended for medication distribution / management , bowel / bladder management and educational purposes , internal medicine to continue to monitor and manage medical conditions ,PM&R to maximize  function and provide medical oversight, and inpatient rehab to maximize self care ,mobility ,strength , and endurance upon discharge to home.

## 2025-01-31 NOTE — PLAN OF CARE
Problem: PAIN - ADULT  Goal: Verbalizes/displays adequate comfort level or baseline comfort level  Description: Interventions:  - Encourage patient to monitor pain and request assistance  - Assess pain using appropriate pain scale  - Administer analgesics based on type and severity of pain and evaluate response  - Implement non-pharmacological measures as appropriate and evaluate response  - Consider cultural and social influences on pain and pain management  - Notify physician/advanced practitioner if interventions unsuccessful or patient reports new pain  Outcome: Progressing     Problem: INFECTION - ADULT  Goal: Absence or prevention of progression during hospitalization  Description: INTERVENTIONS:  - Assess and monitor for signs and symptoms of infection  - Monitor lab/diagnostic results  - Monitor all insertion sites, i.e. indwelling lines, tubes, and drains  - Monitor endotracheal if appropriate and nasal secretions for changes in amount and color  - [place] appropriate cooling/warming therapies per order  - Administer medications as ordered  - Instruct and encourage patient and family to use good hand hygiene technique  - Identify and instruct in appropriate isolation precautions for identified infection/condition  Outcome: Progressing  Goal: Absence of fever/infection during neutropenic period  Description: INTERVENTIONS:  - Monitor WBC    Outcome: Progressing     Problem: DISCHARGE PLANNING  Goal: Discharge to home or other facility with appropriate resources  Description: INTERVENTIONS:  - Identify barriers to discharge w/patient and caregiver  - Arrange for needed discharge resources and transportation as appropriate  - Identify discharge learning needs (meds, wound care, etc.)  - Arrange for interpretive services to assist at discharge as needed  - Refer to Case Management Department for coordinating discharge planning if the patient needs post-hospital services based on physician/advanced  practitioner order or complex needs related to functional status, cognitive ability, or social support system  Outcome: Progressing     Problem: Knowledge Deficit  Goal: Patient/family/caregiver demonstrates understanding of disease process, treatment plan, medications, and discharge instructions  Description: Complete learning assessment and assess knowledge base.  Interventions:  - Provide teaching at level of understanding  - Provide teaching via preferred learning methods  Outcome: Progressing     Problem: NEUROSENSORY - ADULT  Goal: Achieves stable or improved neurological status  Description: INTERVENTIONS  - Monitor and report changes in neurological status  - Monitor vital signs such as temperature, blood pressure, glucose, and any other labs ordered   - Initiate measures to prevent increased intracranial pressure  - Monitor for seizure activity and implement precautions if appropriate      Outcome: Progressing  Goal: Remains free of injury related to seizures activity  Description: INTERVENTIONS  - Maintain airway, patient safety  and administer oxygen as ordered  - Monitor patient for seizure activity, document and report duration and description of seizure to physician/advanced practitioner  - If seizure occurs,  ensure patient safety during seizure  - Reorient patient post seizure  - Seizure pads on all 4 side rails  - Instruct patient/family to notify RN of any seizure activity including if an aura is experienced  - Instruct patient/family to call for assistance with activity based on nursing assessment  - Administer anti-seizure medications if ordered    Outcome: Progressing  Goal: Achieves maximal functionality and self care  Description: INTERVENTIONS  - Monitor swallowing and airway patency with patient fatigue and changes in neurological status  - Encourage and assist patient to increase activity and self care.   - Encourage visually impaired, hearing impaired and aphasic patients to use  assistive/communication devices  Outcome: Progressing     Problem: METABOLIC, FLUID AND ELECTROLYTES - ADULT  Goal: Electrolytes maintained within normal limits  Description: INTERVENTIONS:  - Monitor labs and assess patient for signs and symptoms of electrolyte imbalances  - Administer electrolyte replacement as ordered  - Monitor response to electrolyte replacements, including repeat lab results as appropriate  - Instruct patient on fluid and nutrition as appropriate  Outcome: Progressing  Goal: Fluid balance maintained  Description: INTERVENTIONS:  - Monitor labs   - Monitor I/O and WT  - Instruct patient on fluid and nutrition as appropriate  - Assess for signs & symptoms of volume excess or deficit  Outcome: Progressing

## 2025-01-31 NOTE — ASSESSMENT & PLAN NOTE
Follows with Podiatry and wound care as an outpt.  He was taking Lasix 3x weekly.  It is not currently ordered.  Will monitor for edema and add back as indicated.

## 2025-01-31 NOTE — CONSULTS
"PHYSICAL MEDICINE AND REHABILITATION [facility] REHAB CONSULT  Daniel Sanz Jr. 84 y.o. male MRN: [id number]  Unit/Bed#: [facility] 457-01 Encounter: [id number]     Rehab Diagnosis: Impairment of mobility, safety and Activities of Daily Living (ADLs) due to Neurologic Conditions:  03.2  Parkinsonism  Etiologic Diagnosis: Parkinson's dementia with mild acute encephalopathy   Date of Onset: 1/21/25     History of Present Illness:   Daniel Sanz Jr. is a 84 y.o. male with PMHx Parkinson's disease with dementia ; HTN; chronic lower extremity edema who presented to the [facility] ED on 1/21/25  with altered mental status after a fall at home where he struck his head and developed a laceration of the right eye. His wife reported he had been becoming intermittently agitated usually around times of sleep or napping. While in the ED he was noted to be hypothermic with labs on admission showing new hyperkalemia. Daniel was admitted for further eval/assessment/consultation. Imaging was negative for any acute findings . Neuro was consulted. Per neuro - \"Patient's Sinemet was increased 1 week prior to hospital admission from 2 tab TID to 2.5 tab TID due to patient having significant bradykinesia, cogwheeling, and tremor at the lower dose. Since then, patient has had a decline in his behavior, especially at night. His wife reports that he will frequently have abnormal movements while sleeping and awaken from sleep with hallucinations. He has also been agitated and aggressive with garbled speech.\"  Per neuro \"Continue Sinemet , 1.5 tabs 4x daily ; He was started on Exelon patch by prior Neurologist and will Continue at this time as long as he continues to do well. During hospital course Daniel was noted impulsive with use of restraints at times during admission. PT/OT therapies were consulted and continue to follow patient at this time and are recommending inpatient acute rehab when medically " "stable. All involved medical disciplines feel/agree patient is medically ready for discharge at this time. As such patient presents to [facility] for PT/OT/SLP needs in the setting of debility s/p AMS and falls.     Subjective:  Patient seen lying in bed at time of eval. No family present. AAOx2 to name/hospital, unsure date or reason for stay. Easily distracted by internal stimuli, short sustained attention. Interactive and responsive to command. Denies any pain or symptoms.         Review of Systems:     Review of Systems - General ROS: negative for - chills, fatigue, or fever  Psychological ROS: negative for - anxiety, concentration difficulties, depression, disorientation, obsessive thoughts, or sleep disturbances  Respiratory ROS: no cough, shortness of breath, or wheezing  Cardiovascular ROS: no chest pain or dyspnea on exertion  Gastrointestinal ROS: no abdominal pain, change in bowel habits, or black or bloody stools  Genito-Urinary ROS: no dysuria, trouble voiding, or hematuria  Musculoskeletal ROS: negative for - gait disturbance, joint pain, joint stiffness, or muscular weakness  Neurological ROS: negative for - bowel and bladder control changes, confusion, dizziness, headaches, impaired coordination/balance, or visual changes  Dermatological ROS: negative for acne, dry skin, rash, and skin lesion changes     Plan:     Altered mental status  Assessment & Plan  Patient's wife endorsing recent abnormal behaviors.  Patient waking frequently at night, hallucinations noted when awaking from sleep, patient noted to be more \"nasty\" and agitated with at times garbled speech which clears.  Presents to ED after fall at home with concern for possible UTI. S/p head strike in ED w/ R eyebrow laceration.  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 with mild acute encephalopathy  CT head w/o acute findings  TSH benign   Folate WNL, B12 normal, vitamin D low at 26.9  Wife notes B12/vitamin D supplementation 3 times weekly " PTA  Continue to q daily dosing of vit D  B12 3x weekly  Negative infxn work-up (as below)   Sinemet as ordered by neurology (below)  Acetaminophen 650mg TID  Clear what the cause of the patient's encephalopathy is, no clear toxic cause so would not attribute this to toxic metabolic encephalopathy    Patient is pleasant but remains confused.   -PT eval and treat  -OT eval and treat  -sleep and behavior logs; will consider virtual monitor if needed  -seroquel 12.5 PRN and zyprexa 2.5 IM PRN standby    Parkinson's disease (HCC)  Assessment & Plan  -AMS likely sequela of progressive parkinsons dementia plus possible adverse reaction to recent sinemet increase  -cont neuro adjusted recs for sinemet 1.5 QID  -cont exelon patch   -SLP Cog eval and treat    Dysphagia  Assessment & Plan  Patient observed coughing while taking medications by nursing at acute   Speech therapy consulted  Patient somnolent on evaluation so they recommended dysphagia 1 diet with nectar thick liquids while awake and pills crushed.  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 ([facility])  Assessment & Plan  Chronic right lower extremity venous ulcers   PTA Lasix for LE edema management; cont at [facility]  Known to [facility] podiatry/A   Wound care sami and treat  Offload heels  ACE wraps to b/l LEs    HTN (hypertension)  Assessment & Plan  BP stable on admission   Will continue current meds: amlodipine 5mg daily    Bowel and bladder incontinence  Assessment & Plan  -charted bowel and bladder incontinence 1/30-1/31  -monitor on prior regimen colace/senna, downtitrate as able  -bladder scans and encourage timed urine voids, measure PVRs, can DC if x3 <150cc        Health Maintenance  #Delirium/Sleep: At risk. Optimize sleep/wake, pain,  bowel, bladder management. Avoid deliriogenic meds and physical restraint. Environmental/behavioral interventions.   #Pain: Tylenol PRN  #Bowel: colace, senna  #Bladder: time voids, PVRs   #Skin/Pressure Injury Prevention: Turn Q2hr in bed, with weight shifts [id number] in wheelchair. Float heels in bed.  #DVT Prophylaxis: Heparin  #Code Status Full  #FEN Dysphagia 2  #Dispo ELOS 10-14       Drug regimen reviewed, all potential adverse effects identified and addressed:    Scheduled Meds:     Restrictions include:  none Fall precautions      Functional History/Home Set-up - Prior to Admission:      Functional Status: Mobility/Ambulation: minimal assist with mobility in the home on all surfaces with one hand hold for home distances for  .  Transfers: independent for wheelchair, bed, commode, and car transfers using no assistive devices.   ADL's: independent for UE/LE dressing, toileting, brush teeth, and washface with no assistive devices.   Dysphagia: independent for eating (po) solids and liquids with no special techniques.   Orientation, Memory, Problem Solving: minimal assist  Bladder: continent of bladder, independent, with no catherization or external collection device.  Bowel: continent of bowel, independent, with no bowel program needed.     Functional Status Upon Admission to [facility]:  Mobility: Min-ModA  Transfers: ModA  ADLs: Supervision-Aayush     Physical Exam:  Temp:  [97.7 °F (36.5 °C)-98 °F (36.7 °C)] 97.9 °F (36.6 °C)  HR:  [71-77] 71  Resp:  [18-20] 19  BP: (129-162)/(63-72) 162/72  SpO2:  [97 %-100 %] 97 %  Physical Exam  Constitutional:       Appearance: Normal appearance. He is normal weight.   HENT:      Head: Normocephalic and atraumatic.      Right Ear: External ear normal.      Left Ear: External ear normal.      Ears:      Comments: Hard of hearing b/l, R>L     Nose: Nose normal.      Mouth/Throat:      Mouth: Mucous membranes are moist.      Pharynx: Oropharynx is clear.   Eyes:      Extraocular  Movements: Extraocular movements intact.      Conjunctiva/sclera: Conjunctivae normal.      Pupils: Pupils are equal, round, and reactive to light.   Cardiovascular:      Rate and Rhythm: Normal rate and regular rhythm.      Pulses: Normal pulses.      Heart sounds: Normal heart sounds.   Pulmonary:      Effort: Pulmonary effort is normal.      Breath sounds: Normal breath sounds.   Abdominal:      General: Abdomen is flat. Bowel sounds are normal.      Palpations: Abdomen is soft.   Musculoskeletal:      Cervical back: Normal range of motion.      Comments: Cogwheeling b/l Ues  No spasticity  Full P/AROM velocity-independent   Skin:     General: Skin is warm and dry.   Neurological:      Mental Status: He is alert. He is disoriented and confused.      Cranial Nerves: Cranial nerves 2-12 are intact.      Sensory: Sensation is intact.      Motor: Motor function is intact.      Deep Tendon Reflexes: Reflexes are normal and symmetric.      Comments: AAOx2 to name and location, unsure date or reason for stay  Dymetria b/l, R>L  R hand physiologic tremor  Slow to initiate movements   Psychiatric:         Attention and Perception: Perception normal. He is inattentive.         Mood and Affect: Mood and affect normal.         Speech: Speech is delayed.         Behavior: Behavior normal. Behavior is cooperative.         Cognition and Memory: Cognition is impaired.      Comments: Delayed initiation of verbal response  Unsustained attention, distracted by internal stimuli             MMT:   Strength:   Right  Left  Site  Right  Left  Site    4 4 S Ab: Shoulder Abductors  4 4 HF: Hip Flexors    4 4 EF: Elbow Flexors  3 3 KF: Knee Flexors    4 4 EE: Elbow Extensors  4 4 KE: Knee Extensors    5  5  WE: Wrist Extensors  4 4 DR: Dorsi Flexors    4 4 FF: Finger Flexors  4 3 PF: Plantar Flexors    4 4 HI: Hand Intrinsics  4 4 EHL: Extensor Hallucis Longus     Laboratory:    Results from last 7 days   Lab Units 01/29/25  1219  "01/28/25  0626 01/27/25  0620   HEMOGLOBIN g/dL 11.3* 10.7* 10.6*   HEMATOCRIT % 34.2* 33.0* 33.3*   WBC Thousand/uL 9.86 6.19 5.99     Results from last 7 days   Lab Units 01/29/25  0531 01/28/25  0626 01/27/25  0620   BUN mg/dL 23 21 21   SODIUM mmol/L 140 139 143   POTASSIUM mmol/L 4.3 4.9 4.5   CHLORIDE mmol/L 105 104 107   CREATININE mg/dL 0.99 1.05 1.04   AST U/L 15  --   --    ALT U/L 7  --   --             Wt Readings from Last 1 Encounters:   12/27/24 98.9 kg (218 lb)     Estimated body mass index is 33.15 kg/m² as calculated from the following:    Height as of 12/27/24: 5' 8\" (1.727 m).    Weight as of 12/27/24: 98.9 kg (218 lb).    Imaging: reviewed     Rehabilitation Prognosis: good     Tolerance for three hours of therapy a day: good     Family/Patient Goals:  Patient/family's goals: Return to previous home/apartment.        Mobility Goals: Supervision / Standby Assist  Transfer Goals: Supervision / Standby Assist  Activities of Daily Living (ADLs) Goals: Supervision / Standby Assist    Discharge Planning:  Rehabilitation and discharge goals discussed with the patient and/or family.    Case Managment and Social Work to review patient/family resources and to coordinate Discharge Planning.    Estimated length of stay: 10 to 14 days    Patient and Family Education and Training:  Rehabilitation and discharge goals discussed with the patient and/or family.  Patient/family education/training needs to be discussed in weekly team meeting.    Equipment/DME needs: Therapy teams to assess and evaluate for additional equipment/DME needs throughout rehabilitation stay    Past Medical History:   Past Surgical History:   Family History:   Social history:   Past Medical History:   Diagnosis Date    Arthritis     Asbestos exposure     Asbestosis (HCC)     GERD (gastroesophageal reflux disease)     Hemoptysis 5/11/2022    Hypertension     Lung disease     Parkinson's disease (HCC)     Past Surgical History:   Procedure " Laterality Date    HERNIA REPAIR      INTRACAPSULAR CATARACT EXTRACTION Left     KNEE SURGERY      many years ago    SHOULDER SURGERY       Family History   Problem Relation Age of Onset    Stroke Mother     Colon cancer Mother     Depression Sister     Hypertension Sister       Social History     Socioeconomic History    Marital status: /Civil Union     Spouse name: Not on file    Number of children: 1    Years of education: 12    Highest education level: Not on file   Occupational History    Not on file   Tobacco Use    Smoking status: Never     Passive exposure: Past    Smokeless tobacco: Former     Types: Chew     Quit date: 1998   Vaping Use    Vaping status: Never Used   Substance and Sexual Activity    Alcohol use: Not Currently    Drug use: Never    Sexual activity: Not on file   Other Topics Concern    Not on file   Social History Narrative    Not on file     Social Drivers of Health     Financial Resource Strain: Not on file   Food Insecurity: No Food Insecurity (6/13/2024)    Nursing - Inadequate Food Risk Classification     Worried About Running Out of Food in the Last Year: Never true     Ran Out of Food in the Last Year: Never true     Ran Out of Food in the Last Year: Not on file   Transportation Needs: No Transportation Needs (12/19/2024)    OASIS : Transportation     Lack of Transportation (Medical): No     Lack of Transportation (Non-Medical): No     Patient Unable or Declines to Respond: No   Physical Activity: Not on file   Stress: Not on file   Social Connections: Not on file   Intimate Partner Violence: Not on file   Housing Stability: Unknown (6/13/2024)    Housing Stability Vital Sign     Unable to Pay for Housing in the Last Year: No     Number of Times Moved in the Last Year: Not on file     Homeless in the Last Year: No          Current Medical Diagnosis Allergies   Patient Active Problem List   Diagnosis    Chronic right shoulder pain    Internal derangement of right shoulder     Tear of right supraspinatus tendon    Parkinson's disease (HCC)    MGUS (monoclonal gammopathy of unknown significance)    Neuropathy    Pleural plaque with presence of asbestos    Allergic rhinitis with postnasal drip    Shortness of breath    Elevated serum immunoglobulin free light chains    Stage 3a chronic kidney disease (HCC)    Closed fracture of nasal bone    Fall    Scalp hematoma    Multiple abrasions    HTN (hypertension)    Fall, initial encounter    Altered mental status    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 In addition, the preadmission screen, post-admission physical evaluation, overall plan of care and admissions order demonstrate a reasonable expectation that the following criteria were met at the time of admission to the [facility].  The patient requires active and ongoing therapeutic intervention of multiple therapy disciplines (physical therapy, occupational therapy, speech-language pathology, or prosthetics/orthotics), one of which is physical or occupational therapy.    Patient requires an intensive rehabilitation therapy program, as defined in Chapter 1, section 110.2.2 of the CMS Medicare Policy Manual. This intensive rehabilitation therapy program will consist of at least 3 hours of therapy per day at least 5 days per week or at least 15 hours of intensive rehabilitation therapy within a 7 consecutive day period, beginning with the date of admission to the [facility].    The patient is reasonably expected to actively participate in, and benefit significantly from, the intensive rehabilitation therapy program as defined in Chapter 1, section 110.2.2 of the CMS Medicare Policy Manual at this time of admission to the [facility]. He can  reasonably be expected to make measurable improvement (that will be of practical value to improve the patient’s functional capacity or adaptation to impairments) as a result of the rehabilitation treatment, as defined in section 110.3, and such improvement can be expected to be made within the prescribed period of time. As noted in the CMS Medicare Policy Manual, the patient need not be expected to achieve complete independence in the domain of self-care nor be expected to return to his or her prior level of functioning in order to meet this standard.  The patient must require physician supervision by a rehabilitation physician. As such, a rehabilitation physician will conduct face-to-face visits with the patient at least 3 days per week throughout the patient’s stay in the [facility] to assess the patient both medically and functionally, as well as to modify the course of treatment as needed to maximize the patient’s capacity to benefit from the rehabilitation process.  The patient requires an intensive and coordinated interdisciplinary approach to providing rehabilitation, as defined in Chapter 1, section 110.2.5 of the CMS Medicare Policy Manual. This will be achieved through periodic team conferences, conducted at least once in a 7-day period, and comprising of an interdisciplinary team of medical professionals consisting of: a rehabilitation physician, registered nurse,  and/or , and a licensed/certified therapist from each therapy discipline involved in treating the patient.       ** Please Note: Fluency Direct voice to text software may have been used in the creation of this document. **

## 2025-01-31 NOTE — TREATMENT PLAN
Individualized Plan of Care - [facility]  Daniel Sanz Jr. 84 y.o. male MRN: [id number]  Unit/Bed#: -01 Encounter: [id number]     PATIENT INFORMATION  ADMISSION DATE: 1/31/2025  1:38 PM AUGUSTINE CATEGORY:Neurologic Conditions:  03.2  Parkinsonism   ADMISSION DIAGNOSIS: Status post fall [Z91.81]  Altered mental state [R41.82]  EXPECTED LOS: 10 to 14 days     MEDICAL/FUNCTIONAL PROGNOSIS  Based on my assessment of the patient's medical conditions and current functional status, the prognosis for attaining medical and functional goals or the IRF stay is:  Fair    Medical Goals: Patient will be medically stable for discharge to [facility] upon completion of rehab program    ANTICIPATED DISCHARGE DISPOSITION AND SERVICES  COMMUNITY SETTING: Home - with supervision  Is a 24-hr caregiver available? Yes  Has discharge plan been discussed with primary caregiver?  No      ANTICIPATED FOLLOW-UP SERVICE:      Home Health Services: PT, OT, and SLP    DISCIPLINE SPECIFIC PLANS:  Required Disciplines & Services: Rehabillitation Nursing and Case Management      REQUIRED THERAPY:  Therapy Hours per Day Days per Week   Physical Therapy 1-1.5 5-6   Occupational Therapy 1-1.5 5-6   Speech/Language Therapy 1-1.5 3-5   NOTE: Additional therapy time(s) or changes to allocation of therapies as appropriate to meet patient needs and to achieve functional goals.        Patient will participate in above therapy regimen consisting of PT, OT, and SLP due to the following medical procedure/condition:Neurologic Conditions:  03.2  Parkinsonism    ANTICIPATED FUNCTIONAL OUTCOMES:  ADL:  Supervision/standby assist   Bladder/Bowel:  Mod-Independent   Transfers:   Supervision/standby assist   Locomotion:  Mod-Independent/Standby assist   Cognitive:  Supervision/standby assist     DISCHARGE PLANNING NEEDS  Equipment needs: Commode -  Recommended, Raised [place] Seat -  Has, Shower Chair -  Has, Tub Bench -   Recommended, Sock Aid -  Recommended, Grab Bars -  Has, Single Point Cane -  Has, and Standard Walker -  Has      REHAB ANTICIPATED PARTICIPATION RESTRICTIONS:  Assist with Mobility, Decreased Insight to Deficits, Decreaed Safety Awareness, Difficulty with Language Tasks, Inability to Drive, Requires Assist with ADLS, Unable to access transportation, and Unable to perform finances

## 2025-01-31 NOTE — ASSESSMENT & PLAN NOTE
- Bowel/bladder incontinence on admission  - Bowel: continence has improved. Was also constipated. On colace, senna 2 tabs and daily miralax.    - Has Prn suppository lactulose   - Last BM 2/2   - Monitor and adjust as appropriate  - Bladder: PVR x3 on admission are low   - UA during acute hospitalization not suspicious for infection   - Timed Voids.

## 2025-01-31 NOTE — ASSESSMENT & PLAN NOTE
Continue Sinemet 1.5 tabs 4x daily. Currently ordered at times pt takes the medication at home. May need to adjust timing based on therapy schedule.  Follows with Dr. Cavanaugh as an outpt.  Therapy per PMR.

## 2025-02-01 PROCEDURE — 99232 SBSQ HOSP IP/OBS MODERATE 35: CPT | Performed by: PHYSICIAN ASSISTANT

## 2025-02-01 PROCEDURE — 97530 THERAPEUTIC ACTIVITIES: CPT

## 2025-02-01 PROCEDURE — 99232 SBSQ HOSP IP/OBS MODERATE 35: CPT | Performed by: PHYSICAL MEDICINE & REHABILITATION

## 2025-02-01 PROCEDURE — 92610 EVALUATE SWALLOWING FUNCTION: CPT

## 2025-02-01 PROCEDURE — 97163 PT EVAL HIGH COMPLEX 45 MIN: CPT

## 2025-02-01 PROCEDURE — 97166 OT EVAL MOD COMPLEX 45 MIN: CPT

## 2025-02-01 PROCEDURE — 92526 ORAL FUNCTION THERAPY: CPT

## 2025-02-01 PROCEDURE — 97535 SELF CARE MNGMENT TRAINING: CPT

## 2025-02-01 RX ADMIN — ACETAMINOPHEN 650 MG: 325 TABLET, FILM COATED ORAL at 17:00

## 2025-02-01 RX ADMIN — RIVASTIGMINE 1 PATCH: 4.6 PATCH, EXTENDED RELEASE TRANSDERMAL at 08:59

## 2025-02-01 RX ADMIN — ENOXAPARIN SODIUM 40 MG: 40 INJECTION SUBCUTANEOUS at 08:58

## 2025-02-01 RX ADMIN — CARBIDOPA AND LEVODOPA 1.5 TABLET: 25; 100 TABLET ORAL at 13:49

## 2025-02-01 RX ADMIN — SENNOSIDES 17.2 MG: 8.6 TABLET ORAL at 17:37

## 2025-02-01 RX ADMIN — DOCUSATE SODIUM 100 MG: 100 CAPSULE, LIQUID FILLED ORAL at 08:57

## 2025-02-01 RX ADMIN — CARBIDOPA AND LEVODOPA 1.5 TABLET: 25; 100 TABLET ORAL at 20:54

## 2025-02-01 RX ADMIN — ACETAMINOPHEN 650 MG: 325 TABLET, FILM COATED ORAL at 08:51

## 2025-02-01 RX ADMIN — LIDOCAINE 1 PATCH: 50 PATCH CUTANEOUS at 17:39

## 2025-02-01 RX ADMIN — SENNOSIDES 17.2 MG: 8.6 TABLET ORAL at 08:57

## 2025-02-01 RX ADMIN — CARBIDOPA AND LEVODOPA 1.5 TABLET: 25; 100 TABLET ORAL at 08:52

## 2025-02-01 RX ADMIN — DEXTRAN 70, GLYCERIN, HYPROMELLOSE 1 DROP: 1; 2; 3 SOLUTION/ DROPS OPHTHALMIC at 09:20

## 2025-02-01 RX ADMIN — POLYETHYLENE GLYCOL 3350 17 G: 17 POWDER, FOR SOLUTION ORAL at 08:51

## 2025-02-01 RX ADMIN — AMLODIPINE BESYLATE 5 MG: 5 TABLET ORAL at 08:57

## 2025-02-01 RX ADMIN — MELATONIN TAB 3 MG 3 MG: 3 TAB at 20:55

## 2025-02-01 RX ADMIN — ACETAMINOPHEN 650 MG: 325 TABLET, FILM COATED ORAL at 20:55

## 2025-02-01 RX ADMIN — CARBIDOPA AND LEVODOPA 1.5 TABLET: 25; 100 TABLET ORAL at 17:36

## 2025-02-01 RX ADMIN — Medication 1000 UNITS: at 08:52

## 2025-02-01 RX ADMIN — DOCUSATE SODIUM 100 MG: 100 CAPSULE, LIQUID FILLED ORAL at 17:37

## 2025-02-01 NOTE — PROGRESS NOTES
"OT initial evaluation       02/01/25 1530   Patient Data   Rehab Impairment Impairment of mobility, safety, Activities of Daily Living (ADLs), and cognitive/communication skills due to Neurologic Conditions:  03.2  Parkinsonism   Etiologic Diagnosis Parkinson's dementia with mild acute encephalopathy   Date of Onset 01/21/25   Support System   Name Mio  (spoe with mio on the phone during interview portion of eval. below information was obtained from the wife.)   Relationship spouse   Able to provide 24 hour supervision Yes   Able to provide physical help? Yes  (light tasks/IADLS)   Multiple Support Systems Yes  (wife reports DIL and grandchildren can also help)   Support System 2   Name 2 Gama   Relationship 2 son   Able to provide 24 hour supervision 2 No   Able to provide physical help? (2) Yes   Home Setup   Type of Home Single Level   Method of Entry Stairs   Number of Stairs 1   Number of Stairs in Home 12  (to basement which pt was using for \"exercise\" at baseline)   First Floor Bathroom Full;Shower;Grab Bars   First Floor Bathroom Accessibility Grab bars in tub/shower   Second Floor Bathroom None   First Floor Setup Available Yes   Home Modification Comment pending progress   Available Equipment Roller Walker;Long Handled Adaptive Equipment;Single Point Cane;Wheelchair;Rollator  (comfort toilet seat height; bedrail on L side of bed)   Baseline Information   Transportation Family/friends drive  (wife is primary )   Prior Device(s) Used Single Point Cane;Roller Walker  (mainly SPC in home and community; wife reports he had a few falls and would then use RW. wife reports he used no AD at times. uses bedrail)   Prior IADL Participation   Money Management   (wife manages)   Meal Preparation   (wife manages)   Laundry   (wife manages)   Home Cleaning   (wife manages; pt enjoys cleaning dishes)   Prior Level of Function   Self-Care 2. Needed Some Help - Patient needed a partial assistance from another " "person to complete activities.   Indoor-Mobility (Ambulation) 3. Independent - Patient completed the activities by him/herself, with or without an assistive device, with no assistance from a helper.  (supervision)   Stairs 3. Independent - Patient completed the activities by him/herself, with or without an assistive device, with no assistance from a helper.  (supervision)   Functional Cognition 1. Dependent - A helper completed the activities for the patient.   Prior Assistance Needed for Driving;Household Chores/Cleaning;Medication Management;Meal Preparation;Money Management;Shopping   Prior Device Used Z. None of the above  (SPC)   Falls in the Last Year   Number of falls in the past 12 months 5   Type of Injury Associated with Fall Major injury  (head lacerations w/ stitches in fall within last year)   Patient Preference   Nickname (Patient Preference) (S)  Alfonzo   Psychosocial   Psychosocial (WDL) WDL   Restrictions/Precautions   Precautions (S)  Aspiration;Bed/chair alarms;Cognitive;Fall Risk;Hard of hearing;Supervision on toilet/commode  (for behavioral plan: recommending Q2 toileting. will cont to assess/monitor behaviors. ortho BP; BLE ace wrapping)   Pain Assessment   Pain Assessment Tool 0-10   Pain Score No Pain   Eating Assessment   Type of Assistance Needed Set-up / clean-up;Supervision   Physical Assistance Level No physical assistance   Comment was not present for meal as pt has [place] this afternoon. anticipate setup- supervision. RN was provided pt w/ medications at start of session and gave pt thin liquid (as ordered) and pt with notable coughing reporting it \"went down the wrong pipe\". nsg downgraded to marina Colin notified.   Eating CARE Score 4   Oral Hygiene   Type of Assistance Needed Physical assistance   Physical Assistance Level Total assistance   Comment would require Ax2 compared to baseline ( in stance at sink). one person to steady in stance while second person assists with teeth " "brushing thoroughness.   Oral Hygiene CARE Score 1   Shower/Bathe Self   Type of Assistance Needed Physical assistance   Physical Assistance Level Total assistance   Comment compared to baseline functioning (in stance in shower) pt would require Ax2 for bathing. Today, pt completed SB seated EOB requiring min A for trunk control/sitting balance, assistance for rear bathing, distal LEs and thoroughness of UB. pt tends to be perseverative on washing face requiring cues and [place] assist to move on to next body part.   Shower/Bathe Self CARE Score 1   Dressing/Undressing Clothing   Type of Assistance Needed Physical assistance   Physical Assistance Level 76% or more   Comment to don button up long sleeze   Upper Body Dressing CARE Score 2   Type of Assistance Needed Physical assistance   Physical Assistance Level Total assistance   Comment to thread seated and don over hips in stance.   Lower Body Dressing CARE Score 1   Putting On/Taking Off Footwear   Type of Assistance Needed Physical assistance   Physical Assistance Level Total assistance   Comment  socks and BLE ace wrapping for edema mgmt and ortho BP   Putting On/Taking Off Footwear CARE Score 1   Toileting Hygiene   Type of Assistance Needed Physical assistance   Physical Assistance Level Total assistance   Comment Ax1 for CM/hygiene   Toileting Hygiene CARE Score 1   Toilet Transfer   Type of Assistance Needed Physical assistance   Physical Assistance Level Total assistance   Comment Ax2 w/o an AD; Mod A x1 SPT > BSC   Toilet Transfer CARE Score 1   Transfer Bed/Chair/Wheelchair   Type of Assistance Needed Physical assistance   Physical Assistance Level Total assistance   Comment Ax2 w/o an AD; Mod A x1 w/ RW with \"big and loud\" cues for gait as pt noted w/ shuffling and freezing gait   Chair/Bed-to-Chair Transfer CARE Score 1   Lying to Sitting on Side of Bed   Type of Assistance Needed Physical assistance   Physical Assistance Level 51%-75%   Comment trunk " "and LE mgmt to L side of bed where pt normally gets out of. does use a bedrail at home.   Lying to Sitting on Side of Bed CARE Score 2   Sit to Stand   Type of Assistance Needed Physical assistance   Physical Assistance Level 51%-75%   Comment Mod - max A with vc for hand/foot placment. mild retropulsion noted when in static stance.   Sit to Stand CARE Score 2   Comprehension   QI: Comprehension 3. Usually Understands: Understands most conversations, but misses some part/intent of message. Requires cues at times to understand.   Expression   QI: Expression 3. Exhibits some difficulty with expressing needs and ideas (e.g., some words or finishing thoughts) or speech is not clear   RUE Assessment   RUE Assessment   (not formally assessed due to time restraints; wife reports having BL shoulder arthritis and he \"shouldve had them replaced years ago\")   LUE Assessment   LUE Assessment   (not formally assessed due to time restraints; wife reports having BL shoulder arthritis and he \"shouldve had them replaced years ago\")   Cognition   Overall Cognitive Status Impaired   Arousal/Participation Alert;Responsive;Cooperative   Attention Within functional limits   Orientation Level Oriented to person;Disoriented to time;Disoriented to situation;Disoriented to place   Memory Decreased long term memory;Decreased short term memory;Decreased recall of recent events;Decreased recall of precautions   Following Commands Follows one step commands with increased time or repetition   Comments (S)  pt not impuslive this session. followed one step commands w/o difficulty. is [place] which impacts direction following at times. MD requesting behavior plan. As of now, pt remains on bed/chair alarms and recommending Q2 toileting to dec urgency which could cause the pt to set off alarms. Per wife, pt should have a consistent schedule and therapists as he performs best when he is in a routine. Will cont to monitor and make behavioral plan changes as " "necessary.   Vision   Vision Comments reading glasses   Objective Measure   OT Measure(s) ortho BP   OT Findings (S)  seated 141/63, standing 118/56 w/ pt reporting \"wooziness\". Pt req BLE ace wraps for edema, may also benefit for orthostasis. OT s/w PT Isatu that if BP is still a barrier then can try an abdominal binder.   Discharge Information   Vocational Plan Retired/not working   Patient's Discharge Plan DC home SUP - MIN A   Patient's Rehab Expectations Wife states \"Oh please make sure he makes it home. I need him back home with me.\"   Barriers to Discharge Home Decreased Cognitive Function;Decreased Strength;Unsafe Home Setup;Decreased Endurance;Safety Considerations;Limited Family Support   Impressions Pt is a 84 y.o. male who was admitted on 1/21/25 due to with altered mental status after a fall at home where he struck his head and developed a laceration of the right eye . Pt was dx with Parkinson's dementia with mild acute encephalopathy  and transferred to [facility] to receive skilled therapy services. Pt  has a past medical history of Arthritis, Asbestos exposure, Asbestosis (HCC), GERD (gastroesophageal reflux disease), Hemoptysis, Hypertension, Lung disease, and Parkinson's disease (HCC).. Current precautions include aspiration, bed/chair alarms, cognitive deficits, fall risk, [place], and supervision on toilet/commode and ortho BP. See flowsheet for details of pts home setup, PLOF and current functional status. Pts impairments include endurance, activity tolerance, functional mobility, forward functional reach, balance, trunk control, functional standing tolerance, decreased I w/ ADLS/IADLS, strength, cognitive impairments, decreased safety awareness, and decreased insight into deficits. These impairments along with  limited caregiver support, inaccesible home environment, steps to enter, limited insight into deficits, decreased initiation and engagement, difficulty performing ADLs, difficulty performing " IADLs, and health management causes the inability to safely complete A/IADLs including Eating, Grooming, Bathing, UB dressing, LB dressing, Toileting, Toilet transfer, Tub/shower Transfer, and IADL Management. Pt presents with good rehab potential with motivation to participate and achieve goal of DC home. Pt is unsafe to DC home at this time, recommending 2 weeks to achieve supervision level goals with bedside commode, shower chair, cane, wheelchair , long handled equipment , RW, rollator, and LRAD . Plan to achieve stated goals with interventions focused on ADL Retraining , LB Dressing, UB dressing, Functional Transfers, Functional Cognition, Functional Attention, Assessment of Cognitive function, Short Term memory, MOCA, ACLS, Standing tolerance, Standing balance , midline awareness, DME training/education, Family training/education, Energy conservation training/education, healthy coping education, Leisure and social pursuits, sitting balance, and Core/trunk control/strengthening .   OT Therapy Minutes   OT Time In 0830   OT Time Out 1000   OT Total Time (minutes) 90   OT Mode of treatment - Individual (minutes) 90   OT Mode of treatment - Concurrent (minutes) 0   OT Mode of treatment - Group (minutes) 0   OT Mode of treatment - Co-treat (minutes) 0   OT Mode of Treatment - Total time(minutes) 90 minutes   OT Cumulative Minutes 90   Cumulative Minutes   Cumulative therapy minutes 90

## 2025-02-01 NOTE — PROGRESS NOTES
02/01/25 1130   Patient Data   Rehab Impairment Impairment of mobility, safety, Activities of Daily Living (ADLs), and cognitive/communication skills due to Neurologic Conditions: 03.2 Parkinsonism   Etiologic Diagnosis Parkinson's dementia with mild acute encephalopathy   Date of Onset 01/21/25   Support System   Name Pt's spouse, Sheng present for assessment and confirmed that she provides assistance for I ADL's   Patient Preference   Nickname (Patient Preference) Alfonzo   Restrictions/Precautions   Precautions Aspiration;Bed/chair alarms;Cognitive;Fall Risk;Impulsive;Supervision on toilet/commode   Pain Assessment   Pain Assessment Tool 0-10   Pain Score No Pain   Eating Assessment   Swallow Precautions Yes   Bedside Swallow Results Yes   VBS Study Results No   Food To Mouth Yes   Able To Cut   (n/a)   Noted Coughing   Positioning Upright;Out of Bed   Safety Needs Increase Time;Cues;Freq Swallow   Meal Assessed Lunch   QI: Swallowing/Nutritional Status Mechanical Diet   Current Diet Dys[place] II;Thin   Intake Mode PO;Self   Finishes Timely Yes   Opens Packages No   Findings Pt demonstrating mild-moderate oral and mild pharayngeal dysphagia. Refer to SLP Rehab note for full details.   Type of Assistance Needed Set-up / clean-up;Supervision;Verbal cues   Physical Assistance Level No physical assistance   Eating CARE Score 4   Discharge Information   Vocational Plan Retired/not working   Patient's Discharge Plan home w/ family support/supervision   Patient's Rehab Expectations not verbalized   Barriers to Discharge Home Decreased Cognitive Function;Decreased Strength;Decreased Endurance;Safety Considerations  (aspiration risk)   Impressions Pt is an 83 y/o M w/ PMH of Parkinson's Disease with dementia (last MoCA completed in 3/2024 w/ score of 7/30), HTN, LE venous stasis dermatitis and edema, as well as GERD presented to [facility] on 1/21/25 w/ worsening agitation and change in mental status to where pt  sustained a fall with HS and R eye laceration. No sutures were needed for the laceration. CTH/N  was negative and X-ray L shoulder negative. Pt was noted to be hypothermic and hyperkalemic on admission. Wound care was consulted for R medial tibia venous ulcer. Geriatrics was consulted who recommended melatonin, delirium precautions. Speech was consulted who recommended NPO initially, but he has since been advanced to an oral diet of Level 2/thin liquids. Neurology was also consulted as the agitation, behavior symptoms, hallucinations worsened since increasing his medications, they lowered his Sinemet and added rivastigmine. There was improvement in his behavioral symptoms. Pt was on restraints and 1:1 during acute care stay but was able to be weaned from this by time of discharge and transition to the [facility]. Pt was medically stable and able to be admitted to the [facility] on 1/31/25 for continued skilled therapies.      Currently SLP services were consulted for assessment of dysphagia and overall swallow skills. Pt completed bedside dysphagia assessment in which pt is demonstrating mild-moderate oral and mild pharyngeal dysphagia at this time. Pt is a a good rehab candidate to achieve least restrictive diet upon anticipated discharge home w/ family support/supervision. Barriers which present include the following risks for aspiration such as: poor positioning, decreased cognition, ineffective mastication, holding of bolus before transfer, delay in oral transit, delay in swallow initiation, pharyngeal weakness upon swallowing, and multiple swallows due to pharyngeal weakness and wet voicing/inconsistent signs/sxs of aspiration w/ liquids. In order to address the noted barriers, skilled SLP services will address this by targeting the following interventions: oral motor exercise, proper positioning, diet modification, safe swallow strategies, family education/training, and complete VFSS as needed. ELOS ~ 2 weeks.  At this  time, pt will benefit from skilled SLP services targeting establishment of least restrictive diet without demonstrating increased risk of aspiration by time of discharge in attempts to decrease need for caregiver burden over time.   SLP Therapy Minutes   SLP Time In 1130   SLP Time Out 1230   SLP Total Time (minutes) 60   SLP Mode of treatment - Individual (minutes) 60   SLP Mode of treatment - Concurrent (minutes) 0   SLP Mode of treatment - Group (minutes) 0   SLP Mode of treatment - Co-treat (minutes) 0   SLP Mode of Treatment - Total time(minutes) 60 minutes   SLP Cumulative Minutes 60   Cumulative Minutes   Cumulative therapy minutes 150

## 2025-02-01 NOTE — PROGRESS NOTES
OT Long Term Goals       02/01/25 0830   Rehab Team Goals   ADL Team Goal Patient will require supervision with ADLs with least restrictive device upon completion of rehab program   Rehab Team Interventions   OT Interventions Self Care;Therapeutic Exercise;Home Management;Community Reintegration;Cognitive Reintegration;Cognitive Retraining;Energy Conservation;Patient/Family Education   Eating Goal   Eating Goal 05. Setup or clean-up assistance - [place] SETS UP or CLEANS UP, patient completes activity. [place] assists only prior to or following the activity.   Status Target goal - two weeks   Interventions Assistive Device;Compensation Strategies;Dysphagia Education;Neuromuscular Education;Optimal Position   Grooming Goal   Oral Hygiene Goal 04. Supervision or touching assistance- [place] provides VERBAL CUES or supervision throughout activity.   Status Target goal - two weeks   Intervention Assistive Device;Balance Work;Neuromuscular Education;Therapeutic Exercise;Tolerance Work   Tub/Shower Transfer Goal   Method Shower Stall   Assist Device Seat with Back;Grab Bar;Hand Held Shower   Status Target goal - two weeks  (SUP)   Interventions ADL Training;Assistive Device;Neuromuscular Education   Bathing Goal   Shower/bathe self Goal 03. Partial/moderate assistance - [place] does less than half the effort. [place] lifts or holds trunk or limbs and provides more than half the effort.   Status Target goal - two weeks   Intervention ADL Training;Assistive Device;Neuromuscular Education;Therapeutic Exercise   Upper Body Dressing Goal   Upper body dressing Goal 04. Supervision or touching assistance- [place] provides VERBAL CUES or supervision throughout activity.   Status Target goal - two weeks   Intervention Assistive Device;Balance Work;Neuromuscular Education;Therapeutic Exercise;Tolerance Work   Lower Body Dressing Goal   Lower body dressing Goal 03. Partial/moderate assistance - [place] does less than half the effort.  [place] lifts or holds trunk or limbs and provides more than half the effort.   Putting on/taking off footwear Goal 03. Partial/moderate assistance - [place] does less than half the effort. [place] lifts or holds trunk or limbs and provides more than half the effort.   Status Target goal - two weeks   Intervention Assistive Device;Balance Work;Neuromuscular Education;Therapeutic Exercise;Tolerance Work   Toileting Transfer Goal   Toilet transfer Goal 04. Supervision or touching assistance- [place] provides VERBAL CUES or supervision throughout activity.   Status Target goal - two weeks   Intervention ADL Training;Balance Work;Assistive Device   Toileting Goal   Toileting hygiene Goal 03. Partial/moderate assistance - [place] does less than half the effort. [place] lifts or holds trunk or limbs and provides more than half the effort.   Status Target goal - two weeks   Intervention ADL Training;Balance Work;Assistive Device

## 2025-02-01 NOTE — PROGRESS NOTES
PT [facility] STGs   02/01/25 1230   PT Transfer Goal   Transfer Type Supine to Sit;Sit to Supine;Sit to Stand;Stand Pivot;Sit Pivot;Car Transfer   Transfer Assist Level Minimal Assistance   Assistive Device Roller Walker   Environment Level Surface;Well Lit   Status Ongoing;Target goal - one week   Walking Goal   Walk Assist Level Minimum Assits   Gait Pattern Improvement Inconsistant Michelle;Excess Slow;Festination;Improper weight shift;Retropulsion;Narrow [place];Forward Flexion;Decreased foot clearance   Assist Device Roller Walker   Distance Walked 100 ft   Environment Level Surface;Well Lit   Status Ongoing;Target goal - one week   Stairs Goal   Stair Assist Level Moderate Assist;Minimum Assist   Number of Stairs 1   Technique Reciprocal;Non-reciprocal;Curb Step   Hand Rail Left   Status Ongoing;Target goal - one week   Multiple Stairs Goal Yes   Stairs Goal 2   Assist Level 2 Moderate Assist   Number of Stairs 2 12  (not needed for d/c)   Technique 2 Reciprocal;Non-reciprocal   Hand Rail 2 Bilateral   Status 2 Ongoing;Target goal - one week

## 2025-02-01 NOTE — PROGRESS NOTES
Physical Medicine and Rehabilitation Progress Note  Daniel Sanz Jr. 84 y.o. male MRN: [id number]  Unit/Bed#: [facility] 457-01 Encounter: [id number]    Etiology/Reason for Admission: Impairment of mobility, safety, Activities of Daily Living (ADLs), and cognitive/communication skills due to Neurologic Conditions:  03.2  Parkinsonism    Interval History: No acute events overnight. Seen face-to-face at bedside. Discussed care directly with RN this afternoon. There was one incident of impulsivity this afternoon during which he slowly starting to move his legs over the side of his recliner to stand up, however it was witnessed and verbally re-directable. No other safety or impulsivity concerns noted and has not otherwise been setting of bed/chair alarms. Has not required PRN neuroleptic medications. He is pleasant and eating an early dinner this afternoon. He has no current complaints or concerns. He is voiding spontaneously, there was observed urine on the bed pad this morning, however per RN suspicion is that this was functional incontinence from spilling urinal, as he has been continent the remainder of the day today. Last BM 1/31/25, continent.     Functional Update:  Physical Therapy Occupational Therapy Speech Therapy      Min assist for rolling in bed  Min to mod assist for lying to seated position  Total assist for sit to stand  Mod to max assist with chair follow for ambulation to 50 ft        Total assist for oral hygiene  Total assist for bathing  Max assist for dressing  Total assist for footwear donning/doffing  Total assist for toileting hygiene  Total assist for toilet transfers        Mild-moderate oral and mild pharyngeal dysphagia, level 2 dysphagia diet with thin liquids       Assessment/Plan - Continue plan of care as per primary attending, unless otherwise stated below:      Parkinson's Disease: Continue lower dose Sinemet QID, Rivastigmine patch. PT/OT/SLP 3-5 hours/day, 5-7 days/week. Consult Neuro  PRN. Outpatient f/u with  Neurology   Delirium/Hallucinations: Sleep log, behavior log. Discussed with team, patient is a fall risk, although his bradykinesia and impaired initiation may help decrease that risk. Will start with frequent checks, low threshold for Virtual or in person 1:1.  Continue melatonin in the evening  Low dose PRN seroquel ordered by Neuro  Adding PRN olanzapine for very severe agitation ONLY.   Redirect, reorient first.   Bed/chair alarms  Optimize bowel/bladder program (see those entries).   Avoid physical restraint and deliriogenic meds.   Bladder Incontinence: Suspect intermittent functional incontinence, has been otherwise continent. Timed voids and scans, initial scans on [facility] admission <50 cc. UA negative during acute hospital stay. Of note at home was on Myrbetriq daily. Would need family to bring from home.   Bowel Incontinence/Constipation: Had constipation inpatient. Has colace, senna 2 tabs, adding daily miralax. Has Prn suppository and lactulose. Last BM was 1/27.  Would give PRN today.   Oropharyngeal Dysphagia: Aspiration precautions. Recently advanced to Level 2/thins, but is coughing. SLP consulted to evaluate. May need VBS here.   At risk for VTE: Lovenox, SCDs, ambulation. Will not go home on lovenox.   Venous stasis dermatitis/pedal edema/recent venous ulcer: Monitor site, ACE wraps, elevate legs. Elevate heels off bed to prevent wounds. At home was on Lasix 40mg daily. Monitor closely  L Shoulder Pain: XR WNL. Continue Lidoderm, scheduled tylenol for now.   Vitamin D Insufficiency: Continue current supplementation  Hypertension: Home: Amlodipine 5mg daily, Lasix 40mg daily, Losartan 50mg daily (unclear how long he was taking). Here: Amlodipine 5mg daily. Monitor and adjust as appropriate  Anemia: 1/29 Hgb stable at 11.3. Monitor intermittently. Continue B12 supplement.  Dispo: ELOS 2 weeks with goals to discharge home at Sup level of function. Ambulation vs. Wheelchair to be  determined while here.   Will need outpatient f/u with Neuro      Current Facility-Administered Medications:     acetaminophen (TYLENOL) tablet 650 mg, 650 mg, Oral, TID, Chelsea Dasilva PA-C, 650 mg at 02/01/25 1700    amLODIPine (NORVASC) tablet 5 mg, 5 mg, Oral, Daily, Chelsea Dasilva PA-C, 5 mg at 02/01/25 0857    Artificial Tears Op Soln 1 drop, 1 drop, Both Eyes, Q4H PRN, Chelsea Dasilva PA-C, 1 drop at 02/01/25 0920    bisacodyl (DULCOLAX) rectal suppository 10 mg, 10 mg, Rectal, Daily PRN, Ashley Depadua, MD    calcium carbonate (TUMS) chewable tablet 1,000 mg, 1,000 mg, Oral, Daily PRN, Chelsea Dasilva PA-C    carbidopa-levodopa (SINEMET)  mg per tablet 1.5 tablet, 1.5 tablet, Oral, 4x Daily, Chelsea Dasilva PA-C, 1.5 tablet at 02/01/25 1736    Cholecalciferol (VITAMIN D3) tablet 1,000 Units, 1,000 Units, Oral, Daily, Chelsea Dasilva PA-C, 1,000 Units at 02/01/25 0852    [START ON 2/3/2025] cyanocobalamin (VITAMIN B-12) tablet 1,000 mcg, 1,000 mcg, Oral, Once per day on Monday Wednesday Friday, Chelsea Dasilva PA-C    docusate sodium (COLACE) capsule 100 mg, 100 mg, Oral, BID, RODNEY Clifton-JUS, 100 mg at 02/01/25 1737    enoxaparin (LOVENOX) subcutaneous injection 40 mg, 40 mg, Subcutaneous, Daily, Chelsea Dasilva PA-C, 40 mg at 02/01/25 0858    lactulose (CHRONULAC) oral solution 20 g, 20 g, Oral, Daily PRN, Ashley Depadua, MD    lidocaine (LIDODERM) 5 % patch 1 patch, 1 patch, Topical, Daily, Ashley Depadua, MD, 1 patch at 02/01/25 1739    melatonin tablet 3 mg, 3 mg, Oral, QPM, Ashley Depadua, MD, 3 mg at 01/31/25 2107    OLANZapine (ZyPREXA) IM injection 2.5 mg, 2.5 mg, Intramuscular, Q4H PRN, Bakari Rivera    ondansetron (ZOFRAN-ODT) dispersible tablet 4 mg, 4 mg, Oral, Q6H PRN, Chelsea Dasilva PA-C    polyethylene glycol (MIRALAX) packet 17 g, 17 g, Oral, Daily, Ashley Depadua, MD, 17 g at 02/01/25 0848    QUEtiapine  (SEROquel) tablet 12.5 mg, 12.5 mg, Oral, Q8H PRN, Chelsea Dasilva PA-C    rivastigmine (EXELON) 4.6 mg/24 hr TD 24 hr patch 1 patch, 1 patch, Transdermal, Daily, Chelsea Dasilva PA-C, 1 patch at 02/01/25 0859    senna (SENOKOT) tablet 17.2 mg, 2 tablet, Oral, BID, Chelsea Dasilva PA-C, 17.2 mg at 02/01/25 1737    Physical Exam:  Temp:  [98.3 °F (36.8 °C)-98.6 °F (37 °C)] 98.3 °F (36.8 °C)  HR:  [71-83] 72  Resp:  [16] 16  BP: (118-184)/(56-76) 154/67  SpO2:  [95 %-99 %] 95 %    GEN: Patient seen resting comfortably in bedside recliner, NAD  HEENT: NCAT; there is mild bilateral tearing present with reported subjective irritation that improves with clearing eyes; no apparent conjunctival injection, no purulent drainage from the eyes on either side; mucous membranes moist  CV: There is BLE edema, ACE wraps are currently donned bilaterally  PULM: Breathing comfortably on room air  ABD: Non-distended  SKIN: No obvious rashes or lesions on exposed skin  NEURO:  Awake and alert, pleasant and interactive but highly tangential to questioning, often very context inappropriate answers to simple questions; does appear to follow simple commands relatively consistently  Speech is slow and mildly dysarthric  Moving all extremities spontaneously in chair    Laboratory:  Labs reviewed, none new  Results from last 7 days   Lab Units 01/29/25  0531 01/28/25  0626 01/27/25  0620   HEMOGLOBIN g/dL 11.3* 10.7* 10.6*   HEMATOCRIT % 34.2* 33.0* 33.3*   WBC Thousand/uL 9.86 6.19 5.99     Results from last 7 days   Lab Units 01/29/25  0531 01/28/25  0626 01/27/25  0620   BUN mg/dL 23 21 21   SODIUM mmol/L 140 139 143   POTASSIUM mmol/L 4.3 4.9 4.5   CHLORIDE mmol/L 105 104 107   CREATININE mg/dL 0.99 1.05 1.04   AST U/L 15  --   --    ALT U/L 7  --   --             Diagnostic Studies: Reviewed, no new imaging   No orders to display     Total time spent:  35 minutes, with more than 50% spent counseling/coordinating care.  Counseling includes discussion with patient re: progress in therapies, functional issues observed by therapy staff, and discussion with patient his/her current medical state/wellbeing. Coordination of patient's care was performed in conjunction with Internal Medicine service to monitor patient's labs, vitals, and management of their comorbidities.     Sim Chavarria MD  Attending Physician  Physical Medicine and Rehabilitation  [facility]

## 2025-02-01 NOTE — PROGRESS NOTES
SLP Dysphagia Assessment       02/01/25 3310   Pain Assessment   Pain Assessment Tool 0-10   Pain Score No Pain   Restrictions/Precautions   Precautions Aspiration;Bed/chair alarms;Cognitive;Fall Risk;Impulsive;Supervision on toilet/commode   Eating   Type of Assistance Needed Set-up / clean-up;Supervision;Verbal cues   Physical Assistance Level No physical assistance   Eating CARE Score 4   Swallow Assessment   Swallow Treatment Assessment   Bedside Dysphagia Evaluation      Patient Name: Daniel Sanz Jr.    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)    Dysphagia    Bowel and bladder incontinence      Past Medical History  Past Medical History:   Diagnosis Date    Arthritis     Asbestos exposure     Asbestosis (HCC)     GERD (gastroesophageal reflux disease)     Hemoptysis 5/11/2022    Hypertension     Lung disease     Parkinson's disease (HCC)        Past Surgical History  Past Surgical History:   Procedure Laterality Date    HERNIA REPAIR      INTRACAPSULAR CATARACT EXTRACTION Left     KNEE SURGERY      many years ago    SHOULDER SURGERY           Summary   Pt presented with s/s suggestive of mild-moderate oral and suspected mild pharyngeal dysphagia.  Symptoms or concerns included the following: Lip seal and bolus retrieval was fairly functional around utensils, straw but near end of meal, pt's lip containment was decreased where pt was noted to demonstrate anterior loss of saliva which was combined w/ magic cup which was last item consumed. Mastication was prolonged with the materials administered today noting that pt would not completed full mastication of bolus presented prior to taking next bite. Pt eventually demonstrating larger boluses at times, in which verbal cues needed to elicit swallows.  Bolus formation was decreased  "as result of this where pt did benefit from increased time and use of liquid wash to clear minimal oral residual throughout meal. Transfers given soft/puree textures was noted to be delayed and it was suspected that w/ larger consecutive sips, decreased oral control occurring. Swallow initiation was delayed across all textures observed w/ meal.  Laryngeal rise was palpated and judged to be mildly decreased upon palpation.   Pt did have ongoing wetness in voice throughout meal, but did have spontaneous cough x1 after peaches and throat clear x1 after soup. It was observed that pt did have wet voicing prior to evaluation as well, indicating decreased management of own saliva.      Risk/s for Aspiration: decreased cognition, poor positioning if not OOB, difficulty in feeding self, inability to carryover swallow strategies     Family Education: Of note, pt's spouse, Sheng, was present at beginning and end of dysphagia assessment, to where SLP attempted to explain process of dysphagia tx sessions, plans for trial advancement under SLP supervision only, monitoring current diet and ensuring that pt is not at increased risk of aspiration on current diet, reviewed swallow strategies, etc. It was difficult for SLP to provide overall education given this as pt's spouse was interrupting SLP with additional concerns, observations, pt's prior diet, etc. Spouse was HIGHLY fixated on pt completing a \"swallow test\" and \"using a machine on him.\" SLP attempting to have spouse provide additional details, but spouse was unable to elaborate as she reported that when pt does receive home ST, she is in another room. SLP asking spouse the name of home ST (Luis) and will attempt to reach out to this SLP for more details. SLP did educate spouse that the \"swallow test\" is not an immediate thing to complete, as education again reviewed about plans for trialing diet advancement, swallow strategies, etc. It could occur later in pt's stay, " pending progress. SLP also educating spouse about diet recommendations and need for education/training on diet level not only throughout stay but closer toward discharge. Spouse also mentioned that home ST was targeting pt's cognitive skills, which can be addressed later in stay as able to where dysphagia tx sessions are to be prioritized at this time. Spouse verbalizing understanding w/ recommendations as outlined.      Recommendations:  Recommended Diet: mechanically altered/level 2 diet and thin liquids   Recommended Form of Meds: whole with puree   Aspiration precautions and swallowing strategies: upright posture, only feed when fully alert, slow rate of feeding, small bites/sips, cough when demonstrating wet voicing, quiet environment (tv off, limit talking, door closed, etc.), alternating bites and sips, and OOB for meals preferred  Other Recommendations: Continue frequent oral care      FULL supervision w/ meals. Will need setup  Results reviewed with:  patient, pt's spouse- DOLORES Patricio- Anne    Aspiration precautions posted.    F/u ST tx: Pt will continue to benefit from ongoing skilled dysphagia tx sessions to establish safest least restrictive diet w/o increased oropharyngeal or aspiration sxs as well as monitor ability to carryover swallow strategies independently.      Plan: Monitor current diet           Review of swallow strategies w/ pt and spouse           Ongoing family education/training w/ spouse           Diet upgrade trials w/ SLP supervision only        Current Medical Status  Pt is a 84 y.o. male with PMHx Parkinson's disease with dementia ; HTN; chronic lower extremity edema who presented to the [facility] ED on 1/21/25  with altered mental status after a fall at home where he struck his head and developed a laceration of the right eye. His wife reported he had been becoming intermittently agitated usually around times of sleep or napping. While in the  "ED he was noted to be hypothermic with labs on admission showing new hyperkalemia. Daniel was admitted for further eval/assessment/consultation. Imaging was negative for any acute findings . Neuro was consulted. Per neuro - \"Patient's Sinemet was increased 1 week prior to hospital admission from 2 tab TID to 2.5 tab TID due to patient having significant bradykinesia, cogwheeling, and tremor at the lower dose. Since then, patient has had a decline in his behavior, especially at night. His wife reports that he will frequently have abnormal movements while sleeping and awaken from sleep with hallucinations. He has also been agitated and aggressive with garbled speech.\"  Per neuro \"Continue Sinemet , 1.5 tabs 4x daily ; He was started on Exelon patch by prior Neurologist and will Continue at this time as long as he continues to do well. During hospital course Daniel was noted impulsive with use of restraints at times during admission. PT/OT therapies were consulted and continue to follow patient at this time and are recommending inpatient acute rehab when medically stable. All involved medical disciplines feel/agree patient is medically ready for discharge at this time. As such patient presents to [facility] for PT/OT/SLP needs in the setting of debility s/p AMS and falls.      Allergies:  No known food allergies    Past medical history:  Please see H&P for details    Special Studies:  5/24/24 FL Barium Swallow IMPRESSION: Mildly dilated esophagus with diminished motility.     Social/Education/Vocational Hx:  Pt lives w/ spouse, Sheng     Swallow Information   Current Risks for Dysphagia & Aspiration: General debilitation; Cognitive deficit; Mental status change; Behavior issues; Hx neurologic dx (Parkinson's); Positioning issues  Current Symptoms/Concerns: Coughs w/ liquids; Cough on own secretions; Difficulty chewing; Holding food in mouth  Current Diet: mechanically altered/level 2 diet and thin liquids   Baseline " Diet: regular diet and thin liquids      Baseline Assessment   Behavior/Cognition: alert  Speech/Language Status: able to participate in basic conversation and able to follow commands  Patient Positioning: upright in recliner  Pain Status/Interventions/Response to Interventions:  No report of or nonverbal indications of pain.       Swallow Mechanism Exam  Facial: masked facies  Labial: decreased strength  Lingual: decreased ROM  Velum: unable to visualize  Mandible: adequate ROM  Dentition: adequate  Vocal quality:clear/adequate and noticed wet voicing throughout assessment   Volitional Cough: unable to elicit volitional cough or throat clear but able to spontaneously elicit throat clear/cough  Respiratory Status: on RA        Consistencies Assessed and Total Amount Consumed: 75% of meal and 120cc of thins  Consistencies Administered: thin liquids, puree, and mechanical soft solids  Materials administered included: minced chicken, chopped green beans, macaroni and cheese, tomato soup, lemon meringue pie, canned peaches, magic cup, thins by straw      Oral Stage: mild-moderate  Lip seal and bolus retrieval was fairly functional around utensils, straw but near end of meal, pt's lip containment was decreased where pt was noted to demonstrate anterior loss of saliva which was combined w/ magic cup which was last item consumed. Mastication was prolonged with the materials administered today noting that pt would not completed full mastication of bolus presented prior to taking next bite. Pt eventually demonstrating larger boluses at times, in which verbal cues needed to elicit swallows.  Bolus formation was decreased as result of this where pt did benefit from increased time and use of liquid wash to clear minimal oral residual throughout meal. Transfers given soft/puree textures was noted to be delayed and it was suspected that w/ larger consecutive sips, decreased oral control occurring.    Pharyngeal Stage:  mild  Swallow initiation was delayed across all textures observed w/ meal.  Laryngeal rise was palpated and judged to be mildly decreased upon palpation.   Pt did have ongoing wetness in voice throughout meal, but did have spontaneous cough x1 after peaches and throat clear x1 after soup. It was observed that pt did have wet voicing prior to evaluation as well, indicating decreased management of own saliva.     Esophageal Concerns: none reported    Strategies and Efficacy: verbal cues needed for: alternating solids/liquids, initiate swallows after piecemeal bites, slow rate of intake, attempts at volitional cough/throat clear but not successful,    Summary and Recommendations (see above)   Swallow Assessment Prognosis   Prognosis Fair   Prognosis Considerations Age;Co-morbidities;Family/community support;Medical prognosis;Medical diagnosis;Severity of impairments;Ability to carry over;New learning ability   SLP Therapy Minutes   SLP Time In 1130   SLP Time Out 1230   SLP Total Time (minutes) 60   SLP Mode of treatment - Individual (minutes) 60   SLP Mode of treatment - Concurrent (minutes) 0   SLP Mode of treatment - Group (minutes) 0   SLP Mode of treatment - Co-treat (minutes) 0   SLP Mode of Treatment - Total time(minutes) 60 minutes   SLP Cumulative Minutes 60   Therapy Time missed   Time missed? No

## 2025-02-01 NOTE — PLAN OF CARE
Problem: PAIN - ADULT  Goal: Verbalizes/displays adequate comfort level or baseline comfort level  Description: Interventions:  - Encourage patient to monitor pain and request assistance  - Assess pain using appropriate pain scale  - Administer analgesics based on type and severity of pain and evaluate response  - Implement non-pharmacological measures as appropriate and evaluate response  - Consider cultural and social influences on pain and pain management  - Notify physician/advanced practitioner if interventions unsuccessful or patient reports new pain  2/1/2025 0933 by Anne Catherine RN  Outcome: Progressing  2/1/2025 0930 by Anne Catherine, RN  Outcome: Progressing

## 2025-02-01 NOTE — PROGRESS NOTES
PT [facility] LTGs   02/01/25 1230   Rehab Team Goals   Transfer Team Goal Patient will require supervision with transfers with least restrictive device upon completion of rehab program   Locomotion Team Goal Patient will require supervision with locomotion with least restrictive device upon completion of rehab program   Rehab Team Interventions   PT Interventions Gait Training;Therapeutic Exercise;Neuromuscualr Reeducation;Transfer Training;Community Reintegration;Bed Mobility;Patient/Family Education   PT Transfer Goal   Roll left and right Goal 05. Setup or clean-up assistance - [place] SETS UP or CLEANS UP, patient completes activity. [place] assists only prior to or following the activity.   Sit to lying Goal 05. Setup or clean-up assistance - [place] SETS UP or CLEANS UP, patient completes activity. [place] assists only prior to or following the activity.   Lying to sitting on side of bed Goal 05. Setup or clean-up assistance - [place] SETS UP or CLEANS UP, patient completes activity. [place] assists only prior to or following the activity.   Sit to stand Goal 05. Setup or clean-up assistance - [place] SETS UP or CLEANS UP, patient completes activity. [place] assists only prior to or following the activity.   Chair/bed-to-chair transfer Goal 05. Setup or clean-up assistance - [place] SETS UP or CLEANS UP, patient completes activity. [place] assists only prior to or following the activity.   Car Transfer Goal 04. Supervision or touching assistance- [place] provides VERBAL CUES or supervision throughout activity.   Assistive Device Roller Walker  (vs SPC)   Environment Level Surface;Well Lit   Status Ongoing;Target goal - two weeks   Locomotion Goal   Primary discharge mode of locomotion Walking   Target Walk Distance 150 ft   Assist Device Roller Walker  (vs SPC)   Gait Pattern Improvement Inconsistant Michelle;Excess Slow;Festination;Improper weight shift;Retropulsion;Narrow ESTELITA;Forward Flexion;Decreased foot clearance    Environment Level Surface;Well Lit   Walk 10 feet Goal 05. Setup or clean-up assistance - [place] SETS UP or CLEANS UP, patient completes activity. [place] assists only prior to or following the activity.   Walk 50 feet with 2 turns Goal 05. Setup or clean-up assistance - [place] SETS UP or CLEANS UP, patient completes activity. [place] assists only prior to or following the activity.   Walk 150 feet Goal 04. Supervision or touching assistance- [place] provides VERBAL CUES or supervision throughout activity.   Walking 10 feet on uneven surface 04. TOUCHING/ STEADYING assistance as patient completes activity.   Walking Goal Status Ongoing;Target goal - two weeks   Wheel 50 feet with 2 turns Goal 09. Not applicable   Wheel 150 feet Goal 09. Not applicable   Stairs Goal   1 step or curb goal 04. TOUCHING/ STEADYING assistance as patient completes activity.   4 steps Goal 03. Partial/moderate assistance - [place] does less than half the effort. [place] lifts or holds trunk or limbs and provides more than half the effort.  (not needed for d/c home)   12 steps Goal 03. Partial/moderate assistance - [place] does less than half the effort. [place] lifts or holds trunk or limbs and provides more than half the effort.  (not needed for d/c home)   Assist Level Contact Guard;Minimum Assist   Number of Stairs 1   Technique Reciprocal;Non-reciprocal;Curb Step   Hand Rail Left  (b/l HR for full flight)   Status Ongoing;Target goal - two weeks   Object Retrieval Goal   Picking up object Goal 05. Setup or clean-up assistance - [place] SETS UP or CLEANS UP, patient completes activity. [place] assists only prior to or following the activity.   Assistive Device  Reacher   Small Object Picked Up marker

## 2025-02-01 NOTE — ASSESSMENT & PLAN NOTE
Home: amlodpine 5mg daily  Here: Same as home medication.  BP stable.  Monitor for orthostasis due to PD.

## 2025-02-01 NOTE — PROGRESS NOTES
Progress Note - Hospitalist   Name: Daniel Sanz Jr. 84 y.o. male I MRN: [id number]  Unit/Bed#: -01 I Date of Admission: 1/31/2025   Date of Service: 2/1/2025 I Hospital Day: 1    Assessment & Plan  Parkinson's disease (HCC)  Continue Sinemet 1.5 tabs 4x daily. Currently ordered at times pt takes the medication at home. May need to adjust timing based on therapy schedule.  Follows with Dr. Cavanaugh as an outpt.  Therapy per PMR.  HTN (hypertension)  Home: amlodpine 5mg daily  Here: Same as home medication.  BP stable.  Monitor for orthostasis due to PD.  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  Continue rivastigmine patch - will check price prior to DC.  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  He was taking Lasix 3x weekly.  It is not currently ordered.  Will monitor for edema and add back as indicated.  Dysphagia  Continue modified diet.  SLP to follow.    The above assessment and plan was reviewed and updated as determined by my evaluation of the patient on 2/1/2025.    History of Present Illness   Patient seen and examined. Patients overnight issues or events were reviewed with nursing staff. New or overnight issues include the following:     Pt seen in his room eating lunch. He states that he is doing well. He denies any current complaints.    A 10 point review of systems was negative except for what is noted in the HPI.    Objective :  Temp:  [97.7 °F (36.5 °C)-98.6 °F (37 °C)] 98.6 °F (37 °C)  HR:  [71-83] 71  BP: (145-184)/(68-76) 148/68  Resp:  [16] 16  SpO2:  [95 %-99 %] 95 %  O2 Device: None (Room air)    Invasive Devices       None                   Physical Exam  General Appearance: NAD; pleasant  HEENT: PERRLA, conjuctiva normal; mucous membranes moist; face symmetrical  Neck:  Supple  Lungs: clear bilaterally, normal respiratory effort, no retractions, expiratory effort normal, on room air  CV:  regular rate and rhythm, no murmurs rubs or gallops noted   ABD: soft non tender, +BS x4  EXT: DP pulses intact, no lymphadenopathy, no edema  Skin: normal turgor, normal texture, no rash  Psych: affect normal, mood normal  Neuro: Awake and alert. +tremor  The above physical exam was reviewed and updated as determined by my evaluation of the patient on 2/1/2025.      Lab Results: I have reviewed the following results:  Results from last 7 days   Lab Units 01/29/25  0531 01/28/25  0626   WBC Thousand/uL 9.86 6.19   HEMOGLOBIN g/dL 11.3* 10.7*   HEMATOCRIT % 34.2* 33.0*   PLATELETS Thousands/uL 203 188     Results from last 7 days   Lab Units 01/29/25  0531 01/28/25  0626   SODIUM mmol/L 140 139   POTASSIUM mmol/L 4.3 4.9   CHLORIDE mmol/L 105 104   CO2 mmol/L 28 32   BUN mg/dL 23 21   CREATININE mg/dL 0.99 1.05   CALCIUM mg/dL 9.0 8.8             Results from last 7 days   Lab Units 01/27/25  1643   POC GLUCOSE mg/dl 119       Imaging Results Review: No pertinent imaging studies reviewed.  Other Study Results Review: No additional pertinent studies reviewed.    Review of Scheduled Meds: Medications  reviewed and reconciled as needed  Current Facility-Administered Medications   Medication Dose Route Frequency Provider Last Rate    acetaminophen  650 mg Oral TID Chelsea Dasilva PA-C      amLODIPine  5 mg Oral Daily Chelsea Dasilva PA-C      Artificial Tears  1 drop Both Eyes Q4H PRN Chelsea Dasilva PA-C      bisacodyl  10 mg Rectal Daily PRN Ashley Depadua, MD      calcium carbonate  1,000 mg Oral Daily PRN Chelsea Dasilva PA-C      carbidopa-levodopa  1.5 tablet Oral 4x Daily Chelsea Dasilva PA-C      Cholecalciferol  1,000 Units Oral Daily Chelsea Dasilva PA-C      [START ON 2/3/2025] vitamin B-12  1,000 mcg Oral Once per day on Monday Wednesday Friday Chelsea Dasilva PA-C      docusate sodium  100 mg Oral BID Chelsea Dasilva PA-C      enoxaparin  40 mg Subcutaneous Daily Chelsea  SURAJ Dasilva      lactulose  20 g Oral Daily PRN Ashley Depadua, MD      lidocaine  1 patch Topical Daily Ashley Depadua, MD      melatonin  3 mg Oral QPM Ashley Depadua, MD      OLANZapine  2.5 mg Intramuscular Q4H PRN Bakari Rivera      ondansetron  4 mg Oral Q6H PRN Chelsea Dasilva PA-C      polyethylene glycol  17 g Oral Daily Ashley Depadua, MD      QUEtiapine  12.5 mg Oral Q8H PRN Chelsea Dasilva PA-C      rivastigmine  1 patch Transdermal Daily Chelseacaitlyn Dasilva PA-C      senna  2 tablet Oral BID Chelsea Dasilva PA-C         VTE Pharmacologic Prophylaxis: Lovenox  Code Status: Level 1 - Full Code  Current Length of Stay: 1 day(s)    Administrative Statements   I have spent a total time of 35 minutes in caring for this patient on the day of the visit/encounter including Prognosis, Risks and benefits of tx options, Instructions for management, Patient and family education, Importance of tx compliance, Risk factor reductions, Impressions, Counseling / Coordination of care, Documenting in the medical record, Reviewing / ordering tests, medicine, procedures  , Obtaining or reviewing history  , and Communicating with other healthcare professionals .  ** Please Note:  voice to text software may have been used in the creation of this document. Although proof errors in transcription or interpretation are a potential of such software**

## 2025-02-01 NOTE — PROGRESS NOTES
PT [facility] Evaluation   02/01/25 1230   Patient Data   Rehab Impairment Impairment of mobility, safety, Activities of Daily Living (ADLs), and cognitive/communication skills due to Neurologic Conditions: 03.2 Parkinsonism   Etiologic Diagnosis Parkinson's dementia with mild acute encephalopathy   Date of Onset 01/21/25   Support System   Name Pt's wife is Sheng, who was present for PT evaluation. She reports being  home most of the day and usually only leaves Alfonzo alone for short periods of time (ie running to [employer]) and whe she does this, she makes sure he is sitting in his chair and has everything he needs near him before she leaves. She also comments that if she doesn't feel like he's safe to leave alone, then she won't leave the house.   Able to provide 24 hour supervision Yes   Able to provide physical help? Yes  (able to help with tasks around the house however reports having back problems and is unable to help move his legs if they're really swollen and heavy)   Home Setup   Type of Home Single Level   Method of Entry Stairs   Number of Stairs 1  (wife reports son putting in a grab bar on the L side for the pt to use)   Number of Stairs in Home 12  (wife reports full flight to the basement with b/l HRs which is another way the pt can leave the house)   In Home Hand Rail Bilateral   First Floor Bathroom Full;Shower;Grab Bars   First Floor Setup Available Yes   Home Modification Comment pending progress   Available Equipment Roller Walker;Single Point Cane;Wheelchair;Long Handled Adaptive Equipment  (has bed rail on L side of the bed)   Baseline Information   Transportation Family/friends drive   Prior Device(s) Used Roller Walker;Single Point Cane  (wife reports pt using the RW more recently after having some falls, but he also tends to use the SPC and at times no device at all)   Prior IADL Participation   Money Management   (wife performs)   Meal Preparation   (wife performs)   Laundry   (wife performs)   Home  Cleaning   (wife performs)   Prior Level of Function   Self-Care 2. Needed Some Help - Patient needed a partial assistance from another person to complete activities.   Indoor-Mobility (Ambulation) 3. Independent - Patient completed the activities by him/herself, with or without an assistive device, with no assistance from a helper.  (vs distant supervision w/ and w/o AD)   Stairs 3. Independent - Patient completed the activities by him/herself, with or without an assistive device, with no assistance from a helper.  (wife reports someone always being there to supervise the pt)   Functional Cognition 1. Dependent - A helper completed the activities for the patient.   Prior Assistance Needed for Driving;Household Chores/Cleaning;Meal Preparation;Medication Management;Money Management;Shopping   Prior Device Used D. Walker  (per wife, has been using RW vs SPC)   Falls in the Last Year   Number of falls in the past 12 months 5   Type of Injury Associated with Fall Major injury   Patient Preference   Nickname (Patient Preference) (S)  Alfonzo   Psychosocial   Psychosocial (WDL) WDL   Restrictions/Precautions   Precautions Aspiration;Bed/chair alarms;Cognitive;Fall Risk;Hard of hearing;Supervision on toilet/commode  (continue to recommend Q2 toileting for behavioral management although no behavioral issues during this session. Continue to monitor behaviors throughout the day in event of sundowning; (+) abd binder and ace wrapping BLE for orthos)   Weight Bearing Restrictions No   ROM Restrictions No   Braces or Orthoses Other (Comment)  (BLE ace wrapping, (+) abd binder)   Pain Assessment   Pain Assessment Tool 0-10   Pain Score No Pain   Transfer Bed/Chair/Wheelchair   Type of Assistance Needed Physical assistance   Physical Assistance Level Total assistance   Comment without AD (occ PLOF) anticipate total assist x2; with RW modAx1 for SPT   Chair/Bed-to-Chair Transfer CARE Score 1   Roll Left and Right   Type of Assistance  Needed Physical assistance   Physical Assistance Level 25% or less   Comment minAx1 for rolling b/l, use of L sided railing similar to home set up   Roll Left and Right CARE Score 3   Sit to Lying   Type of Assistance Needed Physical assistance   Physical Assistance Level 26%-50%   Comment min-modAx1 for repositioning once lying down initially due to being crooked in bed   Sit to Lying CARE Score 3   Lying to Sitting on Side of Bed   Type of Assistance Needed Physical assistance   Physical Assistance Level 26%-50%   Comment min-modAx1 with use of L bed railing for trunk control   Lying to Sitting on Side of Bed CARE Score 3   Sit to Stand   Type of Assistance Needed Physical assistance   Physical Assistance Level Total assistance   Comment without AD (occ PLOF) anticipate total assist x2; with RW mod-maxAx1 due to poor power production while standing up and retropulsion in standing; provided intervention of cueing pt for hand placement as he kept pulling on the RW to stand up, increasing his risk for falls   Sit to Stand CARE Score 1   Picking Up Object   Comment wife reports picking up anything that is dropped on the floor, they do have reachers however pt rarely uses them   Car Transfer   Type of Assistance Needed Physical assistance   Physical Assistance Level Total assistance   Comment with SPC (PLOF when leaving the house) anticipate total assist x2; with RW modAx1 for SPT into mock car   Car Transfer CARE Score 1   Ambulation   Primary Mode of Locomotion Prior to Admission Walk   Distance Walked (feet) 50 ft   Assist Device Roller Walker   Gait Pattern Inconsistant Michelle;Slow Michelle;Decreased foot clearance;Festination;Forward Flexion;Retropulsion;Narrow ESTELITA;Step to;Step through;Improper weight shift   Limitations Noted In Balance;Coordination;Device Management;Endurance;Heel Strike;Midline Orientation;Posture;Safety;Sensation;Sequencing;Speed;Strength;Swing   Provided Assistance with:  "Balance;Direction;Trunk Support;Weight Shift   Walk Assist Level Moderate Assist;Maximum Assist;Chair Follow   Findings only assessed ambulation with RW during today's session due to pt's severe festination and freezing episodes and for safety reasons; noted increased freezing when turning around or while navigating tighter spaces like his room. He responded well to cues to take \"longer steps\" however occ required those cues to be repeated before responding to them. May benefit from tying a theraband around the front of his RW as a visual cue to step to the theraband, which is something he can continue at home. Per wife, he sometimes walks better with the SPC, so recommending in future sessions assessing ambulation with SPC.   Walk 10 Feet   Type of Assistance Needed Physical assistance   Physical Assistance Level Total assistance   Comment with RW and second person CF   Walk 10 Feet CARE Score 1   Walk 50 Feet with Two Turns   Type of Assistance Needed Physical assistance   Physical Assistance Level Total assistance   Comment with Rw and second person for CF   Walk 50 Feet with Two Turns CARE Score 1   Walk 150 Feet   Comment fatigue levels   Reason if not Attempted Safety concerns   Walk 150 Feet CARE Score 88   Walking 10 Feet on Uneven Surfaces   Type of Assistance Needed Physical assistance   Physical Assistance Level Total assistance   Comment with RW over floor mat modAx2   Walking 10 Feet on Uneven Surfaces CARE Score 1   Wheel 50 Feet with Two Turns   Reason if not Attempted Activity not applicable   Wheel 50 Feet with Two Turns CARE Score 9   Wheel 150 Feet   Reason if not Attempted Activity not applicable   Wheel 150 Feet CARE Score 9   Curb or Single Stair   Style negotiated Single stair   Type of Assistance Needed Physical assistance   Physical Assistance Level 76% or more   Comment maxAx1 for single  step using LHR to simulate JAYLEN   1 Step (Curb) CARE Score 2   4 Steps   Reason if not Attempted " Safety concerns   4 Steps CARE Score 88   12 Steps   Reason if not Attempted Safety concerns   12 Steps CARE Score 88   Stairs   Type  Steps   # of Steps 1   Weight Bearing Precautions Fall Risk   Assist Devices Single Rail  (LHR to simulate L grab bar at home)   Findings required maxAx1 due to retropulsion as he stepped up onto single step and descending retro. In future sessions please assess descending facing forwards as well; goals will be made for full flight however does not need to be performed for d/c home as he is able to enter/exit through the front door 1 JAYLEN.   Comprehension   QI: Comprehension 3. Usually Understands: Understands most conversations, but misses some part/intent of message. Requires cues at times to understand.   Expression   QI: Expression 3. Exhibits some difficulty with expressing needs and ideas (e.g., some words or finishing thoughts) or speech is not clear   RLE Assessment   RLE Assessment X   Strength RLE   RLE Overall Strength 4/5   LLE Assessment   LLE Assessment X   Strength LLE   LLE Overall Strength 4/5   Cognition   Overall Cognitive Status Impaired   Arousal/Participation Alert;Responsive;Cooperative   Attention Within functional limits   Orientation Level Oriented to person;Oriented to place;Disoriented to time;Disoriented to situation   Memory Decreased long term memory;Decreased short term memory;Decreased recall of recent events;Decreased recall of precautions   Following Commands Follows one step commands with increased time or repetition   Comments (S)  Pt was not impulsive during PT evaluation, required repeated single step commands but did not get agitated and expressed appreciation for education regarding his expected stay here. Does appear to be [place] however pt's wife thinks it's more of a comprehension issue rather than actually being able to hear. Continue to recommend Q2 toileting in event of sundowning later in the day. Did have pt demonstrate how to use  "call bell to ring for assistance at end of session .   Vision   Vision Comments reading glasses   Objective Measure   PT Measure(s) ortho BP (see below); contacted PAC to place order for abd binder; family education regarding current functional status (see assessment for further details); wife asking about seeing if insurance can cover a new RW for them as the one they got from the hospital before broke at home, informed her that primary PT/case management can look into that   PT Findings (S)  seated 134/61, standing 110/55; with abd binder seated 148/67, standing 128/68, standing after 3mins 134/66   Discharge Information   Vocational Plan Retired/not working   Patient's Discharge Plan home with family support   Patient's Rehab Expectations Pt unable to voice personal goals, but wife's goal: \"to get him back home with me\"   Barriers to Discharge Home Limited Family Support;Unsafe Home Setup;Decreased Cognitive Function;Decreased Strength;Decreased Endurance;Safety Considerations  (limited family support in regards to wife's inability to provide physical assistance if needed)   Impressions Daniel Sanz is a pleasant 84 y.o. male who was admitted on 1/21/25 due to with altered mental status after a fall at home where he struck his head and developed a laceration of the right eye . Pt was dx with Parkinson's dementia with mild acute encephalopathy and transferred to [facility] to receive skilled therapy services. Pt has a past medical history of Arthritis, Asbestos exposure, Asbestosis ([facility]), GERD (gastroesophageal reflux disease), Hemoptysis, Hypertension, Lung disease, and Parkinson's disease ([facility]). PTA, he required minimal assistance/supervision with all mobilities and ADLs/IADLs which were performed by his wife Sheng. He alternated between using a SPC and RW, however more recently has been using a RW due to experiencing multiple falls, but his wife does endorse that he sometimes walks around the house without an AD at " all. Upon evaluation, he is performing below his baseline of function, requiring increased assistance with use of RW for transfers and ambulation due to increased functional impairments that are commonly seen with PD, including retropulsion, festination, and freezing episodes. He also is (+) for orthostatic hypotension with positional changes that results in some dizziness/lightheadedness with mobilities, however this seemed to improve with the addition of an abdominal binder and BLE ace wrapping. In addition to assessing functional mobilities, majority of the session was spent providing education to his wife regarding these impairments seen that are typical for PD but may be new for Daniel. This included the pathology of PD, how it affects the amplitude of one's movements, and the increased risk for falls associated with it. He is a good candidate for skilled rehab to address the following deficits: decreased BLE strength, impaired static/dynamic balance, impaired ambulation with RW, and decreased safety awareness. Functional limitations and barriers to discharge include: limited physical assistance from family and 1 JAYLEN. KAELA is 2 weeks to reach DS-Yue goals with LRAD which was pt's PLOF. PT POC to continue focusing on assessing mobilities with SPC vs RW, stair negotiation, gait training, and static/dynamic balance training.   PT Therapy Minutes   PT Time In 1230   PT Time Out 1400   PT Total Time (minutes) 90   PT Mode of treatment - Individual (minutes) 90   PT Mode of treatment - Concurrent (minutes) 0   PT Mode of treatment - Group (minutes) 0   PT Mode of treatment - Co-treat (minutes) 0   PT Mode of Treatment - Total time(minutes) 90 minutes   PT Cumulative Minutes 90   Cumulative Minutes   Cumulative therapy minutes 240

## 2025-02-02 PROCEDURE — 92526 ORAL FUNCTION THERAPY: CPT

## 2025-02-02 PROCEDURE — 99232 SBSQ HOSP IP/OBS MODERATE 35: CPT | Performed by: PHYSICIAN ASSISTANT

## 2025-02-02 PROCEDURE — 97129 THER IVNTJ 1ST 15 MIN: CPT

## 2025-02-02 PROCEDURE — 92523 SPEECH SOUND LANG COMPREHEN: CPT

## 2025-02-02 RX ADMIN — CARBIDOPA AND LEVODOPA 1.5 TABLET: 25; 100 TABLET ORAL at 09:10

## 2025-02-02 RX ADMIN — CARBIDOPA AND LEVODOPA 1.5 TABLET: 25; 100 TABLET ORAL at 21:23

## 2025-02-02 RX ADMIN — AMLODIPINE BESYLATE 5 MG: 5 TABLET ORAL at 09:11

## 2025-02-02 RX ADMIN — ACETAMINOPHEN 650 MG: 325 TABLET, FILM COATED ORAL at 09:10

## 2025-02-02 RX ADMIN — CARBIDOPA AND LEVODOPA 1.5 TABLET: 25; 100 TABLET ORAL at 12:38

## 2025-02-02 RX ADMIN — SENNOSIDES 17.2 MG: 8.6 TABLET ORAL at 16:51

## 2025-02-02 RX ADMIN — POLYETHYLENE GLYCOL 3350 17 G: 17 POWDER, FOR SOLUTION ORAL at 09:14

## 2025-02-02 RX ADMIN — ACETAMINOPHEN 650 MG: 325 TABLET, FILM COATED ORAL at 16:47

## 2025-02-02 RX ADMIN — DOCUSATE SODIUM 100 MG: 100 CAPSULE, LIQUID FILLED ORAL at 16:51

## 2025-02-02 RX ADMIN — Medication 1000 UNITS: at 09:11

## 2025-02-02 RX ADMIN — SENNOSIDES 17.2 MG: 8.6 TABLET ORAL at 09:12

## 2025-02-02 RX ADMIN — ACETAMINOPHEN 650 MG: 325 TABLET, FILM COATED ORAL at 21:23

## 2025-02-02 RX ADMIN — RIVASTIGMINE 1 PATCH: 4.6 PATCH, EXTENDED RELEASE TRANSDERMAL at 09:12

## 2025-02-02 RX ADMIN — LIDOCAINE 1 PATCH: 50 PATCH CUTANEOUS at 16:48

## 2025-02-02 RX ADMIN — DOCUSATE SODIUM 100 MG: 100 CAPSULE, LIQUID FILLED ORAL at 09:12

## 2025-02-02 RX ADMIN — MELATONIN TAB 3 MG 3 MG: 3 TAB at 21:24

## 2025-02-02 RX ADMIN — ENOXAPARIN SODIUM 40 MG: 40 INJECTION SUBCUTANEOUS at 09:13

## 2025-02-02 RX ADMIN — CARBIDOPA AND LEVODOPA 1.5 TABLET: 25; 100 TABLET ORAL at 16:47

## 2025-02-02 NOTE — ASSESSMENT & PLAN NOTE
Home: amlodpine 5mg daily  Here: Same as home medication.  He did have some changes in BP with position changes yesterday but was not orthostatic  Monitor for orthostasis due to PD.

## 2025-02-02 NOTE — PROGRESS NOTES
SLP TAA- Cognitive         02/02/25 0800   Patient Data   Rehab Impairment Impairment of mobility, safety, Activities of Daily Living (ADLs), and cognitive/communication skills due to Neurologic Conditions: 03.2 Parkinsonism   Etiologic Diagnosis Parkinson's dementia with mild acute encephalopathy   Date of Onset 01/21/25   Support System   Name Refer to note on 2/1/25   Restrictions/Precautions   Precautions Aspiration;Bed/chair alarms;Cognitive;Fall Risk;Impulsive;Supervision on toilet/commode   Pain Assessment   Pain Assessment Tool 0-10   Pain Score No Pain   Eating Assessment   Type of Assistance Needed Set-up / clean-up;Supervision;Verbal cues   Physical Assistance Level No physical assistance   Eating CARE Score 4   Comprehension   Assist Devices Glasses   Auditory Basic;Complex   Visual Basic;Complex   Findings Pt completing formalized cognitive assessment.   QI: Comprehension 2. Sometimes Understands: Understands only basic conversations or simple, direct phrases. Frequently requires cues to understand   Comprehension (FIM) 3 - Understands basic info/conversation 50-74% of time   Expression   Verbal Basic   Non-Verbal Basic   Intelligibility Sentence   Findings Pt completing formalized cognitive assessment.   QI: Expression 3. Exhibits some difficulty with expressing needs and ideas (e.g., some words or finishing thoughts) or speech is not clear   Expression (FIM) 4 - Expresses basic info/needs 75-90% of time   Social Interaction   Cooperation with staff   Participation Individual   Behaviors observed Appropriate;Restless   Findings Pt completing formalized cognitive assessment.   Social Interaction (FIM) 5 - Interacts appropriately with others 90% of time   Problem Solving   Findings Pt completing formalized cognitive assessment.   Problem solving (FIM) 2 - Needs direction more than ½ time to initiate, plan or complete simple tasks   Memory   Remember Routine No   Initiates Tasks Yes   Short-Term Impaired    Long Term Impaired   Recalls Precaution No   Findings Pt completing formalized cognitive assessment.   Memory (FIM) 2 - Recognizes, recalls/performs 25-49%   Discharge Information   Patient's Discharge Plan refer to assessment on 2/1/25   Impressions Pt is an 85 y/o M w/ PMH of Parkinson's Disease with dementia (last MoCA completed in 3/2024 w/ score of 7/30), HTN, LE venous stasis dermatitis and edema, as well as GERD presented to [facility] on 1/21/25 w/ worsening agitation and change in mental status to where pt sustained a fall with HS and R eye laceration. No sutures were needed for the laceration. CTH/N was negative and X-ray L shoulder negative. Pt was noted to be hypothermic and hyperkalemic on admission. Wound care was consulted for R medial tibia venous ulcer. Geriatrics was consulted who recommended melatonin, delirium precautions. Speech was consulted who recommended NPO initially, but he has since been advanced to an oral diet of Level 2/thin liquids. Neurology was also consulted as the agitation, behavior symptoms, hallucinations worsened since increasing his medications, they lowered his Sinemet and added rivastigmine. There was improvement in his behavioral symptoms. Pt was on restraints and 1:1 during acute care stay but was able to be weaned from this by time of discharge and transition to the [facility]. Pt was medically stable and able to be admitted to the [facility] on 1/31/25 for continued skilled therapies.     Currently SLP services were consulted for assessment of cognitive linguistic skills. Pt able to complete formalized cognitive assessment in which pt is demonstrating cognitive skills to be severely impaired at this time. Pt is a a good rehab candidate to achieve min-mod A level for functional cognitive skills upon anticipated discharge home w/ family support/supervision. Barriers which present at this time include: decreased attention, LT memory recall, ST memory recall , problem  solving, reasoning, sequencing, organization of thoughts, judgement, slower processing, and insight . In order to address the noted barriers, skilled SLP services will address this by targeting the following interventions: visual orientation checklist, memory book/memory packet, verbal problem solving task, drawing conclusions activities, written sequencing tasks, categorization tasks , picture problem solving activities, verbal reasoning tasks, functional reading tasks , and family education/training. ELOS ~ 2 weeks . At this time, pt will benefit from skilled SLP services targeting functional independence of cognitive linguistic skills in attempts to decrease need for caregiver burden over time.   SLP Therapy Minutes   SLP Time In 0800   SLP Time Out 0900   SLP Total Time (minutes) 60   SLP Mode of treatment - Individual (minutes) 60   SLP Mode of treatment - Concurrent (minutes) 0   SLP Mode of treatment - Group (minutes) 0   SLP Mode of treatment - Co-treat (minutes) 0   SLP Mode of Treatment - Total time(minutes) 60 minutes   SLP Cumulative Minutes 120   Cumulative Minutes   Cumulative therapy minutes 300

## 2025-02-02 NOTE — PROGRESS NOTES
Progress Note - Hospitalist   Name: Daniel Sanz Jr. 84 y.o. male I MRN: [id number]  Unit/Bed#: -01 I Date of Admission: 1/31/2025   Date of Service: 2/2/2025 I Hospital Day: 2    Assessment & Plan  Parkinson's disease (HCC)  Continue Sinemet 1.5 tabs 4x daily. Currently ordered at times pt takes the medication at home. May need to adjust timing based on therapy schedule.  Follows with Dr. Cavanaugh as an outpt.  Therapy per PMR.  HTN (hypertension)  Home: amlodpine 5mg daily  Here: Same as home medication.  He did have some changes in BP with position changes yesterday but was not orthostatic  Monitor for orthostasis due to PD.  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  Continue rivastigmine patch - will check price prior to DC.  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  He was taking Lasix 3x weekly.  It is not currently ordered.  Will monitor for edema and add back as indicated.  Dysphagia  Continue modified diet.  SLP to follow.    The above assessment and plan was reviewed and updated as determined by my evaluation of the patient on 2/2/2025.    History of Present Illness   Patient seen and examined. Patients overnight issues or events were reviewed with nursing staff. New or overnight issues include the following:     Pt seen in his room. He states that he is doing well. He denies any current complaints.    A 10 point review of systems was negative except for what is noted in the HPI.    Objective :  Temp:  [97.8 °F (36.6 °C)-98.4 °F (36.9 °C)] 97.8 °F (36.6 °C)  HR:  [72-79] 75  BP: (140-154)/(62-68) 152/68  Resp:  [16-18] 17  SpO2:  [95 %-96 %] 95 %  O2 Device: None (Room air)    Invasive Devices       None                   Physical Exam  General Appearance: NAD; pleasant  HEENT: PERRLA, conjuctiva normal; mucous membranes moist; face symmetrical  Neck:  Supple  Lungs: clear bilaterally, normal respiratory  effort, no retractions, expiratory effort normal, on room air  CV: regular rate and rhythm, no murmurs rubs or gallops noted   ABD: soft non tender, +BS x4  EXT: DP pulses intact, no lymphadenopathy, no edema  Skin: normal turgor, normal texture, no rash  Psych: affect normal, mood normal  Neuro: Awake and alert.  The above physical exam was reviewed and updated as determined by my evaluation of the patient on 2/2/2025.      Lab Results: I have reviewed the following results:  Results from last 7 days   Lab Units 01/29/25  0531 01/28/25  0626   WBC Thousand/uL 9.86 6.19   HEMOGLOBIN g/dL 11.3* 10.7*   HEMATOCRIT % 34.2* 33.0*   PLATELETS Thousands/uL 203 188     Results from last 7 days   Lab Units 01/29/25  0531 01/28/25  0626   SODIUM mmol/L 140 139   POTASSIUM mmol/L 4.3 4.9   CHLORIDE mmol/L 105 104   CO2 mmol/L 28 32   BUN mg/dL 23 21   CREATININE mg/dL 0.99 1.05   CALCIUM mg/dL 9.0 8.8             Results from last 7 days   Lab Units 01/27/25  1643   POC GLUCOSE mg/dl 119       Imaging Results Review: No pertinent imaging studies reviewed.  Other Study Results Review: No additional pertinent studies reviewed.    Review of Scheduled Meds: Medications  reviewed and reconciled as needed  Current Facility-Administered Medications   Medication Dose Route Frequency Provider Last Rate    acetaminophen  650 mg Oral TID Chelsea Dasilva PA-C      amLODIPine  5 mg Oral Daily Cheslea Dasilva PA-C      Artificial Tears  1 drop Both Eyes Q4H PRN Chelsea Dasilva PA-C      bisacodyl  10 mg Rectal Daily PRN Ashley Depadua, MD      calcium carbonate  1,000 mg Oral Daily PRN Chelsea Dasilva PA-C      carbidopa-levodopa  1.5 tablet Oral 4x Daily Chelsea Dasilva PA-C      Cholecalciferol  1,000 Units Oral Daily Chelsea Dasilva PA-C      [START ON 2/3/2025] vitamin B-12  1,000 mcg Oral Once per day on Monday Wednesday Friday Chelsea Dasilva PA-C      docusate sodium  100 mg Oral BID Chelsea  SURAJ Dasilva      enoxaparin  40 mg Subcutaneous Daily Chelsea Dasilva PA-C      lactulose  20 g Oral Daily PRN Ashley Depadua, MD      lidocaine  1 patch Topical Daily Ashley Depadua, MD      melatonin  3 mg Oral QPM Ashley Depadua, MD      OLANZapine  2.5 mg Intramuscular Q4H PRN Bakari Gerardofabiola Rivera      ondansetron  4 mg Oral Q6H PRN Chelsea Dasilva PA-C      polyethylene glycol  17 g Oral Daily Ashley Depadua, MD      QUEtiapine  12.5 mg Oral Q8H PRN Chelsea Dasilva PA-C      rivastigmine  1 patch Transdermal Daily Chelsea Dasilva PA-C      senna  2 tablet Oral BID Chelsea Dasilva PA-C         VTE Pharmacologic Prophylaxis: Lovenox  Code Status: Level 1 - Full Code  Current Length of Stay: 2 day(s)    Administrative Statements   I have spent a total time of 30 minutes in caring for this patient on the day of the visit/encounter including Prognosis, Risks and benefits of tx options, Instructions for management, Patient and family education, Importance of tx compliance, Risk factor reductions, Impressions, Counseling / Coordination of care, Documenting in the medical record, Reviewing / ordering tests, medicine, procedures  , Obtaining or reviewing history  , and Communicating with other healthcare professionals .  ** Please Note:  voice to text software may have been used in the creation of this document. Although proof errors in transcription or interpretation are a potential of such software**

## 2025-02-02 NOTE — PROGRESS NOTES
SLP Cognitive Assessment and Dysphagia Tx Note       02/02/25 0800   Pain Assessment   Pain Assessment Tool 0-10   Pain Score No Pain   Restrictions/Precautions   Precautions Aspiration;Bed/chair alarms;Cognitive;Fall Risk;Impulsive;Supervision on toilet/commode   Cognitive Linguisitic Assessments   Cognitive Linquistic Quick Test (CLQT) Refer to below for full details.   Comprehension   Assist Devices Glasses   Auditory Basic;Complex   Visual Basic;Complex   Findings Pt completing formalized cognitive assessment.   QI: Comprehension 2. Sometimes Understands: Understands only basic conversations or simple, direct phrases. Frequently requires cues to understand   Comprehension (FIM) 3 - Understands basic info/conversation 50-74% of time   Expression   Verbal Basic   Non-Verbal Basic   Intelligibility Sentence   Findings Pt completing formalized cognitive assessment.   QI: Expression 3. Exhibits some difficulty with expressing needs and ideas (e.g., some words or finishing thoughts) or speech is not clear   Expression (FIM) 4 - Expresses basic info/needs 75-90% of time   Social Interaction   Cooperation with staff   Participation Individual   Behaviors observed Appropriate;Restless   Findings Pt completing formalized cognitive assessment.   Social Interaction (FIM) 5 - Interacts appropriately with others 90% of time   Problem Solving   Findings Pt completing formalized cognitive assessment.   Problem solving (FIM) 2 - Needs direction more than ½ time to initiate, plan or complete simple tasks   Memory   Remember Routine No   Initiates Tasks Yes   Short-Term Impaired   Long Term Impaired   Recalls Precaution No   Findings Pt completing formalized cognitive assessment.   Memory (FIM) 2 - Recognizes, recalls/performs 25-49%   Speech/Language/Cognition Assessmetn   Treatment Assessment Pt completed the SLUMS cognitive assessment. Pt total score was 8/30 which as compared to those with high school education correlates with  severe neurocognitive disorder and is indicative of dementia. Breakdown of scores as follows:    Orientation: 1/3  Functional problem solving with math: 3/3  Divergent naming (timed 1 minute- animals): 2/3 (10 animals total)  Word recall: 1/5  Mental flexibility with number order: 0/2  Clock Drawin/4   Following directions given shapes: 1/2  Story recall comprehension questions: 0/8    Total score: 8/30    In addition to completing formalized assessment, SLP did engage pt in orientation review (outside of questions presented on assessment) as well as LT biographical recall. It was noted that pt was able to recall place, but not city. Able to recall month, but not date, JENNIFER or year. Pt was able to state sustaining fall at home but overall pt is noted to be a poorer historian. When asking pt about LT biographical information, pt was noted to recall , age, address, as well as spouse's name (Sheng) as well as son's name (Gama). Pt was exhibiting difficulty in recalling grandchildren vs great grandchildren's names. It was noted even during assessment, pt was noted to have decreased attention, ST recall, slower processing time, in addition to decreased executive function skills, problem solving, reasoning,sequencing, judgement, insight which currently impacts overall safety awareness, risk for falls and risk for aspiration.Based on score, patient will benefit from further skilled SLP services to maximize overall cognitive lingusitic communication abilities for increased independence and decreased burden of care.    Eating   Type of Assistance Needed Set-up / clean-up;Supervision;Verbal cues   Physical Assistance Level No physical assistance   Eating CARE Score 4   Swallow Assessment   Swallow Treatment Assessment Daily Dysphagia Tx Note     Patient Name: Daniel Sanz     Today's Date: 2025      Current Risks for Dysphagia & Aspiration: General debilitation; Cognitive deficit; Mental status change; Behavior  issues; Hx neurologic dx (Parkinson's); Positioning issues     Current Symptoms/Concerns: Coughs w/ liquids; Cough on own secretions; Difficulty chewing; Holding food in mouth     Current diet:mechanically altered/level 2 diet and thin liquids    Premorbid diet::soft/level 3 diet, regular diet, and thin liquids     Positioning: repositioned in bed which was placed in chair mode for meal    Items administered:Consistencies Administered: thin liquids, mechanical soft solids, and soft solids  Materials administered included: level 3 Telugu toast, chopped sausage, canned peaches, thins by cup    Total amount of meal consumed:   100% of meal 180cc of thins by cup      Oral stage:mild-moderate  Lip closure: functional  Anterior spillage: none  Mastication: slower and more effortful; was observed to forget to chew when scooping next bite  Bolus formation: mildly decreased w/ softer solids  Bolus control: decreased w/ larger sips of htins  Transfer: delayed  Oral residue: minimal but clears w/ increased time and use of liquid wash  Pocketing: none         Pharyngeal stage:mild  Swallow promptness: delayed across textures observed  Hyolaryngeal elevation: present upon palpation   Wet voice: increased after thin liquids  Throat clear: x2 throughout meal  Cough: x1 after thins  Secondary swallows: inconsistently noted w/ soft solids  Audible swallows: none       Esophageal stage:No overt sxs observed throughout meal        Summary:     Pt presenting with mild-moderate oral and mild pharyngeal dysphagia today. Symptoms or concerns included slower mastication given level 3 foods due to piecemeal transfers BUT also pt would not demonstrate chewing when collecting next bolus. Increased verbal prompting was required today to ensure pt was continuing to breakdown bolus while retrieving for another bite. Additionally, pt did have some decreased formulation give piecemeal transit to where SLP did have to provide verbal cues to  alternate solids/liquids for meal today. However, no significant oral residual was observed across meal. Bolus control/transfer of thins by cup when taking single sips did appear to be more controled vs when taking successive sips. Swallow initiation was delayed throughout meal, noting present HLE but decreased when palpated. Pt did have inconsistent double swallows w/ soft solids. When taking larger successive sips of thins, it was noted that pt keep larayngeal excursion present which there could be suspected  airway protection given this. Pt did exhibit increased wetness after this sip, to where pt was able to complete spontaneous throat clear/cough as needed. Outside of wet voicing, pt did have throat clear x2 throughout meal and cough x1 after thins by cup.       Recommendations:  Diet: mechanically altered/level 2 diet and thin liquids  Meds: whole with puree  Strategies:  upright posture, only feed when fully alert, slow rate of feeding, small bites/sips, cough when demonstrating wet voicing, quiet environment (tv off, limit talking, door closed, etc.), alternating bites and sips, and OOB for meals preferred     FULL supervision w/ meals. Will need setup  Results reviewed with:  patient, Julia    Aspiration precautions posted.     F/u ST tx: Pt will continue to benefit from ongoing skilled dysphagia tx sessions to establish safest least restrictive diet w/o increased oropharyngeal or aspiration sxs as well as monitor ability to carryover swallow strategies independently.        Plan: Monitor current diet           Review of swallow strategies w/ pt and spouse           Ongoing family education/training w/ spouse           Diet upgrade trials w/ SLP supervision only   Swallow Assessment Prognosis   Prognosis Fair   Prognosis Considerations Age;Co-morbidities;Medical diagnosis;Medical prognosis;Previous level of function;Severity of impairments;New learning ability;Ability to carry over   SLP Therapy  Minutes   SLP Time In 0800   SLP Time Out 0900   SLP Total Time (minutes) 60   SLP Mode of treatment - Individual (minutes) 60   SLP Mode of treatment - Concurrent (minutes) 0   SLP Mode of treatment - Group (minutes) 0   SLP Mode of treatment - Co-treat (minutes) 0   SLP Mode of Treatment - Total time(minutes) 60 minutes   SLP Cumulative Minutes 120   Therapy Time missed   Time missed? No

## 2025-02-03 PROBLEM — Z91.89 AT RISK FOR VENOUS THROMBOEMBOLISM (VTE): Status: ACTIVE | Noted: 2025-02-03

## 2025-02-03 PROBLEM — E55.9 VITAMIN D INSUFFICIENCY: Status: ACTIVE | Noted: 2025-02-03

## 2025-02-03 PROBLEM — M25.512 CHRONIC PAIN OF BOTH SHOULDERS: Status: ACTIVE | Noted: 2019-03-12

## 2025-02-03 LAB
ANION GAP SERPL CALCULATED.3IONS-SCNC: 9 MMOL/L (ref 4–13)
BASOPHILS # BLD AUTO: 0.02 THOUSANDS/ÂΜL (ref 0–0.1)
BASOPHILS NFR BLD AUTO: 0 % (ref 0–1)
BUN SERPL-MCNC: 33 MG/DL (ref 5–25)
CALCIUM SERPL-MCNC: 9.1 MG/DL (ref 8.4–10.2)
CHLORIDE SERPL-SCNC: 104 MMOL/L (ref 96–108)
CO2 SERPL-SCNC: 27 MMOL/L (ref 21–32)
CREAT SERPL-MCNC: 1.05 MG/DL (ref 0.6–1.3)
EOSINOPHIL # BLD AUTO: 0.22 THOUSAND/ÂΜL (ref 0–0.61)
EOSINOPHIL NFR BLD AUTO: 4 % (ref 0–6)
ERYTHROCYTE [DISTWIDTH] IN BLOOD BY AUTOMATED COUNT: 12.7 % (ref 11.6–15.1)
GFR SERPL CREATININE-BSD FRML MDRD: 65 ML/MIN/1.73SQ M
GLUCOSE SERPL-MCNC: 80 MG/DL (ref 65–140)
GLUCOSE SERPL-MCNC: 85 MG/DL (ref 65–140)
HCT VFR BLD AUTO: 35.5 % (ref 36.5–49.3)
HGB BLD-MCNC: 11.5 G/DL (ref 12–17)
IMM GRANULOCYTES # BLD AUTO: 0.03 THOUSAND/UL (ref 0–0.2)
IMM GRANULOCYTES NFR BLD AUTO: 1 % (ref 0–2)
LYMPHOCYTES # BLD AUTO: 1.47 THOUSANDS/ÂΜL (ref 0.6–4.47)
LYMPHOCYTES NFR BLD AUTO: 25 % (ref 14–44)
MCH RBC QN AUTO: 31.9 PG (ref 26.8–34.3)
MCHC RBC AUTO-ENTMCNC: 32.4 G/DL (ref 31.4–37.4)
MCV RBC AUTO: 99 FL (ref 82–98)
MONOCYTES # BLD AUTO: 0.52 THOUSAND/ÂΜL (ref 0.17–1.22)
MONOCYTES NFR BLD AUTO: 9 % (ref 4–12)
NEUTROPHILS # BLD AUTO: 3.7 THOUSANDS/ÂΜL (ref 1.85–7.62)
NEUTS SEG NFR BLD AUTO: 61 % (ref 43–75)
NRBC BLD AUTO-RTO: 0 /100 WBCS
PLATELET # BLD AUTO: 230 THOUSANDS/UL (ref 149–390)
PMV BLD AUTO: 9.6 FL (ref 8.9–12.7)
POTASSIUM SERPL-SCNC: 4.8 MMOL/L (ref 3.5–5.3)
RBC # BLD AUTO: 3.6 MILLION/UL (ref 3.88–5.62)
SODIUM SERPL-SCNC: 140 MMOL/L (ref 135–147)
WBC # BLD AUTO: 5.96 THOUSAND/UL (ref 4.31–10.16)

## 2025-02-03 PROCEDURE — 80048 BASIC METABOLIC PNL TOTAL CA: CPT | Performed by: PHYSICIAN ASSISTANT

## 2025-02-03 PROCEDURE — 99232 SBSQ HOSP IP/OBS MODERATE 35: CPT | Performed by: PHYSICIAN ASSISTANT

## 2025-02-03 PROCEDURE — 99233 SBSQ HOSP IP/OBS HIGH 50: CPT | Performed by: PHYSICAL MEDICINE & REHABILITATION

## 2025-02-03 PROCEDURE — 85025 COMPLETE CBC W/AUTO DIFF WBC: CPT | Performed by: PHYSICIAN ASSISTANT

## 2025-02-03 PROCEDURE — 97535 SELF CARE MNGMENT TRAINING: CPT

## 2025-02-03 PROCEDURE — 97116 GAIT TRAINING THERAPY: CPT

## 2025-02-03 PROCEDURE — 97112 NEUROMUSCULAR REEDUCATION: CPT

## 2025-02-03 PROCEDURE — 97530 THERAPEUTIC ACTIVITIES: CPT

## 2025-02-03 PROCEDURE — 92526 ORAL FUNCTION THERAPY: CPT

## 2025-02-03 PROCEDURE — 82948 REAGENT STRIP/BLOOD GLUCOSE: CPT

## 2025-02-03 RX ORDER — LIDOCAINE 50 MG/G
2 PATCH TOPICAL DAILY
Status: DISCONTINUED | OUTPATIENT
Start: 2025-02-03 | End: 2025-02-13

## 2025-02-03 RX ORDER — RIVASTIGMINE 4.6 MG/24H
1 PATCH, EXTENDED RELEASE TRANSDERMAL DAILY
Qty: 30 PATCH | Refills: 0 | Status: SHIPPED | OUTPATIENT
Start: 2025-02-03 | End: 2025-02-13

## 2025-02-03 RX ADMIN — DOCUSATE SODIUM 100 MG: 100 CAPSULE, LIQUID FILLED ORAL at 17:14

## 2025-02-03 RX ADMIN — MELATONIN TAB 3 MG 3 MG: 3 TAB at 21:42

## 2025-02-03 RX ADMIN — SENNOSIDES 17.2 MG: 8.6 TABLET ORAL at 10:00

## 2025-02-03 RX ADMIN — RIVASTIGMINE 1 PATCH: 4.6 PATCH, EXTENDED RELEASE TRANSDERMAL at 10:16

## 2025-02-03 RX ADMIN — Medication 1000 UNITS: at 10:00

## 2025-02-03 RX ADMIN — CARBIDOPA AND LEVODOPA 1.5 TABLET: 25; 100 TABLET ORAL at 17:14

## 2025-02-03 RX ADMIN — CARBIDOPA AND LEVODOPA 1.5 TABLET: 25; 100 TABLET ORAL at 10:00

## 2025-02-03 RX ADMIN — CARBIDOPA AND LEVODOPA 1.5 TABLET: 25; 100 TABLET ORAL at 21:42

## 2025-02-03 RX ADMIN — CARBIDOPA AND LEVODOPA 1.5 TABLET: 25; 100 TABLET ORAL at 13:29

## 2025-02-03 RX ADMIN — ACETAMINOPHEN 650 MG: 325 TABLET, FILM COATED ORAL at 15:41

## 2025-02-03 RX ADMIN — OLANZAPINE 2.5 MG: 10 INJECTION, POWDER, FOR SOLUTION INTRAMUSCULAR at 06:27

## 2025-02-03 RX ADMIN — ACETAMINOPHEN 650 MG: 325 TABLET, FILM COATED ORAL at 21:10

## 2025-02-03 RX ADMIN — POLYETHYLENE GLYCOL 3350 17 G: 17 POWDER, FOR SOLUTION ORAL at 10:09

## 2025-02-03 RX ADMIN — CYANOCOBALAMIN TAB 500 MCG 1000 MCG: 500 TAB at 10:00

## 2025-02-03 RX ADMIN — LIDOCAINE 5% 2 PATCH: 700 PATCH TOPICAL at 15:42

## 2025-02-03 RX ADMIN — AMLODIPINE BESYLATE 5 MG: 5 TABLET ORAL at 10:00

## 2025-02-03 RX ADMIN — DEXTRAN 70, GLYCERIN, HYPROMELLOSE 1 DROP: 1; 2; 3 SOLUTION/ DROPS OPHTHALMIC at 13:50

## 2025-02-03 RX ADMIN — ENOXAPARIN SODIUM 40 MG: 40 INJECTION SUBCUTANEOUS at 10:00

## 2025-02-03 RX ADMIN — SENNOSIDES 17.2 MG: 8.6 TABLET ORAL at 17:14

## 2025-02-03 RX ADMIN — DOCUSATE SODIUM 100 MG: 100 CAPSULE, LIQUID FILLED ORAL at 10:00

## 2025-02-03 RX ADMIN — ACETAMINOPHEN 650 MG: 325 TABLET, FILM COATED ORAL at 10:00

## 2025-02-03 NOTE — PCC OCCUPATIONAL THERAPY
02/10/25  Pt is demonstrating good progress with occupational therapy and is progressing toward long term goals for ADL, IADL, and functional transfers/mobility. Pts long term goals for ADLs are SUP with Rolling Walker. Pt is currently Partial/moderate assistance  for ADLs. Pt continues to present with impairments in activity tolerance, endurance, standing balance/tolerance, UE strength, memory, insight, safety , judgement , task initiation , and task termination . Occupational performance remains limited by fatigue, pain, decreased caregiver support, risk for falls, and home environment. Family training/education will be required prior to D/C. Pt will continue to benefit from skilled acute rehab OT services to address above mentioned barriers and maximize functional independence in baseline areas of occupation to meet established treatment goals with overall decreased burden of care. Plan of care to continue to focus on ADL Retraining , LB Dressing, UB dressing, Functional Transfers, Functional Cognition, Standing tolerance, Standing balance , Gross motor strengthening , Energy conservation training/education, healthy coping education, Leisure and social pursuits, and community re-integration.

## 2025-02-03 NOTE — PROGRESS NOTES
02/03/25 1140   Pain Assessment   Pain Assessment Tool 0-10   Pain Score No Pain   Restrictions/Precautions   Precautions Aspiration;Bed/chair alarms;Cognitive;Fall Risk;Pain;Supervision on toilet/commode   Eating   Type of Assistance Needed Set-up / clean-up;Supervision;Verbal cues   Physical Assistance Level No physical assistance   Eating CARE Score 4   Swallow Assessment   Swallow Treatment Assessment Daily Dysphagia Tx Note      Patient Name: Daniel Sanz Jr.     Today's Date: 2/3/2025        Current Risks for Dysphagia & Aspiration: General debilitation; Cognitive deficit; Mental status change; Behavior issues; Hx neurologic dx (Parkinson's); Positioning issues      Current Symptoms/Concerns: Coughs w/ liquids; Cough on own secretions; Difficulty chewing; Holding food in mouth      Current diet:mechanically altered/level 2 diet and thin liquids     Premorbid diet::soft/level 3 diet, regular diet, and thin liquids      Positioning: upright in recliner     Items administered:Consistencies Administered: thin liquids, mechanical soft solids, and soft solids  Materials administered included: level 3 Syriac toast, level 2 green beans, chicken with gravy, and puree applesauce, magic cup and thins by cup     Total amount of meal consumed:   100% of meal 240cc of thins by cup        Summary:  Pt presenting with s/s suggestive of mild-moderate oral and mild-moderate pharyngeal dysphagia today. Session focused on level 2 diet items, along with trialing level 3 toast. Pt's wife and daughter in law present at beginning of session where SLP reviewed plan for today's session. Although family not present for session, SLP did get to speak to them after to provide an update from the session, reviewing continuing full supervision with meals due to safety concerns and pt's need for cuing.     Pt demo functional bolus retrieval/bite pull and oral containment-no anterior loss. Manipulation and mastication appeared mildly  slower but effective for bolus breakdown, though fairly consistent piecemeal deglutition/transfers. However, occasionally when pt was distracted by loading next bolus, pt was halting mastication until he refocused on his current bite. Bolus formation appeared mildly decreased with softer solids but functional with puree, thins. Suspected some decreased bolus control with both thins and with food items, likely when pt was distracted from meal and when swallows were delayed. Tranfers were slower but mainly complete, noting only trace oral residual between bites, which pt was able to clear himself and with using liquids. Swallow initiation appeared to fluctuate greatly in timing, where at times swallows appeared only mildly delayed but at other times appeared more moderately to severely delayed, pending pt's attention to meal, occurring with both solids and liquids. Multiple swallows inconsistently with solids and liquids. Pt with wet vocal quality x2 throughout meal and able to clear when prompted for throat clearing, as well as models given from SLP. Spontaneous throat clearing observed with liquids x2, with magic cup x3. Pt also with throat clearing present when pt conversing between bites, ?ing delayed protective response vs pharyngeal retention/residue? Cough x1 with trials of toast. Throughout meal, pt required min to moderate verbal cues to remain focused on meal and to complete swallows before taking another bite.       Recommendations:  Diet: mechanically altered/level 2 diet and thin liquids  Meds: whole with puree  Strategies:  upright posture, only feed when fully alert, slow rate of feeding, small bites/sips, cough when demonstrating wet voicing, quiet environment (tv off, limit talking, door closed, etc.), alternating bites and sips, and OOB for meals preferred      FULL supervision w/ meals. Will need setup.  Results reviewed with:  patient, RN Oswaldo, pt's wife, daughter in law  Aspiration precautions  posted.     F/u ST tx: Pt is currently recommended for further skilled SLP services targeting dysphagia therapy in order to maximize oral and pharyngeal swallow skills, while safely supporting PO intake, as well as to improve independent carryover of safe swallow strategies.       Plan: Continue dysphagia level 2, thins            Trial dysphagia level 3 with SLP            Ongoing family education/training w/ spouse   SLP Therapy Minutes   SLP Time In 1140   SLP Time Out 1235   SLP Total Time (minutes) 55   SLP Mode of treatment - Individual (minutes) 55   SLP Mode of treatment - Concurrent (minutes) 0   SLP Mode of treatment - Group (minutes) 0   SLP Mode of treatment - Co-treat (minutes) 0   SLP Mode of Treatment - Total time(minutes) 55 minutes   SLP Cumulative Minutes 175   Therapy Time missed   Time missed? No

## 2025-02-03 NOTE — NURSING NOTE
Patient bed alarm went off, patient was found sitting on side of bed. Upon assessment patient appeared confused and wet with urine. Patient bed linen and gown changed, patient was assisted back to bed, call bell and side table within reach and bed alarmed. Patient educated on use of svitlana bell.

## 2025-02-03 NOTE — NURSING NOTE
During vitals patient became agitated and combative. Patient reoriented. PCA attempt to get vitals and patient became agitated and combative again. PRN IM Zyprexa given due patient refusing to take Seroquel PO.

## 2025-02-03 NOTE — PLAN OF CARE
Problem: Prexisting or High Potential for Compromised Skin Integrity  Goal: Skin integrity is maintained or improved  Description: INTERVENTIONS:  - Identify patients at risk for skin breakdown  - Assess and monitor skin integrity  - Assess and monitor nutrition and hydration status  - Monitor labs   - Assess for incontinence   - Turn and reposition patient  - Assist with mobility/ambulation  - Relieve pressure over bony prominences  - Avoid friction and shearing  - Provide appropriate hygiene as needed including keeping skin clean and dry  - Evaluate need for skin moisturizer/barrier cream  - Collaborate with interdisciplinary team   - Patient/family teaching  - Consider wound care consult   Outcome: Progressing     Problem: PAIN - ADULT  Goal: Verbalizes/displays adequate comfort level or baseline comfort level  Description: Interventions:  - Encourage patient to monitor pain and request assistance  - Assess pain using appropriate pain scale  - Administer analgesics based on type and severity of pain and evaluate response  - Implement non-pharmacological measures as appropriate and evaluate response  - Consider cultural and social influences on pain and pain management  - Notify physician/advanced practitioner if interventions unsuccessful or patient reports new pain  Outcome: Progressing     Problem: INFECTION - ADULT  Goal: Absence or prevention of progression during hospitalization  Description: INTERVENTIONS:  - Assess and monitor for signs and symptoms of infection  - Monitor lab/diagnostic results  - Monitor all insertion sites, i.e. indwelling lines, tubes, and drains  - Monitor endotracheal if appropriate and nasal secretions for changes in amount and color  - [place] appropriate cooling/warming therapies per order  - Administer medications as ordered  - Instruct and encourage patient and family to use good hand hygiene technique  - Identify and instruct in appropriate isolation precautions for  identified infection/condition  Outcome: Progressing  Goal: Absence of fever/infection during neutropenic period  Description: INTERVENTIONS:  - Monitor WBC    Outcome: Progressing     Problem: SAFETY ADULT  Goal: Patient will remain free of falls  Description: INTERVENTIONS:  - Educate patient/family on patient safety including physical limitations  - Instruct patient to call for assistance with activity   - Consult OT/PT to assist with strengthening/mobility   - Keep Call bell within reach  - Keep bed low and locked with side rails adjusted as appropriate  - Keep care items and personal belongings within reach  - Initiate and maintain comfort rounds  - Make Fall Risk Sign visible to staff  - Offer Toileting every 2 Hours, in advance of need  - Initiate/Maintain bed/chair alarm  - Obtain necessary fall risk management equipment: alarms  - Apply yellow socks and bracelet for high fall risk patients  - Consider moving patient to room near nurses station  Outcome: Progressing  Goal: Maintain or return to baseline ADL function  Description: INTERVENTIONS:  -  Assess patient's ability to carry out ADLs; assess patient's baseline for ADL function and identify physical deficits which impact ability to perform ADLs (bathing, care of mouth/teeth, toileting, grooming, dressing, etc.)  - Assess/evaluate cause of self-care deficits   - Assess range of motion  - Assess patient's mobility; develop plan if impaired  - Assess patient's need for assistive devices and provide as appropriate  - Encourage maximum independence but intervene and supervise when necessary  - Involve family in performance of ADLs  - Assess for home care needs following discharge   - Consider OT consult to assist with ADL evaluation and planning for discharge  - Provide patient education as appropriate  Outcome: Progressing  Goal: Maintains/Returns to pre admission functional level  Description: INTERVENTIONS:  - Perform AM-PAC 6 Click Basic Mobility/ Daily  Activity assessment daily.  - Set and communicate daily mobility goal to care team and patient/family/caregiver.   - Collaborate with rehabilitation services on mobility goals if consulted  - Perform Range of Motion 3 times a day.  - Reposition patient every 2 hours.  - Dangle patient 3 times a day  - Stand patient 3 times a day  - Ambulate patient 3 times a day  - Out of bed to chair 3 times a day   - Out of bed for meals 3 times a day  - Out of bed for toileting  - Record patient progress and toleration of activity level   Outcome: Progressing     Problem: DISCHARGE PLANNING  Goal: Discharge to home or other facility with appropriate resources  Description: INTERVENTIONS:  - Identify barriers to discharge w/patient and caregiver  - Arrange for needed discharge resources and transportation as appropriate  - Identify discharge learning needs (meds, wound care, etc.)  - Arrange for interpretive services to assist at discharge as needed  - Refer to Case Management Department for coordinating discharge planning if the patient needs post-hospital services based on physician/advanced practitioner order or complex needs related to functional status, cognitive ability, or social support system  Outcome: Progressing

## 2025-02-03 NOTE — ASSESSMENT & PLAN NOTE
Reports both shoulders.  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   Non-surgically managed. Last seen by Ortho 2019.   Tylenol scheduled.  Lidoderm patches   Topicals for now. Monitor.

## 2025-02-03 NOTE — PROGRESS NOTES
"OT Treatment Note       02/03/25 1001   Pain Assessment   Pain Assessment Tool 0-10   Pain Score No Pain   Restrictions/Precautions   Precautions Aspiration;Bed/chair alarms;Cognitive;Fall Risk;Supervision on toilet/commode   Weight Bearing Restrictions No   ROM Restrictions No   Braces or Orthoses   (pt wearing personal compression socks from home; abdominal binder PRN for ortho BP)   Lifestyle   Autonomy \"We're missing the meeting,\" referring to therapy schedule on tray table.   Eating   Type of Assistance Needed Set-up / clean-up;Supervision;Verbal cues   Physical Assistance Level No physical assistance   Comment setup and SUP, min vc's to alternate bites with sups, to finish chewing/swallowing prior to talking.   Eating CARE Score 4   Oral Hygiene   Type of Assistance Needed Physical assistance   Physical Assistance Level 26%-50%   Comment SUP seated with extended time to open container, apply toothpaste. pt declines completing in stance today but anticipate he would require modA to steady in stance for task   Oral Hygiene CARE Score 3   Grooming   Able To Shave;Wash/Dry Face;Brush/Clean Teeth;Comb/Brush Hair   Findings washed hands, washes face, brushes hair SUP seated with extended time. pt declines attempting shaving using electric razor, requires TA   Upper Body Dressing   Type of Assistance Needed Physical assistance   Physical Assistance Level 76% or more   Comment requires TA to unbutton shirt, doffs shirt off BUE in stance requiring min/modA to steady in stance. pt requires maxA to don seated and TA for snaps.   Upper Body Dressing CARE Score 2   Putting On/Taking Off Footwear   Type of Assistance Needed Physical assistance   Physical Assistance Level Total assistance   Comment requires TA for personal compression socks/slip on shoes   Putting On/Taking Off Footwear CARE Score 1   Sit to Stand   Type of Assistance Needed Physical assistance   Physical Assistance Level 26%-50%   Comment extended time to " come to stand with multiple attempts, but then able to complete with CGA using RW. with fatigue, pt requires up to modA for STS. requires vc's for proper hand placement   Sit to Stand CARE Score 3   Bed-Chair Transfer   Type of Assistance Needed Physical assistance   Physical Assistance Level 26%-50%   Comment SPT recliner<->WC with extended time for STS, small shuffling steps with turns, but at best completes with CGA using RW. With fatiuge requires up to modA. Requires vc's for proper hand placement   Chair/Bed-to-Chair Transfer CARE Score 3   Toileting Hygiene   Type of Assistance Needed Physical assistance   Physical Assistance Level 26%-50%   Comment pt reports need to urinate during breakfast and requests urinal. Reports having urinal at home. pt completes STS and pushes pants down with CGA in stance and setup of urinal. pt requires assist to fully hike pants up over hips after urinating.   Toileting Hygiene CARE Score 3   Cognition   Overall Cognitive Status Impaired   Arousal/Participation Alert;Cooperative   Orientation Level Oriented to person;Oriented to place   Memory Decreased short term memory;Decreased recall of recent events;Decreased recall of precautions   Following Commands Follows one step commands with increased time or repetition   Comments pt pleasant and cooperative, did not demo impulsivity during session. requires increased processing time for questions/commands   Activity Tolerance   Activity Tolerance Patient tolerated treatment well   Assessment   Treatment Assessment Pt seen for 105min OT session focusing on ADL tasks and functional transfers. Pt requires increased time for processing questions/commands, and requires increased time/multiple attempts with STS for functional transfers. However pt does demo increased IND with functional transfers today. See above for further details. Pt's wife present during portion of session, asking questions re: modified diet and writing down list of  pt's favorite foods to assist with meal selections. Further questions re: pt's modified diet OT deferring to SLP and informed wife SLP to be with pt for lunch today; verbalized understanding. OT to continue POC to focus on large amplitude based therex/act, standing balance/tolerance, functional strengthening, and functional cog to maximize functional IND and safety and decrease burden of care prior to d/c.   Prognosis Fair   Problem List Decreased strength;Decreased range of motion;Decreased endurance;Impaired balance;Decreased mobility;Decreased coordination;Decreased cognition;Impaired judgement;Decreased safety awareness   Barriers to Discharge Inaccessible home environment   Plan   Treatment/Interventions ADL retraining;Functional transfer training;Therapeutic exercise;Endurance training;Cognitive reorientation;Patient/family training;Equipment eval/education;Bed mobility;Gait training;Compensatory technique education;Spoke to nursing   Progress Progressing toward goals   Discharge Recommendation   Rehab Resource Intensity Level, OT   (pending)   OT Therapy Minutes   OT Time In 1000   OT Time Out 1145   OT Total Time (minutes) 105   OT Mode of treatment - Individual (minutes) 105   OT Mode of treatment - Concurrent (minutes) 0   OT Mode of treatment - Group (minutes) 0   OT Mode of treatment - Co-treat (minutes) 0   OT Mode of Treatment - Total time(minutes) 105 minutes   OT Cumulative Minutes 195   Therapy Time missed   Time missed? No

## 2025-02-03 NOTE — CASE MANAGEMENT
Attempted to meet w/pts wife and dtr in law however they were leaving for the day. Pts wife hyper focused on the exelon patch being ordered and the cost.  Cm had been made aware by RODNYE barrios that the price check was $10. Cm was able to inform them of the team mtg tomorrow and that follow up would be done afterwards. Wife and dtr in law confirmed they would be back to visit tomorrow but needed to leave at this time due to traffic.

## 2025-02-03 NOTE — PROGRESS NOTES
02/03/25 0830   Therapy Time missed   Time missed? Yes   Amount of time missed 30   Reason for time missed Medical hold   Time(s) multiple attempts made pt placed on med hold this AM, was given sedative early this morning and still asleep

## 2025-02-03 NOTE — ASSESSMENT & PLAN NOTE
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  Continue rivastigmine patch - pt's wife very concerned about the cost. Price check was $10.  Pt agitated this AM during vitals. He did receive IM Zyprexa.

## 2025-02-03 NOTE — PROGRESS NOTES
"Progress Note - PMR   Name: Daniel Sanz Jr. 84 y.o. male I MRN: [id number]  Unit/Bed#: -01 I Date of Admission: 1/31/2025   Date of Service: 2/3/2025 I Hospital Day: 3     Assessment & Plan  Parkinson's disease (HCC)  -AMS likely sequela of progressive parkinsons dementia plus possible adverse reaction to recent sinemet increase  -cont neuro adjusted recs for sinemet 1.5 QID  -cont exelon patch   - PT/OT/SLP 3-5 hours/day, 5-7 days/week.  - Will need f/u with Neurology after discharge.   Altered mental status  Patient's wife endorsing recent abnormal behaviors.  Patient waking frequently at night, hallucinations noted when awaking from sleep, patient noted to be more \"nasty\" and agitated with at times garbled speech which clears.  Presents to ED after fall at home with concern for possible UTI. S/p head strike in ED w/ R eyebrow laceration.  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 with mild acute encephalopathy  CT head w/o acute findings  TSH benign   Folate WNL, B12 normal, vitamin D low at 26.9  Wife notes B12/vitamin D supplementation 3 times weekly PTA  Continue to q daily dosing of vit D  B12 3x weekly  Negative infxn work-up  Sinemet as ordered by neurology (below)  Acetaminophen 650mg TID    Patient oriented, and has some decent recall. Impaired insight, and he sundowns.   - Low dose seroquel PRN ordered by Neuro  - Added PRN olanzapine for severe agitation ONLY.    - Did receive this on 2/3 in the AM after some aggressive behaviors during blood draw/BP.  - Redirect, reorient first  - Low dose melatonin in the evening for sleep/wake.  - Bed/chair alarms  - Optimize bowel/bladder program  - Avoid deliriogenic meds and physical restraint.  Bowel and bladder incontinence  - Bowel/bladder incontinence on admission  - Bowel: continence has improved. Was also constipated. On colace, senna 2 tabs and daily miralax.    - Has Prn suppository lactulose   - Last BM 2/2   - Monitor and adjust as appropriate  - " Bladder: PVR x3 on admission are low   - UA during acute hospitalization not suspicious for infection   - Timed Voids.   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 (HCC)  Chronic right lower extremity venous ulcers. Largely healed and wound care signed off.   PTA Lasix for LE edema management; cont at [facility]  Known to [facility]   Wound care sami and treat  Offload heels  ACE wraps to b/l LEs  Dysphagia  Patient observed coughing while taking medications by nursing at acute   Cont diet dysphagia 2/ thins.  Aspiration precautions   Timing or need for VBS as per SLP.   SLP dysphagia eval and treat  Chronic pain of both shoulders  Reports both shoulders.  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   Non-surgically managed. Last seen by Ortho 2019.   Tylenol scheduled.  Lidoderm patches   Topicals for now. Monitor.   At risk for venous thromboembolism (VTE)  Lovenox, SCDs, ambulation  Will not need lovenox at discharge   Vitamin D insufficiency  Continue daily cholecalciferol supplementation.   HTN (hypertension)  Home: Amlodipine 5mg daily, Lasix 40mg daily, Losartan 50mg daily (unclear how long he was taking). Here: Amlodipine 5mg daily.   Monitor and adjust as appropriate     Health Maintenance  #Skin/Pressure Injury Prevention: Turn Q2hr in bed, with weight shifts [id number] in wheelchair.  #GI Prophylaxis: Not indicated   #Code Status: Full Code  #FEN: Level 2/Thins, Magic [employer] at lunch, Angel at dinner, and EP HP angel at Breakfast  #Dispo: ELOS 2 weeks with goals to discharge home at Sup level of function. Ambulation vs. Wheelchair to be determined while here.   Will need outpatient f/u with Neuro    To Review: Mr. Sanz is an 85yo M with medical history of Parkinson's Disease with dementia (MOCA 7/30 in March 2024), HTN, LE venous stasis dermatitis and edema, GERD who presented to [facility] on 1/21 with worsening agitation, AMS, culminating in a fall with HS and R eye lac. No  sutures were needed for the lac. CTH/N and L shoulder benign. He was noted to be hypothermic and hyperkalemic on admission. TSH WNL, Folate WNL, Vitamin D was low. B12 WNL. Given gentle IVF, noted to be constipated. Wound care was consulted for R medial tibia venous ulcer, and Geriatrics was consulted who recommended melatonin, delirium precautions. Speech was consulted who recommended NPO initially, but he has since been advanced to an oral diet of Level 2/thin liquids. Neurology was also consulted as the agitation, behavior symptoms, hallucinations worsened since increasing his medications, they lowered his Sinemet and added rivastigmine. There was improvement in his behavioral symptoms. He no longer needed restraint/1:1 at time of discharge. [facility] to [facility] on 1/[facility].     Chief Complaint: Agitation this AM.    Interval History/Subjective:  This AM had episode of agitation, hitting out and crying out during blood pressure check. Also found at edge of bed and incontinent. He was given Olanzapine by nursing, slept for some of the morning, and woke up a couple hours later. He did not remember what had happened that AM, but remembered this examiner, remembered that she was with another resident on Friday, and was even able to tell this examiner what she was wearing that day. He currently denies any pain, other than chronic shoulder pain, N/V, abdominal pain, CP, SOB, fevers, chills. Continent of bowel with last BM 2/2, but remains with incontinence of urine.     Sleep log demonstrates good sleep the evening prior.     Functional Update:  PT: bed mobility min-modA , total A transfers, mod-maxA with CF for ambulation 50' with RW, maxA for single curb step.   OT: Set-up eating, Yue oral hygiene, maxA Ub dressing, totalA footwear, Yue toileting hygiene   SLP: modA comprehension, Yue expression, maxA problem solving/memory. SLUMS 8/30 - severe neurocognitive disorder. Mild-mod oral and Mild pharyngeal  dysphagia. Recommended for  Level 2/thins and full supervision with meals     Objective     Temp:  [97 °F (36.1 °C)-97.6 °F (36.4 °C)] 97.6 °F (36.4 °C)  HR:  [69-77] 73  Resp:  [17-19] 19  BP: (146-170)/(65-78) 170/78  SpO2:  [96 %-98 %] 98 %    Gen: No acute distress, Well-nourished, well-appearing.  HEENT: Moist mucus membranes, Normocephalic/Atraumatic  Cardiovascular: Regular rate, rhythm, S1/S2. Distal pulses palpable  Heme/Extr: No edema  Pulmonary: Non-labored breathing. Lungs CTAB  : No whitley  GI: Soft, non-tender, non-distended. BS+  MSK: Has full PROM in both shoulders. Pain with end range of motion on the R, pain with sneed and neers on the R, pain anteriorly. L shoulder without pain through provocative testing.   Integumentary: Skin is warm, dry.   Neuro: Awake, alert. Impaired recall. Answering questions appropriately. Bradykinesia, cogwheeling rigidity. Pleasantly confused. Able to remember we had met a few days prior and described what I was wearing when he met me.   Psych: Normal mood and affect.     Physical examination is otherwise unchanged from previous encounter, except as noted above.    Scheduled Meds:  Current Facility-Administered Medications   Medication Dose Route Frequency Provider Last Rate    acetaminophen  650 mg Oral TID Chelsea Dasilva PA-C      amLODIPine  5 mg Oral Daily Chelsea Dasilva PA-C      Artificial Tears  1 drop Both Eyes Q4H PRN Chelsea Dasilva PA-C      bisacodyl  10 mg Rectal Daily PRN Ashley Depadua, MD      calcium carbonate  1,000 mg Oral Daily PRN Chelsea Dasilva PA-C      carbidopa-levodopa  1.5 tablet Oral 4x Daily Chelsea Dasilva PA-C      Cholecalciferol  1,000 Units Oral Daily Chelsea Dasilva PA-C      vitamin B-12  1,000 mcg Oral Once per day on Monday Wednesday Friday Chelsea Dasilva PA-C      docusate sodium  100 mg Oral BID Chelsea Dasilva PA-C      enoxaparin  40 mg Subcutaneous Daily Chelsea Dasilva PA-C       lactulose  20 g Oral Daily PRN Ashley Depadua, MD      lidocaine  1 patch Topical Daily Ashley Depadua, MD      melatonin  3 mg Oral QPM Ashley Depadua, MD      OLANZapine  2.5 mg Intramuscular Q4H PRN Bakari Rivera      ondansetron  4 mg Oral Q6H PRN Chelsea Dasilva PA-C      polyethylene glycol  17 g Oral Daily Ashley Depadua, MD      QUEtiapine  12.5 mg Oral Q8H PRN Chelsea Dasilva PA-C      rivastigmine  1 patch Transdermal Daily Chelsea Dasilva PA-C      senna  2 tablet Oral BID Chelsea Dasilva PA-C       Imaging: No new imaging.       Lab Results: I have reviewed the following results:  Results from last 7 days   Lab Units 02/03/25  0634 01/29/25  0531 01/28/25  0626   HEMOGLOBIN g/dL 11.5* 11.3* 10.7*   HEMATOCRIT % 35.5* 34.2* 33.0*   WBC Thousand/uL 5.96 9.86 6.19   PLATELETS Thousands/uL 230 203 188     Results from last 7 days   Lab Units 02/03/25  0634 01/29/25  0531 01/28/25  0626   BUN mg/dL 33* 23 21   SODIUM mmol/L 140 140 139   POTASSIUM mmol/L 4.8 4.3 4.9   CHLORIDE mmol/L 104 105 104   CREATININE mg/dL 1.05 0.99 1.05   AST U/L  --  15  --    ALT U/L  --  7  --               0658}      ** Please Note: Fluency Direct voice to text software may have been used in the creation of this document. **

## 2025-02-03 NOTE — PROGRESS NOTES
Progress Note - Hospitalist   Name: Daniel Sanz Jr. 84 y.o. male I MRN: [id number]  Unit/Bed#: -01 I Date of Admission: 1/31/2025   Date of Service: 2/3/2025 I Hospital Day: 3    Assessment & Plan  Parkinson's disease (HCC)  Continue Sinemet 1.5 tabs 4x daily. Currently ordered at times pt takes the medication at home. May need to adjust timing based on therapy schedule.  Follows with Dr. Cavanaugh as an outpt.  Therapy per PMR.  HTN (hypertension)  Home: amlodpine 5mg daily  Here: Same as home medication.  He did have some changes in BP with position changes yesterday but was not orthostatic  Monitor for orthostasis due to PD.  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  Continue rivastigmine patch - pt's wife very concerned about the cost. Price check was $10.  Pt agitated this AM during vitals. He did receive IM Zyprexa.  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  He was taking Lasix 3x weekly.  It is not currently ordered.  Will monitor for edema and add back as indicated.  Dysphagia  Continue modified diet.  SLP to follow.    The above assessment and plan was reviewed and updated as determined by my evaluation of the patient on 2/3/2025.    History of Present Illness   Patient seen and examined. Patients overnight issues or events were reviewed with nursing staff. New or overnight issues include the following:     Pt seen in his room with his wife present. He was agitated this AM when it was time for his vitals to be checked. He did end up getting a dose of IM Zyprexa. He is pleasant now. Long discussion with pt's wife re the rationale behind the medication and causes for his behavior. She was concerned for potential infection and was advised that infection was ruled out during hospitalization and he has no signs or symptoms concerning for infection at this time. She is concerned about the cost of the  rivastigmine patch. She was assured that it will be send in for price check. Pt denies any current complaints.    A 10 point review of systems was negative except for what is noted in the HPI.    Objective :  Temp:  [97 °F (36.1 °C)-97.6 °F (36.4 °C)] 97.6 °F (36.4 °C)  HR:  [69-77] 73  BP: (146-170)/(65-78) 170/78  Resp:  [17-19] 19  SpO2:  [96 %-98 %] 98 %  O2 Device: None (Room air)    Invasive Devices       None                   Physical Exam  General Appearance: NAD; pleasant  HEENT: PERRLA, conjuctiva normal; mucous membranes moist; face symmetrical  Neck:  Supple  Lungs: clear bilaterally, normal respiratory effort, no retractions, expiratory effort normal, on room air  CV: regular rate and rhythm, no murmurs rubs or gallops noted   ABD: soft non tender, +BS x4  EXT: DP pulses intact, no lymphadenopathy, no edema  Skin: normal turgor, normal texture, no rash  Psych: affect normal, mood normal  Neuro: Awake and alert.   The above physical exam was reviewed and updated as determined by my evaluation of the patient on 2/3/2025.      Lab Results: I have reviewed the following results:  Results from last 7 days   Lab Units 02/03/25  0634 01/29/25  0531   WBC Thousand/uL 5.96 9.86   HEMOGLOBIN g/dL 11.5* 11.3*   HEMATOCRIT % 35.5* 34.2*   PLATELETS Thousands/uL 230 203     Results from last 7 days   Lab Units 02/03/25  0634 01/29/25  0531   SODIUM mmol/L 140 140   POTASSIUM mmol/L 4.8 4.3   CHLORIDE mmol/L 104 105   CO2 mmol/L 27 28   BUN mg/dL 33* 23   CREATININE mg/dL 1.05 0.99   CALCIUM mg/dL 9.1 9.0             Results from last 7 days   Lab Units 02/03/25  0745 01/27/25  1643   POC GLUCOSE mg/dl 80 119       Imaging Results Review: No pertinent imaging studies reviewed.  Other Study Results Review: Other studies reviewed include: CBC, BMP    Review of Scheduled Meds: Medications  reviewed and reconciled as needed  Current Facility-Administered Medications   Medication Dose Route Frequency Provider Last Rate     acetaminophen  650 mg Oral TID Chelsea Dasilva PA-C      amLODIPine  5 mg Oral Daily Chelsea Dasilva PA-C      Artificial Tears  1 drop Both Eyes Q4H PRN Chelsea Dasilva PA-C      bisacodyl  10 mg Rectal Daily PRN Ashley Depadua, MD      calcium carbonate  1,000 mg Oral Daily PRN Chelsea Dasilva PA-C      carbidopa-levodopa  1.5 tablet Oral 4x Daily Chelsea Dasilva PA-C      Cholecalciferol  1,000 Units Oral Daily Chelsea Dasilva PA-C      vitamin B-12  1,000 mcg Oral Once per day on Monday Wednesday Friday Chelsea Dasilva PA-C      docusate sodium  100 mg Oral BID Chelsea Dasilva PA-C      enoxaparin  40 mg Subcutaneous Daily Chelsea Dasilva PA-C      lactulose  20 g Oral Daily PRN Ashley Depadua, MD      lidocaine  1 patch Topical Daily Ashley Depadua, MD      melatonin  3 mg Oral QPM Ashley Depadua, MD      OLANZapine  2.5 mg Intramuscular Q4H PRN Bakari Rivera      ondansetron  4 mg Oral Q6H PRN Chelsea Dasilva PA-C      polyethylene glycol  17 g Oral Daily Ashley Depadua, MD      QUEtiapine  12.5 mg Oral Q8H PRN Chelsea Dasilva PA-C      rivastigmine  1 patch Transdermal Daily Chelsea Dasilva PA-C      senna  2 tablet Oral BID Chelsea Dasilva PA-C         VTE Pharmacologic Prophylaxis: Lovenox  Code Status: Level 1 - Full Code  Current Length of Stay: 3 day(s)    Administrative Statements   I have spent a total time of 35 minutes in caring for this patient on the day of the visit/encounter including Diagnostic results, Prognosis, Risks and benefits of tx options, Instructions for management, Patient and family education, Importance of tx compliance, Risk factor reductions, Impressions, Counseling / Coordination of care, Documenting in the medical record, Reviewing / ordering tests, medicine, procedures  , Obtaining or reviewing history  , and Communicating with other healthcare professionals .  ** Please Note:  voice to text  software may have been used in the creation of this document. Although proof errors in transcription or interpretation are a potential of such software**

## 2025-02-03 NOTE — PCC PHYSICAL THERAPY
Pt is an 84 year old male with Hx of Parkinson's disease admitted to [facility] secondary to functional declined after a fall with head strike related hospitalization. Barriers to functional indep and overall safety include decreased BLE strength caretr with proximal mm group, impaired static/dynamic balance with impaired righting reactions, gait dysfunction with/without AD including freezing & festination episodes,  impaired cognition with decreased safety awareness. Additional barriers to home d/c include limited physical assistance from family and 1 JAYLEN.  At this time pt functional indep varies from requiring max -min of 1 with  bed/chair transfers while mod of 2 if walking and not using an AD. When compensating with SPC or RW he requires mod of 1. Also requires max of 1 for curb step negotiation using L grab bar per home set up. Recommend re-team with increase focus on strengthening, functional mobility training and NMR/NPP no AD for balance and gait training.     2/10/24  Pt is demonstrating functional gains although carry over can be inconsistent due to PD related symptoms include freezing, shuffling, rigidity and fatigue level. Pt cont to demo impaired dynamic balance with impaired righting reaction. Pt still demo's impulsive behavior and due to impaired cognition and memory does not recall to use call bell to call for staff assistance placing pt at high risk for falls. Hence wife/DIL educated on recommendation to use bed/chair alarm at home so wife is alerted if pt's gets up.  Additionally, Pt is safer when mobilizing with RW vs SPC or no AD. So, family been educated on current recommendation for pt to have 24/7 S/assist using a RW. Wife verbalized understanding but will require additional education and hands on FT which was initiated today. Currently, pt requires CS to CGA guarding during mobilities using a RW including FF negotiation with bilat HR. Plan for this week to complete hands on FT with wife who will be  primary caregiver at d/c. Family requesting for pt to resume OP PT services.

## 2025-02-03 NOTE — UTILIZATION REVIEW
NOTIFICATION OF ADMISSION DISCHARGE   This is a Notification of Discharge from [facility]. Please be advised that this patient has been discharge from our facility. Below you will find the admission and discharge date and time including the patient’s disposition.   UTILIZATION REVIEW CONTACT:  Yanci Leyva  Utilization   Network Utilization Review Department  Phone: [phone] x carefully listen to the prompts. All voicemails are confidential.  Email: [email or web address]     ADMISSION INFORMATION  PRESENTATION DATE: 1/21/2025 11:35 AM  OBERVATION ADMISSION DATE: N/A  INPATIENT ADMISSION DATE: 1/21/25  4:28 PM   DISCHARGE DATE: 1/31/2025 12:24 PM   DISPOSITION:Psychiatric    Network Utilization Review Department  ATTENTION: Please call with any questions or concerns to [phone] and carefully listen to the prompts so that you are directed to the right person. All voicemails are confidential.   For Discharge needs, contact Care Management DC Support Team at [phone] opt. 2  Send all requests for admission clinical reviews, approved or denied determinations and any other requests to dedicated fax number below belonging to the campus where the patient is receiving treatment. List of dedicated fax numbers for the Facilities:  FACILITY NAME UR FAX NUMBER   ADMISSION DENIALS (Administrative/Medical Necessity) [phone]   DISCHARGE SUPPORT TEAM ([facility]) [phone]   PARENT CHILD HEALTH (Maternity/NICU/Pediatrics) [phone]   General acute hospital [phone]   [facility] [phone]   [facility] [phone]   [facility] [phone]   [facility] [phone]   [facility] [phone]   [facility] [phone]   [facility] [phone]   [facility]  [facility] [phone]   [facility] [phone]   General acute hospital [phone]   [facility] [phone]

## 2025-02-03 NOTE — PROGRESS NOTES
02/03/25 1400   Pain Assessment   Pain Assessment Tool 0-10   Pain Score 5   Pain Location/Orientation Orientation: Right;Location: Shoulder   Pain Onset/Description Onset: Ongoing  (chronic)   Effect of Pain on Daily Activities tender to touch but does not endorse during WB through the [facility] Pain Intervention(s) Rest  (per RN not due for pain meds till 4pm)   Restrictions/Precautions   Precautions Aspiration;Bed/chair alarms;Cognitive;Fall Risk;Pain;Supervision on toilet/commode   Cognition   Overall Cognitive Status Impaired   Arousal/Participation Alert;Cooperative   Attention Within functional limits   Memory Decreased short term memory;Decreased recall of recent events;Decreased recall of precautions   Following Commands Follows one step commands with increased time or repetition   Subjective   Subjective pt awake and on the recliner, agreeable to PT with endorsement of R shoulder pain which per wife is chronic   Sit to Stand   Type of Assistance Needed Physical assistance;Verbal cues   Physical Assistance Level 51%-75%   Comment varies from min-max of 1 with intermittent slight retropulsion or plops down upon sitting. also requires several attempts to stand up at times   Sit to Stand CARE Score 2   Bed-Chair Transfer   Type of Assistance Needed Physical assistance;Verbal cues   Physical Assistance Level 51%-75%   Comment mod-max without AD, demo's freezing when approaching chair   Chair/Bed-to-Chair Transfer CARE Score 2   Walk 10 Feet   Type of Assistance Needed Physical assistance;Verbal cues;Adaptive equipment   Physical Assistance Level Total assistance   Walk 10 Feet CARE Score 1   Walk 50 Feet with Two Turns   Type of Assistance Needed Physical assistance;Verbal cues;Adaptive equipment   Physical Assistance Level Total assistance   Walk 50 Feet with Two Turns CARE Score 1   Walk 150 Feet   Type of Assistance Needed Physical assistance;Verbal cues;Adaptive equipment   Physical  Assistance Level Total assistance   Comment 150' no AD mod -min of 2 , CFA of 3rd person. mod of 1 with SPC or RW x 150' + 2nd person CFA. at times lightly kicks SPC and does not really use it for support more like carrying it. wife to bring in pt personal cane tomorrow.   Walk 150 Feet CARE Score 1   Ambulation   Gait Pattern Decreased foot clearance;Inconsistant Michelle;Step to;Improper weight shift  (festinating gait to marching instead advancing limb fwd through knee extension. demo's fwd flexed posture with foot scuffing)   Therapeutic Interventions   Neuromuscular Re-Education NPP gait training no AD x 200' x 2 reps with external/internal cues to promote foot clearance and step through gait pattern, retrowalking with L rail and bilat HHA   Other see vitals section for ortho BP results   Other Comments   Comments Pt provided repeated re-ed on importance of standing still for a moment upon initial standing due to OH and reduce risk for falls. Wife expressed this concern that at home pt would just walk right away after standing up. pt able to verbalized what he is supposed to do after standing and expressed he will sit down if he feel lightheaded. although edu to notify wife or in this case the staff here if it happens, he verbalized understanding.   Assessment   Treatment Assessment pt tolerated skilled PT which focused initially on gait assessment no AD then with SPC vs RW He endorsed slight level of lightheadedness upon initial standing but resolves right away. at this time no significant difference in terms of indep regardless if using AD or no AD. He demo's the same gait pattern as described above. so PT then focus tx session on NMR/NPP gait and righting reaction training without using an AD. no overt LOB but does demo freezing episodes usually when approaching chairs or intermittent festination carter when initiating amb. Pt will benefit from additional skilled PT intervention with training focus on NMR/NPP  without using an AD to improve overall safety and indep.   Problem List Decreased strength;Decreased range of motion;Decreased endurance;Impaired balance;Decreased mobility;Decreased coordination;Decreased cognition;Impaired judgement;Decreased safety awareness   Barriers to Discharge Inaccessible home environment   Plan   Treatment/Interventions Functional transfer training;Therapeutic exercise;Endurance training;Gait training;Spoke to nursing;OT   Progress Progressing toward goals   PT Therapy Minutes   PT Time In 1400   PT Time Out 1500   PT Total Time (minutes) 60   PT Mode of treatment - Individual (minutes) 60   PT Mode of treatment - Concurrent (minutes) 0   PT Mode of treatment - Group (minutes) 0   PT Mode of treatment - Co-treat (minutes) 0   PT Mode of Treatment - Total time(minutes) 60 minutes   PT Cumulative Minutes 150

## 2025-02-04 PROCEDURE — 99232 SBSQ HOSP IP/OBS MODERATE 35: CPT | Performed by: PHYSICIAN ASSISTANT

## 2025-02-04 PROCEDURE — 97116 GAIT TRAINING THERAPY: CPT

## 2025-02-04 PROCEDURE — 92526 ORAL FUNCTION THERAPY: CPT

## 2025-02-04 PROCEDURE — 97535 SELF CARE MNGMENT TRAINING: CPT

## 2025-02-04 PROCEDURE — 97112 NEUROMUSCULAR REEDUCATION: CPT

## 2025-02-04 PROCEDURE — 97530 THERAPEUTIC ACTIVITIES: CPT

## 2025-02-04 PROCEDURE — 97110 THERAPEUTIC EXERCISES: CPT

## 2025-02-04 PROCEDURE — 99233 SBSQ HOSP IP/OBS HIGH 50: CPT | Performed by: PHYSICAL MEDICINE & REHABILITATION

## 2025-02-04 RX ORDER — OLANZAPINE 10 MG/2ML
2.5 INJECTION, POWDER, FOR SOLUTION INTRAMUSCULAR EVERY 4 HOURS PRN
Status: DISCONTINUED | OUTPATIENT
Start: 2025-02-04 | End: 2025-02-13

## 2025-02-04 RX ADMIN — RIVASTIGMINE 1 PATCH: 4.6 PATCH, EXTENDED RELEASE TRANSDERMAL at 08:26

## 2025-02-04 RX ADMIN — Medication 1000 UNITS: at 08:26

## 2025-02-04 RX ADMIN — ENOXAPARIN SODIUM 40 MG: 40 INJECTION SUBCUTANEOUS at 08:25

## 2025-02-04 RX ADMIN — ACETAMINOPHEN 650 MG: 325 TABLET, FILM COATED ORAL at 20:08

## 2025-02-04 RX ADMIN — CARBIDOPA AND LEVODOPA 1.5 TABLET: 25; 100 TABLET ORAL at 20:30

## 2025-02-04 RX ADMIN — LIDOCAINE 5% 2 PATCH: 700 PATCH TOPICAL at 16:33

## 2025-02-04 RX ADMIN — DOCUSATE SODIUM 100 MG: 100 CAPSULE, LIQUID FILLED ORAL at 08:26

## 2025-02-04 RX ADMIN — CARBIDOPA AND LEVODOPA 1.5 TABLET: 25; 100 TABLET ORAL at 17:07

## 2025-02-04 RX ADMIN — CARBIDOPA AND LEVODOPA 1.5 TABLET: 25; 100 TABLET ORAL at 12:51

## 2025-02-04 RX ADMIN — DOCUSATE SODIUM 100 MG: 100 CAPSULE, LIQUID FILLED ORAL at 17:07

## 2025-02-04 RX ADMIN — POLYETHYLENE GLYCOL 3350 17 G: 17 POWDER, FOR SOLUTION ORAL at 08:26

## 2025-02-04 RX ADMIN — CARBIDOPA AND LEVODOPA 1.5 TABLET: 25; 100 TABLET ORAL at 08:26

## 2025-02-04 RX ADMIN — AMLODIPINE BESYLATE 5 MG: 5 TABLET ORAL at 08:26

## 2025-02-04 RX ADMIN — SENNOSIDES 17.2 MG: 8.6 TABLET ORAL at 08:26

## 2025-02-04 RX ADMIN — SENNOSIDES 17.2 MG: 8.6 TABLET ORAL at 17:07

## 2025-02-04 RX ADMIN — ACETAMINOPHEN 650 MG: 325 TABLET, FILM COATED ORAL at 16:32

## 2025-02-04 RX ADMIN — MELATONIN TAB 3 MG 3 MG: 3 TAB at 20:08

## 2025-02-04 RX ADMIN — ACETAMINOPHEN 650 MG: 325 TABLET, FILM COATED ORAL at 08:26

## 2025-02-04 NOTE — CASE MANAGEMENT
Clinical update faxed via [employer] to [employer], [phone], awaiting determination.

## 2025-02-04 NOTE — ASSESSMENT & PLAN NOTE
Chronic right lower extremity venous ulcers   PTA Lasix for LE edema management   Known to [facility] podiatry/[facility]   Wound care consulted

## 2025-02-04 NOTE — PROGRESS NOTES
02/04/25 1230   Pain Assessment   Pain Assessment Tool 0-10   Pain Score No Pain   Pain Rating: FLACC (Rest) - Face 0   Pain Rating: FLACC (Rest) - Legs 0   Pain Rating: FLACC (Rest) - Activity 0   Pain Rating: FLACC (Rest) - Cry 0   Pain Rating: FLACC (Rest) - Consolability 0   Score: FLACC (Rest) 0   Pain Rating: FLACC (Activity) - Face 0   Pain Rating: FLACC (Activity) - Legs 0   Pain Rating: FLACC (Activity) - Activity 0   Pain Rating: FLACC (Activity) - Cry 0   Pain Rating: FLACC (Activity) - Consolability 0   Score: FLACC (Activity) 0   Restrictions/Precautions   Precautions Aspiration;Bed/chair alarms;Cognitive;Fall Risk;Supervision on toilet/commode   Cognition   Overall Cognitive Status Impaired   Arousal/Participation Alert;Cooperative   Subjective   Subjective pt sitting in recliner and agreeable to PT   Roll Left and Right   Type of Assistance Needed Supervision;Verbal cues   Roll Left and Right CARE Score 4   Sit to Lying   Type of Assistance Needed Supervision;Verbal cues   Sit to Lying CARE Score 4   Lying to Sitting on Side of Bed   Type of Assistance Needed Supervision;Verbal cues   Comment HOB flat, no rail on the left side of the bed (window side) to simulate home set up - has a bedrail at home but did not use bedrail this time   Lying to Sitting on Side of Bed CARE Score 4   Sit to Stand   Type of Assistance Needed Physical assistance;Verbal cues   Physical Assistance Level 25% or less   Comment extra time to complete with VC for sequencing carter to scoot to edge of the chair/bed to facilitate STS and minimize risk of retroleaning   Sit to Stand CARE Score 3   Bed-Chair Transfer   Type of Assistance Needed Physical assistance;Verbal cues   Physical Assistance Level 25% or less   Comment no AD with VC for safe approach of chairs with proper technique   Chair/Bed-to-Chair Transfer CARE Score 3   Walk 10 Feet   Type of Assistance Needed Physical assistance;Verbal cues   Physical Assistance Level  "25% or less   Walk 10 Feet CARE Score 3   Walk 50 Feet with Two Turns   Type of Assistance Needed Physical assistance;Verbal cues   Physical Assistance Level 25% or less   Comment 50' no AD   Walk 50 Feet with Two Turns CARE Score 3   Walk 150 Feet   Type of Assistance Needed Physical assistance;Verbal cues   Physical Assistance Level Total assistance   Comment 150', 200' no AD - internal/external cues about 75% of the time to promote foot clearance and step through patterns but still unable to sustained pattern. Liu's festination when trying to enter his room so VC to stop walking to reset before continuing   Walk 150 Feet CARE Score 1   Curb or Single Stair   Style negotiated Single stair   Type of Assistance Needed Adaptive equipment;Physical assistance;Verbal cues   Physical Assistance Level 25% or less   Comment min of 1 on 6\"  x 6 reps fwd facing ascent/descent but switches from reciprocal to non recip pattern   1 Step (Curb) CARE Score 3   4 Steps   Type of Assistance Needed Physical assistance;Verbal cues;Adaptive equipment   Physical Assistance Level 25% or less   4 Steps CARE Score 3   Assessment   Treatment Assessment Patient tolerated tx x 30 mins which focused on functional bed/chair transfers and gait training to improve overall indep, safety and functional efficiency. Overall pt is functioning at min A level no AD with intermittent onset of PD related symptoms including festination, retropulsion, freezing along with cognitive deficits and STM impairment requiring repetitive training to improve. Cont primary PT POC.   Family/Caregiver Present yes   PT Family training done with: wife Sheng notified of current recommendation for her to provide S to the pt at home upon initial d/c from [facility], she nodded in agreement/understandin. Yesterday together with LEVON they were also educated on benefits of using a chair/bed alarm to alert her when pt gets up at night, they both verbalized understanding. wife " still has to bring in pt's cane from home.   Barriers to Discharge Inaccessible home environment   Barriers to Discharge Comments 3 JAYLEN with [facility] [employer].   PT Barriers   Physical Impairment Decreased strength;Decreased endurance;Impaired balance;Decreased mobility;Decreased coordination;Decreased cognition;Decreased safety awareness;Pain   Plan   Treatment/Interventions Functional transfer training;Therapeutic exercise;Endurance training;Bed mobility;Gait training;Spoke to nursing;OT;Equipment eval/education;Spoke to case management;Spoke to MD   Progress Progressing toward goals   PT Therapy Minutes   PT Time In 1230   PT Time Out 1300   PT Total Time (minutes) 30   PT Mode of treatment - Individual (minutes) 30   PT Mode of treatment - Concurrent (minutes) 0   PT Mode of treatment - Group (minutes) 0   PT Mode of treatment - Co-treat (minutes) 0   PT Mode of Treatment - Total time(minutes) 30 minutes   PT Cumulative Minutes 270   Therapy Time missed   Time missed? No

## 2025-02-04 NOTE — PROGRESS NOTES
02/04/25 1130   Pain Assessment   Pain Assessment Tool 0-10   Pain Score No Pain   Restrictions/Precautions   Precautions Aspiration;Bed/chair alarms;Cognitive;Fall Risk;Supervision on toilet/commode;Pain   Eating   Type of Assistance Needed Set-up / clean-up;Supervision;Verbal cues   Physical Assistance Level No physical assistance   Eating CARE Score 4   Swallow Assessment   Swallow Treatment Assessment   Patient Name: Daniel Sanz Jr.    Today's Date: 2/4/2025      Current Risks for Dysphagia & Aspiration: General debilitation; Cognitive deficit; Mental status change; Behavior issues; Hx neurologic dx (Parkinson's); Positioning issues      Current Symptoms/Concerns: Coughs w/ liquids; Cough on own secretions; Difficulty chewing; Holding food in mouth      Current diet:mechanically altered/level 2 diet and thin liquids     Premorbid diet::soft/level 3 diet, regular diet, and thin liquids      Positioning: upright in recliner    Items administered:Consistencies Administered: thin liquids, hard solids, and mixed consistency  Materials administered included: grilled cheese, fresh fruit cup, tomato soup, thins by cup/straw    Total amount of meal consumed:   100% of meal and 360cc of thins by cup/straw      Oral stage:mild-moderate  Lip closure: functional   Anterior spillage: none  Mastication: slower but increased effort noted   Bolus formation: still decreased due to piecemeal bites  Bolus control: decreased w/ larger sips  Transfer: delayed  Oral residue: mild but cleared given prompting for strategies by SLP  Pocketing: none         Pharyngeal stage:mild  Swallow promptness: fluctuated pending bolus size w/ solids vs prompter swallows w/ liquids  Hyolaryngeal elevation: present but still decreased when palpated   Wet voice: present but less from prior sessions  Throat clear: prompted by SLP prior to initiation of meal  Cough: x3--> after fresh fruit x1 and x2 after thins by cup and straw  Secondary swallows:  fairly consistent across meal  Audible swallows: increased when taking thins by cup       Esophageal stage:No overt sxs observed w/ meal        Summary:     Pt presenting with mild-moderate oral and mild pharyngeal dysphagia today. Symptoms or concerns included functional lip seal and bolus retrieval around utensils, cup and straw. No anterior loss observed across meal today. Mastication was slower with the materials trialed continuing to exhibit piecemeal transfer of solids. Continues to require verbal cues from SLP for appropriate pacing and completing full mastication of bites taken before next bites.  Bolus formation was decreased as result of this where pt did benefit from increased time and use of liquid wash to clear minimal oral residual throughout meal. When taking thins by cup or straw, pt continued to take larger successive sips in which it was suspected some decreased control occurring. Swallow initiation was delayed across all textures observed w/ meal. HLE remains to be present but mildly decreased upon palpation.  Pt did have less overt wetness in voice throughout meal, but did have spontaneous cough x1 after fresh fruit and cough x2 after a sip by cup and then by straw. It was observed that pt did have wet voicing prior to evaluation as well, indicating decreased management of own saliva.          Recommendations:  Diet: soft/level 3 diet and thin liquids  Meds: whole with puree  Strategies:  upright posture, only feed when fully alert, slow rate of feeding, small bites/sips, cough when demonstrating wet voicing, quiet environment (tv off, limit talking, door closed, etc.), alternating bites and sips, and OOB for meals preferred      FULL supervision w/ meals. Will need setup.  Results reviewed with:  patient, RN MD Jany- Dr. DePadua, DANIEL Urbina  Aspiration precautions posted.     F/u ST tx: Pt is currently recommended for further skilled SLP services targeting dysphagia therapy in order to  maximize oral and pharyngeal swallow skills, while safely supporting PO intake, as well as to improve independent carryover of safe swallow strategies.       Plan: Monitor diet advancement to level 3 diet w/ thins            Trial solids with SLP supervision only            Ongoing family education/training w/ spouse            Consider VFSS later this week into early next week pending overall progress     Swallow Assessment Prognosis   Prognosis Fair   Prognosis Considerations Co-morbidities;Age;Family/community support;Medical diagnosis;Medical prognosis;Severity of impairments;New learning ability;Ability to carry over   SLP Therapy Minutes   SLP Time In 1130   SLP Time Out 1210   SLP Total Time (minutes) 40   SLP Mode of treatment - Individual (minutes) 40   SLP Mode of treatment - Concurrent (minutes) 0   SLP Mode of treatment - Group (minutes) 0   SLP Mode of treatment - Co-treat (minutes) 0   SLP Mode of Treatment - Total time(minutes) 40 minutes   SLP Cumulative Minutes 215   Therapy Time missed   Time missed? No

## 2025-02-04 NOTE — TEAM CONFERENCE
Acute RehabilitationTeam Conference Note  Date: 2/4/2025   Time: 10:59 AM       Patient Name:  Daniel Sanz Jr.       Medical Record Number: [id number]   YOB: 1940  Sex: Male          Room/Bed:  [facility] 457/[facility] 457-01  Payor Info:  Payor: [facility] REP / Plan: [facility] MEDICARE ADVANTAGE  REP / Product Type: Medicare HMO /      Admitting Diagnosis: Status post fall [Z91.81]  Altered mental state [R41.82]   Admit Date/Time:  1/31/2025  1:38 PM  Admission Comments: No comment available     Primary Diagnosis:  Parkinson's disease (HCC)  Principal Problem: Parkinson's disease (HCC)    Patient Active Problem List    Diagnosis Date Noted    At risk for venous thromboembolism (VTE) 02/03/2025    Vitamin D insufficiency 02/03/2025    Bowel and bladder incontinence 01/31/2025    Dysphagia 01/23/2025    Anemia 01/23/2025    Dementia (HCC) 01/22/2025    Altered mental status 01/21/2025    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 ([facility]) 01/21/2025    Fall, initial encounter 10/11/2024    Closed fracture of nasal bone 06/12/2024    Fall 06/12/2024    Scalp hematoma 06/12/2024    Multiple abrasions 06/12/2024    HTN (hypertension) 06/12/2024    Stage 3a chronic kidney disease (HCC) 03/15/2024    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    Shortness of breath     MGUS (monoclonal gammopathy of unknown significance) 04/29/2022    Neuropathy 04/29/2022    Parkinson's disease (HCC) 02/18/2020    Tear of right supraspinatus tendon 06/11/2019    Chronic pain of both shoulders 03/12/2019    Internal derangement of right shoulder 03/12/2019    Pleural plaque with presence of asbestos 03/02/2017    Allergic rhinitis with postnasal drip 03/02/2017       Physical Therapy:    Weight Bearing Status: Full Weight Bearing  Transfers: Maximum Assistance, Moderate Assistance  Bed Mobility: Moderate Assistance, Minimal Assistance  Amulation Distance (ft): 150 feet  Ambulation: Assist of 2  "(mod of 2 no AD, mod with RW or SPC)  Number of Stairs: 1 (6\" curb step)  Assistive Device for Stairs: Lehft Hand Rail  Stair Assistance: Maximum Assistance  Discharge Recommendations: Home with:  DC Home with:: 24 Hour Assisteance, 24 Hour Supervision, Family Support, First Floor Setup (PT services recommendation pending progress)    Pt is an 84 year old male with Hx of Parkinson's disease admitted to [facility] secondary to functional declined after a fall with head strike related hospitalization. Barriers to functional indep and overall safety include decreased BLE strength carter with proximal mm group, impaired static/dynamic balance with impaired righting reactions, gait dysfunction with/without AD including freezing & festination episodes,  impaired cognition with decreased safety awareness. Additional barriers to home d/c include limited physical assistance from family and 1 JAYLEN.  At this time pt functional indep varies from requiring max -min of 1 with  bed/chair transfers while mod of 2 if walking and not using an AD. When compensating with SPC or RW he requires mod of 1. Also requires max of 1 for curb step negotiation using L grab bar per home set up. Recommend re-team with increase focus on strengthening, functional mobility training and NMR/NPP no AD for balance and gait training.     Occupational Therapy:  Eating: Supervision  Grooming: Moderate Assistance  Bathing: Total Assistance, Assist of 2  Bathing: Total Assistance, Assist of 2  Upper Body Dressing: Maximum Assistance  Lower Body Dressing: Total Assistance  Toileting: Maximum Assistance  Tub/Shower Transfer:  (not assessed)  Toilet Transfer: Total Assistance, Assist of 2  Cognition: Exceptions to WNL  Cognition: Decreased Memory, Decreased Executive Functions, Decreased Attention, Decreased Comprehension, Decreased Safety  Orientation: Person, Place  Discharge Recommendations: Home with:  DC Home with:: 24 Hour Supervision, 24 Hour Assistance, Family " Support, First Floor Setup, Home Occupational Therapy       2/3/25  Pt was admitted to [facility] 1/31 with dx of Parkinson's dementia with mild acute encephalopathy. Pts impairments include endurance, activity tolerance, functional mobility, forward functional reach, balance, trunk control, functional standing tolerance, decreased I w/ ADLS/IADLS, strength, cognitive impairments, decreased safety awareness, and decreased insight into deficits. These impairments along with limited caregiver support, inaccesible home environment, steps to enter, limited insight into deficits, decreased initiation, and increased processing time interfere with pt's ability to complete BADLs and functional transfers/mobility independently. Pt lives with his wife who can provide light BADL assist and completes IADLs. Initial OT eval completed 2/1 recommending 2 week LOS to achieve SUP level goals. Recommend re-team.     Speech Therapy:  Mode of Communication: Verbal  Cognition: Exceptions to WNL  Cognition: Decreased Memory, Decreased Executive Functions, Decreased Attention, Decreased Comprehension, Decreased Safety, Behavioral Considerations, Impulsive  Orientation: Person  Swallowing: Exceptions to WNL  Swallowing: Oral Dysphagia, Pharyngeal Dysphagia, Aspiration Risk  Diet Recommendations: Level 2/Mechanically Altered, Thin  Discharge Recommendations:  (pending pt progress)   Pt currently being followed for dysphagia and cognitive tx sessions to where slower progress this week.     Continued cognitive barriers which present include: decreased attention, LT memory recall, ST memory recall , problem solving, organization of thoughts, judgement, slower processing, and insight, which still impacts pt's overall safety, functional cognitive communication skills as well as functional mobility. The following interventions are used to target these barriers, including visual orientation checklist, verbal problem solving task, drawing conclusions  activities, categorization tasks , picture problem solving activities, verbal reasoning tasks, and family education/training.     Continued dysphagia barriers which present include the following risks for aspiration such as: poor positioning, decreased cognition, ineffective mastication, holding of bolus before transfer, delay in oral transit, delay in swallow initiation, pharyngeal weakness upon swallowing, and increased wet voicing and increased throat clearing, coughing given meals  . In order to address the noted barriers, skilled SLP services will address this by targeting the following interventions: proper positioning, diet modification, safe swallow strategies, family education/training, and complete VFSS as needed.    Current diet is dysphagia level 2 w/ thin liquids.      Current level of functioning:   Eating: FULL supervision for meals  Comprehension: mod A  Expression: min A  Social Interaction: supervision  Executive functions: max A  Memory: max A    Family training/education has been initiated with pt's spouse and family.  Of note, pt's spouse will require increased education and review given swallow strategies, education of the need for supervision for meals to ensure use of swallow strategies, review of cognitive skills and need for increased supervision/assistance w/ ADL's and I ADL's. Recommendations at time of discharge are for continued skilled ST, home vs OP.    This week, focus for continued services to target trials of diet upgrade to level 3 items, monitoring thin liquids across meals, as well as carryover given strategies. For cognitive skills, targeting reorientation, LT biographical recall, ST recall w/ use of written cues, basic safety/problem solving skills, basic sequencing, etc. At this time, pt will continue to benefit from skilled cognitive linguistic tx and dysphagia tx  session to maximize overall functional independence given overall cognitive linguistic skills and swallow  abilities  in attempts to decreased caregiver burden over time.      Nursing Notes:  Appetite: Good  Diet Type: Dysphagia II, Thin Liquids                                                                     Pain Location/Orientation: Orientation: Bilateral, Location: Shoulder (R>L)  Pain Score: 0                       Hospital Pain Intervention(s): Rest (per RN not due for pain meds till 4pm)          83yo M with medical history of Parkinson's Disease with dementia (MOCA 7/30 in March 2024), HTN, LE venous stasis dermatitis and edema, GERD who presented to [facility] on 1/21 with worsening agitation, AMS, culminating in a fall with HS and R eye lac. No sutures were needed for the lac. CTH/N and L shoulder benign. He was noted to be hypothermic and hyperkalemic on admission. TSH WNL, Folate WNL, Vitamin D was low. B12 WNL. Given gentle IVF, noted to be constipated. Wound care was consulted for R medial tibia venous ulcer, and Geriatrics was consulted who recommended melatonin, delirium precautions. Speech was consulted who recommended NPO initially, but he has since been advanced to an oral diet of Level 2/thin liquids. Neurology was also consulted as the agitation, behavior symptoms, hallucinations worsened since increasing his medications, they lowered his Sinemet and added rivastigmine. There was improvement in his behavioral symptoms. He no longer needed restraint/1:1 at time of discharge. He was admitted to the [facility] on 1/31.     2/3:  This week we will encourage independence with ADLs. We will monitor labs and vital signs. We will educate pt/family about repositioning to prevent skin breakdown. We will assist w repositioning and perform routine skin checks. We will monitor for adequate pain control. We will monitor for constipation and medicate pt as ordered. We will provide pt/family with DM education as needed.We will monitor incision/wound for healing and s/s of infection.We will increase safety awareness and  keep pt free from falls.    Case Management:     Discharge Planning  Living Arrangements: Lives Alone  Support Systems: Spouse/significant other  Assistance Needed: no  Type of Current Residence: Private residence  Current Home Care Services: No  Pt admitted s/p parkisonism and was funcitoning at home with his wife and daughter in law support. Pt is expected to return home with recommended services from the team. Clinical update due today.     Is the patient actively participating in therapies? yes  List any modifications to the treatment plan:     Barriers Interventions   Dysphagia, aspiration Modified diet, full supervision with meals, cueing for swallow, swallowing exercises   Right shoulder pain, parkinsons Pain medications, neuro re ed w/o device   Steps to enter Therapy stair training   Memory recall, carryover Repetitive education, cueing for carryover, spouse education         Is the patient making expected progress toward goals? yes  List any update or changes to goals:     Medical Goals: Patient will be medically stable for discharge to [facility] upon completion of rehab program and Patient will be able to manage medical conditions and comorbid conditions with medications and follow up upon completion of rehab program    Weekly Team Goals:   Rehab Team Goals  ADL Team Goal: Patient will require supervision with ADLs with least restrictive device upon completion of rehab program  Transfer Team Goal: Patient will require supervision with transfers with least restrictive device upon completion of rehab program  Locomotion Team Goal: Patient will require supervision with locomotion with least restrictive device upon completion of rehab program  Cognitive Team Goal: Patient will require supervision for basic and complex tasks upon completion of rehab program    Discussion: pt presents with the above barriers and the team is utilizing stated interventions to achieve goals of dc. The team is  still working to identify pts baseline abilities as info is conflicting. Pt has poor initiation and attention during meals. Pt needs cueing to swallow and is full supervision. Spouse is visually supportive but will not be able to assist physically. Supervision to min a are goals for adls, especially with lower body. Estimated los two weeks with potential dc to home. Recommendations are for [facility] for pt ot and speech.     Anticipated Discharge Date:  2/14/25  [facility] Team Members Present:The following team members are supervising care for this patient and were present during this Weekly Team Conference.    Physician: Dr. DePadua, MD  : Nathalia Griffin MSW  Registered Nurse: Kerry Messina RN  Physical Therapist: Viki Hendrickson DPT  Occupational Therapist: Mica Eaton MS, OTR/L  Speech Therapist: Yelena Foster MA, CCC-SLP

## 2025-02-04 NOTE — ASSESSMENT & PLAN NOTE
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  Continue rivastigmine patch - pt's wife very concerned about the cost. Price check was $10.  Pt agitated yesterday during AM vitals. He did receive IM Zyprexa. Not agitated currently

## 2025-02-04 NOTE — PROGRESS NOTES
02/04/25 1300   Pain Assessment   Pain Assessment Tool 0-10   Pain Score No Pain   Patient's Stated Pain Goal No pain   Restrictions/Precautions   Precautions Aspiration;Bed/chair alarms;Cognitive;Fall Risk;Supervision on toilet/commode   Weight Bearing Restrictions No   ROM Restrictions No   Braces or Orthoses   (Pt has not been orthostatic x 2 days, therefore not using the ABD binder. He has his own compression socks here from home.)   Lifestyle   Autonomy Pt expressed that he has urgency when needing to urinate. He wears pull-up style briefs at home   Reciprocal Relationships lives with his wife Sheng   Service to Others retired    Intrinsic Gratification watching TV   Upper Body Dressing   Type of Assistance Needed Physical assistance   Physical Assistance Level 51%-75%   Comment snap down shirt seated   Upper Body Dressing CARE Score 2   Lower Body Dressing   Type of Assistance Needed Physical assistance   Physical Assistance Level 51%-75%   Comment threading hospital pants seated on recliner with use of reacher mod/max A, mod A to hike to waist.   Lower Body Dressing CARE Score 2   Putting On/Taking Off Footwear   Type of Assistance Needed Physical assistance   Physical Assistance Level 76% or more   Comment Able to slid off shoes and min A to fix the heel after sliding back into them, likely due to the loss of edema in his feet making the shoes large on him now. TA to don/doff compression socks. Education provided to his wife regarding position recommendations to protect her back when assisting Pt with foot wear.   Putting On/Taking Off Footwear CARE Score 2   Roll Left and Right   Type of Assistance Needed Supervision   Physical Assistance Level No physical assistance   Comment use of hand rail on the right as he has in bed   Roll Left and Right CARE Score 4   Sit to Lying   Type of Assistance Needed Supervision   Physical Assistance Level No physical assistance   Sit to Lying CARE Score 4    Lying to Sitting on Side of Bed   Type of Assistance Needed Incidental touching   Physical Assistance Level No physical assistance   Comment CGA until fully upright and balanced at EOB   Lying to Sitting on Side of Bed CARE Score 4   Sit to Stand   Type of Assistance Needed Physical assistance   Physical Assistance Level 25% or less   Comment CG/min A to gain his balance   Sit to Stand CARE Score 3   Bed-Chair Transfer   Type of Assistance Needed Physical assistance   Physical Assistance Level 25% or less   Comment min A for balance and mod VC's for techniques   Chair/Bed-to-Chair Transfer CARE Score 3   Toileting Hygiene   Type of Assistance Needed Physical assistance   Physical Assistance Level 26%-50%   Comment mod A to manage clothes back to waist, Pt unable to void.   Toileting Hygiene CARE Score 3   Toilet Transfer   Type of Assistance Needed Physical assistance   Physical Assistance Level 26%-50%   Comment mod A due to Pt trying to take pants down prior to turn at the toilet   Toilet Transfer CARE Score 3   Cognition   Overall Cognitive Status Impaired   Arousal/Participation Alert;Cooperative   Attention Attends with cues to redirect   Orientation Level Oriented to person;Oriented to place;Oriented to situation   Memory Decreased short term memory;Decreased recall of recent events;Decreased recall of precautions   Following Commands Follows one step commands with increased time or repetition   Additional Activities   Additional Activities Comments Pt ambulated hallway distance with focus on level eye gaze and stopping his motion when he wanted to talk with someone he passed, due to scissoring or lateral LOB when turning his head to engage in conversation while still walking.   Activity Tolerance   Activity Tolerance Patient tolerated treatment well   Assessment   Treatment Assessment Pt engaged in 60min OT treatment, with wife Sheng present for entire session, focused on dressing techniques and use of LHAE  that Pt's wife states he has at home, reacher and dressing stick. Pt needed extended time and max VC's to utilize the LHAE effectively. OT provided education to Pt's wife regarding her positioning and techniques as a caregiver to protect her back and reduce fall risks. In further discussion with wife and questioning details of her account of his performance at home, it came out that she was assisting him to thread the pants, don/doff shoes and socks, thread his arms into his snap up shirts from behind and bring them up onto his shoulders due to his RTC injuries. While she initially stated he would go to the bathroom himself, it was actually determined that he would only get the pull-up and pants partially up in the back and she would complete the hike when he came out of the bathroom. She also stated the urinary incontinence was getting so bad that she placed two pull-ups on him at a time so they could cut the soiled one out and have another in place without having to disrobe his lower half. Pt and wife would continue to benefit from training sessions and assistance in problem solving the most approriate methods to manage Pt's deficits at home.   Barriers to Discharge Inaccessible home environment   Plan   Treatment/Interventions ADL retraining;Functional transfer training   Progress Slow progress, cognitive deficits   OT Therapy Minutes   OT Time In 1300   OT Time Out 1400   OT Total Time (minutes) 60   OT Mode of treatment - Individual (minutes) 60   OT Mode of treatment - Concurrent (minutes) 0   OT Mode of treatment - Group (minutes) 0   OT Mode of treatment - Co-treat (minutes) 0   OT Mode of Treatment - Total time(minutes) 60 minutes   OT Cumulative Minutes 255   Therapy Time missed   Time missed? No

## 2025-02-04 NOTE — ASSESSMENT & PLAN NOTE
Continue modified diet, upgraded to level 3 with thin liquids today. Per speech- still needs frequent reminders to swallow so he needs supervision for all meals  SLP to follow.

## 2025-02-04 NOTE — NURSING NOTE
Patient bed alarm went off, patient was trying to exit bed requesting to go to bathroom. Patient needs met and assisted back into bed. Call bell and side table within reach and bed alarmed. Patient educated on use of svtilana bell.

## 2025-02-04 NOTE — ASSESSMENT & PLAN NOTE
S/p head strike in ED w/ R eyebrow laceration; unclear if mechanical versus orthostatic hypotensive episode  CT head/neck and left shoulder benign  Laceration cleaned with dressing placed; no sutures placed  ECG without ischemia, troponins negative x 3  PT OT consulted- acute rehab is authorized by insurance   DC to [facility] today

## 2025-02-04 NOTE — NURSING NOTE
Patient set off bed alarm 2 more times (10p &12:30a), patient was found with legs off the side of bed. Patient needs met and repositioned in bed. Call bell and side table within reach and bed alarmed. Patient educated on use of svitlana bell.

## 2025-02-04 NOTE — ASSESSMENT & PLAN NOTE
"Routine Office Visit    Meghan Richards  1989  [id number]      Subjective     Meghan is a 33 y.o. female who presents today for:    Annual exam / establish care / new to me   Modified visit   Elevated blood pressure - Patient has been monitoring her blood pressure and notes it is high sometimes. She feels it is related to stress, diet, and lack of sleep. She does want to start making lifestyle modifications to improve her health   Stress / depression - Patient works in the service industry as a . 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 Her  does not work. He does no help out around the house. She feels     Objective     Review of Systems   Constitutional:  Negative for chills and fever.   HENT:  Negative for congestion.    Eyes:  Negative for blurred vision.   Respiratory:  Negative for cough.    Cardiovascular:  Negative for chest pain.   Gastrointestinal:  Negative for abdominal pain, constipation, diarrhea, heartburn, nausea and vomiting.   Genitourinary:  Negative for dysuria.   Musculoskeletal:  Negative for myalgias.   Skin:  Negative for itching and rash.   Neurological:  Negative for dizziness and headaches.   Psychiatric/Behavioral:  Negative for depression.      /88 (BP Location: Left arm, Patient Position: Sitting, BP Method: Large (Manual))   Pulse 73   Temp 98.5 °F (36.9 °C) (Oral)   Ht 5' 1" (1.549 m)   Wt 103.6 kg (228 lb 6.3 oz)   SpO2 97%   BMI 43.16 kg/m²   Physical Exam  Constitutional:       Appearance: She is well-developed.   HENT:      Head: Normocephalic and atraumatic.   Eyes:      Conjunctiva/sclera: Conjunctivae normal.      Pupils: Pupils are equal, round, and reactive to light.   Cardiovascular:      Rate and Rhythm: Normal rate and regular rhythm.      Heart sounds: Normal heart sounds. No murmur heard.    No friction rub. No gallop.   Pulmonary:      Effort: No respiratory distress.      Breath sounds: Normal breath sounds.   Abdominal:      General: Bowel sounds are " - Bowel/bladder incontinence on admission  - Bowel: continence has improved. Was also constipated. On colace, senna 2 tabs and daily miralax.    - Has Prn suppository lactulose   - Last BM 2/2   - Monitor and adjust as appropriate  - Bladder: PVR x3 on admission are low   - UA during acute hospitalization not suspicious for infection   - Timed Voids.    - Improving. 2/3 largely continent of bladder.    normal. There is no distension.      Palpations: Abdomen is soft.      Tenderness: There is no abdominal tenderness.   Musculoskeletal:         General: Normal range of motion.      Cervical back: Normal range of motion and neck supple.   Lymphadenopathy:      Cervical: No cervical adenopathy.   Skin:     General: Skin is warm.   Neurological:      Mental Status: She is alert and oriented to person, place, and time.           Assessment     Health Maintenance         Date Due Completion Date    Lipid Panel Never done ---    TETANUS VACCINE Never done ---    COVID-19 Vaccine (3 - Booster for Pfizer series) 03/29/2022 2/1/2022    Cervical Cancer Screening 12/02/2025 12/2/2020    Override on 5/14/2019: Done              Problem List Items Addressed This Visit    None  Visit Diagnoses       Annual physical exam    -  Primary    Relevant Orders    CBC Auto Differential    Comprehensive Metabolic Panel    Lipid Panel    Hemoglobin A1C    TSH  I addressed all major concerns as it related to health maintenance.  All were ordered and scheduled based on the patients wishes.  Any additional health maintenance will be readdressed at the next physical if declined or deferred by the patient.       Need for prophylactic vaccination and inoculation against influenza        Modified visit   HPI as above   ROS: depression, stress   PE: as above   Depression, unspecified depression type        Relevant Medications    buPROPion (WELLBUTRIN SR) 150 MG TBSR 12 hr tablet  Common side effects of this medication were discussed with the patient. Questions regarding medications were discussed during this visit.    Recommend therapy   Recommend to find solution to patient's problem  Monitor blood pressure  Recommend increase activity                       Follow up in about 1 year (around 11/10/2023), or if symptoms worsen or fail to improve, for yearly exam.

## 2025-02-04 NOTE — CASE MANAGEMENT
Phone call placed to pts daughter in law Pamela who is not visiting today. She confirmed Sheng is already here, drove herself and will be onsite until 2 50.  Cm inquired about discussions they may have had as a family re: pt and his wife living alone. Pamela stated Sheng is very head strong and in denial about the progression of things.  Pamela stated pts nephew and niece in law have offered to take pt in as niece in law's father had parkinsons.  Sheng is not in agreement for this, yet and wishes to take pt home. Sheng supervises pt with all meals, knows the kinds of foods to make and cuts things up very small. They sleep separately and Pamela has bought door and bed alarms for Sheng to use but she is not sure she is. Sheng isn't getting proper sleep because pt wakes up at night.  There are chairs to block the door to the basement. Team to discuss with Sheng alternative living arrangements vs having help come in to help with caregiver burden.  Pamela would like a phone call back when this occurs so she and her  Gama can follow up . Both Pamela and Gama feel pt should receive more care at this point. Reviewed dc date with Pamela.

## 2025-02-04 NOTE — PCC SPEECH THERAPY
Pt currently being followed for dysphagia and cognitive tx sessions to where slower progress this week.     Continued cognitive barriers which present include: decreased attention, LT memory recall, ST memory recall , problem solving, organization of thoughts, judgement, slower processing, and insight, which still impacts pt's overall safety, functional cognitive communication skills as well as functional mobility. The following interventions are used to target these barriers, including visual orientation checklist, verbal problem solving task, drawing conclusions activities, categorization tasks , picture problem solving activities, verbal reasoning tasks, and family education/training.     Continued dysphagia barriers which present include the following risks for aspiration such as: poor positioning, decreased cognition, ineffective mastication, holding of bolus before transfer, delay in oral transit, delay in swallow initiation, pharyngeal weakness upon swallowing, and increased wet voicing and increased throat clearing, coughing given meals  . In order to address the noted barriers, skilled SLP services will address this by targeting the following interventions: proper positioning, diet modification, safe swallow strategies, family education/training, and complete VFSS as needed.    Current diet is dysphagia level 2 w/ thin liquids.      Current level of functioning:   Eating: FULL supervision for meals  Comprehension: mod A  Expression: min A  Social Interaction: supervision  Executive functions: max A  Memory: max A    Family training/education has been initiated with pt's spouse and family.  Of note, pt's spouse will require increased education and review given swallow strategies, education of the need for supervision for meals to ensure use of swallow strategies, review of cognitive skills and need for increased supervision/assistance w/ ADL's and I ADL's. Recommendations at time of discharge are for  continued skilled ST, home vs OP.    This week, focus for continued services to target trials of diet upgrade to level 3 items, monitoring thin liquids across meals, as well as carryover given strategies. For cognitive skills, targeting reorientation, LT biographical recall, ST recall w/ use of written cues, basic safety/problem solving skills, basic sequencing, etc. At this time, pt will continue to benefit from skilled cognitive linguistic tx and dysphagia tx  session to maximize overall functional independence given overall cognitive linguistic skills and swallow abilities  in attempts to decreased caregiver burden over time.    Update from week: 2/11/2025 . Pt is being followed for cognitive linguistic tx and dysphagia tx  sessions to where pt is making slower and steady progress this week.     Continued cognitive barriers which present include: decreased attention, ST memory recall , problem solving, reasoning, sequencing, organization of thoughts, judgement, slower processing, and insight, which still impacts pt's overall safety, functional cognitive communication skills as well as functional mobility. The following interventions are used to target these barriers, including verbal problem solving task, verbal working memory tasks, drawing conclusions activities, written sequencing tasks, verbal sequencing tasks, categorization tasks , picture problem solving activities, functional reading tasks , and family education/training.     Continued dysphagia barriers which present include the following risks for aspiration such as: poor positioning, decreased cognition, ineffective mastication, holding of bolus before transfer, delay in oral transit, delay in swallow initiation, pharyngeal weakness upon swallowing, multiple swallows due to pharyngeal weakness, and inconsistent sxs of aspiration throughout meal . In order to address the noted barriers, skilled SLP services will address this by targeting the following  interventions: proper positioning, diet modification, safe swallow strategies, family education/training, and complete VFSS this week.   Current diet is now dysphagia level 3 diet w/ thin liquids.     Current level of functioning:   Eating: FULL supervision   Comprehension: mod A  Expression: min A  Social Interaction: supervision  Executive functions: mod- max A  Memory: max A    Family training/education has been initiated with pt's spouse, Sheng, who has been more receptive to the education given the need for ongoing verbal prompting and cues throughout meals to ensure safety of swallow. However, ongoing review of these strategies would be needed.  Recommendations at time of discharge are for continue home ST services.    This week, focus for continued services to target functional cognitive skills, more so review of problem solving, reasoning, sequencing, ST recall abilities. Additionally, will plan for VFSS to determine risk of aspiration and recommend further safest diet recommendations in addition to ongoing family training. At this time, pt will continue to benefit from skilled cognitive linguistic tx and dysphagia tx  session to maximize overall functional independence given overall cognitive linguistic skills and swallow abilities  in attempts to decreased caregiver burden over time.

## 2025-02-04 NOTE — ASSESSMENT & PLAN NOTE
Chronic right lower extremity venous ulcers. Largely healed and wound care signed off.   PTA Lasix for LE edema management; cont at [facility]  Known to [facility] podiatry/[facility]   Wound care sami and treat  Offload heels  ACE wraps to b/l LEs

## 2025-02-04 NOTE — ASSESSMENT & PLAN NOTE
Reports both shoulders.  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   Non-surgically managed. Last seen by Ortho 2019.   Tylenol scheduled.  Lidoderm patches    - This has helped.   Topicals for now. Monitor.

## 2025-02-04 NOTE — DISCHARGE SUMMARY
"Discharge Summary - Hospitalist   Name: Daniel Sanz Jr. 84 y.o. male I MRN: [id number]  Unit/Bed#: S -01 I Date of Admission: 1/21/2025   Date of Service: 2/4/2025 I Hospital Day: 10     Assessment & Plan  Altered mental status  Patient's wife endorsing recent abnormal behaviors.  Patient waking frequently at night, hallucinations noted when awaking from sleep, patient noted to be more \"nasty\" and agitated with at times garbled speech which clears.  Presents to ED after fall at home with concern for possible UTI. S/p head strike in ED w/ R eyebrow laceration.  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 with mild acute encephalopathy  CT head w/o acute findings  TSH benign   Folate WNL, B12 normal, vitamin D low at 26.9  Wife notes B12/vitamin D supplementation 3 times weekly PTA  Continue to q daily dosing of vit D  B12 3x weekly  Negative infxn work-up (as below)   Consult geriatrics, input appreciated  Sinemet as ordered by neurology (below)  Acetaminophen 650mg TID  Neurology consulted for recent sinemet increase  BM 1/23, 1/27    Fall  S/p head strike in ED w/ R eyebrow laceration; unclear if mechanical versus orthostatic hypotensive episode  CT head/neck and left shoulder benign  Laceration cleaned with dressing placed; no sutures placed  ECG without ischemia, troponins negative x 3  PT OT consulted- acute rehab is authorized by insurance   DC to [facility] today  Parkinson's disease (HCC)  Known to [facility] neurology   Sinemet now will be 1.5 tab 4x a day  Rivastigmine TD patch added  Follow-up in the office  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 (HCC)  Chronic right lower extremity venous ulcers   PTA Lasix for LE edema management   Known to [facility] podiatry/[facility]   Wound care consulted  HTN (hypertension)  BP stable is 162/72  Will continue current meds  May need adjustment   Dysphagia  Patient observed coughing while taking medications by nursing.  Speech therapy consulted  Patient somnolent " on evaluation so they recommended dysphagia 1 diet with nectar thick liquids while awake and pills crushed.  Patient was to have outpatient VBS test, per speech he may need during admission but will have to be consistently awake first  Diet advanced today to dysphagia 2  VBS can be completed at [facility]     Medical Problems       Resolved Problems  Date Reviewed: 6/14/2024          Resolved    Hypothermia 1/24/2025     Resolved by  Mable Balasundram, DO    Hyperkalemia 1/24/2025     Resolved by  Mable Balasundram, DO    Constipation 1/24/2025     Resolved by  Mable Balasundram, DO        Discharging Physician / Practitioner: Jeannie Rodriguez MD  PCP: Charlie Martinez DO  Admission Date:   Admission Orders (From admission, onward)       Ordered        01/21/25 1627  INPATIENT ADMISSION  Once                          Discharge Date: January 31st 2025    Consultations During Hospital Stay:  Geriatrics and Neurology    Procedures Performed:   CT head and C spine - no acute abnormalities.   ABX- moderate colonic stool burden.     Significant Findings / Test Results:   None.     Incidental Findings:   Pleural plaques.    NA    Test Results Pending at Discharge (will require follow up):   None.      Outpatient Tests Requested:  Should follow up with routine labs and imaging at [facility].     Complications:  none.     Reason for Admission: fall    Hospital Course:   Daniel Sanz Jr. is a 84 y.o. male patient who originally presented to the hospital on 1/21/2025 due to ambulatory dysfunction and a fall.     Seen by neurology and geriatrics and PD meds were adjusted.       Seen by PT and recommended DC to acute rehab.     He is pleasant but confused, as per wife there has been a significant decline in mobility and cognition over the last several weeks to months.     Neuro and geriatrics suspect delirium , TME and pain to be contributing.     Neuro also suspect worsening bradykinesia and hypophonia and adjusted PD meds.  "    Condition at Discharge: stable    Discharge Day Visit / Exam:   Subjective:    Patient seen and examined   Feeling \"good\"  No new symptoms   Vitals: Blood Pressure: 162/72 (01/31/25 0811)  Pulse: 71 (01/31/25 0811)  Temperature: 97.9 °F (36.6 °C) (01/31/25 0811)  Temp Source: Oral (01/30/25 0711)  Respirations: 19 (01/31/25 0811)  SpO2: 97 % (01/31/25 0811)  Physical Exam  Constitutional:       General: He is not in acute distress.     Appearance: He is not ill-appearing, toxic-appearing or diaphoretic.   HENT:      Head: Normocephalic.      Nose: Nose normal.      Mouth/Throat:      Mouth: Mucous membranes are moist.   Eyes:      General: No scleral icterus.        Right eye: No discharge.         Left eye: No discharge.      Pupils: Pupils are equal, round, and reactive to light.   Cardiovascular:      Rate and Rhythm: Normal rate.      Heart sounds: No murmur heard.     No friction rub. No gallop.   Pulmonary:      Effort: No respiratory distress.      Breath sounds: No stridor. No wheezing, rhonchi or rales.   Chest:      Chest wall: No tenderness.   Abdominal:      General: Abdomen is flat. There is no distension.      Palpations: There is no mass.      Tenderness: There is no abdominal tenderness. There is no right CVA tenderness, guarding or rebound.      Hernia: No hernia is present.   Skin:     Capillary Refill: Capillary refill takes less than 2 seconds.      Coloration: Skin is pale. Skin is not jaundiced.      Findings: No bruising or lesion.   Neurological:      General: No focal deficit present.      Mental Status: He is alert.   Psychiatric:         Mood and Affect: Mood normal.          Discussion with Family:  updated wife at bedside. .     Discharge instructions/Information to patient and family:   See after visit summary for information provided to patient and family.      Provisions for Follow-Up Care:  See after visit summary for information related to follow-up care and any pertinent home " health orders.      Mobility at time of Discharge:   Basic Mobility Inpatient Raw Score: 13  JH-HLM Goal: 4: Move to chair/commode  JH-HLM Achieved: 7: Walk 25 feet or more  Mobility not assessed by myself.      Disposition:   Home    Planned Readmission: none.     Discharge Medications:  See after visit summary for reconciled discharge medications provided to patient and/or family.      Administrative Statements   Discharge Statement:  I have spent a total time of 45 minutes in caring for this patient on the day of the visit/encounter. >30 minutes of time was spent on: Diagnostic results, Prognosis, Risks and benefits of tx options, Instructions for management, Patient and family education, Importance of tx compliance, Risk factor reductions, Impressions, Counseling / Coordination of care, Documenting in the medical record, Reviewing / ordering tests, medicine, procedures  , and Communicating with other healthcare professionals .    **Please Note: This note may have been constructed using a voice recognition system**

## 2025-02-04 NOTE — PLAN OF CARE
Problem: SAFETY ADULT  Goal: Patient will remain free of falls  Description: INTERVENTIONS:  - Educate patient/family on patient safety including physical limitations  - Instruct patient to call for assistance with activity   - Consult OT/PT to assist with strengthening/mobility   - Keep Call bell within reach  - Keep bed low and locked with side rails adjusted as appropriate  - Keep care items and personal belongings within reach  - Initiate and maintain comfort rounds  - Make Fall Risk Sign visible to staff  - Offer Toileting every 2 Hours, in advance of need  - Initiate/Maintain alarm  - Obtain necessary fall risk management equipment:   - Apply yellow socks and bracelet for high fall risk patients  - Consider moving patient to room near nurses station  Outcome: Progressing  Goal: Maintain or return to baseline ADL function  Description: INTERVENTIONS:  -  Assess patient's ability to carry out ADLs; assess patient's baseline for ADL function and identify physical deficits which impact ability to perform ADLs (bathing, care of mouth/teeth, toileting, grooming, dressing, etc.)  - Assess/evaluate cause of self-care deficits   - Assess range of motion  - Assess patient's mobility; develop plan if impaired  - Assess patient's need for assistive devices and provide as appropriate  - Encourage maximum independence but intervene and supervise when necessary  - Involve family in performance of ADLs  - Assess for home care needs following discharge   - Consider OT consult to assist with ADL evaluation and planning for discharge  - Provide patient education as appropriate  Outcome: Progressing  Goal: Maintains/Returns to pre admission functional level  Description: INTERVENTIONS:  - Perform AM-PAC 6 Click Basic Mobility/ Daily Activity assessment daily.  - Set and communicate daily mobility goal to care team and patient/family/caregiver.   - Collaborate with rehabilitation services on mobility goals if consulted  -  Perform Range of Motion 3 times a day.  - Reposition patient every 2 hours.  - Dangle patient 3 times a day  - Stand patient 3 times a day  - Ambulate patient 3 times a day  - Out of bed to chair 3 times a day   - Out of bed for meals 3 times a day  - Out of bed for toileting  - Record patient progress and toleration of activity level   Outcome: Progressing

## 2025-02-04 NOTE — CASE MANAGEMENT
"Met w/pts wife Sheng and reviewed team update with planned dc for 2/14.  Cm inquired if she felt she would be able to manage at home and Sheng feels this program is very good for her  and they both want to go home. Cm asked if she felt she was aware of resources available to her if she needed assistance.  Cm also asked if she felt she would be able to identify the time when she would need help.  Sheng mentioned her nephew and niece who offered to take pt in but Sheng referred to \"when he needs hospice\".  Cm inquired if she was interested in hiring someone at night so she didn't have disrupted sleep.  Her plan is to give pt 5 mg of melatonin at bedtime and then when he wakes up in the middle of the night give him the other 5mg.  She does not feels she needs to have someone there. Cm asked if having pt go to the nephews for a couple of days a week as a respite would interest her and she said no. Cm asked if pt is not at his baseline when it is time for dc would she be comfortable in physically  helping him.  Sheng believes pt will be able to walk around with his cane w/o problems. Cm phoned dtr in law Santiago and made aware.   "

## 2025-02-04 NOTE — PCC NURSING
85yo M with medical history of Parkinson's Disease with dementia (MOCA 7/30 in March 2024), HTN, LE venous stasis dermatitis and edema, GERD who presented to [facility] on 1/21 with worsening agitation, AMS, culminating in a fall with HS and R eye lac. No sutures were needed for the lac. CTH/N and L shoulder benign. He was noted to be hypothermic and hyperkalemic on admission. TSH WNL, Folate WNL, Vitamin D was low. B12 WNL. Given gentle IVF, noted to be constipated. Wound care was consulted for R medial tibia venous ulcer, and Geriatrics was consulted who recommended melatonin, delirium precautions. Speech was consulted who recommended NPO initially, but he has since been advanced to an oral diet of Level 2/thin liquids. Neurology was also consulted as the agitation, behavior symptoms, hallucinations worsened since increasing his medications, they lowered his Sinemet and added rivastigmine. There was improvement in his behavioral symptoms. He no longer needed restraint/1:1 at time of discharge. He was admitted to the [facility] on 1/31.     2/10:  This week we will encourage independence with ADLs. We will monitor labs and vital signs. We will educate pt/family about repositioning to prevent skin breakdown. We will assist w repositioning and perform routine skin checks. We will monitor for adequate pain control. We will monitor for constipation and medicate pt as ordered. We will provide pt/family with DM education as needed.We will monitor incision/wound for healing and s/s of infection.We will increase safety awareness and keep pt free from falls.

## 2025-02-04 NOTE — PROGRESS NOTES
02/04/25 0855   Pain Assessment   Pain Assessment Tool 0-10   Pain Score 5   Pain Location/Orientation Orientation: Bilateral;Location: Shoulder  (R>L)   Pain Onset/Description Onset: Ongoing  (Pain when lifting arms overhead but no c/o to push off during transfers)   Hospital Pain Intervention(s) Rest;Repositioned   Restrictions/Precautions   Precautions Aspiration;Bed/chair alarms;Cognitive;Fall Risk;Pain;Supervision on toilet/commode   Weight Bearing Restrictions No   Cognition   Overall Cognitive Status Impaired   Arousal/Participation Alert;Cooperative   Subjective   Subjective Patient sitting in recliner finishing breakfast with vc's for pacing and educated in goals for PT this session. Patient agreeable to treatment session   Roll Left and Right   Comment Sitting in recliner at start of session   Sit to Lying   Comment Will assess next session   Lying to Sitting on Side of Bed   Comment Will assess next session   Sit to Stand   Type of Assistance Needed Physical assistance;Verbal cues   Physical Assistance Level 25% or less   Comment Min/CG with vc's for anterior scooting in recliner/wc to facilitate push off. Patient exhibited x1 retropulsion during attempted  wc but demonstrated improvement post forward scooting in wc and then pushing off. Patient provided with vc's to express symptoms upon initially standing each attempt secondary to previous episodes of orthostatic hypotension   Sit to Stand CARE Score 3   Bed-Chair Transfer   Type of Assistance Needed Physical assistance;Verbal cues   Physical Assistance Level 25% or less   Comment Patient exhibits festinating pattern while approaching chair, instructed to maintain step sequencing and complete full pivot prior to descent   Chair/Bed-to-Chair Transfer CARE Score 3   Walk 10 Feet   Type of Assistance Needed Physical assistance   Physical Assistance Level 25% or less   Comment No device   Walk 10 Feet CARE Score 3   Walk 50 Feet with Two Turns    Type of Assistance Needed Physical assistance;Verbal cues   Physical Assistance Level 25% or less   Comment Min A with no device x4 reps. Trialed use of New Balance sneakers vs current sneakers for 50ft with no device and demonstrated improved foot clearance while donning New Balance but c/o discomfort in bilateral toes due to a more narrow shoe compared to current shoes. Will discuss with patient wife about potentially getting patient different shoes to improve step sequencing and decrease risk for falls. Requires extra time to complete task secondary to slow motor planning   Walk 50 Feet with Two Turns CARE Score 3   Walk 150 Feet   Type of Assistance Needed Physical assistance;Verbal cues   Physical Assistance Level Total assistance   Comment Ambulates 150ft using no AD with Min Ax1 and secondary CG for safety. Noted episodes of festinating pattern and freezing with vc's for increased foot clearance and step through gait pattern to promote functional movements   Walk 150 Feet CARE Score 1   Walking 10 Feet on Uneven Surfaces   Type of Assistance Needed Physical assistance;Verbal cues   Physical Assistance Level 26%-50%   Comment Min-Mod Ax1 with no device and instruction in step clearance during ascent onto floor mat   Walking 10 Feet on Uneven Surfaces CARE Score 3   Wheel 50 Feet with Two Turns   Reason if not Attempted Activity not applicable   Wheel 50 Feet with Two Turns CARE Score 9   Picking Up Object   Type of Assistance Needed Physical assistance;Verbal cues   Physical Assistance Level 26%-50%   Comment Able to retrieve ball from the floor with no LOB but extra time to complete task. For safety, will recommend use of device for item retrieval   Picking Up Object CARE Score 3   Therapeutic Interventions   Flexibility Seated manual static stretching of bilateral hamstrings and heel cords with 30s holds at endrange x5 reps ea   Balance Standing ball toss in multi-directions with wide ESTELITA progressing to a  more narrow ESTELITA with x1 posterior LOB, Min A provided to maintain upright posture   Equipment Use   NuStep Level 2 x10 minutes using B UE/LE   Assessment   Treatment Assessment Patient participated in 90 minutes of skilled PT with a focus on functional transfers and gait sequencing in order to improve performance and decrease risk for falls. Patient demonstrated improved foot clearance during swing phase while donning New Balance sneakers vs donning current sneakers which exhibit increased shuffling possibly due to size difference. Patient required decreased assistance to perform sit<>stand and SPT this session without a device but continues to require vc's for step sequencing when approaching chair in order to improve safety. Patient will benefit from continued focus on gait training, transfers when approaching chairs and balance activities without a device to maximize independence and safely return to previous home setting. Recommend to continue to provide vc's when patient initially stands up for symptoms of orthostatic hypotension in order to improve carryover.   Family/Caregiver Present No   Barriers to Discharge Inaccessible home environment   PT Barriers   Physical Impairment Decreased strength;Decreased endurance;Impaired balance;Decreased mobility;Decreased coordination;Decreased cognition;Decreased safety awareness;Pain   Plan   Treatment/Interventions Functional transfer training;LE strengthening/ROM;Therapeutic exercise;Endurance training;Cognitive reorientation;Patient/family training;Bed mobility;Gait training;OT   Progress Progressing toward goals   PT Therapy Minutes   PT Time In 0830   PT Time Out 1000   PT Total Time (minutes) 90   PT Mode of treatment - Individual (minutes) 90   PT Mode of treatment - Concurrent (minutes) 0   PT Mode of treatment - Group (minutes) 0   PT Mode of treatment - Co-treat (minutes) 0   PT Mode of Treatment - Total time(minutes) 90 minutes   PT Cumulative Minutes 240    Therapy Time missed   Time missed? No      [phone]

## 2025-02-04 NOTE — ASSESSMENT & PLAN NOTE
"Patient's wife endorsing recent abnormal behaviors.  Patient waking frequently at night, hallucinations noted when awaking from sleep, patient noted to be more \"nasty\" and agitated with at times garbled speech which clears.  Presents to ED after fall at home with concern for possible UTI. S/p head strike in ED w/ R eyebrow laceration.  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 with mild acute encephalopathy  CT head w/o acute findings  TSH benign   Folate WNL, B12 normal, vitamin D low at 26.9  Wife notes B12/vitamin D supplementation 3 times weekly PTA  Continue to q daily dosing of vit D  B12 3x weekly  Negative infxn work-up  Sinemet as ordered by neurology (below)  Acetaminophen 650mg TID    Patient oriented, and has some decent recall. Impaired insight, and he sundowns.   - Low dose seroquel PRN ordered by Neuro  - Added PRN olanzapine for severe agitation ONLY.    - Did receive this on 2/3 in the AM after some aggressive behaviors during blood draw/BP.  - Redirect, reorient first  - Low dose melatonin in the evening for sleep/wake.  - Bed/chair alarms  - Optimize bowel/bladder program  - Avoid deliriogenic meds and physical restraint.  " .                                                                                                     March 17, 2023    [facility]  [address]  [facility] [address]        To whom it may concern,     Please excuse Natacha's recent absence from work 3/16/23 due to medical issue she is able to return to work today 3/17/23.    If you have any further questions please call me at [phone]      Sincerely,    Electronically signed    Dimitris Cabral DO  [address]  [facility] [id number] [phone]

## 2025-02-04 NOTE — PROGRESS NOTES
Progress Note - Hospitalist   Name: Daniel Sanz Jr. 84 y.o. male I MRN: [id number]  Unit/Bed#: -01 I Date of Admission: 1/31/2025   Date of Service: 2/4/2025 I Hospital Day: 4    Assessment & Plan  Parkinson's disease (HCC)  Continue Sinemet 1.5 tabs 4x daily. Currently ordered at times pt takes the medication at home. May need to adjust timing based on therapy schedule.  Follows with Dr. Cavanaugh as an outpt.  Therapy per PMR.  HTN (hypertension)  Home: amlodpine 5mg daily  Here: Same as home medication.  He did have some changes in BP with position changes yesterday but was not orthostatic  Monitor for orthostasis due to PD.  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  Continue rivastigmine patch - pt's wife very concerned about the cost. Price check was $10.  Pt agitated yesterday during AM vitals. He did receive IM Zyprexa. Not agitated currently  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 (HCC)  Follows with Podiatry and wound care as an outpt.  He was taking Lasix 3x weekly.  It is not currently ordered.  Will monitor for edema and add back as indicated.  Dysphagia  Continue modified diet, upgraded to level 3 with thin liquids today. Per speech- still needs frequent reminders to swallow so he needs supervision for all meals  SLP to follow.  Chronic pain of both shoulders  Per wife, had at home as well  Continue lidocaine patches  Bowel and bladder incontinence    At risk for venous thromboembolism (VTE)    Vitamin D insufficiency      The above assessment and plan was reviewed and updated as determined by my evaluation of the patient on 2/4/2025.    History of Present Illness   Patient seen and examined. Patients overnight issues or events were reviewed with nursing staff. New or overnight issues include the following:   Patient sitting up in chair. Wife at bedside. Patient only complains of chronic shoulder pain which wife states is chronic. No CP/SOB    Review of  Systems    Objective :  Temp:  [97.4 °F (36.3 °C)-98.5 °F (36.9 °C)] 98.1 °F (36.7 °C)  HR:  [67-82] 67  BP: (132-171)/(57-76) 142/76  Resp:  [16] 16  SpO2:  [97 %-98 %] 97 %  O2 Device: None (Room air)    Invasive Devices       None                   Physical Exam  General Appearance: NAD; pleasant  HEENT: PERRLA, conjuctiva normal; mucous membranes moist; face symmetrical  Neck:  Supple  Lungs: clear bilaterally, normal respiratory effort, no retractions, expiratory effort normal, on room air  CV: regular rate and rhythm, no murmurs rubs or gallops noted   ABD: soft non tender, +BS x4  EXT: DP pulses intact, no lymphadenopathy  Skin: normal turgor, normal texture, no rash  Psych: affect flat  Neuro: awake and alert  The above physical exam was reviewed and updated as determined by my evaluation of the patient on 2/4/2025.      Lab Results: I have reviewed the following results:  Results from last 7 days   Lab Units 02/03/25  0634 01/29/25  0531   WBC Thousand/uL 5.96 9.86   HEMOGLOBIN g/dL 11.5* 11.3*   HEMATOCRIT % 35.5* 34.2*   PLATELETS Thousands/uL 230 203     Results from last 7 days   Lab Units 02/03/25  0634 01/29/25  0531   SODIUM mmol/L 140 140   POTASSIUM mmol/L 4.8 4.3   CHLORIDE mmol/L 104 105   CO2 mmol/L 27 28   BUN mg/dL 33* 23   CREATININE mg/dL 1.05 0.99   CALCIUM mg/dL 9.1 9.0             Results from last 7 days   Lab Units 02/03/25  0745   POC GLUCOSE mg/dl 80           Review of Scheduled Meds: Medications  reviewed and reconciled as needed  Current Facility-Administered Medications   Medication Dose Route Frequency Provider Last Rate    acetaminophen  650 mg Oral TID Chelsea Dasilva PA-C      amLODIPine  5 mg Oral Daily Chelsea Dasilva PA-C      Artificial Tears  1 drop Both Eyes Q4H PRN Chelsea Dasilva PA-C      bisacodyl  10 mg Rectal Daily PRN Ashley Depadua, MD      calcium carbonate  1,000 mg Oral Daily PRN TANNER CliftonC      carbidopa-levodopa  1.5 tablet Oral  4x Daily Chelsea Dasilva PA-C      Cholecalciferol  1,000 Units Oral Daily Chelsea Dasilva PA-C      vitamin B-12  1,000 mcg Oral Once per day on Monday Wednesday Friday Chelsea Dasilva PA-C      docusate sodium  100 mg Oral BID Chelsea Dasilva PA-C      enoxaparin  40 mg Subcutaneous Daily Chelsea Dasilva PA-C      lactulose  20 g Oral Daily PRN Ashley Depadua, MD      lidocaine  2 patch Topical Daily Ashley Depadua, MD      melatonin  3 mg Oral QPM Ashley Depadua, MD      OLANZapine  2.5 mg Intramuscular Q4H PRN Ashley Depadua, MD      ondansetron  4 mg Oral Q6H PRN Chelsea Dasilva PA-C      polyethylene glycol  17 g Oral Daily Ashley Depadua, MD      QUEtiapine  12.5 mg Oral Q8H PRN Chelsea Dasilva PA-C      rivastigmine  1 patch Transdermal Daily Chelsea Dasilva PA-C      senna  2 tablet Oral BID Chelsea Dasilva PA-C         VTE Pharmacologic Prophylaxis: lovenox  Code Status: Level 1 - Full Code  Current Length of Stay: 4 day(s)      ** Please Note:  voice to text software may have been used in the creation of this document. Although proof errors in transcription or interpretation are a potential of such software**

## 2025-02-04 NOTE — PLAN OF CARE
Problem: Prexisting or High Potential for Compromised Skin Integrity  Goal: Skin integrity is maintained or improved  Description: INTERVENTIONS:  - Identify patients at risk for skin breakdown  - Assess and monitor skin integrity  - Assess and monitor nutrition and hydration status  - Monitor labs   - Assess for incontinence   - Turn and reposition patient  - Assist with mobility/ambulation  - Relieve pressure over bony prominences  - Avoid friction and shearing  - Provide appropriate hygiene as needed including keeping skin clean and dry  - Evaluate need for skin moisturizer/barrier cream  - Collaborate with interdisciplinary team   - Patient/family teaching  - Consider wound care consult   Outcome: Progressing     Problem: PAIN - ADULT  Goal: Verbalizes/displays adequate comfort level or baseline comfort level  Description: Interventions:  - Encourage patient to monitor pain and request assistance  - Assess pain using appropriate pain scale  - Administer analgesics based on type and severity of pain and evaluate response  - Implement non-pharmacological measures as appropriate and evaluate response  - Consider cultural and social influences on pain and pain management  - Notify physician/advanced practitioner if interventions unsuccessful or patient reports new pain  Outcome: Progressing     Problem: INFECTION - ADULT  Goal: Absence or prevention of progression during hospitalization  Description: INTERVENTIONS:  - Assess and monitor for signs and symptoms of infection  - Monitor lab/diagnostic results  - Monitor all insertion sites, i.e. indwelling lines, tubes, and drains  - Monitor endotracheal if appropriate and nasal secretions for changes in amount and color  - [place] appropriate cooling/warming therapies per order  - Administer medications as ordered  - Instruct and encourage patient and family to use good hand hygiene technique  - Identify and instruct in appropriate isolation precautions for  identified infection/condition  Outcome: Progressing  Goal: Absence of fever/infection during neutropenic period  Description: INTERVENTIONS:  - Monitor WBC    Outcome: Progressing     Problem: SAFETY ADULT  Goal: Patient will remain free of falls  Description: INTERVENTIONS:  - Educate patient/family on patient safety including physical limitations  - Instruct patient to call for assistance with activity   - Consult OT/PT to assist with strengthening/mobility   - Keep Call bell within reach  - Keep bed low and locked with side rails adjusted as appropriate  - Keep care items and personal belongings within reach  - Initiate and maintain comfort rounds  - Make Fall Risk Sign visible to staff  - Offer Toileting every 2 Hours, in advance of need  - Initiate/Maintain bed/chair alarm  - Obtain necessary fall risk management equipment: alarms  - Apply yellow socks and bracelet for high fall risk patients  - Consider moving patient to room near nurses station  Outcome: Progressing  Goal: Maintain or return to baseline ADL function  Description: INTERVENTIONS:  -  Assess patient's ability to carry out ADLs; assess patient's baseline for ADL function and identify physical deficits which impact ability to perform ADLs (bathing, care of mouth/teeth, toileting, grooming, dressing, etc.)  - Assess/evaluate cause of self-care deficits   - Assess range of motion  - Assess patient's mobility; develop plan if impaired  - Assess patient's need for assistive devices and provide as appropriate  - Encourage maximum independence but intervene and supervise when necessary  - Involve family in performance of ADLs  - Assess for home care needs following discharge   - Consider OT consult to assist with ADL evaluation and planning for discharge  - Provide patient education as appropriate  Outcome: Progressing  Goal: Maintains/Returns to pre admission functional level  Description: INTERVENTIONS:  - Perform AM-PAC 6 Click Basic Mobility/ Daily  Activity assessment daily.  - Set and communicate daily mobility goal to care team and patient/family/caregiver.   - Collaborate with rehabilitation services on mobility goals if consulted  - Perform Range of Motion 3 times a day.  - Reposition patient every 2 hours.  - Dangle patient 3 times a day  - Stand patient 3 times a day  - Ambulate patient 3 times a day  - Out of bed to chair 3 times a day   - Out of bed for meals 3 times a day  - Out of bed for toileting  - Record patient progress and toleration of activity level   Outcome: Progressing     Problem: DISCHARGE PLANNING  Goal: Discharge to home or other facility with appropriate resources  Description: INTERVENTIONS:  - Identify barriers to discharge w/patient and caregiver  - Arrange for needed discharge resources and transportation as appropriate  - Identify discharge learning needs (meds, wound care, etc.)  - Arrange for interpretive services to assist at discharge as needed  - Refer to Case Management Department for coordinating discharge planning if the patient needs post-hospital services based on physician/advanced practitioner order or complex needs related to functional status, cognitive ability, or social support system  Outcome: Progressing

## 2025-02-04 NOTE — PCC CARE MANAGEMENT
Pt continues to participate with therapy and is scheduled to return home w/family the end of the week. Wife is aware of resources to aid in assistance of pts care and feels she is able to manage at home. Following to finalize dc recommendations.

## 2025-02-04 NOTE — NURSING NOTE
While walking past patient room noticed his feet were hanging off the side of bed. Patient request urinal. Patient needs met and repositioned in bed. Call bell and side table within reach and bed alarmed. Patient educated on use of svitlana bell, patient verbalized understanding.

## 2025-02-04 NOTE — PROGRESS NOTES
"Progress Note - Hospitalist   Name: Daniel Sanz Jr. 84 y.o. male I MRN: [id number]  Unit/Bed#: -01 I Date of Admission: 1/31/2025   Date of Service: 2/4/2025 I Hospital Day: 4     Assessment & Plan  Parkinson's disease (HCC)  -AMS likely sequela of progressive parkinsons dementia plus possible adverse reaction to recent sinemet increase  -cont neuro adjusted recs for sinemet 1.5 QID  -cont exelon patch   - PT/OT/SLP 3-5 hours/day, 5-7 days/week.  - Will need f/u with Neurology after discharge.   Dysphagia  Patient observed coughing while taking medications by nursing at acute   Cont diet dysphagia 2/ thins.  Aspiration precautions   Timing or need for VBS as per SLP.   SLP dysphagia eval and treat  Altered mental status  Patient's wife endorsing recent abnormal behaviors.  Patient waking frequently at night, hallucinations noted when awaking from sleep, patient noted to be more \"nasty\" and agitated with at times garbled speech which clears.  Presents to ED after fall at home with concern for possible UTI. S/p head strike in ED w/ R eyebrow laceration.  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 with mild acute encephalopathy  CT head w/o acute findings  TSH benign   Folate WNL, B12 normal, vitamin D low at 26.9  Wife notes B12/vitamin D supplementation 3 times weekly PTA  Continue to q daily dosing of vit D  B12 3x weekly  Negative infxn work-up  Sinemet as ordered by neurology (below)  Acetaminophen 650mg TID    Patient oriented, and has some decent recall. Impaired insight, and he sundowns.   - Low dose seroquel PRN ordered by Neuro  - Added PRN olanzapine for severe agitation ONLY.    - Did receive this on 2/3 in the AM after some aggressive behaviors during blood draw/BP.  - Redirect, reorient first  - Low dose melatonin in the evening for sleep/wake.  - Bed/chair alarms  - Optimize bowel/bladder program  - Avoid deliriogenic meds and physical restraint.  Bowel and bladder incontinence  - Bowel/bladder " incontinence on admission  - Bowel: continence has improved. Was also constipated. On colace, senna 2 tabs and daily miralax.    - Has Prn suppository lactulose   - Last BM 2/2   - Monitor and adjust as appropriate  - Bladder: PVR x3 on admission are low   - UA during acute hospitalization not suspicious for infection   - Timed Voids.    - Improving. 2/3 largely continent of bladder.   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 (HCC)  Chronic right lower extremity venous ulcers. Largely healed and wound care signed off.   PTA Lasix for LE edema management; cont at [facility]  Known to [facility] podiatry/A   Wound care sami and treat  Offload heels  ACE wraps to b/l LEs  Chronic pain of both shoulders  Reports both shoulders.  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   Non-surgically managed. Last seen by Ortho 2019.   Tylenol scheduled.  Lidoderm patches    - This has helped.   Topicals for now. Monitor.   At risk for venous thromboembolism (VTE)  Lovenox, SCDs, ambulation  Will not need lovenox at discharge   Vitamin D insufficiency  Continue daily cholecalciferol supplementation.   HTN (hypertension)  Home: Amlodipine 5mg daily, Lasix 40mg daily, Losartan 50mg daily (unclear how long he was taking). Here: Amlodipine 5mg daily.   Monitor and adjust as appropriate     Health Maintenance  #Skin/Pressure Injury Prevention: Turn Q2hr in bed, with weight shifts [id number] in wheelchair.  #GI Prophylaxis: Not indicated   #Code Status: Full Code  #FEN: Level 2/Thins, [place] at lunch, Angel at dinner, and EP HP angel at Breakfast  #Dispo: Team 2/4: ADD 2/14 with Home PT/OT/SLP  with goals to discharge home at Sup-Yue level of function. Ambulation vs. Wheelchair to be determined while here.   Will need outpatient f/u with Neuro  I led and participated in Team Conference today. See dispo below and separate conference note for more details. I concur with the plan of care as determined in Team  Conference.    Total time spent:  50 minutes, with more than 50% spent counseling/coordinating care. Counseling includes discussion with patient re: progress in therapies, functional issues observed by therapy staff, and discussion with patient his/her current medical state/wellbeing. Coordination of patient's care was performed in conjunction with Internal Medicine service to monitor patient's labs, vitals, and management of their comorbidities. In addition, I lead the interdisciplinary team in weekly case conference today and concur with plan as per team meeting. The care of the patient was extensively discussed with all care providers and an appropriate rehabilitation plan was formulated unique for this patient. Barriers were identified preventing progression of therapy and appropriate interventions were discussed with each discipline. Please see the team note for input from all disciplines regarding barriers, intervention, and discharge planning.      To Review: Mr. Sanz is an 85yo M with medical history of Parkinson's Disease with dementia (MOCA 7/30 in March 2024), HTN, LE venous stasis dermatitis and edema, GERD who presented to [place] on 1/21 with worsening agitation, AMS, culminating in a fall with HS and R eye lac. No sutures were needed for the lac. CTH/N and L shoulder benign. He was noted to be hypothermic and hyperkalemic on admission. TSH WNL, Folate WNL, Vitamin D was low. B12 WNL. Given gentle IVF, noted to be constipated. Wound care was consulted for R medial tibia venous ulcer, and Geriatrics was consulted who recommended melatonin, delirium precautions. Speech was consulted who recommended NPO initially, but he has since been advanced to an oral diet of Level 2/thin liquids. Neurology was also consulted as the agitation, behavior symptoms, hallucinations worsened since increasing his medications, they lowered his Sinemet and added rivastigmine. There was improvement in his behavioral symptoms.  He no longer needed restraint/1:1 at time of discharge. [facility] to [facility] on 1/[facility].     Chief Complaint: Did a bit better last night.     Interval History/Subjective:  Multiple times last night, found at edge of bed, needing to use the restroom. Nursing would arrive when alarm went off, redirect patient and assist with urination. He states he doesn't drink much in general, and is open to decreasing fluid intake in the late afternoon/evening. His R arm feels better with the lidoderm patch. No new CP, SOB, fevers, chills, N/V, abdominal pain. Bladder continence improved. Last BM 2/2 and continent.     Functional Update:  PT: mod-maxA transfers, min-modA bed mobility, Ax2 ambulation 150', maxA stairs   OT: Sup eating, modA grooming, total A x2 bathing, maxA Ub dressing, total A LB dressing, maxA toileting, total A x2 toilet transfers   SLP: Full supervision for meals, modA comprehension, Yue expression, Sup social interaction, maxA executive function, maxA memory. Mild-mod oropharyngeal dysphagia (mostly oral) Level 2/thins.     Objective     Temp:  [97.4 °F (36.3 °C)-98.5 °F (36.9 °C)] 98.1 °F (36.7 °C)  HR:  [67-82] 67  Resp:  [16] 16  BP: ()/(49-76) 142/76  SpO2:  [97 %-98 %] 97 %    Gen: No acute distress, Well-nourished, well-appearing.  HEENT: Moist mucus membranes, Normocephalic/Atraumatic  Cardiovascular: Regular rate, rhythm, S1/S2. Distal pulses palpable  Heme/Extr: Mild/stable LE edema   Pulmonary: Non-labored breathing. Lungs CTAB  : No whitley  GI: Soft, non-tender, non-distended. BS+  Integumentary: Skin is warm, dry.   Neuro: AAOx3, Speech is intact. Appropriate to questioning. Cogwheeling rigidity, tremor worse in RUE/hand than L. Ambulates with decreased step length and clearance, but improves with cuing. Festinating is worst with turns. Decreased arm swing bilaterally. Uses rolling walker.   Psych: Normal mood and affect.     Physical examination is otherwise unchanged from previous  encounter, except as noted above.    Scheduled Meds:  Current Facility-Administered Medications   Medication Dose Route Frequency Provider Last Rate    acetaminophen  650 mg Oral TID Chelsea Dasilva PA-C      amLODIPine  5 mg Oral Daily Chelsea Dasilva PA-C      Artificial Tears  1 drop Both Eyes Q4H PRN Chelsea Dasilva PA-C      bisacodyl  10 mg Rectal Daily PRN Ashley Depadua, MD      calcium carbonate  1,000 mg Oral Daily PRN Chelsea Dasilva PA-C      carbidopa-levodopa  1.5 tablet Oral 4x Daily Chelsea Dasilva PA-C      Cholecalciferol  1,000 Units Oral Daily Chelsea Dasilva PA-C      vitamin B-12  1,000 mcg Oral Once per day on Monday Wednesday Friday Chelsea Dasilva PA-C      docusate sodium  100 mg Oral BID Chelsea Dasilva PA-C      enoxaparin  40 mg Subcutaneous Daily Chelsea Dasilva PA-C      lactulose  20 g Oral Daily PRN Ashley Depadua, MD      lidocaine  2 patch Topical Daily Ashley Depadua, MD      melatonin  3 mg Oral QPM Ashley Depadua, MD      OLANZapine  2.5 mg Intramuscular Q4H PRN Bakari Rivera      ondansetron  4 mg Oral Q6H PRN Chelsea Dasilva PA-C      polyethylene glycol  17 g Oral Daily Ashley Depadua, MD      QUEtiapine  12.5 mg Oral Q8H PRN Chelsea Dasilva PA-C      rivastigmine  1 patch Transdermal Daily Chelsea Dasilva PA-C      senna  2 tablet Oral BID Chelsea Dasilva PA-C       Imaging: Reviewed, no new imaging       Lab Results: Reviewed, no new labs.  Results from last 7 days   Lab Units 02/03/25  0634 01/29/25  0531   HEMOGLOBIN g/dL 11.5* 11.3*   HEMATOCRIT % 35.5* 34.2*   WBC Thousand/uL 5.96 9.86   PLATELETS Thousands/uL 230 203     Results from last 7 days   Lab Units 02/03/25  0634 01/29/25  0531   BUN mg/dL 33* 23   SODIUM mmol/L 140 140   POTASSIUM mmol/L 4.8 4.3   CHLORIDE mmol/L 104 105   CREATININE mg/dL 1.05 0.99   AST U/L  --  15   ALT U/L  --  7              0658}      ** Please Note:  [employer] voice to text software may have been used in the creation of this document. **

## 2025-02-05 PROCEDURE — 97530 THERAPEUTIC ACTIVITIES: CPT

## 2025-02-05 PROCEDURE — 92526 ORAL FUNCTION THERAPY: CPT

## 2025-02-05 PROCEDURE — 97116 GAIT TRAINING THERAPY: CPT

## 2025-02-05 PROCEDURE — 97129 THER IVNTJ 1ST 15 MIN: CPT

## 2025-02-05 PROCEDURE — 99232 SBSQ HOSP IP/OBS MODERATE 35: CPT | Performed by: PHYSICAL MEDICINE & REHABILITATION

## 2025-02-05 PROCEDURE — 97112 NEUROMUSCULAR REEDUCATION: CPT

## 2025-02-05 PROCEDURE — 97110 THERAPEUTIC EXERCISES: CPT

## 2025-02-05 PROCEDURE — 99232 SBSQ HOSP IP/OBS MODERATE 35: CPT | Performed by: PHYSICIAN ASSISTANT

## 2025-02-05 PROCEDURE — 97535 SELF CARE MNGMENT TRAINING: CPT

## 2025-02-05 RX ORDER — FUROSEMIDE 20 MG/1
20 TABLET ORAL DAILY
Status: DISCONTINUED | OUTPATIENT
Start: 2025-02-05 | End: 2025-02-14 | Stop reason: HOSPADM

## 2025-02-05 RX ADMIN — CARBIDOPA AND LEVODOPA 1.5 TABLET: 25; 100 TABLET ORAL at 21:10

## 2025-02-05 RX ADMIN — SENNOSIDES 17.2 MG: 8.6 TABLET ORAL at 09:01

## 2025-02-05 RX ADMIN — CARBIDOPA AND LEVODOPA 1.5 TABLET: 25; 100 TABLET ORAL at 09:01

## 2025-02-05 RX ADMIN — ACETAMINOPHEN 650 MG: 325 TABLET, FILM COATED ORAL at 21:10

## 2025-02-05 RX ADMIN — CARBIDOPA AND LEVODOPA 1.5 TABLET: 25; 100 TABLET ORAL at 13:11

## 2025-02-05 RX ADMIN — ENOXAPARIN SODIUM 40 MG: 40 INJECTION SUBCUTANEOUS at 09:01

## 2025-02-05 RX ADMIN — FUROSEMIDE 20 MG: 20 TABLET ORAL at 13:11

## 2025-02-05 RX ADMIN — SENNOSIDES 17.2 MG: 8.6 TABLET ORAL at 17:17

## 2025-02-05 RX ADMIN — AMLODIPINE BESYLATE 5 MG: 5 TABLET ORAL at 09:01

## 2025-02-05 RX ADMIN — DOCUSATE SODIUM 100 MG: 100 CAPSULE, LIQUID FILLED ORAL at 17:16

## 2025-02-05 RX ADMIN — RIVASTIGMINE 1 PATCH: 4.6 PATCH, EXTENDED RELEASE TRANSDERMAL at 09:04

## 2025-02-05 RX ADMIN — ACETAMINOPHEN 650 MG: 325 TABLET, FILM COATED ORAL at 17:16

## 2025-02-05 RX ADMIN — POLYETHYLENE GLYCOL 3350 17 G: 17 POWDER, FOR SOLUTION ORAL at 09:02

## 2025-02-05 RX ADMIN — Medication 1000 UNITS: at 09:01

## 2025-02-05 RX ADMIN — ACETAMINOPHEN 650 MG: 325 TABLET, FILM COATED ORAL at 09:01

## 2025-02-05 RX ADMIN — DOCUSATE SODIUM 100 MG: 100 CAPSULE, LIQUID FILLED ORAL at 09:01

## 2025-02-05 RX ADMIN — CARBIDOPA AND LEVODOPA 1.5 TABLET: 25; 100 TABLET ORAL at 17:17

## 2025-02-05 RX ADMIN — LACTULOSE 20 G: 20 SOLUTION ORAL at 13:12

## 2025-02-05 RX ADMIN — DEXTRAN 70, GLYCERIN, HYPROMELLOSE 1 DROP: 1; 2; 3 SOLUTION/ DROPS OPHTHALMIC at 13:15

## 2025-02-05 RX ADMIN — Medication 6 MG: at 21:10

## 2025-02-05 RX ADMIN — LIDOCAINE 5% 2 PATCH: 700 PATCH TOPICAL at 17:17

## 2025-02-05 RX ADMIN — CYANOCOBALAMIN TAB 500 MCG 1000 MCG: 500 TAB at 09:01

## 2025-02-05 NOTE — ASSESSMENT & PLAN NOTE
- Bowel/bladder incontinence on admission  - Bowel: continence has improved. Was also constipated. On colace, senna 2 tabs and daily miralax.    - Has Prn suppository or lactulose   - Last BM 2/2 - asked nursing to give PRN today.    - Monitor and adjust as appropriate  - Bladder: PVR x3 on admission are low   - UA during acute hospitalization not suspicious for infection   - Timed Voids.    - Improving. 2/3 largely continent of bladder.

## 2025-02-05 NOTE — PROGRESS NOTES
Progress Note - Hospitalist   Name: Daniel Sanz Jr. 84 y.o. male I MRN: [id number]  Unit/Bed#: -01 I Date of Admission: 1/31/2025   Date of Service: 2/5/2025 I Hospital Day: 5    Assessment & Plan  Parkinson's disease (HCC)  Continue Sinemet 1.5 tabs 4x daily. Currently ordered at times pt takes the medication at home. May need to adjust timing based on therapy schedule.  Follows with Dr. Cavanaugh as an outpt.  Therapy per PMR.  HTN (hypertension)  Home: amlodpine 5mg daily  Here: Same as home medication.  He did have some changes in BP with position changes 2/3 but was not orthostatic  Monitor for orthostasis due to parkinsons  BP has been running a bit on the higher side. Lasix being added back today.   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  Continue rivastigmine patch - pt's wife very concerned about the cost. Price check was $10.  Pt agitated 2/3 during AM vitals. He did receive IM Zyprexa. Not agitated currently  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 (HCC)  Follows with Podiatry and wound care as an outpt.  He was taking Lasix 3x weekly.  It is has not been ordered here  Patient's weight up several pounds today and on exam has barrie LE edema.   Will add back lasix daily for now with plans to readdress and possibly return back to his 3x weekly dosing when edema improved and weight better  Dysphagia  Continue modified diet, upgraded to level 3 with thin liquids today. Per speech- still needs frequent reminders to swallow so he needs supervision for all meals  SLP to follow.  Chronic pain of both shoulders  Per wife, had at home as well  Continue lidocaine patches  Bowel and bladder incontinence    At risk for venous thromboembolism (VTE)    Vitamin D insufficiency      The above assessment and plan was reviewed and updated as determined by my evaluation of the patient on 2/5/2025.    History of Present Illness   Patient seen and examined. Patients overnight  issues or events were reviewed with nursing staff. New or overnight issues include the following:   Patient sitting up in chair. Denies pain today. No CP/SOB.     Review of Systems    Objective :  Temp:  [97.5 °F (36.4 °C)-98.2 °F (36.8 °C)] 97.5 °F (36.4 °C)  HR:  [65-78] 69  BP: (120-161)/(56-70) 143/65  Resp:  [16-18] 18  SpO2:  [93 %-98 %] 93 %  O2 Device: None (Room air)    Invasive Devices       None                   Physical Exam  General Appearance: NAD; pleasant  HEENT: PERRLA, conjuctiva normal; mucous membranes moist; face symmetrical  Neck:  Supple  Lungs: clear bilaterally, normal respiratory effort, no retractions, expiratory effort normal, on room air  CV: regular rate and rhythm, no murmurs rubs or gallops noted   ABD: soft non tender, +BS x4  EXT: DP pulses intact, no lymphadenopathy, bilateral LE moderate edema  Skin: normal turgor, normal texture, no rash  Psych: affect flat  Neuro: awake and alert  The above physical exam was reviewed and updated as determined by my evaluation of the patient on 2/5/2025.      Lab Results: I have reviewed the following results:  Results from last 7 days   Lab Units 02/03/25  0634   WBC Thousand/uL 5.96   HEMOGLOBIN g/dL 11.5*   HEMATOCRIT % 35.5*   PLATELETS Thousands/uL 230     Results from last 7 days   Lab Units 02/03/25  0634   SODIUM mmol/L 140   POTASSIUM mmol/L 4.8   CHLORIDE mmol/L 104   CO2 mmol/L 27   BUN mg/dL 33*   CREATININE mg/dL 1.05   CALCIUM mg/dL 9.1             Results from last 7 days   Lab Units 02/03/25  0745   POC GLUCOSE mg/dl 80           Review of Scheduled Meds: Medications  reviewed and reconciled as needed  Current Facility-Administered Medications   Medication Dose Route Frequency Provider Last Rate    acetaminophen  650 mg Oral TID Chelsea Dasilva PA-C      amLODIPine  5 mg Oral Daily Chelsea Dasilva PA-C      Artificial Tears  1 drop Both Eyes Q4H PRN Chelsea Dasilva PA-C      bisacodyl  10 mg Rectal Daily PRN Lubna  Depadua, MD      calcium carbonate  1,000 mg Oral Daily PRN Chelsea Dasilva PA-C      carbidopa-levodopa  1.5 tablet Oral 4x Daily Cehlsea Dasilva PA-C      Cholecalciferol  1,000 Units Oral Daily Chelsea Dasilva PA-C      vitamin B-12  1,000 mcg Oral Once per day on Monday Wednesday Friday Chelsea Dasilva PA-C      docusate sodium  100 mg Oral BID Chelsea Dasilva PA-C      enoxaparin  40 mg Subcutaneous Daily Chelsea Dasilva PA-C      furosemide  20 mg Oral Daily Ciara Calderon PA-C      lactulose  20 g Oral Daily PRN Ashley Depadua, MD      lidocaine  2 patch Topical Daily Ashley Depadua, MD      melatonin  6 mg Oral QPM Ashley Depadua, MD      OLANZapine  2.5 mg Intramuscular Q4H PRN Ashley Depadua, MD      ondansetron  4 mg Oral Q6H PRN Chelsea Dasilva PA-C      polyethylene glycol  17 g Oral Daily Ashley Depadua, MD      QUEtiapine  12.5 mg Oral Q8H PRN Chelsea Dasilva PA-C      rivastigmine  1 patch Transdermal Daily Chelsea Dasilva PA-C      senna  2 tablet Oral BID Chelsea Dasilva PA-C         VTE Pharmacologic Prophylaxis: lovenox  Code Status: Level 1 - Full Code  Current Length of Stay: 5 day(s)      ** Please Note:  voice to text software may have been used in the creation of this document. Although proof errors in transcription or interpretation are a potential of such software**

## 2025-02-05 NOTE — PROGRESS NOTES
02/05/25 1030   Pain Assessment   Pain Assessment Tool 0-10   Pain Score No Pain   Patient's Stated Pain Goal No pain   Restrictions/Precautions   Precautions Aspiration;Bed/chair alarms;Cognitive;Fall Risk;Supervision on toilet/commode   Weight Bearing Restrictions No   ROM Restrictions No   Lifestyle   Autonomy Pt pleasant and cooperative although easily distracted today by other peers and therapist in the gym.   Reciprocal Relationships lives with his wife Sheng, supportive DIL Pamela   Service to Others retired    Intrinsic Gratification watching TV   Kitchen Mobility   Kitchen-Mobility Level Walker   Kitchen Activity Retrieve items;Transport items   Kitchen Mobility Comments While Sheng provides all of his meals, he does state he goes to get his own drinks. Pt was not using the RW in the kitchen at home previously, most often using the cane. However Pt is lifting the cane and just carrying it frequently, not offering good support. Equally with the walker though Pt trying to use it one handed while carrying items, turned sideways within the walker and reaching over the side of it into the refrigerator with the other hand on the swinging door. Pt unable to report how he would get his drink to where he was sitting at home, eventually stating his grand kids bring things to him. OT also worked on training in chair manipulation at a table for transfers walker to/from dining room chair without arms. Pt required step by step instruction and assist to push in his chair.   Functional Standing Tolerance   Time 4-5minutes x 3   Activity kitchen tasks with RW   Cognition   Overall Cognitive Status Impaired   Arousal/Participation Alert;Cooperative   Attention Attends with cues to redirect   Orientation Level Oriented to person;Oriented to place   Memory Decreased short term memory;Decreased recall of recent events;Decreased recall of precautions   Following Commands Follows one step commands with increased time or  repetition   Activity Tolerance   Activity Tolerance Patient tolerated treatment well   Assessment   Treatment Assessment Pt engaged in short OT session x 30minutes focused on assessment of kitchen mobility tasks. PT has encouraged use of RW for balance and safer mobility, however training in RW negotiation within functional tasks indicates potential for increased tripping and safety risks. Further training within smaller negotiable spaces and while managing other objects is needed.   Prognosis Fair   Problem List Decreased strength;Decreased range of motion;Decreased endurance;Impaired balance;Decreased mobility;Decreased coordination;Decreased cognition;Impaired judgement;Decreased safety awareness   Barriers to Discharge Inaccessible home environment;Decreased caregiver support   Plan   Treatment/Interventions ADL retraining;Functional transfer training   Progress Progressing toward goals   OT Therapy Minutes   OT Time In 1030   OT Time Out 1115   OT Total Time (minutes) 45   OT Mode of treatment - Individual (minutes) 45   OT Mode of treatment - Concurrent (minutes) 0   OT Mode of treatment - Group (minutes) 0   OT Mode of treatment - Co-treat (minutes) 0   OT Mode of Treatment - Total time(minutes) 45 minutes   OT Cumulative Minutes 300   Therapy Time missed   Time missed? No

## 2025-02-05 NOTE — PROGRESS NOTES
02/05/25 0900   Pain Assessment   Pain Assessment Tool 0-10   Pain Score 4   Pain Location/Orientation Orientation: Bilateral;Location: Shoulder   Hospital Pain Intervention(s) Medication (See MAR);Repositioned;Rest   Pain 2   Pain Score 2 4   Pain Location/Orientation 2 Orientation: Right;Location: Hip   Restrictions/Precautions   Precautions Aspiration;Bed/chair alarms;Cognitive;Fall Risk;Pain;Supervision on toilet/commode   Weight Bearing Restrictions No   Cognition   Overall Cognitive Status Impaired   Arousal/Participation Alert;Cooperative   Subjective   Subjective Patient resting in recliner finishing up with NSG, agreeable to PT session   Roll Left and Right   Comment Sitting in recliner at start of session   Sit to Stand   Type of Assistance Needed Physical assistance;Verbal cues   Physical Assistance Level 25% or less   Comment Min/CG with/without RW support. VC's for hand positioning during descent to decrease plopping   Sit to Stand CARE Score 3   Bed-Chair Transfer   Type of Assistance Needed Physical assistance;Verbal cues   Physical Assistance Level 25% or less   Comment Min/CG with and without RW support. VC's for step/AD sequencing and to complete full pivot prior to descent to improve safety   Chair/Bed-to-Chair Transfer CARE Score 3   Car Transfer   Type of Assistance Needed Physical assistance;Verbal cues   Physical Assistance Level 25% or less   Comment Min A with extra time to complete tasks and constant vc's for hand positioning and sequencing in order to improve performance. Educated in importance of clearing head while entering/exiting car to reduce risk of injury   Car Transfer CARE Score 3   Walk 10 Feet   Type of Assistance Needed Physical assistance;Verbal cues;Adaptive equipment   Physical Assistance Level 25% or less   Comment Min A using no AD vs RW   Walk 10 Feet CARE Score 3   Walk 50 Feet with Two Turns   Type of Assistance Needed Physical assistance   Physical Assistance Level  25% or less   Comment 50ft using no device   Walk 50 Feet with Two Turns CARE Score 3   Walk 150 Feet   Type of Assistance Needed Physical assistance;Verbal cues   Physical Assistance Level Total assistance   Comment 150ft using no AD with Min A and chair follow for safety. External vc's for increased step/stride length during swing phase to promote reciprocal pattern with step through sequencing   Walk 150 Feet CARE Score 1   Walking 10 Feet on Uneven Surfaces   Type of Assistance Needed Physical assistance;Verbal cues;Adaptive equipment   Physical Assistance Level 26%-50%   Comment Min-Mod Ax1 with no device   Walking 10 Feet on Uneven Surfaces CARE Score 3   Wheel 50 Feet with Two Turns   Reason if not Attempted Activity not applicable   Wheel 50 Feet with Two Turns CARE Score 9   Toilet Transfer   Type of Assistance Needed Physical assistance;Verbal cues   Physical Assistance Level 25% or less   Comment Transfer onto/off toliet with no device and Mod A to don pants   Toilet Transfer CARE Score 3   Therapeutic Interventions   Flexibility Seated manual static stretching of R hamstring with 60s x4 reps and heel cord 30s x4 reps   Neuromuscular Re-Education NMR training focused on several reps of walking on floor mat with ankle weights placed undert mat to simulate walking on uneven surfaces in the community with no device to promote righting reactions and enhance stability   Equipment Use   NuStep Level 2 x10 minutes using B UE/LE and maintains above 40 SPM. No reported c/o shoulder pain during NuStep   Assessment   Treatment Assessment Patient participated in 90 minutes of skilled PT with a focus on functional mobility training and standing balance activities to maximize performance and establish independence. Patient able to negotitate on floor mat to simulate walking on uneven surfaces with no LOB but hesitancy noted and requires vc's for step sequencing to enhance performance. Patient assessed for short  ambulation distances with/without RW support and demonstrated fair carryover for RW use but vc's for safety use. Recommend to focus on using RW for ambulation in prep for d/c home to maximize safety and performance and continue to practice standing balance activities/NMR without a device.   Family/Caregiver Present No   Barriers to Discharge Inaccessible home environment;Decreased caregiver support   Plan   Treatment/Interventions Functional transfer training;LE strengthening/ROM;Endurance training;Therapeutic exercise;Cognitive reorientation;Patient/family training;Bed mobility;Gait training   Progress Progressing toward goals   PT Therapy Minutes   PT Time In 0900   PT Time Out 1030   PT Total Time (minutes) 90   PT Mode of treatment - Individual (minutes) 90   PT Mode of treatment - Concurrent (minutes) 0   PT Mode of treatment - Group (minutes) 0   PT Mode of treatment - Co-treat (minutes) 0   PT Mode of Treatment - Total time(minutes) 90 minutes   PT Cumulative Minutes 360   Therapy Time missed   Time missed? No

## 2025-02-05 NOTE — PLAN OF CARE
Problem: PAIN - ADULT  Goal: Verbalizes/displays adequate comfort level or baseline comfort level  Description: Interventions:  - Encourage patient to monitor pain and request assistance  - Assess pain using appropriate pain scale  - Administer analgesics based on type and severity of pain and evaluate response  - Implement non-pharmacological measures as appropriate and evaluate response  - Consider cultural and social influences on pain and pain management  - Notify physician/advanced practitioner if interventions unsuccessful or patient reports new pain  2/5/2025 1119 by Jany Messina RN  Outcome: Progressing  2/5/2025 1118 by Jany Messina RN  Outcome: Progressing

## 2025-02-05 NOTE — ASSESSMENT & PLAN NOTE
Chronic right lower extremity venous ulcers. Largely healed and wound care signed off.   PTA Lasix for LE edema management; cont at [facility]  Known to [facility] podiatry/[facility]   Wound care sami and treat  Offload heels  ACE wraps to b/l LEs

## 2025-02-05 NOTE — PROGRESS NOTES
"   02/05/25 0800   Pain Assessment   Pain Assessment Tool 0-10   Pain Score No Pain   Restrictions/Precautions   Precautions Aspiration;Bed/chair alarms;Cognitive;Fall Risk;Impulsive;Supervision on toilet/commode   Comprehension   Comprehension (FIM) 3 - Understands basic info/conversation 50-74% of time   Expression   Expression (FIM) 4 - Expresses basic info/needs 75-90% of time   Social Interaction   Social Interaction (FIM) 5 - Interacts appropriately with others 90% of time   Problem Solving   Problem solving (FIM) 1 - Solves basic problems less than 25% of time   Memory   Memory (FIM) 2 - Recognizes, recalls/performs 25-49%   Speech/Language/Cognition Assessmetn   Treatment Assessment In addition to dysphagia tx session, SLP engaging pt in cognitive tx session to where review of orientation was completed. Pt was oriented to month, year but not date or JENNIFER. Pt was able to recall hospital but verbal binary choices for name of hospital. Pt was aware of reasoning for current hospitalization. Of note, pt was noted to have slower processing time to provide answers. Additionally, SLP engaged pt in basic verbal problem solving task to where SLP presented \"what if\" scenarios to provide best solutions. It was noted that pt did have difficulty in determining safe solutions, benefiting from verbal probing questions. Pt was 0/4 given questions, but w/ cues, pt was able to provide appropriate answers in 2 out of 4 opportunities. Even with the leading questions, pt had increased difficult in his ability to identify calling 911. Pt stated that he would call the \"emergency squad\" to where SLP asking pt about the number to call, which still pt stated \"[id number].\" Given the overt difficulty in his ability to ID the safest means for problem solving, this would warrant full supervision from spouse upon discharge. At this time, pt will continue to benefit from ongoing skilled SLP services targeting basic cognitive skills in hopes of " decreasing caregiver burden over time.   Eating   Type of Assistance Needed Set-up / clean-up;Supervision;Verbal cues   Physical Assistance Level No physical assistance   Eating CARE Score 4   Swallow Assessment   Swallow Treatment Assessment Daily Dysphagia Tx Note     Patient Name: Daniel Sanz Jr.    Today's Date: 2/5/2025      Current Risks for Dysphagia & Aspiration: General debilitation; Cognitive deficit; Mental status change; Behavior issues; Hx neurologic dx (Parkinson's); Positioning issues      Current Symptoms/Concerns: Coughs w/ liquids; Cough on own secretions; Difficulty chewing; Holding food in mouth    Current diet:soft/level 3 diet and thin liquids     Premorbid diet::soft/level 3 diet, regular diet, and thin liquids     Positioning: upright in recliner    Items administered:Consistencies Administered: thin liquids, mechanical soft solids, and soft solids  Materials administered included scrambled eggs, minced sausage, level 3 Arabic toast, canned peaches, thins by cup/straw    Total amount of meal consumed:   100% of meal and 360cc of thins       Oral stage:mild-moderate  Lip closure: functional  Anterior spillage: none  Mastication: slower due to increased boluses presented throughout meal  Bolus formation: decreased  Bolus control: likely decreased  Transfer: delayed  Oral residue: present  Pocketing: none         Pharyngeal stage:mild  Swallow promptness: delayed   Hyolaryngeal elevation: present but decreased  Wet voice: minimal today  Throat clear: x 1 after thins  Cough: x2 after soft solids, x3 after thins  Secondary swallows: consistent w/ soft solids  Audible swallows: none       Esophageal stage:No overt sxs observed given meal today        Summary:     Pt presenting with mild-moderate oral and mild pharyngeal dysphagia today. Symptoms or concerns included slower and more prolonged mastication given soft solids but this was due to increased rate of intake given boluses as pt would take  3-4 bites, needing verbal cue from SLP to chew and swallow items in mouth prior to next bites. Pt was demonstrating piecemeal transit w/ soft solids as result of faster intake rate. Of note, bolus formulation was decreased given this as well to where pt does have oral residual b/l but can clear when prompted to finish chewing items in mouth before next bite and use of liquid wash. Also, pt does have difficulty in dual task as he stops chewing to retrieve next bite. SLP needing to provide cues to continue to chew items in attempts to elicit prompter transition to swallow. Question the potential for decreased bolus control/transfer of thins, as pt did appear to take more single sips, but as meal progressed, larger sips taken.  Swallow initiation is delayed across textures due to slower oral stage. HLE is present but still decreased when elicited upon palpation. Double swallows are noted w/ softer solids given piecemeal transfers. No overt audible swallows elicited. Pt did demonstrate less wetness in voice throughout meal today, noting that occurred x2 throughout meal, but pt did have cough x2 during soft solids (chopped sausage) and x3 after thin liquids.     Of note, current SLP had reached out to pt's home SLP (Luis). This occurred given pt's spouse's increased questioning about completing a VFSS while on the unit. SLP able to d/w home SLP about focus of sessions which were towards voice/cognitive skills. Home SLP was not focusing on swallow function given sessions, but pre her report, pt would have pocketing of food in cheeks when he would snack, as well as demonstrated fluctuating tolerance of wet voicing w/ and w/o liquids. Home SLP stating that she has been recommending a VFSS for months, but difficulty in being able to schedule per pt's primary provider. However, current SLP did state to home SLP about similar observations which are noted w/ wetness in voicing, inconsistent signs/sx of aspiration throughout  meals, to where a VFSS would be complete throughout pt's stay. Also d/w Home SLP was how spouse WAS REPORTING difficulty in swallowing at home. Per initial bedside dysphagia assessment, spouse DENIED difficulty in swallowing. SLP did report this to home SLP. Current SLP did express to home SLP that spouse WILL NEED TO PROVIDE VERBAL CUES to pt during meals given his ability to not recall swallow strategies. Current SLP thankful for the information provided and current SLP can coordinate ongoing plan of care for pt accordingly.     Traditional oral care completed post meal w/o removal of any oral residual, but did have mildly wet voicing after completing. Pt needing verbal prompting to clear throat which was unsuccessful. Did eventually elicit over time.      Recommendations:  Diet: soft/level 3 diet and thin liquids  Meds: whole with puree  Strategies:  upright posture, only feed when fully alert, slow rate of feeding, small bites/sips, cough when demonstrating wet voicing, quiet environment (tv off, limit talking, door closed, etc.), alternating bites and sips, and OOB for meals preferred      FULL supervision w/ meals. Will need setup AND ongoing verbal cues for pacing throughout meal.   Results reviewed with:  patient, DOLORES Carmichael, Dr. Wilson  Aspiration precautions posted.     F/u ST tx: Pt is currently recommended for further skilled SLP services targeting dysphagia therapy in order to maximize oral and pharyngeal swallow skills, while safely supporting PO intake, as well as to improve independent carryover of safe swallow strategies.       Plan: Monitor diet advancement to level 3 diet w/ thins            Trial solids with SLP supervision only            Ongoing family education/training w/ spouse--> will need increased education on cues for pt throughout meal            Consider VFSS later this week into early next week pending overall progress   SLP Therapy Minutes   SLP Time In 0800   SLP Time Out 0900    SLP Total Time (minutes) 60   SLP Mode of treatment - Individual (minutes) 60   SLP Mode of treatment - Concurrent (minutes) 0   SLP Mode of treatment - Group (minutes) 0   SLP Mode of treatment - Co-treat (minutes) 0   SLP Mode of Treatment - Total time(minutes) 60 minutes   SLP Cumulative Minutes 275   Therapy Time missed   Time missed? No

## 2025-02-05 NOTE — ASSESSMENT & PLAN NOTE
Patient observed coughing while taking medications by nursing at acute   Advanced to Level 3/thins on 2/4  Aspiration precautions   Timing or need for VBS as per SLP.   SLP dysphagia eval and treat

## 2025-02-05 NOTE — PROGRESS NOTES
02/05/25 1330   Pain Assessment   Pain Assessment Tool 0-10   Pain Score No Pain   Restrictions/Precautions   Precautions Aspiration;Bed/chair alarms;Cognitive;Fall Risk;Supervision on toilet/commode   Weight Bearing Restrictions No   ROM Restrictions No   Lifestyle   Autonomy Pt had easier time responding to questions and following directives with treatment in his room to reduce external distractions.   Reciprocal Relationships lives with his wife Sheng, supportive DIL Pamela   Service to Others retired    Intrinsic Gratification watching TV, talks about hunting as a favorite leisure but family reports he last went 13 years ago   Putting On/Taking Off Footwear   Type of Assistance Needed Physical assistance   Physical Assistance Level 76% or more   Comment Wife brought in a pair of shoes 1 size smaller. OT assessed and found better fit, able to stay on Pt's foot resulting in reduced scuffing during ambulation and reduced tripping risk. OT also demonstrated use of foot funnel to reduce the back of shoe turning under and wife struggling to keep it upright while donning the shoes. Pt able to step into the shoe with foot funnel placed for him, TA to close velcro strap.   Putting On/Taking Off Footwear CARE Score 2   Picking Up Object   Type of Assistance Needed Physical assistance   Physical Assistance Level 25% or less   Comment able to retrieve bean bag from floor with min A to stabilize using gait belt   Picking Up Object CARE Score 3   Sit to Stand   Type of Assistance Needed Incidental touching;Verbal cues   Physical Assistance Level No physical assistance   Comment cues for hand placement less than 50%, but cues to stand and count to 10 before ambulating 100%   Sit to Stand CARE Score 4   Bed-Chair Transfer   Type of Assistance Needed Physical assistance   Physical Assistance Level 25% or less   Comment CG/min A with SPC, and no device.   Chair/Bed-to-Chair Transfer CARE Score 3   Functional Standing  "Tolerance   Time 4-5min repeated   Activity bean bag toss with semi tandem stance and retrieval of the bags from low surface to work on dynamic balance, standing tolerance, coordination of multiple limbs simultaneously. Pt also completed in-room ambulation without AD to retrieve bean bags from various surface heights, problem solve small negotiation spaces. Discussion of safety aspects and the affect on step length, fluidity of movement when scanning the environment, goal driven movement.   Cognition   Overall Cognitive Status Impaired   Arousal/Participation Alert;Cooperative   Attention Attends with cues to redirect   Orientation Level Oriented to person;Oriented to place   Memory Decreased short term memory;Decreased recall of recent events;Decreased recall of precautions   Following Commands Follows one step commands with increased time or repetition   Additional Activities   Additional Activities Comments Pt's wife and DIL present for entire session and provided education on recommendations to introduce RW now in anticiaption of pathology progression and not utilizing the SPC in a manner that will assist stabilization; the affects of external distractions, inability to attend to two goals at the same time without compromising his mobility; current need for assistance with urinal use and potential improved use/safety with BSC next to the bed at home- however wife states he wont sit on the toilet except for BM and was always using the urinal overnight. She also contradicted herself in conversation though in regards to him spilling the urinal, then changing it to be urinating on the floor in front of the toilet due to not managing his clothing fast enough. This led to discussion of wearing PJ bottoms without underwear for ease of urinal use, but Pt reports he was incontinent of urine over night last night. Pt brought up use of condom cath in the past, wife is against this idea due to infection risks and \"the skin " "needs air\". No conclusion was made despite extended conversation, with plan to focus on urinal use over the next few days to determine methods for Pt to be more successful.   Activity Tolerance   Activity Tolerance Patient tolerated treatment well   Assessment   Treatment Assessment Pt engaged in 80min session focused on functional mobility in room for item retrieval and balance, self initiation of methods to improve step length and reduce festinating gait. Pt continues to need consistent cues to carryover techniques. Pt wife and DIL present for education as well. See above for details and plan to work on urinal use and determine msot appropriate overnight plan at home.   Prognosis Fair   Problem List Decreased strength;Decreased range of motion;Decreased endurance;Impaired balance;Decreased mobility;Decreased coordination;Decreased cognition;Impaired judgement;Decreased safety awareness   Barriers to Discharge Inaccessible home environment;Decreased caregiver support   Plan   Treatment/Interventions ADL retraining;Functional transfer training;Endurance training;Patient/family training   Progress Progressing toward goals   OT Therapy Minutes   OT Time In 1330   OT Time Out 1450   OT Total Time (minutes) 80   OT Mode of treatment - Individual (minutes) 80   OT Mode of treatment - Concurrent (minutes) 0   OT Mode of treatment - Group (minutes) 0   OT Mode of treatment - Co-treat (minutes) 0   OT Mode of Treatment - Total time(minutes) 80 minutes   OT Cumulative Minutes 380   Therapy Time missed   Time missed? No       "

## 2025-02-05 NOTE — ASSESSMENT & PLAN NOTE
"Patient's wife endorsing recent abnormal behaviors prior to hospitalization.  Patient waking frequently at night, hallucinations noted when awaking from sleep, patient noted to be more \"nasty\" and agitated with at times garbled speech which clears.  Presents to ED after fall at home with concern for possible UTI. S/p head strike in ED w/ R eyebrow laceration.  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 with mild acute encephalopathy  CT head w/o acute findings  TSH benign   Folate WNL, B12 normal, vitamin D low at 26.9  Wife notes B12/vitamin D supplementation 3 times weekly PTA  Continue to q daily dosing of vit D  B12 3x weekly  Negative infxn work-up  Sinemet as ordered by neurology (below)  Acetaminophen 650mg TID    Patient oriented, and has some decent recall. Impaired insight, and he sundowns.   Previous MOCA - 7  Current SLUMS - 8   - Low dose seroquel PRN ordered by Neuro  - Added PRN olanzapine for severe agitation ONLY.    - Did receive this on 2/3 in the AM after some aggressive behaviors during blood draw/BP.  - Redirect, reorient first  - Low dose melatonin in the evening for sleep/wake.  - Bed/chair alarms  - Optimize bowel/bladder program  - Avoid deliriogenic meds and physical restraint.  "

## 2025-02-05 NOTE — ASSESSMENT & PLAN NOTE
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  Continue rivastigmine patch - pt's wife very concerned about the cost. Price check was $10.  Pt agitated 2/3 during AM vitals. He did receive IM Zyprexa. Not agitated currently

## 2025-02-05 NOTE — ASSESSMENT & PLAN NOTE
Follows with Podiatry and wound care as an outpt.  He was taking Lasix 3x weekly.  It is has not been ordered here  Patient's weight up several pounds today and on exam has barrie LE edema.   Will add back lasix daily for now with plans to readdress and possibly return back to his 3x weekly dosing when edema improved and weight better

## 2025-02-05 NOTE — PROGRESS NOTES
"Progress Note - PMR   Name: Daniel Sanz Jr. 84 y.o. male I MRN: [id number]  Unit/Bed#: -01 I Date of Admission: 1/31/2025   Date of Service: 2/5/2025 I Hospital Day: 5     Assessment & Plan  Parkinson's disease (HCC)  -AMS likely sequela of progressive parkinsons dementia plus possible adverse reaction to recent sinemet increase  -cont neuro adjusted recs for sinemet 1.5 QID  -cont exelon patch   - PT/OT/SLP 3-5 hours/day, 5-7 days/week.  - Will need f/u with Neurology after discharge.   Dysphagia  Patient observed coughing while taking medications by nursing at acute   Advanced to Level 3/thins on 2/4  Aspiration precautions   Timing or need for VBS as per SLP.   SLP dysphagia eval and treat  Altered mental status  Patient's wife endorsing recent abnormal behaviors prior to hospitalization.  Patient waking frequently at night, hallucinations noted when awaking from sleep, patient noted to be more \"nasty\" and agitated with at times garbled speech which clears.  Presents to ED after fall at home with concern for possible UTI. S/p head strike in ED w/ R eyebrow laceration.  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 with mild acute encephalopathy  CT head w/o acute findings  TSH benign   Folate WNL, B12 normal, vitamin D low at 26.9  Wife notes B12/vitamin D supplementation 3 times weekly PTA  Continue to q daily dosing of vit D  B12 3x weekly  Negative infxn work-up  Sinemet as ordered by neurology (below)  Acetaminophen 650mg TID    Patient oriented, and has some decent recall. Impaired insight, and he sundowns.   Previous MOCA - 7  Current SLUMS - 8   - Low dose seroquel PRN ordered by Neuro  - Added PRN olanzapine for severe agitation ONLY.    - Did receive this on 2/3 in the AM after some aggressive behaviors during blood draw/BP.  - Redirect, reorient first  - Low dose melatonin in the evening for sleep/wake.  - Bed/chair alarms  - Optimize bowel/bladder program  - Avoid deliriogenic meds and physical " restraint.  Bowel and bladder incontinence  - Bowel/bladder incontinence on admission  - Bowel: continence has improved. Was also constipated. On colace, senna 2 tabs and daily miralax.    - Has Prn suppository or lactulose   - Last BM 2/2 - asked nursing to give PRN today.    - Monitor and adjust as appropriate  - Bladder: PVR x3 on admission are low   - UA during acute hospitalization not suspicious for infection   - Timed Voids.    - Improving. 2/3 largely continent of bladder.   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 (HCC)  Chronic right lower extremity venous ulcers. Largely healed and wound care signed off.   PTA Lasix for LE edema management; cont at [facility]  Known to [facility] podiatry/A   Wound care sami and treat  Offload heels  ACE wraps to b/l LEs  Chronic pain of both shoulders  Reports both shoulders.  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   Non-surgically managed. Last seen by Ortho 2019.   Tylenol scheduled.  Lidoderm patches    - This has helped.   Topicals for now. Monitor.   At risk for venous thromboembolism (VTE)  Lovenox, SCDs, ambulation  Will not need lovenox at discharge   Vitamin D insufficiency  Continue daily cholecalciferol supplementation.   HTN (hypertension)  Home: Amlodipine 5mg daily, Lasix 40mg daily, Losartan 50mg daily (unclear how long he was taking). Here: Amlodipine 5mg daily.   Monitor and adjust as appropriate     Health Maintenance  #Skin/Pressure Injury Prevention: Turn Q2hr in bed, with weight shifts [id number] in wheelchair.  #GI Prophylaxis: Not indicated   #Code Status: Full Code  #FEN: Level 2/Thins, [employer] at lunch, Angel at dinner, and EP HP angel at Breakfast  #Dispo: Team 2/4: ADD 2/14 with Home PT/OT/SLP  with goals to discharge home at Sup-Yue level of function. Ambulation vs. Wheelchair to be determined while here.   Will need outpatient f/u with Neuro      To Review: Mr. Sanz is an 85yo M with medical history of  Parkinson's Disease with dementia (MOCA 7/30 in March 2024), HTN, LE venous stasis dermatitis and edema, GERD who presented to [facility] on 1/21 with worsening agitation, AMS, culminating in a fall with HS and R eye lac. No sutures were needed for the lac. CTH/N and L shoulder benign. He was noted to be hypothermic and hyperkalemic on admission. TSH WNL, Folate WNL, Vitamin D was low. B12 WNL. Given gentle IVF, noted to be constipated. Wound care was consulted for R medial tibia venous ulcer, and Geriatrics was consulted who recommended melatonin, delirium precautions. Speech was consulted who recommended NPO initially, but he has since been advanced to an oral diet of Level 2/thin liquids. Neurology was also consulted as the agitation, behavior symptoms, hallucinations worsened since increasing his medications, they lowered his Sinemet and added rivastigmine. There was improvement in his behavioral symptoms. He no longer needed restraint/1:1 at time of discharge. He was [facility] the [facility] on 1/31.     Chief Complaint: Slept better last night.     Interval History/Subjective:  Did have a few issues last night with his bladder - is generally continent during the day, but overnight sometimes spills the urinal and has difficulty getting set-up with it. He has triggered the alarm, standing to go to the bathroom, with nursing in the room. He felt he did better last night. He denies any new CP, SOB, fevers, chills, N/V, abdominal pain. Last BM 2/2 - and per therapy getting PRNs with prune juice.     Functional Update:  PT: mod-maxA transfers, min-modA bed mobility, Ax2 ambulation 150', maxA stairs   OT: Sup eating, modA grooming, total A x2 bathing, maxA Ub dressing, total A LB dressing, maxA toileting, total A x2 toilet transfers   SLP: Full supervision for meals, modA comprehension, Yue expression, Sup social interaction, maxA executive function, maxA memory. Mild-mod oropharyngeal dysphagia (mostly oral) Level  2/thins.     Objective     Temp:  [97.5 °F (36.4 °C)-98.2 °F (36.8 °C)] 97.5 °F (36.4 °C)  HR:  [65-78] 69  Resp:  [16-18] 18  BP: (120-161)/(56-70) 143/65  SpO2:  [93 %-98 %] 93 %      Gen: No acute distress, Well-nourished, well-appearing.  HEENT: Moist mucus membranes, Normocephalic/Atraumatic  Cardiovascular: Regular rate, rhythm, S1/S2. Distal pulses palpable  Heme/Extr: LE venous stasis dermatitis, pedal edema stable/improved.   Pulmonary: Non-labored breathing. Lungs CTAB  : No whitley  GI: Soft, non-tender, non-distended. BS+   Integumentary: Skin is warm, dry.   Neuro: Awake, alert. Smiling and cooperative. Answering questions appropriately and interacting appropriately. Has tremor, bradykinesia, cogwheel rigidity, and impaired initiation. Festinating gait - that does improve with some cuing.   Psych: Normal mood and affect.     Physical examination is otherwise unchanged from previous encounter, except as noted above.    Scheduled Meds:  Current Facility-Administered Medications   Medication Dose Route Frequency Provider Last Rate    acetaminophen  650 mg Oral TID Chelsea Dasilva PA-C      amLODIPine  5 mg Oral Daily Chelsea Dasilva PA-C      Artificial Tears  1 drop Both Eyes Q4H PRN Chelsea Dasilva PA-C      bisacodyl  10 mg Rectal Daily PRN Ashley Depadua, MD      calcium carbonate  1,000 mg Oral Daily PRN Chelsea Dasilva PA-C      carbidopa-levodopa  1.5 tablet Oral 4x Daily Chelsea Dasilva PA-C      Cholecalciferol  1,000 Units Oral Daily Chelsea Dasilva PA-C      vitamin B-12  1,000 mcg Oral Once per day on Monday Wednesday Friday Chelsea Dasilva PA-C      docusate sodium  100 mg Oral BID Chelsea Dasilva PA-C      enoxaparin  40 mg Subcutaneous Daily Chelsea Dasilva PA-C      lactulose  20 g Oral Daily PRN Ashley Depadua, MD      lidocaine  2 patch Topical Daily Ashley Depadua, MD      melatonin  6 mg Oral QPM Ashley Depadua, MD      OLANZapine  2.5 mg  Intramuscular Q4H PRN Ashley Depadua, MD      ondansetron  4 mg Oral Q6H PRN Chelsea Dasilva PA-C      polyethylene glycol  17 g Oral Daily Ashley Depadua, MD      QUEtiapine  12.5 mg Oral Q8H PRN Chelsea Dasilva PA-C      rivastigmine  1 patch Transdermal Daily Chelsea Dasilva PA-C      senna  2 tablet Oral BID Chelsea Dasilva PA-C       Imaging: Reviewed, no new imaging       Lab Results: Reviewed, no new labs.   Results from last 7 days   Lab Units 02/03/25  0634   HEMOGLOBIN g/dL 11.5*   HEMATOCRIT % 35.5*   WBC Thousand/uL 5.96   PLATELETS Thousands/uL 230     Results from last 7 days   Lab Units 02/03/25  0634   BUN mg/dL 33*   SODIUM mmol/L 140   POTASSIUM mmol/L 4.8   CHLORIDE mmol/L 104   CREATININE mg/dL 1.05              0658}      ** Please Note: Fluency Direct voice to text software may have been used in the creation of this document. **

## 2025-02-05 NOTE — ASSESSMENT & PLAN NOTE
Home: amlodpine 5mg daily  Here: Same as home medication.  He did have some changes in BP with position changes 2/3 but was not orthostatic  Monitor for orthostasis due to parkinsons  BP has been running a bit on the higher side. Lasix being added back today.

## 2025-02-06 PROCEDURE — 97129 THER IVNTJ 1ST 15 MIN: CPT

## 2025-02-06 PROCEDURE — 97116 GAIT TRAINING THERAPY: CPT

## 2025-02-06 PROCEDURE — 97530 THERAPEUTIC ACTIVITIES: CPT

## 2025-02-06 PROCEDURE — 97535 SELF CARE MNGMENT TRAINING: CPT

## 2025-02-06 PROCEDURE — 97110 THERAPEUTIC EXERCISES: CPT

## 2025-02-06 PROCEDURE — 97112 NEUROMUSCULAR REEDUCATION: CPT

## 2025-02-06 PROCEDURE — 99232 SBSQ HOSP IP/OBS MODERATE 35: CPT | Performed by: PHYSICAL MEDICINE & REHABILITATION

## 2025-02-06 PROCEDURE — 99232 SBSQ HOSP IP/OBS MODERATE 35: CPT | Performed by: PHYSICIAN ASSISTANT

## 2025-02-06 PROCEDURE — 97130 THER IVNTJ EA ADDL 15 MIN: CPT

## 2025-02-06 PROCEDURE — 92526 ORAL FUNCTION THERAPY: CPT

## 2025-02-06 RX ADMIN — CARBIDOPA AND LEVODOPA 1.5 TABLET: 25; 100 TABLET ORAL at 13:05

## 2025-02-06 RX ADMIN — ACETAMINOPHEN 650 MG: 325 TABLET, FILM COATED ORAL at 09:03

## 2025-02-06 RX ADMIN — LIDOCAINE 5% 2 PATCH: 700 PATCH TOPICAL at 16:15

## 2025-02-06 RX ADMIN — ENOXAPARIN SODIUM 40 MG: 40 INJECTION SUBCUTANEOUS at 09:03

## 2025-02-06 RX ADMIN — AMLODIPINE BESYLATE 5 MG: 5 TABLET ORAL at 09:03

## 2025-02-06 RX ADMIN — FUROSEMIDE 20 MG: 20 TABLET ORAL at 09:03

## 2025-02-06 RX ADMIN — ACETAMINOPHEN 650 MG: 325 TABLET, FILM COATED ORAL at 16:14

## 2025-02-06 RX ADMIN — CARBIDOPA AND LEVODOPA 1.5 TABLET: 25; 100 TABLET ORAL at 16:15

## 2025-02-06 RX ADMIN — Medication 1000 UNITS: at 09:03

## 2025-02-06 RX ADMIN — DOCUSATE SODIUM 100 MG: 100 CAPSULE, LIQUID FILLED ORAL at 18:20

## 2025-02-06 RX ADMIN — CARBIDOPA AND LEVODOPA 1.5 TABLET: 25; 100 TABLET ORAL at 21:00

## 2025-02-06 RX ADMIN — CARBIDOPA AND LEVODOPA 1.5 TABLET: 25; 100 TABLET ORAL at 09:03

## 2025-02-06 RX ADMIN — ACETAMINOPHEN 650 MG: 325 TABLET, FILM COATED ORAL at 22:05

## 2025-02-06 RX ADMIN — SENNOSIDES 17.2 MG: 8.6 TABLET ORAL at 18:20

## 2025-02-06 RX ADMIN — SENNOSIDES 17.2 MG: 8.6 TABLET ORAL at 09:03

## 2025-02-06 RX ADMIN — Medication 6 MG: at 21:00

## 2025-02-06 RX ADMIN — RIVASTIGMINE 1 PATCH: 4.6 PATCH, EXTENDED RELEASE TRANSDERMAL at 09:26

## 2025-02-06 RX ADMIN — DOCUSATE SODIUM 100 MG: 100 CAPSULE, LIQUID FILLED ORAL at 09:03

## 2025-02-06 NOTE — ASSESSMENT & PLAN NOTE
Home: amlodpine 5mg daily  Here: Same as home medication.  He did have some changes in BP with position changes 2/3 but was not orthostatic  Monitor for orthostasis due to parkinsons  BP has been running a bit on the higher side. Lasix being added back 2/5/25

## 2025-02-06 NOTE — PROGRESS NOTES
02/06/25 0948   Pain Assessment   Pain Assessment Tool 0-10   Pain Score No Pain   Restrictions/Precautions   Precautions Aspiration;Bed/chair alarms;Cognitive;Fall Risk;Supervision on toilet/commode   Oral Hygiene   Type of Assistance Needed Physical assistance   Physical Assistance Level 25% or less   Comment MARIA DEL CARMEN  in stance a sink. Mild postural swaying noted. No overt LOB at this time. No reports of dizziness at this time with prolonged standing   Oral Hygiene CARE Score 3   Shower/Bathe Self   Type of Assistance Needed Physical assistance   Physical Assistance Level 26%-50%   Comment progressed to full shower today. sitting on tub bench pt able to compelte UB bathing arms chest in sitting along with upper thighs. in stance with grab abr support and CGA pt able to complete perineal bathing. slight assist for buttocks thoroughness.   Shower/Bathe Self CARE Score 3   Bathing   Assessed Bath Style Shower   Tub/Shower Transfer   Findings MIN A lateral side step to shower over small simulated ledge with grab bars   Upper Body Dressing   Type of Assistance Needed Physical assistance   Physical Assistance Level 51%-75%   Comment MARIA DEL CARMEN  in stance today to challenge balance training. req assistance to progress task. noted with freezing throughout. non stretch cooton materail for button up made dressing difficult for him. pt may benefit from alternate materials to inc ability to don overhead   Upper Body Dressing CARE Score 2   Lower Body Dressing   Type of Assistance Needed Physical assistance   Physical Assistance Level 51%-75%   Comment focus on forward reach to feet to inc reachign range. porr abilty to coordinate forward reach with elevation of LE to place into pants. assist for threading. in stance dec abilty to don clothing all the way up in the back with tighter fitting garmets.   Lower Body Dressing CARE Score 2   Putting On/Taking Off Footwear   Type of Assistance Needed Physical assistance   Physical  Assistance Level 76% or more   Comment With extended time and guarding pt able to doff socks with extended forward reach. assist for socks at this time 2* dampness following shower. today pt able to place toes into shoes but unable to manipulate back and closures.   Putting On/Taking Off Footwear CARE Score 2   Sit to Stand   Type of Assistance Needed Incidental touching   Sit to Stand CARE Score 4   Bed-Chair Transfer   Type of Assistance Needed Physical assistance   Physical Assistance Level 25% or less   Comment HHA for guidance and gisela   Chair/Bed-to-Chair Transfer CARE Score 3   Toileting Hygiene   Type of Assistance Needed Physical assistance   Physical Assistance Level 26%-50%   Comment CGA in stance with mild postural swaying. Encouraged hand hold on grab bar, but pt declines. Completed standing urination. Slight assidt for clothing over hips   Toileting Hygiene CARE Score 3   Assessment   Treatment Assessment Pt participated in skilled OT session focusing on functional ADL retraining, activity tolerance, activity modification, use of DME, and functional transfers. See above for details on ADL function. Pt continues to demonstrate the need for direct supervision with dec safety and insight. Overall poor dynamic reaching to LB. Overall does well in the shower with grab bar support. Pt is currently limited by the following deficits: Higher level cognitive functions  (Judgment, executive functions, praxis, cognitive flexibility, insight), Attention, Memory , Impaired safety awareness, Impaired standing balance, Impaired righting reactions, impaired motor planning, impaired Gross motor control, and impaired Fine motor control. Pt continues to require skilled acute rehab OT services to increase overall functional independence and safety w/ I/ADLs and functional transfers. Continued plan of care for OT sessions to focus on the following areas:  ADL Retraining , LB Dressing,  LHAE education/training, Functional  Transfers, Standing tolerance, Standing balance , Gross motor coordination, DME training/education, Family training/education, Energy conservation training/education, healthy coping education, and Leisure and social pursuits. Following OT sessions to cont to focus on: basic functional transfers , ADL full shower, standing balance activities , and Roller walker safety, seated dynamic reaching, cont training on AE.   Prognosis Fair   OT Therapy Minutes   OT Time In 0930   OT Time Out 1100   OT Total Time (minutes) 90   OT Mode of treatment - Individual (minutes) 90   OT Mode of treatment - Concurrent (minutes) 0   OT Mode of treatment - Group (minutes) 0   OT Mode of treatment - Co-treat (minutes) 0   OT Mode of Treatment - Total time(minutes) 90 minutes   OT Cumulative Minutes 470

## 2025-02-06 NOTE — ASSESSMENT & PLAN NOTE
- Bowel/bladder incontinence on admission  - Bowel: continence has improved. Was also constipated. On colace, senna 2 tabs and daily miralax.    - Has Prn suppository or lactulose   - Last BM 2/5 - incontinent and loose after getting lactulose.    - Monitor and adjust as appropriate  - Bladder: PVR x3 on admission are low   - UA during acute hospitalization not suspicious for infection   - Timed Voids.    - Improving. 2/3 largely continent of bladder.

## 2025-02-06 NOTE — PROGRESS NOTES
"   02/06/25 1230   Pain Assessment   Pain Assessment Tool 0-10   Pain Score No Pain   Restrictions/Precautions   Precautions Aspiration;Bed/chair alarms;Cognitive;Fall Risk;Impulsive;Supervision on toilet/commode   Comprehension   Comprehension (FIM) 3 - Understands basic info/conversation 50-74% of time   Expression   Expression (FIM) 4 - Expresses basic info/needs 75-90% of time   Social Interaction   Social Interaction (FIM) 5 - Interacts appropriately with others 90% of time   Problem Solving   Problem solving (FIM) 2 - Needs direction more than ½ time to initiate, plan or complete simple tasks   Memory   Memory (FIM) 2 - Recognizes, recalls/performs 25-49%   Speech/Language/Cognition Assessmetn   Treatment Assessment In addition to dysphagia tx session, pt engaged in cognitive tx session, where pt did appropriately verbalize to SLP the need to urinate. However, pt was mildly impulsive given this, attempting to stand up w/o SLP being ready w/ RW for stance and setup w/ urinal. Once ready, pt was provided verbal cues to standup to where pt did need min A.Once standing, pt did require verbal cues to pul down pants, but it was noted that holding urinal was not appropriate as pt was beginning to be retropulsive, needing SLP to provide verbal cues for \"nose over toes\" to ensure pt was standing fully upright. Pt was able to identify being done but now pt needing increased assist w/ donning underwear and pants due to retropulsive stance. Still needing verbal cues for hand placement onto arm rests of recliner to sit down.     Otherwise, SLP engaging pt in completing a concrete convergent categorization task by providing pt w/ 4 words and pt was to determine the overall way the words were similar. Pt was able to name 9/12 categories, to where pt did need moderate phrase cues to determine the way the words were similar increasing to 12/12 accuracy. The next task pt was engaged in was a verbal drawing conclusion task where " pt was to predict situations from results. It was noted that pt was mildly echolalic in responses, noting decreased mental flexibility. However, pt was able to ID 3/8 items, needing moderate probing questions for the remaining items, to increase to 6/8 accuracy. At this time, pt will benefit from skilled SLP services targeting functional cognitive skills in hopes for decreasing the potential for caregiver burden over time.    Eating   Type of Assistance Needed Set-up / clean-up;Supervision;Verbal cues   Physical Assistance Level No physical assistance   Eating CARE Score 4   Swallow Assessment   Swallow Treatment Assessment Daily Dysphagia Tx Note     Patient Name: Daniel Sanz Jr.    Today's Date: 2/6/2025      Current Risks for Dysphagia & Aspiration: General debilitation; Cognitive deficit; Mental status change; Behavior issues; Hx neurologic dx (Parkinson's); Positioning issues      Current Symptoms/Concerns: Coughs w/ liquids; Cough on own secretions; Difficulty chewing; Holding food in mouth     Current diet:soft/level 3 diet and thin liquids      Premorbid diet::soft/level 3 diet, regular diet, and thin liquids      Positioning: upright in recliner    Items administered:Consistencies Administered: thin liquids, puree, and soft solids  Materials administered included: crab cakes, chopped carrots, mashed potatoes, magic cup, thins by cup/straw    Total amount of meal consumed:   100% of meal and 360cc of thins      Oral stage:mild-moderate  Lip closure: still functional around utensils, cup and straw   Anterior spillage: none  Mastication: still slower and pt continues to need ongoing verbal cues to continue to chew   Bolus formation: still decreased   Bolus control: likely decreased w/ larger sips of thins   Transfer: delayed, noting piecemeal transfers  Oral residue: mild but does clear w/ increased time or use of liquid wash  Pocketing: none         Pharyngeal stage:mild  Swallow promptness: delayed more  w/ food items (soft/purees), slightly prompter w/ thins  Hyolaryngeal elevation: present but still decreased when palpated  Wet voice: noted x5 throughout meal, slightly increased w/ magic cup today  Throat clear: pt eliciting spontaneous throat clears after wet voicing  Cough: x1 throughout meal today  Secondary swallows: present w/ softer solids due to piecemeal transfers  Audible swallows: none       Esophageal stage:No overt sxs observed across meal today        Summary:     Pt presenting with mild-moderate oral and mild pharyngeal dysphagia today. Symptoms or concerns included slower mastication and manipulation given softer solids to where pt continues to present multiple boluses at a time to where ongoing verbal cues needed to stop and swallow the increased bolus amounts presented. Also pt continues to stop chewing when collecting next bolus to where SLP continuing to provide verbal cues to continue to chew foods. Due to this, pt does have decreased bolus formulation but w/ prompting, able to clear w/ use of increased time or use of liquid wash. Transfers overall remain piecemeal which does lead delayed transfers of boluses as well. Of note, when pt would take single sips, still delayed in transfer but suspected better control vs larger consecutive sips. Swallow initiation remains to be delayed and HLE is present but decreased when elicited. Pt does have double swallows w/ foods due to piecemeal transfers. No audible swallows today. Of note, pt did have wet voicing episodes to where pt did elicit spontaneous throat clears to eliminate w/ success. Only 1 cough elicited throughout meal today. However post meal and when engaging in cognitive tx session, voicing remained to be wet but still pt able to spontaneously throat clear to eliminate.          Recommendations:  Diet: soft/level 3 diet and thin liquids  Meds: whole with puree  Strategies:  upright posture, only feed when fully alert, slow rate of feeding,  small bites/sips, cough when demonstrating wet voicing, quiet environment (tv off, limit talking, door closed, etc.), alternating bites and sips, and OOB for meals preferred      FULL supervision w/ meals. Will need setup AND ongoing verbal cues for pacing throughout meal.   Results reviewed with:  patient, RN Jany  Aspiration precautions posted.     F/u ST tx: Pt is currently recommended for further skilled SLP services targeting dysphagia therapy in order to maximize oral and pharyngeal swallow skills, while safely supporting PO intake, as well as to improve independent carryover of safe swallow strategies.       Plan: Monitor diet advancement to level 3 diet w/ thins            Trial solids with SLP supervision only            Ongoing family education/training w/ spouse--> will need increased education on cues for pt throughout meal            Consider VFSS later this week into early next week pending overall progress   Swallow Assessment Prognosis   Prognosis Fair   Prognosis Considerations Age;Co-morbidities;Family/community support;Medical diagnosis;Medical prognosis;Previous level of function;Severity of impairments;New learning ability;Ability to carry over   SLP Therapy Minutes   SLP Time In 1230   SLP Time Out 1330   SLP Total Time (minutes) 60   SLP Mode of treatment - Individual (minutes) 60   SLP Mode of treatment - Concurrent (minutes) 0   SLP Mode of treatment - Group (minutes) 0   SLP Mode of treatment - Co-treat (minutes) 0   SLP Mode of Treatment - Total time(minutes) 60 minutes   SLP Cumulative Minutes 335   Therapy Time missed   Time missed? No

## 2025-02-06 NOTE — PROGRESS NOTES
"Progress Note - PMR   Name: Daniel Sanz Jr. 84 y.o. male I MRN: [id number]  Unit/Bed#: -01 I Date of Admission: 1/31/2025   Date of Service: 2/6/2025 I Hospital Day: 6     Assessment & Plan  Parkinson's disease (HCC)  -AMS likely sequela of progressive parkinsons dementia plus possible adverse reaction to recent sinemet increase  -cont neuro adjusted recs for sinemet 1.5 QID  -cont exelon patch   - PT/OT/SLP 3-5 hours/day, 5-7 days/week.  - Will need f/u with Neurology after discharge.   Dysphagia  Patient observed coughing while taking medications by nursing at acute   Advanced to Level 3/thins on 2/4  Aspiration precautions   Timing or need for VBS as per SLP.   SLP dysphagia eval and treat  Altered mental status  Patient's wife endorsing recent abnormal behaviors prior to hospitalization.  Patient waking frequently at night, hallucinations noted when awaking from sleep, patient noted to be more \"nasty\" and agitated with at times garbled speech which clears.  Presents to ED after fall at home with concern for possible UTI. S/p head strike in ED w/ R eyebrow laceration.  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 with mild acute encephalopathy  CT head w/o acute findings  TSH benign   Folate WNL, B12 normal, vitamin D low at 26.9  Wife notes B12/vitamin D supplementation 3 times weekly PTA  Continue to q daily dosing of vit D  B12 3x weekly  Negative infxn work-up  Sinemet as ordered by neurology (below)  Acetaminophen 650mg TID    Patient oriented, and has some decent recall. Impaired insight, and he sundowns.   Previous MOCA - 7  Current SLUMS - 8   - Low dose seroquel PRN ordered by Neuro  - Added PRN olanzapine for severe agitation ONLY.    - Did receive this on 2/3 in the AM after some aggressive behaviors during blood draw/BP.  - Redirect, reorient first  - Low dose melatonin in the evening for sleep/wake.  - Bed/chair alarms  - Optimize bowel/bladder program  - Avoid deliriogenic meds and physical " restraint.  Bowel and bladder incontinence  - Bowel/bladder incontinence on admission  - Bowel: continence has improved. Was also constipated. On colace, senna 2 tabs and daily miralax.    - Has Prn suppository or lactulose   - Last BM 2/5 - incontinent and loose after getting lactulose.    - Monitor and adjust as appropriate  - Bladder: PVR x3 on admission are low   - UA during acute hospitalization not suspicious for infection   - Timed Voids.    - Improving. 2/3 largely continent of bladder.   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 (HCC)  Chronic right lower extremity venous ulcers. Largely healed and wound care signed off.   PTA Lasix for LE edema management; cont at [facility]  Known to [facility] podiatry/A   Wound care sami and treat  Offload heels  ACE wraps to b/l LEs  Chronic pain of both shoulders  Reports both shoulders.  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   Non-surgically managed. Last seen by Ortho 2019.   Tylenol scheduled.  Lidoderm patches    - This has helped.   Topicals for now. Monitor.   At risk for venous thromboembolism (VTE)  Lovenox, SCDs, ambulation  Will not need lovenox at discharge   Vitamin D insufficiency  Continue daily cholecalciferol supplementation.   HTN (hypertension)  Home: Amlodipine 5mg daily, Lasix 40mg daily, Losartan 50mg daily (unclear how long he was taking). Here: Amlodipine 5mg daily.   Monitor and adjust as appropriate     Health Maintenance  #Skin/Pressure Injury Prevention: Turn Q2hr in bed, with weight shifts [id number] in wheelchair.  #GI Prophylaxis: Not indicated   #Code Status: Full Code  #FEN: Level 2/Thins, Magic Cup [facility] at lunch, Angel at dinner, and EP HP angel at Breakfast  #Dispo: Team 2/4: ADD 2/14 with Home PT/OT/SLP  with goals to discharge home at Sup-Yue level of function. Ambulation vs. Wheelchair to be determined while here.   Will need outpatient f/u with Neuro      To Review: Mr. Sanz is an 83yo M with medical history  of Parkinson's Disease with dementia (MOCA 7/30 in March 2024), HTN, LE venous stasis dermatitis and edema, GERD who presented to [facility] on 1/21 with worsening agitation, AMS, culminating in a fall with HS and R eye lac. No sutures were needed for the lac. CTH/N and L shoulder benign. He was noted to be hypothermic and hyperkalemic on admission. TSH WNL, Folate WNL, Vitamin D was low. B12 WNL. Given gentle IVF, noted to be constipated. Wound care was consulted for R medial tibia venous ulcer, and Geriatrics was consulted who recommended melatonin, delirium precautions. Speech was consulted who recommended NPO initially, but he has since been advanced to an oral diet of Level 2/thin liquids. Neurology was also consulted as the agitation, behavior symptoms, hallucinations worsened since increasing his medications, they lowered his Sinemet and added rivastigmine. There was improvement in his behavioral symptoms. He no longer needed restraint/1:1 at time of discharge. He was admitted to the [facility] on 1/31.     Chief Complaint: No new concerns.     Interval History/Subjective:  No acute events overnight. Had a loose BM after getting lactulose. He denies any new CP, SOB, fevers, chills, N/V, abdominal pain. Continent largely of bladder.     Functional Update:  PT: mod-maxA transfers, min-modA bed mobility, Ax2 ambulation 150', maxA stairs   OT: Sup eating, modA grooming, total A x2 bathing, maxA Ub dressing, total A LB dressing, maxA toileting, total A x2 toilet transfers   SLP: Full supervision for meals, modA comprehension, Yue expression, Sup social interaction, maxA executive function, maxA memory. Mild-mod oropharyngeal dysphagia (mostly oral) Level 2/thins.     Objective     Temp:  [97.7 °F (36.5 °C)-98 °F (36.7 °C)] 97.7 °F (36.5 °C)  HR:  [65-77] 65  Resp:  [17-20] 20  BP: (131-169)/(61-75) 169/75  SpO2:  [97 %-98 %] 97 %    Gen: No acute distress, Well-nourished, well-appearing.  HEENT: Moist mucus membranes,  Normocephalic/Atraumatic  Cardiovascular: Regular rate, rhythm, S1/S2. Distal pulses palpable  Heme/Extr: BL venous stasis changes and stable bilateral pedal edema.   Pulmonary: Non-labored breathing. Lungs CTAB  : No whitley  GI: Soft, non-tender, non-distended. BS+  Integumentary: Skin is warm, dry.   Neuro: AAOx3, Appropriate to questioning. Stable cogwheeling rigidity, tremor, and bradykinesia.   Psych: Normal mood and affect.     Physical examination is otherwise unchanged from previous encounter, except as noted above.    Scheduled Meds:  Current Facility-Administered Medications   Medication Dose Route Frequency Provider Last Rate    acetaminophen  650 mg Oral TID Chelsea Dasilva PA-C      amLODIPine  5 mg Oral Daily Chelsea Dasilva PA-C      Artificial Tears  1 drop Both Eyes Q4H PRN Chelsea Dasilva PA-C      bisacodyl  10 mg Rectal Daily PRN Ashley Depadua, MD      calcium carbonate  1,000 mg Oral Daily PRN Chelsea Dasilva PA-C      carbidopa-levodopa  1.5 tablet Oral 4x Daily Chelsea Dasilva PA-C      Cholecalciferol  1,000 Units Oral Daily Chelsea Dasilva PA-C      vitamin B-12  1,000 mcg Oral Once per day on Monday Wednesday Friday Chelsea Dasilva PA-C      docusate sodium  100 mg Oral BID Chelsea Dasilva PA-C      enoxaparin  40 mg Subcutaneous Daily Chelsea Dasilva PA-C      furosemide  20 mg Oral Daily Ciara Calderon PA-C      lactulose  20 g Oral Daily PRN Ashley Depadua, MD      lidocaine  2 patch Topical Daily Ashley Depadua, MD      melatonin  6 mg Oral QPM Ashley Depadua, MD      OLANZapine  2.5 mg Intramuscular Q4H PRN Ashley Depadua, MD      ondansetron  4 mg Oral Q6H PRN Chelsea Dasilva PA-C      polyethylene glycol  17 g Oral Daily Ashley Depadua, MD      QUEtiapine  12.5 mg Oral Q8H PRN Chelsea Dasilva PA-C      rivastigmine  1 patch Transdermal Daily Chelsea Dasilva PA-C      senna  2 tablet Oral BID Chelsea Dasilva PA-C        Imaging: Reviewed, no new imaging       Lab Results: Reviewed, no new labs.  Results from last 7 days   Lab Units 02/03/25  0634   HEMOGLOBIN g/dL 11.5*   HEMATOCRIT % 35.5*   WBC Thousand/uL 5.96   PLATELETS Thousands/uL 230     Results from last 7 days   Lab Units 02/03/25  0634   BUN mg/dL 33*   SODIUM mmol/L 140   POTASSIUM mmol/L 4.8   CHLORIDE mmol/L 104   CREATININE mg/dL 1.05              0658}      ** Please Note: Fluency Direct voice to text software may have been used in the creation of this document. **

## 2025-02-06 NOTE — PROGRESS NOTES
02/06/25 1400   Pain Assessment   Pain Assessment Tool 0-10   Pain Rating: FLACC (Rest) - Face 0   Pain Rating: FLACC (Rest) - Legs 0   Pain Rating: FLACC (Rest) - Activity 0   Pain Rating: FLACC (Rest) - Cry 0   Pain Rating: FLACC (Rest) - Consolability 0   Score: FLACC (Rest) 0   Pain Rating: FLACC (Activity) - Face 0   Pain Rating: FLACC (Activity) - Legs 0   Pain Rating: FLACC (Activity) - Activity 0   Pain Rating: FLACC (Activity) - Cry 0   Pain Rating: FLACC (Activity) - Consolability 0   Score: FLACC (Activity) 0   Restrictions/Precautions   Precautions Aspiration;Bed/chair alarms;Cognitive;Fall Risk;Impulsive;Supervision on toilet/commode   Cognition   Overall Cognitive Status Impaired   Arousal/Participation Alert;Cooperative   Subjective   Subjective pt in recliner reading the newspaper but agreeable to PT   Roll Left and Right   Comment (S)  will reassess next tx   Sit to Lying   Comment (S)  will reassess next tx   Lying to Sitting on Side of Bed   Comment (S)  will reassess next tx   Sit to Stand   Type of Assistance Needed Verbal cues;Incidental touching;Adaptive equipment   Comment Cg-CS with RW, VC for hand placement usually to reach back for chair prior to sitting down about 50% of the time   Sit to Stand CARE Score 4   Bed-Chair Transfer   Type of Assistance Needed Physical assistance;Verbal cues;Adaptive equipment   Physical Assistance Level 25% or less   Comment repeated w/c <> chair SPT with RW requiring CG-min A with VC for sequencing and walker management about 75% of the time, at times demo'd slight post LOB due to festination   Chair/Bed-to-Chair Transfer CARE Score 3   Walk 10 Feet   Type of Assistance Needed Incidental touching;Verbal cues;Adaptive equipment   Comment RW   Walk 10 Feet CARE Score 4   Walk 50 Feet with Two Turns   Type of Assistance Needed Incidental touching;Verbal cues;Adaptive equipment   Comment RW x 50' or SPC on L hand   Walk 50 Feet with Two Turns CARE Score 4    Walk 150 Feet   Type of Assistance Needed Physical assistance;Verbal cues;Adaptive equipment   Physical Assistance Level 25% or less   Comment demo's intermittent R path deviations when using SPC. demo's increase festination while turning. at times slightly tip RW to the side but no overt LOB. step through pattern and foot clearaance are better when provided overpressure on the RW vs verbal cues to take big steps or clear / feet t   Walk 150 Feet CARE Score 3   Walking 10 Feet on Uneven Surfaces   Comment (S)  will reassess next tx   Picking Up Object   Comment (S)  will reassess next tx - will recommend a reacher to optimize safety despite pt able to bend down and  object without using an AD   Toilet Transfer   Type of Assistance Needed Physical assistance;Verbal cues;Adaptive equipment   Physical Assistance Level 25% or less   Comment BSC over toilet with + bladder mov't   Toilet Transfer CARE Score 3   Therapeutic Interventions   Neuromuscular Re-Education ball kicking with RW support with reps till fatigue, alt foot taps with L HHA x 10 reps x 1 then progress task to looking straight ahead but pt unable to sustained fwd gaze x 10 reps, repeated cone weaving using RW   Equipment Use   NuStep lvl 2 x 12 min, 15 seconds to finish lap 1 using all extremities, SPM 30-65   Assessment   Treatment Assessment Focused functional mobility training this session using a RW to promote carry over of sequencing and proper hand placement to optimize safety upon d/c. Pt does not effectively use personal SPC with noted R side path deviation,impaired sequencing and placement leading to pt licking SPC at times which increases his risk to trip over them so at this time PT will recommend RW for all mobilities upon d/c. Demo'd improve carry over in using hands to push up from chairs but still requires inc verbal cues to reach back prior to sitting down. He continues to demo inc festinating gait with turns and during  lateral transfers inc risk for pt to have post LOB if unable to control festination timely. He also easily gets distracted carter when walking in the hallway requiring redirection cues to attend with task. cont PT POC using RW for functional activity training while NMR/NPP training HHA vs no AD as needed.   Family/Caregiver Present no   Barriers to Discharge Inaccessible home environment;Decreased caregiver support   PT Barriers   Functional Limitation Stair negotiation;Car transfers;Transfers;Walking;Standing;Ramp negotiation   Plan   Treatment/Interventions Functional transfer training;LE strengthening/ROM;Therapeutic exercise;Endurance training;Gait training;Spoke to nursing;OT;Spoke to advanced practitioner;Spoke to case management;Spoke to MD   PT Therapy Minutes   PT Time In 1400   PT Time Out 1515   PT Total Time (minutes) 75   PT Mode of treatment - Individual (minutes) 60   PT Mode of treatment - Concurrent (minutes) 15   PT Mode of treatment - Group (minutes) 0   PT Mode of treatment - Co-treat (minutes) 0   PT Mode of Treatment - Total time(minutes) 75 minutes   PT Cumulative Minutes 435   Therapy Time missed   Time missed? No

## 2025-02-06 NOTE — ASSESSMENT & PLAN NOTE
Continue modified diet, upgraded to level 3 with thin liquids. Per speech- still needs frequent reminders to swallow so he needs supervision for all meals  SLP to follow.

## 2025-02-06 NOTE — PROGRESS NOTES
Progress Note - Hospitalist   Name: Daniel Sanz Jr. 84 y.o. male I MRN: [id number]  Unit/Bed#: -01 I Date of Admission: 1/31/2025   Date of Service: 2/6/2025 I Hospital Day: 6    Assessment & Plan  Parkinson's disease (HCC)  Continue Sinemet 1.5 tabs 4x daily. Currently ordered at times pt takes the medication at home. May need to adjust timing based on therapy schedule.  Follows with Dr. Cavanaugh as an outpt.  Therapy per PMR.  HTN (hypertension)  Home: amlodpine 5mg daily  Here: Same as home medication.  He did have some changes in BP with position changes 2/3 but was not orthostatic  Monitor for orthostasis due to parkinsons  BP has been running a bit on the higher side. Lasix being added back 2/5/25  Altered mental status  Likely due to Parkinson's dementia.  Continue rivastigmine patch - pt's wife very concerned about the cost. Price check was $10.  Pt agitated 2/3 during AM vitals. He did receive IM Zyprexa. Not agitated currently  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 (HCC)  Follows with Podiatry and wound care as an outpt.  He was taking Lasix 3x weekly.  It is has not been ordered here  Patient's weight up several pounds today and on exam has barrie LE edema.   Currently on daily Lasix with plans to readdress and possibly return back to his 3x weekly dosing when edema improved and weight better  Dysphagia  Continue modified diet, upgraded to level 3 with thin liquids. Per speech- still needs frequent reminders to swallow so he needs supervision for all meals  SLP to follow.  Chronic pain of both shoulders  Per wife, had at home as well  Continue lidocaine patches    The above assessment and plan was reviewed and updated as determined by my evaluation of the patient on 2/6/2025.    History of Present Illness   Patient seen and examined. Patients overnight issues or events were reviewed with nursing staff. New or overnight issues include the following:     Pt seen in OT. He denies  any current complaints.    A 10 point review of systems was negative except for what is noted in the HPI.    Objective :  Temp:  [97.7 °F (36.5 °C)-98 °F (36.7 °C)] 97.7 °F (36.5 °C)  HR:  [65-77] 73  BP: (122-169)/(59-75) 122/59  Resp:  [17-20] 20  SpO2:  [97 %-98 %] 97 %  O2 Device: None (Room air)    Invasive Devices       None                   Physical Exam  General Appearance: NAD; pleasant  HEENT: PERRLA, conjuctiva normal; mucous membranes moist; face symmetrical  Neck:  Supple  Lungs: clear bilaterally, normal respiratory effort, no retractions, expiratory effort normal, on room air  CV: regular rate and rhythm, no murmurs rubs or gallops noted   ABD: soft non tender, +BS x4  EXT: DP pulses intact, no lymphadenopathy, no edema  Skin: normal turgor, normal texture, no rash  Psych: affect normal, mood normal  Neuro: Awake and alert. +tremor  The above physical exam was reviewed and updated as determined by my evaluation of the patient on 2/6/2025.      Lab Results: I have reviewed the following results:  Results from last 7 days   Lab Units 02/03/25  0634   WBC Thousand/uL 5.96   HEMOGLOBIN g/dL 11.5*   HEMATOCRIT % 35.5*   PLATELETS Thousands/uL 230     Results from last 7 days   Lab Units 02/03/25  0634   SODIUM mmol/L 140   POTASSIUM mmol/L 4.8   CHLORIDE mmol/L 104   CO2 mmol/L 27   BUN mg/dL 33*   CREATININE mg/dL 1.05   CALCIUM mg/dL 9.1             Results from last 7 days   Lab Units 02/03/25  0745   POC GLUCOSE mg/dl 80       Imaging Results Review: No pertinent imaging studies reviewed.  Other Study Results Review: No additional pertinent studies reviewed.    Review of Scheduled Meds: Medications  reviewed and reconciled as needed  Current Facility-Administered Medications   Medication Dose Route Frequency Provider Last Rate    acetaminophen  650 mg Oral TID Chelsea Dasilva PA-C      amLODIPine  5 mg Oral Daily Chelsea Dasilva PA-C      Artificial Tears  1 drop Both Eyes Q4H PRN Chelsea  SURAJ Dasilva      bisacodyl  10 mg Rectal Daily PRN Ashley Depadua, MD      calcium carbonate  1,000 mg Oral Daily PRN Chelsea Dasilva PA-C      carbidopa-levodopa  1.5 tablet Oral 4x Daily Chelsea Dasilva PA-C      Cholecalciferol  1,000 Units Oral Daily Chelsea Dasilva PA-C      vitamin B-12  1,000 mcg Oral Once per day on Monday Wednesday Friday Chelsea Dasilva PA-C      docusate sodium  100 mg Oral BID Chelsea Dasilva PA-C      enoxaparin  40 mg Subcutaneous Daily Chelsea Dasilva PA-C      furosemide  20 mg Oral Daily Ciara Calderon PA-C      lactulose  20 g Oral Daily PRN Ashley Depadua, MD      lidocaine  2 patch Topical Daily Ashley Depadua, MD      melatonin  6 mg Oral QPM Ashley Depadua, MD      OLANZapine  2.5 mg Intramuscular Q4H PRN Ashley Depadua, MD      ondansetron  4 mg Oral Q6H PRN Chelsea Dasilva PA-C      polyethylene glycol  17 g Oral Daily Ashley Depadua, MD      QUEtiapine  12.5 mg Oral Q8H PRN Chelsea Dasilva PA-C      rivastigmine  1 patch Transdermal Daily Chelsea Dasilva PA-C      senna  2 tablet Oral BID Chelsea Dasilva PA-C         VTE Pharmacologic Prophylaxis: Lovenox  Code Status: Level 1 - Full Code  Current Length of Stay: 6 day(s)    Administrative Statements   I have spent a total time of 35 minutes in caring for this patient on the day of the visit/encounter including Prognosis, Risks and benefits of tx options, Instructions for management, Patient and family education, Importance of tx compliance, Risk factor reductions, Impressions, Counseling / Coordination of care, Documenting in the medical record, Reviewing / ordering tests, medicine, procedures  , Obtaining or reviewing history  , and Communicating with other healthcare professionals .  ** Please Note:  voice to text software may have been used in the creation of this document. Although proof errors in transcription or interpretation are a potential of such  software**

## 2025-02-06 NOTE — ASSESSMENT & PLAN NOTE
Follows with Podiatry and wound care as an outpt.  He was taking Lasix 3x weekly.  It is has not been ordered here  Patient's weight up several pounds today and on exam has barrie LE edema.   Currently on daily Lasix with plans to readdress and possibly return back to his 3x weekly dosing when edema improved and weight better

## 2025-02-06 NOTE — ASSESSMENT & PLAN NOTE
Chronic right lower extremity venous ulcers. Largely healed and wound care signed off.   PTA Lasix for LE edema management; cont at [facility]  Known to [facility] podiatry/[facility]   Wound care sami and treat  Offload heels  ACE wraps to b/l LEs

## 2025-02-07 LAB
ANION GAP SERPL CALCULATED.3IONS-SCNC: 6 MMOL/L (ref 4–13)
BUN SERPL-MCNC: 39 MG/DL (ref 5–25)
CALCIUM SERPL-MCNC: 9.3 MG/DL (ref 8.4–10.2)
CHLORIDE SERPL-SCNC: 105 MMOL/L (ref 96–108)
CO2 SERPL-SCNC: 27 MMOL/L (ref 21–32)
CREAT SERPL-MCNC: 1.21 MG/DL (ref 0.6–1.3)
GFR SERPL CREATININE-BSD FRML MDRD: 55 ML/MIN/1.73SQ M
GLUCOSE SERPL-MCNC: 85 MG/DL (ref 65–140)
POTASSIUM SERPL-SCNC: 4.8 MMOL/L (ref 3.5–5.3)
SODIUM SERPL-SCNC: 138 MMOL/L (ref 135–147)

## 2025-02-07 PROCEDURE — 97530 THERAPEUTIC ACTIVITIES: CPT

## 2025-02-07 PROCEDURE — 99232 SBSQ HOSP IP/OBS MODERATE 35: CPT | Performed by: PHYSICIAN ASSISTANT

## 2025-02-07 PROCEDURE — 99233 SBSQ HOSP IP/OBS HIGH 50: CPT | Performed by: PHYSICAL MEDICINE & REHABILITATION

## 2025-02-07 PROCEDURE — 80048 BASIC METABOLIC PNL TOTAL CA: CPT | Performed by: PHYSICIAN ASSISTANT

## 2025-02-07 PROCEDURE — 92526 ORAL FUNCTION THERAPY: CPT

## 2025-02-07 PROCEDURE — 97535 SELF CARE MNGMENT TRAINING: CPT

## 2025-02-07 PROCEDURE — 97110 THERAPEUTIC EXERCISES: CPT

## 2025-02-07 PROCEDURE — 97116 GAIT TRAINING THERAPY: CPT

## 2025-02-07 RX ADMIN — CYANOCOBALAMIN TAB 500 MCG 1000 MCG: 500 TAB at 08:10

## 2025-02-07 RX ADMIN — LIDOCAINE 5% 2 PATCH: 700 PATCH TOPICAL at 16:41

## 2025-02-07 RX ADMIN — CARBIDOPA AND LEVODOPA 1.5 TABLET: 25; 100 TABLET ORAL at 16:40

## 2025-02-07 RX ADMIN — ACETAMINOPHEN 650 MG: 325 TABLET, FILM COATED ORAL at 16:40

## 2025-02-07 RX ADMIN — ACETAMINOPHEN 650 MG: 325 TABLET, FILM COATED ORAL at 21:42

## 2025-02-07 RX ADMIN — CARBIDOPA AND LEVODOPA 1.5 TABLET: 25; 100 TABLET ORAL at 13:17

## 2025-02-07 RX ADMIN — AMLODIPINE BESYLATE 5 MG: 5 TABLET ORAL at 08:10

## 2025-02-07 RX ADMIN — DOCUSATE SODIUM 100 MG: 100 CAPSULE, LIQUID FILLED ORAL at 17:51

## 2025-02-07 RX ADMIN — Medication 1000 UNITS: at 08:10

## 2025-02-07 RX ADMIN — ACETAMINOPHEN 650 MG: 325 TABLET, FILM COATED ORAL at 08:10

## 2025-02-07 RX ADMIN — ENOXAPARIN SODIUM 40 MG: 40 INJECTION SUBCUTANEOUS at 08:10

## 2025-02-07 RX ADMIN — SENNOSIDES 17.2 MG: 8.6 TABLET ORAL at 08:10

## 2025-02-07 RX ADMIN — CARBIDOPA AND LEVODOPA 1.5 TABLET: 25; 100 TABLET ORAL at 08:10

## 2025-02-07 RX ADMIN — CARBIDOPA AND LEVODOPA 1.5 TABLET: 25; 100 TABLET ORAL at 21:42

## 2025-02-07 RX ADMIN — SENNOSIDES 17.2 MG: 8.6 TABLET ORAL at 17:51

## 2025-02-07 RX ADMIN — POLYETHYLENE GLYCOL 3350 17 G: 17 POWDER, FOR SOLUTION ORAL at 08:10

## 2025-02-07 RX ADMIN — RIVASTIGMINE 1 PATCH: 4.6 PATCH, EXTENDED RELEASE TRANSDERMAL at 08:10

## 2025-02-07 RX ADMIN — DOCUSATE SODIUM 100 MG: 100 CAPSULE, LIQUID FILLED ORAL at 08:10

## 2025-02-07 RX ADMIN — Medication 6 MG: at 21:42

## 2025-02-07 RX ADMIN — FUROSEMIDE 20 MG: 20 TABLET ORAL at 08:10

## 2025-02-07 RX ADMIN — DEXTRAN 70, GLYCERIN, HYPROMELLOSE 1 DROP: 1; 2; 3 SOLUTION/ DROPS OPHTHALMIC at 13:42

## 2025-02-07 NOTE — ASSESSMENT & PLAN NOTE
Home: amlodpine 5mg daily  Here: Same as home medication.  He did have some changes in BP with position changes 2/3 but was not orthostatic  Monitor for orthostasis due to parkinsons  AM BP has been elevated but improves after medications.

## 2025-02-07 NOTE — ASSESSMENT & PLAN NOTE
"Likely due to Parkinson's dementia.  Continue rivastigmine patch - pt's wife very concerned about the cost. Price check was $10.  Pt agitated 2/3 during AM vitals. He did receive IM Zyprexa. Not agitated currently  He was trying to get up this AM to \"fix a leak\". He was able to be redirected.  "

## 2025-02-07 NOTE — PROGRESS NOTES
02/07/25 1200   Pain Assessment   Pain Assessment Tool 0-10   Pain Score No Pain   Restrictions/Precautions   Precautions Aspiration;Bed/chair alarms;Cognitive;Fall Risk;Impulsive;Supervision on toilet/commode   Eating   Type of Assistance Needed Supervision;Set-up / clean-up;Verbal cues   Physical Assistance Level No physical assistance   Eating CARE Score 4   Swallow Assessment   Swallow Treatment Assessment   Daily Dysphagia Tx Note     Patient Name: Daniel Sanz Jr.    Today's Date: 2/7/2025      Current Risks for Dysphagia & Aspiration: General debilitation; Cognitive deficit; Mental status change; Behavior issues; Hx neurologic dx (Parkinson's); Positioning issues      Current Symptoms/Concerns: Coughs w/ liquids; Cough on own secretions; Difficulty chewing; Holding food in mouth     Current diet:soft/level 3 diet and thin liquids      Premorbid diet::soft/level 3 diet, regular diet, and thin liquids      Positioning: upright in recliner    Items administered:Consistencies Administered: thin liquids, puree, soft solids, and hard solids  Materials administered included: penne pasta, cooked vegetables, ainsley food cake, rice pudding, thins by cup/straw    Total amount of meal consumed:   100% of meal and 240cc of thins      Summary:     Pt presenting with mild-moderate oral and pharyngeal dysphagia today.  Symptoms or concerns included:still functional lip seal and bolus retrieval of food times off of utensils, cup and straw for meal today w/o anterior loss. However, pt does continue w/ slower mastication and manipulation given soft and solids textures to where pt continues to present multiple boluses at a time to where ongoing verbal cues needed to stop and swallow the increased bolus amounts presented. Also pt continues to stop chewing when collecting next bolus to where SLP continuing to provide verbal cues to continue to chew foods. Today pt did need increased verbal prompting from SLP given these  "strategies throughout meal. Given the delayed mastication, pt does have decreased bolus formulation but w/ prompting, able to clear w/ use of increased time or use of liquid wash. Transfers overall remain piecemeal which does lead delayed transfers of boluses. Pt did have more inconsistency w/ bolus control/transfer of thins, but again, pt was observed to hold thins prior to eliciting swallow which likely lead to decreased control. Swallow initiation remains to be delayed and HLE is present but decreased when elicited. Pt does have double swallows w/ foods due to piecemeal transfers. No audible swallows today. Today, pt w/ increased wet voicing episodes where pt did elicit spontaneous throat clears, but also needed verbal prompts to throat clear to eliminate wetness w/ success. Only 1 cough elicited after thins by cup. However, the increased wetness was more noted today given meal.     Pt's spouse was more present for session to where SLP providing education on how she will also need to provide verbal cues for pacing, continuing to chew when taking next bites, eliciting throat clear when pt's voice is noted to be wet or \"under water\" to pt during meals to ensure safety in swallow. Spouse was noted to be more receptive to this education today, but question carryover. Will plan for ongoing education and review in future sessions and will likely benefit from a written sheet for use at home.       Recommendations:  Diet: soft/level 3 diet and thin liquids  Meds: whole with puree  Strategies:  upright posture, only feed when fully alert, slow rate of feeding, small bites/sips, cough when demonstrating wet voicing, quiet environment (tv off, limit talking, door closed, etc.), alternating bites and sips, and OOB for meals preferred      FULL supervision w/ meals. Will need setup AND ongoing verbal cues for pacing throughout meal.   Results reviewed with:  patient, RN Jany, pt's wife- Sheng  Aspiration precautions " posted.     F/u ST tx: Pt is currently recommended for further skilled SLP services targeting dysphagia therapy in order to maximize oral and pharyngeal swallow skills, while safely supporting PO intake, as well as to improve independent carryover of safe swallow strategies.       Plan: Monitor diet advancement to level 3 diet w/ thins            Trial solids with SLP supervision only            Ongoing family education/training w/ spouse--> will need increased education on cues for pt throughout meal            Consider VFSS later this week into early next week pending overall progress   Swallow Assessment Prognosis   Prognosis Fair   Prognosis Considerations Age;Co-morbidities;Family/community support;Medical diagnosis;Medical prognosis;New learning ability;Ability to carry over;Severity of impairments;Previous level of function   SLP Therapy Minutes   SLP Time In 1200   SLP Time Out 1300   SLP Total Time (minutes) 60   SLP Mode of treatment - Individual (minutes) 60   SLP Mode of treatment - Concurrent (minutes) 0   SLP Mode of treatment - Group (minutes) 0   SLP Mode of treatment - Co-treat (minutes) 0   SLP Mode of Treatment - Total time(minutes) 60 minutes   SLP Cumulative Minutes 395   Therapy Time missed   Time missed? No

## 2025-02-07 NOTE — NURSING NOTE
"Cooperative with care, med compliant. Poor safety awareness, and impulsive with movements- disregards alarms. Tremors noted to be more significant on left hand. Pt required ambulation x2 in the hallway this evening by staff using the RW and gaitbelt. Pt stated he needed to \" walk and stretch his legs\". Talkative-superised for meals and intake. Pain verbalized to shoulder areas, stated lidoderm patches are helpful. Tolerated ambulation and transfers well.   "

## 2025-02-07 NOTE — PROGRESS NOTES
"   02/07/25 0800   Pain Assessment   Pain Assessment Tool 0-10   Pain Score No Pain   Restrictions/Precautions   Precautions Aspiration;Bed/chair alarms;Cognitive;Fall Risk;Impulsive;Supervision on toilet/commode   Weight Bearing Restrictions No   ROM Restrictions No   Lifestyle   Autonomy \"I already got washed up this morning.\" Pt was incontinent of urine this morning at was asssited with incontinence care.   Reciprocal Relationships lives with his wife Sheng, supportive LEVON Santiago   Service to Others retired    Intrinsic Gratification watching TV, talks about hunting as a favorite leisure but family reports he last went 13 years ago   Oral Hygiene   Type of Assistance Needed Incidental touching   Physical Assistance Level No physical assistance   Comment CGA standing at the sink for oral and hair care   Oral Hygiene CARE Score 4   Shower/Bathe Self   Type of Assistance Needed Incidental touching;Verbal cues;Set-up / clean-up   Physical Assistance Level 25% or less   Comment SB in recliner chair with assistance with bilateral feet due to absence of LH sponge and difficulty coordinating use of washcloth around a reacher. CGA in stance to bathe buttocks and VCs for thoroughness.   Shower/Bathe Self CARE Score 3   Bathing   Able to Wash/Rinse/Dry (body part) Left Arm;L Upper Leg;Right Arm;R Upper Leg;Chest;Abdomen;Perineal Area;Buttocks  (Pt uses a LH sponge at home for distal feet)   Upper Body Dressing   Type of Assistance Needed Physical assistance   Physical Assistance Level 51%-75%   Comment mod A to hold shirt behind him low like his wife does for him to thread. assistance to bring up his arms and snap closed   Upper Body Dressing CARE Score 2   Lower Body Dressing   Type of Assistance Needed Physical assistance   Physical Assistance Level 26%-50%   Comment assist to thread items over feet then Pt pulled up to waist in stance with extended time, VCs and CGA for balance.   Lower Body Dressing CARE Score 3 "   Putting On/Taking Off Footwear   Type of Assistance Needed Physical assistance   Physical Assistance Level 76% or more   Comment max A for compression stockings and closing slide shoes   Putting On/Taking Off Footwear CARE Score 2   Sit to Stand   Type of Assistance Needed Incidental touching   Physical Assistance Level No physical assistance   Comment VC to RW   Sit to Stand CARE Score 4   Bed-Chair Transfer   Type of Assistance Needed Incidental touching   Physical Assistance Level No physical assistance   Comment CGA with RW   Chair/Bed-to-Chair Transfer CARE Score 4   Cognition   Overall Cognitive Status Impaired   Arousal/Participation Alert;Cooperative   Attention Attends with cues to redirect   Orientation Level Oriented to person;Oriented to place   Memory Decreased short term memory;Decreased recall of recent events;Decreased recall of precautions   Following Commands Follows one step commands with increased time or repetition   Activity Tolerance   Activity Tolerance Patient tolerated treatment well   Assessment   Treatment Assessment Pt engaged in 60min OT session focused on improving selfcare performance, standing tolerance and balance at the sink for grooming routines. See above for details of performance. Pt continues to benefit from repetitive walker mgmt within various environemnts and functional tasks to improve safe carryover and reduced abandonment of the walker during tasks. Need to assess urinal use next session as Pt again incontinent overnight.   Prognosis Fair   Problem List Decreased strength;Decreased range of motion;Decreased endurance;Impaired balance;Decreased mobility;Decreased coordination;Decreased cognition;Impaired judgement;Decreased safety awareness   Barriers to Discharge Inaccessible home environment;Decreased caregiver support   Plan   Treatment/Interventions ADL retraining;Functional transfer training;Endurance training   Progress Progressing toward goals   OT Therapy  Minutes   OT Time In 0800   OT Time Out 0900   OT Total Time (minutes) 60   OT Mode of treatment - Individual (minutes) 60   OT Mode of treatment - Concurrent (minutes) 0   OT Mode of treatment - Group (minutes) 0   OT Mode of treatment - Co-treat (minutes) 0   OT Mode of Treatment - Total time(minutes) 60 minutes   OT Cumulative Minutes 530   Therapy Time missed   Time missed? No

## 2025-02-07 NOTE — PROGRESS NOTES
"   02/07/25 0900   Pain Assessment   Pain Assessment Tool 0-10   Pain Score No Pain   Restrictions/Precautions   Precautions Aspiration;Cognitive;Fall Risk;Bed/chair alarms;Supervision on toilet/commode   Sit to Stand   Type of Assistance Needed Incidental touching;Verbal cues   Comment practiced repeated STS working on mainatining FWd wt shift and working at higher amplitude   Sit to Stand CARE Score 4   Bed-Chair Transfer   Type of Assistance Needed Incidental touching;Verbal cues;Adaptive equipment   Comment SPT with RW,cueing to keep feet moving('MArch\") and complete turn to face someone or object in room. turns better to his left   Chair/Bed-to-Chair Transfer CARE Score 4   Walk 10 Feet   Type of Assistance Needed Incidental touching;Adaptive equipment   Comment RW   Walk 10 Feet CARE Score 4   Walk 50 Feet with Two Turns   Type of Assistance Needed Incidental touching;Verbal cues;Adaptive equipment   Comment RW, cueing for turns   Walk 50 Feet with Two Turns CARE Score 4   Walk 150 Feet   Type of Assistance Needed Incidental touching;Verbal cues;Adaptive equipment   Comment RW, cueing for \"BIG steps\"   Walk 150 Feet CARE Score 4   Therapeutic Interventions   Flexibility seated manual B HS & calf stretches x 4 min each   Equipment Use   NuStep lvl 2 x 10 min   Assessment   Treatment Assessment Pt brought to therapy gym by OTR at end of their session. Pt awake, cooperative and participating well. Seen for 30 min skilled therapy. Pt remains easily distracted, requiring supervision and cueing to stay on task. Freezing more noted when turning to his right vs left.   PT Therapy Minutes   PT Time In 0900   PT Time Out 0930   PT Total Time (minutes) 30   PT Mode of treatment - Individual (minutes) 30   PT Mode of treatment - Concurrent (minutes) 0   PT Mode of treatment - Group (minutes) 0   PT Mode of treatment - Co-treat (minutes) 0   PT Mode of Treatment - Total time(minutes) 30 minutes   PT Cumulative Minutes 465 "   Therapy Time missed   Time missed? No

## 2025-02-07 NOTE — PROGRESS NOTES
"Progress Note - PMR   Name: Daniel Sanz Jr. 84 y.o. male I MRN: [id number]  Unit/Bed#: -01 I Date of Admission: 1/31/2025   Date of Service: 2/7/2025 I Hospital Day: 7     Assessment & Plan  Parkinson's disease (HCC)  - AMS likely sequela of progressive parkinsons dementia plus possible adverse reaction to recent sinemet increase  - cont neuro adjusted recs for sinemet 1.5 QID  - cont exelon patch   - PT/OT/SLP 3-5 hours/day, 5-7 days/week.  - Will need f/u with Neurology after discharge.   Dysphagia  Patient observed coughing while taking medications by nursing at acute   Advanced to Level 3/thins on 2/4  Aspiration precautions   Timing or need for VBS as per SLP.   SLP dysphagia eval and treat  Altered mental status  Patient's wife endorsing recent abnormal behaviors prior to hospitalization.  Patient waking frequently at night, hallucinations noted when awaking from sleep, patient noted to be more \"nasty\" and agitated with at times garbled speech which clears.  Presents to ED after fall at home with concern for possible UTI. S/p head strike in ED w/ R eyebrow laceration.  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 with mild acute encephalopathy  CT head w/o acute findings  TSH benign   Folate WNL, B12 normal, vitamin D low at 26.9  Wife notes B12/vitamin D supplementation 3 times weekly PTA  Continue to q daily dosing of vit D  B12 3x weekly  Negative infxn work-up  Sinemet as ordered by neurology (below)  Acetaminophen 650mg TID    Patient oriented, and has some decent recall. Impaired insight, and he sundowns.   Previous MOCA - 7  Current SLUMS - 8   - Low dose seroquel PRN ordered by Neuro  - Added PRN olanzapine for severe agitation ONLY.    - Did receive this on 2/3 in the AM after some aggressive behaviors during blood draw/BP.  - Redirect, reorient first  - Low dose melatonin in the evening for sleep/wake.  - Bed/chair alarms  - Optimize bowel/bladder program  - Avoid deliriogenic meds and physical " restraint.  Bowel and bladder incontinence  - Bowel/bladder incontinence on admission  - Bowel: continence has improved. Was also constipated. On colace, senna 2 tabs and daily miralax.    - Has Prn suppository or lactulose   - Last BM 2/5 - incontinent and loose after getting lactulose.    - Monitor and adjust as appropriate  - Bladder: PVR x3 on admission are low   - UA during acute hospitalization not suspicious for infection   - Timed Voids.    - Improving. 2/3 largely continent of bladder.   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 (HCC)  Chronic right lower extremity venous ulcers. Largely healed and wound care signed off.   PTA Lasix for LE edema management; cont at [facility]  Known to [facility] podiatry/A   Wound care sami and treat  Offload heels  ACE wraps to b/l LEs  Chronic pain of both shoulders  Reports both shoulders.  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   Non-surgically managed. Last seen by Ortho 2019.   Tylenol scheduled.  Lidoderm patches    - This has helped.   Topicals for now. Monitor.   At risk for venous thromboembolism (VTE)  Lovenox, SCDs, ambulation  Will not need lovenox at discharge   Vitamin D insufficiency  Continue daily cholecalciferol supplementation.   HTN (hypertension)  Home: Amlodipine 5mg daily, Lasix 40mg daily, Losartan 50mg daily (unclear how long he was taking). Here: Amlodipine 5mg daily.   Monitor and adjust as appropriate       Health Maintenance  #Skin/Pressure Injury Prevention: Turn Q2hr in bed, with weight shifts [id number] in wheelchair.  #GI Prophylaxis: Not indicated   #Code Status: Full Code  #FEN: Level 2/Thins, [employer] at lunch, Angel at dinner, and EP HP angel at Breakfast  #Dispo: Team 2/4: ADD 2/14 with Home PT/OT/SLP  with goals to discharge home at Sup-Yue level of function. Ambulation vs. Wheelchair to be determined while here.   Will need outpatient f/u with Neuro        To Review: Mr. Sanz is an 85yo M with medical  history of Parkinson's Disease with dementia (MOCA 7/30 in March 2024), HTN, LE venous stasis dermatitis and edema, GERD who presented to [facility] on 1/21 with worsening agitation, AMS, culminating in a fall with HS and R eye lac. No sutures were needed for the lac. CTH/N and L shoulder benign. He was noted to be hypothermic and hyperkalemic on admission. TSH WNL, Folate WNL, Vitamin D was low. B12 WNL. Given gentle IVF, noted to be constipated. Wound care was consulted for R medial tibia venous ulcer, and Geriatrics was consulted who recommended melatonin, delirium precautions. Speech was consulted who recommended NPO initially, but he has since been advanced to an oral diet of Level 2/thin liquids. Neurology was also consulted as the agitation, behavior symptoms, hallucinations worsened since increasing his medications, they lowered his Sinemet and added rivastigmine. There was improvement in his behavioral symptoms. He no longer needed restraint/1:1 at time of discharge. He was admitted to the [facility] on 1/31.       Chief Complaint: No active concerns.    Interval History/Subjective:  Patient evaluated at bedside, sitting comfortably in recliner. Significant other present during evaluation. Patient noted to have some hallucinations overnight. Stating that he believed there was either a leak in the ceiling or sprinkler hitting the window. Nursing overnight was able to re-orient the patient with conservative measures, no PRN medications given. Patient denies new chest pain, SOB, fevers, chills, N/V, abdominal pain.       Functional Update:  PT: mod-maxA transfers, min-modA bed mobility, Ax2 ambulation 150', maxA stairs   OT: Sup eating, modA grooming, total A x2 bathing, maxA Ub dressing, total A LB dressing, maxA toileting, total A x2 toilet transfers   SLP: Full supervision for meals, modA comprehension, Yue expression, Sup social interaction, maxA executive function, maxA memory. Mild-mod oropharyngeal dysphagia  (mostly oral) Level 2/thins.     Objective     Temp:  [97.4 °F (36.3 °C)-98.3 °F (36.8 °C)] 97.4 °F (36.3 °C)  HR:  [73-76] 73  Resp:  [16-18] 18  BP: (130-161)/(57-71) 161/71  SpO2:  [91 %-99 %] 99 %    Physical Exam  Constitutional:       Appearance: Normal appearance.   HENT:      Head: Normocephalic and atraumatic.      Mouth/Throat:      Pharynx: Oropharynx is clear.   Eyes:      Conjunctiva/sclera: Conjunctivae normal.   Cardiovascular:      Rate and Rhythm: Normal rate and regular rhythm.      Pulses: Normal pulses.      Heart sounds: Normal heart sounds.   Pulmonary:      Effort: Pulmonary effort is normal.      Breath sounds: Normal breath sounds.   Abdominal:      General: Abdomen is flat. Bowel sounds are normal.      Palpations: Abdomen is soft.   Musculoskeletal:      Comments: Stable cogwheel rigidity and bradykinesia noted in bilateral wrists. Resting tremor bilateral hands.    Skin:     General: Skin is warm.      Comments: BL venous stasis changes and stable bilateral pedal edema.    Neurological:      Mental Status: He is alert. Mental status is at baseline.   Psychiatric:         Mood and Affect: Mood normal.         Behavior: Behavior normal.         Physical examination is otherwise unchanged from previous encounter, except as noted above.    Scheduled Meds:  Current Facility-Administered Medications   Medication Dose Route Frequency Provider Last Rate    acetaminophen  650 mg Oral TID Chelsea Dasilva PA-C      amLODIPine  5 mg Oral Daily Chelsea Dasilva PA-C      Artificial Tears  1 drop Both Eyes Q4H PRN Chelsea Dasilva PA-C      bisacodyl  10 mg Rectal Daily PRN Ashley Depadua, MD      calcium carbonate  1,000 mg Oral Daily PRN Chelsea Dasilva PA-C      carbidopa-levodopa  1.5 tablet Oral 4x Daily Chelsea Dasilva PA-C      Cholecalciferol  1,000 Units Oral Daily Chelsea Dasilva PA-C      vitamin B-12  1,000 mcg Oral Once per day on Monday Wednesday Friday Chelsea  SURAJ Dasilva      docusate sodium  100 mg Oral BID Chelsea Dasilva PA-C      enoxaparin  40 mg Subcutaneous Daily Chelsea Dasilva PA-C      [Held by provider] furosemide  20 mg Oral Daily Ciara Calderon PA-C      lactulose  20 g Oral Daily PRN Ashley Depadua, MD      lidocaine  2 patch Topical Daily Ashley Depadua, MD      melatonin  6 mg Oral QPM Ashley Depadua, MD      OLANZapine  2.5 mg Intramuscular Q4H PRN Ashley Depadua, MD      ondansetron  4 mg Oral Q6H PRN Chelsea Dasilva PA-C      polyethylene glycol  17 g Oral Daily Ashley Depadua, MD      QUEtiapine  12.5 mg Oral Q8H PRN Chelsea Dasilva PA-C      rivastigmine  1 patch Transdermal Daily Chelsea Dasilva PA-C      senna  2 tablet Oral BID Chelsea Dasilva PA-C           Lab Results: I have reviewed the following results:  Results from last 7 days   Lab Units 02/03/25  0634   HEMOGLOBIN g/dL 11.5*   HEMATOCRIT % 35.5*   WBC Thousand/uL 5.96   PLATELETS Thousands/uL 230     Results from last 7 days   Lab Units 02/07/25  0618 02/03/25  0634   BUN mg/dL 39* 33*   SODIUM mmol/L 138 140   POTASSIUM mmol/L 4.8 4.8   CHLORIDE mmol/L 105 104   CREATININE mg/dL 1.21 1.05              0658}      ** Please Note: Fluency Direct voice to text software may have been used in the creation of this document. **

## 2025-02-07 NOTE — CASE MANAGEMENT
Received call from Roshan at [facility] approving additional days with lcd 2/13 and expected dc 2/14.

## 2025-02-07 NOTE — ASSESSMENT & PLAN NOTE
Chronic right lower extremity venous ulcers. Largely healed and wound care signed off.   PTA Lasix for LE edema management; cont at [facility]  Known to [facility] podiatry/[facility]   Wound care sami and treat  Offload heels  ACE wraps to b/l LEs

## 2025-02-07 NOTE — PROGRESS NOTES
02/07/25 1400   Pain Assessment   Pain Assessment Tool 0-10   Pain Score No Pain   Restrictions/Precautions   Precautions Aspiration;Bed/chair alarms;Cognitive;Fall Risk;Impulsive;Supervision on toilet/commode  (at end of session, PCA came to take over bathroom/toileting and return pt to recliner, informed PCA of need for chair alarm on once in recliner)   Weight Bearing Restrictions No   ROM Restrictions No   Braces or Orthoses Other (Comment)  (had personal zipped up compression socks)   Cognition   Overall Cognitive Status Impaired   Arousal/Participation Alert;Cooperative   Attention Attends with cues to redirect   Orientation Level Oriented to person;Oriented to place   Memory Decreased short term memory;Decreased recall of recent events;Decreased recall of precautions   Following Commands Follows one step commands with increased time or repetition   Comments pleasant this afternoon, requested to listen to music during session, enjoyed The Beatles and Parth Springsteen   Roll Left and Right   Type of Assistance Needed Supervision   Comment HOB close to flat, did use L sided rail which pt has at home; required increased time to complete to R   Roll Left and Right CARE Score 4   Sit to Lying   Type of Assistance Needed Supervision;Verbal cues   Comment HOB close to flat, no rails, uses momentum to swing BLEs into bed, required inc time to reposition once supine but requires cues to do so otherwise would've remained lying crooked   Sit to Lying CARE Score 4   Lying to Sitting on Side of Bed   Type of Assistance Needed Supervision   Comment CS in event of dizziness with supine>sitting however no c/o of this   Lying to Sitting on Side of Bed CARE Score 4   Sit to Stand   Type of Assistance Needed Physical assistance   Physical Assistance Level 25% or less   Comment mostly CG t/o session with RW however one instance of needing minAx1 with external cues to successfully stand up from EOB due to decreased force  production from lower surface   Sit to Stand CARE Score 3   Bed-Chair Transfer   Type of Assistance Needed Incidental touching;Verbal cues   Comment SPT with RW; cues for positioning of RW during turning   Chair/Bed-to-Chair Transfer CARE Score 4   Transfer Bed/Chair/Wheelchair   Limitations Noted In Balance;Endurance;Problem Solving;Sequencing;UE Strength;LE Strength   Adaptive Equipment Roller Walker   Findings continues to experience freezing episodes with SPTs; responded okay to verbal cues for marching in place to inc step height and break out of freezing episodes   Walk 10 Feet   Type of Assistance Needed Incidental touching   Comment RW   Walk 10 Feet CARE Score 4   Walk 50 Feet with Two Turns   Type of Assistance Needed Physical assistance   Physical Assistance Level 25% or less   Comment CG-miNAx1 with RW   Walk 50 Feet with Two Turns CARE Score 3   Walk 150 Feet   Type of Assistance Needed Physical assistance   Physical Assistance Level 25% or less   Comment CG-miNAx1 with RW   Walk 150 Feet CARE Score 3   Walking 10 Feet on Uneven Surfaces   Type of Assistance Needed Physical assistance   Physical Assistance Level 26%-50%   Comment min-modAx1 with Rw over floor mat due to freezing episode on mat   Walking 10 Feet on Uneven Surfaces CARE Score 3   Ambulation   Primary Mode of Locomotion Prior to Admission Walk   Distance Walked (feet) 120 ft  (x2; 20'x4)   Assist Device Roller Walker   Gait Pattern Decreased foot clearance;Festination;Forward Flexion;Narrow ESTELITA;Poor UE WB;Shuffle;Step to;Step through;Improper weight shift   Limitations Noted In Balance;Device Management;Endurance;Heel Strike;Posture;Safety;Sequencing;Speed;Strength;Swing   Provided Assistance with: Balance;Direction   Walk Assist Level Minimum Assist   Findings mostly CG with RW but minAx1 at times during freezing episodes during longer distances with cues for marching to inc step height   Does the patient walk? 2. Yes   Wheel 50 Feet with  Two Turns   Reason if not Attempted Activity not applicable   Wheel 50 Feet with Two Turns CARE Score 9   Wheel 150 Feet   Reason if not Attempted Activity not applicable   Wheel 150 Feet CARE Score 9   Picking Up Object   Comment (S)  assess next session with reacher   Toilet Transfer   Type of Assistance Needed Physical assistance   Physical Assistance Level 25% or less   Comment SPT with Rw to commode over toilet   Toilet Transfer CARE Score 3   Toilet Transfer   Findings required directions on RW management in bathroom due to tighter space in front of toilet; due this requiring increased time to complete, pt was incontinent   Therapeutic Interventions   Flexibility seated HS/gastroc stretch b/l x5mins total   Other Rw management going to table with pulling chair out to sit down, management of RW at kitchen counter while putting utensils away in different drawers, navigating furniture in room and in therapy gym   Other Comments   Comments captured standing weight on scale for nursing at beginning of session, 89.1kg, notified DOLORES Flores   Assessment   Treatment Assessment Session focused on further practice of RW management in simulation of home set up with furniture being in the way, having to manage pulling chairs out or back in at the table, and putting away dishes at the kitchen counter. T/o session pt required cueing for RW positioning about 75% of the time otherwise he would've completed mobilities without it. This can be expected as he didn't use a RW often at home per his wife. She expressed concern about him reverting back to using his cane upon d/c despite recommendations for him to use a RW. I explained that for that reason, we would likely be recommending supervision level at d/c to make sure he is using the RW properly at home to avoid any falls. I also explained that sometimes we recommend placing signs around the house to encourage/remind pt to use the RW, however unsure how successful that would be  in this case. Overall, pt is meeting majority of his STGs set at IE to make sure he's on track for d/c in one week. Continue with repetitive practice of transfers and ambulation with RW for increased carryover between sessions.   Family/Caregiver Present yes-Sheng present at beginning of session but left about 10 mins in   Problem List Decreased strength;Decreased range of motion;Decreased endurance;Impaired balance;Decreased mobility;Decreased coordination;Decreased cognition;Impaired judgement;Decreased safety awareness   Barriers to Discharge Inaccessible home environment;Decreased caregiver support   PT Barriers   Functional Limitation Stair negotiation;Car transfers;Transfers;Walking;Standing;Ramp negotiation   Plan   Treatment/Interventions Functional transfer training;LE strengthening/ROM;Elevations;Therapeutic exercise;Endurance training;Cognitive reorientation;Patient/family training;Equipment eval/education;Bed mobility;Gait training   Progress Progressing toward goals   PT Therapy Minutes   PT Time In 1400   PT Time Out 1500   PT Total Time (minutes) 60   PT Mode of treatment - Individual (minutes) 60   PT Mode of treatment - Concurrent (minutes) 0   PT Mode of treatment - Group (minutes) 0   PT Mode of treatment - Co-treat (minutes) 0   PT Mode of Treatment - Total time(minutes) 60 minutes   PT Cumulative Minutes 525   Therapy Time missed   Time missed? No

## 2025-02-07 NOTE — ASSESSMENT & PLAN NOTE
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  - Will need f/u with Neurology after discharge.

## 2025-02-07 NOTE — PROGRESS NOTES
"Progress Note - Hospitalist   Name: Daniel Sanz Jr. 84 y.o. male I MRN: [id number]  Unit/Bed#: -01 I Date of Admission: 1/31/2025   Date of Service: 2/7/2025 I Hospital Day: 7    Assessment & Plan  Parkinson's disease (HCC)  Continue Sinemet 1.5 tabs 4x daily. Currently ordered at times pt takes the medication at home. May need to adjust timing based on therapy schedule.  Follows with Dr. Cavanaugh as an outpt.  Therapy per PMR.  HTN (hypertension)  Home: amlodpine 5mg daily  Here: Same as home medication.  He did have some changes in BP with position changes 2/3 but was not orthostatic  Monitor for orthostasis due to parkinsons  AM BP has been elevated but improves after medications.  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  Continue rivastigmine patch - pt's wife very concerned about the cost. Price check was $10.  Pt agitated 2/3 during AM vitals. He did receive IM Zyprexa. Not agitated currently  He was trying to get up this AM to \"fix a leak\". He was able to be redirected.  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  He was taking Lasix 3x weekly as an outpt.  Lasix restarted and he has been taking it daily. BUN and Cr are trending up.  Will hold Lasix through the weekend  BMP Monday  Dysphagia  Continue modified diet, upgraded to level 3 with thin liquids. Per speech- still needs frequent reminders to swallow so he needs supervision for all meals  SLP to follow.  Chronic pain of both shoulders  Per wife, had at home as well  Continue lidocaine patches    The above assessment and plan was reviewed and updated as determined by my evaluation of the patient on 2/7/2025.    History of Present Illness   Patient seen and examined. Patients overnight issues or events were reviewed with nursing staff. New or overnight issues include the following:     Pt seen in his room with his wife present. He states that he is " doing well. He denies any current complaints.    A 10 point review of systems was negative except for what is noted in the HPI.    Objective :  Temp:  [97.4 °F (36.3 °C)-98.3 °F (36.8 °C)] 97.4 °F (36.3 °C)  HR:  [73-76] 73  BP: (130-161)/(57-71) 161/71  Resp:  [16-18] 18  SpO2:  [91 %-99 %] 99 %  O2 Device: None (Room air)    Invasive Devices       None                   Physical Exam  General Appearance: NAD; pleasant  HEENT: PERRLA, conjuctiva normal; mucous membranes moist; face symmetrical  Neck:  Supple  Lungs: clear bilaterally, normal respiratory effort, no retractions, expiratory effort normal, on room air  CV: regular rate and rhythm, no murmurs rubs or gallops noted   ABD: soft non tender, +BS x4  EXT: DP pulses intact, no lymphadenopathy, + edema Rt > Lt  Skin: normal turgor, normal texture, no rash  Psych: affect normal, mood normal  Neuro: Awake and alert.  The above physical exam was reviewed and updated as determined by my evaluation of the patient on 2/7/2025.      Lab Results: I have reviewed the following results:  Results from last 7 days   Lab Units 02/03/25  0634   WBC Thousand/uL 5.96   HEMOGLOBIN g/dL 11.5*   HEMATOCRIT % 35.5*   PLATELETS Thousands/uL 230     Results from last 7 days   Lab Units 02/07/25  0618 02/03/25  0634   SODIUM mmol/L 138 140   POTASSIUM mmol/L 4.8 4.8   CHLORIDE mmol/L 105 104   CO2 mmol/L 27 27   BUN mg/dL 39* 33*   CREATININE mg/dL 1.21 1.05   CALCIUM mg/dL 9.3 9.1             Results from last 7 days   Lab Units 02/03/25  0745   POC GLUCOSE mg/dl 80       Imaging Results Review: No pertinent imaging studies reviewed.  Other Study Results Review: Other studies reviewed include: BMP    Review of Scheduled Meds: Medications  reviewed and reconciled as needed  Current Facility-Administered Medications   Medication Dose Route Frequency Provider Last Rate    acetaminophen  650 mg Oral TID Chelsea Dasilva PA-C      amLODIPine  5 mg Oral Daily Chelsea Dasilva  PA-JUS      Artificial Tears  1 drop Both Eyes Q4H PRN Chelsea Dasilva PA-C      bisacodyl  10 mg Rectal Daily PRN Ashley Depadua, MD      calcium carbonate  1,000 mg Oral Daily PRN Chelsea Dasilva PA-C      carbidopa-levodopa  1.5 tablet Oral 4x Daily Chelsea Dasilva PA-C      Cholecalciferol  1,000 Units Oral Daily Chelsea Dasilva PA-C      vitamin B-12  1,000 mcg Oral Once per day on Monday Wednesday Friday Chelsea Dasilva PA-C      docusate sodium  100 mg Oral BID Chelsea Dasilva PA-C      enoxaparin  40 mg Subcutaneous Daily Chelsea Dasilva PA-C      furosemide  20 mg Oral Daily Ciara Calderon PA-C      lactulose  20 g Oral Daily PRN Ashley Depadua, MD      lidocaine  2 patch Topical Daily Ashley Depadua, MD      melatonin  6 mg Oral QPM Ashley Depadua, MD      OLANZapine  2.5 mg Intramuscular Q4H PRN Ashley Depadua, MD      ondansetron  4 mg Oral Q6H PRN Chelsea Dasilva PA-C      polyethylene glycol  17 g Oral Daily Ashley Depadua, MD      QUEtiapine  12.5 mg Oral Q8H PRN Chelsea Dasilva PA-C      rivastigmine  1 patch Transdermal Daily Chelsea Dasilva PA-C      senna  2 tablet Oral BID Chelsea Dasilva PA-C         VTE Pharmacologic Prophylaxis: Lovenox  Code Status: Level 1 - Full Code  Current Length of Stay: 7 day(s)    Administrative Statements   I have spent a total time of 35 minutes in caring for this patient on the day of the visit/encounter including Diagnostic results, Prognosis, Risks and benefits of tx options, Instructions for management, Patient and family education, Importance of tx compliance, Risk factor reductions, Impressions, Counseling / Coordination of care, Documenting in the medical record, Reviewing / ordering tests, medicine, procedures  , Obtaining or reviewing history  , and Communicating with other healthcare professionals .  ** Please Note:  voice to text software may have been used in the creation of this document. Although  proof errors in transcription or interpretation are a potential of such software**
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
## 2025-02-07 NOTE — ASSESSMENT & PLAN NOTE
Follows with Podiatry and wound care as an outpt.  He was taking Lasix 3x weekly as an outpt.  Lasix restarted and he has been taking it daily. BUN and Cr are trending up.  Will hold Lasix through the weekend  BMP Monday

## 2025-02-07 NOTE — PLAN OF CARE
Problem: Prexisting or High Potential for Compromised Skin Integrity  Goal: Skin integrity is maintained or improved  Description: INTERVENTIONS:  - Identify patients at risk for skin breakdown  - Assess and monitor skin integrity  - Assess and monitor nutrition and hydration status  - Monitor labs   - Assess for incontinence   - Turn and reposition patient  - Assist with mobility/ambulation  - Relieve pressure over bony prominences  - Avoid friction and shearing  - Provide appropriate hygiene as needed including keeping skin clean and dry  - Evaluate need for skin moisturizer/barrier cream  - Collaborate with interdisciplinary team   - Patient/family teaching  - Consider wound care consult   Outcome: Progressing     Problem: SAFETY ADULT  Goal: Patient will remain free of falls  Description: INTERVENTIONS:  - Educate patient/family on patient safety including physical limitations  - Instruct patient to call for assistance with activity   - Consult OT/PT to assist with strengthening/mobility   - Keep Call bell within reach  - Keep bed low and locked with side rails adjusted as appropriate  - Keep care items and personal belongings within reach  - Initiate and maintain comfort rounds  - Make Fall Risk Sign visible to staff  - Offer Toileting every 2 Hours, in advance of need  - Initiate/Maintain alarm  - Obtain necessary fall risk management equipment:   - Apply yellow socks and bracelet for high fall risk patients  - Consider moving patient to room near nurses station  Outcome: Progressing

## 2025-02-08 PROCEDURE — 99231 SBSQ HOSP IP/OBS SF/LOW 25: CPT | Performed by: PHYSICIAN ASSISTANT

## 2025-02-08 RX ADMIN — CARBIDOPA AND LEVODOPA 1.5 TABLET: 25; 100 TABLET ORAL at 16:51

## 2025-02-08 RX ADMIN — ACETAMINOPHEN 650 MG: 325 TABLET, FILM COATED ORAL at 21:29

## 2025-02-08 RX ADMIN — AMLODIPINE BESYLATE 5 MG: 5 TABLET ORAL at 08:54

## 2025-02-08 RX ADMIN — ENOXAPARIN SODIUM 40 MG: 40 INJECTION SUBCUTANEOUS at 08:54

## 2025-02-08 RX ADMIN — CARBIDOPA AND LEVODOPA 1.5 TABLET: 25; 100 TABLET ORAL at 08:54

## 2025-02-08 RX ADMIN — CARBIDOPA AND LEVODOPA 1.5 TABLET: 25; 100 TABLET ORAL at 13:23

## 2025-02-08 RX ADMIN — Medication 1000 UNITS: at 08:54

## 2025-02-08 RX ADMIN — Medication 6 MG: at 21:29

## 2025-02-08 RX ADMIN — DOCUSATE SODIUM 100 MG: 100 CAPSULE, LIQUID FILLED ORAL at 16:51

## 2025-02-08 RX ADMIN — CARBIDOPA AND LEVODOPA 1.5 TABLET: 25; 100 TABLET ORAL at 21:29

## 2025-02-08 RX ADMIN — SENNOSIDES 17.2 MG: 8.6 TABLET ORAL at 16:51

## 2025-02-08 RX ADMIN — ACETAMINOPHEN 650 MG: 325 TABLET, FILM COATED ORAL at 16:53

## 2025-02-08 RX ADMIN — POLYETHYLENE GLYCOL 3350 17 G: 17 POWDER, FOR SOLUTION ORAL at 08:54

## 2025-02-08 RX ADMIN — DOCUSATE SODIUM 100 MG: 100 CAPSULE, LIQUID FILLED ORAL at 08:54

## 2025-02-08 RX ADMIN — RIVASTIGMINE 1 PATCH: 4.6 PATCH, EXTENDED RELEASE TRANSDERMAL at 08:57

## 2025-02-08 RX ADMIN — SENNOSIDES 17.2 MG: 8.6 TABLET ORAL at 08:54

## 2025-02-08 RX ADMIN — ACETAMINOPHEN 650 MG: 325 TABLET, FILM COATED ORAL at 08:54

## 2025-02-08 RX ADMIN — LIDOCAINE 5% 2 PATCH: 700 PATCH TOPICAL at 08:54

## 2025-02-08 NOTE — PROGRESS NOTES
"Progress Note - Hospitalist   Name: Daniel Sanz Jr. 84 y.o. male I MRN: [id number]  Unit/Bed#: -01 I Date of Admission: 1/31/2025   Date of Service: 2/8/2025 I Hospital Day: 8    Assessment & Plan  Parkinson's disease (HCC)  Continue Sinemet 1.5 tabs 4x daily. Currently ordered at times pt takes the medication at home. May need to adjust timing based on therapy schedule.  Follows with Dr. Cavanaugh as an outpt.  Therapy per PMR.  HTN (hypertension)  Home: amlodpine 5mg daily  Here: Same as home medication.  He did have some changes in BP with position changes 2/3 but was not orthostatic  Monitor for orthostasis due to parkinsons  AM BP has been elevated but improves after medications.  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  Continue rivastigmine patch - pt's wife very concerned about the cost. Price check was $10.  Pt agitated 2/3 during AM vitals. He did receive IM Zyprexa. Not agitated currently  He was trying to get up this AM to \"fix a leak\". He was able to be redirected.  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  He was taking Lasix 3x weekly as an outpt.  Lasix restarted and he has been taking it daily. BUN and Cr are trending up.  Will hold Lasix through the weekend  BMP Monday  Dysphagia  Continue modified diet, upgraded to level 3 with thin liquids. Per speech- still needs frequent reminders to swallow so he needs supervision for all meals  SLP to follow.  Chronic pain of both shoulders  Per wife, had at home as well  Continue lidocaine patches  Bowel and bladder incontinence    At risk for venous thromboembolism (VTE)    Vitamin D insufficiency  Continue supplementation    The above assessment and plan was reviewed and updated as determined by my evaluation of the patient on 2/8/2025.    History of Present Illness   Patient seen and examined. Patients overnight issues or events were reviewed with " nursing staff. New or overnight issues include the following:   Patient awake and alert in bed. Able to have a good conversation today about football and the Super Bowl. Denies pain    Review of Systems    Objective :  Temp:  [97.8 °F (36.6 °C)-98.2 °F (36.8 °C)] 97.8 °F (36.6 °C)  HR:  [75-85] 75  BP: (133-174)/(61-72) 174/72  Resp:  [17-18] 17  SpO2:  [95 %-99 %] 95 %  O2 Device: None (Room air)    Invasive Devices       None                   Physical Exam  General Appearance: NAD; pleasant  HEENT: PERRLA, conjuctiva normal; mucous membranes moist; face symmetrical  Neck:  Supple  Lungs: clear bilaterally, normal respiratory effort, no retractions, expiratory effort normal, on room air  CV: regular rate and rhythm, no murmurs rubs or gallops noted   ABD: soft non tender, +BS x4  EXT: DP pulses intact, no lymphadenopathy  Skin: normal turgor, normal texture, no rash  Psych: affect normal, mood normal  Neuro: awake and alert    The above physical exam was reviewed and updated as determined by my evaluation of the patient on 2/8/2025.      Lab Results: I have reviewed the following results:  Results from last 7 days   Lab Units 02/03/25  0634   WBC Thousand/uL 5.96   HEMOGLOBIN g/dL 11.5*   HEMATOCRIT % 35.5*   PLATELETS Thousands/uL 230     Results from last 7 days   Lab Units 02/07/25  0618 02/03/25  0634   SODIUM mmol/L 138 140   POTASSIUM mmol/L 4.8 4.8   CHLORIDE mmol/L 105 104   CO2 mmol/L 27 27   BUN mg/dL 39* 33*   CREATININE mg/dL 1.21 1.05   CALCIUM mg/dL 9.3 9.1             Results from last 7 days   Lab Units 02/03/25  0745   POC GLUCOSE mg/dl 80           Review of Scheduled Meds: Medications  reviewed and reconciled as needed  Current Facility-Administered Medications   Medication Dose Route Frequency Provider Last Rate    acetaminophen  650 mg Oral TID Chelsea Dasilva PA-C      amLODIPine  5 mg Oral Daily Chelsea Dasilva PA-C      Artificial Tears  1 drop Both Eyes Q4H PRN Chelsea  SURAJ Dasilva      bisacodyl  10 mg Rectal Daily PRN Ashley Depadua, MD      calcium carbonate  1,000 mg Oral Daily PRN Chelsea Dasilva PA-C      carbidopa-levodopa  1.5 tablet Oral 4x Daily Chelsea Dasilva PA-C      Cholecalciferol  1,000 Units Oral Daily Chelsea Dasilva PA-C      vitamin B-12  1,000 mcg Oral Once per day on Monday Wednesday Friday Chelsea Dasilva PA-C      docusate sodium  100 mg Oral BID Chelsea Dasilva PA-C      enoxaparin  40 mg Subcutaneous Daily Chelsea Dasilva PA-C      [Held by provider] furosemide  20 mg Oral Daily Ciara Calderon PA-C      lactulose  20 g Oral Daily PRN Ashley Depadua, MD      lidocaine  2 patch Topical Daily Ashley Depadua, MD      melatonin  6 mg Oral QPM Ashley Depadua, MD      OLANZapine  2.5 mg Intramuscular Q4H PRN Ashley Depadua, MD      ondansetron  4 mg Oral Q6H PRN Chelsea Dasilva PA-C      polyethylene glycol  17 g Oral Daily Ashley Depadua, MD      QUEtiapine  12.5 mg Oral Q8H PRN Chelsea Dasilva PA-C      rivastigmine  1 patch Transdermal Daily Chelsea Dasilva PA-C      senna  2 tablet Oral BID Chelsea Dasilva PA-C         VTE Pharmacologic Prophylaxis: lovenox  Code Status: Level 1 - Full Code  Current Length of Stay: 8 day(s)      ** Please Note:  voice to text software may have been used in the creation of this document. Although proof errors in transcription or interpretation are a potential of such software**

## 2025-02-09 PROCEDURE — 97110 THERAPEUTIC EXERCISES: CPT

## 2025-02-09 PROCEDURE — 97530 THERAPEUTIC ACTIVITIES: CPT

## 2025-02-09 PROCEDURE — 99231 SBSQ HOSP IP/OBS SF/LOW 25: CPT | Performed by: PHYSICIAN ASSISTANT

## 2025-02-09 RX ADMIN — CARBIDOPA AND LEVODOPA 1.5 TABLET: 25; 100 TABLET ORAL at 08:01

## 2025-02-09 RX ADMIN — DOCUSATE SODIUM 100 MG: 100 CAPSULE, LIQUID FILLED ORAL at 08:02

## 2025-02-09 RX ADMIN — CARBIDOPA AND LEVODOPA 1.5 TABLET: 25; 100 TABLET ORAL at 17:25

## 2025-02-09 RX ADMIN — ACETAMINOPHEN 650 MG: 325 TABLET, FILM COATED ORAL at 17:25

## 2025-02-09 RX ADMIN — Medication 6 MG: at 22:00

## 2025-02-09 RX ADMIN — SENNOSIDES 17.2 MG: 8.6 TABLET ORAL at 08:01

## 2025-02-09 RX ADMIN — Medication 1000 UNITS: at 08:02

## 2025-02-09 RX ADMIN — RIVASTIGMINE 1 PATCH: 4.6 PATCH, EXTENDED RELEASE TRANSDERMAL at 08:04

## 2025-02-09 RX ADMIN — CARBIDOPA AND LEVODOPA 1.5 TABLET: 25; 100 TABLET ORAL at 22:00

## 2025-02-09 RX ADMIN — ENOXAPARIN SODIUM 40 MG: 40 INJECTION SUBCUTANEOUS at 08:01

## 2025-02-09 RX ADMIN — LIDOCAINE 5% 2 PATCH: 700 PATCH TOPICAL at 08:02

## 2025-02-09 RX ADMIN — AMLODIPINE BESYLATE 5 MG: 5 TABLET ORAL at 08:01

## 2025-02-09 RX ADMIN — POLYETHYLENE GLYCOL 3350 17 G: 17 POWDER, FOR SOLUTION ORAL at 08:02

## 2025-02-09 RX ADMIN — DOCUSATE SODIUM 100 MG: 100 CAPSULE, LIQUID FILLED ORAL at 17:25

## 2025-02-09 RX ADMIN — ACETAMINOPHEN 650 MG: 325 TABLET, FILM COATED ORAL at 08:01

## 2025-02-09 RX ADMIN — CARBIDOPA AND LEVODOPA 1.5 TABLET: 25; 100 TABLET ORAL at 12:42

## 2025-02-09 RX ADMIN — ACETAMINOPHEN 650 MG: 325 TABLET, FILM COATED ORAL at 22:00

## 2025-02-09 RX ADMIN — SENNOSIDES 17.2 MG: 8.6 TABLET ORAL at 17:26

## 2025-02-09 NOTE — PLAN OF CARE
Problem: Prexisting or High Potential for Compromised Skin Integrity  Goal: Skin integrity is maintained or improved  Description: INTERVENTIONS:  - Identify patients at risk for skin breakdown  - Assess and monitor skin integrity  - Assess and monitor nutrition and hydration status  - Monitor labs   - Assess for incontinence   - Turn and reposition patient  - Assist with mobility/ambulation  - Relieve pressure over bony prominences  - Avoid friction and shearing  - Provide appropriate hygiene as needed including keeping skin clean and dry  - Evaluate need for skin moisturizer/barrier cream  - Collaborate with interdisciplinary team   - Patient/family teaching  - Consider wound care consult   Outcome: Progressing     Problem: PAIN - ADULT  Goal: Verbalizes/displays adequate comfort level or baseline comfort level  Description: Interventions:  - Encourage patient to monitor pain and request assistance  - Assess pain using appropriate pain scale  - Administer analgesics based on type and severity of pain and evaluate response  - Implement non-pharmacological measures as appropriate and evaluate response  - Consider cultural and social influences on pain and pain management  - Notify physician/advanced practitioner if interventions unsuccessful or patient reports new pain  Outcome: Progressing     Problem: INFECTION - ADULT  Goal: Absence or prevention of progression during hospitalization  Description: INTERVENTIONS:  - Assess and monitor for signs and symptoms of infection  - Monitor lab/diagnostic results  - Monitor all insertion sites, i.e. indwelling lines, tubes, and drains  - Monitor endotracheal if appropriate and nasal secretions for changes in amount and color  - [place] appropriate cooling/warming therapies per order  - Administer medications as ordered  - Instruct and encourage patient and family to use good hand hygiene technique  - Identify and instruct in appropriate isolation precautions for  identified infection/condition  Outcome: Progressing  Goal: Absence of fever/infection during neutropenic period  Description: INTERVENTIONS:  - Monitor WBC    Outcome: Progressing     Problem: SAFETY ADULT  Goal: Patient will remain free of falls  Description: INTERVENTIONS:  - Educate patient/family on patient safety including physical limitations  - Instruct patient to call for assistance with activity   - Consult OT/PT to assist with strengthening/mobility   - Keep Call bell within reach  - Keep bed low and locked with side rails adjusted as appropriate  - Keep care items and personal belongings within reach  - Initiate and maintain comfort rounds  - Make Fall Risk Sign visible to staff  - Offer Toileting every 2 Hours, in advance of need  - Initiate/Maintain alarm  - Obtain necessary fall risk management equipment:   - Apply yellow socks and bracelet for high fall risk patients  - Consider moving patient to room near nurses station  Outcome: Progressing  Goal: Maintain or return to baseline ADL function  Description: INTERVENTIONS:  -  Assess patient's ability to carry out ADLs; assess patient's baseline for ADL function and identify physical deficits which impact ability to perform ADLs (bathing, care of mouth/teeth, toileting, grooming, dressing, etc.)  - Assess/evaluate cause of self-care deficits   - Assess range of motion  - Assess patient's mobility; develop plan if impaired  - Assess patient's need for assistive devices and provide as appropriate  - Encourage maximum independence but intervene and supervise when necessary  - Involve family in performance of ADLs  - Assess for home care needs following discharge   - Consider OT consult to assist with ADL evaluation and planning for discharge  - Provide patient education as appropriate  Outcome: Progressing  Goal: Maintains/Returns to pre admission functional level  Description: INTERVENTIONS:  - Perform AM-PAC 6 Click Basic Mobility/ Daily Activity  assessment daily.  - Set and communicate daily mobility goal to care team and patient/family/caregiver.   - Collaborate with rehabilitation services on mobility goals if consulted  - Perform Range of Motion 3 times a day.  - Reposition patient every 2 hours.  - Dangle patient 3 times a day  - Stand patient 3 times a day  - Ambulate patient 3 times a day  - Out of bed to chair 3 times a day   - Out of bed for meals 3 times a day  - Out of bed for toileting  - Record patient progress and toleration of activity level   Outcome: Progressing     Problem: DISCHARGE PLANNING  Goal: Discharge to home or other facility with appropriate resources  Description: INTERVENTIONS:  - Identify barriers to discharge w/patient and caregiver  - Arrange for needed discharge resources and transportation as appropriate  - Identify discharge learning needs (meds, wound care, etc.)  - Arrange for interpretive services to assist at discharge as needed  - Refer to Case Management Department for coordinating discharge planning if the patient needs post-hospital services based on physician/advanced practitioner order or complex needs related to functional status, cognitive ability, or social support system  Outcome: Progressing

## 2025-02-09 NOTE — PROGRESS NOTES
"Progress Note - Hospitalist   Name: Daniel Sanz Jr. 84 y.o. male I MRN: [id number]  Unit/Bed#: -01 I Date of Admission: 1/31/2025   Date of Service: 2/9/2025 I Hospital Day: 9    Assessment & Plan  Parkinson's disease (HCC)  Continue Sinemet 1.5 tabs 4x daily. Currently ordered at times pt takes the medication at home. May need to adjust timing based on therapy schedule.  Follows with Dr. Cavanaugh as an outpt.  Therapy per PMR.  HTN (hypertension)  Home: amlodpine 5mg daily  Here: Same as home medication.  He did have some changes in BP with position changes 2/3 but was not orthostatic  Monitor for orthostasis due to parkinsons  AM BP has been elevated but improves after medications.  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  Continue rivastigmine patch - pt's wife very concerned about the cost. Price check was $10.  Pt agitated 2/3 during AM vitals. He did receive IM Zyprexa. Not agitated currently  He was trying to get up this AM to \"fix a leak\". He was able to be redirected.  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  He was taking Lasix 3x weekly as an outpt.  Lasix restarted and he has been taking it daily. BUN and Cr are trending up.  Will hold Lasix through the weekend  BMP Monday  Dysphagia  Continue modified diet, upgraded to level 3 with thin liquids. Per speech- still needs frequent reminders to swallow so he needs supervision for all meals  SLP to follow.  Chronic pain of both shoulders  Per wife, had at home as well  Continue lidocaine patches  Bowel and bladder incontinence    At risk for venous thromboembolism (VTE)    Vitamin D insufficiency  Continue supplementation    The above assessment and plan was reviewed and updated as determined by my evaluation of the patient on 2/9/2025.    History of Present Illness   Patient seen and examined. Patients overnight issues or events were reviewed with " nursing staff. New or overnight issues include the following:   Patient sitting up comfortably in chair. No CP/SOB. VSS    Review of Systems    Objective :  Temp:  [97.5 °F (36.4 °C)-98.3 °F (36.8 °C)] 98.3 °F (36.8 °C)  HR:  [74-78] 74  BP: (106-178)/(53-74) 178/74  Resp:  [16-17] 16  SpO2:  [97 %-100 %] 97 %  O2 Device: None (Room air)    Invasive Devices       None                   Physical Exam  General Appearance: NAD; pleasant  HEENT: PERRLA, conjuctiva normal; mucous membranes moist; face symmetrical  Neck:  Supple  Lungs: clear bilaterally, normal respiratory effort, no retractions, expiratory effort normal, on room air  CV: regular rate and rhythm, no murmurs rubs or gallops noted   ABD: soft non tender, +BS x4  EXT: DP pulses intact, no lymphadenopathy, no edema  Skin: normal turgor, normal texture, no rash  Psych: affect normal, mood normal  Neuro: awake and alert  The above physical exam was reviewed and updated as determined by my evaluation of the patient on 2/9/2025.      Lab Results: I have reviewed the following results:  Results from last 7 days   Lab Units 02/03/25  0634   WBC Thousand/uL 5.96   HEMOGLOBIN g/dL 11.5*   HEMATOCRIT % 35.5*   PLATELETS Thousands/uL 230     Results from last 7 days   Lab Units 02/07/25  0618 02/03/25  0634   SODIUM mmol/L 138 140   POTASSIUM mmol/L 4.8 4.8   CHLORIDE mmol/L 105 104   CO2 mmol/L 27 27   BUN mg/dL 39* 33*   CREATININE mg/dL 1.21 1.05   CALCIUM mg/dL 9.3 9.1             Results from last 7 days   Lab Units 02/03/25  0745   POC GLUCOSE mg/dl 80         Review of Scheduled Meds: Medications  reviewed and reconciled as needed  Current Facility-Administered Medications   Medication Dose Route Frequency Provider Last Rate    acetaminophen  650 mg Oral TID Chelsea Dasilva PA-C      amLODIPine  5 mg Oral Daily Chelsea Dasilva PA-C      Artificial Tears  1 drop Both Eyes Q4H PRN Chelsea Dasilva PA-C      bisacodyl  10 mg Rectal Daily PRN [place]  Depadua, MD      calcium carbonate  1,000 mg Oral Daily PRN Chelsea Dasilva PA-C      carbidopa-levodopa  1.5 tablet Oral 4x Daily Chelsea Dasilva PA-C      Cholecalciferol  1,000 Units Oral Daily Chelsea Dasilva PA-C      vitamin B-12  1,000 mcg Oral Once per day on Monday Wednesday Friday Chelsea Dasilva PA-C      docusate sodium  100 mg Oral BID Chelsea Dasilva PA-C      enoxaparin  40 mg Subcutaneous Daily Chelsea Dasilva PA-C      [Held by provider] furosemide  20 mg Oral Daily Ciara Calderon PA-C      lactulose  20 g Oral Daily PRN Ashley Depadua, MD      lidocaine  2 patch Topical Daily Ashley Depadua, MD      melatonin  6 mg Oral QPM Ashley Depadua, MD      OLANZapine  2.5 mg Intramuscular Q4H PRN Ashley Depadua, MD      ondansetron  4 mg Oral Q6H PRN Chelsea Dasilav PA-C      polyethylene glycol  17 g Oral Daily Ashley Depadua, MD      QUEtiapine  12.5 mg Oral Q8H PRN Chelsea Dasilva PA-C      rivastigmine  1 patch Transdermal Daily Chelsea Dasilva PA-C      senna  2 tablet Oral BID Chelsea Dasilva PA-C         VTE Pharmacologic Prophylaxis: lovenox  Code Status: Level 1 - Full Code  Current Length of Stay: 9 day(s)      ** Please Note:  voice to text software may have been used in the creation of this document. Although proof errors in transcription or interpretation are a potential of such software**

## 2025-02-09 NOTE — PROGRESS NOTES
02/09/25 0830   Pain Assessment   Pain Assessment Tool 0-10   Pain Score No Pain   Restrictions/Precautions   Precautions Aspiration;Bed/chair alarms;Cognitive;Fall Risk;Impulsive;Supervision on toilet/commode   Braces or Orthoses Other (Comment)  (had personal zipped up compression socks)   Subjective   Subjective pt in bed and needed to be dress and bathroom and then BK in recliner at Full S lvl   Roll Left and Right   Type of Assistance Needed Supervision   Roll Left and Right CARE Score 4   Lying to Sitting on Side of Bed   Type of Assistance Needed Supervision   Lying to Sitting on Side of Bed CARE Score 4   Sit to Stand   Type of Assistance Needed Physical assistance;Adaptive equipment   Physical Assistance Level 25% or less   Comment STS x 4 for cleaning and needed to use the bathroom   Sit to Stand CARE Score 3   Bed-Chair Transfer   Type of Assistance Needed Incidental touching;Adaptive equipment   Comment SPT w RW   Chair/Bed-to-Chair Transfer CARE Score 4   Transfer Bed/Chair/Wheelchair   Adaptive Equipment Roller Walker   Walk 10 Feet   Type of Assistance Needed Incidental touching;Adaptive equipment   Comment RW   Walk 10 Feet CARE Score 4   Walk 50 Feet with Two Turns   Type of Assistance Needed Incidental touching;Adaptive equipment   Comment RW   Walk 50 Feet with Two Turns CARE Score 4   Walk 150 Feet   Type of Assistance Needed Physical assistance;Adaptive equipment   Physical Assistance Level 25% or less   Comment RW   Walk 150 Feet CARE Score 3   Ambulation   Primary Mode of Locomotion Prior to Admission Walk   Distance Walked (feet) 150 ft   Assist Device Roller Walker   Does the patient walk? 2. Yes   Wheel 50 Feet with Two Turns   Reason if not Attempted Activity not applicable   Wheel 50 Feet with Two Turns CARE Score 9   Wheel 150 Feet   Reason if not Attempted Activity not applicable   Wheel 150 Feet CARE Score 9   Curb or Single Stair   Style negotiated Single stair   Type of Assistance  Needed Physical assistance   Physical Assistance Level 25% or less   Comment  steps BLHR 6 inch and 12 steps Descending BWD   1 Step (Curb) CARE Score 3   4 Steps   Type of Assistance Needed Physical assistance   Physical Assistance Level 25% or less   Comment  steps BLHR 6 inch and 12 steps Descending BWD   4 Steps CARE Score 3   12 Steps   Type of Assistance Needed Physical assistance   Physical Assistance Level 25% or less   Comment  steps BLHR 6 inch and 12 steps Descending BWD   12 Steps CARE Score 3   Picking Up Object   Type of Assistance Needed Incidental touching;Adaptive equipment   Comment standing w RW and reacher for marker   Picking Up Object CARE Score 4   Toilet Transfer   Type of Assistance Needed Physical assistance   Physical Assistance Level 25% or less   Comment Standing RW support   Toilet Transfer CARE Score 3   Therapeutic Interventions   Flexibility manual stretch LE seated   Balance standing balance using RW for support   Equipment Use   NuStep lvl 2 for 10 min   Assessment   Treatment Assessment This session focus on bed mobility getting dress  and Nsging aide cleaning pt and then PT set up pt for BK and bathroom. Pt cont to focus on ambulaing with RW for homie DC and VC for speed during walking to increase stride stepping and not short step or freezing at times carter during his turns to set. Pt overall will cont toward DC goals and cont working on barriers for more indep lvl before going home.Pt back in recliner with all needs in reach and alarm on .   Barriers to Discharge Inaccessible home environment;Decreased caregiver support   PT Barriers   Functional Limitation Stair negotiation;Car transfers;Transfers;Walking;Standing;Ramp negotiation   Plan   Progress Progressing toward goals   PT Therapy Minutes   PT Time In 0900   PT Time Out 1000   PT Total Time (minutes) 60   PT Mode of treatment - Individual (minutes) 60   PT Mode of treatment - Concurrent (minutes) 0   PT  Mode of treatment - Group (minutes) 0   PT Mode of treatment - Co-treat (minutes) 0   PT Mode of Treatment - Total time(minutes) 60 minutes   PT Cumulative Minutes 585

## 2025-02-10 LAB
ANION GAP SERPL CALCULATED.3IONS-SCNC: 7 MMOL/L (ref 4–13)
BASOPHILS # BLD AUTO: 0.04 THOUSANDS/ÂΜL (ref 0–0.1)
BASOPHILS NFR BLD AUTO: 1 % (ref 0–1)
BUN SERPL-MCNC: 40 MG/DL (ref 5–25)
CALCIUM SERPL-MCNC: 8.9 MG/DL (ref 8.4–10.2)
CHLORIDE SERPL-SCNC: 108 MMOL/L (ref 96–108)
CO2 SERPL-SCNC: 26 MMOL/L (ref 21–32)
CREAT SERPL-MCNC: 1.15 MG/DL (ref 0.6–1.3)
EOSINOPHIL # BLD AUTO: 0.17 THOUSAND/ÂΜL (ref 0–0.61)
EOSINOPHIL NFR BLD AUTO: 3 % (ref 0–6)
ERYTHROCYTE [DISTWIDTH] IN BLOOD BY AUTOMATED COUNT: 12.7 % (ref 11.6–15.1)
GFR SERPL CREATININE-BSD FRML MDRD: 58 ML/MIN/1.73SQ M
GLUCOSE P FAST SERPL-MCNC: 88 MG/DL (ref 65–99)
GLUCOSE SERPL-MCNC: 88 MG/DL (ref 65–140)
HCT VFR BLD AUTO: 35.5 % (ref 36.5–49.3)
HGB BLD-MCNC: 11.6 G/DL (ref 12–17)
IMM GRANULOCYTES # BLD AUTO: 0.02 THOUSAND/UL (ref 0–0.2)
IMM GRANULOCYTES NFR BLD AUTO: 0 % (ref 0–2)
LYMPHOCYTES # BLD AUTO: 1.56 THOUSANDS/ÂΜL (ref 0.6–4.47)
LYMPHOCYTES NFR BLD AUTO: 28 % (ref 14–44)
MCH RBC QN AUTO: 32.5 PG (ref 26.8–34.3)
MCHC RBC AUTO-ENTMCNC: 32.7 G/DL (ref 31.4–37.4)
MCV RBC AUTO: 99 FL (ref 82–98)
MONOCYTES # BLD AUTO: 0.53 THOUSAND/ÂΜL (ref 0.17–1.22)
MONOCYTES NFR BLD AUTO: 9 % (ref 4–12)
NEUTROPHILS # BLD AUTO: 3.34 THOUSANDS/ÂΜL (ref 1.85–7.62)
NEUTS SEG NFR BLD AUTO: 59 % (ref 43–75)
NRBC BLD AUTO-RTO: 0 /100 WBCS
PLATELET # BLD AUTO: 224 THOUSANDS/UL (ref 149–390)
PMV BLD AUTO: 9.9 FL (ref 8.9–12.7)
POTASSIUM SERPL-SCNC: 4.8 MMOL/L (ref 3.5–5.3)
RBC # BLD AUTO: 3.57 MILLION/UL (ref 3.88–5.62)
SODIUM SERPL-SCNC: 141 MMOL/L (ref 135–147)
WBC # BLD AUTO: 5.66 THOUSAND/UL (ref 4.31–10.16)

## 2025-02-10 PROCEDURE — 99232 SBSQ HOSP IP/OBS MODERATE 35: CPT | Performed by: PHYSICAL MEDICINE & REHABILITATION

## 2025-02-10 PROCEDURE — 97130 THER IVNTJ EA ADDL 15 MIN: CPT

## 2025-02-10 PROCEDURE — 85025 COMPLETE CBC W/AUTO DIFF WBC: CPT | Performed by: PHYSICIAN ASSISTANT

## 2025-02-10 PROCEDURE — 97530 THERAPEUTIC ACTIVITIES: CPT

## 2025-02-10 PROCEDURE — 97535 SELF CARE MNGMENT TRAINING: CPT

## 2025-02-10 PROCEDURE — 97110 THERAPEUTIC EXERCISES: CPT

## 2025-02-10 PROCEDURE — 80048 BASIC METABOLIC PNL TOTAL CA: CPT | Performed by: PHYSICIAN ASSISTANT

## 2025-02-10 PROCEDURE — 97112 NEUROMUSCULAR REEDUCATION: CPT

## 2025-02-10 PROCEDURE — 99232 SBSQ HOSP IP/OBS MODERATE 35: CPT | Performed by: PHYSICIAN ASSISTANT

## 2025-02-10 PROCEDURE — 97129 THER IVNTJ 1ST 15 MIN: CPT

## 2025-02-10 RX ADMIN — CARBIDOPA AND LEVODOPA 1.5 TABLET: 25; 100 TABLET ORAL at 09:12

## 2025-02-10 RX ADMIN — SENNOSIDES 17.2 MG: 8.6 TABLET ORAL at 17:49

## 2025-02-10 RX ADMIN — ACETAMINOPHEN 650 MG: 325 TABLET, FILM COATED ORAL at 22:10

## 2025-02-10 RX ADMIN — DOCUSATE SODIUM 100 MG: 100 CAPSULE, LIQUID FILLED ORAL at 17:49

## 2025-02-10 RX ADMIN — CARBIDOPA AND LEVODOPA 1.5 TABLET: 25; 100 TABLET ORAL at 12:44

## 2025-02-10 RX ADMIN — AMLODIPINE BESYLATE 5 MG: 5 TABLET ORAL at 09:13

## 2025-02-10 RX ADMIN — POLYETHYLENE GLYCOL 3350 17 G: 17 POWDER, FOR SOLUTION ORAL at 09:19

## 2025-02-10 RX ADMIN — ENOXAPARIN SODIUM 40 MG: 40 INJECTION SUBCUTANEOUS at 09:12

## 2025-02-10 RX ADMIN — DOCUSATE SODIUM 100 MG: 100 CAPSULE, LIQUID FILLED ORAL at 09:12

## 2025-02-10 RX ADMIN — CYANOCOBALAMIN TAB 500 MCG 1000 MCG: 500 TAB at 09:13

## 2025-02-10 RX ADMIN — CARBIDOPA AND LEVODOPA 1.5 TABLET: 25; 100 TABLET ORAL at 22:10

## 2025-02-10 RX ADMIN — RIVASTIGMINE 1 PATCH: 4.6 PATCH, EXTENDED RELEASE TRANSDERMAL at 09:24

## 2025-02-10 RX ADMIN — ACETAMINOPHEN 650 MG: 325 TABLET, FILM COATED ORAL at 09:12

## 2025-02-10 RX ADMIN — SENNOSIDES 17.2 MG: 8.6 TABLET ORAL at 09:12

## 2025-02-10 RX ADMIN — Medication 6 MG: at 22:10

## 2025-02-10 RX ADMIN — ACETAMINOPHEN 650 MG: 325 TABLET, FILM COATED ORAL at 16:20

## 2025-02-10 RX ADMIN — Medication 1000 UNITS: at 09:13

## 2025-02-10 RX ADMIN — LIDOCAINE 5% 2 PATCH: 700 PATCH TOPICAL at 09:19

## 2025-02-10 RX ADMIN — CARBIDOPA AND LEVODOPA 1.5 TABLET: 25; 100 TABLET ORAL at 16:20

## 2025-02-10 NOTE — ASSESSMENT & PLAN NOTE
Patient observed coughing while taking medications by nursing at acute   Advanced to Level 3/thins on 2/4  Aspiration precautions   Timing or need for VBS as per SLP  Likely early the week of 2/10. Will await instructions from SLP re: timing of placing order.   SLP dysphagia eval and treat

## 2025-02-10 NOTE — PROGRESS NOTES
02/10/25 1505   Pain Assessment   Pain Assessment Tool 0-10   Pain Rating: FLACC (Rest) - Face 0   Pain Rating: FLACC (Rest) - Legs 0   Pain Rating: FLACC (Rest) - Activity 0   Pain Rating: FLACC (Rest) - Cry 0   Pain Rating: FLACC (Rest) - Consolability 0   Score: FLACC (Rest) 0   Pain Rating: FLACC (Activity) - Face 0   Pain Rating: FLACC (Activity) - Legs 0   Pain Rating: FLACC (Activity) - Activity 0   Pain Rating: FLACC (Activity) - Cry 0   Pain Rating: FLACC (Activity) - Consolability 0   Score: FLACC (Activity) 0   Restrictions/Precautions   Precautions Aspiration;Cognitive;Fall Risk;Supervision on toilet/commode;Bed/chair alarms   Braces or Orthoses   (personal compression socks)   Cognition   Overall Cognitive Status Impaired   Arousal/Participation Alert;Cooperative   Subjective   Subjective pt agreeable to do extra PT session.   Sit to Stand   Type of Assistance Needed Supervision;Verbal cues;Adaptive equipment   Comment CS with RW   Sit to Stand CARE Score 4   Bed-Chair Transfer   Type of Assistance Needed Supervision;Verbal cues;Adaptive equipment   Comment CS with RW   Chair/Bed-to-Chair Transfer CARE Score 4   Walk 10 Feet   Type of Assistance Needed Supervision;Verbal cues;Adaptive equipment   Comment CS with RW   Walk 10 Feet CARE Score 4   Walk 50 Feet with Two Turns   Type of Assistance Needed Supervision;Verbal cues;Adaptive equipment   Comment CS with RW x 50' x 2   Walk 50 Feet with Two Turns CARE Score 4   Equipment Use   NuStep lvl 2 x 10 mins using all ext, SPM> 50   Assessment   Treatment Assessment Pt tolerated short PT session focusing on strengthening and household distance mobilities using a RW at CS level. cont PT POC with inc focus on improving functional indep to at least S level  to reduce caregiver burden.   Barriers to Discharge Inaccessible home environment;Decreased caregiver support   PT Barriers   Functional Limitation Stair negotiation;Car  transfers;Transfers;Walking;Standing;Ramp negotiation   Plan   Treatment/Interventions Functional transfer training;LE strengthening/ROM;Therapeutic exercise;Cognitive reorientation;Endurance training;Spoke to nursing;Gait training;Spoke to MD;Spoke to case management;Spoke to advanced practitioner;OT   Progress Progressing toward goals   PT Therapy Minutes   PT Time In 1505   PT Time Out 1520   PT Total Time (minutes) 15   PT Mode of treatment - Individual (minutes) 0   PT Mode of treatment - Concurrent (minutes) 15   PT Mode of treatment - Group (minutes) 0   PT Mode of treatment - Co-treat (minutes) 0   PT Mode of Treatment - Total time(minutes) 15 minutes   PT Cumulative Minutes 690

## 2025-02-10 NOTE — PLAN OF CARE
Problem: Prexisting or High Potential for Compromised Skin Integrity  Goal: Skin integrity is maintained or improved  Description: INTERVENTIONS:  - Identify patients at risk for skin breakdown  - Assess and monitor skin integrity  - Assess and monitor nutrition and hydration status  - Monitor labs   - Assess for incontinence   - Turn and reposition patient  - Assist with mobility/ambulation  - Relieve pressure over bony prominences  - Avoid friction and shearing  - Provide appropriate hygiene as needed including keeping skin clean and dry  - Evaluate need for skin moisturizer/barrier cream  - Collaborate with interdisciplinary team   - Patient/family teaching  - Consider wound care consult   Outcome: Progressing     Problem: PAIN - ADULT  Goal: Verbalizes/displays adequate comfort level or baseline comfort level  Description: Interventions:  - Encourage patient to monitor pain and request assistance  - Assess pain using appropriate pain scale  - Administer analgesics based on type and severity of pain and evaluate response  - Implement non-pharmacological measures as appropriate and evaluate response  - Consider cultural and social influences on pain and pain management  - Notify physician/advanced practitioner if interventions unsuccessful or patient reports new pain  Outcome: Progressing     Problem: INFECTION - ADULT  Goal: Absence or prevention of progression during hospitalization  Description: INTERVENTIONS:  - Assess and monitor for signs and symptoms of infection  - Monitor lab/diagnostic results  - Monitor all insertion sites, i.e. indwelling lines, tubes, and drains  - Monitor endotracheal if appropriate and nasal secretions for changes in amount and color  - [place] appropriate cooling/warming therapies per order  - Administer medications as ordered  - Instruct and encourage patient and family to use good hand hygiene technique  - Identify and instruct in appropriate isolation precautions for  identified infection/condition  Outcome: Progressing  Goal: Absence of fever/infection during neutropenic period  Description: INTERVENTIONS:  - Monitor WBC    Outcome: Progressing     Problem: SAFETY ADULT  Goal: Patient will remain free of falls  Description: INTERVENTIONS:  - Educate patient/family on patient safety including physical limitations  - Instruct patient to call for assistance with activity   - Consult OT/PT to assist with strengthening/mobility   - Keep Call bell within reach  - Keep bed low and locked with side rails adjusted as appropriate  - Keep care items and personal belongings within reach  - Initiate and maintain comfort rounds  - Make Fall Risk Sign visible to staff  - Offer Toileting every 2 Hours, in advance of need  - Initiate/Maintain alarm  - Obtain necessary fall risk management equipment:   - Apply yellow socks and bracelet for high fall risk patients  - Consider moving patient to room near nurses station  Outcome: Progressing  Goal: Maintain or return to baseline ADL function  Description: INTERVENTIONS:  -  Assess patient's ability to carry out ADLs; assess patient's baseline for ADL function and identify physical deficits which impact ability to perform ADLs (bathing, care of mouth/teeth, toileting, grooming, dressing, etc.)  - Assess/evaluate cause of self-care deficits   - Assess range of motion  - Assess patient's mobility; develop plan if impaired  - Assess patient's need for assistive devices and provide as appropriate  - Encourage maximum independence but intervene and supervise when necessary  - Involve family in performance of ADLs  - Assess for home care needs following discharge   - Consider OT consult to assist with ADL evaluation and planning for discharge  - Provide patient education as appropriate  Outcome: Progressing  Goal: Maintains/Returns to pre admission functional level  Description: INTERVENTIONS:  - Perform AM-PAC 6 Click Basic Mobility/ Daily Activity  assessment daily.  - Set and communicate daily mobility goal to care team and patient/family/caregiver.   - Collaborate with rehabilitation services on mobility goals if consulted  - Perform Range of Motion 3 times a day.  - Reposition patient every 2 hours.  - Dangle patient 3 times a day  - Stand patient 3 times a day  - Ambulate patient 3 times a day  - Out of bed to chair 3 times a day   - Out of bed for meals 3 times a day  - Out of bed for toileting  - Record patient progress and toleration of activity level   Outcome: Progressing     Problem: DISCHARGE PLANNING  Goal: Discharge to home or other facility with appropriate resources  Description: INTERVENTIONS:  - Identify barriers to discharge w/patient and caregiver  - Arrange for needed discharge resources and transportation as appropriate  - Identify discharge learning needs (meds, wound care, etc.)  - Arrange for interpretive services to assist at discharge as needed  - Refer to Case Management Department for coordinating discharge planning if the patient needs post-hospital services based on physician/advanced practitioner order or complex needs related to functional status, cognitive ability, or social support system  Outcome: Progressing

## 2025-02-10 NOTE — PROGRESS NOTES
02/10/25 0900   Pain Assessment   Pain Assessment Tool 0-10   Pain Score 4   Pain Location/Orientation Orientation: Right;Location: Shoulder   Pain Onset/Description Onset: Ongoing   Hospital Pain Intervention(s) Medication (See MAR);Rest   Restrictions/Precautions   Precautions Aspiration;Bed/chair alarms;Cognitive;Fall Risk;Impulsive;Supervision on toilet/commode   Weight Bearing Restrictions No   Braces or Orthoses Other (Comment)  (Donned bilateral compression socks)   Cognition   Overall Cognitive Status Impaired   Arousal/Participation Alert;Cooperative   Attention Attends with cues to redirect   Subjective   Subjective Patient agreeable to PT session   Roll Left and Right   Type of Assistance Needed Supervision   Physical Assistance Level No physical assistance   Comment HOB flat, utilized rail for rolling left and no rail to roll right   Roll Left and Right CARE Score 4   Sit to Lying   Type of Assistance Needed Supervision   Physical Assistance Level No physical assistance   Comment HOB flat and use of L rail   Sit to Lying CARE Score 4   Lying to Sitting on Side of Bed   Type of Assistance Needed Supervision   Physical Assistance Level No physical assistance   Comment HOB flat and no rail use   Lying to Sitting on Side of Bed CARE Score 4   Sit to Stand   Type of Assistance Needed Incidental touching;Verbal cues;Adaptive equipment   Physical Assistance Level No physical assistance   Comment CG/CS with walker support. Required x1 CGA to stand from sitting at EOB with vc's for weight shifting strategies to facilitate sequencing. Patient required extra time to complete stand while sitting at EOB, anticipate may be due to change in enviornment compared to home set up and fatigue   Sit to Stand CARE Score 4   Bed-Chair Transfer   Type of Assistance Needed Supervision;Adaptive equipment   Physical Assistance Level No physical assistance   Comment CS using RW   Chair/Bed-to-Chair Transfer CARE Score 4   Car  "Transfer   Type of Assistance Needed Incidental touching;Verbal cues;Adaptive equipment   Physical Assistance Level No physical assistance   Comment RW, vc's for sequencing and hand placement to improve performance   Car Transfer CARE Score 4   Walk 10 Feet   Type of Assistance Needed Supervision;Adaptive equipment   Physical Assistance Level No physical assistance   Comment RW support   Walk 10 Feet CARE Score 4   Walk 50 Feet with Two Turns   Type of Assistance Needed Supervision;Adaptive equipment   Physical Assistance Level No physical assistance   Comment CS using RW   Walk 50 Feet with Two Turns CARE Score 4   Walk 150 Feet   Type of Assistance Needed Supervision;Adaptive equipment   Physical Assistance Level No physical assistance   Comment CS using RW for 150ft x2 reps. Patient continues to exhibit intermittent episodes of shuffling and decreased step/stride length   Walk 150 Feet CARE Score 4   Walking 10 Feet on Uneven Surfaces   Type of Assistance Needed Incidental touching;Verbal cues;Adaptive equipment   Physical Assistance Level No physical assistance   Comment CG using RW with vc's for step/AD sequencing to improve safety, no LOB   Walking 10 Feet on Uneven Surfaces CARE Score 4   Wheel 50 Feet with Two Turns   Reason if not Attempted Activity not applicable   Wheel 50 Feet with Two Turns CARE Score 9   Wheel 150 Feet   Reason if not Attempted Activity not applicable   Wheel 150 Feet CARE Score 9   Curb or Single Stair   Style negotiated Curb   Type of Assistance Needed Incidental touching;Verbal cues;Adaptive equipment   Physical Assistance Level No physical assistance   Comment CG using RW to ascend/descend 6\" curb step. VC's for walker placement and step sequencing   1 Step (Curb) CARE Score 4   4 Steps   Type of Assistance Needed Incidental touching;Verbal cues   Physical Assistance Level No physical assistance   Comment CG to ascend/descend 4 steps using L rail only on 6\"  steps with step " to pattern. CGA to ascend/descend 12 steps in stairwell using bilateral rail in reciprocal pattern   4 Steps CARE Score 4   12 Steps   Type of Assistance Needed Incidental touching;Verbal cues   Physical Assistance Level No physical assistance   Comment CG to ascend/descend 12 steps in pphp stairwell using bilateral rail with reciprocal pattern. VC's to improve foot clearance during step down to prevent heel catching on step   12 Steps CARE Score 4   Picking Up Object   Type of Assistance Needed Incidental touching;Verbal cues;Adaptive equipment   Physical Assistance Level No physical assistance   Comment RW support, utilized reacher to retrieve marker from floor   Picking Up Object CARE Score 4   Therapeutic Interventions   Flexibility Seated manual static stretching of bilateral hamstrings  with 30s holds x4 reps and heel cords with 10s holds x4 reps   Neuromuscular Re-Education Patient performed additional 150ft using RW while donning bilateral #2 ankle weights to provide feedback to improve foot clearance during swing phase   Other Comments   Comments Patient's SO in attendence throughout treatment session. Educated in guarding patient on L side during ambulation and step negotiation as well as recommending for supervision levels with patient at all times to complete tasks at home in order to maximize safety. Also educated in using bed/chair alarm in home to maximize safety and alert SO in order to decrease risk for falls   Assessment   Treatment Assessment Patient exhibits functional improvement this session with CS for functional mobility using RW but continues to require intermittent vc's for increased foot clearance during swing phase in order to prevent shuffling and risk for falls. Patient demonstrates inconsistent carryover for hand positioning during transfers but able to complete the tasks safely with no LOB. Patient required x1 episode of increased vc's for weight shifting strategies to stand from EOB  and required extra time to complete tasks secondary to signs of slow motor planning. Episodes of freezing throughout but increased with fatigue. Patient's wife in attendence and initiated family training with hands on family training set for 2/12/25 in prep for d/c home. Patient will continue to benefit from functional mobility training and standing balance activities to maximize performance. Per family, requests RW upon d/c home.   Family/Caregiver Present Yes- Sheng   Barriers to Discharge Inaccessible home environment;Decreased caregiver support   PT Barriers   Physical Impairment Decreased strength;Decreased endurance;Impaired balance;Decreased mobility;Decreased coordination;Decreased cognition;Impaired judgement;Decreased safety awareness;Pain   Plan   Treatment/Interventions Functional transfer training;LE strengthening/ROM;Therapeutic exercise;Endurance training;Cognitive reorientation;Patient/family training;Bed mobility;Gait training;Spoke to MD;Spoke to nursing;Family   Progress Progressing toward goals   Discharge Recommendation   Equipment Recommended Walker   PT Equipment ordered Walker  (OT to submit tomorrow)   PT Therapy Minutes   PT Time In 0900   PT Time Out 1030   PT Total Time (minutes) 90   PT Mode of treatment - Individual (minutes) 90   PT Mode of treatment - Concurrent (minutes) 0   PT Mode of treatment - Group (minutes) 0   PT Mode of treatment - Co-treat (minutes) 0   PT Mode of Treatment - Total time(minutes) 90 minutes   PT Cumulative Minutes 675   Therapy Time missed   Time missed? No

## 2025-02-10 NOTE — PLAN OF CARE
Problem: Prexisting or High Potential for Compromised Skin Integrity  Goal: Skin integrity is maintained or improved  Description: INTERVENTIONS:  - Identify patients at risk for skin breakdown  - Assess and monitor skin integrity  - Assess and monitor nutrition and hydration status  - Monitor labs   - Assess for incontinence   - Turn and reposition patient  - Assist with mobility/ambulation  - Relieve pressure over bony prominences  - Avoid friction and shearing  - Provide appropriate hygiene as needed including keeping skin clean and dry  - Evaluate need for skin moisturizer/barrier cream  - Collaborate with interdisciplinary team   - Patient/family teaching  - Consider wound care consult   Outcome: Progressing     Problem: PAIN - ADULT  Goal: Verbalizes/displays adequate comfort level or baseline comfort level  Description: Interventions:  - Encourage patient to monitor pain and request assistance  - Assess pain using appropriate pain scale  - Administer analgesics based on type and severity of pain and evaluate response  - Implement non-pharmacological measures as appropriate and evaluate response  - Consider cultural and social influences on pain and pain management  - Notify physician/advanced practitioner if interventions unsuccessful or patient reports new pain  Outcome: Progressing     Problem: INFECTION - ADULT  Goal: Absence or prevention of progression during hospitalization  Description: INTERVENTIONS:  - Assess and monitor for signs and symptoms of infection  - Monitor lab/diagnostic results  - Monitor all insertion sites, i.e. indwelling lines, tubes, and drains  - Monitor endotracheal if appropriate and nasal secretions for changes in amount and color  - [place] appropriate cooling/warming therapies per order  - Administer medications as ordered  - Instruct and encourage patient and family to use good hand hygiene technique  - Identify and instruct in appropriate isolation precautions for  identified infection/condition  Outcome: Progressing  Goal: Absence of fever/infection during neutropenic period  Description: INTERVENTIONS:  - Monitor WBC    Outcome: Progressing

## 2025-02-10 NOTE — ASSESSMENT & PLAN NOTE
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  Continue rivastigmine patch - pt's wife very concerned about the cost. Price check was $10.  Pt agitated 2/3 during AM vitals. He did receive IM Zyprexa. Not agitated currently  No agitation over the weekend.

## 2025-02-10 NOTE — PROGRESS NOTES
"OT daily tx note     02/10/25 1230   Pain Assessment   Pain Assessment Tool 0-10   Pain Score No Pain   Restrictions/Precautions   Precautions Aspiration;Bed/chair alarms;Cognitive;Fall Risk;Supervision on toilet/commode   Weight Bearing Restrictions No   ROM Restrictions No   Lifestyle   Autonomy \"See ya later alligator\"   Eating   Type of Assistance Needed Supervision   Physical Assistance Level No physical assistance   Comment Full Sup for meals; upon arrival still eating, OT took over for PCA and provided VC as needed. pt tends to eat quickly and reminded to pace himself and alternate food/drink   Eating CARE Score 4   Sit to Stand   Type of Assistance Needed Physical assistance   Physical Assistance Level 25% or less   Comment pt initially CGA for STS but t/o session, req min A due to fatigue   Sit to Stand CARE Score 3   Bed-Chair Transfer   Type of Assistance Needed Physical assistance   Physical Assistance Level 26%-50%   Comment min-mod A using RW for short distance; pt req mod VC for hand placement and feet when walking due to shuffling gait. min A t/o most of session but when fatigued, can be mod A   Chair/Bed-to-Chair Transfer CARE Score 3   Toileting Hygiene   Type of Assistance Needed Physical assistance   Physical Assistance Level Total assistance   Comment TA for backside hygiene/cm in stance; pt completed BM w/ prolonged time and unable to follow through w/ commands such as wiping and pulling up pants.   Toileting Hygiene CARE Score 1   Toilet Transfer   Type of Assistance Needed Physical assistance   Physical Assistance Level 26%-50%   Comment w/ RW; VC for shuffling/freezing gait; pt very fatigued   Toilet Transfer CARE Score 3   Functional Standing Tolerance   Time 5.5 min   Activity card matching   Comments Pt participated in standing activity using card matching. Pt completed standing at tabletop and able to tolerate 5.5 min of standing w/ G functional reach/balance. No LOB/SOB noted. Pt req " toilet break and demo some fatigue by end of 5 min. Pt benefits from activity to improve endurance, visual scanning, standing tolerance, balance, working memory, functional reach, and activity tolerance to faciltitate ind w/ ADLs.   Cognition   Overall Cognitive Status Impaired   Arousal/Participation Alert;Cooperative   Attention Attends with cues to redirect   Orientation Level Oriented to person;Oriented to place   Memory Decreased short term memory;Decreased recall of recent events;Decreased recall of precautions   Following Commands Follows one step commands with increased time or repetition   Comments initially pt able to follow simple commands. mod-max VC for instruction w/ poor carryover by end of session. pt demo fatigue by end of session   Additional Activities   Additional Activities Other (Comment)   Additional Activities Comments Pt seated in recliner and instructed to  cones from floor to simulate reaching to put on shoes/socks. Pt able to retreive cones to the side using same side hand but unable to retrieve if using opposing hand to cross midline. Pt able to reach cones placed on floor btwn legs and inc difficulty retrieving cones near feet. Multiple trials completed to trial reaching to retrieve all cones and added difficulty for crossing midline and pt able to complete 50%. Pt req mod VC for direction and projected voice to understand commands. Pt benefits from reaching to improve ability to don/doff shoes/socks.   Assessment   Treatment Assessment Pt engaged in skilled OT session with focus on Functional Transfers, Functional Cognition, Standing tolerance, Standing balance , midline awareness, Visual scanning, Energy conservation training/education, healthy coping education, Leisure and social pursuits, and community re-integration. Pt is limited by weakness, impaired balance, decreased endurance, increased fall risk, decreased ADLS, decreased IADLS, pain, decreased activity tolerance,  decreased safety awareness, impaired judgement, decreased cognition, and decreased strength. Session focus on balance training, standing tolerance, functional reach, and toileting. Pt not able to fully utilize therapy session due to BM following lunch. Upcoming sessions to continue to focus on RW use, transfers, functional cog, and balance training. OT services are warranted to address above barriers.   Prognosis Good   Problem List Decreased strength;Decreased range of motion;Decreased endurance;Impaired balance;Decreased mobility;Decreased coordination;Decreased cognition;Impaired judgement;Decreased safety awareness   Barriers to Discharge Inaccessible home environment;Decreased caregiver support   Plan   Treatment/Interventions ADL retraining;Functional transfer training;Therapeutic exercise;Endurance training;Patient/family training;Compensatory technique education   Progress Progressing toward goals   OT Therapy Minutes   OT Time In 1230   OT Time Out 1400   OT Total Time (minutes) 90   OT Mode of treatment - Individual (minutes) 90   OT Mode of treatment - Concurrent (minutes) 0   OT Mode of treatment - Group (minutes) 0   OT Mode of treatment - Co-treat (minutes) 0   OT Mode of Treatment - Total time(minutes) 90 minutes   OT Cumulative Minutes 620   Therapy Time missed   Time missed? No

## 2025-02-10 NOTE — ASSESSMENT & PLAN NOTE
Follows with Podiatry and wound care as an outpt.  He was taking Lasix 3x weekly as an outpt.  Daily Lasix restarted last week. He appeared dry on labs and Lasix held over the weekend.  BMP without change. Continue to hold Lasix.  Encourage po fluids.  Spoke with pt's wife and LE edema is much better then it was at home.

## 2025-02-10 NOTE — ASSESSMENT & PLAN NOTE
Continue modified diet, upgraded to level 3 with thin liquids. Per speech- still needs frequent reminders to swallow so he needs supervision for all meals  SLP to continue to follow.

## 2025-02-10 NOTE — ASSESSMENT & PLAN NOTE
"Patient's wife endorsing recent abnormal behaviors prior to hospitalization.  Patient waking frequently at night, hallucinations noted when awaking from sleep, patient noted to be more \"nasty\" and agitated with at times garbled speech which clears.  Presents to ED after fall at home with concern for possible UTI. S/p head strike in ED w/ R eyebrow laceration.  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 with mild acute encephalopathy  CT head w/o acute findings  TSH benign   Folate WNL, B12 normal, vitamin D low at 26.9  Wife notes B12/vitamin D supplementation 3 times weekly PTA  Continue to q daily dosing of vit D  B12 3x weekly  Negative infxn work-up  Sinemet as ordered by neurology (below)  Acetaminophen 650mg TID    Patient oriented, and has some decent recall. Impaired insight, and he sundowns.   Previous MOCA - 7  Current SLUMS - 8   - Low dose seroquel PRN ordered by Neuro  - Added PRN olanzapine for severe agitation ONLY.    - Did receive this on 2/3 in the AM after some aggressive behaviors during blood draw/BP.   - Has been fine since.   - Redirect, reorient first  - Melatonin in the evening for sleep/wake.  - Bed/chair alarms  - Optimize bowel/bladder program  - Avoid deliriogenic meds and physical restraint.  "

## 2025-02-10 NOTE — ASSESSMENT & PLAN NOTE
Continue Sinemet 1.5 tabs 4x daily. Currently ordered at times pt takes the medication at home. May need to adjust timing based on therapy schedule.  Follows with Dr. Cavanaugh as an outpt.  Therapy per PMR.  DC 2/14/25

## 2025-02-10 NOTE — ASSESSMENT & PLAN NOTE
- Bowel/bladder incontinence on admission  - Bowel: continence has improved. Was also constipated. On colace, senna 2 tabs and daily miralax.    - Has Prn suppository or lactulose   - Last BM 2/7   - Monitor and adjust as appropriate  - Bladder: PVR x3 on admission are low   - UA during acute hospitalization not suspicious for infection   - Timed Voids.    - Improving. 2/3 largely continent of bladder.

## 2025-02-10 NOTE — ASSESSMENT & PLAN NOTE
Chronic right lower extremity venous ulcers. Largely healed and wound care signed off.   PTA Lasix for LE edema management; cont at [facility]  Known to [facility] podiatry/[facility]   Wound care sami and treat  Offload heels  ACE wraps to b/l LEs

## 2025-02-10 NOTE — PROGRESS NOTES
Progress Note - Hospitalist   Name: Daniel Sanz Jr. 84 y.o. male I MRN: [id number]  Unit/Bed#: -01 I Date of Admission: 1/31/2025   Date of Service: 2/10/2025 I Hospital Day: 10    Assessment & Plan  Parkinson's disease (HCC)  Continue Sinemet 1.5 tabs 4x daily. Currently ordered at times pt takes the medication at home. May need to adjust timing based on therapy schedule.  Follows with Dr. Cavanaugh as an outpt.  Therapy per PMR.  DC 2/14/25  HTN (hypertension)  Home: amlodpine 5mg daily  Here: Same as home medication.  He did have some changes in BP with position changes 2/3 but was not orthostatic  Monitor for orthostasis due to parkinsons  AM BP has been elevated but improves after medications.  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  Continue rivastigmine patch - pt's wife very concerned about the cost. Price check was $10.  Pt agitated 2/3 during AM vitals. He did receive IM Zyprexa. Not agitated currently  No agitation over the weekend.  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  He was taking Lasix 3x weekly as an outpt.  Daily Lasix restarted last week. He appeared dry on labs and Lasix held over the weekend.  BMP without change. Continue to hold Lasix.  Encourage po fluids.  Spoke with pt's wife and LE edema is much better then it was at home.  Dysphagia  Continue modified diet, upgraded to level 3 with thin liquids. Per speech- still needs frequent reminders to swallow so he needs supervision for all meals  SLP to continue to follow.  Chronic pain of both shoulders  Per wife, had at home as well  Continue lidocaine patches  Vitamin D insufficiency  Continue supplementation    The above assessment and plan was reviewed and updated as determined by my evaluation of the patient on 2/10/2025.    History of Present Illness   Patient seen and examined. Patients overnight issues or events were reviewed with  nursing staff. New or overnight issues include the following:     Pt seen in his room with his wife present. She reports that the LE edema is better here then it was at home. Pt currently denies complaints.    A 10 point review of systems was negative except for what is noted in the HPI.    Objective :  Temp:  [97.8 °F (36.6 °C)-98.5 °F (36.9 °C)] 98.5 °F (36.9 °C)  HR:  [74-83] 83  BP: (133-166)/(60-71) 163/71  Resp:  [16-18] 18  SpO2:  [97 %-100 %] 97 %  O2 Device: None (Room air)    Invasive Devices       None                   Physical Exam  General Appearance: NAD; pleasant  HEENT: PERRLA, conjuctiva normal; mucous membranes moist; face symmetrical  Neck:  Supple  Lungs: clear bilaterally, normal respiratory effort, no retractions, expiratory effort normal, on room air  CV: regular rate and rhythm, no murmurs rubs or gallops noted   ABD: soft non tender, +BS x4  EXT: DP pulses intact, no lymphadenopathy, Rt > Lt ankle edema.  Skin: normal turgor, normal texture, no rash  Psych: affect normal, mood normal  Neuro: Awake and alert. +tremor  The above physical exam was reviewed and updated as determined by my evaluation of the patient on 2/10/2025.      Lab Results: I have reviewed the following results:  Results from last 7 days   Lab Units 02/10/25  0632   WBC Thousand/uL 5.66   HEMOGLOBIN g/dL 11.6*   HEMATOCRIT % 35.5*   PLATELETS Thousands/uL 224     Results from last 7 days   Lab Units 02/10/25  0632 02/07/25  0618   SODIUM mmol/L 141 138   POTASSIUM mmol/L 4.8 4.8   CHLORIDE mmol/L 108 105   CO2 mmol/L 26 27   BUN mg/dL 40* 39*   CREATININE mg/dL 1.15 1.21   CALCIUM mg/dL 8.9 9.3                   Imaging Results Review: No pertinent imaging studies reviewed.  Other Study Results Review: Other studies reviewed include: BMP, CBC    Review of Scheduled Meds: Medications  reviewed and reconciled as needed  Current Facility-Administered Medications   Medication Dose Route Frequency Provider Last Rate     acetaminophen  650 mg Oral TID Chelsea Dasilva PA-C      amLODIPine  5 mg Oral Daily Chelsea Dasilva PA-C      Artificial Tears  1 drop Both Eyes Q4H PRN Chelsea Dasilva PA-C      bisacodyl  10 mg Rectal Daily PRN Ashley Depadua, MD      calcium carbonate  1,000 mg Oral Daily PRN Chelsea Dasilva PA-C      carbidopa-levodopa  1.5 tablet Oral 4x Daily Chelsea Dasilva PA-C      Cholecalciferol  1,000 Units Oral Daily Chelsea Dasilva PA-C      vitamin B-12  1,000 mcg Oral Once per day on Monday Wednesday Friday Chelsea Dasilva PA-C      docusate sodium  100 mg Oral BID Chelsea Dasilva PA-C      enoxaparin  40 mg Subcutaneous Daily Chelsea Dasilva PA-C      [Held by provider] furosemide  20 mg Oral Daily Ciara Calderon PA-C      lactulose  20 g Oral Daily PRN Ashley Depadua, MD      lidocaine  2 patch Topical Daily Ashley Depadua, MD      melatonin  6 mg Oral QPM Ashley Depadua, MD      OLANZapine  2.5 mg Intramuscular Q4H PRN Ashley Depadua, MD      ondansetron  4 mg Oral Q6H PRN Chelsea Dasilva PA-C      polyethylene glycol  17 g Oral Daily Ashley Depadua, MD      QUEtiapine  12.5 mg Oral Q8H PRN Chelsea Dasilva PA-C      rivastigmine  1 patch Transdermal Daily Chelsea Dasilva PA-C      senna  2 tablet Oral BID Chelsea Dasilva PA-C         VTE Pharmacologic Prophylaxis: Lovenox  Code Status: Level 1 - Full Code  Current Length of Stay: 10 day(s)    Administrative Statements   I have spent a total time of 35 minutes in caring for this patient on the day of the visit/encounter including Diagnostic results, Prognosis, Risks and benefits of tx options, Instructions for management, Patient and family education, Importance of tx compliance, Risk factor reductions, Impressions, Counseling / Coordination of care, Documenting in the medical record, Reviewing / ordering tests, medicine, procedures  , Obtaining or reviewing history  , and Communicating with other  healthcare professionals .  ** Please Note:  voice to text software may have been used in the creation of this document. Although proof errors in transcription or interpretation are a potential of such software**

## 2025-02-10 NOTE — PROGRESS NOTES
"Progress Note - PMR   Name: Daniel Sanz Jr. 84 y.o. male I MRN: [id number]  Unit/Bed#: -01 I Date of Admission: 1/31/2025   Date of Service: 2/10/2025 I Hospital Day: 10     Assessment & Plan  Parkinson's disease (HCC)  - AMS likely sequela of progressive parkinsons dementia plus possible adverse reaction to recent sinemet increase  - cont neuro adjusted recs for sinemet 1.5 QID  - cont exelon patch   - PT/OT/SLP 3-5 hours/day, 5-7 days/week.  - Will need f/u with Neurology after discharge.   Dysphagia  Patient observed coughing while taking medications by nursing at acute   Advanced to Level 3/thins on 2/4  Aspiration precautions   Timing or need for VBS as per SLP  Likely early the week of 2/10. Will await instructions from SLP re: timing of placing order.   SLP dysphagia eval and treat  Altered mental status  Patient's wife endorsing recent abnormal behaviors prior to hospitalization.  Patient waking frequently at night, hallucinations noted when awaking from sleep, patient noted to be more \"nasty\" and agitated with at times garbled speech which clears.  Presents to ED after fall at home with concern for possible UTI. S/p head strike in ED w/ R eyebrow laceration.  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 with mild acute encephalopathy  CT head w/o acute findings  TSH benign   Folate WNL, B12 normal, vitamin D low at 26.9  Wife notes B12/vitamin D supplementation 3 times weekly PTA  Continue to q daily dosing of vit D  B12 3x weekly  Negative infxn work-up  Sinemet as ordered by neurology (below)  Acetaminophen 650mg TID    Patient oriented, and has some decent recall. Impaired insight, and he sundowns.   Previous MOCA - 7  Current SLUMS - 8   - Low dose seroquel PRN ordered by Neuro  - Added PRN olanzapine for severe agitation ONLY.    - Did receive this on 2/3 in the AM after some aggressive behaviors during blood draw/BP.   - Has been fine since.   - Redirect, reorient first  - Melatonin in the evening " for sleep/wake.  - Bed/chair alarms  - Optimize bowel/bladder program  - Avoid deliriogenic meds and physical restraint.  Bowel and bladder incontinence  - Bowel/bladder incontinence on admission  - Bowel: continence has improved. Was also constipated. On colace, senna 2 tabs and daily miralax.    - Has Prn suppository or lactulose   - Last BM 2/7   - Monitor and adjust as appropriate  - Bladder: PVR x3 on admission are low   - UA during acute hospitalization not suspicious for infection   - Timed Voids.    - Improving. 2/3 largely continent of bladder.   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 (HCC)  Chronic right lower extremity venous ulcers. Largely healed and wound care signed off.   PTA Lasix for LE edema management; cont at [facility]  Known to [facility] podiatry/A   Wound care sami and treat  Offload heels  ACE wraps to b/l LEs  Chronic pain of both shoulders  Reports both shoulders.  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   Non-surgically managed. Last seen by Ortho 2019.   Tylenol scheduled.  Lidoderm patches    - This has helped.   Topicals for now. Monitor.   At risk for venous thromboembolism (VTE)  Lovenox, SCDs, ambulation  Will not need lovenox at discharge   Vitamin D insufficiency  Continue daily cholecalciferol supplementation.   HTN (hypertension)  Home: Amlodipine 5mg daily, Lasix 40mg daily, Losartan 50mg daily (unclear how long he was taking). Here: Amlodipine 5mg daily.   Monitor and adjust as appropriate       Health Maintenance  #Skin/Pressure Injury Prevention: Turn Q2hr in bed, with weight shifts [id number] in wheelchair.  #GI Prophylaxis: Not indicated   #Code Status: Full Code  #FEN: Level 2/Thins, [employer] at lunch, Angel at dinner, and EP HP angel at Breakfast  #Dispo: Team 2/4: ADD 2/14 with Home PT/OT/SLP  with goals to discharge home at Sup-Yue level of function. Ambulation vs. Wheelchair to be determined while here.   Will need outpatient f/u with  Neuro        To Review: Mr. Sanz is an 85yo M with medical history of Parkinson's Disease with dementia (MOCA 7/30 in March 2024), HTN, LE venous stasis dermatitis and edema, GERD who presented to [facility] on 1/21 with worsening agitation, AMS, culminating in a fall with HS and R eye lac. No sutures were needed for the lac. CTH/N and L shoulder benign. He was noted to be hypothermic and hyperkalemic on admission. TSH WNL, Folate WNL, Vitamin D was low. B12 WNL. Given gentle IVF, noted to be constipated. Wound care was consulted for R medial tibia venous ulcer, and Geriatrics was consulted who recommended melatonin, delirium precautions. Speech was consulted who recommended NPO initially, but he has since been advanced to an oral diet of Level 2/thin liquids. Neurology was also consulted as the agitation, behavior symptoms, hallucinations worsened since increasing his medications, they lowered his Sinemet and added rivastigmine. There was improvement in his behavioral symptoms. He no longer needed restraint/1:1 at time of discharge. He was [facility] to the [facility] on 1/[facility].       Chief Complaint: Wife is here for family training.     Interval History/Subjective:  No acute events over the weekend. Has not needed PRN meds for behavior, and per nursing no behavioral concerns to report overnight. Last BM was 2/7, and nursing will give PRN today. No new N/V, abdominal pain, CP, SOB, fevers, chills, N/V.    Functional Update:  PT: progressing to Sup with bed mobility, Sup-CGA transfers, Sup-CGA ambulation with RW, CGA stairs   OT: progressing to CGA-modA with ADLs.   SLP: Full supervision for meals, modA comprehension, Yue expression, Sup social interaction, maxA executive function, maxA memory. Mild-mod oropharyngeal dysphagia (mostly oral) Level 3/thins. VBS planned for this week.     Objective     Temp:  [97.8 °F (36.6 °C)-98.5 °F (36.9 °C)] 98.5 °F (36.9 °C)  HR:  [74-83] 83  Resp:  [16-18] 18  BP: (133-166)/(60-71)  166/71  SpO2:  [97 %-100 %] 97 %    Gen: No acute distress, Well-nourished, well-appearing.  HEENT: Moist mucus membranes, Normocephalic/Atraumatic  Cardiovascular: Regular rate, rhythm, S1/S2. Distal pulses palpable  Heme/Extr: No edema  Pulmonary: Non-labored breathing. Lungs CTAB  : No whitley  GI: Soft, non-tender, non-distended.   Integumentary: Skin is warm, dry.   Neuro: AA,  Speech is intact. Appropriate to questioning.  Psych: Normal mood and affect.     Physical examination is otherwise unchanged from previous encounter, except as noted above.    Scheduled Meds:  Current Facility-Administered Medications   Medication Dose Route Frequency Provider Last Rate    acetaminophen  650 mg Oral TID Chelsea Dasilva PA-C      amLODIPine  5 mg Oral Daily Chelsea Dasilva PA-C      Artificial Tears  1 drop Both Eyes Q4H PRN Chelsea Dasilva PA-C      bisacodyl  10 mg Rectal Daily PRN Ashley Depadua, MD      calcium carbonate  1,000 mg Oral Daily PRN Chelsea Dasilva PA-C      carbidopa-levodopa  1.5 tablet Oral 4x Daily Chelsea Dasilva PA-C      Cholecalciferol  1,000 Units Oral Daily Chelsea Dasilva PA-C      vitamin B-12  1,000 mcg Oral Once per day on Monday Wednesday Friday Chelsea Dasilva PA-C      docusate sodium  100 mg Oral BID Chelsea Dasilva PA-C      enoxaparin  40 mg Subcutaneous Daily Chelsea Dasilva PA-C      [Held by provider] furosemide  20 mg Oral Daily Ciara Calderon PA-C      lactulose  20 g Oral Daily PRN Ashley Depadua, MD      lidocaine  2 patch Topical Daily Ashley Depadua, MD      melatonin  6 mg Oral QPM Ashley Depadua, MD      OLANZapine  2.5 mg Intramuscular Q4H PRN Ashley Depadua, MD      ondansetron  4 mg Oral Q6H PRN Chelsea Dasilva PA-C      polyethylene glycol  17 g Oral Daily Ashley Depadua, MD      QUEtiapine  12.5 mg Oral Q8H PRN Chelsea Dasilva PA-C      rivastigmine  1 patch Transdermal Daily SURAJ Clifton   2 tablet Oral BID Chelsea Dasilva PA-C       Imaging: Reviewed, no new imaging.       Lab Results: I have reviewed the following results:  Results from last 7 days   Lab Units 02/10/25  0632   HEMOGLOBIN g/dL 11.6*   HEMATOCRIT % 35.5*   WBC Thousand/uL 5.66   PLATELETS Thousands/uL 224     Results from last 7 days   Lab Units 02/10/25  0632 02/07/25  0618   BUN mg/dL 40* 39*   SODIUM mmol/L 141 138   POTASSIUM mmol/L 4.8 4.8   CHLORIDE mmol/L 108 105   CREATININE mg/dL 1.15 1.21              0658}      ** Please Note: Fluency Direct voice to text software may have been used in the creation of this document. **

## 2025-02-10 NOTE — PROGRESS NOTES
"   02/10/25 1530   Pain Assessment   Pain Assessment Tool 0-10   Restrictions/Precautions   Precautions Aspiration;Cognitive;Fall Risk;Supervision on toilet/commode;Bed/chair alarms   Comprehension   Comprehension (FIM) 3 - Understands basic info/conversation 50-74% of time   Expression   Expression (FIM) 4 - Expresses basic info/needs 75-90% of time   Social Interaction   Social Interaction (FIM) 5 - Interacts appropriately with others 90% of time   Problem Solving   Problem solving (FIM) 3 - Solves basic problmes 50-74% of time   Memory   Memory (FIM) 2 - Recognizes, recalls/performs 25-49%   Speech/Language/Cognition Assessmetn   Treatment Assessment Patient seen to address cognitive linguistic skills. Patient was upright in recliner awake and alert.  He was engaged in short rapport building as this SLP new to this patient care team.  Orientation skills assessed.  Correct identification of month, year, and date.  He displayed recall of one swallowing strategy noting\"most important is not to overstuff your mouth\".  Speech was clear and adequate volume observed.  Swallowing strategies reviewed.  He was unable to correctly identify location, RFA, or season despite max verbal cues.  Initial structured task attempted to complete four step sequencing.  He refused completion of task noting poor vision and glasses present were not his \"normal\" glasses.  SLP switched identification of large pictures to target one step direction following and naming.  Task completed with 100% accuracy.  Next structured task utilized verbally presented stimuli targeting completion of convergent categorization.  Task completed eith 75% accuracy.  Patient required several repetitions and clarifications to allow for adequate processing and comprehension.  Max verbal cues did not increase accuracy.  Next structured task targeted correctly identifying true versus false statements.  Several repetitions and clarifications required for accurate " completion of task.  Overall task completed with 100% accuracy.  Slow rate of completion and need for repetitions, clarifications, and redirections as he was easily distracted. At this time, pt will benefit from skilled SLP services targeting functional cognitive skills in hopes for decreasing the potential for caregiver burden over time.   SLP Therapy Minutes   SLP Time In 1530   SLP Time Out 1600   SLP Total Time (minutes) 30   SLP Mode of treatment - Individual (minutes) 30   SLP Mode of treatment - Concurrent (minutes) 0   SLP Mode of treatment - Group (minutes) 0   SLP Mode of treatment - Co-treat (minutes) 0   SLP Mode of Treatment - Total time(minutes) 30 minutes   SLP Cumulative Minutes 425   Therapy Time missed   Time missed? No

## 2025-02-11 PROCEDURE — 99231 SBSQ HOSP IP/OBS SF/LOW 25: CPT | Performed by: PHYSICIAN ASSISTANT

## 2025-02-11 PROCEDURE — 97110 THERAPEUTIC EXERCISES: CPT

## 2025-02-11 PROCEDURE — 97535 SELF CARE MNGMENT TRAINING: CPT

## 2025-02-11 PROCEDURE — 97530 THERAPEUTIC ACTIVITIES: CPT

## 2025-02-11 PROCEDURE — 92526 ORAL FUNCTION THERAPY: CPT

## 2025-02-11 PROCEDURE — 97116 GAIT TRAINING THERAPY: CPT

## 2025-02-11 PROCEDURE — 99233 SBSQ HOSP IP/OBS HIGH 50: CPT | Performed by: PHYSICAL MEDICINE & REHABILITATION

## 2025-02-11 RX ADMIN — ENOXAPARIN SODIUM 40 MG: 40 INJECTION SUBCUTANEOUS at 10:25

## 2025-02-11 RX ADMIN — CARBIDOPA AND LEVODOPA 1.5 TABLET: 25; 100 TABLET ORAL at 21:11

## 2025-02-11 RX ADMIN — ACETAMINOPHEN 650 MG: 325 TABLET, FILM COATED ORAL at 17:10

## 2025-02-11 RX ADMIN — LIDOCAINE 5% 2 PATCH: 700 PATCH TOPICAL at 12:19

## 2025-02-11 RX ADMIN — DOCUSATE SODIUM 100 MG: 100 CAPSULE, LIQUID FILLED ORAL at 17:11

## 2025-02-11 RX ADMIN — ACETAMINOPHEN 650 MG: 325 TABLET, FILM COATED ORAL at 10:26

## 2025-02-11 RX ADMIN — CARBIDOPA AND LEVODOPA 1.5 TABLET: 25; 100 TABLET ORAL at 13:03

## 2025-02-11 RX ADMIN — Medication 1000 UNITS: at 12:18

## 2025-02-11 RX ADMIN — RIVASTIGMINE 1 PATCH: 4.6 PATCH, EXTENDED RELEASE TRANSDERMAL at 12:19

## 2025-02-11 RX ADMIN — Medication 6 MG: at 21:00

## 2025-02-11 RX ADMIN — ACETAMINOPHEN 650 MG: 325 TABLET, FILM COATED ORAL at 21:12

## 2025-02-11 RX ADMIN — CARBIDOPA AND LEVODOPA 1.5 TABLET: 25; 100 TABLET ORAL at 10:25

## 2025-02-11 RX ADMIN — CARBIDOPA AND LEVODOPA 1.5 TABLET: 25; 100 TABLET ORAL at 17:11

## 2025-02-11 RX ADMIN — AMLODIPINE BESYLATE 5 MG: 5 TABLET ORAL at 10:24

## 2025-02-11 RX ADMIN — DOCUSATE SODIUM 100 MG: 100 CAPSULE, LIQUID FILLED ORAL at 12:18

## 2025-02-11 RX ADMIN — SENNOSIDES 17.2 MG: 8.6 TABLET ORAL at 17:10

## 2025-02-11 RX ADMIN — POLYETHYLENE GLYCOL 3350 17 G: 17 POWDER, FOR SOLUTION ORAL at 12:18

## 2025-02-11 RX ADMIN — QUETIAPINE FUMARATE 12.5 MG: 25 TABLET ORAL at 21:14

## 2025-02-11 RX ADMIN — OLANZAPINE 2.5 MG: 10 INJECTION, POWDER, FOR SOLUTION INTRAMUSCULAR at 01:05

## 2025-02-11 NOTE — ASSESSMENT & PLAN NOTE
Patient observed coughing while taking medications by nursing at acute   Advanced to Level 3/thins on 2/4  Aspiration precautions   Timing or need for VBS as per SLP  Likely early the week of 2/10. Will await instructions from SLP re: timing of placing order.   SLP dysphagia eval and treat  SLP plans to perform FL barium swallow tomorrow

## 2025-02-11 NOTE — PROGRESS NOTES
02/11/25 1115   Pain Assessment   Pain Assessment Tool 0-10   Pain Score No Pain   Restrictions/Precautions   Precautions Aspiration;Bed/chair alarms;Cognitive;Fall Risk;Impulsive;Supervision on toilet/commode   Eating   Type of Assistance Needed Set-up / clean-up;Supervision;Verbal cues   Physical Assistance Level No physical assistance   Eating CARE Score 4   Swallow Assessment   Swallow Treatment Assessment   Daily Dysphagia Tx Note     Patient Name: Daniel Sanz Jr.    Today's Date: 2/11/2025      Current Risks for Dysphagia & Aspiration: General debilitation; Cognitive deficit; Mental status change; Behavior issues; Hx neurologic dx (Parkinson's); Positioning issues      Current Symptoms/Concerns: Coughs w/ liquids; Cough on own secretions; Difficulty chewing; Holding food in mouth     Current diet:soft/level 3 diet and thin liquids      Premorbid diet::soft/level 3 diet, regular diet, and thin liquids      Positioning: upright in recliner    Items administered:Consistencies Administered: thin liquids, puree and hard solids  Materials administered included: chicken fingers, tater tots, pudding, ice cream and thins by straw    Total amount of meal consumed:   100% of meal and 240cc of thins        Summary:     Pt presenting with mild-moderate oral and mild pharyngeal dysphagia today. Symptoms or concerns included the following: continues w/ functional lip seal and bolus retrieval given finger foods, utensils and straw sips today w/o demonstrating anterior loss. However, pt's overall manipulation and mastication are delayed due to decreased attention to task at hand, where pt will stop chewing or manipulating boluses when retrieving next bites. SLP did have to provide fairly consistent verbal cues to have pt to continue to chew and swallow both solids and pudding items. Pt was less impulsive w/ rate of intake today w/ solids to where pt did have less piecemeal transit of solids. However, still pt does have  "decreased bolus formulation w/ both solids/pudding due to delay in mastication/manipulation which leads to minimal oral residual which pt cleared w/ increased time and cues to use liquid wash. Pt was suspected to have decreased bolus control/transit w/ chicken tenders, ice cream today. Swallow initiation was still delayed across textures and once HLE was elicited, suspected to be decreased. Double swallows still persisting w/ solids > pudding/thins. Slightly increased audible swallows noted w/ thins and pudding. Pt was observed to have wet voicing prior to meal, continuing to require verbal prompts to clear throat or elicit cough. Wetness increased near end of meal w/ the milk based products (ice cream/pudding), to where ongoing verbal cues were needed for throat clear/cough. Spontaneous cough was elicited x3 during solids today.     SLP continuing to provide spouse ongoing education in regard the need to provide verbal cues to pt throughout meals. At one point, spouse stated to pt, \"you have to listen to her\" in which SLP educated spouse that she needs to provide the same cues to him during meals. Spouse continuing to ask SLP about completing VFSS which today has been scheduled for 2/12 at 10am. SLP did further educate spouse that this is a controlled test where SLP feeds pt. It might not demonstrate overt sxs which are noted during meal times, where SLP stating the importance in how he NEEDS VERBAL CUES to ensure safety of swallow. Will plan to outline swallow strategy cues tomorrow in family training. Additionally will provide education on diet recommendations for home.        Recommendations:  Diet: soft/level 3 diet and thin liquids  Meds: whole with puree  Strategies:  upright posture, only feed when fully alert, slow rate of feeding, small bites/sips, cough when demonstrating wet voicing, quiet environment (tv off, limit talking, door closed, etc.), alternating bites and sips, and OOB for meals preferred    "   FULL supervision w/ meals. Will need setup AND ongoing verbal cues for pacing throughout meal.   Results reviewed with:  patient, RN- Sara, pt's wife- Sheng  Aspiration precautions posted.     F/u ST tx: Pt is currently recommended for further skilled SLP services targeting dysphagia therapy in order to maximize oral and pharyngeal swallow skills, while safely supporting PO intake, as well as to improve independent carryover of safe swallow strategies.       Plan: Monitor diet advancement to level 3 diet w/ thins            Trial solids with SLP supervision only            Ongoing family education/training w/ spouse--> continue increased education on cues for pt throughout meal            VFSS has been scheduled for 2/12 at 10am; will review results w/ family in FT session tomorrow   Swallow Assessment Prognosis   Prognosis Fair   Prognosis Considerations Age;Co-morbidities;Family/community support;Medical prognosis;Medical diagnosis;Ability to carry over;New learning ability;Severity of impairments;Previous level of function   SLP Therapy Minutes   SLP Time In 1115   SLP Time Out 1200   SLP Total Time (minutes) 45   SLP Mode of treatment - Individual (minutes) 45   SLP Mode of treatment - Concurrent (minutes) 0   SLP Mode of treatment - Group (minutes) 0   SLP Mode of treatment - Co-treat (minutes) 0   SLP Mode of Treatment - Total time(minutes) 45 minutes   SLP Cumulative Minutes 470   Therapy Time missed   Time missed? No
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
## 2025-02-11 NOTE — ASSESSMENT & PLAN NOTE
- Bowel/bladder incontinence on admission  - Bowel: continence has improved. Was also constipated. On colace, senna 2 tabs and daily miralax.    - Has Prn suppository or lactulose   - Last BM 2/10   - Monitor and adjust as appropriate  - Bladder: PVR x3 on admission are low   - UA during acute hospitalization not suspicious for infection   - Timed Voids.    - Improving. 2/3 largely continent of bladder.

## 2025-02-11 NOTE — ASSESSMENT & PLAN NOTE
Chronic right lower extremity venous ulcers. Largely healed and wound care signed off.   PTA Lasix for LE edema management; cont at [facility]  Known to [facility] podiatry/[facility]   Wound care sami and treat  Offload heels  ACE wraps to b/l LEs

## 2025-02-11 NOTE — PROGRESS NOTES
02/11/25 1301   Pain Assessment   Pain Assessment Tool 0-10   Pain Score No Pain   Restrictions/Precautions   Precautions Aspiration;Bed/chair alarms;Cognitive;Fall Risk;Supervision on toilet/commode   Weight Bearing Restrictions No   ROM Restrictions No   Lifestyle   Autonomy Pt demonstrating slower processing and communication today.   Reciprocal Relationships Pt's wife Sheng present for entire session.   Service to Others retired    Intrinsic Gratification watching TV, talks about hunting as a favorite leisure but family reports he last went 13 years ago   Sit to Lying   Type of Assistance Needed Independent   Physical Assistance Level No physical assistance   Comment bed flat did not use the rail although he has a single bed rail at home   Sit to Lying CARE Score 6   Lying to Sitting on Side of Bed   Type of Assistance Needed Independent   Physical Assistance Level No physical assistance   Comment bed flat did not use the rail although he has a single bed rail at home   Lying to Sitting on Side of Bed CARE Score 6   Sit to Stand   Type of Assistance Needed Supervision   Physical Assistance Level No physical assistance   Sit to Stand CARE Score 4   Bed-Chair Transfer   Type of Assistance Needed Incidental touching   Physical Assistance Level No physical assistance   Comment RW   Chair/Bed-to-Chair Transfer CARE Score 4   Toileting Hygiene   Type of Assistance Needed Physical assistance   Physical Assistance Level 51%-75%   Comment mod A to manage disposable brief and placement of the urinal. Trial of female urinal with improved ability to secure placement inside the opening. Trial of placement x 3 with improved ability to keep the urinal level by third trial. Training provided to Pt's wife regarding application/removal of tab style brief. Training in techniques to don a new pull-up without having to take off the pants.   Toileting Hygiene CARE Score 2   Cognition   Overall Cognitive Status Impaired  "  Arousal/Participation Cooperative   Attention Attends with cues to redirect   Orientation Level Oriented to person;Oriented to place   Memory Decreased short term memory;Decreased recall of precautions   Following Commands Follows one step commands with increased time or repetition   Activity Tolerance   Activity Tolerance Patient tolerated treatment well   Medical Staff Made Aware Nurse Sara educated on Pt's improved positioning with use of a female urinal and request that staff attempt using it with him this evening and overnight.   Assessment   Treatment Assessment Pt engaged in 60min OT session focused on problem solving urinary incontinence overnight. Pt's wife present and participating in problem solving. She is in agreement that the femal urinal fits better than the std urinal, that pajama pants or pull-ups are easier for Pt to manage out of the way and place urinal. Pt's wife was already placing dispoable absorbant pads on the bed PTA. Pt was able to demonstrate transfer in/out of bed including blanket mgmt during simulation of urinal use over night. OT also recommended a bed alarm or video monitoring device so that Sheng would hear if Pt was up and moving. She responded that there is only a closet between their rooms and she is able to hear him overnight. She plans to \"wait and see how it goes\". Family training planned for tomorrow with DIL and/or son also accompanying Sheng. Overall Pt appeared slower to process today, less conversive, and twice unable to coordinate clothing mgmt at his previous level with reduced coordination. Pt had just received his sinemet at start of session.   Prognosis Good   Barriers to Discharge Decreased caregiver support;Inaccessible home environment   Plan   Treatment/Interventions ADL retraining;Functional transfer training;Endurance training;Cognitive reorientation   Progress Progressing toward goals   OT Therapy Minutes   OT Time In 1300   OT Time Out 1400   OT Total Time " (minutes) 60   OT Mode of treatment - Individual (minutes) 60   OT Mode of treatment - Concurrent (minutes) 0   OT Mode of treatment - Group (minutes) 0   OT Mode of treatment - Co-treat (minutes) 0   OT Mode of Treatment - Total time(minutes) 60 minutes   OT Cumulative Minutes 680   Therapy Time missed   Time missed? No

## 2025-02-11 NOTE — PROGRESS NOTES
Progress Note - Hospitalist   Name: Daniel Sanz Jr. 84 y.o. male I MRN: [id number]  Unit/Bed#: -01 I Date of Admission: 1/31/2025   Date of Service: 2/11/2025 I Hospital Day: 11    Assessment & Plan  Parkinson's disease (HCC)  Continue Sinemet 1.5 tabs 4x daily. Currently ordered at times pt takes the medication at home. May need to adjust timing based on therapy schedule.  Follows with Dr. Cavanaugh as an outpt.  Therapy per PMR.  DC 2/14/25  HTN (hypertension)  Home: amlodpine 5mg daily  Here: Same as home medication.  He did have some changes in BP with position changes 2/3 but was not orthostatic  Monitor for orthostasis due to parkinsons  AM BP has been elevated but improves after medications.  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  Continue rivastigmine patch - pt's wife very concerned about the cost. Price check was $10.  Pt agitated 2/3 during AM vitals. He did receive IM Zyprexa. Not agitated currently  No agitation over the weekend.  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  He was taking Lasix 3x weekly as an outpt.  Daily Lasix restarted last week. He appeared dry on labs and Lasix held over the weekend.  BMP without change. Continue to hold Lasix.  Encourage po fluids.  Spoke with pt's wife and LE edema is much better then it was at home.  Dysphagia  Continue modified diet, upgraded to level 3 with thin liquids. Per speech- still needs frequent reminders to swallow so he needs supervision for all meals  SLP to continue to follow.  Chronic pain of both shoulders  Per wife, had at home as well  Continue lidocaine patches  Vitamin D insufficiency  Continue supplementation  Bowel and bladder incontinence    At risk for venous thromboembolism (VTE)      The above assessment and plan was reviewed and updated as determined by my evaluation of the patient on 2/11/2025.    History of Present Illness    Patient seen and examined. Patients overnight issues or events were reviewed with nursing staff. New or overnight issues include the following:   Patient resting comfortably in bed. Denies pain. No CP/SOB. VSS    Review of Systems    Objective :  Temp:  [98.2 °F (36.8 °C)-98.5 °F (36.9 °C)] 98.2 °F (36.8 °C)  HR:  [76-77] 77  BP: (130-163)/(61-71) 136/66  Resp:  [16-19] 19  SpO2:  [97 %-100 %] 100 %  O2 Device: None (Room air)    Invasive Devices       None                   Physical Exam  General Appearance: NAD; pleasant  HEENT: PERRLA, conjuctiva normal; mucous membranes moist; face symmetrical  Neck:  Supple  Lungs: clear bilaterally, normal respiratory effort, no retractions, expiratory effort normal, on room air  CV: regular rate and rhythm, no murmurs rubs or gallops noted   ABD: soft non tender, +BS x4  EXT: DP pulses intact, no lymphadenopathy, no edema  Skin: normal turgor, normal texture, no rash  Psych: affect flat  Neuro: awake and alert  The above physical exam was reviewed and updated as determined by my evaluation of the patient on 2/11/2025.      Lab Results: I have reviewed the following results:  Results from last 7 days   Lab Units 02/10/25  0632   WBC Thousand/uL 5.66   HEMOGLOBIN g/dL 11.6*   HEMATOCRIT % 35.5*   PLATELETS Thousands/uL 224     Results from last 7 days   Lab Units 02/10/25  0632 02/07/25  0618   SODIUM mmol/L 141 138   POTASSIUM mmol/L 4.8 4.8   CHLORIDE mmol/L 108 105   CO2 mmol/L 26 27   BUN mg/dL 40* 39*   CREATININE mg/dL 1.15 1.21   CALCIUM mg/dL 8.9 9.3                   Review of Scheduled Meds: Medications  reviewed and reconciled as needed  Current Facility-Administered Medications   Medication Dose Route Frequency Provider Last Rate    acetaminophen  650 mg Oral TID Chelsea Dasilva PA-C      amLODIPine  5 mg Oral Daily Chelsea Dasilva PA-C      Artificial Tears  1 drop Both Eyes Q4H PRN Chelsea Dasilva PA-C      bisacodyl  10 mg Rectal Daily PRN Lubna  Depadua, MD      calcium carbonate  1,000 mg Oral Daily PRN Chelsea Dasilva PA-C      carbidopa-levodopa  1.5 tablet Oral 4x Daily Chelsea Dasilva PA-C      Cholecalciferol  1,000 Units Oral Daily Chelsea Dasilva PA-C      vitamin B-12  1,000 mcg Oral Once per day on Monday Wednesday Friday Chelsea Dasilva PA-C      docusate sodium  100 mg Oral BID Chelsea Dasilva PA-C      enoxaparin  40 mg Subcutaneous Daily Chelsea Dasilva PA-C      [Held by provider] furosemide  20 mg Oral Daily Ciara Calderon PA-C      lactulose  20 g Oral Daily PRN Ashley Depadua, MD      lidocaine  2 patch Topical Daily Ashley Depadua, MD      melatonin  6 mg Oral QPM Ashley Depadua, MD      OLANZapine  2.5 mg Intramuscular Q4H PRN Ashley Depadua, MD      ondansetron  4 mg Oral Q6H PRN Chelsea Dasilva PA-C      polyethylene glycol  17 g Oral Daily Ashley Depadua, MD      QUEtiapine  12.5 mg Oral Q8H PRN Chelsea Dasilva PA-C      rivastigmine  1 patch Transdermal Daily Chelsea Dasilva PA-C      senna  2 tablet Oral BID Chelsea Dasilva PA-C         VTE Pharmacologic Prophylaxis: lovenox  Code Status: Level 1 - Full Code  Current Length of Stay: 11 day(s)      ** Please Note:  voice to text software may have been used in the creation of this document. Although proof errors in transcription or interpretation are a potential of such software**

## 2025-02-11 NOTE — TEAM CONFERENCE
Acute RehabilitationTeam Conference Note  Date: 2/11/2025   Time: 10:56 AM       Patient Name:  Daniel Sanz Jr.       Medical Record Number: [id number]   YOB: 1940  Sex: Male          Room/Bed:  [facility] 457/[facility] 457-01  Payor Info:  Payor: [facility] REP / Plan: [facility] MEDICARE ADVANTAGE  REP / Product Type: Medicare HMO /      Admitting Diagnosis: Status post fall [Z91.81]  Altered mental state [R41.82]   Admit Date/Time:  1/31/2025  1:38 PM  Admission Comments: No comment available     Primary Diagnosis:  Parkinson's disease (HCC)  Principal Problem: Parkinson's disease (HCC)    Patient Active Problem List    Diagnosis Date Noted    At risk for venous thromboembolism (VTE) 02/03/2025    Vitamin D insufficiency 02/03/2025    Bowel and bladder incontinence 01/31/2025    Dysphagia 01/23/2025    Anemia 01/23/2025    Dementia (HCC) 01/22/2025    Altered mental status 01/21/2025    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 ([facility]) 01/21/2025    Fall, initial encounter 10/11/2024    Closed fracture of nasal bone 06/12/2024    Fall 06/12/2024    Scalp hematoma 06/12/2024    Multiple abrasions 06/12/2024    HTN (hypertension) 06/12/2024    Stage 3a chronic kidney disease (HCC) 03/15/2024    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    Shortness of breath     MGUS (monoclonal gammopathy of unknown significance) 04/29/2022    Neuropathy 04/29/2022    Parkinson's disease (HCC) 02/18/2020    Tear of right supraspinatus tendon 06/11/2019    Chronic pain of both shoulders 03/12/2019    Internal derangement of right shoulder 03/12/2019    Pleural plaque with presence of asbestos 03/02/2017    Allergic rhinitis with postnasal drip 03/02/2017       Physical Therapy:    Weight Bearing Status: Full Weight Bearing  Transfers: Supervision, Incidental Touching (depending on fatigue due to slower thought processing, freezing with extra time to complete)  Bed Mobility:  "Supervision  Amulation Distance (ft): 150 feet  Ambulation: Supervision (CS)  Assistive Device for Ambulation: Roller Walker  Number of Stairs: 12 (FF)  Assistive Device for Stairs: Bilateral Hand Rails (or L rail x 4 steps on 6\" )  Stair Assistance: Incidental Touching  Discharge Recommendations: Home with:  DC Home with:: 24 Hour Supervision, 24 Hour Assisteance, Family Support, First Floor Setup, Outpatient Physical Therapy    Pt is an 84 year old male with Hx of Parkinson's disease admitted to [facility] [place] secondary to functional declined after a fall with head strike related hospitalization. Barriers to functional indep and overall safety include decreased BLE strength carter with proximal mm group, impaired static/dynamic balance with impaired righting reactions, gait dysfunction with/without AD including freezing & festination episodes,  impaired cognition with decreased safety awareness. Additional barriers to home d/c include limited physical assistance from family and 1 JAYLEN.  At this time pt functional indep varies from requiring max -min of 1 with  bed/chair transfers while mod of 2 if walking and not using an AD. When compensating with SPC or RW he requires mod of 1. Also requires max of 1 for curb step negotiation using L grab bar per home set up. Recommend re-team with increase focus on strengthening, functional mobility training and NMR/NPP no AD for balance and gait training.     2/10/24  Pt is demonstrating functional gains although carry over can be inconsistent due to PD related symptoms include freezing, shuffling, rigidity and fatigue level. Pt cont to demo impaired dynamic balance with impaired righting reaction. Pt still demo's impulsive behavior and due to impaired cognition and memory does not recall to use call bell to call for staff assistance placing pt at high risk for falls. Hence wife/DIL educated on recommendation to use bed/chair alarm at home so wife is alerted if pt's gets up.  " Additionally, Pt is safer when mobilizing with RW vs SPC or no AD. So, family been educated on current recommendation for pt to have 24/7 S/assist using a RW. Wife verbalized understanding but will require additional education and hands on FT which was initiated today. Currently, pt requires CS to CGA guarding during mobilities using a RW including FF negotiation with bilat HR. Plan for this week to complete hands on FT with wife who will be primary caregiver at d/c. Family requesting for pt to resume OP PT services.       Occupational Therapy:  Eating: Supervision  Grooming: Incidental Touching  Bathing: Minimal Assistance  Bathing: Minimal Assistance  Upper Body Dressing: Moderate Assistance  Lower Body Dressing: Minimal Assistance, Moderate Assistance  Toileting: Maximum Assistance  Tub/Shower Transfer: Minimal Assistance, Moderate Assistance  Toilet Transfer: Minimal Assistance, Moderate Assistance  Cognition: Exceptions to WNL  Cognition: Decreased Memory, Decreased Executive Functions, Decreased Attention, Decreased Comprehension, Decreased Safety  Orientation: Person, Place  Discharge Recommendations: Home with:  DC Home with:: Family Support, 24 Hour Supervision, 24 Hour Assistance       02/10/25  Pt is demonstrating good progress with occupational therapy and is progressing toward long term goals for ADL, IADL, and functional transfers/mobility. Pts long term goals for ADLs are SUP with Rolling Walker. Pt is currently Partial/moderate assistance  for ADLs. Pt continues to present with impairments in activity tolerance, endurance, standing balance/tolerance, UE strength, memory, insight, safety , judgement , task initiation , and task termination . Occupational performance remains limited by fatigue, pain, decreased caregiver support, risk for falls, and home environment. Family training/education will be required prior to D/C. Pt will continue to benefit from skilled acute rehab OT services to address above  mentioned barriers and maximize functional independence in baseline areas of occupation to meet established treatment goals with overall decreased burden of care. Plan of care to continue to focus on ADL Retraining , LB Dressing, UB dressing, Functional Transfers, Functional Cognition, Standing tolerance, Standing balance , Gross motor strengthening , Energy conservation training/education, healthy coping education, Leisure and social pursuits, and community re-integration.       Speech Therapy:  Mode of Communication: Verbal  Speech/Language: Dysarthia  Cognition: Exceptions to WNL  Cognition: Decreased Memory, Decreased Executive Functions, Decreased Attention, Decreased Comprehension, Decreased Safety, Impulsive  Orientation: Person, Place, Situation  Swallowing: Exceptions to WNL  Swallowing: Oral Dysphagia, Pharyngeal Dysphagia, Aspiration Risk  Diet Recommendations: Level 3/Denture Soft, Thin  Discharge Recommendations: Home with:  DC Home with:: 24 Hour Supervision, 24 Hour Assisteance, Family Support, Home Speech Therapy   Pt currently being followed for dysphagia and cognitive tx sessions to where slower progress this week.     Continued cognitive barriers which present include: decreased attention, LT memory recall, ST memory recall , problem solving, organization of thoughts, judgement, slower processing, and insight, which still impacts pt's overall safety, functional cognitive communication skills as well as functional mobility. The following interventions are used to target these barriers, including visual orientation checklist, verbal problem solving task, drawing conclusions activities, categorization tasks , picture problem solving activities, verbal reasoning tasks, and family education/training.     Continued dysphagia barriers which present include the following risks for aspiration such as: poor positioning, decreased cognition, ineffective mastication, holding of bolus before transfer, delay in  oral transit, delay in swallow initiation, pharyngeal weakness upon swallowing, and increased wet voicing and increased throat clearing, coughing given meals  . In order to address the noted barriers, skilled SLP services will address this by targeting the following interventions: proper positioning, diet modification, safe swallow strategies, family education/training, and complete VFSS as needed.    Current diet is dysphagia level 2 w/ thin liquids.      Current level of functioning:   Eating: FULL supervision for meals  Comprehension: mod A  Expression: min A  Social Interaction: supervision  Executive functions: max A  Memory: max A    Family training/education has been initiated with pt's spouse and family.  Of note, pt's spouse will require increased education and review given swallow strategies, education of the need for supervision for meals to ensure use of swallow strategies, review of cognitive skills and need for increased supervision/assistance w/ ADL's and I ADL's. Recommendations at time of discharge are for continued skilled ST, home vs OP.    This week, focus for continued services to target trials of diet upgrade to level 3 items, monitoring thin liquids across meals, as well as carryover given strategies. For cognitive skills, targeting reorientation, LT biographical recall, ST recall w/ use of written cues, basic safety/problem solving skills, basic sequencing, etc. At this time, pt will continue to benefit from skilled cognitive linguistic tx and dysphagia tx  session to maximize overall functional independence given overall cognitive linguistic skills and swallow abilities  in attempts to decreased caregiver burden over time.    Update from week: 2/11/2025 . Pt is being followed for cognitive linguistic tx and dysphagia tx  sessions to where pt is making slower and steady progress this week.     Continued cognitive barriers which present include: decreased attention, ST memory recall , problem  solving, reasoning, sequencing, organization of thoughts, judgement, slower processing, and insight, which still impacts pt's overall safety, functional cognitive communication skills as well as functional mobility. The following interventions are used to target these barriers, including verbal problem solving task, verbal working memory tasks, drawing conclusions activities, written sequencing tasks, verbal sequencing tasks, categorization tasks , picture problem solving activities, functional reading tasks , and family education/training.     Continued dysphagia barriers which present include the following risks for aspiration such as: poor positioning, decreased cognition, ineffective mastication, holding of bolus before transfer, delay in oral transit, delay in swallow initiation, pharyngeal weakness upon swallowing, multiple swallows due to pharyngeal weakness, and inconsistent sxs of aspiration throughout meal . In order to address the noted barriers, skilled SLP services will address this by targeting the following interventions: proper positioning, diet modification, safe swallow strategies, family education/training, and complete VFSS this week.   Current diet is now dysphagia level 3 diet w/ thin liquids.     Current level of functioning:   Eating: FULL supervision   Comprehension: mod A  Expression: min A  Social Interaction: supervision  Executive functions: mod- max A  Memory: max A    Family training/education has been initiated with pt's spouse, Sheng, who has been more receptive to the education given the need for ongoing verbal prompting and cues throughout meals to ensure safety of swallow. However, ongoing review of these strategies would be needed.  Recommendations at time of discharge are for continue home ST services.    This week, focus for continued services to target functional cognitive skills, more so review of problem solving, reasoning, sequencing, ST recall abilities. Additionally, will  plan for VFSS to determine risk of aspiration and recommend further safest diet recommendations in addition to ongoing family training. At this time, pt will continue to benefit from skilled cognitive linguistic tx and dysphagia tx  session to maximize overall functional independence given overall cognitive linguistic skills and swallow abilities  in attempts to decreased caregiver burden over time.       Nursing Notes:  Appetite: Good  Diet Type: Dysphagia III, Thin Liquids                                                                     Pain Location/Orientation: Location: Shoulder, Orientation: Right  Pain Score: 3  Pain 2  Pain Score 2: 4  Pain Location/Orientation 2: Orientation: Right, Location: Hip                    Hospital Pain Intervention(s): Medication (See MAR), Rest          83yo M with medical history of Parkinson's Disease with dementia (MOCA 7/30 in March 2024), HTN, LE venous stasis dermatitis and edema, GERD who presented to [facility] [place] on 1/21 with worsening agitation, AMS, culminating in a fall with HS and R eye lac. No sutures were needed for the lac. CTH/N and L shoulder benign. He was noted to be hypothermic and hyperkalemic on admission. TSH WNL, Folate WNL, Vitamin D was low. B12 WNL. Given gentle IVF, noted to be constipated. Wound care was consulted for R medial tibia venous ulcer, and Geriatrics was consulted who recommended melatonin, delirium precautions. Speech was consulted who recommended NPO initially, but he has since been advanced to an oral diet of Level 2/thin liquids. Neurology was also consulted as the agitation, behavior symptoms, hallucinations worsened since increasing his medications, they lowered his Sinemet and added rivastigmine. There was improvement in his behavioral symptoms. He no longer needed restraint/1:1 at time of discharge. He was [facility] to the [facility] on 1/31.     2/10:  This week we will encourage independence with ADLs. We will monitor labs and vital signs.  We will educate pt/family about repositioning to prevent skin breakdown. We will assist w repositioning and perform routine skin checks. We will monitor for adequate pain control. We will monitor for constipation and medicate pt as ordered. We will provide pt/family with DM education as needed.We will monitor incision/wound for healing and s/s of infection.We will increase safety awareness and keep pt free from falls.    Case Management:     Discharge Planning  Living Arrangements: Lives Alone  Support Systems: Spouse/significant other  Assistance Needed: no  Type of Current Residence: Private residence  Current Home Care Services: No  Pt continues to participate with therapy and is scheduled to return home w/family the end of the week. Wife is aware of resources to aid in assistance of pts care and feels she is able to manage at home. Following to finalize dc recommendations.     Is the patient actively participating in therapies? yes  List any modifications to the treatment plan:     Barriers Interventions   Night time behaviors, confusion memory Medication provided as needed/required   constipation Prn bowel meds                 Is the patient making expected progress toward goals? yes  List any update or changes to goals:     Medical Goals: Patient will be medically stable for discharge to [facility] upon completion of rehab program and Patient will be able to manage medical conditions and comorbid conditions with medications and follow up upon completion of rehab program    Weekly Team Goals:   Rehab Team Goals  ADL Team Goal: Patient will require supervision with ADLs with least restrictive device upon completion of rehab program  Transfer Team Goal: Patient will require supervision with transfers with least restrictive device upon completion of rehab program  Locomotion Team Goal: Patient will require supervision with locomotion with least restrictive device upon completion of rehab  program  Cognitive Team Goal: Patient will require supervision for basic and complex tasks upon completion of rehab program    Discussion: pt presents with the above barriers and a vbs is to be ordered. Family training is scheduled for tomorrow with all modalities and wife reports she is bringing her son or daughter in law. Pts overnight cog is a concern for wife to manage in the home. Pt does get up frequently at night to urinate. Overall pt is close supervision to contact guard with rw with a lot of cueing. Mod a for adls which is pts baseline.  [facility] services to resume for pt ot and speech.     Anticipated Discharge Date:  2/14/25  [facility] Team Members Present:The following team members are supervising care for this patient and were present during this Weekly Team Conference.    Physician: Dr. DePadua, MD  : Nathalia Griffin MSW  Registered Nurse: Ria Goodson, DOLORES  Physical Therapist: Viki Hendrickson DPT  Occupational Therapist: Mica Eaton MS, OTR/L  Speech Therapist: Yelena Foster MA, CCC-SLP

## 2025-02-11 NOTE — CASE MANAGEMENT
Met w/pt and wife and confirmed dc arrangements for Friday. Wife is still set on taking pt home and confirmed therapy arranged training for her. Cm confirmed she wishes to still use [facility] for services. Cm to phone [place] location to determine how script should be written. Cm confirmed 11am dc time for Friday and the dc process.     Received order for a roller walker from therapy. Order placed via parachute with [employer] for item to be delivered to pts room prior to dc.     1400 cm made aware pt will be billed $99 for the walker as he must have had one under insurance in the past 5 years. Therapy made aware.

## 2025-02-11 NOTE — PLAN OF CARE
Problem: Prexisting or High Potential for Compromised Skin Integrity  Goal: Skin integrity is maintained or improved  Description: INTERVENTIONS:  - Identify patients at risk for skin breakdown  - Assess and monitor skin integrity  - Assess and monitor nutrition and hydration status  - Monitor labs   - Assess for incontinence   - Turn and reposition patient  - Assist with mobility/ambulation  - Relieve pressure over bony prominences  - Avoid friction and shearing  - Provide appropriate hygiene as needed including keeping skin clean and dry  - Evaluate need for skin moisturizer/barrier cream  - Collaborate with interdisciplinary team   - Patient/family teaching  - Consider wound care consult   Outcome: Progressing     Problem: PAIN - ADULT  Goal: Verbalizes/displays adequate comfort level or baseline comfort level  Description: Interventions:  - Encourage patient to monitor pain and request assistance  - Assess pain using appropriate pain scale  - Administer analgesics based on type and severity of pain and evaluate response  - Implement non-pharmacological measures as appropriate and evaluate response  - Consider cultural and social influences on pain and pain management  - Notify physician/advanced practitioner if interventions unsuccessful or patient reports new pain  Outcome: Progressing     Problem: INFECTION - ADULT  Goal: Absence or prevention of progression during hospitalization  Description: INTERVENTIONS:  - Assess and monitor for signs and symptoms of infection  - Monitor lab/diagnostic results  - Monitor all insertion sites, i.e. indwelling lines, tubes, and drains  - Monitor endotracheal if appropriate and nasal secretions for changes in amount and color  - [place] appropriate cooling/warming therapies per order  - Administer medications as ordered  - Instruct and encourage patient and family to use good hand hygiene technique  - Identify and instruct in appropriate isolation precautions for  identified infection/condition  Outcome: Progressing  Goal: Absence of fever/infection during neutropenic period  Description: INTERVENTIONS:  - Monitor WBC    Outcome: Progressing     Problem: SAFETY ADULT  Goal: Patient will remain free of falls  Description: INTERVENTIONS:  - Educate patient/family on patient safety including physical limitations  - Instruct patient to call for assistance with activity   - Consult OT/PT to assist with strengthening/mobility   - Keep Call bell within reach  - Keep bed low and locked with side rails adjusted as appropriate  - Keep care items and personal belongings within reach  - Initiate and maintain comfort rounds  - Make Fall Risk Sign visible to staff  - Offer Toileting every 2 Hours, in advance of need  - Initiate/Maintain alarm  - Obtain necessary fall risk management equipment:   - Apply yellow socks and bracelet for high fall risk patients  - Consider moving patient to room near nurses station  Outcome: Progressing  Goal: Maintain or return to baseline ADL function  Description: INTERVENTIONS:  -  Assess patient's ability to carry out ADLs; assess patient's baseline for ADL function and identify physical deficits which impact ability to perform ADLs (bathing, care of mouth/teeth, toileting, grooming, dressing, etc.)  - Assess/evaluate cause of self-care deficits   - Assess range of motion  - Assess patient's mobility; develop plan if impaired  - Assess patient's need for assistive devices and provide as appropriate  - Encourage maximum independence but intervene and supervise when necessary  - Involve family in performance of ADLs  - Assess for home care needs following discharge   - Consider OT consult to assist with ADL evaluation and planning for discharge  - Provide patient education as appropriate  Outcome: Progressing  Goal: Maintains/Returns to pre admission functional level  Description: INTERVENTIONS:  - Perform AM-PAC 6 Click Basic Mobility/ Daily Activity  assessment daily.  - Set and communicate daily mobility goal to care team and patient/family/caregiver.   - Collaborate with rehabilitation services on mobility goals if consulted  - Perform Range of Motion 3 times a day.  - Reposition patient every 2 hours.  - Dangle patient 3 times a day  - Stand patient 3 times a day  - Ambulate patient 3 times a day  - Out of bed to chair 3 times a day   - Out of bed for meals 3 times a day  - Out of bed for toileting  - Record patient progress and toleration of activity level   Outcome: Progressing     Problem: DISCHARGE PLANNING  Goal: Discharge to home or other facility with appropriate resources  Description: INTERVENTIONS:  - Identify barriers to discharge w/patient and caregiver  - Arrange for needed discharge resources and transportation as appropriate  - Identify discharge learning needs (meds, wound care, etc.)  - Arrange for interpretive services to assist at discharge as needed  - Refer to Case Management Department for coordinating discharge planning if the patient needs post-hospital services based on physician/advanced practitioner order or complex needs related to functional status, cognitive ability, or social support system  Outcome: Progressing

## 2025-02-11 NOTE — PROGRESS NOTES
Pt lethargic this am Did open eyes to name and did eat a little breakfast under supervision  pt now much more awake and alert and is working with PT. Meds given now that pt is awake pt swallowed whole with applesauce

## 2025-02-11 NOTE — PROGRESS NOTES
"Progress Note - PMR   Name: Daniel Sanz Jr. 84 y.o. male I MRN: [id number]  Unit/Bed#: -01 I Date of Admission: 1/31/2025   Date of Service: 2/11/2025 I Hospital Day: 11     Assessment & Plan  Parkinson's disease (HCC)  - AMS likely sequela of progressive parkinsons dementia plus possible adverse reaction to recent sinemet increase  - cont neuro adjusted recs for sinemet 1.5 QID  - cont exelon patch   - PT/OT/SLP 3-5 hours/day, 5-7 days/week.  - Will need f/u with Neurology after discharge.   Dysphagia  Patient observed coughing while taking medications by nursing at acute   Advanced to Level 3/thins on 2/4  Aspiration precautions   Timing or need for VBS as per SLP  Likely early the week of 2/10. Will await instructions from SLP re: timing of placing order.   SLP dysphagia eval and treat  SLP plans to perform FL barium swallow tomorrow  Altered mental status  Patient's wife endorsing recent abnormal behaviors prior to hospitalization.  Patient waking frequently at night, hallucinations noted when awaking from sleep, patient noted to be more \"nasty\" and agitated with at times garbled speech which clears.  Presents to ED after fall at home with concern for possible UTI. S/p head strike in ED w/ R eyebrow laceration.  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 with mild acute encephalopathy  CT head w/o acute findings  TSH benign   Folate WNL, B12 normal, vitamin D low at 26.9  Wife notes B12/vitamin D supplementation 3 times weekly PTA  Continue to q daily dosing of vit D  B12 3x weekly  Negative infxn work-up  Sinemet as ordered by neurology (below)  Acetaminophen 650mg TID    Patient oriented, and has some decent recall. Impaired insight, and he sundowns.   Previous MOCA - 7  Current SLUMS - 8   - Low dose seroquel PRN ordered by Neuro  - Added PRN olanzapine for severe agitation ONLY.    - Did receive this on 2/3 in the AM after some aggressive behaviors during blood draw/BP.   - Received again on 2/11 in the " AM after agitation and aggressive towards staff.   - Redirect, reorient first  - Melatonin in the evening for sleep/wake.  - Bed/chair alarms  - Optimize bowel/bladder program  - Avoid deliriogenic meds and physical restraint.  - If patient continues to experience agitation overnight, will consider giving Seroquel nightly. However, for now, will ask nursing to provide teaching to wife on Seroquel administration as needed for agitation.   Bowel and bladder incontinence  - Bowel/bladder incontinence on admission  - Bowel: continence has improved. Was also constipated. On colace, senna 2 tabs and daily miralax.    - Has Prn suppository or lactulose   - Last BM 2/10   - Monitor and adjust as appropriate  - Bladder: PVR x3 on admission are low   - UA during acute hospitalization not suspicious for infection   - Timed Voids.    - Improving. 2/3 largely continent of bladder.   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 (HCC)  Chronic right lower extremity venous ulcers. Largely healed and wound care signed off.   PTA Lasix for LE edema management; cont at [facility]  Known to [facility] podiatry/A   Wound care sami and treat  Offload heels  ACE wraps to b/l LEs  Chronic pain of both shoulders  Reports both shoulders.  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   Non-surgically managed. Last seen by Ortho 2019.   Tylenol scheduled.  Lidoderm patches    - This has helped.   Topicals for now. Monitor.   At risk for venous thromboembolism (VTE)  Lovenox, SCDs, ambulation  Will not need lovenox at discharge   Vitamin D insufficiency  Continue daily cholecalciferol supplementation.   HTN (hypertension)  Home: Amlodipine 5mg daily, Lasix 40mg daily, Losartan 50mg daily (unclear how long he was taking). Here: Amlodipine 5mg daily.   Monitor and adjust as appropriate     Health Maintenance  #Skin/Pressure Injury Prevention: Turn Q2hr in bed, with weight shifts [id number] in wheelchair.  #GI Prophylaxis: Not  indicated   #Code Status: Full Code  #FEN: Level 2/Thins, [employer] at lunch, Angel at dinner, and EP HP angel at Breakfast  #Dispo: Team 2/4: ADD 2/14 with Home PT/OT/SLP with goals to discharge home at Sup-Yue level of function. Ambulation vs. Wheelchair to be determined while here. Patient was discussed during team conference on 2/11/24.  Will need outpatient f/u with Neuro    To Review: Mr. Sanz is an 85yo M with medical history of Parkinson's Disease with dementia (MOCA 7/30 in March 2024), HTN, LE venous stasis dermatitis and edema, GERD who presented to [place] on 1/21 with worsening agitation, AMS, culminating in a fall with HS and R eye lac. No sutures were needed for the lac. CTH/N and L shoulder benign. He was noted to be hypothermic and hyperkalemic on admission. TSH WNL, Folate WNL, Vitamin D was low. B12 WNL. Given gentle IVF, noted to be constipated. Wound care was consulted for R medial tibia venous ulcer, and Geriatrics was consulted who recommended melatonin, delirium precautions. Speech was consulted who recommended NPO initially, but he has since been advanced to an oral diet of Level 2/thin liquids. Neurology was also consulted as the agitation, behavior symptoms, hallucinations worsened since increasing his medications, they lowered his Sinemet and added rivastigmine. There was improvement in his behavioral symptoms. He no longer needed restraint/1:1 at time of discharge. He was admitted to the [facility] on 1/31.     Chief Complaint: overnight agitation    Interval History/Subjective:  Patient was evaluated at bedside, found to be lethargic and hard to awaken in bed. Of note, patient had an episode of agitation overnight and was aggressive to staff. Struck nurse. Due to this, patient was given PRN Olanzapine which likely contributed to his morning somnolence. I returned to the patient's room in the afternoon to assess how he was doing. Patient found to be sitting comfortably in recliner with wife  at bedside. Patient mental status back to baseline and answering most questions appropriately. Discussed with patient and wife what occurred overnight. Patient seemed confused by the events and upset that he received an IM injection. Prior to this episode of agitation, the patient has been doing well overnight without the need of PRN behavior medications. Patient had been constipated for some time which may have contributed to his agitation overnight. The patient was was discussed during team conference today with ADD of 2/14. Additionally, during team conference, nursing was asked to provide education to the wife on administration of Seroquel as needed. Last BM 2/10. No new N/V, abdominal pain, CP, SOB, fevers, chills, N/V. SLP plans to perform video swallow tomorrow.     Functional Update:  PT: CG transfers with RW. CG-Sup ambulation with roller walker 150'. CG stairs.  OT: Sup eating. Mod A transfers. Total assistance toilet hygiene. Impaired cognition with decreased short term memory, recall, recall of precautions.   SLP: Mod A comprehension. Min A expression. Mod A problem solving. Max A memory.     Objective     Temp:  [98.2 °F (36.8 °C)-98.5 °F (36.9 °C)] 98.2 °F (36.8 °C)  HR:  [58-77] 58  Resp:  [16-19] 19  BP: (130-175)/(61-74) 175/74  SpO2:  [97 %-100 %] 100 %    Physical Exam  Constitutional:       Appearance: Normal appearance.   HENT:      Head: Normocephalic and atraumatic.   Eyes:      Conjunctiva/sclera: Conjunctivae normal.   Cardiovascular:      Rate and Rhythm: Normal rate and regular rhythm.      Pulses: Normal pulses.      Heart sounds: Normal heart sounds.   Pulmonary:      Effort: Pulmonary effort is normal.      Breath sounds: Normal breath sounds.   Abdominal:      General: Abdomen is flat. Bowel sounds are normal.      Palpations: Abdomen is soft.   Musculoskeletal:      Comments: Bilateral cogwheel rigidity in hands.    Skin:     General: Skin is warm.   Neurological:      Mental Status:  He is alert. Mental status is at baseline.   Psychiatric:         Mood and Affect: Mood normal.           Physical examination is otherwise unchanged from previous encounter, except as noted above.    Scheduled Meds:  Current Facility-Administered Medications   Medication Dose Route Frequency Provider Last Rate    acetaminophen  650 mg Oral TID Chelsea Dasilva PA-C      amLODIPine  5 mg Oral Daily Chelsea Dasilva PA-C      Artificial Tears  1 drop Both Eyes Q4H PRN Chelsea Dasilva PA-C      bisacodyl  10 mg Rectal Daily PRN Ashley Depadua, MD      calcium carbonate  1,000 mg Oral Daily PRN Chelsea Dasilva PA-C      carbidopa-levodopa  1.5 tablet Oral 4x Daily Chelsea Dasilva PA-C      Cholecalciferol  1,000 Units Oral Daily Chelsea Dasilva PA-C      vitamin B-12  1,000 mcg Oral Once per day on Monday Wednesday Friday Chelsea Dasilva PA-C      docusate sodium  100 mg Oral BID Chelsea Dasilva PA-C      enoxaparin  40 mg Subcutaneous Daily Chelsea Dasivla PA-C      [Held by provider] furosemide  20 mg Oral Daily Ciara Calderon PA-C      lactulose  20 g Oral Daily PRN Ashley Depadua, MD      lidocaine  2 patch Topical Daily Ashley Depadua, MD      melatonin  6 mg Oral QPM Ashley Depadua, MD      OLANZapine  2.5 mg Intramuscular Q4H PRN Ashley Depadua, MD      ondansetron  4 mg Oral Q6H PRN Chelsea Dasilva PA-C      polyethylene glycol  17 g Oral Daily Ashley Depadua, MD      QUEtiapine  12.5 mg Oral Q8H PRN Chelsea Dasilva PA-C      rivastigmine  1 patch Transdermal Daily Chelsea Dasilva PA-C      senna  2 tablet Oral BID Chelsea Dasilva PA-C           Lab Results: I have reviewed the following results:  Results from last 7 days   Lab Units 02/10/25  0632   HEMOGLOBIN g/dL 11.6*   HEMATOCRIT % 35.5*   WBC Thousand/uL 5.66   PLATELETS Thousands/uL 224     Results from last 7 days   Lab Units 02/10/25  0632 02/07/25  0618   BUN mg/dL 40* 39*   SODIUM  mmol/L 141 138   POTASSIUM mmol/L 4.8 4.8   CHLORIDE mmol/L 108 105   CREATININE mg/dL 1.15 1.21              0658}      ** Please Note: Fluency Direct voice to text software may have been used in the creation of this document. **

## 2025-02-11 NOTE — ASSESSMENT & PLAN NOTE
"Patient's wife endorsing recent abnormal behaviors prior to hospitalization.  Patient waking frequently at night, hallucinations noted when awaking from sleep, patient noted to be more \"nasty\" and agitated with at times garbled speech which clears.  Presents to ED after fall at home with concern for possible UTI. S/p head strike in ED w/ R eyebrow laceration.  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 with mild acute encephalopathy  CT head w/o acute findings  TSH benign   Folate WNL, B12 normal, vitamin D low at 26.9  Wife notes B12/vitamin D supplementation 3 times weekly PTA  Continue to q daily dosing of vit D  B12 3x weekly  Negative infxn work-up  Sinemet as ordered by neurology (below)  Acetaminophen 650mg TID    Patient oriented, and has some decent recall. Impaired insight, and he sundowns.   Previous MOCA - 7  Current SLUMS - 8   - Low dose seroquel PRN ordered by Neuro  - Added PRN olanzapine for severe agitation ONLY.    - Did receive this on 2/3 in the AM after some aggressive behaviors during blood draw/BP.   - Received again on 2/11 in the AM after agitation and aggressive towards staff.   - Redirect, reorient first  - Melatonin in the evening for sleep/wake.  - Bed/chair alarms  - Optimize bowel/bladder program  - Avoid deliriogenic meds and physical restraint.  - If patient continues to experience agitation overnight, will consider giving Seroquel nightly. However, for now, will ask nursing to provide teaching to wife on Seroquel administration as needed for agitation.   "

## 2025-02-11 NOTE — PLAN OF CARE
Problem: Prexisting or High Potential for Compromised Skin Integrity  Goal: Skin integrity is maintained or improved  Description: INTERVENTIONS:  - Identify patients at risk for skin breakdown  - Assess and monitor skin integrity  - Assess and monitor nutrition and hydration status  - Monitor labs   - Assess for incontinence   - Turn and reposition patient  - Assist with mobility/ambulation  - Relieve pressure over bony prominences  - Avoid friction and shearing  - Provide appropriate hygiene as needed including keeping skin clean and dry  - Evaluate need for skin moisturizer/barrier cream  - Collaborate with interdisciplinary team   - Patient/family teaching  - Consider wound care consult   Outcome: Progressing     Problem: PAIN - ADULT  Goal: Verbalizes/displays adequate comfort level or baseline comfort level  Description: Interventions:  - Encourage patient to monitor pain and request assistance  - Assess pain using appropriate pain scale  - Administer analgesics based on type and severity of pain and evaluate response  - Implement non-pharmacological measures as appropriate and evaluate response  - Consider cultural and social influences on pain and pain management  - Notify physician/advanced practitioner if interventions unsuccessful or patient reports new pain  Outcome: Progressing     Problem: INFECTION - ADULT  Goal: Absence or prevention of progression during hospitalization  Description: INTERVENTIONS:  - Assess and monitor for signs and symptoms of infection  - Monitor lab/diagnostic results  - Monitor all insertion sites, i.e. indwelling lines, tubes, and drains  - Monitor endotracheal if appropriate and nasal secretions for changes in amount and color  - [place] appropriate cooling/warming therapies per order  - Administer medications as ordered  - Instruct and encourage patient and family to use good hand hygiene technique  - Identify and instruct in appropriate isolation precautions for  identified infection/condition  Outcome: Progressing  Goal: Absence of fever/infection during neutropenic period  Description: INTERVENTIONS:  - Monitor WBC    Outcome: Progressing     Problem: SAFETY ADULT  Goal: Patient will remain free of falls  Description: INTERVENTIONS:  - Educate patient/family on patient safety including physical limitations  - Instruct patient to call for assistance with activity   - Consult OT/PT to assist with strengthening/mobility   - Keep Call bell within reach  - Keep bed low and locked with side rails adjusted as appropriate  - Keep care items and personal belongings within reach  - Initiate and maintain comfort rounds  - Make Fall Risk Sign visible to staff  - Offer Toileting every 2 Hours, in advance of need  - Initiate/Maintain alarm  - Obtain necessary fall risk management equipment:   - Apply yellow socks and bracelet for high fall risk patients  - Consider moving patient to room near nurses station  Outcome: Progressing  Goal: Maintain or return to baseline ADL function  Description: INTERVENTIONS:  -  Assess patient's ability to carry out ADLs; assess patient's baseline for ADL function and identify physical deficits which impact ability to perform ADLs (bathing, care of mouth/teeth, toileting, grooming, dressing, etc.)  - Assess/evaluate cause of self-care deficits   - Assess range of motion  - Assess patient's mobility; develop plan if impaired  - Assess patient's need for assistive devices and provide as appropriate  - Encourage maximum independence but intervene and supervise when necessary  - Involve family in performance of ADLs  - Assess for home care needs following discharge   - Consider OT consult to assist with ADL evaluation and planning for discharge  - Provide patient education as appropriate  Outcome: Progressing  Goal: Maintains/Returns to pre admission functional level  Description: INTERVENTIONS:  - Perform AM-PAC 6 Click Basic Mobility/ Daily Activity  assessment daily.  - Set and communicate daily mobility goal to care team and patient/family/caregiver.   - Collaborate with rehabilitation services on mobility goals if consulted  - Perform Range of Motion 3 times a day.  - Reposition patient every 2 hours.  - Dangle patient 3 times a day  - Stand patient 3 times a day  - Ambulate patient 3 times a day  - Out of bed to chair 3 times a day   - Out of bed for meals 3 times a day  - Out of bed for toileting  - Record patient progress and toleration of activity level   Outcome: Progressing     Problem: DISCHARGE PLANNING  Goal: Discharge to home or other facility with appropriate resources  Description: INTERVENTIONS:  - Identify barriers to discharge w/patient and caregiver  - Arrange for needed discharge resources and transportation as appropriate  - Identify discharge learning needs (meds, wound care, etc.)  - Arrange for interpretive services to assist at discharge as needed  - Refer to Case Management Department for coordinating discharge planning if the patient needs post-hospital services based on physician/advanced practitioner order or complex needs related to functional status, cognitive ability, or social support system  Outcome: Progressing

## 2025-02-11 NOTE — PROGRESS NOTES
02/11/25 0930   Pain Assessment   Pain Assessment Tool 0-10   Pain Score No Pain   Restrictions/Precautions   Precautions Aspiration;Bed/chair alarms;Cognitive;Fall Risk;Supervision on toilet/commode   Weight Bearing Restrictions No   ROM Restrictions No   Cognition   Overall Cognitive Status Impaired   Arousal/Participation Cooperative   Attention Attends with cues to redirect   Memory Decreased short term memory;Decreased recall of precautions   Following Commands Follows one step commands with increased time or repetition   Subjective   Subjective Per RN report, pt with an episode of agitation and confusion last PM. Meds administered at approx 1:30am. As a result, this morning for PT session pt with initial difficulty maintaining EO and demonstrated dec alertness and attentiveness to session. Began session at 9:30am with pt lethargic and requiring max stimulation to open eyes and offer verbal responses to therapist. Initiated session at bed level, opened curtains, turned on tv volume and raised HOB. Initiated supine TE/stretches and AAROM until pt was able to progress to sitting EOB due to inc alertness. Sp Sheng present for session.   Lying to Sitting on Side of Bed   Type of Assistance Needed Supervision   Physical Assistance Level No physical assistance   Comment HOB flat, no rails   Lying to Sitting on Side of Bed CARE Score 4   Sit to Stand   Type of Assistance Needed Incidental touching;Verbal cues;Adaptive equipment   Physical Assistance Level No physical assistance   Comment RW   Sit to Stand CARE Score 4   Bed-Chair Transfer   Type of Assistance Needed Incidental touching;Verbal cues;Adaptive equipment   Physical Assistance Level No physical assistance   Comment RW   Chair/Bed-to-Chair Transfer CARE Score 4   Transfer Bed/Chair/Wheelchair   Adaptive Equipment Roller Walker   Walk 10 Feet   Type of Assistance Needed Incidental touching;Verbal cues;Adaptive equipment   Physical Assistance Level No  physical assistance   Comment RW   Walk 10 Feet CARE Score 4   Walk 50 Feet with Two Turns   Type of Assistance Needed Incidental touching;Verbal cues;Adaptive equipment   Physical Assistance Level No physical assistance   Comment RW   Walk 50 Feet with Two Turns CARE Score 4   Walk 150 Feet   Type of Assistance Needed Incidental touching;Verbal cues;Adaptive equipment   Physical Assistance Level No physical assistance   Comment RW   Walk 150 Feet CARE Score 4   Ambulation   Primary Mode of Locomotion Prior to Admission Walk   Distance Walked (feet) 150 ft  (150ft x1, 50ft x4)   Assist Device Roller Walker   Gait Pattern Decreased foot clearance;Festination;Inconsistant Michelle;Forward Flexion;Shuffle;Improper weight shift   Walk Assist Level Contact Guard;Close Supervision   Findings Due to inc lethargy at start of session and general fatigue throughout session, focused on shorter household distances today. Pt conts to demo inc festination around turns and obstacles or narrow walkways. Responds well to verbal cues but difficulty carrying over strategies indep throughout session.   Does the patient walk? 2. Yes   Wheel 50 Feet with Two Turns   Reason if not Attempted Activity not applicable   Wheel 50 Feet with Two Turns CARE Score 9   Wheel 150 Feet   Reason if not Attempted Activity not applicable   Wheel 150 Feet CARE Score 9   Curb or Single Stair   Style negotiated Single stair   Type of Assistance Needed Incidental touching;Verbal cues;Adaptive equipment   Physical Assistance Level No physical assistance   Comment Single stair with bilat HRs   1 Step (Curb) CARE Score 4   4 Steps   Type of Assistance Needed Incidental touching;Verbal cues;Adaptive equipment   Physical Assistance Level No physical assistance   Comment Inc time to complete, cues for full foot placement on stairs. Performed 4 steps with bilat HRs and additional 2 steps with bilat UEs on L asccending HR (home setup).   4 Steps CARE Score 4  "  Stairs   Findings Pt's spouse reporting alternative entrance to home is through the basement with 12 steps (ascending to main level) with bilat HRs. This option may be the prefered entrance at this time due to location of \"grab bar\" installed at front porch entrance - may make descending stairs (exiting home) more difficult than pt using basement entrance. Will use FT session tomorrow to confirm family ability to assist on stairs however pt able to perform best when using bilat HRs.   Picking Up Object   Type of Assistance Needed Incidental touching;Verbal cues;Adaptive equipment   Physical Assistance Level No physical assistance   Comment RW, reacher   Picking Up Object CARE Score 4   Toilet Transfer   Type of Assistance Needed Incidental touching;Verbal cues;Adaptive equipment   Physical Assistance Level No physical assistance   Comment RW, commode over toilet   Toilet Transfer CARE Score 4   Toilet Transfer   Findings Pt's brief saturated prior to voiding additional minimal amount into toilet. Changed brief. Discussed plan for d/c with spouse. Sp reporting pt has worn briefs at home, especially at night, prior to this admission but states this episode may be due to confusion/agitation last PM and pt's dec level of alertness this morning.   Therapeutic Interventions   Flexibility Supine PROM/AAROM bilat hip flex, hip abd/add, ankle df/pf. Unsupported seated: LAQ, marches, ankle df/pf. Repeated sit/stands from EOB to RW, 3x5. Standing at RW, 2x10: alt marches, heel raises, mini squats.   Other Comments   Comments Additional, ongoing edu provided to pt's spouse, Sheng. Demonstrated guarding techniques with transfers, amb and stairs. Additional edu on home setup and safety recommendations including use of RW in home and in the community. Pt's sp cont to ask about use of SPC at this time. Reviewed safety concerns and fall risk. Will need to reinforce at formal FT tomorrow 2/12/25.   Assessment   Treatment Assessment " "Pt participated in 90 minute PT session. See above for details re: alertness and difficulty at beginning of session. As session progressed, pt able to perform mobility at CG level with inc time required to complete all transfers, amb and stairs. For safety due to pt's dec alertness provided hands-on CGA for mobility. Previous session suggest pt was performing at sup level. Pt conts with occasional \"freezing\" episodes during mobility, especially with direction changes, turns and obstacle negotiation. No overt LOB or instability noted, and pt responded well to verbal cueing to inc step length and improve upright posture. Pt's spouse again asking about SPC use in home. Therapist provided lengthy explanation and edu as to why RW is safer at this time. She was receptive to edu however may benefit from going edu at  tomorrow 2/12/25. Pt will cont to benefit from skilled inpt PT to further improve functional strength, balance, safety and indep with all mobility prior to d/c. Pt likely to require 24/7 sup upon d/c for safety due to dec mobility and cog/insight.   Family/Caregiver Present Yes   Problem List Decreased strength;Decreased range of motion;Decreased endurance;Impaired balance;Decreased mobility;Decreased coordination;Decreased cognition;Impaired judgement;Decreased safety awareness   Barriers to Discharge Decreased caregiver support;Inaccessible home environment   PT Barriers   Physical Impairment Decreased strength;Decreased range of motion;Decreased endurance;Impaired balance;Decreased coordination;Decreased mobility;Decreased cognition;Impaired judgement;Decreased safety awareness   Functional Limitation Car transfers;Ramp negotiation;Stair negotiation;Standing;Transfers;Walking   Plan   Treatment/Interventions Functional transfer training;LE strengthening/ROM;Elevations;Therapeutic exercise;Endurance training;Cognitive reorientation;Patient/family training;Equipment eval/education;Bed mobility;Gait " training;Compensatory technique education   Progress Progressing toward goals   Discharge Recommendation   Rehab Resource Intensity Level, PT   (Pending progress, outpt PT)   Equipment Recommended Walker   PT Therapy Minutes   PT Time In 0930   PT Time Out 1100   PT Total Time (minutes) 90   PT Mode of treatment - Individual (minutes) 90   PT Mode of treatment - Concurrent (minutes) 0   PT Mode of treatment - Group (minutes) 0   PT Mode of treatment - Co-treat (minutes) 0   PT Mode of Treatment - Total time(minutes) 90 minutes   PT Cumulative Minutes 780   Therapy Time missed   Time missed? No

## 2025-02-12 ENCOUNTER — APPOINTMENT (INPATIENT)
Dept: RADIOLOGY | Facility: HOSPITAL | Age: 85
DRG: 057 | End: 2025-02-12
Attending: PHYSICAL MEDICINE & REHABILITATION
Payer: COMMERCIAL

## 2025-02-12 LAB
BACTERIA UR QL AUTO: NORMAL /HPF
BILIRUB UR QL STRIP: NEGATIVE
CLARITY UR: CLEAR
COLOR UR: COLORLESS
GLUCOSE UR STRIP-MCNC: NEGATIVE MG/DL
HGB UR QL STRIP.AUTO: NEGATIVE
KETONES UR STRIP-MCNC: NEGATIVE MG/DL
LEUKOCYTE ESTERASE UR QL STRIP: NEGATIVE
NITRITE UR QL STRIP: NEGATIVE
NON-SQ EPI CELLS URNS QL MICRO: NORMAL /HPF
PH UR STRIP.AUTO: 6.5 [PH]
PROT UR STRIP-MCNC: NEGATIVE MG/DL
RBC #/AREA URNS AUTO: NORMAL /HPF
SP GR UR STRIP.AUTO: 1.01 (ref 1–1.03)
UROBILINOGEN UR STRIP-ACNC: <2 MG/DL
WBC #/AREA URNS AUTO: NORMAL /HPF

## 2025-02-12 PROCEDURE — 97535 SELF CARE MNGMENT TRAINING: CPT

## 2025-02-12 PROCEDURE — 99233 SBSQ HOSP IP/OBS HIGH 50: CPT | Performed by: PHYSICAL MEDICINE & REHABILITATION

## 2025-02-12 PROCEDURE — 74230 X-RAY XM SWLNG FUNCJ C+: CPT

## 2025-02-12 PROCEDURE — 81001 URINALYSIS AUTO W/SCOPE: CPT | Performed by: PHYSICAL MEDICINE & REHABILITATION

## 2025-02-12 PROCEDURE — 99232 SBSQ HOSP IP/OBS MODERATE 35: CPT | Performed by: PHYSICIAN ASSISTANT

## 2025-02-12 PROCEDURE — 92611 MOTION FLUOROSCOPY/SWALLOW: CPT

## 2025-02-12 PROCEDURE — 97530 THERAPEUTIC ACTIVITIES: CPT

## 2025-02-12 PROCEDURE — 92526 ORAL FUNCTION THERAPY: CPT

## 2025-02-12 RX ADMIN — CARBIDOPA AND LEVODOPA 1.5 TABLET: 25; 100 TABLET ORAL at 17:09

## 2025-02-12 RX ADMIN — DEXTRAN 70, GLYCERIN, HYPROMELLOSE 1 DROP: 1; 2; 3 SOLUTION/ DROPS OPHTHALMIC at 11:16

## 2025-02-12 RX ADMIN — CARBIDOPA AND LEVODOPA 1.5 TABLET: 25; 100 TABLET ORAL at 13:21

## 2025-02-12 RX ADMIN — SENNOSIDES 17.2 MG: 8.6 TABLET ORAL at 17:09

## 2025-02-12 RX ADMIN — Medication 6 MG: at 20:49

## 2025-02-12 RX ADMIN — ACETAMINOPHEN 650 MG: 325 TABLET, FILM COATED ORAL at 20:49

## 2025-02-12 RX ADMIN — CARBIDOPA AND LEVODOPA 1.5 TABLET: 25; 100 TABLET ORAL at 20:49

## 2025-02-12 RX ADMIN — DOCUSATE SODIUM 100 MG: 100 CAPSULE, LIQUID FILLED ORAL at 17:09

## 2025-02-12 RX ADMIN — ACETAMINOPHEN 650 MG: 325 TABLET, FILM COATED ORAL at 17:09

## 2025-02-12 RX ADMIN — POLYETHYLENE GLYCOL 3350 17 G: 17 POWDER, FOR SOLUTION ORAL at 11:16

## 2025-02-12 RX ADMIN — ENOXAPARIN SODIUM 40 MG: 40 INJECTION SUBCUTANEOUS at 11:16

## 2025-02-12 RX ADMIN — AMLODIPINE BESYLATE 5 MG: 5 TABLET ORAL at 11:16

## 2025-02-12 RX ADMIN — RIVASTIGMINE 1 PATCH: 4.6 PATCH, EXTENDED RELEASE TRANSDERMAL at 11:22

## 2025-02-12 NOTE — ASSESSMENT & PLAN NOTE
"Patient's wife endorsing recent abnormal behaviors prior to hospitalization.  Patient waking frequently at night, hallucinations noted when awaking from sleep, patient noted to be more \"nasty\" and agitated with at times garbled speech which clears.  Presents to ED after fall at home with concern for possible UTI. S/p head strike in ED w/ R eyebrow laceration.  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 with mild acute encephalopathy  CT head w/o acute findings  TSH benign   Folate WNL, B12 normal, vitamin D low at 26.9  Wife notes B12/vitamin D supplementation 3 times weekly PTA  Continue to q daily dosing of vit D  B12 3x weekly  Negative infxn work-up  Sinemet as ordered by neurology (below)  Acetaminophen 650mg TID    Patient oriented, and has some decent recall. Impaired insight, and he sundowns.   Previous MOCA - 7  Current SLUMS - 8   - Low dose seroquel PRN ordered by Neuro  - Added PRN olanzapine for severe agitation ONLY.    - Did receive this on 2/3 in the AM after some aggressive behaviors during blood draw/BP.   - Received again on 2/11 in the AM after agitation and aggressive towards staff.    - Received seroquel on 2/12 due to some mild agitation.   - Very somnolent after getting these medications. Would try to avoid giving these medications unless danger to himself/others.   - Wife is not keen on going home with these medications. She mostly uses melatonin at home.    - No clear cause - will check UA to ensure on new UTI. Has had increase incontinent episodes recently. Labs have been stable. No other new localizing symptoms.    - Considering repeat CTH if persistently somnolent.   - Redirect, reorient first  - Melatonin in the evening for sleep/wake.  - Bed/chair alarms  - Optimize bowel/bladder program  - Avoid deliriogenic meds and physical restraint.  "

## 2025-02-12 NOTE — PROGRESS NOTES
Pt very lethargic this am . Would only open eyes when name called several times  I did not feed pt his breakfast  or give him any meds this am Because of lethargy   Pt did go to S from 10 am to 11 am;  On return pt is much more alert and awake . Vital signs stable pt is working with therapy now  Dr Depadua and Ciara Calderon NP aware of pt's lethargy this am and that he did not get his meds

## 2025-02-12 NOTE — PROGRESS NOTES
Progress Note - Hospitalist   Name: Daniel Sanz Jr. 84 y.o. male I MRN: [id number]  Unit/Bed#: -01 I Date of Admission: 1/31/2025   Date of Service: 2/12/2025 I Hospital Day: 12    Assessment & Plan  Parkinson's disease (HCC)  Continue Sinemet 1.5 tabs 4x daily. Currently ordered at times pt takes the medication at home. May need to adjust timing based on therapy schedule.  Follows with Dr. Cavanaugh as an outpt.  Therapy per PMR.  DC 2/14/25  HTN (hypertension)  Home: amlodpine 5mg daily  Here: Same as home medication.  He did have some changes in BP with position changes 2/3 but was not orthostatic  Monitor for orthostasis due to parkinsons  AM BP has been elevated but improves after medications- also of note, he does have tremors and UE rigidity which may be falsely elevating BP readings. On my exam his BP was 133/65 this AM when waiting for the tremors to subside after applying the BP cuff. No changes in his meds made today.  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  Continue rivastigmine patch - pt's wife very concerned about the cost. Price check was $10.  Pt agitated 2/3 during AM vitals. He did receive IM Zyprexa. Not agitated currently  Staff has been reporting overnight agitation at times. 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  He was taking Lasix 3x weekly as an outpt.  Daily Lasix restarted last week. He appeared dry on labs and Lasix held over the weekend.  BMP without change. Continue to hold Lasix.  Encourage po fluids.  Per pt's wife- LE edema is much better then it was at home.  Dysphagia  Continue modified diet, upgraded to level 3 with thin liquids. Per speech- still needs frequent reminders to swallow so he needs supervision for all meals  SLP to continue to follow.  Chronic pain of both shoulders  Per wife, had at home  as well  Continue lidocaine patches  Vitamin D insufficiency  Continue supplementation    The above assessment and plan was reviewed and updated as determined by my evaluation of the patient on 2/12/2025.    History of Present Illness   Patient seen and examined. Patients overnight issues or events were reviewed with nursing staff. New or overnight issues include the following:   Patient laying comfortably in bed. Does occasionally get agitated overnight and required IM zyprexa very early AM 2/11 and seroquel last night. No CP/SOB. VSS    Review of Systems    Objective :  Temp:  [98.1 °F (36.7 °C)-98.9 °F (37.2 °C)] 98.9 °F (37.2 °C)  HR:  [58-81] 70  BP: (140-175)/(67-74) 148/72  Resp:  [18-20] 18  SpO2:  [94 %-99 %] 94 %  O2 Device: None (Room air)    Invasive Devices       None                   Physical Exam  General Appearance: NAD; pleasant  HEENT: PERRLA, conjuctiva normal; mucous membranes moist; face symmetrical  Neck:  Supple  Lungs: clear bilaterally, normal respiratory effort, no retractions, expiratory effort normal, on room air  CV: regular rate and rhythm, no murmurs rubs or gallops noted   ABD: soft non tender, +BS x4  EXT: DP pulses intact, no lymphadenopathy, chronic barrie LE edema  Skin: normal turgor, normal texture, no rash  Psych: affect normal, mood normal  Neuro: awakens to verbal stimuli. Tremor and rigidity noted in UE's  The above physical exam was reviewed and updated as determined by my evaluation of the patient on 2/12/2025.      Lab Results: I have reviewed the following results:  Results from last 7 days   Lab Units 02/10/25  0632   WBC Thousand/uL 5.66   HEMOGLOBIN g/dL 11.6*   HEMATOCRIT % 35.5*   PLATELETS Thousands/uL 224     Results from last 7 days   Lab Units 02/10/25  0632 02/07/25  0618   SODIUM mmol/L 141 138   POTASSIUM mmol/L 4.8 4.8   CHLORIDE mmol/L 108 105   CO2 mmol/L 26 27   BUN mg/dL 40* 39*   CREATININE mg/dL 1.15 1.21   CALCIUM mg/dL 8.9 9.3                   Review  of Scheduled Meds: Medications  reviewed and reconciled as needed  Current Facility-Administered Medications   Medication Dose Route Frequency Provider Last Rate    acetaminophen  650 mg Oral TID Chelsea Dasilva PA-C      amLODIPine  5 mg Oral Daily Chelsea Dasilva PA-C      Artificial Tears  1 drop Both Eyes Q4H PRN Chelsea Dasilva PA-C      bisacodyl  10 mg Rectal Daily PRN Ashley Depadua, MD      calcium carbonate  1,000 mg Oral Daily PRN Chelsea Dasilva PA-C      carbidopa-levodopa  1.5 tablet Oral 4x Daily Chelsea Dasilva PA-C      Cholecalciferol  1,000 Units Oral Daily Chelsea Dasilva PA-C      vitamin B-12  1,000 mcg Oral Once per day on Monday Wednesday Friday Chelsea Dasilva PA-C      docusate sodium  100 mg Oral BID Chelsea Dasilva PA-C      enoxaparin  40 mg Subcutaneous Daily Chelsea Dasilva PA-C      [Held by provider] furosemide  20 mg Oral Daily Ciara Calderon PA-C      lactulose  20 g Oral Daily PRN Ashley Depadua, MD      lidocaine  2 patch Topical Daily Ashley Depadua, MD      melatonin  6 mg Oral QPM Ashley Depadua, MD      OLANZapine  2.5 mg Intramuscular Q4H PRN Ashley Depadua, MD      ondansetron  4 mg Oral Q6H PRN Chelsea Dasilva PA-C      polyethylene glycol  17 g Oral Daily Ashley Depadua, MD      QUEtiapine  12.5 mg Oral Q8H PRN Chelsea Dasilva PA-C      rivastigmine  1 patch Transdermal Daily Chelsea Dasilva PA-C      senna  2 tablet Oral BID Chelsea Dasilva PA-C         VTE Pharmacologic Prophylaxis: lovenox  Code Status: Level 1 - Full Code  Current Length of Stay: 12 day(s)      ** Please Note:  voice to text software may have been used in the creation of this document. Although proof errors in transcription or interpretation are a potential of such software**

## 2025-02-12 NOTE — PROGRESS NOTES
"Progress Note - PMR   Name: Daniel Sanz Jr. 84 y.o. male I MRN: [id number]  Unit/Bed#: -01 I Date of Admission: 1/31/2025   Date of Service: 2/12/2025 I Hospital Day: 12     Assessment & Plan  Parkinson's disease (HCC)  - AMS likely sequela of progressive parkinsons dementia plus possible adverse reaction to recent sinemet increase  - cont neuro adjusted recs for sinemet 1.5 QID  - cont exelon patch   - PT/OT/SLP 3-5 hours/day, 5-7 days/week.  - Will need f/u with Neurology after discharge.   Dysphagia  Patient observed coughing while taking medications by nursing at acute   Advanced to Level 3/thins on 2/4  Aspiration precautions   Timing or need for VBS as per SLP  Likely early the week of 2/10. Will await instructions from SLP re: timing of placing order.   SLP dysphagia eval and treat  SLP plans to perform FL barium swallow tomorrow  Altered mental status  Patient's wife endorsing recent abnormal behaviors prior to hospitalization.  Patient waking frequently at night, hallucinations noted when awaking from sleep, patient noted to be more \"nasty\" and agitated with at times garbled speech which clears.  Presents to ED after fall at home with concern for possible UTI. S/p head strike in ED w/ R eyebrow laceration.  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 with mild acute encephalopathy  CT head w/o acute findings  TSH benign   Folate WNL, B12 normal, vitamin D low at 26.9  Wife notes B12/vitamin D supplementation 3 times weekly PTA  Continue to q daily dosing of vit D  B12 3x weekly  Negative infxn work-up  Sinemet as ordered by neurology (below)  Acetaminophen 650mg TID    Patient oriented, and has some decent recall. Impaired insight, and he sundowns.   Previous MOCA - 7  Current SLUMS - 8   - Low dose seroquel PRN ordered by Neuro  - Added PRN olanzapine for severe agitation ONLY.    - Did receive this on 2/3 in the AM after some aggressive behaviors during blood draw/BP.   - Received again on 2/11 in the " AM after agitation and aggressive towards staff.    - Received seroquel on 2/12 due to some mild agitation.   - Very somnolent after getting these medications. Would try to avoid giving these medications unless danger to himself/others.   - Wife is not keen on going home with these medications. She mostly uses melatonin at home.    - No clear cause - will check UA to ensure on new UTI. Has had increase incontinent episodes recently. Labs have been stable. No other new localizing symptoms.    - Considering repeat CTH if persistently somnolent.   - Redirect, reorient first  - Melatonin in the evening for sleep/wake.  - Bed/chair alarms  - Optimize bowel/bladder program  - Avoid deliriogenic meds and physical restraint.  Bowel and bladder incontinence  - Bowel/bladder incontinence on admission  - Bowel: continence has improved. Was also constipated. On colace, senna 2 tabs and daily miralax.    - Has Prn suppository or lactulose   - Last BM 2/10   - Monitor and adjust as appropriate  - Bladder: PVR x3 on admission are low   - UA during acute hospitalization not suspicious for infection   - Timed Voids.    - Improving. 2/3 largely continent of bladder.    - Increased AMS since 2/11 with episodes of incontinence. Checking UA.   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 (HCC)  Chronic right lower extremity venous ulcers. Largely healed and wound care signed off.   PTA Lasix for LE edema management; cont at [facility]  Known to [facility] podiatry/[facility]   Wound care sami and treat  Offload heels  ACE wraps to b/l LEs  Chronic pain of both shoulders  Reports both shoulders.  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   Non-surgically managed. Last seen by Ortho 2019.   Tylenol scheduled.  Lidoderm patches    - This has helped.   Topicals for now. Monitor.   At risk for venous thromboembolism (VTE)  Lovenox, SCDs, ambulation  Will not need lovenox at discharge   Vitamin D insufficiency  Continue daily  cholecalciferol supplementation.   HTN (hypertension)  Home: Amlodipine 5mg daily, Lasix 40mg daily, Losartan 50mg daily (unclear how long he was taking). Here: Amlodipine 5mg daily.   Monitor and adjust as appropriate     Health Maintenance  #Skin/Pressure Injury Prevention: Turn Q2hr in bed, with weight shifts [id number] in wheelchair.  #GI Prophylaxis: Not indicated   #Code Status: Full Code  #FEN: Level 2/Thins, [employer] at lunch, Angel at dinner, and EP HP angel at Breakfast  #Dispo:Team 2/11: ADD 2/14 with outpatient at Home PT/OT/SLP  with goals to discharge home at Sup-Yue level of function. Ambulation vs. Wheelchair to be determined while here.   Will need outpatient f/u with Neuro    To Review: Mr. Sanz is an 83yo M with medical history of Parkinson's Disease with dementia (MOCA 7/30 in March 2024), HTN, LE venous stasis dermatitis and edema, GERD who presented to [facility] on 1/21 with worsening agitation, AMS, culminating in a fall with HS and R eye lac. No sutures were needed for the lac. CTH/N and L shoulder benign. He was noted to be hypothermic and hyperkalemic on admission. TSH WNL, Folate WNL, Vitamin D was low. B12 WNL. Given gentle IVF, noted to be constipated. Wound care was consulted for R medial tibia venous ulcer, and Geriatrics was consulted who recommended melatonin, delirium precautions. Speech was consulted who recommended NPO initially, but he has since been advanced to an oral diet of Level 2/thin liquids. Neurology was also consulted as the agitation, behavior symptoms, hallucinations worsened since increasing his medications, they lowered his Sinemet and added rivastigmine. There was improvement in his behavioral symptoms. He no longer needed restraint/1:1 at time of discharge. He was admitted to the [facility] on 1/31.     Chief Complaint: Overnight some agitation    Interval History/Subjective:  Per nursing overnight patient had episode of mild agitation and received night time dose of  seroquel. He is extremely somnolent this morning now, arousable, but requires calming and redirection. Unable to give me review of systems this AM. Last BM 2/10. Bladder has been intermittently incontinent for the past couple of days. Continent of bowel.     Functional Update:  PT: Sup-CGA transfers, Sup bed mobility, Sup ambulation 150' with RW, CGA stairs FF  OT: Sup eating, CGA grooming, Yue bathing, modA UB dressing, min-modA LB dressing, maxA toielting, min-modA tub/shower transfers, min-modA toilet transfers  SLP: Sup eating, modA comprehension, Yue expression, Sup social interaction, mod-maxA executive function, maxA memory.     Objective     Temp:  [98.1 °F (36.7 °C)-98.9 °F (37.2 °C)] 98.9 °F (37.2 °C)  HR:  [58-81] 70  Resp:  [18-20] 18  BP: (140-175)/(67-74) 148/72  SpO2:  [94 %-99 %] 94 %    Gen: Somnolent.  HEENT: Moist mucus membranes, Normocephalic/Atraumatic  Cardiovascular: Regular rate, rhythm, S1/S2. Distal pulses palpable  Heme/Extr: No edema  Pulmonary: Non-labored breathing. Lungs CTAB  : No whitley  GI: Soft, non-tender, non-distended. BS+  Integumentary: Skin is warm, dry.  Neuro: Somnolent. When awoken, gets agitated, but able to be de-escalated easily. Stable bilateral cogwheeling rigidity. And tremor.     Physical examination is otherwise unchanged from previous encounter, except as noted above.    Scheduled Meds:  Current Facility-Administered Medications   Medication Dose Route Frequency Provider Last Rate    acetaminophen  650 mg Oral TID Chelsea Dasilva PA-C      amLODIPine  5 mg Oral Daily Chelsea Dasilva PA-C      Artificial Tears  1 drop Both Eyes Q4H PRN Chelsea Dasilva PA-C      bisacodyl  10 mg Rectal Daily PRN Ashley Depadua, MD      calcium carbonate  1,000 mg Oral Daily PRN Chelsea Dasilva PA-C      carbidopa-levodopa  1.5 tablet Oral 4x Daily Chelsea Dasilva PA-C      Cholecalciferol  1,000 Units Oral Daily Chelsea Dasilva PA-C      vitamin  B-12  1,000 mcg Oral Once per day on Monday Wednesday Friday Chelsea Dasilva PA-C      docusate sodium  100 mg Oral BID Chelsea Dasilva PA-C      enoxaparin  40 mg Subcutaneous Daily Chelsea Dasilva PA-C      [Held by provider] furosemide  20 mg Oral Daily Ciara Calderon PA-C      lactulose  20 g Oral Daily PRN Ashley Depadua, MD      lidocaine  2 patch Topical Daily Ashley Depadua, MD      melatonin  6 mg Oral QPM Ashley Depadua, MD      OLANZapine  2.5 mg Intramuscular Q4H PRN Ashley Depadua, MD      ondansetron  4 mg Oral Q6H PRN Chelsea Dasilva PA-C      polyethylene glycol  17 g Oral Daily Ashley Depadua, MD      QUEtiapine  12.5 mg Oral Q8H PRN Chelsea Dasilva PA-C      rivastigmine  1 patch Transdermal Daily Chelsea Dasilva PA-C      senna  2 tablet Oral BID Chelsea Dasilva PA-C       Imaging: No new imaging.       Lab Results: No new labs.  Results from last 7 days   Lab Units 02/10/25  0632   HEMOGLOBIN g/dL 11.6*   HEMATOCRIT % 35.5*   WBC Thousand/uL 5.66   PLATELETS Thousands/uL 224     Results from last 7 days   Lab Units 02/10/25  0632 02/07/25  0618   BUN mg/dL 40* 39*   SODIUM mmol/L 141 138   POTASSIUM mmol/L 4.8 4.8   CHLORIDE mmol/L 108 105   CREATININE mg/dL 1.15 1.21              0658}      ** Please Note: Fluency Direct voice to text software may have been used in the creation of this document. **

## 2025-02-12 NOTE — PROGRESS NOTES
02/12/25 1000   Pain Assessment   Pain Assessment Tool 0-10   Pain Score No Pain   Restrictions/Precautions   Precautions Aspiration;Bed/chair alarms;Cognitive;Fall Risk;Impulsive;Supervision on toilet/commode   Eating   Comment Pt completing VFSS study. Refer to Procedure note for full details.   Swallow Assessment   Swallow Treatment Assessment Focus of session today was toward focus on completing a VFSS to further assess swallow function, r/o aspiration and recommendations for least restrictive diet. Refer to below for full details.           Assessment Summary:    Pt presents with moderate oral and mild-moderate pharyngeal dysphagia characterized by the following observations:  Lip seal and bolus retrieval was observed to be functional. Manipulation given purees was delayed to where increased lingual pumping was observed. Mastication was prolonged w/ soft, mixed and solid textures. Of note, pt's bolus formulation was decreased to where pt did have piecemeal transit of puree, soft, mixed and solids. Transfer were delayed across all consistencies assessed.  Oral control was also noted to be decreased to where premature spillage over the base of tongue observed w/ food items to the level of the valleculae and w/ liquids deeper to the level of the pyriform sinuses prior to swallow elicited. Swallow initiation fluctuated between mild to moderately delayed, noting more delay w/ food items vs liquids. Hyolaryngeal excursion was noted to be present but demonstrated more lateral movement vs upward movement when elicited. BOT retraction was decreased in addition to decreased pharyngeal constriction to where pt did exhibit fairly full vallecular retention w/ foods (puree, mixed, soft and solids). Epiglottis inversion was present but at times sluggish. However, for most of the assessment, pt did demonstrate adequate airway protection UNTIL, larger consecutive sips of thins by straw. Pt exhibited deeper penetration w/  flash aspiration (x1) during swallow w/ straw sips. It was noted that throat clear/cough were effective to clear out penetration/possible aspiration events.      Note: Images are available for review in PACS as desired.        Recommendations:   Recommended Diet:  soft/level 3 diet and thin liquids---NO STRAWS  Recommended Form of Medications: whole with puree   Aspiration precautions  Compensatory swallowing strategies: Refer to below for details.      SLP Dysphagia therapy recommended: yes and f/u upon discharge given home ST services    Results Reviewed with: patient, pt's family (refer to FT note for details), MD- Dr. DePadua,  REJI Urbina   Pt/Family Education: completed. Pt and caregivers (spouse) would benefit from written education about swallow strategies as listed below.        Swallow Strategies for Alfonzo to Follow During Meals  Always out of bed for ALL mealsà should be sitting in a chair fully upright.  NO straws  Small single sips of thin liquids by cup only.  Assist in cutting up food items into smaller bites for Alfonzo.  SLOW intake of food.  Verbal cues for swallowing 2-3 bites of food at a time. Allow for fork or spoon down between bites.   Verbal cue to take a drink ever 3-4 bites to help clear mouth of any food which might remain.  If cough or throat clear is noted during meals, ENCOURAGE stronger cough or throat clear to “clear out” the airway.   Also, if outside of meals, Alfonzo has a voice that sounds like he is underwater, encourage throat clear or cough to eliminate.     Alfonzo NEEDS to be supervised/watched during ALL meals to ensure that he is following the recommended strategies.   Swallow Assessment Prognosis   Prognosis Fair   Prognosis Considerations Age;Co-morbidities;Family/community support;Medical diagnosis;Medical prognosis;Severity of impairments;New learning ability;Ability to carry over   SLP Therapy Minutes   SLP Time In 1000   SLP Time Out 1100   SLP Total Time (minutes) 60   SLP  Mode of treatment - Individual (minutes) 60   SLP Mode of treatment - Concurrent (minutes) 0   SLP Mode of treatment - Group (minutes) 0   SLP Mode of treatment - Co-treat (minutes) 0   SLP Mode of Treatment - Total time(minutes) 60 minutes   SLP Cumulative Minutes 530   Therapy Time missed   Time missed? No

## 2025-02-12 NOTE — CASE MANAGEMENT
Cm  made aware by [facility] PT that family is going to borrow a walker so the order to [facility] can be cancelled. Mssg sent to [facility] via parachute to cancel the order.     1319 cm phoned [facility] [place], [phone] and learned the pt ot and speech services pt receives at this location and at home are considered outpt. Scripts med list and clinical info can be faxed to them at [phone]. Wife already phoned them and made them aware of the dc date. They will schedule directly with wife.     1539 clinical info faxed to [facility] at the number above along ith scripts.

## 2025-02-12 NOTE — NURSING NOTE
Pt's alarm going off, came into room, pt on bed looking for white phone to call wife.  Assisted pt back into chair and helped pt call wife.  Alarm box on and cord to wall plugged in

## 2025-02-12 NOTE — PROGRESS NOTES
"   02/12/25 1200   Pain Assessment   Pain Assessment Tool 0-10   Pain Score No Pain   Restrictions/Precautions   Precautions Aspiration;Bed/chair alarms;Cognitive;Fall Risk;Impulsive;Supervision on toilet/commode   Swallow Assessment   Swallow Treatment Assessment Family Training w/ pt, pt's spouse- Sheng, pt's son- Gama and pt's dtr-in-law-Pamela for session today. Main focus was to review results from VFSS which was completed this AM. SLP reviewing at length the results from the assessment, to where primary recommendation was for foods to be softer and cut up for pt at home, in addition to continuing w/ thin liquids BUT BY CUP ONLY.  SLP educating how straw sips of thin liquids increases his risk for aspiration as he did exhibit episodes of thin liquids to the level of the vocal cords w/ flash aspiration but then cleared. SLP did also educate pt and family that his throat clear/cough IS EFFECTIVE to clear any risk for penetration/aspiration events (liquid more so going into airway). SLP continuing to recommend that when pt sounds as though he is underwater, prompt to throat clear or cough to eliminate. SLP also explained the risk of aspiration on own secretions, not just w/ meals. SLP did educate all parties again in regard to how the VFSS was controlled to where SLP did provide the trials, to where more focus is towards the need for SWALLOW STRATEGIES given meals. Pt WILL NEED FULL supervision for ALL meals at home. SLP reviewed all strategies which are paramount for pt to utilize at home, as current SLP educating family that currently all mentioned strategies are used during all meal tx sessions. Pt's son and dtr-in-law were receptive to this education, but spouse still suspected to have decreased comprehension given recommendations as she stated to SLP again, \"he didn't have any trouble at home.\" SLP continuing w/ supportive education and review how things are different now and focus is for pt to be safe when " "eating and to prevent any potential risk for aspiration pneumonia. SLP did educate spouse about food items to avoid--> dry crumble textures (ie: dry cookies, nuts) as well as still having a \"chicken sandwich\" but to cut it up for pt. Of note, family verbalized that spouse was doing this prior to admission for pt. All questions were answered at this time, to where SLP providing handouts of swallow strategies as listed below to son/dtr-in-law and spouse. SLP will also have this on Discharge Instructions and fax over a copy of them to home care SLP in addition to VFSS report.            Swallow Strategies for Alfonzo to Follow During Meals  Always out of bed for ALL mealsà should be sitting in a chair fully upright.  NO straws  Small single sips of thin liquids by cup only.  Assist in cutting up food items into smaller bites for Alfonzo.  SLOW intake of food.  Verbal cues for swallowing 2-3 bites of food at a time. Allow for fork or spoon down between bites.   Verbal cue to take a drink ever 3-4 bites to help clear mouth of any food which might remain.  If cough or throat clear is noted during meals, ENCOURAGE stronger cough or throat clear to “clear out” the airway.   Also, if outside of meals, Alfonzo has a voice that sounds like he is underwater, encourage throat clear or cough to eliminate.    Alfonzo NEEDS to be supervised/watched during ALL meals to ensure that he is following the recommended strategies.   SLP Therapy Minutes   SLP Time In 1200   SLP Time Out 1230   SLP Total Time (minutes) 30   SLP Mode of treatment - Individual (minutes) 30   SLP Mode of treatment - Concurrent (minutes) 0   SLP Mode of treatment - Group (minutes) 0   SLP Mode of treatment - Co-treat (minutes) 0   SLP Mode of Treatment - Total time(minutes) 30 minutes   SLP Cumulative Minutes 500   Therapy Time missed   Time missed? No       "

## 2025-02-12 NOTE — ASSESSMENT & PLAN NOTE
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  Continue rivastigmine patch - pt's wife very concerned about the cost. Price check was $10.  Pt agitated 2/3 during AM vitals. He did receive IM Zyprexa. Not agitated currently  Staff has been reporting overnight agitation at times. Needed IM zyprexa early in the AM 2/11 and PRN seroquel late last night

## 2025-02-12 NOTE — NURSING NOTE
Ambulated pt down [facility] hallway x2 with RWYue, to stretch legs before getting into bed for the night.  No LOB or issues.

## 2025-02-12 NOTE — ASSESSMENT & PLAN NOTE
Chronic right lower extremity venous ulcers. Largely healed and wound care signed off.   PTA Lasix for LE edema management; cont at [facility]  Known to [facility] podiatry/[facility]   Wound care sami and treat  Offload heels  ACE wraps to b/l LEs

## 2025-02-12 NOTE — ASSESSMENT & PLAN NOTE
Follows with Podiatry and wound care as an outpt.  He was taking Lasix 3x weekly as an outpt.  Daily Lasix restarted last week. He appeared dry on labs and Lasix held over the weekend.  BMP without change. Continue to hold Lasix.  Encourage po fluids.  Per pt's wife- LE edema is much better then it was at home.

## 2025-02-12 NOTE — PROGRESS NOTES
02/12/25 1030   Pain Assessment   Pain Assessment Tool 0-10   Pain Score No Pain   Patient's Stated Pain Goal No pain   Restrictions/Precautions   Precautions Aspiration;Bed/chair alarms;Cognitive;Fall Risk;Impulsive;Supervision on toilet/commode   Weight Bearing Restrictions No   ROM Restrictions No   Lifestyle   Autonomy Pt alert and cooperative throughout family training.   Reciprocal Relationships Pt's wife hSeng, Son Gama, and DIL Pamela present.   Service to Others retired    Intrinsic Gratification watching TV, talks about hunting as a favorite leisure but family reports he last went 13 years ago   Cognition   Overall Cognitive Status Impaired   Arousal/Participation Cooperative   Attention Attends with cues to redirect   Orientation Level Oriented to person;Oriented to place   Memory Decreased short term memory;Decreased recall of precautions   Following Commands Follows one step commands with increased time or repetition   Activity Tolerance   Activity Tolerance Patient tolerated treatment well   Assessment   Treatment Assessment Session grossly focused on formalized family training with pt, wife, son, and LEVON. OT educated on pts CLOF, DC recommendations and DME needs. Currently, pt is Mod A for ADLs and Total A for IADL mgmt including med mgmt, meal prep, laundry and finance mgmt. OT then discussed barriers that are contributing to the pts CLOF which includes functional mobility, forward functional reach, balance, cognitive impairments, decreased safety awareness, decreased insight into deficits, slurred speech, and coordination deficits. Based on pts functional performance, DC recommendations are for assist with posterior bathing and drying, with use of shower seat and grab bars at entrance to shower and along back wall; mod A dressing; Min A when using female urinal in stance. OT then addressed DME needs which are shower chair, RW, and female urinal, bed alarm or video monitoring system,  incontinence pads for the bed and full tab briefs over night due to incontinence . All questions were answered appropriately with no further questions or concerns at this time. Pt is set for DC home with family on 2/14 with outpt OT/PT/ST provided at home through [facility].   OT Family training done with: Wife Sheng, Son Gama, LEVON Santiago   Prognosis Good   Problem List Decreased strength;Decreased range of motion;Decreased endurance;Impaired balance;Decreased mobility;Decreased coordination;Decreased cognition;Impaired judgement;Decreased safety awareness   Barriers to Discharge Inaccessible home environment   Barriers to Discharge Comments Family training completed   Plan   Treatment/Interventions ADL retraining;Functional transfer training;Patient/family training   Progress Progressing toward goals   OT Therapy Minutes   OT Time In 1030   OT Time Out 1130   OT Total Time (minutes) 60   OT Mode of treatment - Individual (minutes) 60   OT Mode of treatment - Concurrent (minutes) 0   OT Mode of treatment - Group (minutes) 0   OT Mode of treatment - Co-treat (minutes) 0   OT Mode of Treatment - Total time(minutes) 60 minutes   OT Cumulative Minutes 740   Therapy Time missed   Time missed? No

## 2025-02-12 NOTE — DISCHARGE INSTR - OTHER ORDERS
Swallow Strategies for Alfonzo to Follow During Meals  Always out of bed for ALL mealsà should be sitting in a chair fully upright.  NO straws  Small single sips of thin liquids by cup only.  Assist in cutting up food items into smaller bites for Alfonzo.  SLOW intake of food.  Verbal cues for swallowing 2-3 bites of food at a time. Allow for fork or spoon down between bites.   Verbal cue to take a drink ever 3-4 bites to help clear mouth of any food which might remain.  If cough or throat clear is noted during meals, ENCOURAGE stronger cough or throat clear to “clear out” the airway.   Also, if outside of meals, Alfonzo has a voice that sounds like he is underwater, encourage throat clear or cough to eliminate.    Alfonzo NEEDS to be supervised/watched during ALL meals to ensure that he is following the recommended strategies.

## 2025-02-12 NOTE — PROGRESS NOTES
02/12/25 1230   Pain Assessment   Pain Assessment Tool 0-10   Pain Score No Pain   Restrictions/Precautions   Precautions Aspiration;Bed/chair alarms;Cognitive;Fall Risk;Hard of hearing;Impulsive;Supervision on toilet/commode   Weight Bearing Restrictions No   Braces or Orthoses Other (Comment)  (Bilateral compression socks (personal))   Cognition   Overall Cognitive Status Impaired   Arousal/Participation Alert;Cooperative   Attention Attends with cues to redirect   Subjective   Subjective Patient agreeable to PT session with no complaints. Family in attendence for training   Roll Left and Right   Type of Assistance Needed Supervision   Physical Assistance Level No physical assistance   Comment bed flat and no rail   Roll Left and Right CARE Score 4   Sit to Lying   Type of Assistance Needed Supervision   Physical Assistance Level No physical assistance   Comment bed flat with no rail use. Required extra time to complete task and re-direction secondary to attempted standing with instruction to lay down   Sit to Lying CARE Score 4   Lying to Sitting on Side of Bed   Type of Assistance Needed Supervision   Physical Assistance Level No physical assistance   Comment HOB flat and no rail on right side   Lying to Sitting on Side of Bed CARE Score 4   Sit to Stand   Type of Assistance Needed Physical assistance;Verbal cues;Adaptive equipment   Physical Assistance Level 25% or less   Comment Patient flucuated with Min/CG using RW this session. Patient required increased vc's for hand placement during ascent/descent. Instructed patient's wife in hand placement to improve safety   Sit to Stand CARE Score 3   Bed-Chair Transfer   Type of Assistance Needed Physical assistance;Verbal cues;Adaptive equipment   Physical Assistance Level 25% or less   Comment Min/CG using RW. Educated patient's wife in positioning during SPT in order to enhance safety and decrease potential risk for falls. SO expressed verbal understanding but  continued to require vc's throughout for positioning during transfers   Chair/Bed-to-Chair Transfer CARE Score 3   Car Transfer   Type of Assistance Needed Physical assistance;Verbal cues;Adaptive equipment   Physical Assistance Level 25% or less   Comment Min A using RW with vc's for hand placement and required increased assist once sitting to improve positioning and prevent forward slide. Family educated in positioning during transfer in/out of car, placement of RW once patient ready to get out of car, hand placement to improve safety and to be mindful to avoid hitting head. Family expressed understanding and patient's wife able to demonstrate guarding techniques.   Car Transfer CARE Score 3   Walk 10 Feet   Type of Assistance Needed Incidental touching;Verbal cues;Adaptive equipment   Physical Assistance Level No physical assistance   Comment CG using RW   Walk 10 Feet CARE Score 4   Walk 50 Feet with Two Turns   Type of Assistance Needed Incidental touching;Verbal cues;Adaptive equipment   Physical Assistance Level No physical assistance   Comment CG/CS using RW. Patient's wife educated in positioning techniques during ambulation and able to demonstrate guarding of patient. Educated family when patient exhibits retropulsion episodes to have patient stop, correct posture and continue when ready with increased foot clearance during LE advancement.   Walk 50 Feet with Two Turns CARE Score 4   Walk 150 Feet   Comment (S)  Re-assess next session   Reason if not Attempted Safety concerns   Walk 150 Feet CARE Score 88   Walking 10 Feet on Uneven Surfaces   Comment (S)  Re-assess next session   Reason if not Attempted Safety concerns   Walking 10 Feet on Uneven Surfaces CARE Score 88   Wheel 50 Feet with Two Turns   Reason if not Attempted Activity not applicable   Wheel 50 Feet with Two Turns CARE Score 9   Wheel 150 Feet   Reason if not Attempted Activity not applicable   Wheel 150 Feet CARE Score 9   Curb or Single  "Stair   Style negotiated Curb   Type of Assistance Needed Incidental touching;Verbal cues;Adaptive equipment   Physical Assistance Level No physical assistance   Comment CG using RW on 6\" curb step. Instructed/educated patient's wife in guarding during curb step negotiation. Patient's wife verbally expressed discomfort in helping patient with curb step this session. Discussed that patient does not require assist to complete task at this time   1 Step (Curb) CARE Score 4   4 Steps   Type of Assistance Needed Incidental touching;Verbal cues   Physical Assistance Level No physical assistance   Comment Ascend/descend 4 steps on 6\"  steps using B UE on L rail with step to pattern. Patient's wife verbally expressed that patient usually does not enter through front doors and instead ascends/descends FF. Educated family in benefits of practice in case patient is fatigued to complete FF and would have to use front steps to enter home   4 Steps CARE Score 4   12 Steps   Comment Previously completed with patient's wife on FF but did not attempt this session stating patient looks fatigued. Re-assess next session   Reason if not Attempted Safety concerns   12 Steps CARE Score 88   Picking Up Object   Comment Re-assess next session   Toilet Transfer   Type of Assistance Needed Physical assistance;Verbal cues;Adaptive equipment   Physical Assistance Level 26%-50%   Comment Patient used BSC for toileting this session in bathroom and NSG reported to wanting to catch sample for testing. Patient required assist to don/doff pants and brief   Toilet Transfer CARE Score 3   Other Comments   Comments Re-educated family about utilizing bed/chair alarm at night for safety and alert SO in order to reduce risk for falls at home   Assessment   Treatment Assessment Skilled PT session focused on family training with patient's wife Sheng and Son + D-I-L. Family education consisted of instructing in safety techniques during functional " mobility with RW and step negotiation in order to decrease risk for falls. Extensive education provided to patient's wife about guarding techniques at all times while using RW especially during SPT in order to prevent risk for falling, wife verbally expressed understanding. Family verbalized having RW at home for patient to utilize. Educated patient family in current performance during mobility and the possible need of increased assist if patient noted to have increased difficulties. Patient exhibited decreased performance this session by requring Min/CG assist for SPT and toileting secondary to signs of slow motor planning. Anticipate performance may be from receiving Sinemet medication later than usual this date with patient exhibiting signs of more alertness after taking medication. Discussed with family about recommendation for supervision levels with use of RW upon d/c home, verbalized understanding. Family declined further family training for tomorrow. Patient will benefit from continued focus on functional mobility with RW, NMR training without a device and step negotiation in order to enhance functional performance and decrease risk for falls. Re-assess CARE scores next session in prep for d/c.   Family/Caregiver Present Yes   PT Family training done with: Wife- Sheng and Son- Sanya +D-I-L Pamela   Barriers to Discharge Inaccessible home environment;Decreased caregiver support   PT Barriers   Physical Impairment Decreased strength;Decreased endurance;Impaired balance;Decreased mobility;Decreased cognition;Impaired judgement;Decreased safety awareness;Impaired hearing   Plan   Treatment/Interventions Functional transfer training;LE strengthening/ROM;Therapeutic exercise;Endurance training;Cognitive reorientation;Bed mobility;Gait training;OT;Spoke to MD;Family;Spoke to nursing   Progress Progressing toward goals   PT Therapy Minutes   PT Time In 1230   PT Time Out 1400   PT Total Time (minutes) 90   PT Mode of  treatment - Individual (minutes) 90   PT Mode of treatment - Concurrent (minutes) 0   PT Mode of treatment - Group (minutes) 0   PT Mode of treatment - Co-treat (minutes) 0   PT Mode of Treatment - Total time(minutes) 90 minutes   PT Cumulative Minutes 870   Therapy Time missed   Time missed? No

## 2025-02-12 NOTE — PROCEDURES
Speech Pathology Videofluoroscopic Swallow Study (VFSS/VBS/MBSS)      Patient Name: Daniel Sanz Jr.    Today's Date: 2/12/2025     Problem List  Principal Problem:    Parkinson's disease (HCC)  Active Problems:    Chronic pain of both shoulders    HTN (hypertension)    Altered mental status    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 (HCC)    Dysphagia    Bowel and bladder incontinence    At risk for venous thromboembolism (VTE)    Vitamin D insufficiency      Past Medical History  Past Medical History:   Diagnosis Date    Arthritis     Asbestos exposure     Asbestosis (HCC)     GERD (gastroesophageal reflux disease)     Hemoptysis 5/11/2022    Hypertension     Lung disease     Parkinson's disease (HCC)        Past Surgical History  Past Surgical History:   Procedure Laterality Date    HERNIA REPAIR      INTRACAPSULAR CATARACT EXTRACTION Left     KNEE SURGERY      many years ago    SHOULDER SURGERY           Assessment Summary:    Pt presents with moderate oral and mild-moderate pharyngeal dysphagia characterized by the following observations:  Lip seal and bolus retrieval was observed to be functional. Manipulation given purees was delayed to where increased lingual pumping was observed. Mastication was prolonged w/ soft, mixed and solid textures. Of note, pt's bolus formulation was decreased to where pt did have piecemeal transit of puree, soft, mixed and solids. Transfer were delayed across all consistencies assessed.  Oral control was also noted to be decreased to where premature spillage over the base of tongue observed w/ food items to the level of the valleculae and w/ liquids deeper to the level of the pyriform sinuses prior to swallow elicited. Swallow initiation fluctuated between mild to moderately delayed, noting more delay w/ food items vs liquids. Hyolaryngeal excursion was noted to be present but demonstrated more lateral movement vs upward movement when elicited. BOT  retraction was decreased in addition to decreased pharyngeal constriction to where pt did exhibit fairly full vallecular retention w/ foods (puree, mixed, soft and solids). Epiglottis inversion was present but at times sluggish. However, for most of the assessment, pt did demonstrate adequate airway protection UNTIL, larger consecutive sips of thins by straw. Pt exhibited deeper penetration w/ flash aspiration (x1) during swallow w/ straw sips. It was noted that throat clear/cough were effective to clear out penetration/possible aspiration events.     Note: Images are available for review in PACS as desired.      Recommendations:   Recommended Diet:  soft/level 3 diet and thin liquids ---NO STRAWS  Recommended Form of Medications: whole with puree   Aspiration precautions  Compensatory swallowing strategies: Refer to below for details.     SLP Dysphagia therapy recommended: yes and f/u upon discharge given home ST services    Results Reviewed with: patient, pt's family (refer to FT note for details), MD- Dr. DePadua,  REJI Urbina   Pt/Family Education: completed. Pt and caregivers (spouse) would benefit from written education about swallow strategies as listed below.      Swallow Strategies for Alfonzo to Follow During Meals  Always out of bed for ALL mealsà should be sitting in a chair fully upright.  NO straws  Small single sips of thin liquids by cup only.  Assist in cutting up food items into smaller bites for Alfonzo.  SLOW intake of food.  Verbal cues for swallowing 2-3 bites of food at a time. Allow for fork or spoon down between bites.   Verbal cue to take a drink ever 3-4 bites to help clear mouth of any food which might remain.  If cough or throat clear is noted during meals, ENCOURAGE stronger cough or throat clear to “clear out” the airway.   Also, if outside of meals, Alfonzo has a voice that sounds like he is underwater, encourage throat clear or cough to eliminate.     Alfonzo NEEDS to be supervised/watched  during ALL meals to ensure that he is following the recommended strategies.        General Information;  Pt is a 84 y.o. male with a PMH remarkable for Parkinson's Disease with dementia (MOCA 7/30 in March 2024), HTN, LE venous stasis dermatitis and edema, GERD who presented to [place] on 1/21 with worsening agitation, AMS, culminating in a fall with HS and R eye lac. No sutures were needed for the lac. CTH/N and L shoulder benign. He was noted to be hypothermic and hyperkalemic on admission. TSH WNL, Folate WNL, Vitamin D was low. B12 WNL. Given gentle IVF, noted to be constipated. Wound care was consulted for R medial tibia venous ulcer, and Geriatrics was consulted who recommended melatonin, delirium precautions. Speech was consulted who recommended NPO initially, but he has since been advanced to an oral diet of Level 2/thin liquids. Neurology was also consulted as the agitation, behavior symptoms, hallucinations worsened since increasing his medications, they lowered his Sinemet and added rivastigmine. There was improvement in his behavioral symptoms. He no longer needed restraint/1:1 at time of discharge. He was [facility] to the [facility] on 1/31/2025.       Current concerns for dysphagia include ongoing observations in dysphagia tx sessions given delayed oral transit for most items as well as delay in swallow initiation which then increases pt's overt signs/sxs of aspiration throughout meals. Also pt is observed to have difficulty in management of own saliva to where increased wetness in voicing is noted. Overall, pt does note demonstrate independence in carryover of swallow strategies due to decreased cognition, needing ongoing verbal prompts throughout meals for safety and decreasing risk of aspiration.    A VFSS was recommended to assess oropharyngeal stage swallowing skills at this time. Pt was viewed in lateral position and was given trials of pureed, soft moist (sliced banana) and solid food (Bria Doone  cookie) as well as honey (moderately thick), nectar (mildly thick), thin liquid.  A mixed consistency item (cereal with thin liquid) was also administered.        Oral stage:  Pt presented with moderate oral stage dysphagia.    Lip seal and bolus retrieval was observed to be functional. Manipulation given purees was delayed to where increased lingual pumping was observed. Mastication was prolonged w/ soft, mixed and solid textures. Of note, pt's bolus formulation was decreased to where pt did have piecemeal transit of puree, soft, mixed and solids. Transfer were delayed across all consistencies assessed.  Oral control was also noted to be decreased to where premature spillage over the base of tongue observed w/ food items to the level of the valleculae and w/ liquids deeper to the level of the pyriform sinuses prior to swallow elicited.     Pharyngeal stage:  Pt presented with mild-moderate pharyngeal dysphagia.     Swallow initiation fluctuated between mild to moderately delayed, noting more delay w/ food items vs liquids. Hyolaryngeal excursion was noted to be present but demonstrated more lateral movement vs upward movement when elicited. BOT retraction was decreased in addition to decreased pharyngeal constriction to where pt did exhibit fairly full vallecular retention w/ foods (puree, mixed, soft and solids). Epiglottis inversion was present but at times sluggish. However, for most of the assessment, pt did demonstrate adequate airway protection UNTIL, larger consecutive sips of thins by straw. Pt exhibited deeper penetration w/ flash aspiration (x1) during swallow w/ straw sips.       Strategies and Efficacy: double swallows-->not consistently effective to clear increased pharyngeal retention; small sips and bites; throat clear/cough were effective to clear any penetration/aspiration.    Aspiration Response and Efficacy: primarily exhibiting penetration to the level of the vocal cords w/ thins by straw w/  flash aspiration but pt's throat clear.cough is effective to clear any penetration/aspiration.

## 2025-02-12 NOTE — ASSESSMENT & PLAN NOTE
- Bowel/bladder incontinence on admission  - Bowel: continence has improved. Was also constipated. On colace, senna 2 tabs and daily miralax.    - Has Prn suppository or lactulose   - Last BM 2/10   - Monitor and adjust as appropriate  - Bladder: PVR x3 on admission are low   - UA during acute hospitalization not suspicious for infection   - Timed Voids.    - Improving. 2/3 largely continent of bladder.    - Increased AMS since 2/11 with episodes of incontinence. Checking UA.

## 2025-02-12 NOTE — PLAN OF CARE
Problem: Prexisting or High Potential for Compromised Skin Integrity  Goal: Skin integrity is maintained or improved  Description: INTERVENTIONS:  - Identify patients at risk for skin breakdown  - Assess and monitor skin integrity  - Assess and monitor nutrition and hydration status  - Monitor labs   - Assess for incontinence   - Turn and reposition patient  - Assist with mobility/ambulation  - Relieve pressure over bony prominences  - Avoid friction and shearing  - Provide appropriate hygiene as needed including keeping skin clean and dry  - Evaluate need for skin moisturizer/barrier cream  - Collaborate with interdisciplinary team   - Patient/family teaching  - Consider wound care consult   Outcome: Progressing     Problem: PAIN - ADULT  Goal: Verbalizes/displays adequate comfort level or baseline comfort level  Description: Interventions:  - Encourage patient to monitor pain and request assistance  - Assess pain using appropriate pain scale  - Administer analgesics based on type and severity of pain and evaluate response  - Implement non-pharmacological measures as appropriate and evaluate response  - Consider cultural and social influences on pain and pain management  - Notify physician/advanced practitioner if interventions unsuccessful or patient reports new pain  Outcome: Progressing     Problem: INFECTION - ADULT  Goal: Absence or prevention of progression during hospitalization  Description: INTERVENTIONS:  - Assess and monitor for signs and symptoms of infection  - Monitor lab/diagnostic results  - Monitor all insertion sites, i.e. indwelling lines, tubes, and drains  - Monitor endotracheal if appropriate and nasal secretions for changes in amount and color  - [place] appropriate cooling/warming therapies per order  - Administer medications as ordered  - Instruct and encourage patient and family to use good hand hygiene technique  - Identify and instruct in appropriate isolation precautions for  identified infection/condition  Outcome: Progressing  Goal: Absence of fever/infection during neutropenic period  Description: INTERVENTIONS:  - Monitor WBC    Outcome: Progressing     Problem: SAFETY ADULT  Goal: Patient will remain free of falls  Description: INTERVENTIONS:  - Educate patient/family on patient safety including physical limitations  - Instruct patient to call for assistance with activity   - Consult OT/PT to assist with strengthening/mobility   - Keep Call bell within reach  - Keep bed low and locked with side rails adjusted as appropriate  - Keep care items and personal belongings within reach  - Initiate and maintain comfort rounds  - Make Fall Risk Sign visible to staff  - Offer Toileting every 2 Hours, in advance of need  - Initiate/Maintain alarm  - Obtain necessary fall risk management equipment:   - Apply yellow socks and bracelet for high fall risk patients  - Consider moving patient to room near nurses station  Outcome: Progressing  Goal: Maintain or return to baseline ADL function  Description: INTERVENTIONS:  -  Assess patient's ability to carry out ADLs; assess patient's baseline for ADL function and identify physical deficits which impact ability to perform ADLs (bathing, care of mouth/teeth, toileting, grooming, dressing, etc.)  - Assess/evaluate cause of self-care deficits   - Assess range of motion  - Assess patient's mobility; develop plan if impaired  - Assess patient's need for assistive devices and provide as appropriate  - Encourage maximum independence but intervene and supervise when necessary  - Involve family in performance of ADLs  - Assess for home care needs following discharge   - Consider OT consult to assist with ADL evaluation and planning for discharge  - Provide patient education as appropriate  Outcome: Progressing  Goal: Maintains/Returns to pre admission functional level  Description: INTERVENTIONS:  - Perform AM-PAC 6 Click Basic Mobility/ Daily Activity  assessment daily.  - Set and communicate daily mobility goal to care team and patient/family/caregiver.   - Collaborate with rehabilitation services on mobility goals if consulted  - Perform Range of Motion 3 times a day.  - Reposition patient every 2 hours.  - Dangle patient 3 times a day  - Stand patient 3 times a day  - Ambulate patient 3 times a day  - Out of bed to chair 3 times a day   - Out of bed for meals 3 times a day  - Out of bed for toileting  - Record patient progress and toleration of activity level   Outcome: Progressing     Problem: DISCHARGE PLANNING  Goal: Discharge to home or other facility with appropriate resources  Description: INTERVENTIONS:  - Identify barriers to discharge w/patient and caregiver  - Arrange for needed discharge resources and transportation as appropriate  - Identify discharge learning needs (meds, wound care, etc.)  - Arrange for interpretive services to assist at discharge as needed  - Refer to Case Management Department for coordinating discharge planning if the patient needs post-hospital services based on physician/advanced practitioner order or complex needs related to functional status, cognitive ability, or social support system  Outcome: Progressing

## 2025-02-12 NOTE — ASSESSMENT & PLAN NOTE
Home: amlodpine 5mg daily  Here: Same as home medication.  He did have some changes in BP with position changes 2/3 but was not orthostatic  Monitor for orthostasis due to parkinsons  AM BP has been elevated but improves after medications- also of note, he does have tremors and UE rigidity which may be falsely elevating BP readings. On my exam his BP was 133/65 this AM when waiting for the tremors to subside after applying the BP cuff. No changes in his meds made today.

## 2025-02-12 NOTE — Clinical Note
"Family Training w/ pt, pt's spouse- Sheng, pt's son- Gama and pt's dtr-in-law-Pamela for session today. Main focus was to review results from VFSS which was completed this AM. SLP reviewing at length the results from the assessment, to where primary recommendation was for foods to be softer and cut up for pt at home, in addition to continuing w/ thin liquids BUT BY CUP ONLY.  SLP educating how straw sips of thin liquids increases his risk for aspiration as he did exhibit episodes of thin liquids to the level of the vocal cords w/ flash aspiration but then cleared. SLP did also educate pt and family that his throat clear/cough IS EFFECTIVE to clear any risk for penetration/aspiration events (liquid more so going into airway). SLP continuing to recommend that when pt sounds as though he is underwater, prompt to throat clear or cough to eliminate. SLP also explained the risk of aspiration on own secretions, not just w/ meals. SLP did educate all parties again in regard to how the VFSS was controlled to where SLP did provide the trials, to where more focus is towards the need for SWALLOW STRATEGIES given meals. Pt WILL NEED FULL supervision for ALL meals at home. SLP reviewed all strategies which are paramount for pt to utilize at home, as current SLP educating family that currently all mentioned strategies are used during all meal tx sessions. Pt's son and dtr-in-law were receptive to this education, but spouse still suspected to have decreased comprehension given recommendations as she stated to SLP again, \"he didn't have any trouble at home.\" SLP continuing w/ supportive education and review how things are different now and focus is for pt to be safe when eating and to prevent any potential risk for aspiration pneumonia. SLP did educate spouse about food items to avoid--> dry crumble textures (ie: dry cookies, nuts) as well as still having a \"chicken sandwich\" but to cut it up for pt. Of note, family verbalized that " spouse was doing this prior to admission for pt. All questions were answered at this time, to where SLP providing handouts of swallow strategies as listed below to son/dtr-in-law and spouse. SLP will also have this on Discharge Instructions and fax over a copy of them to home care SLP in addition to VFSS report.            Swallow Strategies for Alfonzo to Follow During Meals  Always out of bed for ALL mealsà should be sitting in a chair fully upright.  NO straws  Small single sips of thin liquids by cup only.  Assist in cutting up food items into smaller bites for Alfonzo.  SLOW intake of food.  Verbal cues for swallowing 2-3 bites of food at a time. Allow for fork or spoon down between bites.   Verbal cue to take a drink ever 3-4 bites to help clear mouth of any food which might remain.  If cough or throat clear is noted during meals, ENCOURAGE stronger cough or throat clear to “clear out” the airway.   Also, if outside of meals, Alfonzo has a voice that sounds like he is underwater, encourage throat clear or cough to eliminate.    Alfonzo NEEDS to be supervised/watched during ALL meals to ensure that he is following the recommended strategies.

## 2025-02-13 LAB
ANION GAP SERPL CALCULATED.3IONS-SCNC: 8 MMOL/L (ref 4–13)
BASOPHILS # BLD AUTO: 0.02 THOUSANDS/ÂΜL (ref 0–0.1)
BASOPHILS NFR BLD AUTO: 0 % (ref 0–1)
BUN SERPL-MCNC: 30 MG/DL (ref 5–25)
CALCIUM SERPL-MCNC: 9.3 MG/DL (ref 8.4–10.2)
CHLORIDE SERPL-SCNC: 106 MMOL/L (ref 96–108)
CO2 SERPL-SCNC: 26 MMOL/L (ref 21–32)
CREAT SERPL-MCNC: 1.3 MG/DL (ref 0.6–1.3)
EOSINOPHIL # BLD AUTO: 0.12 THOUSAND/ÂΜL (ref 0–0.61)
EOSINOPHIL NFR BLD AUTO: 2 % (ref 0–6)
ERYTHROCYTE [DISTWIDTH] IN BLOOD BY AUTOMATED COUNT: 12.6 % (ref 11.6–15.1)
GFR SERPL CREATININE-BSD FRML MDRD: 50 ML/MIN/1.73SQ M
GLUCOSE SERPL-MCNC: 96 MG/DL (ref 65–140)
HCT VFR BLD AUTO: 38.2 % (ref 36.5–49.3)
HGB BLD-MCNC: 12.4 G/DL (ref 12–17)
IMM GRANULOCYTES # BLD AUTO: 0.02 THOUSAND/UL (ref 0–0.2)
IMM GRANULOCYTES NFR BLD AUTO: 0 % (ref 0–2)
LYMPHOCYTES # BLD AUTO: 1.24 THOUSANDS/ÂΜL (ref 0.6–4.47)
LYMPHOCYTES NFR BLD AUTO: 20 % (ref 14–44)
MCH RBC QN AUTO: 31.9 PG (ref 26.8–34.3)
MCHC RBC AUTO-ENTMCNC: 32.5 G/DL (ref 31.4–37.4)
MCV RBC AUTO: 98 FL (ref 82–98)
MONOCYTES # BLD AUTO: 0.56 THOUSAND/ÂΜL (ref 0.17–1.22)
MONOCYTES NFR BLD AUTO: 9 % (ref 4–12)
NEUTROPHILS # BLD AUTO: 4.38 THOUSANDS/ÂΜL (ref 1.85–7.62)
NEUTS SEG NFR BLD AUTO: 69 % (ref 43–75)
NRBC BLD AUTO-RTO: 0 /100 WBCS
PLATELET # BLD AUTO: 227 THOUSANDS/UL (ref 149–390)
PMV BLD AUTO: 9.4 FL (ref 8.9–12.7)
POTASSIUM SERPL-SCNC: 5.2 MMOL/L (ref 3.5–5.3)
RBC # BLD AUTO: 3.89 MILLION/UL (ref 3.88–5.62)
SODIUM SERPL-SCNC: 140 MMOL/L (ref 135–147)
WBC # BLD AUTO: 6.34 THOUSAND/UL (ref 4.31–10.16)

## 2025-02-13 PROCEDURE — 80048 BASIC METABOLIC PNL TOTAL CA: CPT | Performed by: PHYSICIAN ASSISTANT

## 2025-02-13 PROCEDURE — 97530 THERAPEUTIC ACTIVITIES: CPT

## 2025-02-13 PROCEDURE — 97535 SELF CARE MNGMENT TRAINING: CPT

## 2025-02-13 PROCEDURE — 97110 THERAPEUTIC EXERCISES: CPT

## 2025-02-13 PROCEDURE — 99232 SBSQ HOSP IP/OBS MODERATE 35: CPT | Performed by: PHYSICAL MEDICINE & REHABILITATION

## 2025-02-13 PROCEDURE — 99232 SBSQ HOSP IP/OBS MODERATE 35: CPT | Performed by: PHYSICIAN ASSISTANT

## 2025-02-13 PROCEDURE — 85025 COMPLETE CBC W/AUTO DIFF WBC: CPT | Performed by: PHYSICIAN ASSISTANT

## 2025-02-13 PROCEDURE — 92526 ORAL FUNCTION THERAPY: CPT

## 2025-02-13 RX ORDER — POLYETHYLENE GLYCOL 3350 17 G/17G
17 POWDER, FOR SOLUTION ORAL DAILY
Qty: 238 G | Refills: 0 | Status: SHIPPED | OUTPATIENT
Start: 2025-02-14 | End: 2025-02-28

## 2025-02-13 RX ORDER — OLANZAPINE 5 MG/1
2.5 TABLET, ORALLY DISINTEGRATING ORAL
Status: DISCONTINUED | OUTPATIENT
Start: 2025-02-13 | End: 2025-02-14 | Stop reason: HOSPADM

## 2025-02-13 RX ORDER — LIDOCAINE 50 MG/G
2 PATCH TOPICAL
Qty: 28 PATCH | Refills: 0 | Status: SHIPPED | OUTPATIENT
Start: 2025-02-13 | End: 2025-02-27

## 2025-02-13 RX ORDER — ACETAMINOPHEN 325 MG/1
650 TABLET ORAL EVERY 6 HOURS PRN
Qty: 30 TABLET | Refills: 0 | Status: SHIPPED | OUTPATIENT
Start: 2025-02-13 | End: 2025-02-27

## 2025-02-13 RX ORDER — LIDOCAINE 50 MG/G
2 PATCH TOPICAL
Status: DISCONTINUED | OUTPATIENT
Start: 2025-02-13 | End: 2025-02-14 | Stop reason: HOSPADM

## 2025-02-13 RX ORDER — SENNOSIDES 8.6 MG
17.2 TABLET ORAL 2 TIMES DAILY
Qty: 56 TABLET | Refills: 0 | Status: SHIPPED | OUTPATIENT
Start: 2025-02-13 | End: 2025-02-27

## 2025-02-13 RX ORDER — RIVASTIGMINE 4.6 MG/24H
1 PATCH, EXTENDED RELEASE TRANSDERMAL DAILY
Qty: 30 PATCH | Refills: 0 | Status: SHIPPED | OUTPATIENT
Start: 2025-02-13

## 2025-02-13 RX ADMIN — ACETAMINOPHEN 650 MG: 325 TABLET, FILM COATED ORAL at 09:05

## 2025-02-13 RX ADMIN — AMLODIPINE BESYLATE 5 MG: 5 TABLET ORAL at 09:05

## 2025-02-13 RX ADMIN — CARBIDOPA AND LEVODOPA 1.5 TABLET: 25; 100 TABLET ORAL at 09:04

## 2025-02-13 RX ADMIN — SENNOSIDES 17.2 MG: 8.6 TABLET ORAL at 09:04

## 2025-02-13 RX ADMIN — Medication 6 MG: at 20:18

## 2025-02-13 RX ADMIN — OLANZAPINE 2.5 MG: 5 TABLET, ORALLY DISINTEGRATING ORAL at 20:18

## 2025-02-13 RX ADMIN — RIVASTIGMINE 1 PATCH: 4.6 PATCH, EXTENDED RELEASE TRANSDERMAL at 09:14

## 2025-02-13 RX ADMIN — ACETAMINOPHEN 650 MG: 325 TABLET, FILM COATED ORAL at 17:10

## 2025-02-13 RX ADMIN — SENNOSIDES 17.2 MG: 8.6 TABLET ORAL at 17:10

## 2025-02-13 RX ADMIN — POLYETHYLENE GLYCOL 3350 17 G: 17 POWDER, FOR SOLUTION ORAL at 09:04

## 2025-02-13 RX ADMIN — DOCUSATE SODIUM 100 MG: 100 CAPSULE, LIQUID FILLED ORAL at 17:10

## 2025-02-13 RX ADMIN — CARBIDOPA AND LEVODOPA 1.5 TABLET: 25; 100 TABLET ORAL at 20:18

## 2025-02-13 RX ADMIN — CARBIDOPA AND LEVODOPA 1.5 TABLET: 25; 100 TABLET ORAL at 17:10

## 2025-02-13 RX ADMIN — Medication 1000 UNITS: at 09:05

## 2025-02-13 RX ADMIN — DOCUSATE SODIUM 100 MG: 100 CAPSULE, LIQUID FILLED ORAL at 09:05

## 2025-02-13 RX ADMIN — DEXTRAN 70, GLYCERIN, HYPROMELLOSE 1 DROP: 1; 2; 3 SOLUTION/ DROPS OPHTHALMIC at 17:51

## 2025-02-13 RX ADMIN — ENOXAPARIN SODIUM 40 MG: 40 INJECTION SUBCUTANEOUS at 09:05

## 2025-02-13 RX ADMIN — ACETAMINOPHEN 650 MG: 325 TABLET, FILM COATED ORAL at 20:18

## 2025-02-13 RX ADMIN — CARBIDOPA AND LEVODOPA 1.5 TABLET: 25; 100 TABLET ORAL at 13:17

## 2025-02-13 NOTE — PROGRESS NOTES
02/13/25 1230   Pain Assessment   Pain Assessment Tool 0-10   Pain Score No Pain   Subjective   Subjective pt agreeable to perform skilled PT   Sit to Stand   Type of Assistance Needed Supervision;Adaptive equipment   Comment RW   Sit to Stand CARE Score 4   Bed-Chair Transfer   Type of Assistance Needed Supervision;Adaptive equipment   Comment CS / S lvl with RW   Chair/Bed-to-Chair Transfer CARE Score 4   Transfer Bed/Chair/Wheelchair   Adaptive Equipment Roller Walker   Walk 10 Feet   Type of Assistance Needed Incidental touching;Adaptive equipment   Comment RW   Walk 10 Feet CARE Score 4   Walk 50 Feet with Two Turns   Type of Assistance Needed Incidental touching;Adaptive equipment   Comment RW   Walk 50 Feet with Two Turns CARE Score 4   Walk 150 Feet   Type of Assistance Needed Incidental touching;Adaptive equipment   Comment RW   Walk 150 Feet CARE Score 4   Ambulation   Primary Mode of Locomotion Prior to Admission Walk   Distance Walked (feet) 150 ft   Assist Device Roller Walker   Does the patient walk? 2. Yes   Wheel 50 Feet with Two Turns   Reason if not Attempted Activity not applicable   Wheel 50 Feet with Two Turns CARE Score 9   Wheel 150 Feet   Reason if not Attempted Activity not applicable   Wheel 150 Feet CARE Score 9   4 Steps   Type of Assistance Needed Incidental touching   Comment L HR   4 Steps CARE Score 4   12 Steps   Type of Assistance Needed Incidental touching   Comment L HR FF stairs   12 Steps CARE Score 4   Equipment Use   NuStep lvl 2 for 15 min   Assessment   Treatment Assessment Pt engaged in skilled PT focus on on functional mobility with RW and JAYLEN home and instructed pt fall risk and floor recovery and not to fall at home and his wife and family needs to be with him during all ambulation /walking with RW and JAYLEN home and stairs. Pt DC tomorrow with all needs in met and f/u with his MD   Plan   Progress Progressing toward goals   Discharge Recommendation   Rehab Resource  Intensity Level, PT   (HH PT)   PT Therapy Minutes   PT Time In 1230   PT Time Out 1300   PT Total Time (minutes) 30   PT Mode of treatment - Individual (minutes) 30   PT Mode of treatment - Concurrent (minutes) 0   PT Mode of treatment - Group (minutes) 0   PT Mode of treatment - Co-treat (minutes) 0   PT Mode of Treatment - Total time(minutes) 30 minutes   PT Cumulative Minutes 990   Therapy Time missed   Time missed? No

## 2025-02-13 NOTE — ASSESSMENT & PLAN NOTE
Chronic right lower extremity venous ulcers. Largely healed and wound care signed off.   PTA Lasix for LE edema management; cont at [facility]  Known to [facility] podiatry/V[facility]   Wound care sami and treat  Offload heels  ACE wraps to b/l LEs   Discussed with patient

## 2025-02-13 NOTE — DISCHARGE INSTR - AVS FIRST PAGE
DISCHARGE INSTRUCTIONS: [facility] ACUTE REHABILITATION [place]    Bring these instructions with you to your Outpatient Physician appointments so they can order and follow-up any additional lab work or imaging recommended at time of discharge.    Resume follow-up with all your prior providers that you have established care prior to this hospitalization.  Discuss with primary care physician (PCP) if you have additional questions.     It  is you or your caregivers responsibility to obtain follow-up MEDICATION REFILLS  As indicated through your Primary Care Physician (PCP) and other outpatient specialty provider(s) after discharge.  Please follow-up with your PCP as soon as possible after discharge to set-up follow-up management and when appropriate refills.      You have been determined to have some cognitive impairments. - It is YOUR CAREGIVER'S RESPONSIBILITY to ensure appropriate follow-up which includes:  APPOINTMENTS are scheduled and safe transportation is arranged  LABS and IMAGING are completed  MEDICATION MANAGEMENT at home is carried out appropriately     You remain a fall and injury risk which could be severe.  Your risk of fall has decreased however since admission to acute rehab.  Caregiver training has been completed with our staff.  Appropriate supervision +/- assistance as instructed during your rehab course is recommended to decrease risk of fall and injury.    Continue skilled therapy as discussed after discharge to further decrease this risk    If you (or your health care proxy) have any questions or concerns regarding your acute rehabilitation stay including issues with medications, rehabilitation, and follow-up plan, please call:          [facility] Acute Rehabilitation Unit in [place] at [phone] or [phone].    Should you develop fevers, chills, new weakness, changes in sensation, difficulty speaking, facial weakness, confusion, shortness of breath, chest pain, or  "other concerning symptoms please call 911 and/or obtain transportation to nearest ER immediately.    Should you develop worsening pain, swelling, or drainage notify your surgeon right away or obtain transportation to nearest ER for evaluation.    Should you develop feelings of significant hopelessness, severe depression, severe anxiety, or suicide you should call 911 or obtain transportation to nearest ER.    24-7 [employer] suicide and crisis lifeline call \"270\"  National Suicide Prevention Lifeline is 1-712.778.3751 and is available 24 hrs/7 days a week.   [facility] Crisis [phone] is available 24 hrs/7 days for mental health  [facility] [phone] is available 24 hrs/7 days for mental health   [facility] Crisis [phone]    PHYSICIANS to see:  Please see your doctors listed in the follow up providers section of your discharge paperwork, and take the discharge paperwork with you to your appointments.      LAB WORK recommended after discharge:  You will need blood work in 1 week to follow-up on your kidney function and potassium level.    Call [facility] to locate the closest open outpatient lab center where you can have your lab work drawn.  Contact them at [phone]      SKIN CARE INSTRUCTIONS to follow:  Monitor incision(s) for increased redness, swelling, pain/tenderness, discharge/pus.  Should you develop significant pain, swelling, or drainage obtain transportation to nearest emergency room for immediate evaluation if unable to reach your surgeon promptly   Should you develop uncontrolled pain, fever, chills, sweats, changes in strength, sensation, or color of this area obtain transportation to nearest emergency room for immediate evaluation.    Monitor skin for increased redness or breadown and promptly notify your physician should these develop  If instructed while in [facility] - be sure you stand +/- walk every 1-2 hours and if " advised use appropriate supervision/assistance to optimally offload your buttock/sacral region.  While seated or lying in bed shift positions and from side to side often.  Can use barrier type cream such as Hydraguard 2 times per day and as needed.    Turn patient (yourself) fully every 2 hours while in bed.      WOUND CARE INSTRUCTIONS to follow:  Home health nursing will assist you with wound management.  You may contact them with issues as well once this service is set-up.    If [facility] is your provider their phone number is [phone].    If you are unable to get in touch with [facility] you may contact your  at [phone].      Due to the following you are at increased risk of skin breakdown/wounds/worsening wounds:  Impaired mobility  Impaired nutrition/intake/low albumin  Medical co-morbidities  Bowel/bladder dysfunction    Driving restrictions:    You are recommended against driving until cleared by an outpatient physician.       You should NOT operate a motor vehicle while under the influence of an opiate medication, muscle relaxant, or other sedative.  Doing so may lead to an accident resulting in serious injury or death to yourself or others.  You have agreed to avoid driving when under the influence of this medication.      Work restrictions:  You should NOT return to work (if working) until cleared by an outpatient physician.    You should not operate heavy machinery (if applicable) until cleared by an outpatient physician.      Alcohol restrictions:  You are recommended to not drink alcohol at this time unless cleared by an outpatient physician.  Drinking alcohol in your current functional condition can increase your risk of injury which could be severe.  Drinking alcohol given your current health problems can lead to increased medical complications which could be severe.    Combining alcohol with your current medications can increase your risk of injury which could be  severe.      Smoking restrictions:  You are recommended to not smoke nicotine.  Smoking increases your risk of heart attack, stroke, emphysema/COPD, and lung cancer.      MEDICATIONS:  Please see a full list of your medications outlined in the After Visit Summary that is attached to these Discharge Instructions.  Please note changes may have been made to your medications please refer to your discharge paperwork for your current medications and take this list with you to all your doctors appointments for your doctors to review.  Please do not resume a home medication unless the medication reconciliation sheet indicates to do so, please do not assume that a medication that you were given a prescription for is the same as a medication you have at home based on both medications having the same name as dosages and frequency may have changed.      Unless specifically noted in your medication list provided to you in your discharge paper work do not resume prior vitamins, minerals, or supplements you may have been taking prior to your hospitalization unless instructed by an outpatient physician in the future.  Discuss with your primary care at next visit if applicable.      Acetaminophen (Tylenol) Dosing Warning:    You may have up to 3000 mg of acetaminophen (Tylenol) from all sources spread out over a 24 hours period.  Do not have more than that, as this can increase your risk of liver injury which can be serious.      MEDICAL MANAGEMENT AT HOME specific to you:    Hypertension Management:  Only take the medications prescribed for you at time of discharge - overly high or low blood pressure increases your risk for health complications    Follow-up with PCP/family doctor regularly to ensure blood pressure remains adequately controlled.      Please check your blood pressure prior to taking your blood pressure medications and keep a log that you will bring with you to your follow-up doctors' appointments.    Please contact  your family doctor or cardiologist immediately for a blood pressure below 100/50 and do not take your blood pressure medications until speaking with them.  Please contact your family doctor or cardiologist as soon as possible for blood pressure greater than 160/100.    Notify your physicians should you develop increased swelling, weight gain, or shortness of breath.  If you develop significant shortness of breath, chest pain, confusion, or other concerning signs or symptoms call 911 or obtain transportation to nearest emergency room right away.      Leg edema (swelling):   Notify your physician if you develop increased swelling, pain, or redness in legs or feet.   You may need to resume your Lasix. Discuss with your PCP    Bowel management:  - Ideally you would have 1 well formed BM every 1-2 days.  Adjust bowel regimen based on that goal or as close to that as possible  - Start with taking miralax 1 times per day and senna twice daily  - If still constipated you can take either oral or by rectum dulcolax  - If still constipated after dulcolax than you may temporarily take mag citrate x1 (but should try to avoid this if possible as this can occasionally cause abnormal electrolytes and other complications and notify your physician if you need to take)  - If unable to go for more than 4 days notify your physician for additional recommendations    - Follow-up with your primary care physician at next visit  - If you develop significant abdominal pain, nausea/vomiting, or other concerns seek medical attention right away.      Cognitive impairment - based on recent assessments, you may have a degree of impaired cognition and are recommended to follow-up with neurology to discuss potential additional work-up and management.  Your family has been recommended to assist you with oversight and supervision to decrease risk of fall and injury, optimize day-to-day activities, and to ensure appropriate medical management.          - For severe agitation, you have seroquel (quetiapine) - a sedating anti-psychotic. This is very sedating, but if Alfonzo is a danger to himself or others, it is appropriate to give him this medication.         - First try gentle reorientation, redirection, and try to address physical needs (pain in shoulder, need to urinate or use the bathroom, need to stretch his legs with a short walk).    For pain - try to use over the counter topicals (creams/gels) as discussed or acetaminophen 1-2 regular or extra-strength Tylenol up to 2-3 times per day.  Could consider heat or ice as well maximum 20 minutes at a time.  Do not use heat or ice if using topicals at the same time.  Notify primary care and/or orthopedics for poorly controlled pain for additional recommendations.        Please note a summary of your hospital stay with relevant information for your doctors will try to be sent to them.  Please confirm with your doctors at your follow up visits that they have received this summary and have them contact [facility] Medical Records if they have not received them along with any other medical records they may require.     [facility] Phone Number:  [phone]

## 2025-02-13 NOTE — ASSESSMENT & PLAN NOTE
- Bowel/bladder incontinence on admission  - Bowel: continence has improved. Was also constipated. On colace, senna 2 tabs and daily miralax.    - Has Prn suppository or lactulose   - Last BM 2/10   - Monitor and adjust as appropriate  - Bladder: PVR x3 on admission are low   - UA during acute hospitalization not suspicious for infection   - Timed Voids.    - Improving. 2/3 largely continent of bladder.    - Increased AMS since 2/11 with episodes of incontinence. UA WNL. AMS likely due to side effect of anti-psychotic medications given PRN for agitation overnight.

## 2025-02-13 NOTE — PROGRESS NOTES
"Progress Note - PMR   Name: Daniel Sanz Jr. 84 y.o. male I MRN: [id number]  Unit/Bed#: -01 I Date of Admission: 1/31/2025   Date of Service: 2/13/2025 I Hospital Day: 13     Assessment & Plan  Parkinson's disease (HCC)  - AMS likely sequela of progressive parkinsons dementia plus possible adverse reaction to recent sinemet increase  - cont neuro adjusted recs for sinemet 1.5 QID  - cont exelon patch   - PT/OT/SLP 3-5 hours/day, 5-7 days/week.  - Will need f/u with Neurology after discharge.   Dysphagia  Patient observed coughing while taking medications by nursing at acute   Advanced to Level 3/thins with no straws; medications whole with puree  Aspiration precautions   VBS performed by SLP: moderate oral and mild-moderate pharyngeal dysphagia.   SLP dysphagia eval and treat  Altered mental status  Patient's wife endorsing recent abnormal behaviors prior to hospitalization.  Patient waking frequently at night, hallucinations noted when awaking from sleep, patient noted to be more \"nasty\" and agitated with at times garbled speech which clears.  Presents to ED after fall at home with concern for possible UTI. S/p head strike in ED w/ R eyebrow laceration.  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 with mild acute encephalopathy  CT head w/o acute findings  TSH benign   Folate WNL, B12 normal, vitamin D low at 26.9  Wife notes B12/vitamin D supplementation 3 times weekly PTA  Continue to q daily dosing of vit D  B12 3x weekly  Negative infxn work-up  Sinemet as ordered by neurology (below)  Acetaminophen 650mg TID    Patient oriented, and has some decent recall. Impaired insight, and he sundowns.   Previous MOCA - 7  Current SLUMS - 8   - Added PRN olanzapine for severe agitation ONLY.    - Did receive this on 2/3 in the AM after some aggressive behaviors during blood draw/BP.   - Received again on 2/11 in the AM after agitation and aggressive towards staff.    - Received seroquel on 2/12 due to some mild " agitation.   - Very somnolent after getting these medications. Would try to avoid giving these medications unless danger to himself/others.   - Wife is not keen on going home with these medications. She mostly uses melatonin at home.    - No clear cause - UA WNL. Labs have been stable. No other new localizing symptoms.    - Considering repeat CTH if persistently somnolent.    - Will switch to Zyprexa-D 2.5 mg PRN for agitation. Would recommend refraining from using Rx for management. Instead patient would benefit from conservative measures including redirection and reorientation. Plan is to likely not send patient with this medication as wife prefers to use Melatonin to promote sleep as she did prior to this most recent hospitalization.   - Redirect, reorient first  - Melatonin in the evening for sleep/wake.  - Bed/chair alarms  - Optimize bowel/bladder program  - Avoid deliriogenic meds and physical restraint.  Bowel and bladder incontinence  - Bowel/bladder incontinence on admission  - Bowel: continence has improved. Was also constipated. On colace, senna 2 tabs and daily miralax.    - Has Prn suppository or lactulose   - Last BM 2/10   - Monitor and adjust as appropriate  - Bladder: PVR x3 on admission are low   - UA during acute hospitalization not suspicious for infection   - Timed Voids.    - Improving. 2/3 largely continent of bladder.    - Increased AMS since 2/11 with episodes of incontinence. UA WNL. AMS likely due to side effect of anti-psychotic medications given PRN for agitation overnight.   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 (HCC)  Chronic right lower extremity venous ulcers. Largely healed and wound care signed off.   PTA Lasix for LE edema management; cont at [facility]  Known to [facility] podiatry/[facility]   Wound care sami and treat  Offload heels  ACE wraps to b/l LEs  Chronic pain of both shoulders  Reports both shoulders.  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    Non-surgically managed. Last seen by Ortho 2019.   Tylenol scheduled.  Lidoderm patches (switched timing to around dinner-time to reduce pain at night and related agitation)   - This has helped.   Topicals for now. Monitor.   At risk for venous thromboembolism (VTE)  Lovenox, SCDs, ambulation  Will not need lovenox at discharge   Vitamin D insufficiency  Continue daily cholecalciferol supplementation.   HTN (hypertension)  Home: Amlodipine 5mg daily, Lasix 40mg daily, Losartan 50mg daily (unclear how long he was taking). Here: Amlodipine 5mg daily.   Monitor and adjust as appropriate     Health Maintenance  #Skin/Pressure Injury Prevention: Turn Q2hr in bed, with weight shifts [id number] in wheelchair.  #GI Prophylaxis: Not indicated   #Code Status: Full Code  #FEN: Level 2/Thins, [employer] at lunch, Angel at dinner, and EP HP angel at Breakfast  #Dispo:Team 2/11: ADD 2/14 with outpatient at Home PT/OT/SLP  with goals to discharge home at Sup-Yue level of function. Ambulation vs. Wheelchair to be determined while here.   Will need outpatient f/u with Neuro     To Review: Mr. Sanz is an 85yo M with medical history of Parkinson's Disease with dementia (MOCA 7/30 in March 2024), HTN, LE venous stasis dermatitis and edema, GERD who presented to [place] on 1/21 with worsening agitation, AMS, culminating in a fall with HS and R eye lac. No sutures were needed for the lac. CTH/N and L shoulder benign. He was noted to be hypothermic and hyperkalemic on admission. TSH WNL, Folate WNL, Vitamin D was low. B12 WNL. Given gentle IVF, noted to be constipated. Wound care was consulted for R medial tibia venous ulcer, and Geriatrics was consulted who recommended melatonin, delirium precautions. Speech was consulted who recommended NPO initially, but he has since been advanced to an oral diet of Level 2/thin liquids. Neurology was also consulted as the agitation, behavior symptoms, hallucinations worsened since increasing his  medications, they lowered his Sinemet and added rivastigmine. There was improvement in his behavioral symptoms. He no longer needed restraint/1:1 at time of discharge. He was admitted to [facility] on 1/31.     Chief Complaint: no new complaints.    Interval History/Subjective:  Patient evaluated at bedside. No acute events overnight. Patient states that he is excited to go home tomorrow. Continues to complain of chronic Bilateral shoulder pain. Will adjust timing of Lidocaine patch to be around dinner time to reduce night-time discomfort and hopefully agitation. Patient denies  new chest pain, shortness of breath, dysuria, abdominal pain, subjective fever, chills, new weakness/numbness. Last BM: 2/10.      Functional Update:  PT: Sup bed mobility. CGA sit to stand. CGA with RW transfers. CGA with RW ambulation 160' x3.  OT: Sup eating, CGA grooming, Yue bathing, modA UB dressing, min-modA LB dressing, maxA toielting, min-modA tub/shower transfers, min-modA toilet transfers   SLP: mild-moderate oral and mild pharyngeal dysphagia. Sup eating, modA comprehension, Yue expression, Sup social interaction, mod-maxA executive function, maxA memory.     Objective     Temp:  [98.1 °F (36.7 °C)-98.2 °F (36.8 °C)] 98.2 °F (36.8 °C)  HR:  [73-81] 73  Resp:  [18-20] 20  BP: (150-162)/(66-74) 162/74  SpO2:  [95 %-97 %] 97 %    Physical Exam  Constitutional:       Appearance: Normal appearance.   HENT:      Head: Normocephalic and atraumatic.      Right Ear: External ear normal.      Left Ear: External ear normal.      Nose: Nose normal.   Eyes:      Conjunctiva/sclera: Conjunctivae normal.   Cardiovascular:      Rate and Rhythm: Normal rate and regular rhythm.      Pulses: Normal pulses.      Heart sounds: Normal heart sounds.   Pulmonary:      Effort: Pulmonary effort is normal.      Breath sounds: Normal breath sounds.   Abdominal:      General: Abdomen is flat. Bowel sounds are normal.      Palpations: Abdomen is soft.    Musculoskeletal:      Cervical back: Rigidity present.      Comments: Cogwheel rigidity in bilateral hands and tremor (stable).    Skin:     General: Skin is warm.   Neurological:      Mental Status: He is alert. Mental status is at baseline.      Comments: Delayed response time (stable).   Psychiatric:         Mood and Affect: Mood normal.         Behavior: Behavior normal.         Physical examination is otherwise unchanged from previous encounter, except as noted above.    Scheduled Meds:  Current Facility-Administered Medications   Medication Dose Route Frequency Provider Last Rate    acetaminophen  650 mg Oral TID Chelsea Dasilva PA-C      amLODIPine  5 mg Oral Daily Chelsea Dasilva PA-C      Artificial Tears  1 drop Both Eyes Q4H PRN Chelsea Dasilva PA-C      bisacodyl  10 mg Rectal Daily PRN Ashley Depadua, MD      calcium carbonate  1,000 mg Oral Daily PRN Chelsea Dasilva PA-C      carbidopa-levodopa  1.5 tablet Oral 4x Daily Chelsea Dasilva PA-C      Cholecalciferol  1,000 Units Oral Daily Chelsea Dasilva PA-C      vitamin B-12  1,000 mcg Oral Once per day on Monday Wednesday Friday Chelsea Dasilva PA-C      docusate sodium  100 mg Oral BID Chelsea Dasilva PA-C      enoxaparin  40 mg Subcutaneous Daily Chelsea Dasilva PA-C      [Held by provider] furosemide  20 mg Oral Daily Ciara Calderon PA-C      lactulose  20 g Oral Daily PRN Ashley Depadua, MD      lidocaine  2 patch Topical After Dinner Mc Hylton DO      melatonin  6 mg Oral QPM Ashley Depadua, MD      OLANZapine  2.5 mg Oral HS PRN Mc Hylton DO      ondansetron  4 mg Oral Q6H PRN Chelsea Dasilva PA-C      polyethylene glycol  17 g Oral Daily Ashley Depadua, MD      rivastigmine  1 patch Transdermal Daily Chelsea Dasilva PA-C      senna  2 tablet Oral BID Chelsea Dasilva PA-C           Lab Results: I have reviewed the following results:  Results from last 7 days   Lab Units  02/13/25  1038 02/10/25  0632   HEMOGLOBIN g/dL 12.4 11.6*   HEMATOCRIT % 38.2 35.5*   WBC Thousand/uL 6.34 5.66   PLATELETS Thousands/uL 227 224     Results from last 7 days   Lab Units 02/13/25  1038 02/10/25  0632 02/07/25  0618   BUN mg/dL 30* 40* 39*   SODIUM mmol/L 140 141 138   POTASSIUM mmol/L 5.2 4.8 4.8   CHLORIDE mmol/L 106 108 105   CREATININE mg/dL 1.30 1.15 1.21              0658}      ** Please Note: Fluency Direct voice to text software may have been used in the creation of this document. **    Mc Hylton DO  PGY-2 PM&R Resident

## 2025-02-13 NOTE — ASSESSMENT & PLAN NOTE
Home: amlodpine 5mg daily/losartan 50mg daily  Here: amlodipine 5mg daily.  Losartan was stopped during his acute hospitalization due to hyperkalemia  He did have some changes in BP with position changes 2/3 but was not orthostatic  Monitor for orthostasis due to parkinsons  AM BP has been elevated but improves after medications- also of note, he does have tremors and UE rigidity which may be falsely elevating BP readings.   No changes in his meds made today.

## 2025-02-13 NOTE — ASSESSMENT & PLAN NOTE
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  Continue rivastigmine patch - pt's wife very concerned about the cost. Price check was $10.  Pt agitated 2/3 during AM vitals. He did receive IM Zyprexa. Not agitated currently  Staff has been reporting overnight agitation at times. Needed IM zyprexa early in the AM 2/11 and PRN seroquel  2/12/25.  Pt very fatigued after receiving Seroquel. He was agitated again 2/13/25 into 2/14/25 due to incontinence. He was calling for his wife. He will be sent home with Seroquel from PMR as needed. Hopefully when he is in his own environment, the agitation will improve.

## 2025-02-13 NOTE — PROGRESS NOTES
Progress Note - Hospitalist   Name: Daniel Sanz Jr. 84 y.o. male I MRN: [id number]  Unit/Bed#: -01 I Date of Admission: 1/31/2025   Date of Service: 2/13/2025 I Hospital Day: 13    Assessment & Plan  Parkinson's disease (HCC)  Continue Sinemet 1.5 tabs 4x daily. Currently ordered at times pt takes the medication at home. May need to adjust timing based on therapy schedule.  Follows with Dr. Cavanaugh as an outpt.  Therapy per PMR.  DC 2/14/25  HTN (hypertension)  Home: amlodpine 5mg daily/losartan 50mg daily  Here: amlodipine 5mg daily.  Losartan was stopped during his acute hospitalization due to hyperkalemia  He did have some changes in BP with position changes 2/3 but was not orthostatic  Monitor for orthostasis due to parkinsons  AM BP has been elevated but improves after medications- also of note, he does have tremors and UE rigidity which may be falsely elevating BP readings.   No changes in his meds made today.  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  Continue rivastigmine patch - pt's wife very concerned about the cost. Price check was $10.  Pt agitated 2/3 during AM vitals. He did receive IM Zyprexa. Not agitated currently  Staff has been reporting overnight agitation at times. Needed IM zyprexa early in the AM 2/11 and PRN seroquel  2/12/25.  Pt very fatigued after receiving Seroquel. He was able to be reoriented last night and did not need medication. PMR does not plan to send him home on medications for agitation due to significant fatigue he experiences after taking these medications.  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  He was taking Lasix 3x weekly as an outpt.  Daily Lasix restarted last week. He appeared dry on labs and Lasix held over the weekend.  BMP without change. Continue to hold Lasix.  Encourage po fluids.  Per pt's wife- LE edema is much better then it was at  home.  Dysphagia  Continue modified diet, upgraded to level 3 with thin liquids. Per speech- still needs frequent reminders to swallow so he needs supervision for all meals  SLP to continue to follow.  Chronic pain of both shoulders  Per wife, had at home as well  Continue lidocaine patches  Vitamin D insufficiency  Continue supplementation    The above assessment and plan was reviewed and updated as determined by my evaluation of the patient on 2/13/2025.    History of Present Illness   Patient seen and examined. Patients overnight issues or events were reviewed with nursing staff. New or overnight issues include the following:     Pt seen in his room. He is looking forward to DC tomorrow. He denies any current complaints.    A 10 point review of systems was negative except for what is noted in the HPI.    Objective :  Temp:  [98.1 °F (36.7 °C)-98.2 °F (36.8 °C)] 98.2 °F (36.8 °C)  HR:  [73-81] 73  BP: (130-162)/(60-74) 162/74  Resp:  [18-20] 20  SpO2:  [95 %-97 %] 97 %  O2 Device: None (Room air)    Invasive Devices       None                   Physical Exam  General Appearance: NAD; pleasant  HEENT: PERRLA, conjuctiva normal; mucous membranes moist; face symmetrical  Neck:  Supple  Lungs: clear bilaterally, normal respiratory effort, no retractions, expiratory effort normal, on room air  CV: regular rate and rhythm, no murmurs rubs or gallops noted   ABD: soft non tender, +BS x4  EXT: DP pulses intact, no lymphadenopathy, + bilat LE edema  Skin: normal turgor, normal texture, no rash  Psych: affect normal, mood normal  Neuro: Awake and alert.  The above physical exam was reviewed and updated as determined by my evaluation of the patient on 2/13/2025.      Lab Results: I have reviewed the following results:  Results from last 7 days   Lab Units 02/10/25  0632   WBC Thousand/uL 5.66   HEMOGLOBIN g/dL 11.6*   HEMATOCRIT % 35.5*   PLATELETS Thousands/uL 224     Results from last 7 days   Lab Units 02/10/25  0632  02/07/25  0618   SODIUM mmol/L 141 138   POTASSIUM mmol/L 4.8 4.8   CHLORIDE mmol/L 108 105   CO2 mmol/L 26 27   BUN mg/dL 40* 39*   CREATININE mg/dL 1.15 1.21   CALCIUM mg/dL 8.9 9.3                   Imaging Results Review: No pertinent imaging studies reviewed.  Other Study Results Review: No additional pertinent studies reviewed.    Review of Scheduled Meds: Medications  reviewed and reconciled as needed  Current Facility-Administered Medications   Medication Dose Route Frequency Provider Last Rate    acetaminophen  650 mg Oral TID Chelsea Dasilva PA-C      amLODIPine  5 mg Oral Daily Chelsea Dasilva PA-C      Artificial Tears  1 drop Both Eyes Q4H PRN Chelsea Dasilva PA-C      bisacodyl  10 mg Rectal Daily PRN Ashley Depadua, MD      calcium carbonate  1,000 mg Oral Daily PRN Chelsea Dasilva PA-C      carbidopa-levodopa  1.5 tablet Oral 4x Daily Chelsea Dasilva PA-C      Cholecalciferol  1,000 Units Oral Daily Chelsea Dasilva PA-C      vitamin B-12  1,000 mcg Oral Once per day on Monday Wednesday Friday Chelsea Dasilva PA-C      docusate sodium  100 mg Oral BID Chelsea Dasilva PA-C      enoxaparin  40 mg Subcutaneous Daily Chelsea Dasilva PA-C      [Held by provider] furosemide  20 mg Oral Daily Ciara Calderon PA-C      lactulose  20 g Oral Daily PRN Ashley Depadua, MD      lidocaine  2 patch Topical After Dinner Mc Hylton DO      melatonin  6 mg Oral QPM Ashley Depadua, MD      OLANZapine  2.5 mg Intramuscular Q4H PRN Ashley Depadua, MD      ondansetron  4 mg Oral Q6H PRN Chelsea Dasilva PA-C      polyethylene glycol  17 g Oral Daily Ashley Depadua, MD      QUEtiapine  12.5 mg Oral Q8H PRN Chelsea Dasilva PA-C      rivastigmine  1 patch Transdermal Daily Chelsea Dasilva PA-C      senna  2 tablet Oral BID Chelsea Dasilva PA-C         VTE Pharmacologic Prophylaxis: Lovenox  Code Status: Level 1 - Full Code  Current Length of Stay: 13  day(s)    Administrative Statements   I have spent a total time of 35 minutes in caring for this patient on the day of the visit/encounter including Prognosis, Risks and benefits of tx options, Instructions for management, Patient and family education, Importance of tx compliance, Risk factor reductions, Impressions, Counseling / Coordination of care, Documenting in the medical record, Reviewing / ordering tests, medicine, procedures  , Obtaining or reviewing history  , and Communicating with other healthcare professionals .  ** Please Note:  voice to text software may have been used in the creation of this document. Although proof errors in transcription or interpretation are a potential of such software**

## 2025-02-13 NOTE — PROGRESS NOTES
02/13/25 0900   Pain Assessment   Pain Assessment Tool 0-10   Restrictions/Precautions   Precautions Aspiration;Bed/chair alarms;Cognitive;Fall Risk;Hard of hearing;Impulsive;Supervision on toilet/commode   Braces or Orthoses Other (Comment)  (Bilateral compression socks (personal))   Subjective   Subjective Pt agreeable to perform skilled PT   Roll Left and Right   Type of Assistance Needed Supervision   Roll Left and Right CARE Score 4   Sit to Lying   Type of Assistance Needed Supervision   Sit to Lying CARE Score 4   Lying to Sitting on Side of Bed   Type of Assistance Needed Supervision   Lying to Sitting on Side of Bed CARE Score 4   Sit to Stand   Type of Assistance Needed Incidental touching;Adaptive equipment   Comment CgA w RW   Sit to Stand CARE Score 4   Bed-Chair Transfer   Type of Assistance Needed Incidental touching;Adaptive equipment   Comment CgA w RW   Chair/Bed-to-Chair Transfer CARE Score 4   Transfer Bed/Chair/Wheelchair   Adaptive Equipment Roller Walker   Car Transfer   Type of Assistance Needed Incidental touching   Comment CgA w RW   Car Transfer CARE Score 4   Walk 10 Feet   Type of Assistance Needed Incidental touching;Adaptive equipment   Comment RW   Walk 10 Feet CARE Score 4   Walk 50 Feet with Two Turns   Type of Assistance Needed Incidental touching;Adaptive equipment   Comment RW   Walk 50 Feet with Two Turns CARE Score 4   Walk 150 Feet   Type of Assistance Needed Incidental touching;Adaptive equipment   Comment RW   Walk 150 Feet CARE Score 4   Walking 10 Feet on Uneven Surfaces   Type of Assistance Needed Incidental touching;Independent   Comment floor mat RW   Walking 10 Feet on Uneven Surfaces CARE Score -   Ambulation   Primary Mode of Locomotion Prior to Admission Walk   Distance Walked (feet) 160 ft  (x3)   Assist Device Roller Walker   Does the patient walk? 2. Yes   Wheel 50 Feet with Two Turns   Reason if not Attempted Activity not applicable   Wheel 50 Feet with Two  Turns CARE Score 9   Wheel 150 Feet   Reason if not Attempted Activity not applicable   Wheel 150 Feet CARE Score 9   Wheelchair mobility   Does the patient use a wheelchair? 0. No   Curb or Single Stair   Style negotiated Curb   Type of Assistance Needed Incidental touching;Adaptive equipment   Comment CgA  RW 6 inch curb steps   1 Step (Curb) CARE Score 4   4 Steps   Type of Assistance Needed Incidental touching;Adaptive equipment   4 Steps CARE Score 4   12 Steps   Type of Assistance Needed Incidental touching   12 Steps CARE Score 4   Picking Up Object   Type of Assistance Needed Incidental touching;Adaptive equipment   Comment reacher marker w RW   Picking Up Object CARE Score 4   Equipment Use   NuStep lvl 2 for 12 min   Assessment   Treatment Assessment pt perform skilled PT and focus  on care scores and overall JAYLEN and LE strengthening and coniditoning. Pt perform care scores as above and progressing , pt going home DC with his wife and family support mostly S lvl with RW and progressing DC tomorrow with all needs and DMEs met . Pt will f/u with post MD  and HH PT   PT Barriers   Physical Impairment Decreased strength;Decreased endurance;Impaired balance;Decreased mobility;Decreased cognition;Impaired judgement;Decreased safety awareness;Impaired hearing   Plan   Progress Progressing toward goals   PT Therapy Minutes   PT Time In 0900   PT Time Out 1030   PT Total Time (minutes) 90   PT Mode of treatment - Individual (minutes) 90   PT Mode of treatment - Concurrent (minutes) 0   PT Mode of treatment - Group (minutes) 0   PT Mode of treatment - Co-treat (minutes) 0   PT Mode of Treatment - Total time(minutes) 90 minutes   PT Cumulative Minutes 960   Therapy Time missed   Time missed? No

## 2025-02-13 NOTE — ASSESSMENT & PLAN NOTE
"Patient's wife endorsing recent abnormal behaviors prior to hospitalization.  Patient waking frequently at night, hallucinations noted when awaking from sleep, patient noted to be more \"nasty\" and agitated with at times garbled speech which clears.  Presents to ED after fall at home with concern for possible UTI. S/p head strike in ED w/ R eyebrow laceration.  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 with mild acute encephalopathy  CT head w/o acute findings  TSH benign   Folate WNL, B12 normal, vitamin D low at 26.9  Wife notes B12/vitamin D supplementation 3 times weekly PTA  Continue to q daily dosing of vit D  B12 3x weekly  Negative infxn work-up  Sinemet as ordered by neurology (below)  Acetaminophen 650mg TID    Patient oriented, and has some decent recall. Impaired insight, and he sundowns.   Previous MOCA - 7  Current SLUMS - 8   - Added PRN olanzapine for severe agitation ONLY.    - Did receive this on 2/3 in the AM after some aggressive behaviors during blood draw/BP.   - Received again on 2/11 in the AM after agitation and aggressive towards staff.    - Received seroquel on 2/12 due to some mild agitation.   - Very somnolent after getting these medications. Would try to avoid giving these medications unless danger to himself/others.   - Wife is not keen on going home with these medications. She mostly uses melatonin at home.    - No clear cause - UA WNL. Labs have been stable. No other new localizing symptoms.    - Considering repeat CTH if persistently somnolent.    - Will switch to Zyprexa-D 2.5 mg PRN for agitation. Would recommend refraining from using Rx for management. Instead patient would benefit from conservative measures including redirection and reorientation. Plan is to likely not send patient with this medication as wife prefers to use Melatonin to promote sleep as she did prior to this most recent hospitalization.   - Redirect, reorient first  - Melatonin in the evening for sleep/wake.  - " Bed/chair alarms  - Optimize bowel/bladder program  - Avoid deliriogenic meds and physical restraint.

## 2025-02-13 NOTE — NURSING NOTE
Pt rang appropriately at 1645 and 2013 to urinate.  Pt resting in bed after, alarm on and pt calm and relaxing.

## 2025-02-13 NOTE — PROGRESS NOTES
02/13/25 0800   Pain Assessment   Pain Assessment Tool 0-10   Pain Score No Pain   Restrictions/Precautions   Precautions Aspiration;Bed/chair alarms;Cognitive;Fall Risk;Impulsive;Supervision on toilet/commode   Eating   Type of Assistance Needed Set-up / clean-up;Supervision;Verbal cues   Physical Assistance Level No physical assistance   Eating CARE Score 4   Swallow Assessment   Swallow Treatment Assessment   Daily Dysphagia Tx Note     Patient Name: Daniel Sanz Jr.    Today's Date: 2/13/2025        Current Risks for Dysphagia & Aspiration: General debilitation; Cognitive deficit; Mental status change; Behavior issues; Hx neurologic dx (Parkinson's); Positioning issues      Current Symptoms/Concerns: Coughs w/ liquids; Cough on own secretions; Difficulty chewing; Holding food in mouth     Current diet:soft/level 3 diet and thin liquids      Premorbid diet::soft/level 3 diet, regular diet, and thin liquids      Positioning: upright in recliner    Items administered:Consistencies Administered: thin liquids and soft solids  Materials administered included: scrambled eggs, chopped sausage, blueberry muffin, coffee by cup    Total amount of meal consumed:   100% of meal and 180cc of thins      Summary:     Pt presenting with mild-moderate oral and mild pharyngeal dysphagia today. Symptoms or concerns included the following observations: functional lip seal and bolus retrieval around utensils, finger foods and cup w/o demonstrating anterior loss. However, pt's overall manipulation and mastication are delayed due to decreased attention to task at hand, where pt will stop chewing or manipulating boluses when retrieving next bites. Additionally, pt w/ piecemeal boluses throughout meal to where incomplete mastication and bolus formulation was decreased leading to mild oral residual bilaterally. Again, SLP providing consistent verbal cues to stop after 3 bolus presentations, encouraged liquid wash after and verbal  prompts for continuing to chew when retrieving next bites. Given this, bolus transfer remains delayed across softer solids. Bolus control/transfer of thins fluctuated where single sips where pt did demonstrate more decreased control w/ sips near end of cup. Swallow initiation was still delayed across textures and once HLE was elicited continues to be decreased. Double swallows still persisting w/ soft solids and thins. Pt had wet voicing x4 where pt eliciting spontaneous cough x2 but still does need verbal prompts to clear throat or elicit cough. No overt coughing was noted throughout meal today.     OF note, SLP was providing verbal cues ~75-90% of the meal.        Recommendations:  Diet: soft/level 3 diet and thin liquids- NO STRAWS  Meds: whole with puree  Strategies:  upright posture, only feed when fully alert, slow rate of feeding, small bites/sips, NO STRAWS, cough when demonstrating wet voicing, quiet environment (tv off, limit talking, door closed, etc.), alternating bites and sips, and OOB for meals preferred      FULL supervision w/ meals. Will need setup AND ongoing verbal cues for pacing throughout meal--> listed below  Results reviewed with:  patient  Aspiration precautions posted.    Swallow Strategies for Alfozno to Follow During Meals  Always out of bed for ALL mealsà should be sitting in a chair fully upright.  NO straws  Small single sips of thin liquids by cup only.  Assist in cutting up food items into smaller bites for Alfonzo.  SLOW intake of food.  Verbal cues for swallowing 2-3 bites of food at a time. Allow for fork or spoon down between bites.   Verbal cue to take a drink ever 3-4 bites to help clear mouth of any food which might remain.  If cough or throat clear is noted during meals, ENCOURAGE stronger cough or throat clear to “clear out” the airway.   Also, if outside of meals, Alfonzo has a voice that sounds like he is underwater, encourage throat clear or cough to eliminate.     Alfonzo NEEDS to  be supervised/watched during ALL meals to ensure that he is following the recommended strategies.      F/u ST tx: Pt is currently recommended for further skilled SLP services targeting dysphagia therapy in order to maximize oral and pharyngeal swallow skills, while safely supporting PO intake, as well as to improve independent carryover of safe swallow strategies.       Plan: Monitor current             As pt is for d/c 2/14 would recommend continued home ST services to continue to monitor overall tolerance of diet, provide ongoing education to spouse given swallow strategies, safety in pt's own secretion management, etc.   Swallow Assessment Prognosis   Prognosis Fair   Prognosis Considerations Age;Co-morbidities;Medical prognosis;Medical diagnosis;Family/community support;Severity of impairments;Ability to carry over;New learning ability   SLP Therapy Minutes   SLP Time In 0800   SLP Time Out 0830   SLP Total Time (minutes) 30   SLP Mode of treatment - Individual (minutes) 30   SLP Mode of treatment - Concurrent (minutes) 0   SLP Mode of treatment - Group (minutes) 0   SLP Mode of treatment - Co-treat (minutes) 0   SLP Mode of Treatment - Total time(minutes) 30 minutes   SLP Cumulative Minutes 590   Therapy Time missed   Time missed? No

## 2025-02-13 NOTE — ASSESSMENT & PLAN NOTE
Home: amlodpine 5mg daily/losartan 50mg daily  Here: amlodipine 5mg daily.  Losartan was stopped during his acute hospitalization due to hyperkalemia  He did have some changes in BP with position changes 2/3 but was not orthostatic  Monitor for orthostasis due to parkinsons  AM BP has been elevated but improves after medications  Outpt follow-up with PCP

## 2025-02-13 NOTE — ASSESSMENT & PLAN NOTE
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  Continue rivastigmine patch - pt's wife very concerned about the cost. Price check was $10.  Pt agitated 2/3 during AM vitals. He did receive IM Zyprexa. Not agitated currently  Staff has been reporting overnight agitation at times. Needed IM zyprexa early in the AM 2/11 and PRN seroquel  2/12/25.  Pt very fatigued after receiving Seroquel. He was able to be reoriented last night and did not need medication. PMR does not plan to send him home on medications for agitation due to significant fatigue he experiences after taking these medications.

## 2025-02-13 NOTE — SPEECH THERAPY NOTE
Daily Dysphagia Tx Note     Patient Name: Daniel Sanz Jr.    Today's Date: 2/13/2025        Current Risks for Dysphagia & Aspiration: General debilitation; Cognitive deficit; Mental status change; Behavior issues; Hx neurologic dx (Parkinson's); Positioning issues      Current Symptoms/Concerns: Coughs w/ liquids; Cough on own secretions; Difficulty chewing; Holding food in mouth     Current diet:soft/level 3 diet and thin liquids      Premorbid diet::soft/level 3 diet, regular diet, and thin liquids      Positioning: upright in recliner    Items administered:Consistencies Administered: thin liquids and soft solids  Materials administered included: scrambled eggs, chopped sausage, blueberry muffin, coffee by cup    Total amount of meal consumed:   100% of meal and 180cc of thins      Summary:     Pt presenting with mild-moderate oral and mild pharyngeal dysphagia today. Symptoms or concerns included the following observations: functional lip seal and bolus retrieval around utensils, finger foods and cup w/o demonstrating anterior loss. However, pt's overall manipulation and mastication are delayed due to decreased attention to task at hand, where pt will stop chewing or manipulating boluses when retrieving next bites. Additionally, pt w/ piecemeal boluses throughout meal to where incomplete mastication and bolus formulation was decreased leading to mild oral residual bilaterally. Again, SLP providing consistent verbal cues to stop after 3 bolus presentations, encouraged liquid wash after and verbal prompts for continuing to chew when retrieving next bites. Given this, bolus transfer remains delayed across softer solids. Bolus control/transfer of thins fluctuated where single sips where pt did demonstrate more decreased control w/ sips near end of cup. Swallow initiation was still delayed across textures and once HLE was elicited continues to be decreased. Double swallows still persisting w/ soft solids and  thins. Pt had wet voicing x4 where pt eliciting spontaneous cough x2 but still does need verbal prompts to clear throat or elicit cough. No overt coughing was noted throughout meal today.     OF note, SLP was providing verbal cues ~75-90% of the meal.        Recommendations:  Diet: soft/level 3 diet and thin liquids- NO STRAWS  Meds: whole with puree  Strategies:  upright posture, only feed when fully alert, slow rate of feeding, small bites/sips, NO STRAWS, cough when demonstrating wet voicing, quiet environment (tv off, limit talking, door closed, etc.), alternating bites and sips, and OOB for meals preferred      FULL supervision w/ meals. Will need setup AND ongoing verbal cues for pacing throughout meal--> listed below  Results reviewed with:  patient  Aspiration precautions posted.    Swallow Strategies for Alfonzo to Follow During Meals  Always out of bed for ALL mealsà should be sitting in a chair fully upright.  NO straws  Small single sips of thin liquids by cup only.  Assist in cutting up food items into smaller bites for Alfonzo.  SLOW intake of food.  Verbal cues for swallowing 2-3 bites of food at a time. Allow for fork or spoon down between bites.   Verbal cue to take a drink ever 3-4 bites to help clear mouth of any food which might remain.  If cough or throat clear is noted during meals, ENCOURAGE stronger cough or throat clear to “clear out” the airway.   Also, if outside of meals, Alfonzo has a voice that sounds like he is underwater, encourage throat clear or cough to eliminate.     Alfonzo NEEDS to be supervised/watched during ALL meals to ensure that he is following the recommended strategies.      F/u ST tx: Pt is currently recommended for further skilled SLP services targeting dysphagia therapy in order to maximize oral and pharyngeal swallow skills, while safely supporting PO intake, as well as to improve independent carryover of safe swallow strategies.       Plan: Monitor current             As pt is  for d/c 2/14 would recommend continued home ST services to continue to monitor overall tolerance of diet, provide ongoing education to spouse given swallow strategies, safety in pt's own secretion management, etc.

## 2025-02-13 NOTE — ASSESSMENT & PLAN NOTE
Follows with Podiatry and wound care as an outpt.  He was taking Lasix 3x weekly as an outpt.  Daily Lasix restarted last week. He appeared dry on labs and Lasix has been held.  BUN improving on BMP. Encourage po fluids. BMP in 1 week as an outpt.  Per pt's wife- LE edema is much better then it was at home.

## 2025-02-13 NOTE — ASSESSMENT & PLAN NOTE
Patient observed coughing while taking medications by nursing at acute   Advanced to Level 3/thins with no straws; medications whole with puree  Aspiration precautions   VBS performed by SLP: moderate oral and mild-moderate pharyngeal dysphagia.   SLP dysphagia eval and treat

## 2025-02-13 NOTE — DISCHARGE SUMMARY
Discharge Summary - Hospitalist   Name: Daniel Sanz Jr. 84 y.o. male I MRN: [id number]  Unit/Bed#: -01 I Date of Admission: 1/31/2025   Date of Service: 2/14/2025 I Hospital Day: 14     Assessment & Plan  Parkinson's disease (HCC)  Continue Sinemet 1.5 tabs 4x daily. Currently ordered at times pt takes the medication at home. May need to adjust timing based on therapy schedule.  Follows with Dr. Cavanaugh as an outpt.  Therapy per PMR.  DC 2/14/25  HTN (hypertension)  Home: amlodpine 5mg daily/losartan 50mg daily  Here: amlodipine 5mg daily.  Losartan was stopped during his acute hospitalization due to hyperkalemia  He did have some changes in BP with position changes 2/3 but was not orthostatic  Monitor for orthostasis due to parkinsons  AM BP has been elevated but improves after medications  Outpt follow-up with PCP  Altered mental status  Likely due to Parkinson's dementia.  Continue rivastigmine patch - pt's wife very concerned about the cost. Price check was $10.  Pt agitated 2/3 during AM vitals. He did receive IM Zyprexa. Not agitated currently  Staff has been reporting overnight agitation at times. Needed IM zyprexa early in the AM 2/11 and PRN seroquel  2/12/25.  Pt very fatigued after receiving Seroquel. He was agitated again 2/13/25 into 2/14/25 due to incontinence. He was calling for his wife. He will be sent home with Seroquel from PMR as needed. Hopefully when he is in his own environment, the agitation will improve.  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  He was taking Lasix 3x weekly as an outpt.  Daily Lasix restarted last week. He appeared dry on labs and Lasix has been held.  BUN improving on BMP. Encourage po fluids. BMP in 1 week as an outpt.  Per pt's wife- LE edema is much better then it was at home.  Dysphagia  Continue modified diet, upgraded to level 3 with thin liquids. Per speech- still needs frequent  "reminders to swallow so he needs supervision for all meals  SLP to continue to follow.  Chronic pain of both shoulders  Per wife, had at home as well  Continue lidocaine patches  Vitamin D insufficiency  Continue supplementation     Discharge Summary   Daniel Sanz Jr. 84 y.o. male MRN: [id number]  Unit/Bed#: [facility] 457-01 Encounter: [id number]  Admission Date: 1/31/2025     Discharge Date: 2/14/2025    Discharging Provider: Chelsea Dasilva    PCP:  [phone]      HPI: Refer to previous hospitalization for complete record    Summary of [facility] Course: Daniel Sanz is an 85yo male, with PAD, HTN, and Parkinson's disease, who was admitted to the [facility] 1/31/25 after hospitalization due to encephalopathy due to Parkinson's dementia. Sinemet was reduced and an Exelon patch was added. He underwent an intensive inpatient rehabilitation program. Lasix was restarted during his [facility] stay due to LE edema. He became dry and Lasix was stopped. The LE edema has remained stable. He will need a BMP 1 week after discharge. He did have an few events of agitation during his [facility] stay. He will be sent home with a prescription for Seroquel as needed for agitation. Pt did progress through the rehabilitation program and pt is cleared for DC from both PMR and medicine standpoint.     Discharge Physical Examination:  /72 (BP Location: Left arm)   Pulse 91   Temp 98.3 °F (36.8 °C) (Axillary)   Resp 18   Ht 5' 8\" (1.727 m)   Wt 89.2 kg (196 lb 9.6 oz)   SpO2 96%   BMI 29.89 kg/m²     General Appearance: no distress, conversive  HEENT: PERRLA, conjuctiva normal; oropharynx clear; mucous membranes moist;   Neck:  Supple, no lymphadenopathy or thyromegaly  Lungs: CTA, normal respiratory effort, no retractions, expiratory effort normal  CV: regular rate and rhythm , PMI normal   ABD: soft non tender, no masses , no hepatic or splenomegaly  EXT: DP pulses intact, no lymphadenopathy, no edema  Skin: normal turgor, normal texture, no " "rash  Psych: affect normal, mood normal  Neuro: Awake and alert.    Significant Findings, Care, Treatment and Services Provided: Acute comprehensive interdisciplinary inpatient rehabilitation including PT, OT, SLP, RN, CM, SW, dietary, psychology, etc.      Physiatry Additions/Comorbidities:    Parkinson's disease (HCC)  - AMS likely sequela of progressive parkinsons dementia plus possible adverse reaction to recent sinemet increase  - cont neuro adjusted recs for sinemet 1.5 QID  - cont exelon patch   - PT/OT/SLP 3-5 hours/day, 5-7 days/week.  - Will need f/u with Neurology after discharge. Appointment scheduled for 3/6/25.     Dysphagia  Patient observed coughing while taking medications by nursing at acute   Advanced to Level 3/thins with no straws; medications whole with puree  Aspiration precautions   VBS performed by SLP: moderate oral and mild-moderate pharyngeal dysphagia.   Referral for OP SLP eval and treat    Altered mental status  Patient's wife endorsing recent abnormal behaviors prior to hospitalization.  Patient waking frequently at night, hallucinations noted when awaking from sleep, patient noted to be more \"nasty\" and agitated with at times garbled speech which clears.  Presents to ED after fall at home with concern for possible UTI. S/p head strike in ED w/ R eyebrow laceration.  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 with mild acute encephalopathy  CT head w/o acute findings  TSH benign   Folate WNL, B12 normal, vitamin D low at 26.9  Wife notes B12/vitamin D supplementation 3 times weekly PTA  Continue to q daily dosing of vit D  B12 3x weekly  Negative infxn work-up  Sinemet as ordered by neurology  Acetaminophen 650mg TID     Patient oriented, and has some decent recall. Impaired insight, and he sundowns.   Previous MOCA - 7  Current SLUMS - 8   - Added PRN olanzapine for severe agitation ONLY.                - Did receive this on 2/3 in the AM after some aggressive behaviors during blood " draw/BP.               - Received again on 2/11 in the AM after agitation and aggressive towards staff.                - Received seroquel on 2/12 due to some mild agitation.               - Very somnolent after getting these medications. Would try to avoid giving these medications unless danger to himself/others.               - Wife is not keen on going home with these medications. She mostly uses melatonin at home.                - No clear cause - UA WNL. Labs have been stable. No other new localizing symptoms.                - Considering repeat CTH if persistently somnolent.                - Will switch to Zyprexa-D 2.5 mg PRN for agitation. Would recommend refraining from using Rx for management. Instead patient would benefit from conservative measures including redirection and reorientation. Plan is to not send patient with this medication as wife prefers to use Melatonin to promote sleep as she did prior to this most recent hospitalization.   - Redirect, reorient first  - Melatonin in the evening for sleep/wake.  - Bed/chair alarms  - Optimize bowel/bladder program  - Avoid deliriogenic meds and physical restraint.    Bowel and bladder incontinence  - Bowel/bladder incontinence on admission  - Bowel: continence has improved. Was also constipated. On colace, senna 2 tabs and daily miralax.                - Has Prn suppository or lactulose, discontinued on discharge               - Monitor and adjust as appropriate  - Bladder: PVR x3 on admission are low               - UA during acute hospitalization not suspicious for infection               - Timed Voids.                - Improving. 2/3 largely continent of bladder.                - Increased AMS since 2/11 with episodes of incontinence. UA WNL. AMS likely due to side effect of anti-psychotic medications given PRN for agitation overnight.     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 (HCC)  Chronic right lower extremity venous ulcers.  Largely healed and wound care signed off.   PTA Lasix for LE edema management; cont at [facility]  Known to [facility] podiatry/[facility]   Wound care sami and treat  Offload heels  ACE wraps to b/l Les    Chronic pain of both shoulders  Reports both shoulders.  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   Non-surgically managed. Last seen by Ortho 2019.   Tylenol scheduled.  Lidoderm patches (switched timing to around dinner-time to reduce pain at night and related agitation)               - This has helped.   Topicals for now. Monitor.     At risk for venous thromboembolism (VTE)  Lovenox, SCDs, ambulation  Will not need lovenox at discharge     Vitamin D insufficiency  Continue daily cholecalciferol supplementation.     Acute Rehabilitation Center Course: Patient participated in a comprehensive interdisciplinary inpatient rehabilitation program which included involvment of Rehab MD, therapies (PT, OT, and/or SLP), RN, CM, SW, dietary, and psychology services.     Etiologic/Rehabilitation Diagnosis: Impairment of mobility, safety and Activities of Daily Living (ADLs) due to Neurologic Conditions:  03.2  Parkinsonism  Etiologic Diagnosis: Parkinson's dementia with mild acute encephalopathy   Date of Onset: 1/21/25     Functional Status Upon Admission to [facility]:  Mobility: Min-ModA  Transfers: ModA  ADLs: Supervision-Yue    Functional Status Upon Discharge from [facility]:     PT: Sup bed mobility, CGA transfers, CGA ambulation 160' with RW, CGA stairs  OT: Sup oral hygiene, Sup eating, Yue toileting hygiene and transfers  SLP: Still recommended for Level 3/Thins diet. Thins only with cup - no straws. Mild-mod oral and mild pharyngeal dysphagia    Condition at Discharge: good     Discharge instructions/Information to patient and family:   See after visit summary for information provided to patient and family.      Provisions for Follow-Up Care:  See after visit summary for information related to follow-up care and any pertinent  home health orders.      Future Appointments   Date Time Provider Department Center   3/6/2025 11:00 AM Marcus Oviedo MD NEURO [facility]   6/26/2025  1:50 PM Pia Caruso MD DERM [facility]           Disposition: Home    Planned Readmission: No    Discharge Statement   I spent 60 minutes or more discharging the patient.  I had direct contact with the patient on the day of discharge. Greater than 50% of the total time was spent examining patient, answering all patient questions, arranging and discussing plan of care with patient and care team as well as directly providing post-discharge instructions. Additional time then spent on discharge activities.    Discharge Medications:  See after visit summary for reconciled discharge medications provided to patient and family.

## 2025-02-13 NOTE — ASSESSMENT & PLAN NOTE
Reports both shoulders.  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   Non-surgically managed. Last seen by Ortho 2019.   Tylenol scheduled.  Lidoderm patches (switched timing to around dinner-time to reduce pain at night and related agitation)   - This has helped.   Topicals for now. Monitor.

## 2025-02-13 NOTE — PROGRESS NOTES
"   02/13/25 0281   Pain Assessment   Pain Assessment Tool 0-10   Pain Score No Pain   Lifestyle   Autonomy \"I told them to stop the patches (on shoulders) yesterday because my shoulder weren't hurting any more.   Reciprocal Relationships Pt's wife Sheng, Son Gama, and LEVON Santiago   Service to Others retired    Intrinsic Gratification watching TV, talks about hunting as a favorite leisure but family reports he last went 13 years ago   Oral Hygiene   Type of Assistance Needed Supervision   Physical Assistance Level No physical assistance   Comment standing at the sink   Oral Hygiene CARE Score 4   Sit to Stand   Type of Assistance Needed Supervision;Verbal cues   Physical Assistance Level No physical assistance   Comment to RW with VC's for increased forward flexion   Sit to Stand CARE Score 4   Bed-Chair Transfer   Type of Assistance Needed Supervision   Physical Assistance Level No physical assistance   Comment CS with RW, VC's for safe walker mgmt when turning and backing up to a surface.   Chair/Bed-to-Chair Transfer CARE Score 4   Toileting Hygiene   Type of Assistance Needed Physical assistance   Physical Assistance Level 26%-50%   Comment mod A to pull the pull-up and pants up in the rear. Pt's wife reports she has been assisting him with that for awhile at home.   Toileting Hygiene CARE Score 3   Toilet Transfer   Type of Assistance Needed Physical assistance   Physical Assistance Level 25% or less   Comment min A ambulation into bathroom and VC's for full pivot before abandoning the walker   Toilet Transfer CARE Score 3   Functional Standing Tolerance   Time 20.5minutes   Activity card sorting task   Comments Pt able to stand for the entire task without rest break   Therapeutic Exercise - ROM   UE-ROM Yes   ROM- Right Upper Extremities   RUE ROM Comment use of arm cycle without resistance 5min prograde and 5min retrograde for bilateral shoulder joint lubrication and endurance training. Pt had no pain " s/p task.   Cognition   Overall Cognitive Status Impaired   Arousal/Participation Alert;Cooperative   Attention Attends with cues to redirect   Orientation Level Oriented to person   Memory Decreased short term memory;Decreased recall of precautions   Following Commands Follows one step commands with increased time or repetition   Activity Tolerance   Activity Tolerance Patient tolerated treatment well   Assessment   Treatment Assessment Pt engaged in 60min OT session focused on standing tolerance, UE exercise, and FMC. See above for details of performance. Pt is ready for discharge home with family Friday following last ADL to assess discharge scores. Family is installing GB in shower and purchased a shower seat with back following yesterday's family training session. Referral made to [facility] program for OT/ST/PT.   Prognosis Good   Problem List Decreased strength;Decreased range of motion;Decreased endurance;Impaired balance;Decreased mobility;Decreased coordination;Decreased cognition;Impaired judgement;Decreased safety awareness   Barriers to Discharge None   Plan   Treatment/Interventions Therapeutic exercise;Endurance training;ADL retraining;Functional transfer training   Progress Improving as expected   OT Therapy Minutes   OT Time In 1300   OT Time Out 1400   OT Total Time (minutes) 60   OT Mode of treatment - Individual (minutes) 60   OT Mode of treatment - Concurrent (minutes) 0   OT Mode of treatment - Group (minutes) 0   OT Mode of treatment - Co-treat (minutes) 0   OT Mode of Treatment - Total time(minutes) 60 minutes   OT Cumulative Minutes 800   Therapy Time missed   Time missed? No

## 2025-02-14 VITALS
TEMPERATURE: 98.9 F | HEART RATE: 81 BPM | SYSTOLIC BLOOD PRESSURE: 127 MMHG | OXYGEN SATURATION: 94 % | DIASTOLIC BLOOD PRESSURE: 60 MMHG | HEIGHT: 68 IN | RESPIRATION RATE: 16 BRPM | WEIGHT: 196.6 LBS | BODY MASS INDEX: 29.8 KG/M2

## 2025-02-14 PROBLEM — Z91.89 AT RISK FOR VENOUS THROMBOEMBOLISM (VTE): Status: RESOLVED | Noted: 2025-02-03 | Resolved: 2025-02-14

## 2025-02-14 PROCEDURE — 97110 THERAPEUTIC EXERCISES: CPT

## 2025-02-14 PROCEDURE — 97535 SELF CARE MNGMENT TRAINING: CPT

## 2025-02-14 PROCEDURE — 97116 GAIT TRAINING THERAPY: CPT

## 2025-02-14 PROCEDURE — 99239 HOSP IP/OBS DSCHRG MGMT >30: CPT | Performed by: PHYSICIAN ASSISTANT

## 2025-02-14 PROCEDURE — 99233 SBSQ HOSP IP/OBS HIGH 50: CPT | Performed by: PHYSICAL MEDICINE & REHABILITATION

## 2025-02-14 RX ORDER — QUETIAPINE FUMARATE 25 MG/1
12.5 TABLET, FILM COATED ORAL
Qty: 30 TABLET | Refills: 0 | Status: SHIPPED | OUTPATIENT
Start: 2025-02-14

## 2025-02-14 RX ORDER — OLANZAPINE 10 MG/2ML
5 INJECTION, POWDER, FOR SOLUTION INTRAMUSCULAR ONCE
Status: DISCONTINUED | OUTPATIENT
Start: 2025-02-14 | End: 2025-02-14

## 2025-02-14 RX ADMIN — CYANOCOBALAMIN TAB 500 MCG 1000 MCG: 500 TAB at 09:17

## 2025-02-14 RX ADMIN — CARBIDOPA AND LEVODOPA 1.5 TABLET: 25; 100 TABLET ORAL at 09:17

## 2025-02-14 RX ADMIN — ENOXAPARIN SODIUM 40 MG: 40 INJECTION SUBCUTANEOUS at 09:18

## 2025-02-14 RX ADMIN — ACETAMINOPHEN 650 MG: 325 TABLET, FILM COATED ORAL at 09:17

## 2025-02-14 RX ADMIN — AMLODIPINE BESYLATE 5 MG: 5 TABLET ORAL at 09:18

## 2025-02-14 RX ADMIN — SENNOSIDES 17.2 MG: 8.6 TABLET ORAL at 09:17

## 2025-02-14 RX ADMIN — RIVASTIGMINE 1 PATCH: 4.6 PATCH, EXTENDED RELEASE TRANSDERMAL at 09:26

## 2025-02-14 RX ADMIN — DOCUSATE SODIUM 100 MG: 100 CAPSULE, LIQUID FILLED ORAL at 09:17

## 2025-02-14 RX ADMIN — Medication 1000 UNITS: at 09:17

## 2025-02-14 RX ADMIN — POLYETHYLENE GLYCOL 3350 17 G: 17 POWDER, FOR SOLUTION ORAL at 09:17

## 2025-02-14 NOTE — PLAN OF CARE
Problem: PAIN - ADULT  Goal: Verbalizes/displays adequate comfort level or baseline comfort level  Description: Interventions:  - Encourage patient to monitor pain and request assistance  - Assess pain using appropriate pain scale  - Administer analgesics based on type and severity of pain and evaluate response  - Implement non-pharmacological measures as appropriate and evaluate response  - Consider cultural and social influences on pain and pain management  - Notify physician/advanced practitioner if interventions unsuccessful or patient reports new pain  Outcome: Progressing     Problem: SAFETY ADULT  Goal: Patient will remain free of falls  Description: INTERVENTIONS:  - Educate patient/family on patient safety including physical limitations  - Instruct patient to call for assistance with activity   - Consult OT/PT to assist with strengthening/mobility   - Keep Call bell within reach  - Keep bed low and locked with side rails adjusted as appropriate  - Keep care items and personal belongings within reach  - Initiate and maintain comfort rounds  - Make Fall Risk Sign visible to staff  - Offer Toileting every 2 Hours, in advance of need  - Initiate/Maintain alarm  - Obtain necessary fall risk management equipment:   - Apply yellow socks and bracelet for high fall risk patients  - Consider moving patient to room near nurses station  Outcome: Progressing

## 2025-02-14 NOTE — NURSING NOTE
pt wake up 0015 was getting out of bed,with his leg on the bed side rays,RN try to put his leg back in bed ,pt started kicking ,he punch the PCA, and scratches other staff control team and slim provider was called,who came to the bedside to help ,restrain was applied as safety to staff and pt .Pt was wet,control team help for pt to be clean ,pt calm down now,slim provider put in an order for I'm Zyprexa,,provider ask RN to hold on the medicine since he has calm  down and reach out when he become aggressive again ,provider ask RN verbally to take the restrain off in [id number] time since he has calm down and observe RN following all orders

## 2025-02-14 NOTE — PHYSICAL THERAPY NOTE
[facility] PT Discharge Summary  Pt demonstrated good progress in PT presented to [facility] s/p fall at home with noted impairments on eval include dec cognition with impaired STM/LTM and safety awareness, strength deficits, standing static & dynamic balance impairment, gait dysfunction, dec endurance resulting to significant functional declined. Pt also demonstrated parkinson's related symptoms that impacted functions include festination, freezing, rigidity, hand tremors. Pt improved from requiring mod-max of 1-2 person on eval to  CG-CS with RW at d/c. [employer] replaced broken RW that was issued to the pt in October 2024. Pt and repeated family education with hands on family training with spouse completed. Pt was d/c on 2/14/25 to home with family support and outpatient PT services to address ongoing rehab needs including improving dynamic balance, increase strength, improve activity tolerance and facilitate return to PLOF with reduce risk for falls and dec caregiver burden.

## 2025-02-14 NOTE — ASSESSMENT & PLAN NOTE
- Bowel/bladder incontinence on admission  - Bowel: continence has improved. Was also constipated. On colace, senna 2 tabs and daily miralax.    - Has Prn suppository or lactulose   - Last BM 2/13   - Monitor and adjust as appropriate  - Bladder: PVR x3 on admission are low   - UA during acute hospitalization not suspicious for infection   - Timed Voids.    - Improving. 2/3 largely continent of bladder.    - Increased AMS since 2/11 with episodes of incontinence. UA WNL. AMS likely due to side effect of anti-psychotic medications given PRN for agitation overnight.

## 2025-02-14 NOTE — PROGRESS NOTES
"Progress Note - PMR   Name: Daniel Sanz Jr. 84 y.o. male I MRN: [id number]  Unit/Bed#: -01 I Date of Admission: 1/31/2025   Date of Service: 2/14/2025 I Hospital Day: 14     Assessment & Plan  Parkinson's disease (HCC)  - AMS likely sequela of progressive parkinsons dementia plus possible adverse reaction to recent sinemet increase  - cont neuro adjusted recs for sinemet 1.5 QID  - cont exelon patch   - PT/OT/SLP 3-5 hours/day, 5-7 days/week.  - Will need f/u with Neurology after discharge.   Dysphagia  Patient observed coughing while taking medications by nursing at acute   Advanced to Level 3/thins with no straws; medications whole with puree  Aspiration precautions   VBS performed by SLP: moderate oral and mild-moderate pharyngeal dysphagia.   SLP dysphagia eval and treat  Altered mental status  Patient's wife endorsing recent abnormal behaviors prior to hospitalization.  Patient waking frequently at night, hallucinations noted when awaking from sleep, patient noted to be more \"nasty\" and agitated with at times garbled speech which clears.  Presents to ED after fall at home with concern for possible UTI. S/p head strike in ED w/ R eyebrow laceration.  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 with mild acute encephalopathy  CT head w/o acute findings  TSH benign   Folate WNL, B12 normal, vitamin D low at 26.9  Wife notes B12/vitamin D supplementation 3 times weekly PTA  Continue to q daily dosing of vit D  B12 3x weekly  Negative infxn work-up  Sinemet as ordered by neurology (below)  Acetaminophen 650mg TID    Patient oriented, and has some decent recall. Impaired insight, and he sundowns.   Previous MOCA - 7  Current SLUMS - 8   - Added PRN olanzapine for severe agitation ONLY.    - Did receive this on 2/3 in the AM after some aggressive behaviors during blood draw/BP.   - Received again on 2/11 in the AM after agitation and aggressive towards staff.    - Received seroquel on 2/12 due to some mild " agitation.   - Very somnolent after getting these medications. Would try to avoid giving these medications unless danger to himself/others.   - Wife is not keen on going home with these medications. She mostly uses melatonin at home.    - No clear cause - UA WNL. Labs have been stable. No other new localizing symptoms.    - Considering repeat CTH if persistently somnolent.    - Will switch to Zyprexa-D 2.5 mg PRN for agitation. Would recommend refraining from using Rx for management. Instead patient would benefit from conservative measures including redirection and reorientation.    - Overnight on 2/13, patient became agitated once again requiring Rx administration. Due to this, will send patient with PRN Seroquel in the event this occurs at home. Wife educated on how to first de-escalate agitation and try more conservative measures. If conservative measures fail, provided education on how to properly administer PRN Seroquel. Wife states she prefers to not use medication but understands recommendations and appreciative of care provided during [facility].   - Redirect, reorient first  - Melatonin in the evening for sleep/wake.  - Bed/chair alarms  - Optimize bowel/bladder program  - Avoid deliriogenic meds and physical restraint.  Bowel and bladder incontinence  - Bowel/bladder incontinence on admission  - Bowel: continence has improved. Was also constipated. On colace, senna 2 tabs and daily miralax.    - Has Prn suppository or lactulose   - Last BM 2/13   - Monitor and adjust as appropriate  - Bladder: PVR x3 on admission are low   - UA during acute hospitalization not suspicious for infection   - Timed Voids.    - Improving. 2/3 largely continent of bladder.    - Increased AMS since 2/11 with episodes of incontinence. UA WNL. AMS likely due to side effect of anti-psychotic medications given PRN for agitation overnight.   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 Largely healed and wound care signed off.   PTA Lasix for LE edema management; cont at [facility]  Known to [facility]   Wound care sami and treat  Offload heels  ACE wraps to b/l LEs  Chronic pain of both shoulders  Reports both shoulders.  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   Non-surgically managed. Last seen by Ortho 2019.   Tylenol scheduled.  Lidoderm patches (switched timing to around dinner-time to reduce pain at night and related agitation)   - This has helped.   Topicals for now. Monitor.   Vitamin D insufficiency  Continue daily cholecalciferol supplementation.   HTN (hypertension)  Home: Amlodipine 5mg daily, Lasix 40mg daily, Losartan 50mg daily (unclear how long he was taking). Here: Amlodipine 5mg daily.   Monitor and adjust as appropriate     Health Maintenance  #Skin/Pressure Injury Prevention: Turn Q2hr in bed, with weight shifts [id number] in wheelchair.  #GI Prophylaxis: Not indicated   #Code Status: Full Code  #FEN: Level 2/Thins, [employer] at lunch, Angel at dinner, and EP HP angel at Breakfast  #Dispo:Team 2/11: ADD 2/14 with outpatient at Home PT/OT/SLP with goals to discharge home at Sup-Yue level of function. Patient to be discharged today, family present at bedside.   Will need outpatient f/u with Neuro    To Review: Mr. Sanz is an 85yo M with medical history of Parkinson's Disease with dementia (MOCA 7/30 in March 2024), HTN, LE venous stasis dermatitis and edema, GERD who presented to [facility] on 1/21 with worsening agitation, AMS, culminating in a fall with HS and R eye lac. No sutures were needed for the lac. CTH/N and L shoulder benign. He was noted to be hypothermic and hyperkalemic on admission. TSH WNL, Folate WNL, Vitamin D was low. B12 WNL. Given gentle IVF, noted to be constipated. Wound care was consulted for R medial tibia venous ulcer, and Geriatrics was consulted who recommended melatonin, delirium precautions. Speech was consulted who  recommended NPO initially, but he has since been advanced to an oral diet of Level 2/thin liquids. Neurology was also consulted as the agitation, behavior symptoms, hallucinations worsened since increasing his medications, they lowered his Sinemet and added rivastigmine. There was improvement in his behavioral symptoms. He no longer needed restraint/1:1 at time of discharge. He was admitted to the [facility] on 1/31.     Chief Complaint: excited to be going home    Interval History/Subjective:  Patient evaluated at bedside. Patient had episodes of agitation overnight requiring administration of Zyprexa-D PO. Later during the night, the patient became agitated again almost resulting in administration of IM Seroquel. Luckily, overnight staff were able to reorient the patient and de-escalate conservatively. Due to this, will send patient home with PRN Seroquel PO in the event this occurs at home. Wife and family at bedside are very supportive. Counseled and education provided on conservative ways a de-escalating agitation and sundowning. Additionally, education provided on when and how to properly use Seroquel on an as needed basis. Patient states that he is happy to be going home, and thankful for therapy provided at [facility]. All questions answered regarding discharge. Patient and wife reminded of the importance of following up with Neurology in March for continued management of PD. Patient denies  new chest pain, shortness of breath, dysuria, abdominal pain, subjective fever, chills, new weakness/numbness. Last BM: 2/13..    Functional Update:  PT: CG with RW ambulation and transfers.   OT: Sup eating, oral hygiene. MaxA bathing and dressing. Total assistance footwear.   SLP SLP: mild-moderate oral and mild pharyngeal dysphagia. Sup eating, modA comprehension, Yue expression, Sup social interaction, mod-maxA executive function, maxA memory.     Objective     Temp:  [98.3 °F (36.8 °C)-98.9 °F (37.2 °C)] 98.9 °F (37.2 °C)  HR:   [78-91] 81  Resp:  [16-18] 16  BP: (127-161)/(60-72) 127/60  SpO2:  [94 %-96 %] 94 %  Physical Exam  Constitutional:       Appearance: Normal appearance.   HENT:      Head: Normocephalic and atraumatic.      Right Ear: External ear normal.      Left Ear: External ear normal.      Nose: Nose normal.      Mouth/Throat:      Mouth: Mucous membranes are moist.      Pharynx: Oropharynx is clear.   Eyes:      Extraocular Movements: Extraocular movements intact.      Conjunctiva/sclera: Conjunctivae normal.      Pupils: Pupils are equal, round, and reactive to light.      Comments: Horizontal and Vertical Nystagmus.    Neck:      Vascular: No carotid bruit.   Cardiovascular:      Rate and Rhythm: Normal rate and regular rhythm.      Pulses: Normal pulses.      Heart sounds: Normal heart sounds.   Pulmonary:      Effort: Pulmonary effort is normal.      Breath sounds: Normal breath sounds.   Abdominal:      General: Abdomen is flat. Bowel sounds are normal.      Palpations: Abdomen is soft. There is no mass.      Tenderness: There is no abdominal tenderness. There is no guarding.   Musculoskeletal:      Cervical back: Rigidity present. No tenderness.      Comments: Cogwheel rigidity and tremors in bilateral hands (stable).   Lymphadenopathy:      Cervical: No cervical adenopathy.   Skin:     General: Skin is warm.      Capillary Refill: Capillary refill takes less than 2 seconds.   Neurological:      Mental Status: He is alert and oriented to person, place, and time. Mental status is at baseline.      Cranial Nerves: No cranial nerve deficit.   Psychiatric:         Mood and Affect: Mood normal.         Behavior: Behavior normal.         MMT:   Right  Left  Site  Right  Left  Site    1 1  S Ab: Shoulder Abductors  4  4  HF: Hip Flexors    5 5  EF: Elbow Flexors  4  4 KF: Knee Flexors    5  5  EE: Elbow Extensors  4  4  KE: Knee Extensors    4+  4+  WE: Wrist Extensors  5  5  DR: Dorsi Flexors    4+  4+  FF: Finger Flexors  5   5  PF: Plantar Flexors    2  2  HI: Hand Intrinsics  5  5  EHL: Extensor Hallucis Longus     Physical examination is otherwise unchanged from previous encounter, except as noted above.    Scheduled Meds:  Current Facility-Administered Medications   Medication Dose Route Frequency Provider Last Rate    acetaminophen  650 mg Oral TID Chelsea Dasilva PA-C      amLODIPine  5 mg Oral Daily Chelsea Dasilva PA-C      Artificial Tears  1 drop Both Eyes Q4H PRN Chelsea Dasilva PA-C      bisacodyl  10 mg Rectal Daily PRN Ashley Depadua, MD      calcium carbonate  1,000 mg Oral Daily PRN Chelsea Dasilva PA-C      carbidopa-levodopa  1.5 tablet Oral 4x Daily Chelsea Dasilva PA-C      Cholecalciferol  1,000 Units Oral Daily Chelsea Dasilva PA-C      vitamin B-12  1,000 mcg Oral Once per day on Monday Wednesday Friday Chelsea Dasilva PA-C      docusate sodium  100 mg Oral BID Chelsea Dasilva PA-C      enoxaparin  40 mg Subcutaneous Daily Chelsea Dasilva PA-C      [Held by provider] furosemide  20 mg Oral Daily Ciara Calderon PA-C      lactulose  20 g Oral Daily PRN Ashley Depadua, MD      lidocaine  2 patch Topical After Dinner Mc Hylton DO      melatonin  6 mg Oral QPM Ashley Depadua, MD      OLANZapine  2.5 mg Oral HS PRN Mc Hylton,       ondansetron  4 mg Oral Q6H PRN Chelsea Dasilva PA-C      polyethylene glycol  17 g Oral Daily Ashley Depadua, MD      rivastigmine  1 patch Transdermal Daily Chelsea Dasilva PA-C      senna  2 tablet Oral BID Chelsea Dasilva PA-C           Lab Results: I have reviewed the following results:  Results from last 7 days   Lab Units 02/13/25  1038 02/10/25  0632   HEMOGLOBIN g/dL 12.4 11.6*   HEMATOCRIT % 38.2 35.5*   WBC Thousand/uL 6.34 5.66   PLATELETS Thousands/uL 227 224     Results from last 7 days   Lab Units 02/13/25  1038 02/10/25  0632   BUN mg/dL 30* 40*   SODIUM mmol/L 140 141   POTASSIUM mmol/L 5.2 4.8    CHLORIDE mmol/L 106 108   CREATININE mg/dL 1.30 1.15              0658}      ** Please Note: Fluency Direct voice to text software may have been used in the creation of this document. **    Mc Hylton DO  PGY-2 PM&R Resident

## 2025-02-14 NOTE — PROGRESS NOTES
"OT Daily Treatment Note       02/14/25 0700   Pain Assessment   Pain Assessment Tool 0-10   Pain Score No Pain   Restrictions/Precautions   Precautions Aspiration;Bed/chair alarms;Cognitive;Fall Risk;Hard of hearing;Impulsive;Supervision on toilet/commode   Weight Bearing Restrictions No   ROM Restrictions No   Lifestyle   Autonomy \"Throw this away\" - referring to wash cloth   Eating   Type of Assistance Needed Supervision   Physical Assistance Level No physical assistance   Comment Direct supervision provided during breakfast with handoff to PCA at end of session. Cues for sips between bites, set-up to apply seasonings/condiments.   Eating CARE Score 4   Oral Hygiene   Type of Assistance Needed Supervision   Physical Assistance Level No physical assistance   Comment Seated in recliner, set-up assist to apply toothpaste due to fatigue.   Oral Hygiene CARE Score 4   Grooming   Able To Comb/Brush Hair;Brush/Clean Teeth   Findings Set-up for combing hair while seated in recliner.   Shower/Bathe Self   Type of Assistance Needed Physical assistance   Physical Assistance Level 76% or more   Comment Pt greeted resting supine in bed, signficant time and multimodal cueing to arouse. Pt also observed to have blood on hands and was bleeding in between R digits 4 & 5. DOLORES Carmichael made aware and reported pt had a difficult night and control team was called for behavior. RN later completed wound care on B hands during ADL. Pt presenting lethargic with difficulty keeping eyes open for sponge bath, max A overall with pt assisting with washing parts of face, chest, and deepthi area with cueing.   Shower/Bathe Self CARE Score 2   Upper Body Dressing   Type of Assistance Needed Physical assistance   Physical Assistance Level 76% or more   Comment Max A donning button up long sleeve while seated EOB, pt lifting BUEs to assist with threading, total A to bring around back and fasten snaps.   Upper Body Dressing CARE Score 2   Lower Body " Dressing   Type of Assistance Needed Physical assistance   Physical Assistance Level 76% or more   Comment Max A overall for LBD bed level due to increased fatigue, pt able to lift LLE to thread pant leg and attempted to adjust pants up thighs with hand over hand assist to grasp waist band. Total A to change tab brief and complete CM over hips in sidelying.   Lower Body Dressing CARE Score 2   Putting On/Taking Off Footwear   Type of Assistance Needed Physical assistance   Physical Assistance Level Total assistance   Comment Total A donning socks and sneakers supine due to fatigue.   Putting On/Taking Off Footwear CARE Score 1   Roll Left and Right   Type of Assistance Needed Physical assistance   Physical Assistance Level 51%-75%   Comment mod-max A rolling L >< R due to fatigue, increased assistance with decreased alertness as pt frequently closing eyes during ADL.   Roll Left and Right CARE Score 2   Lying to Sitting on Side of Bed   Type of Assistance Needed Physical assistance   Physical Assistance Level 76% or more   Comment Max A for BLE mgmt and lifting trunk with bed flat.   Lying to Sitting on Side of Bed CARE Score 2   Sit to Stand   Type of Assistance Needed Physical assistance   Physical Assistance Level 26%-50%   Comment min A sit >< stand with RW, cues for hand placement.   Sit to Stand CARE Score 3   Bed-Chair Transfer   Type of Assistance Needed Physical assistance   Physical Assistance Level 26%-50%   Comment min A SPT EOB > recliner with RW, assist for RW mgmt during pivot with cues for sequence.   Chair/Bed-to-Chair Transfer CARE Score 3   Toileting Hygiene   Type of Assistance Needed Physical assistance   Physical Assistance Level 76% or more   Comment max A changing soiled tab brief and managing female urinal during session, pt assisting with completing deepthi hygiene with OTR assisting for thoroughness.   Toileting Hygiene CARE Score 2   Toilet Transfer   Type of Assistance Needed Physical  assistance   Physical Assistance Level 26%-50%   Comment Not completed this date due to time constraints and fatigue, per performance with bed > chair transfer pt would require min A for SPT w/RW.   Toilet Transfer CARE Score 3   Cognition   Overall Cognitive Status Impaired   Arousal/Participation Lethargic   Attention Difficulty attending to directions   Orientation Level Oriented to person   Memory Decreased short term memory;Decreased recall of recent events;Decreased recall of precautions   Following Commands Follows one step commands inconsistently   Comments Pt presenting with increased fatigue during ADL, frequently closing eyes requiring increased time and repetition to follow one-step commands.   Activity Tolerance   Activity Tolerance Patient limited by fatigue   Medical Staff Made Aware DOLORES Carmichael aware and reporting pt had difficult night causing increased fatigue this AM.   Assessment   Treatment Assessment Pt engaged in 90 minute session focusing on ADL retraining and functional transfers. Per RN, pt had difficult night with limited sleep, causing increased lethargy/fatigue this AM. Pt consequently required increased assistance for following one-step commands, maintaining attention, and completing all self-care tasks and functional transfers. Pt is scheduled for discharge home today with family. Family has attended FT and pt is recommended to have assistance for all ADLs.   Problem List Decreased cognition;Impaired judgement;Decreased safety awareness;Decreased endurance;Impaired balance;Decreased mobility;Decreased coordination   Barriers to Discharge None   Plan   Progress Discontinue OT  (Pt discharging home with HHOT recommended for f/u.)   OT Therapy Minutes   OT Time In 0700   OT Time Out 0830   OT Total Time (minutes) 90   OT Mode of treatment - Individual (minutes) 90   OT Mode of treatment - Concurrent (minutes) 0   OT Mode of treatment - Group (minutes) 0   OT Mode of treatment - Co-treat  (minutes) 0   OT Mode of Treatment - Total time(minutes) 90 minutes   OT Cumulative Minutes 890   Therapy Time missed   Time missed? No

## 2025-02-14 NOTE — NURSING NOTE
Pt became a little aggressive, getting out of the chair and wanted to walk out of the room to go to his wife ,RN and PCA explain to him the wife is at home ,but still insisting,he was redirected to use the bathroom ,which he agrees and was taking to the bathroom he had a bowel movement,went back to bed with bed alarm on and medication given

## 2025-02-14 NOTE — ASSESSMENT & PLAN NOTE
"Patient's wife endorsing recent abnormal behaviors prior to hospitalization.  Patient waking frequently at night, hallucinations noted when awaking from sleep, patient noted to be more \"nasty\" and agitated with at times garbled speech which clears.  Presents to ED after fall at home with concern for possible UTI. S/p head strike in ED w/ R eyebrow laceration.  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 with mild acute encephalopathy  CT head w/o acute findings  TSH benign   Folate WNL, B12 normal, vitamin D low at 26.9  Wife notes B12/vitamin D supplementation 3 times weekly PTA  Continue to q daily dosing of vit D  B12 3x weekly  Negative infxn work-up  Sinemet as ordered by neurology (below)  Acetaminophen 650mg TID    Patient oriented, and has some decent recall. Impaired insight, and he sundowns.   Previous MOCA - 7  Current SLUMS - 8   - Added PRN olanzapine for severe agitation ONLY.    - Did receive this on 2/3 in the AM after some aggressive behaviors during blood draw/BP.   - Received again on 2/11 in the AM after agitation and aggressive towards staff.    - Received seroquel on 2/12 due to some mild agitation.   - Very somnolent after getting these medications. Would try to avoid giving these medications unless danger to himself/others.   - Wife is not keen on going home with these medications. She mostly uses melatonin at home.    - No clear cause - UA WNL. Labs have been stable. No other new localizing symptoms.    - Considering repeat CTH if persistently somnolent.    - Will switch to Zyprexa-D 2.5 mg PRN for agitation. Would recommend refraining from using Rx for management. Instead patient would benefit from conservative measures including redirection and reorientation.    - Overnight on 2/13, patient became agitated once again requiring Rx administration. Due to this, will send patient with PRN Seroquel in the event this occurs at home. Wife educated on how to first de-escalate agitation and try more " conservative measures. If conservative measures fail, provided education on how to properly administer PRN Seroquel. Wife states she prefers to not use medication but understands recommendations and appreciative of care provided during ARC.   - Redirect, reorient first  - Melatonin in the evening for sleep/wake.  - Bed/chair alarms  - Optimize bowel/bladder program  - Avoid deliriogenic meds and physical restraint.   Female

## 2025-02-14 NOTE — PROGRESS NOTES
02/14/25 1020   Pain Assessment   Pain Assessment Tool 0-10   Pain Score No Pain   Sit to Stand   Type of Assistance Needed Incidental touching;Verbal cues   Sit to Stand CARE Score 4   Walk 10 Feet   Type of Assistance Needed Incidental touching;Adaptive equipment   Physical Assistance Level No physical assistance   Comment RW   Walk 10 Feet CARE Score 4   Walk 50 Feet with Two Turns   Type of Assistance Needed Incidental touching;Adaptive equipment;Verbal cues   Physical Assistance Level No physical assistance   Comment RW   Walk 50 Feet with Two Turns CARE Score 4   Walk 150 Feet   Type of Assistance Needed Incidental touching;Verbal cues;Adaptive equipment   Physical Assistance Level No physical assistance   Comment RW   Walk 150 Feet CARE Score 4   Toilet Transfer   Type of Assistance Needed Incidental touching   Comment commode over toilet   Toilet Transfer CARE Score 4   Toilet Transfer   Surface Assessed Raised Toilet   Therapeutic Interventions   Flexibility seated passive stretch B HS & calves x 5 min   Equipment Use   NuStep lvl 2 x 12 min   Assessment   Treatment Assessment Pt seen for 30 min of mobility & flexibility to loosen up for discharge home with family. Pt taken to bathroom to attempt urination, but did not actually urinate. Intermittent freezing with turns, imrpoved with cues to focus on object in room vs the chair. Family present and ready for discharge.   PT Therapy Minutes   PT Time In 1020   PT Time Out 1050   PT Total Time (minutes) 30   PT Mode of treatment - Individual (minutes) 30   PT Mode of treatment - Concurrent (minutes) 0   PT Mode of treatment - Group (minutes) 0   PT Mode of treatment - Co-treat (minutes) 0   PT Mode of Treatment - Total time(minutes) 30 minutes   PT Cumulative Minutes 1020   Therapy Time missed   Time missed? No

## 2025-02-14 NOTE — NURSING NOTE
Patient stable for discharge home with spouse, Instructions reviewed with spouse and daughter in law and verbalized understanding. All belongings left with patient. Patient assisted to car by PCA

## 2025-02-14 NOTE — CASE MANAGEMENT
Met with the patient's wife and daughter to discuss DC. They informed me that his RW that was received in October was broken. I spoke with Joel from [facility] who confirmed that [employer] would be stopping by to swap the RW for a new one. Informed the patient's wife that someone will be by shortly.

## 2025-02-20 NOTE — OCCUPATIONAL THERAPY NOTE
Pt is a 84 y.o. male who was admitted on 1/21/25 due to with altered mental status after a fall at home where he struck his head and developed a laceration of the right eye . Pt was dx with Parkinson's dementia with mild acute encephalopathy and transferred to [facility] to receive skilled therapy services. Pt has a past medical history of Arthritis, Asbestos exposure, Asbestosis (HCC), GERD (gastroesophageal reflux disease), Hemoptysis, Hypertension, Lung disease, and Parkinson's disease. Pt made progress during his stay in the [facility], however remains limited from independence due to chronic deficits in balance, coordination, cognition. Extensive training provided to Pt's wife throughout sessions to provide recommendations on caregiver techniques to protect her health and body mechanics during care for Pt. A formalized family training session was provided to Pt's wife, son, and DIL to cover all DME recommendations, fall risk reduction methods. Pt was previously receiving [facility] therapy services at home, and has chosen to return to that company at discharge for PT/OT/ST.

## 2025-02-21 ENCOUNTER — TELEPHONE (OUTPATIENT)
Dept: NEUROLOGY | Facility: CLINIC | Age: 85
End: 2025-02-21

## 2025-02-21 NOTE — TELEPHONE ENCOUNTER
Lidocaine patches were prescribed by dr depadua when pt was dc'd from [facility].      Per fax lidocaine patches needs PA

## 2025-02-21 NOTE — TELEPHONE ENCOUNTER
Called and spoke to pt's wife.  Made her aware of below.  She also wanted to make you aware that his behavior is much better since being home.    She states that he was having difficulty at HS when in the rehab and you said that this would probably improve when he got home     YUDI

## 2025-02-21 NOTE — TELEPHONE ENCOUNTER
Ashley Depadua, MD  You1 minute ago (3:48 PM)       I am thrilled to hear that. I hope they are doing all right!

## 2025-02-21 NOTE — TELEPHONE ENCOUNTER
Ashley Depadua, MD to Me       2/21/25  3:17 PM  I would encourage family to buy these over the counter 4% formulation. The prescription is gonna be expensive and it's 5% lido, so not much of a big difference.    Ashley de Padua, MD  Physical Medicine and Rehabilitation

## 2025-02-27 ENCOUNTER — TELEPHONE (OUTPATIENT)
Age: 85
End: 2025-02-27

## 2025-02-27 NOTE — TELEPHONE ENCOUNTER
Pt wife called in to verify appt details.   Verified that the pt is scheduled 3/6/25 at 11:00 am with Dr. Dilia López office.   Address was provided.   Sheng verbalized understanding of date, time and location.

## 2025-03-04 ENCOUNTER — TELEPHONE (OUTPATIENT)
Dept: NEUROLOGY | Facility: CLINIC | Age: 85
End: 2025-03-04

## 2025-03-04 NOTE — TELEPHONE ENCOUNTER
I called and spoke with patient spouse to rescheduled patient appointment sooner due to scheduled with wrong provider. Patient spouse stated that she did not want to move and needed to see Dr. Argueta for the meds that he put patient on inpatient. Patient spouse feels that it's not fair to have Dr. Cavanaugh fix it. I explained to patient spouse that Dr. Cavanaugh can be able to address her concerns for the medication as well. Patient spouse also stated that at last office visit Dr. Cavanaugh informed patient to talk with her PCP of any other recommendation of medication to try.  Appointment a this time is scheduled with Dr. Oviedo who will address the F/U care moving forward. Writer will await response from Dr. Oviedo.

## 2025-03-06 ENCOUNTER — OFFICE VISIT (OUTPATIENT)
Dept: NEUROLOGY | Facility: CLINIC | Age: 85
End: 2025-03-06
Payer: COMMERCIAL

## 2025-03-06 VITALS
HEIGHT: 68 IN | SYSTOLIC BLOOD PRESSURE: 130 MMHG | WEIGHT: 199 LBS | HEART RATE: 60 BPM | BODY MASS INDEX: 30.16 KG/M2 | DIASTOLIC BLOOD PRESSURE: 70 MMHG

## 2025-03-06 DIAGNOSIS — G20.B1 PARKINSON'S DISEASE WITH DYSKINESIA, UNSPECIFIED WHETHER MANIFESTATIONS FLUCTUATE (HCC): Primary | ICD-10-CM

## 2025-03-06 DIAGNOSIS — G20.A1 PARKINSON'S DISEASE (HCC): ICD-10-CM

## 2025-03-06 PROCEDURE — 99214 OFFICE O/P EST MOD 30 MIN: CPT | Performed by: PSYCHIATRY & NEUROLOGY

## 2025-03-06 RX ORDER — PRAMIPEXOLE DIHYDROCHLORIDE 0.25 MG/1
0.25 TABLET ORAL 3 TIMES DAILY
Qty: 90 TABLET | Refills: 1 | Status: SHIPPED | OUTPATIENT
Start: 2025-03-06 | End: 2025-03-06

## 2025-03-06 RX ORDER — FUROSEMIDE 40 MG/1
1 TABLET ORAL DAILY
COMMUNITY
Start: 2024-11-27

## 2025-03-06 RX ORDER — PRAMIPEXOLE DIHYDROCHLORIDE 0.25 MG/1
0.12 TABLET ORAL 3 TIMES DAILY
Qty: 45 TABLET | Refills: 1 | Status: SHIPPED | OUTPATIENT
Start: 2025-03-06

## 2025-03-06 RX ORDER — CARBIDOPA AND LEVODOPA 25; 100 MG/1; MG/1
1.5 TABLET ORAL 3 TIMES DAILY
Qty: 135 TABLET | Refills: 0 | Status: SHIPPED | OUTPATIENT
Start: 2025-03-06

## 2025-03-06 NOTE — PROGRESS NOTES
Name: Daniel Sanz Jr.      : 1940      MRN: [id number]  Encounter Provider: Marcus Oviedo MD  Encounter Date: 3/6/2025   Encounter department: [facility]  :  Assessment & Plan  Parkinson's disease with dyskinesia, unspecified whether manifestations fluctuate (HCC)  Patient with recent hospitalization in 2025 for increased agitation and confusion, complicated by a fall with headstrike.  Trauma workup and infectious workup were both negative  Sinemet was decreased to 1.5 mg 4 times daily, if still noting episodes of getting stuck while walking as well as increased sleepiness.    Wife does note increased alertness since starting the Exelon  Patient has been receiving physical therapy and will soon begin seeing occupational and speech therapy, wife feels as though this is helping.    Plan:  Decrease Sinemet to 1.5 mg three times daily  Give first dose early in the morning before getting up  Give second doses 30 min before lunch and dinner  Take at: 7 AM, 1 PM, 6 PM  Continue Seroquel 12.5 mg at bedtime as needed for agitation  Continue Exelon TD patch once daily  Start Pramipexole 0.125 mg three times daily, take in conjunction with sinemet  Continue PT/OT/ST  Return to clinic for follow-up in  Orders:    pramipexole (MIRAPEX) 0.25 mg tablet; Take 0.5 tablets (0.125 mg total) by mouth 3 (three) times a day Take simultaneously with sinemet at 7 AM, 1 PM, and 6 PM.      There are no Patient Instructions on file for this visit.     History of Present Illness   HPI   84-year-old male with history of Parkinson's dementia on Sinemet, hypertension, asbestosis, CKD stage IIIa presents to clinic for hospital follow-up. Patient was hospitalized on 25 after experiencing increased confusion, hallucinations, agitation.  Following discharge from hospital patient Sinemet dose was decreased to 1.5 mg 3 daily to help minimize side effects.  Patient was also started on Exelon patch  "daily and also given Seroquel 12.5 mg as needed for agitation at nighttime.  Patient's wife notes that since being discharged from his ARC alert however does note a period of 2 to 3 days of increased sleepiness over this past weekend.  Patient's wife states that she has not had to use his Seroquel as she felt that it made him really sleepy in the hospital.  Since leaving hospital patient has been primarily using rolling walker to ambulate however previously he was using a cane.  Patient's wife notes that he will occasionally get \"stuck\" when attempting to turn with a roller walker so he gets confused with attempting to turn.  Otherwise patient notes his wife denies any worsening hallucinations, agitation, confusion.  Review of Systems   Constitutional:  Negative for appetite change, fatigue and fever.   HENT: Negative.  Negative for hearing loss, tinnitus, trouble swallowing and voice change.    Eyes: Negative.  Negative for photophobia, pain and visual disturbance.   Respiratory: Negative.  Negative for shortness of breath.    Cardiovascular: Negative.  Negative for palpitations.   Gastrointestinal: Negative.  Negative for nausea and vomiting.   Endocrine: Negative.  Negative for cold intolerance.   Genitourinary: Negative.  Negative for dysuria, frequency and urgency.   Musculoskeletal:  Positive for gait problem. Negative for back pain, myalgias, neck pain and neck stiffness.   Skin: Negative.  Negative for rash.   Allergic/Immunologic: Negative.    Neurological:  Positive for tremors. Negative for dizziness, seizures, syncope, facial asymmetry, speech difficulty, weakness, light-headedness, numbness and headaches.   Hematological: Negative.  Does not bruise/bleed easily.   Psychiatric/Behavioral: Negative.  Negative for confusion, hallucinations and sleep disturbance.     I have personally reviewed the MA's review of systems and made changes as necessary.    Current Outpatient Medications on File Prior to Visit "   Medication Sig Dispense Refill    amLODIPine (NORVASC) 5 mg tablet Take 5 mg by mouth daily      calcium carbonate (TUMS) 500 mg chewable tablet Chew 2 tablets (1,000 mg total) daily as needed for indigestion or heartburn 2 tablet 0    carboxymethylcellulose (Refresh Tears) 0.5 % SOLN Administer 1 drop to both eyes daily.      docusate sodium (COLACE) 100 mg capsule Take 1 capsule (100 mg total) by mouth 2 (two) times a day 60 capsule 0    furosemide (LASIX) 40 mg tablet Take 1 tablet by mouth in the morning      melatonin 3 mg Take 1 tablet (3 mg total) by mouth daily at bedtime 30 tablet 0    QUEtiapine (SEROquel) 25 mg tablet Take 0.5 tablets (12.5 mg total) by mouth daily at bedtime as needed (severe agitation/hallucinations) 30 tablet 0    rivastigmine (EXELON) 4.6 mg/24 hr TD 24 hr patch Place 1 patch on the skin over 24 hours daily 30 patch 0    vitamin B-12 (VITAMIN B-12) 1,000 mcg tablet Take 1,000 mcg by mouth 3 (three) times a week      Vitamin D, Cholecalciferol, 25 MCG (1000 UT) TABS Take 1 tablet by mouth 3 (three) times a week      [DISCONTINUED] carbidopa-levodopa (SINEMET)  mg per tablet Take 1.5 tablets by mouth 4 (four) times a day 180 tablet 0    lidocaine (LIDODERM) 5 % Apply 2 patches topically over 12 hours daily after dinner for 14 days Remove & Discard patch within 12 hours or as directed by MD 28 patch 0    polyethylene glycol (MIRALAX) 17 g packet Take 17 g by mouth daily for 14 days 238 g 0    senna (SENOKOT) 8.6 mg Take 2 tablets (17.2 mg total) by mouth 2 (two) times a day for 14 days 56 tablet 0     No current facility-administered medications on file prior to visit.      Social History     Tobacco Use    Smoking status: Never     Passive exposure: Past    Smokeless tobacco: Former     Types: Chew     Quit date: 1998   Vaping Use    Vaping status: Never Used   Substance and Sexual Activity    Alcohol use: Not Currently    Drug use: Never    Sexual activity: Not on file       "  Objective   Ht 5' 8\" (1.727 m)   Wt 90.3 kg (199 lb)   BMI 30.26 kg/m²     Physical Exam  Constitutional:       Appearance: Normal appearance.   HENT:      Head: Normocephalic and atraumatic.      Mouth/Throat:      Mouth: Mucous membranes are moist.      Pharynx: Oropharynx is clear.   Eyes:      Extraocular Movements: Extraocular movements intact.      Conjunctiva/sclera: Conjunctivae normal.      Pupils: Pupils are equal, round, and reactive to light.   Cardiovascular:      Rate and Rhythm: Normal rate.      Pulses: Normal pulses.   Pulmonary:      Effort: Pulmonary effort is normal.   Musculoskeletal:         General: No tenderness or deformity. Normal range of motion.      Cervical back: Normal range of motion. No rigidity.      Right lower leg: Edema present.      Left lower leg: Edema present.   Neurological:      Mental Status: He is alert.      Deep Tendon Reflexes:      Reflex Scores:       Bicep reflexes are 2+ on the right side and 2+ on the left side.       Brachioradialis reflexes are 2+ on the right side and 2+ on the left side.       Patellar reflexes are 2+ on the right side and 2+ on the left side.  Psychiatric:         Mood and Affect: Mood normal.         Behavior: Behavior normal.       Neurological Exam  Mental Status  Alert. Oriented only to person and place.    Cranial Nerves  CN III, IV, VI: Extraocular movements intact bilaterally. Pupils equal round and reactive to light bilaterally.  CN V: Facial sensation is normal.  CN VII: Full and symmetric facial movement.  CN VIII: Hearing is normal.  CN IX, X: Palate elevates symmetrically  CN XI: Shoulder shrug strength is normal.  CN XII: Tongue midline without atrophy or fasciculations.  Masked facies.    Motor  Normal muscle bulk throughout. No fasciculations present. Increased muscle tone.                                               Right                     Left   Shoulder adduction               5                          5  Elbow " flexion                         5                          5  Elbow extension                    5                          5  Hip flexion                              5                          5  Knee flexion                           5                          5  Knee extension                      5                          5  Bradykinesia presen, decrease rapid fine movements. .    Sensory  Light touch is normal in upper and lower extremities.     Reflexes                                            Right                      Left  Brachioradialis                    2+                         2+  Biceps                                 2+                         2+  Patellar                                2+                         2+    Gait  Casual gait: Reduced stride length. Shuffling gait. Unable to rise from chair without using arms.  Shuffling gait, with decreased arm swing L>R.      Radiology Results Review : No pertinent imaging studies reviewed.

## 2025-03-06 NOTE — ASSESSMENT & PLAN NOTE
Patient with recent hospitalization in Jan 2025 for increased agitation and confusion, complicated by a fall with headstrike.  Trauma workup and infectious workup were both negative  Sinemet was decreased to 1.5 mg 4 times daily, if still noting episodes of getting stuck while walking as well as increased sleepiness.    Wife does note increased alertness since starting the Exelon  Patient has been receiving physical therapy and will soon begin seeing occupational and speech therapy, wife feels as though this is helping.    Plan:  Decrease Sinemet to 1.5 mg three times daily  Give first dose early in the morning before getting up  Give second doses 30 min before lunch and dinner  Take at: 7 AM, 1 PM, 6 PM  Continue Seroquel 12.5 mg at bedtime as needed for agitation  Continue Exelon TD patch once daily  Start Pramipexole 0.125 mg three times daily, take in conjunction with sinemet  Continue PT/OT/ST  Return to clinic for follow-up in  Orders:    pramipexole (MIRAPEX) 0.25 mg tablet; Take 0.5 tablets (0.125 mg total) by mouth 3 (three) times a day Take simultaneously with sinemet at 7 AM, 1 PM, and 6 PM.

## 2025-03-12 ENCOUNTER — TELEPHONE (OUTPATIENT)
Age: 85
End: 2025-03-12

## 2025-03-20 NOTE — ASSESSMENT & PLAN NOTE
If provider orders tests at today's visit, patient would like to be contacted via Telephone.  If to contact patient by phone, patient's preferred phone # is [phone] (home) and it is OK to leave message on voice mail or with family member.  If medications are ordered at today's visit, the pharmacy name/location patient would like them to be sent to is   [facility] [address] [place] [address] [address] [address]  Phone: [phone] Fax: [phone]      Known to [facility]   Sinemet now will be 1.5 tab 4x a day  Rivastigmine TD patch added  Follow-up in the office

## 2025-04-04 ENCOUNTER — TELEPHONE (OUTPATIENT)
Age: 85
End: 2025-04-04

## 2025-04-12 DIAGNOSIS — G20.A1 PARKINSON'S DISEASE (HCC): ICD-10-CM

## 2025-04-24 DIAGNOSIS — G20.A1 PARKINSON'S DISEASE (HCC): ICD-10-CM

## 2025-04-24 DIAGNOSIS — G20.B1 PARKINSON'S DISEASE WITH DYSKINESIA, UNSPECIFIED WHETHER MANIFESTATIONS FLUCTUATE (HCC): ICD-10-CM

## 2025-04-24 NOTE — TELEPHONE ENCOUNTER
Pt's wife called. Pt needs 90 day scripts for carbidopa-levodopa and pramipexole sent to [facility]. Last OV was 3/6/2025. Routed refill request to provider to review.

## 2025-04-30 ENCOUNTER — TELEPHONE (OUTPATIENT)
Age: 85
End: 2025-04-30

## 2025-04-30 NOTE — TELEPHONE ENCOUNTER
Pt's wife called regarding refill.   Pt will be out of medication in 1 week.      Advised I can request script be [facility] and a 2 week supply to local pharm until mail order received.    Scripts entered, please sign off

## 2025-05-01 ENCOUNTER — TELEPHONE (OUTPATIENT)
Age: 85
End: 2025-05-01

## 2025-05-01 RX ORDER — PRAMIPEXOLE DIHYDROCHLORIDE 0.25 MG/1
0.12 TABLET ORAL 3 TIMES DAILY
Qty: 21 TABLET | Refills: 0 | Status: SHIPPED | OUTPATIENT
Start: 2025-05-01

## 2025-05-01 RX ORDER — PRAMIPEXOLE DIHYDROCHLORIDE 0.25 MG/1
0.12 TABLET ORAL 3 TIMES DAILY
Qty: 135 TABLET | Refills: 1 | Status: SHIPPED | OUTPATIENT
Start: 2025-05-01

## 2025-05-01 RX ORDER — CARBIDOPA AND LEVODOPA 25; 100 MG/1; MG/1
1.5 TABLET ORAL 3 TIMES DAILY
Qty: 405 TABLET | Refills: 1 | Status: SHIPPED | OUTPATIENT
Start: 2025-05-01

## 2025-05-01 RX ORDER — CARBIDOPA AND LEVODOPA 25; 100 MG/1; MG/1
TABLET ORAL
Qty: 63 TABLET | Refills: 0 | Status: SHIPPED | OUTPATIENT
Start: 2025-05-01

## 2025-05-15 ENCOUNTER — TELEPHONE (OUTPATIENT)
Age: 85
End: 2025-05-15

## 2025-06-06 ENCOUNTER — TELEPHONE (OUTPATIENT)
Age: 85
End: 2025-06-06

## 2025-06-10 ENCOUNTER — HOSPITAL ENCOUNTER (OUTPATIENT)
Dept: RADIOLOGY | Facility: HOSPITAL | Age: 85
Discharge: HOME | End: 2025-06-10
Attending: FAMILY MEDICINE
Payer: COMMERCIAL

## 2025-06-10 ENCOUNTER — TELEPHONE (OUTPATIENT)
Age: 85
End: 2025-06-10

## 2025-06-10 DIAGNOSIS — G20.B2 PARKINSON'S DISEASE WITH DYSKINESIA AND FLUCTUATING MANIFESTATIONS (HCC): ICD-10-CM

## 2025-06-10 DIAGNOSIS — R47.02 DYSPHASIA: ICD-10-CM

## 2025-06-10 PROCEDURE — 74230 X-RAY XM SWLNG FUNCJ C+: CPT

## 2025-06-10 PROCEDURE — 92611 MOTION FLUOROSCOPY/SWALLOW: CPT

## 2025-06-10 NOTE — PROCEDURES
Speech Pathology - Modified Barium Swallow Study    Patient Name: Daniel Sanz Jr.    Today's Date: 6/10/2025     Problem List  Active Problems:  There are no active Hospital Problems.      Past Medical History  Past Medical History[1]    Past Surgical History  Past Surgical History[2]    Assessment Summary:    Pt presents with mild-moderate oropharyngeal dysphagia characterized by decreased oral containment, bolus formation and control.  Posterior spill of all textures over the BOT.  Waterfall spill of liquid consistencies.  Mastication is reduced, incomplete with anterior munch.  Transfer prolonged w/ lingual pump.  Piecemeal deglutition.  Weak BOT retraction which solid boluses noted to collect within the valleculae; noted difficulty clearing.  Hyoid protraction and laryngeal elevation are reduced with incomplete laryngeal vestibule closure.  Duration of closure time is short with re-opening of the laryngeal inlet prior to complete pharyngeal clearance.  Penetration of thin liquids by single sip - no attempt to clear.  Microaspiration of thins in larger, successive sips.  Silent in nature. Material mostly ejected with cued throat clear when able to elicit.   Penetration of prior bolus (thins) when taking pre-moistened solid.  Airway invasion reduced to penetration w/ use of chin tuck (thins).   Unfortunately, patient with difficulty following cues for strategy use.  Diffuse pharyngeal retention, requires multiple dry swallows for partial clearance.       Note: Images are available for review in [facility] as desired.    Recommendations:   Recommended Diet: mechanically altered/level 2 diet and thin liquids (small single sips only)   Recommended Form of Medications: as tolerated, may need to crush if difficulty with pocking noted    Aspiration precautions and compensatory swallowing strategies: upright posture, only feed when fully alert, slow rate of feeding, small bites/sips, no straws, and alternating bites and  sips  - Chin tuck with large sips  - Throat clear /cough  - Re-swallow   SLP Dysphagia therapy recommended:  Yes- recommend continued follow-up with HH SLP    Results Reviewed with: patient, MD, and family   Pt/Family Education: initiated. Pt and caregiver require continued education.    General Information;  Pt is a 84 y.o. male with a PMH remarkable for Parkinson's dementia on Sinemet, hypertension, asbestosis, CKD stage IIIa.  Dysphagia, dementia.      Current concerns for dysphagia slightly unclear.  Pt's wife reports pt no longer coughing w/ PO.  Patient does follow with home health SLP who is requesting the present study.  MBS was recommended to obtain updated data re: oropharyngeal stage swallowing skills.  Prev MBS completed while in acute rehab.  See below.     Prior MBS 2/12/25:  Pt presents with moderate oral and mild-moderate pharyngeal dysphagia characterized by the following observations:  Lip seal and bolus retrieval was observed to be functional. Manipulation given purees was delayed to where increased lingual pumping was observed. Mastication was prolonged w/ soft, mixed and solid textures. Of note, pt's bolus formulation was decreased to where pt did have piecemeal transit of puree, soft, mixed and solids. Transfer were delayed across all consistencies assessed.  Oral control was also noted to be decreased to where premature spillage over the base of tongue observed w/ food items to the level of the valleculae and w/ liquids deeper to the level of the pyriform sinuses prior to swallow elicited. Swallow initiation fluctuated between mild to moderately delayed, noting more delay w/ food items vs liquids. Hyolaryngeal excursion was noted to be present but demonstrated more lateral movement vs upward movement when elicited. BOT retraction was decreased in addition to decreased pharyngeal constriction to where pt did exhibit fairly full vallecular retention w/ foods (puree, mixed, soft and solids).  Epiglottis inversion was present but at times sluggish. However, for most of the assessment, pt did demonstrate adequate airway protection UNTIL, larger consecutive sips of thins by straw. Pt exhibited deeper penetration w/ flash aspiration (x1) during swallow w/ straw sips. It was noted that throat clear/cough were effective to clear out penetration/possible aspiration events.      Oral Mechanism Exam  Facial: symmetrical  Labial: WFL  Lingual: decreased ROM and bilateral decreased strength  Velum: unable to visualize  Mandible:  decreased ROM  Dentition: adequate  Vocal quality: weak   Volitional Cough: unable to initiate volitional cough   Respiratory Status: on RA    Pt was viewed sitting upright in the lateral position. Due to concerns for patient safety / patient refusal, trials provided deviated from the MBSImP Validated Protocol. Pt was given 5-mL thin liquid x2, 20-mL cup sip thin, 40-mL sequential swallow thin, 20-mL cup sip nectar thick, 5-mL pudding, and ½ cookie coated with 3-mL pudding.     Initial view observations/comments: Clear view of the upper airway      8-Point Penetration-Aspiration Scale   Thin liquid 8 - Material enters the airway, passes below the vocal folds, and no effort is made to eject    PAS - 4 small single sip (trace)  PAS - 4 chin tuck (trace)   Nectar thick liquid 3 - Material enters the airway, remains above the vocal folds, and is not ejected from the airway   Honey thick liquid NA   Puree (pudding) 1 - Material does not enter the airway   Solid 1 - Material does not enter the airway     Strategies and Efficacy: Pt w/ inconsistent ability to follow cues.  See above.     Aspiration Response and Efficacy:  no response, ejected with cued cough/throat clear     MBS IMP Rating    ORAL Impairment  Compinent 1--Lip Closure  Judged at any point during the swallow.  3 - Escape progressing to mid-chin    Component 2--Tongue Control During Bolus Hold  Judged on held liquid boluses only and  prior to productive tongue movement.   3 - Posterior escape of greater than half of bolus    Component 3--Bolus Preparation/Mastication  Judged only during presentation of 1/2 shortbread cookie coated in pudding.   3 - Minimal chewing/mashing with majority of bolus unchewed    Component 4--Bolus Transport/Lingual Motion  Judged after first productive tongue movement for oral bolus transport.  3 - Repetitive/disorganized tongue motion    Component 5--Oral Residue  Judged after first swallow or after the last swallow of the sequential swallow task.  2 - Residue collection on oral structures   Location   B - Palate and C - Tongue    Component 6--Initiation of Pharyngeal Swallow  Judged at first movement of the brisk superior-anterior hyoid trajectory.  3 - Bolus head in pyriforms      PHARYNGEAL Impairment  Component 7--Soft Palate Elevation  Judged during maximum displacement of soft palate.  1 - Trace column of contrast or air between the SP and PW    Component 8--Laryngeal Elevation  Judged when epiglottis is in its most horizontal position.  1 - Partial superior movement of thyroid cartilage/partial approximation of arytenoids to epiglottic petiole    Component 9--Anterior Hyoid Excursion  Judged at height of swallow/maximal anterior hyoid displacement.  1 - Partial anterior movement    Component 10--Epiglottic Movement  Judged at height of swallow/maximal anterior hyoid displacement.  1 - Partial inversion    Component 11--Laryngeal Vestibular Closure  Judged at height of swallow/maximal anterior hyoid displacement.  1 - Incomplete; narrow column air/contrast in laryngeal vestibule    Component 12--Pharyngeal Stripping Wave  Judged during the full duration of the pharyngeal swallow.  1 - Present - diminished    Component 13--Pharyngeal Contraction  Judged in AP view at rest and throughout maximum movement of structures.  NA    Component 14--Pharyngoesophageal Segment Opening  Judged during maximum distension of  PES and throughout opening and closure.  1 - Partial distension/partial duration; partial obstruction to flow    Component 15--Tongue Base (TB) Retraction  Judged during maximum retraction of the tongue base.  3 - Wide column of contrast or air between TB and PW    Component 16--Pharyngeal Residue  Judged after first swallow or after the last swallow of the sequential swallow task.  3 - Majority of contrast within or on pharyngeal structures   Location   F - Diffuse (>3 areas)      ESOPHAGEAL Impairment  Component 17--Esophageal Clearance Upright Position  Judged in AP view during bolus transit through the oral cavity to the LES  0 - Complete clearance; esophageal coating        Bhakti Loya MS, CCC-SLP  Speech-Language Pathologist  [place] #[id number]  [place] #[id number]          [1]   Past Medical History:  Diagnosis Date    Arthritis     Asbestos exposure     Asbestosis (HCC)     GERD (gastroesophageal reflux disease)     Hemoptysis 5/11/2022    Hypertension     Lung disease     Parkinson's disease (HCC)    [2]   Past Surgical History:  Procedure Laterality Date    HERNIA REPAIR      INTRACAPSULAR CATARACT EXTRACTION Left     KNEE SURGERY      many years ago    SHOULDER SURGERY

## 2025-06-11 RX ORDER — QUETIAPINE FUMARATE 25 MG/1
TABLET, FILM COATED ORAL
Qty: 30 TABLET | Refills: 0 | OUTPATIENT
Start: 2025-06-11

## 2025-06-17 ENCOUNTER — TELEPHONE (OUTPATIENT)
Age: 85
End: 2025-06-17

## 2025-06-17 NOTE — TELEPHONE ENCOUNTER
Faxed to [facility]inal[facility].  Per Dr Depadua, she is no longer caring for this patient.  All requests must be sent to PCP as of today

## 2025-07-07 ENCOUNTER — TELEPHONE (OUTPATIENT)
Age: 85
End: 2025-07-07

## 2025-07-07 NOTE — TELEPHONE ENCOUNTER
Returned forms to [facility] without Dr Depadua's signature.  She no longer cares for patient.  Stated that all future paperwork for patient must be forwarded to patient's PCP.  This is a second request.

## 2025-07-16 ENCOUNTER — TELEPHONE (OUTPATIENT)
Dept: NEUROLOGY | Facility: CLINIC | Age: 85
End: 2025-07-16

## 2025-07-17 ENCOUNTER — OFFICE VISIT (OUTPATIENT)
Dept: NEUROLOGY | Facility: CLINIC | Age: 85
End: 2025-07-17
Payer: COMMERCIAL

## 2025-07-17 VITALS
DIASTOLIC BLOOD PRESSURE: 60 MMHG | HEIGHT: 68 IN | WEIGHT: 203 LBS | BODY MASS INDEX: 30.77 KG/M2 | SYSTOLIC BLOOD PRESSURE: 110 MMHG | HEART RATE: 60 BPM

## 2025-07-17 DIAGNOSIS — G20.B1 PARKINSON'S DISEASE WITH DYSKINESIA, UNSPECIFIED WHETHER MANIFESTATIONS FLUCTUATE (HCC): ICD-10-CM

## 2025-07-17 DIAGNOSIS — G20.A1 PARKINSON'S DISEASE (HCC): ICD-10-CM

## 2025-07-17 PROCEDURE — 99215 OFFICE O/P EST HI 40 MIN: CPT | Performed by: PSYCHIATRY & NEUROLOGY

## 2025-07-17 RX ORDER — RIVASTIGMINE 4.6 MG/24H
1 PATCH, EXTENDED RELEASE TRANSDERMAL DAILY
Qty: 90 PATCH | Refills: 0 | Status: SHIPPED | OUTPATIENT
Start: 2025-07-17

## 2025-07-17 RX ORDER — PRAMIPEXOLE DIHYDROCHLORIDE 0.25 MG/1
0.12 TABLET ORAL 3 TIMES DAILY
Qty: 135 TABLET | Refills: 1 | Status: SHIPPED | OUTPATIENT
Start: 2025-07-17

## 2025-07-17 RX ORDER — CARBIDOPA AND LEVODOPA 25; 100 MG/1; MG/1
1.5 TABLET ORAL 3 TIMES DAILY
Qty: 405 TABLET | Refills: 1 | Status: SHIPPED | OUTPATIENT
Start: 2025-07-17

## 2025-07-17 NOTE — ASSESSMENT & PLAN NOTE
Patient's symptoms have been stable since last visit, doing well with PT/OT, compliant with medications  Daytime sleepiness has improved, hallucinations occurring a few times a week  PE showed increased saliva, cogwheel rigidity bilaterally, bradykinesia and shuffling/slowed gait   Due to stability of patient's symptoms and no new complaints from patient or wife, we will plan to continue current treatment regimen as listed below; will follow up with patient in 6 months to assess any progression of PD symptoms   Orders:    carbidopa-levodopa (SINEMET)  mg per tablet; Take 1.5 tablets by mouth 3 (three) times a day Take simultaneously with pramipexole at 7 AM, 1 PM, and 6 PM.    rivastigmine (EXELON) 4.6 mg/24 hr TD 24 hr patch; Place 1 patch on the skin daily over 24 hours

## 2025-07-17 NOTE — ASSESSMENT & PLAN NOTE
Orders:    pramipexole (MIRAPEX) 0.25 mg tablet; Take 0.5 tablets (0.125 mg total) by mouth 3 (three) times a day Take simultaneously with sinemet at 7 AM, 1 PM, and 6 PM.

## 2025-07-17 NOTE — PROGRESS NOTES
Name: Daniel Sanz Jr.      : 1940      MRN: [id number]  Encounter Provider: Marcus Oviedo MD  Encounter Date: 2025   Encounter department: [facility]  :  Assessment & Plan  Parkinson's disease (HCC)  Patient's symptoms have been stable since last visit, doing well with PT/OT, compliant with medications  Daytime sleepiness has improved, hallucinations occurring a few times a week  PE showed increased saliva, cogwheel rigidity bilaterally, bradykinesia and shuffling/slowed gait   Due to stability of patient's symptoms and no new complaints from patient or wife, we will plan to continue current treatment regimen as listed below; will follow up with patient in 6 months to assess any progression of PD symptoms   Orders:    carbidopa-levodopa (SINEMET)  mg per tablet; Take 1.5 tablets by mouth 3 (three) times a day Take simultaneously with pramipexole at 7 AM, 1 PM, and 6 PM.    rivastigmine (EXELON) 4.6 mg/24 hr TD 24 hr patch; Place 1 patch on the skin daily over 24 hours    Parkinson's disease with dyskinesia, unspecified whether manifestations fluctuate ([facility])    Orders:    pramipexole (MIRAPEX) 0.25 mg tablet; Take 0.5 tablets (0.125 mg total) by mouth 3 (three) times a day Take simultaneously with sinemet at 7 AM, 1 PM, and 6 PM.          History of Present Illness   HPI   84-year-old male with history of Parkinson's dementia on Sinemet, hypertension, asbestosis, CKD stage IIIa presents to clinic to follow up of Parkinson's disease.     -Wife has concerns about sundowning in the afternoon  -Experiencing hallucinations multiple times a week such as seeing someone in the yard  -More alert with activity such as socializing with other people, graduation parties  -Sleeping better at night about 10pm-5am; less daytime sleepiness (comes and goes)  -Ambulation - shuffling/stuttering, no falls, using cane, will use walker if he needs; ambulation seems to get worse at the  end of the day   -Tremors - some tremors right before next dose of sinemet; not happening frequently  -Simemet 1.5mg - takes 7am, 1pm and 7pm  -Taking Pramipexole 3x per day  -Has not been taking Seroquel   -Using Exelon patch  -PT/OT/ST - still going and doing well  -Generally at home watches TV, goes outside weather permitting ; leaves house for PT appt    Review of Systems   Cardiovascular:  Positive for leg swelling.   Neurological:  Positive for speech difficulty.    I have personally reviewed the MA's review of systems and made changes as necessary.       Objective   There were no vitals taken for this visit.    Physical Exam  HENT:      Head: Normocephalic and atraumatic.      Nose: Nose normal.      Mouth/Throat:      Mouth: Mucous membranes are moist.     Eyes:      Extraocular Movements: Extraocular movements intact.      Conjunctiva/sclera: Conjunctivae normal.      Pupils: Pupils are equal, round, and reactive to light.     Pulmonary:      Effort: Pulmonary effort is normal.     Musculoskeletal:      Cervical back: Neck supple.      Right lower leg: Edema present.      Left lower leg: Edema present.     Skin:     General: Skin is warm and dry.      Findings: Bruising present.     Neurological:      Mental Status: He is alert and oriented to person, place, and time.      Cranial Nerves: Dysarthria present.      Comments: No resting tremor     Neurological Exam  Mental Status  Alert. Oriented only to person, place and time. Oriented to person, place, and time. dysarthria present.    Cranial Nerves  CN III, IV, VI: Extraocular movements intact bilaterally. Pupils equal round and reactive to light bilaterally.  CN V:  Right: Facial sensation is normal.  Left: Facial sensation is normal on the left.  CN VII:  Right: There is no facial weakness.  Left: There is no facial weakness. Masked facies.  CN VIII: Hearing is normal.  CN IX, X: Palate elevates symmetrically. Normal gag reflex. Increased saliva.  CN  XI:  Right: Sternocleidomastoid strength is weak.  Left: Sternocleidomastoid strength is weak.  CN XII: Tongue midline without atrophy or fasciculations.    Motor  Normal muscle bulk throughout. Cogwheel rigidity bilaterally  Bradykinesia .                                               Right                     Left   Shoulder abduction               5                          5   Shoulder adduction               5                          5  Elbow flexion                         5                          5  Elbow extension                    5                          5  Hip flexion                              5                          5  Hip extension                         5                          5  Knee extension                      5                          5    Sensory  Pinprick is normal in upper and lower extremities.     Coordination  Right: Finger-to-nose normal. Slowed, slight tremor.Left: Finger-to-nose normal. Slowed, slight tremor.    Gait   Shuffled and slowed gait, unable to rise from seated position without assistance, use cane .  No resting tremor.      CBC AND DIFFERENTIAL  Order: [id number]  Component  Ref Range & Units 5/2/25 12:14 PM   Hemoglobin  12.5 - 17.0 g/dL 11.4 Low    Hematocrit  37.0 - 48.0 % 33.5 Low    WBC  4.0 - 10.5 thou/cmm 8.4   RBC  4.00 - 5.40 mill/cmm 3.58 Low    Platelet  140 - 350 thou/cmm 184   MPV  7.5 - 11.3 fL 8.5   MCV  80 - 100 fL 94   MCH  27.0 - 36.0 pg 31.9   MCHC  32.0 - 37.0 g/dL 34.1   RDW  12.0 - 16.0 % 14.1   Differential Type AUTO   Absolute Neutrophils  1.8 - 7.8 thou/cmm 6.3   Absolute Lymphocytes  1.0 - 3.0 thou/cmm 1.2   Absolute Monocytes  0.3 - 1.0 thou/cmm 0.8   Absolute Eosinophils  0.0 - 0.5 thou/cmm 0.1   Absolute Basophils  0.0 - 0.1 thou/cmm 0   Neutrophils  % 74   Lymphocytes Manual  % 14   Monocytes  % 10   Eosinophils  % 1   Basophils  % 1          COMPREHENSIVE METABOLIC PANEL  Order: [id number]   Status: Final result       Next appt:  04/07/2026 at 01:30 PM in Dermatology (Pia Caruso MD)              Component  Ref Range & Units (hodeepti) 5/2/25 12:14 PM 2/13/25 10:38 AM 2/10/25  6:32 AM 2/7/25  6:18 AM 2/3/25  6:34 AM 1/29/25  5:31 AM 1/28/25  6:26 AM   Glucose 71 96 R, CM 88 R, CM 85 R, CM 85 R, CM 87 R, CM 84 R, CM   BUN 36 High  30 High  R 40 High  R 39 High  R 33 High  R 23 R 21 R   Creatinine 1.33 High  1.30 R, CM 1.15 R, CM 1.21 R, CM 1.05 R, CM 0.99 R, CM 1.05 R, CM   Sodium 141 140 R 141 R 138 R 140 R 140 R 139 R   Potassium 4.9 5.2 R 4.8 R 4.8 R 4.8 R 4.3 R 4.9 R   Chloride 104 106 R 108 R 105 R 104 R 105 R 104 R   Carbon Dioxide 29 26 R 26 R 27 R 27 R 28 R 32 R   Calcium 8.9 9.3 R 8.9 R 9.3 R 9.1 R 9.0 R 8.8 R   Alkaline Phosphatase 108     125 High  R    ALBUMIN 4.1     3.8 R    Total Bilirubin 1.1 High      0.71 R, CM    Comment: Eltrombopag and its metabolites may interfere with this assay causing erroneously high patient results.   Protein, Total 6.4     6.6 R    AST 8     15 R    ALT <3     7 R, CM    ANION GAP 8 8 R 7 R 6 R 9 R 7 R 3 Low  R   eGFRcr 53 Low  50 R 58 R 55 R 65 R 70 R 65 R   eGFR Comment Interpretive information: calculated GFR                    Administrative Statements   I have spent a total time of 60 minutes in caring for this patient on the day of the visit/encounter including Counseling / Coordination of care, Documenting in the medical record, Reviewing/placing orders in the medical record (including tests, medications, and/or procedures), Obtaining or reviewing history  , and Communicating with other healthcare professionals     Electronically signed,  Tracy [place] D.O.  Neurology Resident .

## 2025-08-04 ENCOUNTER — TELEPHONE (OUTPATIENT)
Age: 85
End: 2025-08-04

## 2025-08-13 ENCOUNTER — TELEPHONE (OUTPATIENT)
Age: 85
End: 2025-08-13

## 2025-08-18 ENCOUNTER — HOSPITAL ENCOUNTER (INPATIENT)
Facility: HOSPITAL | Age: 85
LOS: 3 days | Discharge: HOME HEALTH | End: 2025-08-22
Attending: EMERGENCY MEDICINE | Admitting: STUDENT IN AN ORGANIZED HEALTH CARE EDUCATION/TRAINING PROGRAM
Payer: COMMERCIAL

## 2025-08-18 ENCOUNTER — APPOINTMENT (OUTPATIENT)
Dept: RADIOLOGY | Facility: HOSPITAL | Age: 85
End: 2025-08-18
Payer: COMMERCIAL

## 2025-08-18 DIAGNOSIS — G20.A1 PARKINSON'S DISEASE, UNSPECIFIED WHETHER DYSKINESIA PRESENT, UNSPECIFIED WHETHER MANIFESTATIONS FLUCTUATE (HCC): ICD-10-CM

## 2025-08-18 DIAGNOSIS — F02.B18 MODERATE DEMENTIA DUE TO PARKINSON'S DISEASE, WITH OTHER BEHAVIORAL DISTURBANCE (HCC): ICD-10-CM

## 2025-08-18 DIAGNOSIS — G20.A1 MODERATE DEMENTIA DUE TO PARKINSON'S DISEASE, WITH OTHER BEHAVIORAL DISTURBANCE (HCC): ICD-10-CM

## 2025-08-18 DIAGNOSIS — G20.A1 PARKINSON'S DISEASE (HCC): ICD-10-CM

## 2025-08-18 DIAGNOSIS — R26.2 AMBULATORY DYSFUNCTION: ICD-10-CM

## 2025-08-18 DIAGNOSIS — I50.9 CHF (CONGESTIVE HEART FAILURE) (HCC): ICD-10-CM

## 2025-08-18 DIAGNOSIS — N18.31 STAGE 3A CHRONIC KIDNEY DISEASE (HCC): ICD-10-CM

## 2025-08-18 DIAGNOSIS — N17.9 AKI (ACUTE KIDNEY INJURY) (HCC): ICD-10-CM

## 2025-08-18 DIAGNOSIS — R60.0 BILATERAL LOWER EXTREMITY EDEMA: Primary | ICD-10-CM

## 2025-08-18 PROBLEM — R79.89 ELEVATED BRAIN NATRIURETIC PEPTIDE (BNP) LEVEL: Status: ACTIVE | Noted: 2025-08-18

## 2025-08-18 LAB
ALBUMIN SERPL BCG-MCNC: 4.4 G/DL (ref 3.5–5)
ALP SERPL-CCNC: 107 U/L (ref 34–104)
ALT SERPL W P-5'-P-CCNC: 3 U/L (ref 7–52)
ANION GAP SERPL CALCULATED.3IONS-SCNC: 9 MMOL/L (ref 4–13)
AST SERPL W P-5'-P-CCNC: 11 U/L (ref 13–39)
ATRIAL RATE: 66 BPM
BASOPHILS # BLD AUTO: 0.03 THOUSANDS/ÂΜL (ref 0–0.1)
BASOPHILS NFR BLD AUTO: 0 % (ref 0–1)
BILIRUB SERPL-MCNC: 1.28 MG/DL (ref 0.2–1)
BNP SERPL-MCNC: 210 PG/ML (ref 0–100)
BUN SERPL-MCNC: 35 MG/DL (ref 5–25)
CALCIUM SERPL-MCNC: 9 MG/DL (ref 8.4–10.2)
CARDIAC TROPONIN I PNL SERPL HS: 4 NG/L (ref ?–50)
CHLORIDE SERPL-SCNC: 106 MMOL/L (ref 96–108)
CO2 SERPL-SCNC: 25 MMOL/L (ref 21–32)
CREAT SERPL-MCNC: 1.5 MG/DL (ref 0.6–1.3)
EOSINOPHIL # BLD AUTO: 0.13 THOUSAND/ÂΜL (ref 0–0.61)
EOSINOPHIL NFR BLD AUTO: 2 % (ref 0–6)
ERYTHROCYTE [DISTWIDTH] IN BLOOD BY AUTOMATED COUNT: 12.7 % (ref 11.6–15.1)
GFR SERPL CREATININE-BSD FRML MDRD: 42 ML/MIN/1.73SQ M
GLUCOSE SERPL-MCNC: 66 MG/DL (ref 65–140)
HCT VFR BLD AUTO: 38.9 % (ref 36.5–49.3)
HGB BLD-MCNC: 12.9 G/DL (ref 12–17)
IMM GRANULOCYTES # BLD AUTO: 0.03 THOUSAND/UL (ref 0–0.2)
IMM GRANULOCYTES NFR BLD AUTO: 0 % (ref 0–2)
LYMPHOCYTES # BLD AUTO: 1.44 THOUSANDS/ÂΜL (ref 0.6–4.47)
LYMPHOCYTES NFR BLD AUTO: 19 % (ref 14–44)
MCH RBC QN AUTO: 32.6 PG (ref 26.8–34.3)
MCHC RBC AUTO-ENTMCNC: 33.2 G/DL (ref 31.4–37.4)
MCV RBC AUTO: 98 FL (ref 82–98)
MONOCYTES # BLD AUTO: 0.83 THOUSAND/ÂΜL (ref 0.17–1.22)
MONOCYTES NFR BLD AUTO: 11 % (ref 4–12)
NEUTROPHILS # BLD AUTO: 5.24 THOUSANDS/ÂΜL (ref 1.85–7.62)
NEUTS SEG NFR BLD AUTO: 68 % (ref 43–75)
NRBC BLD AUTO-RTO: 0 /100 WBCS
P AXIS: 38 DEGREES
PLATELET # BLD AUTO: 176 THOUSANDS/UL (ref 149–390)
PMV BLD AUTO: 10.1 FL (ref 8.9–12.7)
POTASSIUM SERPL-SCNC: 4.9 MMOL/L (ref 3.5–5.3)
PR INTERVAL: 270 MS
PROT SERPL-MCNC: 7.1 G/DL (ref 6.4–8.4)
QRS AXIS: 17 DEGREES
QRSD INTERVAL: 90 MS
QT INTERVAL: 396 MS
QTC INTERVAL: 415 MS
RBC # BLD AUTO: 3.96 MILLION/UL (ref 3.88–5.62)
SODIUM SERPL-SCNC: 140 MMOL/L (ref 135–147)
T WAVE AXIS: 0 DEGREES
VENTRICULAR RATE: 66 BPM
WBC # BLD AUTO: 7.7 THOUSAND/UL (ref 4.31–10.16)

## 2025-08-18 PROCEDURE — 99285 EMERGENCY DEPT VISIT HI MDM: CPT | Performed by: EMERGENCY MEDICINE

## 2025-08-18 PROCEDURE — 85025 COMPLETE CBC W/AUTO DIFF WBC: CPT | Performed by: EMERGENCY MEDICINE

## 2025-08-18 PROCEDURE — 83880 ASSAY OF NATRIURETIC PEPTIDE: CPT | Performed by: EMERGENCY MEDICINE

## 2025-08-18 PROCEDURE — 36415 COLL VENOUS BLD VENIPUNCTURE: CPT | Performed by: EMERGENCY MEDICINE

## 2025-08-18 PROCEDURE — 99223 1ST HOSP IP/OBS HIGH 75: CPT | Performed by: INTERNAL MEDICINE

## 2025-08-18 PROCEDURE — 84484 ASSAY OF TROPONIN QUANT: CPT | Performed by: EMERGENCY MEDICINE

## 2025-08-18 PROCEDURE — 71045 X-RAY EXAM CHEST 1 VIEW: CPT

## 2025-08-18 PROCEDURE — 99285 EMERGENCY DEPT VISIT HI MDM: CPT

## 2025-08-18 PROCEDURE — 93005 ELECTROCARDIOGRAM TRACING: CPT

## 2025-08-18 PROCEDURE — 93010 ELECTROCARDIOGRAM REPORT: CPT | Performed by: INTERNAL MEDICINE

## 2025-08-18 PROCEDURE — 80053 COMPREHEN METABOLIC PANEL: CPT | Performed by: EMERGENCY MEDICINE

## 2025-08-18 RX ORDER — RIVASTIGMINE 4.6 MG/24H
1 PATCH, EXTENDED RELEASE TRANSDERMAL DAILY
Status: DISCONTINUED | OUTPATIENT
Start: 2025-08-18 | End: 2025-08-22 | Stop reason: HOSPADM

## 2025-08-18 RX ORDER — AMLODIPINE BESYLATE 5 MG/1
5 TABLET ORAL DAILY
Status: DISCONTINUED | OUTPATIENT
Start: 2025-08-18 | End: 2025-08-18

## 2025-08-18 RX ORDER — AMLODIPINE BESYLATE 2.5 MG/1
2.5 TABLET ORAL DAILY
Status: DISCONTINUED | OUTPATIENT
Start: 2025-08-19 | End: 2025-08-22 | Stop reason: HOSPADM

## 2025-08-18 RX ORDER — CARBIDOPA AND LEVODOPA 25; 100 MG/1; MG/1
1.5 TABLET ORAL 3 TIMES DAILY
Status: DISCONTINUED | OUTPATIENT
Start: 2025-08-18 | End: 2025-08-22 | Stop reason: HOSPADM

## 2025-08-18 RX ORDER — HEPARIN SODIUM 5000 [USP'U]/ML
5000 INJECTION, SOLUTION INTRAVENOUS; SUBCUTANEOUS EVERY 8 HOURS SCHEDULED
Status: DISCONTINUED | OUTPATIENT
Start: 2025-08-18 | End: 2025-08-22 | Stop reason: HOSPADM

## 2025-08-18 RX ORDER — PRAMIPEXOLE DIHYDROCHLORIDE 0.25 MG/1
0.12 TABLET ORAL 3 TIMES DAILY
Status: DISCONTINUED | OUTPATIENT
Start: 2025-08-18 | End: 2025-08-22 | Stop reason: HOSPADM

## 2025-08-18 RX ORDER — FUROSEMIDE 10 MG/ML
40 INJECTION INTRAMUSCULAR; INTRAVENOUS ONCE
Status: COMPLETED | OUTPATIENT
Start: 2025-08-18 | End: 2025-08-18

## 2025-08-18 RX ORDER — ACETAMINOPHEN 325 MG/1
650 TABLET ORAL EVERY 4 HOURS PRN
Status: DISCONTINUED | OUTPATIENT
Start: 2025-08-18 | End: 2025-08-22 | Stop reason: HOSPADM

## 2025-08-18 RX ORDER — FUROSEMIDE 10 MG/ML
50 INJECTION INTRAMUSCULAR; INTRAVENOUS
Status: DISCONTINUED | OUTPATIENT
Start: 2025-08-18 | End: 2025-08-22

## 2025-08-18 RX ADMIN — RIVASTIGMINE 1 PATCH: 4.6 PATCH TRANSDERMAL at 17:18

## 2025-08-18 RX ADMIN — FUROSEMIDE 40 MG: 10 INJECTION, SOLUTION INTRAVENOUS at 13:34

## 2025-08-18 RX ADMIN — Medication 3 MG: at 21:27

## 2025-08-18 RX ADMIN — PRAMIPEXOLE DIHYDROCHLORIDE 0.12 MG: 0.25 TABLET ORAL at 21:28

## 2025-08-18 RX ADMIN — CARBIDOPA AND LEVODOPA 1.5 TABLET: 25; 100 TABLET ORAL at 21:27

## 2025-08-18 RX ADMIN — DEXTRAN 70, GLYCERIN, HYPROMELLOSE 1 DROP: 1; 2; 3 SOLUTION/ DROPS OPHTHALMIC at 17:18

## 2025-08-18 RX ADMIN — Medication 1000 UNITS: at 16:00

## 2025-08-18 RX ADMIN — PRAMIPEXOLE DIHYDROCHLORIDE 0.12 MG: 0.25 TABLET ORAL at 16:00

## 2025-08-18 RX ADMIN — AMLODIPINE BESYLATE 5 MG: 5 TABLET ORAL at 16:01

## 2025-08-18 RX ADMIN — CARBIDOPA AND LEVODOPA 1.5 TABLET: 25; 100 TABLET ORAL at 16:00

## 2025-08-18 RX ADMIN — HEPARIN SODIUM 5000 UNITS: 5000 INJECTION INTRAVENOUS; SUBCUTANEOUS at 21:38

## 2025-08-18 RX ADMIN — CYANOCOBALAMIN TAB 500 MCG 1000 MCG: 500 TAB at 16:00

## 2025-08-18 RX ADMIN — HEPARIN SODIUM 5000 UNITS: 5000 INJECTION INTRAVENOUS; SUBCUTANEOUS at 16:00

## 2025-08-19 ENCOUNTER — APPOINTMENT (OUTPATIENT)
Dept: NON INVASIVE DIAGNOSTICS | Facility: HOSPITAL | Age: 85
End: 2025-08-19
Payer: COMMERCIAL

## 2025-08-19 PROBLEM — R54 FRAILTY SYNDROME IN GERIATRIC PATIENT: Status: ACTIVE | Noted: 2025-08-19

## 2025-08-19 PROBLEM — I50.31 ACUTE DIASTOLIC CHF (CONGESTIVE HEART FAILURE) (HCC): Status: ACTIVE | Noted: 2025-08-18

## 2025-08-19 PROBLEM — H54.7 VISUAL IMPAIRMENT: Status: ACTIVE | Noted: 2025-08-19

## 2025-08-19 PROBLEM — H91.90 HOH (HARD OF HEARING): Status: ACTIVE | Noted: 2025-08-19

## 2025-08-19 PROBLEM — G47.9 SLEEP DIFFICULTIES: Status: ACTIVE | Noted: 2025-08-19

## 2025-08-19 LAB
ANION GAP SERPL CALCULATED.3IONS-SCNC: 7 MMOL/L (ref 4–13)
AORTIC ROOT: 3.3 CM
ASCENDING AORTA: 3 CM
AV LVOT MEAN GRADIENT: 1 MMHG
AV LVOT PEAK GRADIENT: 2 MMHG
AV REGURGITATION PRESSURE HALF TIME: 412 MS
BACTERIA UR QL AUTO: ABNORMAL /HPF
BILIRUB UR QL STRIP: NEGATIVE
BSA FOR ECHO PROCEDURE: 2.06 M2
BUN SERPL-MCNC: 39 MG/DL (ref 5–25)
CALCIUM SERPL-MCNC: 8.9 MG/DL (ref 8.4–10.2)
CHLORIDE SERPL-SCNC: 100 MMOL/L (ref 96–108)
CLARITY UR: CLEAR
CO2 SERPL-SCNC: 31 MMOL/L (ref 21–32)
COLOR UR: COLORLESS
CREAT SERPL-MCNC: 1.55 MG/DL (ref 0.6–1.3)
DOP CALC LVOT AREA: 3.8 CM2
DOP CALC LVOT CARDIAC INDEX: 2.06 L/MIN/M2
DOP CALC LVOT CARDIAC OUTPUT: 4.24 L/MIN
DOP CALC LVOT DIAMETER: 2.2 CM
DOP CALC LVOT PEAK VEL VTI: 15.89 CM
DOP CALC LVOT PEAK VEL: 0.71 M/S
DOP CALC LVOT STROKE INDEX: 29.1 ML/M2
DOP CALC LVOT STROKE VOLUME: 60.37
E WAVE DECELERATION TIME: 181 MS
E/A RATIO: 0.62
ERYTHROCYTE [DISTWIDTH] IN BLOOD BY AUTOMATED COUNT: 12.7 % (ref 11.6–15.1)
FRACTIONAL SHORTENING: 37 (ref 28–44)
GFR SERPL CREATININE-BSD FRML MDRD: 40 ML/MIN/1.73SQ M
GLUCOSE P FAST SERPL-MCNC: 87 MG/DL (ref 65–99)
GLUCOSE SERPL-MCNC: 87 MG/DL (ref 65–140)
GLUCOSE UR STRIP-MCNC: NEGATIVE MG/DL
HCT VFR BLD AUTO: 36.4 % (ref 36.5–49.3)
HGB BLD-MCNC: 12.5 G/DL (ref 12–17)
HGB UR QL STRIP.AUTO: NEGATIVE
HYALINE CASTS #/AREA URNS LPF: ABNORMAL /LPF
INTERVENTRICULAR SEPTUM IN DIASTOLE (PARASTERNAL SHORT AXIS VIEW): 1.2 CM
INTERVENTRICULAR SEPTUM: 1.2 CM (ref 0.6–1.1)
KETONES UR STRIP-MCNC: NEGATIVE MG/DL
LAAS-AP2: 14.9 CM2
LAAS-AP4: 16 CM2
LEFT ATRIUM SIZE: 3 CM
LEFT ATRIUM VOLUME (MOD BIPLANE): 34 ML
LEFT ATRIUM VOLUME INDEX (MOD BIPLANE): 16.5 ML/M2
LEFT INTERNAL DIMENSION IN SYSTOLE: 2.6 CM (ref 2.1–4)
LEFT VENTRICULAR INTERNAL DIMENSION IN DIASTOLE: 4.1 CM (ref 3.5–6)
LEFT VENTRICULAR POSTERIOR WALL IN END DIASTOLE: 1.2 CM
LEFT VENTRICULAR STROKE VOLUME: 51 ML
LEUKOCYTE ESTERASE UR QL STRIP: NEGATIVE
LV EF US.2D.A4C+ESTIMATED: 63 %
LVSV (TEICH): 51 ML
MCH RBC QN AUTO: 32.6 PG (ref 26.8–34.3)
MCHC RBC AUTO-ENTMCNC: 34.3 G/DL (ref 31.4–37.4)
MCV RBC AUTO: 95 FL (ref 82–98)
MV E'TISSUE VEL-LAT: 5 CM/S
MV E'TISSUE VEL-SEP: 7 CM/S
MV PEAK A VEL: 0.61 M/S
MV PEAK E VEL: 38 CM/S
MV STENOSIS PRESSURE HALF TIME: 52 MS
MV VALVE AREA P 1/2 METHOD: 4.23
NITRITE UR QL STRIP: NEGATIVE
NON-SQ EPI CELLS URNS QL MICRO: ABNORMAL /HPF
OSMOLALITY UR: 335 MMOL/KG (ref 250–900)
PH UR STRIP.AUTO: 6 [PH]
PLATELET # BLD AUTO: 183 THOUSANDS/UL (ref 149–390)
PMV BLD AUTO: 9.7 FL (ref 8.9–12.7)
POTASSIUM SERPL-SCNC: 4.4 MMOL/L (ref 3.5–5.3)
PROT UR STRIP-MCNC: NEGATIVE MG/DL
RBC # BLD AUTO: 3.84 MILLION/UL (ref 3.88–5.62)
RBC #/AREA URNS AUTO: ABNORMAL /HPF
RIGHT ATRIUM AREA SYSTOLE A4C: 10.5 CM2
RIGHT VENTRICLE ID DIMENSION: 3.6 CM
SL CV AV DECELERATION TIME RETROGRADE: 1421 MS
SL CV AV PEAK GRADIENT RETROGRADE: 47 MMHG
SL CV LEFT ATRIUM LENGTH A2C: 5 CM
SL CV LV EF: 60
SL CV PED ECHO LEFT VENTRICLE DIASTOLIC VOLUME (MOD BIPLANE) 2D: 75 ML
SL CV PED ECHO LEFT VENTRICLE SYSTOLIC VOLUME (MOD BIPLANE) 2D: 25 ML
SODIUM SERPL-SCNC: 138 MMOL/L (ref 135–147)
SODIUM UR-SCNC: 113 MMOL/L
SP GR UR STRIP.AUTO: 1.01 (ref 1–1.03)
TRICUSPID ANNULAR PLANE SYSTOLIC EXCURSION: 2 CM
TSH SERPL DL<=0.05 MIU/L-ACNC: 2.16 UIU/ML (ref 0.45–4.5)
UROBILINOGEN UR STRIP-ACNC: <2 MG/DL
WBC # BLD AUTO: 7.93 THOUSAND/UL (ref 4.31–10.16)
WBC #/AREA URNS AUTO: ABNORMAL /HPF

## 2025-08-19 PROCEDURE — 84300 ASSAY OF URINE SODIUM: CPT | Performed by: NURSE PRACTITIONER

## 2025-08-19 PROCEDURE — 84443 ASSAY THYROID STIM HORMONE: CPT

## 2025-08-19 PROCEDURE — 97110 THERAPEUTIC EXERCISES: CPT

## 2025-08-19 PROCEDURE — 93306 TTE W/DOPPLER COMPLETE: CPT | Performed by: INTERNAL MEDICINE

## 2025-08-19 PROCEDURE — 97167 OT EVAL HIGH COMPLEX 60 MIN: CPT

## 2025-08-19 PROCEDURE — 97163 PT EVAL HIGH COMPLEX 45 MIN: CPT

## 2025-08-19 PROCEDURE — C8929 TTE W OR WO FOL WCON,DOPPLER: HCPCS

## 2025-08-19 PROCEDURE — 99223 1ST HOSP IP/OBS HIGH 75: CPT | Performed by: INTERNAL MEDICINE

## 2025-08-19 PROCEDURE — 99233 SBSQ HOSP IP/OBS HIGH 50: CPT | Performed by: NURSE PRACTITIONER

## 2025-08-19 PROCEDURE — 81001 URINALYSIS AUTO W/SCOPE: CPT | Performed by: NURSE PRACTITIONER

## 2025-08-19 PROCEDURE — 83935 ASSAY OF URINE OSMOLALITY: CPT | Performed by: NURSE PRACTITIONER

## 2025-08-19 PROCEDURE — 85027 COMPLETE CBC AUTOMATED: CPT

## 2025-08-19 PROCEDURE — 80048 BASIC METABOLIC PNL TOTAL CA: CPT

## 2025-08-19 RX ADMIN — HEPARIN SODIUM 5000 UNITS: 5000 INJECTION INTRAVENOUS; SUBCUTANEOUS at 21:27

## 2025-08-19 RX ADMIN — HEPARIN SODIUM 5000 UNITS: 5000 INJECTION INTRAVENOUS; SUBCUTANEOUS at 15:42

## 2025-08-19 RX ADMIN — DEXTRAN 70, GLYCERIN, HYPROMELLOSE 1 DROP: 1; 2; 3 SOLUTION/ DROPS OPHTHALMIC at 18:10

## 2025-08-19 RX ADMIN — AMLODIPINE BESYLATE 2.5 MG: 2.5 TABLET ORAL at 08:47

## 2025-08-19 RX ADMIN — FUROSEMIDE 50 MG: 10 INJECTION, SOLUTION INTRAMUSCULAR; INTRAVENOUS at 16:14

## 2025-08-19 RX ADMIN — CARBIDOPA AND LEVODOPA 1.5 TABLET: 25; 100 TABLET ORAL at 21:24

## 2025-08-19 RX ADMIN — PRAMIPEXOLE DIHYDROCHLORIDE 0.12 MG: 0.25 TABLET ORAL at 08:47

## 2025-08-19 RX ADMIN — CARBIDOPA AND LEVODOPA 1.5 TABLET: 25; 100 TABLET ORAL at 08:47

## 2025-08-19 RX ADMIN — CARBIDOPA AND LEVODOPA 1.5 TABLET: 25; 100 TABLET ORAL at 16:14

## 2025-08-19 RX ADMIN — PRAMIPEXOLE DIHYDROCHLORIDE 0.12 MG: 0.25 TABLET ORAL at 16:14

## 2025-08-19 RX ADMIN — FUROSEMIDE 50 MG: 10 INJECTION, SOLUTION INTRAMUSCULAR; INTRAVENOUS at 08:47

## 2025-08-19 RX ADMIN — HEPARIN SODIUM 5000 UNITS: 5000 INJECTION INTRAVENOUS; SUBCUTANEOUS at 06:02

## 2025-08-19 RX ADMIN — PRAMIPEXOLE DIHYDROCHLORIDE 0.12 MG: 0.25 TABLET ORAL at 21:24

## 2025-08-19 RX ADMIN — RIVASTIGMINE 1 PATCH: 4.6 PATCH TRANSDERMAL at 09:00

## 2025-08-19 RX ADMIN — DEXTRAN 70, GLYCERIN, HYPROMELLOSE 1 DROP: 1; 2; 3 SOLUTION/ DROPS OPHTHALMIC at 08:47

## 2025-08-19 RX ADMIN — PERFLUTREN 0.8 ML: 6.52 INJECTION, SUSPENSION INTRAVENOUS at 12:37

## 2025-08-19 RX ADMIN — Medication 3 MG: at 21:24

## 2025-08-20 ENCOUNTER — APPOINTMENT (INPATIENT)
Dept: RADIOLOGY | Facility: HOSPITAL | Age: 85
End: 2025-08-20
Payer: COMMERCIAL

## 2025-08-20 PROBLEM — N17.9 AKI (ACUTE KIDNEY INJURY) (HCC): Status: RESOLVED | Noted: 2022-05-11 | Resolved: 2025-08-20

## 2025-08-20 PROBLEM — N18.31 STAGE 3A CHRONIC KIDNEY DISEASE (HCC): Status: RESOLVED | Noted: 2024-03-15 | Resolved: 2025-08-20

## 2025-08-20 PROBLEM — N18.32 STAGE 3B CHRONIC KIDNEY DISEASE (HCC): Status: ACTIVE | Noted: 2025-08-20

## 2025-08-20 LAB
ANION GAP SERPL CALCULATED.3IONS-SCNC: 7 MMOL/L (ref 4–13)
BUN SERPL-MCNC: 42 MG/DL (ref 5–25)
CALCIUM SERPL-MCNC: 8.8 MG/DL (ref 8.4–10.2)
CHLORIDE SERPL-SCNC: 98 MMOL/L (ref 96–108)
CO2 SERPL-SCNC: 33 MMOL/L (ref 21–32)
CREAT SERPL-MCNC: 1.55 MG/DL (ref 0.6–1.3)
ERYTHROCYTE [DISTWIDTH] IN BLOOD BY AUTOMATED COUNT: 12.7 % (ref 11.6–15.1)
GFR SERPL CREATININE-BSD FRML MDRD: 40 ML/MIN/1.73SQ M
GLUCOSE SERPL-MCNC: 110 MG/DL (ref 65–140)
HCT VFR BLD AUTO: 36.2 % (ref 36.5–49.3)
HGB BLD-MCNC: 12.6 G/DL (ref 12–17)
MCH RBC QN AUTO: 32.8 PG (ref 26.8–34.3)
MCHC RBC AUTO-ENTMCNC: 34.8 G/DL (ref 31.4–37.4)
MCV RBC AUTO: 94 FL (ref 82–98)
PLATELET # BLD AUTO: 185 THOUSANDS/UL (ref 149–390)
PMV BLD AUTO: 9.9 FL (ref 8.9–12.7)
POTASSIUM SERPL-SCNC: 4.1 MMOL/L (ref 3.5–5.3)
RBC # BLD AUTO: 3.84 MILLION/UL (ref 3.88–5.62)
SODIUM SERPL-SCNC: 138 MMOL/L (ref 135–147)
WBC # BLD AUTO: 9.5 THOUSAND/UL (ref 4.31–10.16)

## 2025-08-20 PROCEDURE — 73560 X-RAY EXAM OF KNEE 1 OR 2: CPT

## 2025-08-20 PROCEDURE — 99232 SBSQ HOSP IP/OBS MODERATE 35: CPT | Performed by: NURSE PRACTITIONER

## 2025-08-20 PROCEDURE — 85027 COMPLETE CBC AUTOMATED: CPT | Performed by: NURSE PRACTITIONER

## 2025-08-20 PROCEDURE — 80048 BASIC METABOLIC PNL TOTAL CA: CPT | Performed by: NURSE PRACTITIONER

## 2025-08-20 PROCEDURE — 99222 1ST HOSP IP/OBS MODERATE 55: CPT | Performed by: INTERNAL MEDICINE

## 2025-08-20 RX ADMIN — Medication 3 MG: at 21:05

## 2025-08-20 RX ADMIN — HEPARIN SODIUM 5000 UNITS: 5000 INJECTION INTRAVENOUS; SUBCUTANEOUS at 05:37

## 2025-08-20 RX ADMIN — PRAMIPEXOLE DIHYDROCHLORIDE 0.12 MG: 0.25 TABLET ORAL at 20:36

## 2025-08-20 RX ADMIN — FUROSEMIDE 50 MG: 10 INJECTION, SOLUTION INTRAMUSCULAR; INTRAVENOUS at 08:58

## 2025-08-20 RX ADMIN — CARBIDOPA AND LEVODOPA 1.5 TABLET: 25; 100 TABLET ORAL at 20:36

## 2025-08-20 RX ADMIN — HEPARIN SODIUM 5000 UNITS: 5000 INJECTION INTRAVENOUS; SUBCUTANEOUS at 21:05

## 2025-08-20 RX ADMIN — HEPARIN SODIUM 5000 UNITS: 5000 INJECTION INTRAVENOUS; SUBCUTANEOUS at 15:18

## 2025-08-20 RX ADMIN — ACETAMINOPHEN 650 MG: 325 TABLET ORAL at 15:17

## 2025-08-20 RX ADMIN — PRAMIPEXOLE DIHYDROCHLORIDE 0.12 MG: 0.25 TABLET ORAL at 08:58

## 2025-08-20 RX ADMIN — Medication 1000 UNITS: at 08:58

## 2025-08-20 RX ADMIN — DEXTRAN 70, GLYCERIN, HYPROMELLOSE 1 DROP: 1; 2; 3 SOLUTION/ DROPS OPHTHALMIC at 09:01

## 2025-08-20 RX ADMIN — CARBIDOPA AND LEVODOPA 1.5 TABLET: 25; 100 TABLET ORAL at 15:17

## 2025-08-20 RX ADMIN — CARBIDOPA AND LEVODOPA 1.5 TABLET: 25; 100 TABLET ORAL at 08:58

## 2025-08-20 RX ADMIN — FUROSEMIDE 50 MG: 10 INJECTION, SOLUTION INTRAMUSCULAR; INTRAVENOUS at 15:17

## 2025-08-20 RX ADMIN — DEXTRAN 70, GLYCERIN, HYPROMELLOSE 1 DROP: 1; 2; 3 SOLUTION/ DROPS OPHTHALMIC at 17:13

## 2025-08-20 RX ADMIN — AMLODIPINE BESYLATE 2.5 MG: 2.5 TABLET ORAL at 08:58

## 2025-08-20 RX ADMIN — CYANOCOBALAMIN TAB 500 MCG 1000 MCG: 500 TAB at 08:58

## 2025-08-20 RX ADMIN — PRAMIPEXOLE DIHYDROCHLORIDE 0.12 MG: 0.25 TABLET ORAL at 15:17

## 2025-08-20 RX ADMIN — RIVASTIGMINE 1 PATCH: 4.6 PATCH TRANSDERMAL at 09:01

## 2025-08-21 ENCOUNTER — APPOINTMENT (INPATIENT)
Dept: ULTRASOUND IMAGING | Facility: HOSPITAL | Age: 85
End: 2025-08-21
Payer: COMMERCIAL

## 2025-08-21 LAB
ANION GAP SERPL CALCULATED.3IONS-SCNC: 10 MMOL/L (ref 4–13)
BUN SERPL-MCNC: 41 MG/DL (ref 5–25)
CALCIUM SERPL-MCNC: 9.2 MG/DL (ref 8.4–10.2)
CHLORIDE SERPL-SCNC: 99 MMOL/L (ref 96–108)
CO2 SERPL-SCNC: 30 MMOL/L (ref 21–32)
CREAT SERPL-MCNC: 1.52 MG/DL (ref 0.6–1.3)
ERYTHROCYTE [DISTWIDTH] IN BLOOD BY AUTOMATED COUNT: 12.8 % (ref 11.6–15.1)
GFR SERPL CREATININE-BSD FRML MDRD: 41 ML/MIN/1.73SQ M
GLUCOSE SERPL-MCNC: 95 MG/DL (ref 65–140)
HCT VFR BLD AUTO: 35 % (ref 36.5–49.3)
HGB BLD-MCNC: 12 G/DL (ref 12–17)
MAGNESIUM SERPL-MCNC: 2.3 MG/DL (ref 1.9–2.7)
MCH RBC QN AUTO: 32.6 PG (ref 26.8–34.3)
MCHC RBC AUTO-ENTMCNC: 34.3 G/DL (ref 31.4–37.4)
MCV RBC AUTO: 95 FL (ref 82–98)
PLATELET # BLD AUTO: 179 THOUSANDS/UL (ref 149–390)
PMV BLD AUTO: 10.3 FL (ref 8.9–12.7)
POTASSIUM SERPL-SCNC: 4.3 MMOL/L (ref 3.5–5.3)
RBC # BLD AUTO: 3.68 MILLION/UL (ref 3.88–5.62)
SODIUM SERPL-SCNC: 139 MMOL/L (ref 135–147)
WBC # BLD AUTO: 9.35 THOUSAND/UL (ref 4.31–10.16)

## 2025-08-21 PROCEDURE — 76775 US EXAM ABDO BACK WALL LIM: CPT

## 2025-08-21 PROCEDURE — 99233 SBSQ HOSP IP/OBS HIGH 50: CPT | Performed by: INTERNAL MEDICINE

## 2025-08-21 PROCEDURE — 83735 ASSAY OF MAGNESIUM: CPT | Performed by: NURSE PRACTITIONER

## 2025-08-21 PROCEDURE — 80048 BASIC METABOLIC PNL TOTAL CA: CPT | Performed by: NURSE PRACTITIONER

## 2025-08-21 PROCEDURE — 85027 COMPLETE CBC AUTOMATED: CPT | Performed by: NURSE PRACTITIONER

## 2025-08-21 PROCEDURE — 99232 SBSQ HOSP IP/OBS MODERATE 35: CPT | Performed by: NURSE PRACTITIONER

## 2025-08-21 RX ADMIN — CARBIDOPA AND LEVODOPA 1.5 TABLET: 25; 100 TABLET ORAL at 09:47

## 2025-08-21 RX ADMIN — DEXTRAN 70, GLYCERIN, HYPROMELLOSE 1 DROP: 1; 2; 3 SOLUTION/ DROPS OPHTHALMIC at 18:22

## 2025-08-21 RX ADMIN — PRAMIPEXOLE DIHYDROCHLORIDE 0.12 MG: 0.25 TABLET ORAL at 15:31

## 2025-08-21 RX ADMIN — HEPARIN SODIUM 5000 UNITS: 5000 INJECTION INTRAVENOUS; SUBCUTANEOUS at 15:31

## 2025-08-21 RX ADMIN — Medication 3 MG: at 21:32

## 2025-08-21 RX ADMIN — AMLODIPINE BESYLATE 2.5 MG: 2.5 TABLET ORAL at 09:47

## 2025-08-21 RX ADMIN — RIVASTIGMINE 1 PATCH: 4.6 PATCH TRANSDERMAL at 10:02

## 2025-08-21 RX ADMIN — FUROSEMIDE 50 MG: 10 INJECTION, SOLUTION INTRAMUSCULAR; INTRAVENOUS at 09:47

## 2025-08-21 RX ADMIN — FUROSEMIDE 50 MG: 10 INJECTION, SOLUTION INTRAMUSCULAR; INTRAVENOUS at 15:31

## 2025-08-21 RX ADMIN — CARBIDOPA AND LEVODOPA 1.5 TABLET: 25; 100 TABLET ORAL at 21:31

## 2025-08-21 RX ADMIN — HEPARIN SODIUM 5000 UNITS: 5000 INJECTION INTRAVENOUS; SUBCUTANEOUS at 21:35

## 2025-08-21 RX ADMIN — HEPARIN SODIUM 5000 UNITS: 5000 INJECTION INTRAVENOUS; SUBCUTANEOUS at 05:25

## 2025-08-21 RX ADMIN — DEXTRAN 70, GLYCERIN, HYPROMELLOSE 1 DROP: 1; 2; 3 SOLUTION/ DROPS OPHTHALMIC at 09:57

## 2025-08-21 RX ADMIN — PRAMIPEXOLE DIHYDROCHLORIDE 0.12 MG: 0.25 TABLET ORAL at 09:47

## 2025-08-21 RX ADMIN — CARBIDOPA AND LEVODOPA 1.5 TABLET: 25; 100 TABLET ORAL at 15:31

## 2025-08-21 RX ADMIN — PRAMIPEXOLE DIHYDROCHLORIDE 0.12 MG: 0.25 TABLET ORAL at 21:31

## 2025-08-22 VITALS
BODY MASS INDEX: 28.47 KG/M2 | TEMPERATURE: 98.4 F | HEART RATE: 67 BPM | SYSTOLIC BLOOD PRESSURE: 161 MMHG | DIASTOLIC BLOOD PRESSURE: 57 MMHG | RESPIRATION RATE: 16 BRPM | WEIGHT: 187.83 LBS | OXYGEN SATURATION: 97 % | HEIGHT: 68 IN

## 2025-08-22 PROCEDURE — 99239 HOSP IP/OBS DSCHRG MGMT >30: CPT | Performed by: NURSE PRACTITIONER

## 2025-08-22 PROCEDURE — 99232 SBSQ HOSP IP/OBS MODERATE 35: CPT | Performed by: INTERNAL MEDICINE

## 2025-08-22 RX ORDER — TORSEMIDE 10 MG/1
10 TABLET ORAL DAILY
Qty: 30 TABLET | Refills: 0 | Status: SHIPPED | OUTPATIENT
Start: 2025-08-23 | End: 2025-09-22

## 2025-08-22 RX ORDER — TORSEMIDE 10 MG/1
10 TABLET ORAL DAILY
Status: DISCONTINUED | OUTPATIENT
Start: 2025-08-23 | End: 2025-08-22 | Stop reason: HOSPADM

## 2025-08-22 RX ADMIN — Medication 1000 UNITS: at 09:30

## 2025-08-22 RX ADMIN — HEPARIN SODIUM 5000 UNITS: 5000 INJECTION INTRAVENOUS; SUBCUTANEOUS at 05:04

## 2025-08-22 RX ADMIN — CARBIDOPA AND LEVODOPA 1.5 TABLET: 25; 100 TABLET ORAL at 09:30

## 2025-08-22 RX ADMIN — AMLODIPINE BESYLATE 2.5 MG: 2.5 TABLET ORAL at 09:30

## 2025-08-22 RX ADMIN — PRAMIPEXOLE DIHYDROCHLORIDE 0.12 MG: 0.25 TABLET ORAL at 09:30

## 2025-08-22 RX ADMIN — ACETAMINOPHEN 650 MG: 325 TABLET ORAL at 04:50

## 2025-08-22 RX ADMIN — CYANOCOBALAMIN TAB 500 MCG 1000 MCG: 500 TAB at 09:30

## 2025-08-22 RX ADMIN — DEXTRAN 70, GLYCERIN, HYPROMELLOSE 1 DROP: 1; 2; 3 SOLUTION/ DROPS OPHTHALMIC at 09:35

## 2025-08-22 RX ADMIN — FUROSEMIDE 50 MG: 10 INJECTION, SOLUTION INTRAMUSCULAR; INTRAVENOUS at 09:32

## 2025-08-22 RX ADMIN — RIVASTIGMINE 1 PATCH: 4.6 PATCH TRANSDERMAL at 09:36

## (undated) RX ORDER — CYCLOSPORINE 0.5 MG/ML: 1 EMULSION OPHTHALMIC TWICE A DAY

## (undated) RX ORDER — LIFITEGRAST 50 MG/ML: 1 SOLUTION/ DROPS OPHTHALMIC TWICE A DAY